# Patient Record
Sex: FEMALE | Race: WHITE | NOT HISPANIC OR LATINO | Employment: OTHER | URBAN - METROPOLITAN AREA
[De-identification: names, ages, dates, MRNs, and addresses within clinical notes are randomized per-mention and may not be internally consistent; named-entity substitution may affect disease eponyms.]

---

## 2017-01-09 ENCOUNTER — OFFICE VISIT (OUTPATIENT)
Dept: PEDIATRICS | Facility: CLINIC | Age: 57
End: 2017-01-09
Payer: COMMERCIAL

## 2017-01-09 VITALS
HEIGHT: 61 IN | SYSTOLIC BLOOD PRESSURE: 131 MMHG | HEART RATE: 84 BPM | BODY MASS INDEX: 28.9 KG/M2 | OXYGEN SATURATION: 97 % | DIASTOLIC BLOOD PRESSURE: 84 MMHG | TEMPERATURE: 96.5 F | WEIGHT: 153.1 LBS

## 2017-01-09 DIAGNOSIS — R06.2 WHEEZING: ICD-10-CM

## 2017-01-09 DIAGNOSIS — F33.2 SEVERE SEASONAL AFFECTIVE DISORDER (H): ICD-10-CM

## 2017-01-09 DIAGNOSIS — N95.1 MENOPAUSAL SYNDROME (HOT FLASHES): ICD-10-CM

## 2017-01-09 DIAGNOSIS — G47.00 INSOMNIA, UNSPECIFIED TYPE: ICD-10-CM

## 2017-01-09 DIAGNOSIS — J30.9 ATOPIC RHINITIS: ICD-10-CM

## 2017-01-09 DIAGNOSIS — H10.45 OTHER CHRONIC ALLERGIC CONJUNCTIVITIS: ICD-10-CM

## 2017-01-09 DIAGNOSIS — B07.0 VERRUCA PLANTARIS: ICD-10-CM

## 2017-01-09 DIAGNOSIS — R52 PAIN: ICD-10-CM

## 2017-01-09 DIAGNOSIS — F32.5 MAJOR DEPRESSION IN COMPLETE REMISSION (H): Primary | ICD-10-CM

## 2017-01-09 LAB — TSH SERPL DL<=0.005 MIU/L-ACNC: 1.18 MU/L (ref 0.4–4)

## 2017-01-09 PROCEDURE — 82306 VITAMIN D 25 HYDROXY: CPT | Mod: 90 | Performed by: NURSE PRACTITIONER

## 2017-01-09 PROCEDURE — 36415 COLL VENOUS BLD VENIPUNCTURE: CPT | Performed by: NURSE PRACTITIONER

## 2017-01-09 PROCEDURE — 84443 ASSAY THYROID STIM HORMONE: CPT | Performed by: NURSE PRACTITIONER

## 2017-01-09 PROCEDURE — 99214 OFFICE O/P EST MOD 30 MIN: CPT | Performed by: NURSE PRACTITIONER

## 2017-01-09 PROCEDURE — 99000 SPECIMEN HANDLING OFFICE-LAB: CPT | Performed by: NURSE PRACTITIONER

## 2017-01-09 RX ORDER — FEXOFENADINE HYDROCHLORIDE 180 MG/1
180 TABLET, FILM COATED ORAL DAILY
Qty: 90 TABLET | Refills: 3 | Status: SHIPPED | OUTPATIENT
Start: 2017-01-09

## 2017-01-09 RX ORDER — ALBUTEROL SULFATE 90 UG/1
2 AEROSOL, METERED RESPIRATORY (INHALATION) EVERY 6 HOURS PRN
Qty: 1 INHALER | Refills: 5 | Status: SHIPPED | OUTPATIENT
Start: 2017-01-09 | End: 2018-07-23

## 2017-01-09 RX ORDER — FLUTICASONE PROPIONATE 50 MCG
1-2 SPRAY, SUSPENSION (ML) NASAL DAILY
Qty: 16 G | Refills: 5 | Status: SHIPPED | OUTPATIENT
Start: 2017-01-09 | End: 2021-08-26

## 2017-01-09 RX ORDER — VENLAFAXINE HYDROCHLORIDE 75 MG/1
225 CAPSULE, EXTENDED RELEASE ORAL DAILY
Qty: 270 CAPSULE | Refills: 3 | Status: SHIPPED | OUTPATIENT
Start: 2017-01-09 | End: 2018-01-19

## 2017-01-09 RX ORDER — TRAZODONE HYDROCHLORIDE 50 MG/1
50 TABLET, FILM COATED ORAL AT BEDTIME
Qty: 90 TABLET | Refills: 3 | Status: SHIPPED | OUTPATIENT
Start: 2017-01-09 | End: 2018-03-05

## 2017-01-09 RX ORDER — OLOPATADINE HYDROCHLORIDE 2 MG/ML
1 SOLUTION/ DROPS OPHTHALMIC DAILY
Qty: 1 BOTTLE | Refills: 6 | Status: SHIPPED | OUTPATIENT
Start: 2017-01-09 | End: 2021-09-17

## 2017-01-09 ASSESSMENT — ANXIETY QUESTIONNAIRES
3. WORRYING TOO MUCH ABOUT DIFFERENT THINGS: MORE THAN HALF THE DAYS
2. NOT BEING ABLE TO STOP OR CONTROL WORRYING: MORE THAN HALF THE DAYS
6. BECOMING EASILY ANNOYED OR IRRITABLE: SEVERAL DAYS
1. FEELING NERVOUS, ANXIOUS, OR ON EDGE: MORE THAN HALF THE DAYS
GAD7 TOTAL SCORE: 9
5. BEING SO RESTLESS THAT IT IS HARD TO SIT STILL: SEVERAL DAYS
7. FEELING AFRAID AS IF SOMETHING AWFUL MIGHT HAPPEN: NOT AT ALL

## 2017-01-09 ASSESSMENT — PATIENT HEALTH QUESTIONNAIRE - PHQ9: 5. POOR APPETITE OR OVEREATING: SEVERAL DAYS

## 2017-01-09 NOTE — NURSING NOTE
"Chief Complaint   Patient presents with     RECHECK     recheck medication for hot flashes     Allergies     need a new script for allegra and inhaler     Depression     would like to discuss restarting prozac       Initial /84 mmHg  Pulse 84  Temp(Src) 96.5  F (35.8  C) (Temporal)  Ht 5' 0.75\" (1.543 m)  Wt 153 lb 1.6 oz (69.446 kg)  BMI 29.17 kg/m2  SpO2 97%  LMP 06/12/2014  Breastfeeding? No Estimated body mass index is 29.17 kg/(m^2) as calculated from the following:    Height as of this encounter: 5' 0.75\" (1.543 m).    Weight as of this encounter: 153 lb 1.6 oz (69.446 kg)..  BP completed using cuff size: KEV Nolan    "

## 2017-01-09 NOTE — PROGRESS NOTES
SUBJECTIVE:                                                    Marcelina Estevez is a 56 year old female who presents to clinic today for the following health issues:    1. Recheck medication for hot flashes.    2. Need a new script for allegra and inhaler    Depression and Anxiety Follow-Up    Status since last visit: Worsened     Other associated symptoms:hard to concentrate, feels more irritable     Complicating factors:     Significant life event: Yes-  Long term care for parents     Current substance abuse: None    PHQ-9 SCORE 6/7/2016 9/28/2016 1/9/2017   Total Score - - -   Total Score MyChart - 12 (Moderate depression) -   Total Score 2 12 8     MARCELINA-7 SCORE 9/24/2015 6/7/2016 1/9/2017   Total Score - - -   Total Score 3 5 9        PHQ-9  English      PHQ-9   Any Language     GAD7   Gotten worse in the last year and was added on Remeron and did not like it how it make her feel  Is caring for her mother with Alzheimer   Now her dad 90 yo and lives alone and was recently diagnosed with pneumonia and is trying to get him into assisted living   Has been in contact with          Amount of exercise or physical activity: walking,yoga    Problems taking medications regularly: No    Medication side effects: none    Diet: regular (no restrictions)        Problem list and histories reviewed & adjusted, as indicated.  Additional history: as documented    Patient Active Problem List   Diagnosis     Hypertension goal BP (blood pressure) < 140/90     Hypertriglyceridemia     Night sweats     Atopic rhinitis     Major depression in complete remission (H)     Keratitis sicca, bilateral     Hyperlipidemia LDL goal <100     CKD (chronic kidney disease) stage 1, GFR 90 ml/min or greater     Menopausal syndrome (hot flashes)     Allergic rhinitis due to pollen     Status post total hysterectomy     Anemia     ACP (advance care planning)     Past Surgical History   Procedure Laterality Date     Release carpal tunnel  bilateral  2009     Orthopedic surgery       left knee tear meniscus     Appendectomy       Ent surgery       tonsillectomy and adnoid removal     Biopsy       cervical node     Dilation and curettage, hysteroscopy diagnostic, combined  7/1/2014     Procedure: COMBINED DILATION AND CURETTAGE, HYSTEROSCOPY DIAGNOSTIC;  Surgeon: Mana Cabrera DO;  Location: [facility] OR     Hysterectomy total abdominal         Social History   Substance Use Topics     Smoking status: Former Smoker     Quit date: 01/01/2001     Smokeless tobacco: Never Used     Alcohol Use: Yes      Comment: social     Family History   Problem Relation Age of Onset     OSTEOPOROSIS Mother      Eye Disorder Mother      cataract, mac degen     OSTEOPOROSIS Father      Eye Disorder Father      glaucoma     Hypertension Maternal Grandmother      Unknown/Adopted Paternal Grandfather      DIABETES Other      Eye Disorder Paternal Grandmother      glaucoma     Hypertension Daughter      DIABETES Maternal Grandfather          Current Outpatient Prescriptions   Medication Sig Dispense Refill     cloNIDine (CATAPRES) 0.2 MG tablet Take 1 tablet (0.2 mg) by mouth daily 90 tablet 1     phenazopyridine (PYRIDIUM) 100 MG tablet Take 1 tablet (100 mg) by mouth 3 times daily as needed for urinary tract discomfort 12 tablet 0     acetaminophen-codeine (TYLENOL #3) 300-30 MG per tablet Take 1 tablet by mouth every 4 hours as needed for moderate pain 15 tablet 0     ALPRAZolam (XANAX) 0.25 MG tablet Take 1 tablet (0.25 mg) by mouth daily as needed for anxiety 30 tablet 1     simvastatin (ZOCOR) 40 MG tablet Take 1 tablet (40 mg) by mouth At Bedtime 90 tablet 2     losartan-hydrochlorothiazide (HYZAAR) 100-25 MG per tablet Take 1 tablet by mouth daily 90 tablet 2     venlafaxine (EFFEXOR-XR) 75 MG 24 hr capsule Take 3 capsules (225 mg) by mouth daily 270 capsule 1     fexofenadine (ALLEGRA ALLERGY) 180 MG tablet Take 1 tablet (180 mg) by mouth daily 30 tablet 0      "fluticasone (FLONASE) 50 MCG/ACT nasal spray Spray 1-2 sprays into both nostrils daily 16 g 5     albuterol (PROAIR HFA, PROVENTIL HFA, VENTOLIN HFA) 108 (90 BASE) MCG/ACT inhaler Inhale 2 puffs into the lungs every 6 hours as needed for shortness of breath / dyspnea 1 Inhaler 5     olopatadine HCl (PATADAY) 0.2 % SOLN Place 1 drop into both eyes daily 1 Bottle 6     traZODone (DESYREL) 50 MG tablet Take 0.5-1 tablets (25-50 mg) by mouth nightly as needed for sleep 90 tablet 3     tretinoin (RETIN-A) 0.05 % cream Spread a pea size amount into affected area on the face topically at bedtime.  Use sunscreen SPF>30. 45 g 2     aspirin (ASPIR-LOW) 81 MG EC tablet Take 81 mg by mouth daily.       Calcium 600+D 600-200 MG-UNIT TABS Take  by mouth.       Allergies   Allergen Reactions     Seasonal Allergies      Sulfa Drugs      Vomiting       Vicodin [Hydrocodone-Acetaminophen] Nausea     Wellbutrin [Bupropion] Nausea       ROS:  CONSTITUTIONAL:NEGATIVE for fever, chills, change in weight  ENT/MOUTH: NEGATIVE for ear, mouth and throat problems  RESP:NEGATIVE for significant cough or SOB  CV: NEGATIVE for chest pain, palpitations or peripheral edema  GI: NEGATIVE for nausea, poor appetite and vomiting  MUSCULOSKELETAL: POSITIVE  for joint pain feet  and NEGATIVE for joint swelling  and joint warmth   NEURO: NEGATIVE for weakness, dizziness or paresthesias  PSYCHIATRIC: POSITIVE foranxiety, depressed mood, Hx anxiety, Hx depression and stress and NEGATIVE forthoughts of hurting someone else and thoughts of self harm  Uses the trazodone but only periodically   OBJECTIVE:                                                    /84 mmHg  Pulse 84  Temp(Src) 96.5  F (35.8  C) (Temporal)  Ht 5' 0.75\" (1.543 m)  Wt 153 lb 1.6 oz (69.446 kg)  BMI 29.17 kg/m2  SpO2 97%  LMP 06/12/2014  Breastfeeding? No  Body mass index is 29.17 kg/(m^2).  GENERAL APPEARANCE: alert, active and no distress  HENT: oral mucous membranes " moist  NECK: no adenopathy and no asymmetry, masses, or scars  RESP: lungs clear to auscultation - no rales, rhonchi or wheezes  CV: regular rates and rhythm, no murmur, click or rub and no irregular beats  MS: extremities normal- no gross deformities noted  NEURO: Normal strength and tone, mentation intact and speech normal  PSYCH: mentation appears normal and affect normal/bright  MENTAL STATUS EXAM:  Appearance/Behavior: No apparent distress and Casually groomed  Speech: Normal  Mood/Affect: normal affect  Insight: Adequate    Diagnostic Test Results:  Results for orders placed or performed in visit on 01/09/17   Vitamin D Deficiency   Result Value Ref Range    Vitamin D Deficiency screening 24 20 - 75 ug/L   TSH with free T4 reflex   Result Value Ref Range    TSH 1.18 0.40 - 4.00 mU/L        ASSESSMENT/PLAN:                                                      Marcelina was seen today for recheck, allergies and depression.    Diagnoses and all orders for this visit:    Major depression in complete remission (H)  -     Vitamin D Deficiency  -     TSH with free T4 reflex    Severe seasonal affective disorder (H)  -     order for DME; Equipment being ordered: light lamp for seasonal affective disorder    Wheezing  -     albuterol (PROAIR HFA/PROVENTIL HFA/VENTOLIN HFA) 108 (90 BASE) MCG/ACT Inhaler; Inhale 2 puffs into the lungs every 6 hours as needed for shortness of breath / dyspnea    Other chronic allergic conjunctivitis  -     olopatadine HCl (PATADAY) 0.2 % SOLN; Place 1 drop into both eyes daily    Verruca plantaris  -     acetaminophen-codeine (TYLENOL #3) 300-30 MG per tablet; Take 1 tablet by mouth every 4 hours as needed for moderate pain  Continue current medications.    Pain  -     acetaminophen-codeine (TYLENOL #3) 300-30 MG per tablet; Take 1 tablet by mouth every 4 hours as needed for moderate pain  Continue current medications.    Menopausal syndrome (hot flashes)  -     venlafaxine (EFFEXOR-XR) 75  MG 24 hr capsule; Take 3 capsules (225 mg) by mouth daily    Atopic rhinitis  -     ALLEGRA ALLERGY 180 MG tablet; Take 1 tablet (180 mg) by mouth daily  -     fluticasone (FLONASE) 50 MCG/ACT spray; Spray 1-2 sprays into both nostrils daily    Insomnia, unspecified type  -     traZODone (DESYREL) 50 MG tablet; Take 1 tablet (50 mg) by mouth At Bedtime    PLAN:   Symptomatic therapy suggested:   Take trazodone every night  Will try a seasonal affective disorder lamp.    Patient needs to follow up in if no improvement,or sooner if worsening of symptoms or other symptoms develop.  Will follow up and/or notify patient of  results via My Chart to determine further need for followup  Follow up in 1 month with Dr. Painting    See Patient Instructions    EDUARD Milner [facility]

## 2017-01-09 NOTE — PATIENT INSTRUCTIONS
It was a pleasure seeing you today at the [facility]. Thank you for allowing us to care for you today. We truly hope we provided you with the excellent service you deserve. Please let us know if there is anything else we can do for you so we can be sure you are leaving completley satisfied with your care experience.       General information about your clinic   Clinic Hours Lab Hours (Appointments are required)   Mon-Thurs: 7:30 AM - 7 PM Mon-Thurs: 7:30 AM - 7 PM   Fri: 7:30 AM - 5 PM Fri: 7:30 AM - 5 PM        After Hours Nurse Advise & Appts:  [facility]: [phone]  [facility]: to make appointments anytime: [phone] On Call Physician: call [phone] and answering service will page the on call physician.        For urgent appointments, please call [phone] and ask for the triage nurse or your care team clinic nurse.  How to contact my care team:  [facility]: [email or web address]   Phone: [phone]   Fax: [phone]       [facility]:   Phone: [phone]  Hours: 8:00 AM - 6:00 PM  Medication Refills:  Call your pharmacy and they will forward the refill to us. Please allow 3 business days for your refills to be completed.       Normal or non-critical lab and imaging results will be communicated to you by MyChart, letter or phone within 7 days.  If you do not hear from us within 10 days, please call the clinic. If you have a critical or abnormal lab result, we will notify you by phone as soon as possible.       We now have PWIC (Pediatric Walk in Care)  Monday-Friday from 7:30-4. Simply walk in and be seen for your urgent needs like cough, fever, rash, diarrhea or vomiting, pink eye, UTI. No appointments needed. Ask one of the team for more information      -Your Care Team:    Dr. Annette Painting - Internal Medicine/Pediatrics   Dr. Abdias Gold - Family Medicine  Dr. Bianca Serrano - Pediatrics  Keerthi Burr CNP - Family Practice Nurse  Practitioner    PLAN:   1.   Symptomatic therapy suggested:   Take trazodone every night  Will try a seasonal affective disorder lamp.  2.  Orders Placed This Encounter   Medications     albuterol (PROAIR HFA/PROVENTIL HFA/VENTOLIN HFA) 108 (90 BASE) MCG/ACT Inhaler     Sig: Inhale 2 puffs into the lungs every 6 hours as needed for shortness of breath / dyspnea     Dispense:  1 Inhaler     Refill:  5     ALLEGRA ALLERGY 180 MG tablet     Sig: Take 1 tablet (180 mg) by mouth daily     Dispense:  90 tablet     Refill:  3     fluticasone (FLONASE) 50 MCG/ACT spray     Sig: Spray 1-2 sprays into both nostrils daily     Dispense:  16 g     Refill:  5     olopatadine HCl (PATADAY) 0.2 % SOLN     Sig: Place 1 drop into both eyes daily     Dispense:  1 Bottle     Refill:  6     acetaminophen-codeine (TYLENOL #3) 300-30 MG per tablet     Sig: Take 1 tablet by mouth every 4 hours as needed for moderate pain     Dispense:  15 tablet     Refill:  0     venlafaxine (EFFEXOR-XR) 75 MG 24 hr capsule     Sig: Take 3 capsules (225 mg) by mouth daily     Dispense:  270 capsule     Refill:  3     traZODone (DESYREL) 50 MG tablet     Sig: Take 1 tablet (50 mg) by mouth At Bedtime     Dispense:  90 tablet     Refill:  3     order for DME     Sig: Equipment being ordered: light lamp for seasonal affective disorder     Dispense:  1 Device     Refill:  0     Orders Placed This Encounter   Procedures     Vitamin D Deficiency     TSH with free T4 reflex       3. Patient needs to follow up in if no improvement,or sooner if worsening of symptoms or other symptoms develop.  Will follow up and/or notify patient of  results via My Chart to determine further need for followup  Follow up in 1 month with Dr. Painting

## 2017-01-09 NOTE — MR AVS SNAPSHOT
After Visit Summary   1/9/2017    Marcelina Estevez    MRN: [id number]           Patient Information     Date Of Birth          1960        Visit Information        Provider Department      1/9/2017 2:20 PM Keerthi Burr APRN [facility]        Today's Diagnoses     Wheezing    -  1     Major depression in complete remission (H)         Other chronic allergic conjunctivitis         Verruca plantaris         Pain         Menopausal syndrome (hot flashes)         Atopic rhinitis         Insomnia, unspecified type         Severe seasonal affective disorder (H)           Care Instructions    It was a pleasure seeing you today at the [facility]. Thank you for allowing us to care for you today. We truly hope we provided you with the excellent service you deserve. Please let us know if there is anything else we can do for you so we can be sure you are leaving completley satisfied with your care experience.       General information about your clinic   Clinic Hours Lab Hours (Appointments are required)   Mon-Thurs: 7:30 AM - 7 PM Mon-Thurs: 7:30 AM - 7 PM   Fri: 7:30 AM - 5 PM Fri: 7:30 AM - 5 PM        After Hours Nurse Advise & Appts:  [facility]: [phone]  [facility] On Call: to make appointments anytime: [phone] On Call Physician: call [phone] and answering service will page the on call physician.        For urgent appointments, please call [phone] and ask for the triage nurse or your care team clinic nurse.  How to contact my care team:  MyChart: [email or web address]   Phone: [phone]   Fax: [phone]       [facility]:   Phone: [phone]  Hours: 8:00 AM - 6:00 PM  Medication Refills:  Call your pharmacy and they will forward the refill to us. Please allow 3 business days for your refills to be completed.       Normal or non-critical lab and imaging results will be communicated to you by  Anyt, letter or phone within 7 days.  If you do not hear from us within 10 days, please call the clinic. If you have a critical or abnormal lab result, we will notify you by phone as soon as possible.       We now have PWIC (Pediatric Walk in Care)  Monday-Friday from 7:30-4. Simply walk in and be seen for your urgent needs like cough, fever, rash, diarrhea or vomiting, pink eye, UTI. No appointments needed. Ask one of the team for more information      -Your Care Team:    Dr. Annette Painting - Internal Medicine/Pediatrics   Dr. Abdias Gold - Family Medicine  Dr. Bianca Serrano - Pediatrics  Keerthi Burr CNP - Family Practice Nurse Practitioner    PLAN:   1.   Symptomatic therapy suggested:   Take trazodone every night  Will try a seasonal affective disorder lamp.  2.  Orders Placed This Encounter   Medications     albuterol (PROAIR HFA/PROVENTIL HFA/VENTOLIN HFA) 108 (90 BASE) MCG/ACT Inhaler     Sig: Inhale 2 puffs into the lungs every 6 hours as needed for shortness of breath / dyspnea     Dispense:  1 Inhaler     Refill:  5     ALLEGRA ALLERGY 180 MG tablet     Sig: Take 1 tablet (180 mg) by mouth daily     Dispense:  90 tablet     Refill:  3     fluticasone (FLONASE) 50 MCG/ACT spray     Sig: Spray 1-2 sprays into both nostrils daily     Dispense:  16 g     Refill:  5     olopatadine HCl (PATADAY) 0.2 % SOLN     Sig: Place 1 drop into both eyes daily     Dispense:  1 Bottle     Refill:  6     acetaminophen-codeine (TYLENOL #3) 300-30 MG per tablet     Sig: Take 1 tablet by mouth every 4 hours as needed for moderate pain     Dispense:  15 tablet     Refill:  0     venlafaxine (EFFEXOR-XR) 75 MG 24 hr capsule     Sig: Take 3 capsules (225 mg) by mouth daily     Dispense:  270 capsule     Refill:  3     traZODone (DESYREL) 50 MG tablet     Sig: Take 1 tablet (50 mg) by mouth At Bedtime     Dispense:  90 tablet     Refill:  3     order for DME     Sig: Equipment being ordered: light lamp for seasonal affective  disorder     Dispense:  1 Device     Refill:  0     Orders Placed This Encounter   Procedures     Vitamin D Deficiency     TSH with free T4 reflex       3. Patient needs to follow up in if no improvement,or sooner if worsening of symptoms or other symptoms develop.  Will follow up and/or notify patient of  results via My Chart to determine further need for followup  Follow up in 1 month with Dr. Painting                         Follow-ups after your visit        Who to contact     If you have questions or need follow up information about today's clinic visit or your schedule please contact [facility] directly at [phone].  Normal or non-critical lab and imaging results will be communicated to you by [employer], letter or phone within 4 business days after the clinic has received the results. If you do not hear from us within 7 days, please contact the clinic through [employer] or phone. If you have a critical or abnormal lab result, we will notify you by phone as soon as possible.  Submit refill requests through [employer] or call your pharmacy and they will forward the refill request to us. Please allow 3 business days for your refill to be completed.          Additional Information About Your Visit        [employer] Information     [employer] gives you secure access to your electronic health record. If you see a primary care provider, you can also send messages to your care team and make appointments. If you have questions, please call your primary care clinic.  If you do not have a primary care provider, please call [phone] and they will assist you.      [employer] is an electronic gateway that provides easy, online access to your medical records. With [employer], you can request a clinic appointment, read your test results, renew a prescription or communicate with your care team.     To access your existing account, please contact your [facility] or call [phone] for  "assistance.        Care EveryWhere ID     This is your Care EveryWhere ID. This could be used by other organizations to access your [facility] medical records  [id number]        Your Vitals Were     Pulse Temperature Height    84 96.5  F (35.8  C) (Temporal) 5' 0.75\" (1.543 m)    BMI (Body Mass Index) Pulse Oximetry Last Period    29.17 kg/m2 97% 06/12/2014    Breastfeeding?          No         Blood Pressure from Last 3 Encounters:   01/09/17 131/84   11/14/16 126/80   10/03/16 124/86    Weight from Last 3 Encounters:   01/09/17 153 lb 1.6 oz (69.446 kg)   11/14/16 149 lb 9.6 oz (67.858 kg)   10/03/16 146 lb (66.225 kg)              We Performed the Following     TSH with free T4 reflex     Vitamin D Deficiency          Today's Medication Changes          These changes are accurate as of: 1/9/17  3:13 PM.  If you have any questions, ask your nurse or doctor.               Start taking these medicines.        Dose/Directions    ALLEGRA ALLERGY 180 MG tablet   Used for:  Atopic rhinitis   Generic drug:  fexofenadine   Replaces:  fexofenadine 180 MG tablet   Started by:  Keerthi Burr APRN CNP        Dose:  180 mg   Take 1 tablet (180 mg) by mouth daily   Quantity:  90 tablet   Refills:  3       order for DME   Used for:  Severe seasonal affective disorder (H)   Started by:  Keerthi Burr APRN CNP        Equipment being ordered: light lamp for seasonal affective disorder   Quantity:  1 Device   Refills:  0         These medicines have changed or have updated prescriptions.        Dose/Directions    traZODone 50 MG tablet   Commonly known as:  DESYREL   This may have changed:    - how much to take  - when to take this  - reasons to take this   Used for:  Insomnia, unspecified type   Changed by:  Keerthi Burr APRN CNP        Dose:  50 mg   Take 1 tablet (50 mg) by mouth At Bedtime   Quantity:  90 tablet   Refills:  3         Stop taking these medicines if you haven't already. Please contact " your care team if you have questions.     fexofenadine 180 MG tablet   Commonly known as:  ALLEGRA ALLERGY   Replaced by:  ALLEGRA ALLERGY 180 MG tablet   Stopped by:  Keerthi Burr APRN CNP                Where to get your medicines      These medications were sent to [facility] - [place], [place] - [address], [address]  [address], [address], [facility] [address]     Phone:  [phone]    - albuterol 108 (90 BASE) MCG/ACT Inhaler  - ALLEGRA ALLERGY 180 MG tablet  - fluticasone 50 MCG/ACT spray  - olopatadine HCl 0.2 % Soln  - traZODone 50 MG tablet  - venlafaxine 75 MG 24 hr capsule      Some of these will need a paper prescription and others can be bought over the counter.  Ask your nurse if you have questions.     Bring a paper prescription for each of these medications    - acetaminophen-codeine 300-30 MG per tablet  - order for DME             Primary Care Provider Office Phone # Fax #    Annette Painting [phone]       [facility] [address]  [facility] [address]        Thank you!     Thank you for choosing [facility]  for your care. Our goal is always to provide you with excellent care. Hearing back from our patients is one way we can continue to improve our services. Please take a few minutes to complete the written survey that you may receive in the mail after your visit with us. Thank you!             Your Updated Medication List - Protect others around you: Learn how to safely use, store and throw away your medicines at www.disposemymeds.org.          This list is accurate as of: 1/9/17  3:13 PM.  Always use your most recent med list.                   Brand Name Dispense Instructions for use    acetaminophen-codeine 300-30 MG per tablet    TYLENOL #3    15 tablet    Take 1 tablet by mouth every 4 hours as needed for moderate pain       albuterol 108 (90 BASE) MCG/ACT Inhaler    PROAIR HFA/PROVENTIL HFA/VENTOLIN  HFA    1 Inhaler    Inhale 2 puffs into the lungs every 6 hours as needed for shortness of breath / dyspnea       ALLEGRA ALLERGY 180 MG tablet   Generic drug:  fexofenadine     90 tablet    Take 1 tablet (180 mg) by mouth daily       ALPRAZolam 0.25 MG tablet    XANAX    30 tablet    Take 1 tablet (0.25 mg) by mouth daily as needed for anxiety       ASPIR-LOW 81 MG EC tablet   Generic drug:  aspirin      Take 81 mg by mouth daily.       CALCIUM 600+D 600-200 MG-UNIT Tabs   Generic drug:  calcium carbonate-vitamin D      Take  by mouth.       cloNIDine 0.2 MG tablet    CATAPRES    90 tablet    Take 1 tablet (0.2 mg) by mouth daily       fluticasone 50 MCG/ACT spray    FLONASE    16 g    Spray 1-2 sprays into both nostrils daily       losartan-hydrochlorothiazide 100-25 MG per tablet    HYZAAR    90 tablet    Take 1 tablet by mouth daily       olopatadine HCl 0.2 % Soln    PATADAY    1 Bottle    Place 1 drop into both eyes daily       order for DME     1 Device    Equipment being ordered: light lamp for seasonal affective disorder       phenazopyridine 100 MG tablet    PYRIDIUM    12 tablet    Take 1 tablet (100 mg) by mouth 3 times daily as needed for urinary tract discomfort       simvastatin 40 MG tablet    ZOCOR    90 tablet    Take 1 tablet (40 mg) by mouth At Bedtime       traZODone 50 MG tablet    DESYREL    90 tablet    Take 1 tablet (50 mg) by mouth At Bedtime       tretinoin 0.05 % cream    RETIN-A    45 g    Spread a pea size amount into affected area on the face topically at bedtime.  Use sunscreen SPF>30.       venlafaxine 75 MG 24 hr capsule    EFFEXOR-XR    270 capsule    Take 3 capsules (225 mg) by mouth daily

## 2017-01-10 LAB — DEPRECATED CALCIDIOL+CALCIFEROL SERPL-MC: 24 UG/L (ref 20–75)

## 2017-01-10 ASSESSMENT — PATIENT HEALTH QUESTIONNAIRE - PHQ9: SUM OF ALL RESPONSES TO PHQ QUESTIONS 1-9: 8

## 2017-01-10 ASSESSMENT — ANXIETY QUESTIONNAIRES: GAD7 TOTAL SCORE: 9

## 2017-01-10 NOTE — PROGRESS NOTES
Quick Note:    Cyndie Estevez,    Attached are your test results.  -TSH (thyroid stimulating hormone) level is normal which indicates normal thyroid function.   Please contact us if you have any questions.    Keerthi Burr, NICK    ______

## 2017-01-10 NOTE — PROGRESS NOTES
Quick Note:    Cyndie Estevez,    Attached are your test results.  -Vitamin D level is mildly below normal, 1000 IU daily in diet or supplements is recommended.    Please contact us if you have any questions.    Keerthi Burr, NICK    ______

## 2017-02-15 DIAGNOSIS — F41.0 PANIC ATTACK: ICD-10-CM

## 2017-02-15 DIAGNOSIS — R52 PAIN: ICD-10-CM

## 2017-02-15 DIAGNOSIS — B07.0 VERRUCA PLANTARIS: ICD-10-CM

## 2017-02-15 RX ORDER — ALPRAZOLAM 0.25 MG
0.25 TABLET ORAL DAILY PRN
Qty: 30 TABLET | Refills: 1 | Status: SHIPPED | OUTPATIENT
Start: 2017-02-15 | End: 2017-07-25

## 2017-02-15 NOTE — TELEPHONE ENCOUNTER
Hello,  last fill date:01-  Last quantity:15    Thank You,  Meagan Bishop  Pharmacy Technician  [facility]  [phone]

## 2017-04-26 DIAGNOSIS — B07.0 VERRUCA PLANTARIS: ICD-10-CM

## 2017-04-26 DIAGNOSIS — R52 PAIN: ICD-10-CM

## 2017-04-26 DIAGNOSIS — I10 HYPERTENSION GOAL BP (BLOOD PRESSURE) < 140/90: ICD-10-CM

## 2017-04-26 NOTE — TELEPHONE ENCOUNTER
Hello,    Patient last filled hyzaar on 1/23/17 for #90 and Tylenol #3 on 2/15/17 for #15    Thank you,    Cheryl Aguilar-Alena  [facility]   [phone]

## 2017-04-27 RX ORDER — LOSARTAN POTASSIUM AND HYDROCHLOROTHIAZIDE 25; 100 MG/1; MG/1
1 TABLET ORAL DAILY
Qty: 90 TABLET | Refills: 0 | Status: SHIPPED | OUTPATIENT
Start: 2017-04-27 | End: 2017-08-07

## 2017-04-27 RX ORDER — LOSARTAN POTASSIUM AND HYDROCHLOROTHIAZIDE 25; 100 MG/1; MG/1
TABLET ORAL
Qty: 60 TABLET | Refills: 0 | Status: SHIPPED | OUTPATIENT
Start: 2017-04-27 | End: 2017-08-07

## 2017-04-27 NOTE — TELEPHONE ENCOUNTER
losartan-hydrochlorothiazide (HYZAAR) 100-25 MG per tablet  Last Written Prescription Date: 7/28/2016  Last Fill Quantity: 90, # refills: 2  Last Office Visit with [facility], P or [facility] prescribing provider: 1/9/2017       Potassium   Date Value Ref Range Status   06/29/2016 4.0 3.4 - 5.3 mmol/L Final     Creatinine   Date Value Ref Range Status   06/29/2016 0.85 0.52 - 1.04 mg/dL Final     BP Readings from Last 3 Encounters:   01/09/17 131/84   11/14/16 126/80   10/03/16 124/86     Refilled per [facility] protocol.    Aisha Mclaughlin RN

## 2017-06-22 DIAGNOSIS — B07.0 VERRUCA PLANTARIS: ICD-10-CM

## 2017-06-22 DIAGNOSIS — I10 HYPERTENSION GOAL BP (BLOOD PRESSURE) < 140/90: ICD-10-CM

## 2017-06-22 DIAGNOSIS — R52 PAIN: ICD-10-CM

## 2017-06-22 DIAGNOSIS — R61 NIGHT SWEATS: ICD-10-CM

## 2017-06-22 RX ORDER — CLONIDINE HYDROCHLORIDE 0.2 MG/1
TABLET ORAL
Qty: 90 TABLET | Refills: 1 | OUTPATIENT
Start: 2017-06-22

## 2017-06-22 RX ORDER — LOSARTAN POTASSIUM AND HYDROCHLOROTHIAZIDE 25; 100 MG/1; MG/1
TABLET ORAL
Qty: 60 TABLET | Refills: 0 | OUTPATIENT
Start: 2017-06-22

## 2017-06-22 RX ORDER — LOSARTAN POTASSIUM AND HYDROCHLOROTHIAZIDE 25; 100 MG/1; MG/1
1 TABLET ORAL DAILY
Qty: 90 TABLET | Refills: 0 | OUTPATIENT
Start: 2017-06-22

## 2017-06-22 RX ORDER — CLONIDINE HYDROCHLORIDE 0.2 MG/1
0.2 TABLET ORAL DAILY
Qty: 30 TABLET | Refills: 0 | Status: SHIPPED | OUTPATIENT
Start: 2017-06-22 | End: 2017-07-25

## 2017-06-22 NOTE — TELEPHONE ENCOUNTER
Routing refill request to provider for review/approval because:  Drug not on the [facility] refill protocol   acetominophen-codeine (TYLENOL #3) 300-30 MG per tablet     Last Written Prescription Date: 4/27/2017  Last Fill Quantity: 15,  # refills: 0   Last Office Visit with [facility], [facility] or [facility] prescribing provider: 1/9/2017                                               losartan-hydrochlorothiazide (HYZAAR) 100-25 MG per tablet\  Last Written Prescription Date: 4/27/2017  Last Fill Quantity: 90, # refills: 0  Last Office Visit with [facility], [facility] or [facility] prescribing provider: 1/9/2017  Potassium   Date Value Ref Range Status   06/29/2016 4.0 3.4 - 5.3 mmol/L Final     Creatinine   Date Value Ref Range Status   06/29/2016 0.85 0.52 - 1.04 mg/dL Final     BP Readings from Last 3 Encounters:   01/09/17 131/84   11/14/16 126/80   10/03/16 124/86   Patient requesting refill too soon. Patient should still have a 30 day supply of Hyzaar. Refill refused.       cloNIDine (CATAPRES) 0.2 MG tablet    Last Written Prescription Date: 12/12/2016  Last Fill Quantity: 90, # refills: 1  Last Office Visit with [facility], [facility] or [facility] prescribing provider: 1/9/2017  Potassium   Date Value Ref Range Status   06/29/2016 4.0 3.4 - 5.3 mmol/L Final     Creatinine   Date Value Ref Range Status   06/29/2016 0.85 0.52 - 1.04 mg/dL Final     BP Readings from Last 3 Encounters:   01/09/17 131/84   11/14/16 126/80   10/03/16 124/86     Refilled Catapres for one month as patient will be due shortly for laboratory monitoring.     Refilled per [facility] protocol.    Aisha Mclaughlin RN

## 2017-06-22 NOTE — TELEPHONE ENCOUNTER
Hello,  last fill date:04-  Last quantity:15  For t#3       last fill date:04-  Last quantity:90 for losartan and clonidine    Thank You,  Meagan Bishop  Pharmacy Technician  [facility]  [phone]

## 2017-06-23 NOTE — TELEPHONE ENCOUNTER
Patient notified RX tubed to onsite pharmacy.  Read her Dr. Painting's note in full.  Patient verbalized understanding. She is coming in on Monday for mammogram, no appointments available that day.  She states she will call back to schedule a physical with fasting labs next week.        RX tubed to onsite pharmacy.    Josey Le RN,   [facility]

## 2017-06-23 NOTE — TELEPHONE ENCOUNTER
Please let patient now that we have new policy regarding Tylenol with codeine refills. She will need an appointment to sign pain contract for ongoing refills. I can give her one last prescription.   She will need to see me before future refills. She is also due for annual physical and fasting labs. Please schedule an appointment.

## 2017-06-26 ENCOUNTER — RADIANT APPOINTMENT (OUTPATIENT)
Dept: MAMMOGRAPHY | Facility: CLINIC | Age: 57
End: 2017-06-26
Attending: INTERNAL MEDICINE
Payer: COMMERCIAL

## 2017-06-26 DIAGNOSIS — Z12.31 VISIT FOR SCREENING MAMMOGRAM: ICD-10-CM

## 2017-06-26 PROCEDURE — G0202 SCR MAMMO BI INCL CAD: HCPCS | Performed by: RADIOLOGY

## 2017-06-26 PROCEDURE — 77063 BREAST TOMOSYNTHESIS BI: CPT | Performed by: RADIOLOGY

## 2017-07-25 DIAGNOSIS — R61 NIGHT SWEATS: ICD-10-CM

## 2017-07-25 DIAGNOSIS — F41.0 PANIC ATTACK: ICD-10-CM

## 2017-07-25 NOTE — TELEPHONE ENCOUNTER
Hello,  last fill date:06-  Last quantity:30    Thank You,  Meagan Bishop  Pharmacy Technician  [facility]  [phone]

## 2017-07-26 DIAGNOSIS — R61 NIGHT SWEATS: ICD-10-CM

## 2017-07-26 RX ORDER — ALPRAZOLAM 0.25 MG
0.25 TABLET ORAL DAILY PRN
Qty: 30 TABLET | Refills: 1 | Status: SHIPPED | OUTPATIENT
Start: 2017-07-26 | End: 2017-11-27

## 2017-07-26 RX ORDER — CLONIDINE HYDROCHLORIDE 0.2 MG/1
TABLET ORAL
Qty: 30 TABLET | Refills: 4 | Status: SHIPPED | OUTPATIENT
Start: 2017-07-26 | End: 2017-07-26

## 2017-07-26 RX ORDER — CLONIDINE HYDROCHLORIDE 0.2 MG/1
0.2 TABLET ORAL DAILY
Qty: 30 TABLET | Refills: 4 | Status: SHIPPED | OUTPATIENT
Start: 2017-07-26 | End: 2017-08-07

## 2017-07-26 NOTE — TELEPHONE ENCOUNTER
cloNIDine (CATAPRES) 0.2 MG tablet - due to internet connection issue - first script did not go through. Refilled. Aisha Mclaughlin RN

## 2017-07-26 NOTE — TELEPHONE ENCOUNTER
Routing refill request to provider for review/approval because:  Drug not on the [facility] refill protocol   ALPRAZolam (XANAX) 0.25 MG tablet  Last Written Prescription Date: 2/15/2017  Last Fill Quantity: 30,  # refills: 1   Last Office Visit with [facility] or [facility] prescribing provider: 1/9/2017                                         Next 5 appointments (look out 90 days)     Aug 07, 2017  2:20 PM CDT   PHYSICAL with Annette Painting MD   [facility])    [address] [place] [id number]   [phone]                  cloNIDine (CATAPRES) 0.2 MG tablet   Last Written Prescription Date: 6/22/2017  Last Fill Quantity: 30, # refills: 0  Last Office Visit with [facility], [facility] or [facility] prescribing provider: 1/9/2017  Next 5 appointments (look out 90 days)     Aug 07, 2017  2:20 PM CDT   PHYSICAL with Annette Painting MD   [facility])    [id number] [place] [id number]   [phone]                   Potassium   Date Value Ref Range Status   06/29/2016 4.0 3.4 - 5.3 mmol/L Final     Creatinine   Date Value Ref Range Status   06/29/2016 0.85 0.52 - 1.04 mg/dL Final     BP Readings from Last 3 Encounters:   01/09/17 131/84   11/14/16 126/80   10/03/16 124/86     Refilled clonidine per [facility] protocol.    Aisha Mclaughlin RN

## 2017-07-26 NOTE — TELEPHONE ENCOUNTER
Please inform patient, refill/prescriptioni approved. Please fax prescription to designated pharmacy.

## 2017-07-27 ENCOUNTER — DOCUMENTATION ONLY (OUTPATIENT)
Dept: LAB | Facility: CLINIC | Age: 57
End: 2017-07-27

## 2017-07-27 DIAGNOSIS — E78.5 HYPERLIPIDEMIA LDL GOAL <100: ICD-10-CM

## 2017-07-27 DIAGNOSIS — I10 HYPERTENSION GOAL BP (BLOOD PRESSURE) < 140/90: Primary | ICD-10-CM

## 2017-07-27 NOTE — TELEPHONE ENCOUNTER
Voicemail message left for patient advising her the script she requested has been sent to [facility] pharmacy.    Marielle Alexander CMA

## 2017-08-02 ENCOUNTER — TELEPHONE (OUTPATIENT)
Dept: PEDIATRICS | Facility: CLINIC | Age: 57
End: 2017-08-02

## 2017-08-02 DIAGNOSIS — B07.0 VERRUCA PLANTARIS: ICD-10-CM

## 2017-08-02 DIAGNOSIS — I10 HYPERTENSION GOAL BP (BLOOD PRESSURE) < 140/90: ICD-10-CM

## 2017-08-02 DIAGNOSIS — E78.5 HYPERLIPIDEMIA LDL GOAL <100: ICD-10-CM

## 2017-08-02 DIAGNOSIS — N18.1 CKD (CHRONIC KIDNEY DISEASE) STAGE 1, GFR 90 ML/MIN OR GREATER: Primary | ICD-10-CM

## 2017-08-02 DIAGNOSIS — R52 PAIN: ICD-10-CM

## 2017-08-02 LAB
ANION GAP SERPL CALCULATED.3IONS-SCNC: 9 MMOL/L (ref 3–14)
BUN SERPL-MCNC: 30 MG/DL (ref 7–30)
CALCIUM SERPL-MCNC: 9.5 MG/DL (ref 8.5–10.1)
CHLORIDE SERPL-SCNC: 97 MMOL/L (ref 94–109)
CHOLEST SERPL-MCNC: 237 MG/DL
CO2 SERPL-SCNC: 27 MMOL/L (ref 20–32)
CREAT SERPL-MCNC: 0.99 MG/DL (ref 0.52–1.04)
CREAT UR-MCNC: 137 MG/DL
GFR SERPL CREATININE-BSD FRML MDRD: 58 ML/MIN/1.7M2
GLUCOSE SERPL-MCNC: 98 MG/DL (ref 70–99)
HDLC SERPL-MCNC: 97 MG/DL
LDLC SERPL CALC-MCNC: 114 MG/DL
MICROALBUMIN UR-MCNC: 166 MG/L
MICROALBUMIN/CREAT UR: 121.17 MG/G CR (ref 0–25)
NONHDLC SERPL-MCNC: 140 MG/DL
POTASSIUM SERPL-SCNC: 4.1 MMOL/L (ref 3.4–5.3)
SODIUM SERPL-SCNC: 133 MMOL/L (ref 133–144)
TRIGL SERPL-MCNC: 129 MG/DL

## 2017-08-02 PROCEDURE — 82043 UR ALBUMIN QUANTITATIVE: CPT | Performed by: INTERNAL MEDICINE

## 2017-08-02 PROCEDURE — 80061 LIPID PANEL: CPT | Performed by: INTERNAL MEDICINE

## 2017-08-02 PROCEDURE — 80048 BASIC METABOLIC PNL TOTAL CA: CPT | Performed by: INTERNAL MEDICINE

## 2017-08-02 PROCEDURE — 36415 COLL VENOUS BLD VENIPUNCTURE: CPT | Performed by: INTERNAL MEDICINE

## 2017-08-02 NOTE — TELEPHONE ENCOUNTER
Patient advised of information below per Ira Burr.  Patient stated understanding.  Patient said she takes about 2 ibuprofen weekly for headaches or pain of some sort.  She said she takes Tylenol #3 but mentioned that she only gets 15 tablets at a time and now Dr. Painting wants her to sign a pain contract which she has no idea what that is about.  Patient is wondering if you have any ideas of what she could take for pain that would work and not effect her Kidneys?  Patient would like you to put in the lab order for her so she doesn't have to worry about calling us in 3 months.    Marielle Alexander [facility]    Message routed to Ira Burr

## 2017-08-02 NOTE — TELEPHONE ENCOUNTER
Albumin Random Urine Quantitative   Status:  Final result   Visible to patient:  Yes ([facility]) Dx:  Hypertension goal BP (blood pressure)... Order: [id number]       Notes Recorded by Keerthi Burr APRN CNP on 8/2/2017 at 8:55 AM  Please call   Dr. Moulton out of the office and we are reviewing your results for you.  Please follow up if further concerns or questions   Please make sure taking her BP medications daily or taking a lot of NSAIDs OTC as her urine microalbuminuria went up considerably  ------    Notes Recorded by Keerthi Burr APRN CNP on 8/2/2017 at 8:54 AM  Please call  Marcelina Estevez,  Attached are your test results.  -Microalbumin (urine protein) level is elevated. This is suggestive of early damage to your kidneys from high blood pressure.  ADVISE: avoiding anti-inflamatory agents such as ibuprofen (Advil, Motrin) or naproxen (Aleve) as much as possible, keeping your blood pressure in a normal range, and rechecking your microalbumin level in 3 months (microalbumin; DX: Microalbuminuria)   Please contact us if you have any questions.    Keerthi Burr CNP             Ref Range & Units 8:10 AM   1yr ago   2yr ago        Creatinine Urine mg/dL 137 104 155      Albumin Urine mg/L mg/L 166 31 105      Albumin Urine mg/g Cr 0 - 25 mg/g Cr 121.17 (H) 29.90 (H) 67.74 (H)     Resulting Agency  [facility]          Specimen Collected: 08/02/17  8:10 AM Last Resulted: 08/02/17  8:44 AM                                Lipid panel reflex to direct LDL   Status:  Final result   [facility]  [facility] Dx:  Hyperlipidemia LDL goal <100 Order: [id number]       Notes Recorded by Keerthi Burr APRN CNP on 8/2/2017 at 8:55 AM  Please call   Dr. Moulton out of the office and we are reviewing your results for you.  Please follow up if further concerns or questions   Please make sure taking her BP medications daily or  taking a lot of NSAIDs OTC as her urine microalbuminuria went up considerably  ------    Notes Recorded by Keerthi Burr APRN CNP on 8/2/2017 at 8:54 AM  Please call  Marcelina Estevez,  Attached are your test results.  -Microalbumin (urine protein) level is elevated. This is suggestive of early damage to your kidneys from high blood pressure.  ADVISE: avoiding anti-inflamatory agents such as ibuprofen (Advil, Motrin) or naproxen (Aleve) as much as possible, keeping your blood pressure in a normal range, and rechecking your microalbumin level in 3 months (microalbumin; DX: Microalbuminuria)   Please contact us if you have any questions.    Keerthi Burr CNP    ------    Notes Recorded by Keerthi Burr APRN CNP on 8/2/2017 at 8:39 AM  Cyndie Estevez,    Is out of the office and we are reviewing your results for you.  Please follow up if further concerns or questions     Attached are your test results.  -Kidney function (GFR) is decreased.  ADVISE: lets recheck this not fasting in the next week or 2  and be sure drinking plenty of water  -Sodium is normal.  -Potassium is normal.  -Glucose (diabetic screening test) is normal.  -Cholesterol levels are at your goal levels.  ADVISE: Continuing your medication, a regular exercise program with at least 30 minutes of aerobic exercise 3-4 days/week ( 45 minutes 4-6 days/week if weight loss needed), and a low saturated fat,/low carbohydrate diet are helpful to maintain this.  Rechecking your fasting cholesterol panel in 12 months is recommended (Lipid w/ LDL reflex).   Please contact us if you have any questions.    Keerthi Burr CNP             Ref Range & Units 8:05 AM   1yr ago   2yr ago        Cholesterol <200 mg/dL 237 (H) 227 (H) (H)CM     Comments: Desirable:       <200 mg/dl      Triglycerides <150 mg/dL 129 106CM 83R, CM     Comments: Fasting specimen      HDL Cholesterol >49 mg/dL 97 99 102R      LDL Cholesterol Calculated <100  mg/dL 114 (H) 107 (H)CM 95R, CM     Comments: Above desirable:  100-129 mg/dl    Borderline High:  130-159 mg/dL    High:             160-189 mg/dL    Very high:       >189 mg/dl         Non HDL Cholesterol <130 mg/dL 140 (H) 128      Comments: Above Desirable:  130-159 mg/dl    Borderline high:  160-189 mg/dl    High:             190-219 mg/dl    Very high:       >219 mg/dl        [facility]ing Agency  [facility]          Specimen Collected: 08/02/17  8:05 AM Last Resulted: 08/02/17  8:30 AM                CM=Additional comments  R=Reference range differs from displayed range                     Basic metabolic panel   Status:  Final result   Visible to patient:  Yes (MyChart) Dx:  Hypertension goal BP (blood pressure)... Order: [id number]       Notes Recorded by Keerthi Burr APRN CNP on 8/2/2017 at 8:55 AM  Please call   Dr. Moulton out of the office and we are reviewing your results for you.  Please follow up if further concerns or questions   Please make sure taking her BP medications daily or taking a lot of NSAIDs OTC as her urine microalbuminuria went up considerably  ------    Notes Recorded by Keerthi Burr APRN CNP on 8/2/2017 at 8:54 AM  Please call  Marcelina Estevez,  Attached are your test results.  -Microalbumin (urine protein) level is elevated. This is suggestive of early damage to your kidneys from high blood pressure.  ADVISE: avoiding anti-inflamatory agents such as ibuprofen (Advil, Motrin) or naproxen (Aleve) as much as possible, keeping your blood pressure in a normal range, and rechecking your microalbumin level in 3 months (microalbumin; DX: Microalbuminuria)   Please contact us if you have any questions.    Keerthi Burr CNP    ------    Notes Recorded by Keerthi Burr APRN CNP on 8/2/2017 at 8:39 AM  Dr.Ho Reena  Is out of the office and we are reviewing your results for you.  Please follow up if further concerns or questions     Attached are your test  results.  -Kidney function (GFR) is decreased.  ADVISE: lets recheck this not fasting in the next week or 2  and be sure drinking plenty of water  -Sodium is normal.  -Potassium is normal.  -Glucose (diabetic screening test) is normal.  -Cholesterol levels are at your goal levels.  ADVISE: Continuing your medication, a regular exercise program with at least 30 minutes of aerobic exercise 3-4 days/week ( 45 minutes 4-6 days/week if weight loss needed), and a low saturated fat,/low carbohydrate diet are helpful to maintain this.  Rechecking your fasting cholesterol panel in 12 months is recommended (Lipid w/ LDL reflex).   Please contact us if you have any questions.    Keerthi Burr, CNP             Ref Range & Units 8:05 AM   1yr ago   2yr ago        Sodium 133 - 144 mmol/L 133 138 138      Potassium 3.4 - 5.3 mmol/L 4.1 4.0 4.3      Chloride 94 - 109 mmol/L 97 104 105      Carbon Dioxide 20 - 32 mmol/L 27 26 26      Anion Gap 3 - 14 mmol/L 9 8 7      Glucose 70 - 99 mg/dL 98 89 95     Comments: Fasting specimen      Urea Nitrogen 7 - 30 mg/dL 30 16 12      Creatinine 0.52 - 1.04 mg/dL 0.99 0.85 0.65      GFR Estimate >60 mL/min/1.7m2 58 (L) 69CM >90  Non  ...     Comments: Non  GFR Calc      GFR Estimate If Black >60 mL/min/1.7m2 70 84CM >90   GFR ...     Comments:  GFR Calc      Calcium 8.5 - 10.1 mg/dL 9.5 9.2 9.1     Resulting Agency  [facility]            Aisha Mclaughlin RN

## 2017-08-02 NOTE — PROGRESS NOTES
Please call  Marcelina Estevez,  Attached are your test results.  -Microalbumin (urine protein) level is elevated. This is suggestive of early damage to your kidneys from high blood pressure.  ADVISE: avoiding anti-inflamatory agents such as ibuprofen (Advil, Motrin) or naproxen (Aleve) as much as possible, keeping your blood pressure in a normal range, and rechecking your microalbumin level in 3 months (microalbumin; DX: Microalbuminuria)   Please contact us if you have any questions.    Keerthi Burr, CNP

## 2017-08-02 NOTE — PROGRESS NOTES
Dr.Ho Reena  Is out of the office and we are reviewing your results for you.  Please follow up if further concerns or questions     Attached are your test results.  -Kidney function (GFR) is decreased.  ADVISE: lets recheck this not fasting in the next week or 2  and be sure drinking plenty of water  -Sodium is normal.  -Potassium is normal.  -Glucose (diabetic screening test) is normal.  -Cholesterol levels are at your goal levels.  ADVISE: Continuing your medication, a regular exercise program with at least 30 minutes of aerobic exercise 3-4 days/week ( 45 minutes 4-6 days/week if weight loss needed), and a low saturated fat,/low carbohydrate diet are helpful to maintain this.  Rechecking your fasting cholesterol panel in 12 months is recommended (Lipid w/ LDL reflex).   Please contact us if you have any questions.    Keerthi Burr, CNP

## 2017-08-03 NOTE — TELEPHONE ENCOUNTER
[facility] Call Center    Phone Message    Name of Caller: QUAN CHENG    Phone Number: Home number on file [phone] (home)    Best time to return call: TOMORROW    May a detailed message be left on voicemail: no    Relation to patient: Self    Reason for Call: Other: Pt returning phone call to Kadie     Action Taken: Message routed to:  Primary Care p [phone]

## 2017-08-03 NOTE — TELEPHONE ENCOUNTER
She could try regular or arthritis tylenol for her pain as that does not bother the kidneys   Increasing her narcotics are not a very good idea as per recommendations nationally are to keep them to a minimum   May need to meet with Dr. Painting if she feels like she is having chronic headaches or podiatry if her feet are giving her a lot of difficulties

## 2017-08-04 NOTE — TELEPHONE ENCOUNTER
Patient advised of information below per Ira Burr.  Patient stated understanding.  Patient states she has an appt with Dr. Painting on Monday at 02:20 pm and she will discuss her pain meds/pain with at that time.    Marielle Alexander CMA

## 2017-08-07 ENCOUNTER — OFFICE VISIT (OUTPATIENT)
Dept: PEDIATRICS | Facility: CLINIC | Age: 57
End: 2017-08-07
Payer: COMMERCIAL

## 2017-08-07 VITALS
HEIGHT: 61 IN | OXYGEN SATURATION: 99 % | WEIGHT: 151 LBS | DIASTOLIC BLOOD PRESSURE: 80 MMHG | BODY MASS INDEX: 28.51 KG/M2 | TEMPERATURE: 98.6 F | HEART RATE: 83 BPM | SYSTOLIC BLOOD PRESSURE: 138 MMHG

## 2017-08-07 DIAGNOSIS — R61 NIGHT SWEATS: ICD-10-CM

## 2017-08-07 DIAGNOSIS — E78.5 HYPERLIPIDEMIA LDL GOAL <100: ICD-10-CM

## 2017-08-07 DIAGNOSIS — F32.5 MAJOR DEPRESSION IN COMPLETE REMISSION (H): ICD-10-CM

## 2017-08-07 DIAGNOSIS — I10 HYPERTENSION GOAL BP (BLOOD PRESSURE) < 140/90: ICD-10-CM

## 2017-08-07 DIAGNOSIS — B07.0 VERRUCA PLANTARIS: ICD-10-CM

## 2017-08-07 DIAGNOSIS — N18.2 CKD (CHRONIC KIDNEY DISEASE) STAGE 2, GFR 60-89 ML/MIN: ICD-10-CM

## 2017-08-07 DIAGNOSIS — Z23 NEED FOR SHINGLES VACCINE: ICD-10-CM

## 2017-08-07 DIAGNOSIS — G89.4 CHRONIC PAIN SYNDROME: ICD-10-CM

## 2017-08-07 DIAGNOSIS — G44.219 EPISODIC TENSION-TYPE HEADACHE, NOT INTRACTABLE: ICD-10-CM

## 2017-08-07 DIAGNOSIS — Z00.00 ROUTINE GENERAL MEDICAL EXAMINATION AT A HEALTH CARE FACILITY: Primary | ICD-10-CM

## 2017-08-07 DIAGNOSIS — Z23 NEED FOR PNEUMOCOCCAL VACCINE: ICD-10-CM

## 2017-08-07 PROCEDURE — 99213 OFFICE O/P EST LOW 20 MIN: CPT | Mod: 25 | Performed by: INTERNAL MEDICINE

## 2017-08-07 PROCEDURE — 99396 PREV VISIT EST AGE 40-64: CPT | Mod: 25 | Performed by: INTERNAL MEDICINE

## 2017-08-07 PROCEDURE — 90732 PPSV23 VACC 2 YRS+ SUBQ/IM: CPT | Performed by: INTERNAL MEDICINE

## 2017-08-07 PROCEDURE — 90471 IMMUNIZATION ADMIN: CPT | Performed by: INTERNAL MEDICINE

## 2017-08-07 RX ORDER — SIMVASTATIN 40 MG
40 TABLET ORAL AT BEDTIME
Qty: 90 TABLET | Refills: 3 | Status: SHIPPED | OUTPATIENT
Start: 2017-08-07 | End: 2018-06-11

## 2017-08-07 RX ORDER — LOSARTAN POTASSIUM AND HYDROCHLOROTHIAZIDE 25; 100 MG/1; MG/1
1 TABLET ORAL DAILY
Qty: 90 TABLET | Refills: 3 | Status: SHIPPED | OUTPATIENT
Start: 2017-08-07 | End: 2017-12-15

## 2017-08-07 RX ORDER — CLONIDINE HYDROCHLORIDE 0.2 MG/1
0.2 TABLET ORAL DAILY
Qty: 90 TABLET | Refills: 3 | Status: SHIPPED | OUTPATIENT
Start: 2017-08-07 | End: 2018-08-08

## 2017-08-07 ASSESSMENT — ANXIETY QUESTIONNAIRES
1. FEELING NERVOUS, ANXIOUS, OR ON EDGE: SEVERAL DAYS
5. BEING SO RESTLESS THAT IT IS HARD TO SIT STILL: NOT AT ALL
GAD7 TOTAL SCORE: 6
3. WORRYING TOO MUCH ABOUT DIFFERENT THINGS: SEVERAL DAYS
6. BECOMING EASILY ANNOYED OR IRRITABLE: SEVERAL DAYS
2. NOT BEING ABLE TO STOP OR CONTROL WORRYING: SEVERAL DAYS
7. FEELING AFRAID AS IF SOMETHING AWFUL MIGHT HAPPEN: SEVERAL DAYS

## 2017-08-07 ASSESSMENT — PATIENT HEALTH QUESTIONNAIRE - PHQ9
SUM OF ALL RESPONSES TO PHQ QUESTIONS 1-9: 3
5. POOR APPETITE OR OVEREATING: SEVERAL DAYS

## 2017-08-07 NOTE — PROGRESS NOTES
SUBJECTIVE:   CC: Marcelina Estevez is an 57 year old woman who presents for preventive health visit.     Healthy Habits:    Do you get at least three servings of calcium containing foods daily (dairy, green leafy vegetables, etc.)? yes    Amount of exercise or daily activities, outside of work: 4 day(s) per week    Problems taking medications regularly No    Medication side effects: No    Have you had an eye exam in the past two years? yes    Do you see a dentist twice per year? yes    Do you have sleep apnea, excessive snoring or daytime drowsiness?no      PROBLEMS TO ADD ON...  Revisit chronic pain management:   Right toe with wart surgically removed by podiatry, sometimes this will wake up at night.  Headache: stress related, not migraine. On Tylenol#3 15 tablets per months.   Patient had signed pain contract in 2011 but she didn't remember this. We reviewed the need for controlled medication agreement.     Recent labs with protein in the urine. She recalled having protein in the urine since she was a kid.   She was using more NSAIDS for headache recently. Will have her try Extra strength tylenol.       Today's PHQ-2 Score:   PHQ-2 ( 1999 [employer]) 8/7/2017 1/9/2017   Q1: Little interest or pleasure in doing things 1 3   Q2: Feeling down, depressed or hopeless 1 3   PHQ-2 Score 2 6       Abuse: Current or Past(Physical, Sexual or Emotional)- No  Do you feel safe in your environment - Yes    Social History   Substance Use Topics     Smoking status: Former Smoker     Quit date: 1/1/2001     Smokeless tobacco: Never Used     Alcohol use Yes      Comment: social     The patient does not drink >3 drinks per day nor >7 drinks per week.    Reviewed orders with patient.  Reviewed health maintenance and updated orders accordingly - Yes  Labs reviewed in [facility]    Patient over age 50, mutual decision to screen reflected in health maintenance.      Pertinent mammograms are reviewed under the imaging tab.  History of abnormal  "Pap smear: NO - age 30-65 PAP every 5 years with negative HPV co-testing recommended    Reviewed and updated as needed this visit by clinical staffTobacco  Allergies  Meds  Med Hx  Surg Hx  Fam Hx  Soc Hx        Reviewed and updated as needed this visit by Provider              ROS:  C: NEGATIVE for fever, chills, change in weight  I: NEGATIVE for worrisome rashes, moles or lesions  E: NEGATIVE for vision changes or irritation  ENT: NEGATIVE for ear, mouth and throat problems  R: NEGATIVE for significant cough or SOB  B: NEGATIVE for masses, tenderness or discharge  CV: NEGATIVE for chest pain, palpitations or peripheral edema  GI: NEGATIVE for nausea, abdominal pain, heartburn, or change in bowel habits  : NEGATIVE for unusual urinary or vaginal symptoms. No vaginal bleeding.  M: NEGATIVE for significant arthralgias or myalgia  N: NEGATIVE for weakness, dizziness or paresthesias  P: NEGATIVE for changes in mood or affect     OBJECTIVE:   /80 (Cuff Size: Adult Regular)  Pulse 83  Temp 98.6  F (37  C) (Temporal)  Ht 5' 0.75\" (1.543 m)  Wt 151 lb (68.5 kg)  LMP 06/12/2014  SpO2 99%  BMI 28.77 kg/m2  EXAM:  GENERAL: healthy, alert and no distress  EYES: Eyes grossly normal to inspection, PERRL and conjunctivae and sclerae normal  HENT: ear canals and TM's normal, nose and mouth without ulcers or lesions  NECK: no adenopathy, no asymmetry, masses, or scars and thyroid normal to palpation  RESP: lungs clear to auscultation - no rales, rhonchi or wheezes  BREAST: normal without masses, tenderness or nipple discharge and no palpable axillary masses or adenopathy  CV: regular rate and rhythm, normal S1 S2, no S3 or S4, no murmur, click or rub, no peripheral edema and peripheral pulses strong  ABDOMEN: soft, nontender, no hepatosplenomegaly, no masses and bowel sounds normal  MS: no gross musculoskeletal defects noted, no edema  SKIN: no suspicious lesions or rashes  NEURO: Normal strength and tone, " mentation intact and speech normal  PSYCH: mentation appears normal, affect normal/bright    Component      Latest Ref Rng & Units 8/2/2017   Sodium      133 - 144 mmol/L 133   Potassium      3.4 - 5.3 mmol/L 4.1   Chloride      94 - 109 mmol/L 97   Carbon Dioxide      20 - 32 mmol/L 27   Anion Gap      3 - 14 mmol/L 9   Glucose      70 - 99 mg/dL 98   Urea Nitrogen      7 - 30 mg/dL 30   Creatinine      0.52 - 1.04 mg/dL 0.99   GFR Estimate      >60 mL/min/1.7m2 58 (L)   GFR Estimate If Black      >60 mL/min/1.7m2 70   Calcium      8.5 - 10.1 mg/dL 9.5   Cholesterol      <200 mg/dL 237 (H)   Triglycerides      <150 mg/dL 129   HDL Cholesterol      >49 mg/dL 97   LDL Cholesterol Calculated      <100 mg/dL 114 (H)   Non HDL Cholesterol      <130 mg/dL 140 (H)   Creatinine Urine      mg/dL 137   Albumin Urine mg/L      mg/L 166   Albumin Urine mg/g Cr      0 - 25 mg/g Cr 121.17 (H)       ASSESSMENT/PLAN:       ICD-10-CM    1. Routine general medical examination at a health care facility Z00.00    2. Hypertension goal BP (blood pressure) < 140/90 I10 losartan-hydrochlorothiazide (HYZAAR) 100-25 MG per tablet   3. Verruca plantaris B07.0 acetaminophen-codeine (TYLENOL #3) 300-30 MG per tablet   4. Episodic tension-type headache, not intractable G44.219 acetaminophen-codeine (TYLENOL #3) 300-30 MG per tablet     Pain Drug Scr UR W Rptd Meds   5. Pain R52    6. Hyperlipidemia LDL goal <100 E78.5 simvastatin (ZOCOR) 40 MG tablet     Lipid panel reflex to direct LDL   7. CKD (chronic kidney disease) stage 2, GFR 60-89 ml/min N18.2 aspirin (ASPIR-LOW) 81 MG EC tablet     losartan-hydrochlorothiazide (HYZAAR) 100-25 MG per tablet     Hemoglobin     Pneumococcal vaccine 23 valent PPSV23  (Pneumovax) [44133]     ADMIN: Vaccine, Initial (37994)   8. Night sweats R61 cloNIDine (CATAPRES) 0.2 MG tablet   9. Major depression in complete remission (H) F32.5 DEPRESSION ACTION PLAN (DAP)   10. Need for pneumococcal vaccine Z23  "Pneumococcal vaccine 23 valent PPSV23  (Pneumovax) [68483]     ADMIN: Vaccine, Initial (31349)   11. Need for shingles vaccine Z23 zoster vaccine live, PF, (ZOSTAVAX) injection   12. Chronic pain syndrome G89.4        -- spent some time discussing need for monitoring controlled medications, narcotic is not the best for chronic pain. Pt will try with extra tylenol as much as possible. May continue with T#3 15 tabs per months. Pt told me she will have dental procedure done soon and her dentist normally give her Percocet for dental pain because T#3 isn't enough. This is permissible with controlled medication agreement provided she notifies us when she gets narcotics elsewhere for a new health condition.   -- CKD 3: avoid NSAIS, baby ASA, goal LDL < 100  -- stable chronic health issues. Medications refilled.   -- LDL not at goal: pt wants to try eating healthier, lose some weight. Continue simvastatin at 40 mg for now.   -- follow up in 3 months to recheck urine microalbumin and fasting lipid.     COUNSELING:   Reviewed preventive health counseling, as reflected in patient instructions         reports that she quit smoking about 16 years ago. She has never used smokeless tobacco.    Estimated body mass index is 28.77 kg/(m^2) as calculated from the following:    Height as of this encounter: 5' 0.75\" (1.543 m).    Weight as of this encounter: 151 lb (68.5 kg).   Weight management plan: pt plans to monitor her eating, walk more.     Counseling Resources:  ATP IV Guidelines  Pooled Cohorts Equation Calculator  Breast Cancer Risk Calculator  FRAX Risk Assessment  ICSI Preventive Guidelines  Dietary Guidelines for Americans, 2010  USDA's MyPlate  ASA Prophylaxis  Lung CA Screening    Annette Painting MD-PhD  [facility]  "

## 2017-08-07 NOTE — PATIENT INSTRUCTIONS
Make appointment(s) for:   -- fasting lab appointment in 3 months.         Medication(s) prescribed today:    Orders Placed This Encounter   Medications     acetaminophen-codeine (TYLENOL #3) 300-30 MG per tablet     Sig: Take 1 tablet by mouth every 4 hours as needed for moderate pain     Dispense:  15 tablet     Refill:  0     aspirin (ASPIR-LOW) 81 MG EC tablet     Sig: Take 1 tablet (81 mg) by mouth daily     Dispense:  30 tablet     simvastatin (ZOCOR) 40 MG tablet     Sig: Take 1 tablet (40 mg) by mouth At Bedtime     Dispense:  90 tablet     Refill:  3     losartan-hydrochlorothiazide (HYZAAR) 100-25 MG per tablet     Sig: Take 1 tablet by mouth daily     Dispense:  90 tablet     Refill:  3     cloNIDine (CATAPRES) 0.2 MG tablet     Sig: Take 1 tablet (0.2 mg) by mouth daily     Dispense:  90 tablet     Refill:  3     zoster vaccine live, PF, (ZOSTAVAX) injection     Sig: Inject 0.65 mLs Subcutaneous once for 1 dose     Dispense:  0.65 mL     Refill:  0           Preventive Health Recommendations  Female Ages 50 - 64    Yearly exam: See your health care provider every year in order to  o Review health changes.   o Discuss preventive care.    o Review your medicines if your doctor has prescribed any.      Get a Pap test every three years (unless you have an abnormal result and your provider advises testing more often).    If you get Pap tests with HPV test, you only need to test every 5 years, unless you have an abnormal result.     You do not need a Pap test if your uterus was removed (hysterectomy) and you have not had cancer.    You should be tested each year for STDs (sexually transmitted diseases) if you're at risk.     Have a mammogram every 1 to 2 years.    Have a colonoscopy at age 50, or have a yearly FIT test (stool test). These exams screen for colon cancer.      Have a cholesterol test every 5 years, or more often if advised.    Have a diabetes test (fasting glucose) every three years. If you are  at risk for diabetes, you should have this test more often.     If you are at risk for osteoporosis (brittle bone disease), think about having a bone density scan (DEXA).    Shots: Get a flu shot each year. Get a tetanus shot every 10 years.    Nutrition:     Eat at least 5 servings of fruits and vegetables each day.    Eat whole-grain bread, whole-wheat pasta and brown rice instead of white grains and rice.    Talk to your provider about Calcium and Vitamin D.     Lifestyle    Exercise at least 150 minutes a week (30 minutes a day, 5 days a week). This will help you control your weight and prevent disease.    Limit alcohol to one drink per day.    No smoking.     Wear sunscreen to prevent skin cancer.     See your dentist every six months for an exam and cleaning.    See your eye doctor every 1 to 2 years.

## 2017-08-07 NOTE — LETTER
My Depression Action Plan  Name: Marcelina Estevez   Date of Birth 1960  Date: 8/7/2017    My doctor: Annette Painting   My clinic: [address] [address] [phone]          GREEN    ZONE   Good Control    What it looks like:     Things are going generally well. You have normal up s and down s. You may even feel depressed from time to time, but bad moods usually last less than a day.   What you need to do:  1. Continue to care for yourself (see self care plan)  2. Check your depression survival kit and update it as needed  3. Follow your physician s recommendations including any medication.  4. Do not stop taking medication unless you consult with your physician first.           YELLOW         ZONE Getting Worse    What it looks like:     Depression is starting to interfere with your life.     It may be hard to get out of bed; you may be starting to isolate yourself from others.    Symptoms of depression are starting to last most all day and this has happened for several days.     You may have suicidal thoughts but they are not constant.   What you need to do:     1. Call your care team, your response to treatment will improve if you keep your care team informed of your progress. Yellow periods are signs an adjustment may need to be made.     2. Continue your self-care, even if you have to fake it!    3. Talk to someone in your support network    4. Open up your depression survival kit           RED    ZONE Medical Alert - Get Help    What it looks like:     Depression is seriously interfering with your life.     You may experience these or other symptoms: You can t get out of bed most days, can t work or engage in other necessary activities, you have trouble taking care of basic hygiene, or basic responsibilities, thoughts of suicide or death that will not go away, self-injurious behavior.     What you need to do:  1. Call your care team and request a same-day  appointment. If they are not available (weekends or after hours) call your local crisis line, emergency room or 911.      Electronically signed by: Annette Painting MD, August 7, 2017    Depression Self Care Plan / Survival Kit    Self-Care for Depression  Here s the deal. Your body and mind are really not as separate as most people think.  What you do and think affects how you feel and how you feel influences what you do and think. This means if you do things that people who feel good do, it will help you feel better.  Sometimes this is all it takes.  There is also a place for medication and therapy depending on how severe your depression is, so be sure to consult with your medical provider and/ or Behavioral Health Consultant if your symptoms are worsening or not improving.     In order to better manage my stress, I will:    Exercise  Get some form of exercise, every day. This will help reduce pain and release endorphins, the  feel good  chemicals in your brain. This is almost as good as taking antidepressants!  This is not the same as joining a gym and then never going! (they count on that by the way ) It can be as simple as just going for a walk or doing some gardening, anything that will get you moving.      Hygiene   Maintain good hygiene (Get out of bed in the morning, Make your bed, Brush your teeth, Take a shower, and Get dressed like you were going to work, even if you are unemployed).  If your clothes don't fit try to get ones that do.    Diet  I will strive to eat foods that are good for me, drink plenty of water, and avoid excessive sugar, caffeine, alcohol, and other mood-altering substances.  Some foods that are helpful in depression are: complex carbohydrates, B vitamins, flaxseed, fish or fish oil, fresh fruits and vegetables.    Psychotherapy  I agree to participate in Individual Therapy (if recommended).    Medication  If prescribed medications, I agree to take them.  Missing doses can result in serious  side effects.  I understand that drinking alcohol, or other illicit drug use, may cause potential side effects.  I will not stop my medication abruptly without first discussing it with my provider.    Staying Connected With Others  I will stay in touch with my friends, family members, and my primary care provider/team.    Use your imagination  Be creative.  We all have a creative side; it doesn t matter if it s oil painting, sand castles, or mud pies! This will also kick up the endorphins.    Witness Beauty  (AKA stop and smell the roses) Take a look outside, even in mid-winter. Notice colors, textures. Watch the squirrels and birds.     Service to others  Be of service to others.  There is always someone else in need.  By helping others we can  get out of ourselves  and remember the really important things.  This also provides opportunities for practicing all the other parts of the program.    Humor  Laugh and be silly!  Adjust your TV habits for less news and crime-drama and more comedy.    Control your stress  Try breathing deep, massage therapy, biofeedback, and meditation. Find time to relax each day.     My support system    Clinic Contact:  Phone number:    Contact 1:  Phone number:    Contact 2:  Phone number:    Anglican/:  Phone number:    Therapist:  Phone number:    Local crisis center:    Phone number:    Other community support:  Phone number:

## 2017-08-07 NOTE — NURSING NOTE
"Chief Complaint   Patient presents with     Physical       Initial /80 (Cuff Size: Adult Regular)  Pulse 83  Temp 98.6  F (37  C) (Temporal)  Ht 5' 0.75\" (1.543 m)  Wt 151 lb (68.5 kg)  LMP 06/12/2014  SpO2 99%  BMI 28.77 kg/m2 Estimated body mass index is 28.77 kg/(m^2) as calculated from the following:    Height as of this encounter: 5' 0.75\" (1.543 m).    Weight as of this encounter: 151 lb (68.5 kg).  Medication Reconciliation: complete    "

## 2017-08-07 NOTE — LETTER
Controlled Substance Agreement  I understand that my care provider has prescribed controlled substances (narcotics, tranquilizers, and/or stimulants) to help manage my condition(s).  I am taking this medicine to help me function or work.  I know that this is strong medicine.  It could have serious side effects and even cause a dependency on the drug.  If I stop these medicines suddenly, I could have severe withdrawal symptoms.    The risks, benefits, and side effects of these medication(s) were explained to me.  I agree that:  1. I will take part in other treatments as advised by my provider.  This may be psychiatry or counseling, physical therapy, behavioral therapy, group treatment, or a referral to a pain clinic.  I will reduce or stop my medicine when my provider tells me to do so.   2. I will take my medicines as prescribed.  I will not change the dose or schedule unless my provider tells me to.  There will be no refills if I  run out early.   I may be contacted at any time without warning and asked to complete a drug test or pill count.   3. I will keep all my appointments at the clinic.  If I miss appointments or fail to follow instructions, my provider may stop my medicine.  4. I will not ask other providers to prescribe controlled substances.  And I will not accept controlled substances from other people.  If I need a prescribed controlled substance for another reason, I will notify my provider.    5. I will use one pharmacy to fill all of my controlled substance prescriptions.  If my prescription is mailed to my pharmacy, it may take 5 to 7 days for my medicine to be ready.  6. I understand that my provider, clinic care team, and pharmacy can track controlled substance prescriptions from other providers through a central database (prescription monitoring program).  7. I will bring in my list of medications (or my medicine bottles) each time I come to the clinic.  8. Refills of controlled substances will be  made only during office hours.  It is up to me to make sure that I do not run out of my medicines on weekends or holidays.     9. I am responsible for my prescriptions.  If the medicine is lost or stolen, it will not be replaced.   I also agree not to share these medicines with anyone.  10. I agree to not use ANY illegal or recreational drugs.  This includes marijuana, cocaine, bath salts or other drugs.  I agree not to use alcohol unless my provider says I may.  I agree to give urine samples whenever asked.  If I fail to give a urine sample, the provider may stop my medicine.     11. I will tell my nurse or provider right away if I become pregnant or have a new medical problem treated outside of [facility] ([facility]  12. I understand that this medicine can affect my thinking and judgment.  It may be unsafe for me to drive, use machinery and do dangerous tasks.  I will not do any of these things until I know how the medicine affects me.  If my dose changes, I will wait to see how it affects me.  I will contact my provider if I have concerns about medicine side effects.  I understand that if I do not follow any of the conditions above, my prescriptions or treatment may be stopped.    I agree that my provider, clinic care team, and pharmacy may work with any city, state or federal law enforcement agency that investigates the misuse, sale, or other diversion of my controlled medicine. I will allow my provider to discuss my care with or share a copy of this agreement with any other treating provider, pharmacy or emergency room where I receive care.  I agree to give up (waive) any right of privacy or confidentiality with respect to these authorizations.   I have read this agreement and have asked questions about anything I did not understand.   ___________________________________    ___________________________  Patient Signature                                                   Date  ___________________________________      ____________________________  Witness                                                                    Date  ___________________________________  Libin Ho, MD-PhD

## 2017-08-07 NOTE — MR AVS SNAPSHOT
After Visit Summary   8/7/2017    Marcelina Estevez    MRN: [id number]           Patient Information     Date Of Birth          1960        Visit Information        Provider Department      8/7/2017 2:20 PM Annette Painting MD [facility]        Today's Diagnoses     Routine general medical examination at a health care facility    -  1    Hypertension goal BP (blood pressure) < 140/90        Verruca plantaris        Episodic tension-type headache, not intractable        Pain        Hyperlipidemia LDL goal <100        CKD (chronic kidney disease) stage 2, GFR 60-89 ml/min        Night sweats        Major depression in complete remission (H)        Need for pneumococcal vaccine        Need for shingles vaccine          Care Instructions    Make appointment(s) for:   -- fasting lab appointment in 3 months.         Medication(s) prescribed today:    Orders Placed This Encounter   Medications     acetaminophen-codeine (TYLENOL #3) 300-30 MG per tablet     Sig: Take 1 tablet by mouth every 4 hours as needed for moderate pain     Dispense:  15 tablet     Refill:  0     aspirin (ASPIR-LOW) 81 MG EC tablet     Sig: Take 1 tablet (81 mg) by mouth daily     Dispense:  30 tablet     simvastatin (ZOCOR) 40 MG tablet     Sig: Take 1 tablet (40 mg) by mouth At Bedtime     Dispense:  90 tablet     Refill:  3     losartan-hydrochlorothiazide (HYZAAR) 100-25 MG per tablet     Sig: Take 1 tablet by mouth daily     Dispense:  90 tablet     Refill:  3     cloNIDine (CATAPRES) 0.2 MG tablet     Sig: Take 1 tablet (0.2 mg) by mouth daily     Dispense:  90 tablet     Refill:  3     zoster vaccine live, PF, (ZOSTAVAX) injection     Sig: Inject 0.65 mLs Subcutaneous once for 1 dose     Dispense:  0.65 mL     Refill:  0           Preventive Health Recommendations  Female Ages 50 - 64    Yearly exam: See your health care provider every year in order to  o Review health changes.   o Discuss preventive care.     o Review your medicines if your doctor has prescribed any.      Get a Pap test every three years (unless you have an abnormal result and your provider advises testing more often).    If you get Pap tests with HPV test, you only need to test every 5 years, unless you have an abnormal result.     You do not need a Pap test if your uterus was removed (hysterectomy) and you have not had cancer.    You should be tested each year for STDs (sexually transmitted diseases) if you're at risk.     Have a mammogram every 1 to 2 years.    Have a colonoscopy at age 50, or have a yearly FIT test (stool test). These exams screen for colon cancer.      Have a cholesterol test every 5 years, or more often if advised.    Have a diabetes test (fasting glucose) every three years. If you are at risk for diabetes, you should have this test more often.     If you are at risk for osteoporosis (brittle bone disease), think about having a bone density scan (DEXA).    Shots: Get a flu shot each year. Get a tetanus shot every 10 years.    Nutrition:     Eat at least 5 servings of fruits and vegetables each day.    Eat whole-grain bread, whole-wheat pasta and brown rice instead of white grains and rice.    Talk to your provider about Calcium and Vitamin D.     Lifestyle    Exercise at least 150 minutes a week (30 minutes a day, 5 days a week). This will help you control your weight and prevent disease.    Limit alcohol to one drink per day.    No smoking.     Wear sunscreen to prevent skin cancer.     See your dentist every six months for an exam and cleaning.    See your eye doctor every 1 to 2 years.            Follow-ups after your visit        Your next 10 appointments already scheduled     Oct 02, 2017 10:30 AM CDT   Return Visit with Job Mandujano MD, MG OPHTH NURSE ONLY   [facility]    [address] [address] [place] [address] [phone]              Future tests that  were ordered for you today     Open Future Orders        Priority Expected Expires Ordered    Pain Drug Scr UR W Rptd Meds Routine  8/7/2018 8/7/2017    Lipid panel reflex to direct LDL Routine  8/7/2018 8/7/2017    Hemoglobin Routine  8/7/2018 8/7/2017            Who to contact     If you have questions or need follow up information about today's clinic visit or your schedule please contact [facility] directly at [phone].  Normal or non-critical lab and imaging results will be communicated to you by [employer], letter or phone within 4 business days after the clinic has received the results. If you do not hear from us within 7 days, please contact the clinic through [employer] or phone. If you have a critical or abnormal lab result, we will notify you by phone as soon as possible.  Submit refill requests through [employer] or call your pharmacy and they will forward the refill request to us. Please allow 3 business days for your refill to be completed.          Additional Information About Your Visit        [employer] Information     [employer] gives you secure access to your electronic health record. If you see a primary care provider, you can also send messages to your care team and make appointments. If you have questions, please call your primary care clinic.  If you do not have a primary care provider, please call [phone] and they will assist you.      [employer] is an electronic gateway that provides easy, online access to your medical records. With [employer], you can request a clinic appointment, read your test results, renew a prescription or communicate with your care team.     To access your existing account, please contact your [facility] or call [phone] for assistance.        Care EveryWhere ID     This is your Care EveryWhere ID. This could be used by other organizations to access your [facility] medical records  [id number]        Your Vitals Were     Pulse  "Temperature Height Last Period Pulse Oximetry BMI (Body Mass Index)    83 98.6  F (37  C) (Temporal) 5' 0.75\" (1.543 m) 06/12/2014 99% 28.77 kg/m2       Blood Pressure from Last 3 Encounters:   08/07/17 138/80   01/09/17 131/84   11/14/16 126/80    Weight from Last 3 Encounters:   08/07/17 151 lb (68.5 kg)   01/09/17 153 lb 1.6 oz (69.4 kg)   11/14/16 149 lb 9.6 oz (67.9 kg)              We Performed the Following     ADMIN: Vaccine, Initial (56267)     DEPRESSION ACTION PLAN (DAP)     Pneumococcal vaccine 23 valent PPSV23  (Pneumovax) [35940]          Today's Medication Changes          These changes are accurate as of: 8/7/17  3:23 PM.  If you have any questions, ask your nurse or doctor.               Start taking these medicines.        Dose/Directions    zoster vaccine live (PF) injection   Commonly known as:  ZOSTAVAX   Used for:  Need for shingles vaccine   Started by:  Annette Painting MD        Dose:  1 each   Inject 0.65 mLs Subcutaneous once for 1 dose   Quantity:  0.65 mL   Refills:  0         These medicines have changed or have updated prescriptions.        Dose/Directions    simvastatin 40 MG tablet   Commonly known as:  ZOCOR   This may have changed:  See the new instructions.   Used for:  Hyperlipidemia LDL goal <100   Changed by:  Annette Painting MD        Dose:  40 mg   Take 1 tablet (40 mg) by mouth At Bedtime   Quantity:  90 tablet   Refills:  3            Where to get your medicines      These medications were sent to [facility] [place], [place] - [address], [address]  [address], [address], [facility] [address]     Phone:  [phone]     cloNIDine 0.2 MG tablet    losartan-hydrochlorothiazide 100-25 MG per tablet    simvastatin 40 MG tablet    zoster vaccine live (PF) injection         Some of these will need a paper prescription and others can be bought over the counter.  Ask your nurse if you have questions.     Bring a paper prescription for each of these " medications     acetaminophen-codeine 300-30 MG per tablet                Primary Care Provider Office Phone # Fax #    Annette Painting [phone]       [facility] [address]  [facility] [address]        Equal Access to Services     LOLAANA CRISTINA CRYSTAL : Hadii aad ku hadasho Soomaali, waaxda luqadaha, qaybta kaalmada adeegyada, waxay idiin hayaan adeeg khelida laDraganpojaelyn foster. So [facility] [phone].    ATENCIÓN: Si habla español, tiene a mcgovern disposición servicios gratuitos de asistencia lingüística. Llame al [phone].    We comply with applicable federal civil rights laws and [place] laws. We do not discriminate on the basis of race, color, national origin, age, disability sex, sexual orientation or gender identity.            Thank you!     Thank you for choosing [facility]  for your care. Our goal is always to provide you with excellent care. Hearing back from our patients is one way we can continue to improve our services. Please take a few minutes to complete the written survey that you may receive in the mail after your visit with us. Thank you!             Your Updated Medication List - Protect others around you: Learn how to safely use, store and throw away your medicines at www.disposemymeds.org.          This list is accurate as of: 8/7/17  3:23 PM.  Always use your most recent med list.                   Brand Name Dispense Instructions for use Diagnosis    acetaminophen-codeine 300-30 MG per tablet    TYLENOL #3    15 tablet    Take 1 tablet by mouth every 4 hours as needed for moderate pain    Verruca plantaris, Episodic tension-type headache, not intractable       albuterol 108 (90 BASE) MCG/ACT Inhaler    PROAIR HFA/PROVENTIL HFA/VENTOLIN HFA    1 Inhaler    Inhale 2 puffs into the lungs every 6 hours as needed for shortness of breath / dyspnea    Wheezing       ALLEGRA ALLERGY 180 MG tablet   Generic drug:  fexofenadine     90 tablet    Take 1 tablet (180 mg) by  mouth daily    Atopic rhinitis       ALPRAZolam 0.25 MG tablet    XANAX    30 tablet    Take 1 tablet (0.25 mg) by mouth daily as needed for anxiety    Panic attack       ASPIR-LOW 81 MG EC tablet   Generic drug:  aspirin     30 tablet    Take 1 tablet (81 mg) by mouth daily    CKD (chronic kidney disease) stage 2, GFR 60-89 ml/min       CALCIUM 600+D 600-200 MG-UNIT Tabs   Generic drug:  calcium carbonate-vitamin D      Take  by mouth.        cloNIDine 0.2 MG tablet    CATAPRES    90 tablet    Take 1 tablet (0.2 mg) by mouth daily    Night sweats       fluticasone 50 MCG/ACT spray    FLONASE    16 g    Spray 1-2 sprays into both nostrils daily    Atopic rhinitis       losartan-hydrochlorothiazide 100-25 MG per tablet    HYZAAR    90 tablet    Take 1 tablet by mouth daily    Hypertension goal BP (blood pressure) < 140/90, CKD (chronic kidney disease) stage 2, GFR 60-89 ml/min       olopatadine HCl 0.2 % Soln    PATADAY    1 Bottle    Place 1 drop into both eyes daily    Other chronic allergic conjunctivitis       order for DME     1 Device    Equipment being ordered: light lamp for seasonal affective disorder    Severe seasonal affective disorder (H)       simvastatin 40 MG tablet    ZOCOR    90 tablet    Take 1 tablet (40 mg) by mouth At Bedtime    Hyperlipidemia LDL goal <100       traZODone 50 MG tablet    DESYREL    90 tablet    Take 1 tablet (50 mg) by mouth At Bedtime    Insomnia, unspecified type       tretinoin 0.05 % cream    RETIN-A    45 g    Spread a pea size amount into affected area on the face topically at bedtime.  Use sunscreen SPF>30.    Milium       venlafaxine 75 MG 24 hr capsule    EFFEXOR-XR    270 capsule    Take 3 capsules (225 mg) by mouth daily    Menopausal syndrome (hot flashes)       zoster vaccine live (PF) injection    ZOSTAVAX    0.65 mL    Inject 0.65 mLs Subcutaneous once for 1 dose    Need for shingles vaccine

## 2017-08-08 ASSESSMENT — ANXIETY QUESTIONNAIRES: GAD7 TOTAL SCORE: 6

## 2017-09-08 ENCOUNTER — TELEPHONE (OUTPATIENT)
Dept: PEDIATRICS | Facility: CLINIC | Age: 57
End: 2017-09-08

## 2017-09-08 DIAGNOSIS — J32.9 OTHER SINUSITIS, UNSPECIFIED CHRONICITY: Primary | ICD-10-CM

## 2017-09-08 RX ORDER — AMOXICILLIN 500 MG/1
1000 CAPSULE ORAL 2 TIMES DAILY
Qty: 40 CAPSULE | Refills: 0 | Status: SHIPPED | OUTPATIENT
Start: 2017-09-08 | End: 2017-09-18

## 2017-09-08 NOTE — TELEPHONE ENCOUNTER
"Called patient, her symptoms started 3 days ago, she did not go to  work on Wednesday, coughing at night, nasal drainage, full sinuses.  Headache, out of balance, nasal drainage is light green.  Has had chills and hot, but not fever.        Discussed with patient that what is advised home remedies such as rest, fluids, steam, neti pot.     She is not interested in Urgent Care or an appointment with a provider at another clinic.  She states \"I just want an antibiotic\".     Please advise.   Marcelle Rosa RN, [facility]    "

## 2017-09-08 NOTE — TELEPHONE ENCOUNTER
Left message informing patient prescription sent to her pharmacy.  If not better should be seen.  If further questions to please call clinic and ask to speak with nurse.    Josey Le RN,   [facility], [facility]

## 2017-09-08 NOTE — TELEPHONE ENCOUNTER
PATIENT CALLING BACK TO FIND OUT IF SHE CAN GET AN ANTIBIOTIC TODAY, WAS TRIAGED.  DO NOT SEE THAT MEDICATION WAS ORDERED. PLEASE CALL PATIENT OR SHE MAY HAVE TO GO TO URGENT CARE. [phone] (home) NONE (work)

## 2017-09-08 NOTE — TELEPHONE ENCOUNTER
[facility] Call Center    Phone Message    Name of Caller: Marcelina    Phone Number: Home number on file [phone] (home)    Best time to return call: Any    May a detailed message be left on voicemail: yes    Relation to patient: Self    Reason for Call: Other: Patient is calling to see if she can get a Rx to treat a sinus infection that she believes she has. She uses the [facility]. Please advise.      Action Taken: Message routed to:  Primary Care p [phone]

## 2017-09-08 NOTE — TELEPHONE ENCOUNTER
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  Good bye.\"    MARISSAI for Dr. Painting.    Marcelle Rosa RN, [facility]    "

## 2017-09-28 DIAGNOSIS — G44.209 TENSION HEADACHE: ICD-10-CM

## 2017-09-28 NOTE — TELEPHONE ENCOUNTER
Tylenol #3      Last Written Prescription Date: 8-17-17  Last Fill Quantity: 15,  # refills: 0   Last Office Visit with G, P or [facility] prescribing provider: 8-7-17                                         Next 5 appointments (look out 90 days)     Oct 02, 2017 10:30 AM CDT   Return Visit with Job Mandujano MD, MG OPH NURSE ONLY   [facility] ([facility]    [address] [place] [id number] [phone]                  [facility]  Pharmacy Float Tech  On Behalf of [facility]

## 2017-10-02 ENCOUNTER — OFFICE VISIT (OUTPATIENT)
Dept: OPHTHALMOLOGY | Facility: CLINIC | Age: 57
End: 2017-10-02
Payer: COMMERCIAL

## 2017-10-02 DIAGNOSIS — Z83.511 FAMILY HISTORY OF GLAUCOMA: ICD-10-CM

## 2017-10-02 DIAGNOSIS — H52.4 PRESBYOPIA: ICD-10-CM

## 2017-10-02 DIAGNOSIS — H52.223 REGULAR ASTIGMATISM OF BOTH EYES: ICD-10-CM

## 2017-10-02 DIAGNOSIS — H52.13 MYOPIA OF BOTH EYES: Primary | ICD-10-CM

## 2017-10-02 PROCEDURE — 92133 CPTRZD OPH DX IMG PST SGM ON: CPT | Performed by: OPHTHALMOLOGY

## 2017-10-02 PROCEDURE — 92015 DETERMINE REFRACTIVE STATE: CPT | Performed by: OPHTHALMOLOGY

## 2017-10-02 PROCEDURE — 92014 COMPRE OPH EXAM EST PT 1/>: CPT | Performed by: OPHTHALMOLOGY

## 2017-10-02 PROCEDURE — 92083 EXTENDED VISUAL FIELD XM: CPT | Performed by: OPHTHALMOLOGY

## 2017-10-02 ASSESSMENT — REFRACTION_WEARINGRX
OS_SPHERE: -3.50
OD_SPHERE: -5.25
SPECS_TYPE: PAL
OS_AXIS: 004
OD_ADD: +2.50
OS_CYLINDER: +1.50
OD_AXIS: 179
OD_CYLINDER: +3.25
OS_ADD: +2.50

## 2017-10-02 ASSESSMENT — REFRACTION_MANIFEST
OD_AXIS: 002
OD_ADD: +2.50
OD_SPHERE: -5.25
OS_ADD: +2.50
OD_CYLINDER: +3.25
OS_SPHERE: -3.75
OS_AXIS: 005
OS_CYLINDER: +1.25

## 2017-10-02 ASSESSMENT — PACHYMETRY
OD_CT(UM): 630
OS_CT(UM): 648

## 2017-10-02 ASSESSMENT — EXTERNAL EXAM - LEFT EYE: OS_EXAM: NORMAL

## 2017-10-02 ASSESSMENT — VISUAL ACUITY
OD_CC: 20/25
OS_CC: 20/25
OS_CC+: -2
OD_CC: 20/20
OS_CC: 20/20
CORRECTION_TYPE: GLASSES
METHOD: SNELLEN - LINEAR

## 2017-10-02 ASSESSMENT — TONOMETRY
OD_IOP_MMHG: 23
OS_IOP_MMHG: 20
IOP_METHOD: TONOPEN

## 2017-10-02 ASSESSMENT — SLIT LAMP EXAM - LIDS
COMMENTS: NORMAL
COMMENTS: NORMAL

## 2017-10-02 ASSESSMENT — CONF VISUAL FIELD
OS_NORMAL: 1
OD_NORMAL: 1

## 2017-10-02 ASSESSMENT — CUP TO DISC RATIO
OS_RATIO: 0.1
OD_RATIO: 0.1

## 2017-10-02 ASSESSMENT — EXTERNAL EXAM - RIGHT EYE: OD_EXAM: NORMAL

## 2017-10-02 NOTE — MR AVS SNAPSHOT
After Visit Summary   10/2/2017    Marcelina Estevez    MRN: [id number]           Patient Information     Date Of Birth          1960        Visit Information        Provider Department      10/2/2017 10:30 AM Job Mandujano MD;  OPHTH NURSE ONLY [facility]        Today's Diagnoses     Myopia of both eyes    -  1    Regular astigmatism of both eyes        Presbyopia        Family history of glaucoma           Follow-ups after your visit        Follow-up notes from your care team     Return in about 1 year (around 10/2/2018) for Annual Eye Exam.      Who to contact     If you have questions or need follow up information about today's clinic visit or your schedule please contact [facility] directly at [phone].  Normal or non-critical lab and imaging results will be communicated to you by Saperionhart, letter or phone within 4 business days after the clinic has received the results. If you do not hear from us within 7 days, please contact the clinic through Private Companyt or phone. If you have a critical or abnormal lab result, we will notify you by phone as soon as possible.  Submit refill requests through Turbocoating or call your pharmacy and they will forward the refill request to us. Please allow 3 business days for your refill to be completed.          Additional Information About Your Visit        Saperionhart Information     Turbocoating gives you secure access to your electronic health record. If you see a primary care provider, you can also send messages to your care team and make appointments. If you have questions, please call your primary care clinic.  If you do not have a primary care provider, please call [phone] and they will assist you.      Turbocoating is an electronic gateway that provides easy, online access to your medical records. With Turbocoating, you can request a clinic appointment, read your test results, renew a prescription or communicate with your care  team.     To access your existing account, please contact your [facility] or call [phone] for assistance.        Care EveryWhere ID     This is your Care EveryWhere ID. This could be used by other organizations to access your [facility] medical records  [id number]        Your Vitals Were     Last Period                   06/12/2014            Blood Pressure from Last 3 Encounters:   08/07/17 138/80   01/09/17 131/84   11/14/16 126/80    Weight from Last 3 Encounters:   08/07/17 68.5 kg (151 lb)   01/09/17 69.4 kg (153 lb 1.6 oz)   11/14/16 67.9 kg (149 lb 9.6 oz)              We Performed the Following     EYE EXAM, NEW PATIENT,COMPREHESV     HVF 24-2 OU     OCT Optic Nerve RNFL Optovue OU (both eyes)     REFRACTION        Primary Care Provider Office Phone # Fax #    Annette Painting MD PhD [phone]       [address]  [facility] [address]        Equal Access to Services     YOEL ROJAS : Hadii aad ku hadasho Soomaali, waaxda luqadaha, qaybta kaalmada adeegyada, waxay idiin haypon danny kharayuri jones . So [facility] [phone].    ATENCIÓN: Si habla español, tiene a mcgovern disposición servicios gratuitos de asistencia lingüística. Llame al [phone].    We comply with applicable federal civil rights laws and [place] laws. We do not discriminate on the basis of race, color, national origin, age, disability, sex, sexual orientation, or gender identity.            Thank you!     Thank you for choosing [facility]  for your care. Our goal is always to provide you with excellent care. Hearing back from our patients is one way we can continue to improve our services. Please take a few minutes to complete the written survey that you may receive in the mail after your visit with us. Thank you!             Your Updated Medication List - Protect others around you: Learn how to safely use, store and throw away your medicines at www.disposemymeds.org.           This list is accurate as of: 10/2/17 11:59 PM.  Always use your most recent med list.                   Brand Name Dispense Instructions for use Diagnosis    acetaminophen-codeine 300-30 MG per tablet    TYLENOL #3    15 tablet    Take 1 tablet by mouth every 4 hours as needed for moderate pain    Tension headache       albuterol 108 (90 BASE) MCG/ACT Inhaler    PROAIR HFA/PROVENTIL HFA/VENTOLIN HFA    1 Inhaler    Inhale 2 puffs into the lungs every 6 hours as needed for shortness of breath / dyspnea    Wheezing       ALLEGRA ALLERGY 180 MG tablet   Generic drug:  fexofenadine     90 tablet    Take 1 tablet (180 mg) by mouth daily    Atopic rhinitis       ALPRAZolam 0.25 MG tablet    XANAX    30 tablet    Take 1 tablet (0.25 mg) by mouth daily as needed for anxiety    Panic attack       ASPIR-LOW 81 MG EC tablet   Generic drug:  aspirin     30 tablet    Take 1 tablet (81 mg) by mouth daily    CKD (chronic kidney disease) stage 2, GFR 60-89 ml/min       CALCIUM 600+D 600-200 MG-UNIT Tabs   Generic drug:  calcium carbonate-vitamin D      Take  by mouth.        cloNIDine 0.2 MG tablet    CATAPRES    90 tablet    Take 1 tablet (0.2 mg) by mouth daily    Night sweats       fluticasone 50 MCG/ACT spray    FLONASE    16 g    Spray 1-2 sprays into both nostrils daily    Atopic rhinitis       losartan-hydrochlorothiazide 100-25 MG per tablet    HYZAAR    90 tablet    Take 1 tablet by mouth daily    Hypertension goal BP (blood pressure) < 140/90, CKD (chronic kidney disease) stage 2, GFR 60-89 ml/min       olopatadine HCl 0.2 % Soln    PATADAY    1 Bottle    Place 1 drop into both eyes daily    Other chronic allergic conjunctivitis       order for DME     1 Device    Equipment being ordered: light lamp for seasonal affective disorder    Severe seasonal affective disorder (H)       simvastatin 40 MG tablet    ZOCOR    90 tablet    Take 1 tablet (40 mg) by mouth At Bedtime    Hyperlipidemia LDL goal <100       traZODone 50  MG tablet    DESYREL    90 tablet    Take 1 tablet (50 mg) by mouth At Bedtime    Insomnia, unspecified type       tretinoin 0.05 % cream    RETIN-A    45 g    Spread a pea size amount into affected area on the face topically at bedtime.  Use sunscreen SPF>30.    Milium       venlafaxine 75 MG 24 hr capsule    EFFEXOR-XR    270 capsule    Take 3 capsules (225 mg) by mouth daily    Menopausal syndrome (hot flashes)
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
## 2017-10-02 NOTE — PROGRESS NOTES
Assessment & Plan   Marcelina Estevze is a 57 year old female who presents with:   Review of systems for the eyes was negative other than the pertinent positives and negatives noted in the HPI.  History is obtained from the patient.    Myopia of both eyes  - Rx per MR for new glasses (optional)  - REFRACTION    Regular astigmatism of both eyes    Presbyopia    Family history of glaucoma  - OCT Optic Nerve RNFL Optovue OU (both eyes)  - HVF 24-2 OU    Both normal OU. Observe.      Return in 1 year for annual exam    Documentation for today's encounter was performed by Edwina Ramos COA. OSC. Acting as a scribe in my presence. I have reviewed and verified that it is an accurate recording of today's encounter.    Attending Physician Attestation:  Complete documentation of historical and exam elements from today's encounter can be found in the full encounter summary report (not reduplicated in this progress note).  I personally obtained the chief complaint(s) and history of present illness.  I confirmed and edited as necessary the review of systems, past medical/surgical history, family history, social history, and examination findings as documented by others; and I examined the patient myself.  I personally reviewed the relevant tests, images, and reports as documented above.  I formulated and edited as necessary the assessment and plan and discussed the findings and management plan with the patient and family. - Job Mandujano MD

## 2017-11-27 DIAGNOSIS — G44.209 TENSION HEADACHE: ICD-10-CM

## 2017-11-27 DIAGNOSIS — F41.0 PANIC ATTACK: ICD-10-CM

## 2017-11-27 RX ORDER — ALPRAZOLAM 0.25 MG
0.25 TABLET ORAL DAILY PRN
Qty: 30 TABLET | Refills: 2 | Status: SHIPPED | OUTPATIENT
Start: 2017-11-27 | End: 2018-05-31

## 2017-11-27 NOTE — TELEPHONE ENCOUNTER
Patient advised of information below per Dr. Painting.  Patient stated understanding.  Tylenol #3 script and Xanax script sent via tube to [facility] pharmacy.    Marielle Alexander CMA

## 2017-11-27 NOTE — TELEPHONE ENCOUNTER
Hello,  last fill date:09-  Last quantity:30 days    Thank You,  Meagan Bishop  Pharmacy Technician  [facility]  [phone]

## 2017-12-15 ENCOUNTER — OFFICE VISIT (OUTPATIENT)
Dept: PEDIATRICS | Facility: CLINIC | Age: 57
End: 2017-12-15
Payer: COMMERCIAL

## 2017-12-15 ENCOUNTER — RADIANT APPOINTMENT (OUTPATIENT)
Dept: GENERAL RADIOLOGY | Facility: CLINIC | Age: 57
End: 2017-12-15
Attending: NURSE PRACTITIONER
Payer: COMMERCIAL

## 2017-12-15 VITALS
WEIGHT: 156.1 LBS | HEART RATE: 87 BPM | SYSTOLIC BLOOD PRESSURE: 145 MMHG | TEMPERATURE: 97.6 F | OXYGEN SATURATION: 98 % | DIASTOLIC BLOOD PRESSURE: 80 MMHG | BODY MASS INDEX: 29.74 KG/M2

## 2017-12-15 DIAGNOSIS — M25.50 MULTIPLE JOINT PAIN: ICD-10-CM

## 2017-12-15 DIAGNOSIS — R76.8 ELEVATED ANTINUCLEAR ANTIBODY (ANA) LEVEL: ICD-10-CM

## 2017-12-15 DIAGNOSIS — I10 HYPERTENSION GOAL BP (BLOOD PRESSURE) < 140/90: ICD-10-CM

## 2017-12-15 DIAGNOSIS — M25.50 MULTIPLE JOINT PAIN: Primary | ICD-10-CM

## 2017-12-15 DIAGNOSIS — N18.2 CKD (CHRONIC KIDNEY DISEASE) STAGE 2, GFR 60-89 ML/MIN: ICD-10-CM

## 2017-12-15 DIAGNOSIS — F32.5 MAJOR DEPRESSION IN COMPLETE REMISSION (H): ICD-10-CM

## 2017-12-15 LAB
ANION GAP SERPL CALCULATED.3IONS-SCNC: 9 MMOL/L (ref 3–14)
BUN SERPL-MCNC: 18 MG/DL (ref 7–30)
CALCIUM SERPL-MCNC: 9.4 MG/DL (ref 8.5–10.1)
CHLORIDE SERPL-SCNC: 100 MMOL/L (ref 94–109)
CO2 SERPL-SCNC: 24 MMOL/L (ref 20–32)
CREAT SERPL-MCNC: 0.97 MG/DL (ref 0.52–1.04)
CRP SERPL-MCNC: 6.8 MG/L (ref 0–8)
ERYTHROCYTE [SEDIMENTATION RATE] IN BLOOD BY WESTERGREN METHOD: 23 MM/H (ref 0–30)
GFR SERPL CREATININE-BSD FRML MDRD: 59 ML/MIN/1.7M2
GLUCOSE SERPL-MCNC: 88 MG/DL (ref 70–99)
POTASSIUM SERPL-SCNC: 4.2 MMOL/L (ref 3.4–5.3)
SODIUM SERPL-SCNC: 133 MMOL/L (ref 133–144)

## 2017-12-15 PROCEDURE — 86140 C-REACTIVE PROTEIN: CPT | Performed by: NURSE PRACTITIONER

## 2017-12-15 PROCEDURE — 73130 X-RAY EXAM OF HAND: CPT | Mod: LT | Performed by: RADIOLOGY

## 2017-12-15 PROCEDURE — 86200 CCP ANTIBODY: CPT | Performed by: NURSE PRACTITIONER

## 2017-12-15 PROCEDURE — 99214 OFFICE O/P EST MOD 30 MIN: CPT | Performed by: NURSE PRACTITIONER

## 2017-12-15 PROCEDURE — 86038 ANTINUCLEAR ANTIBODIES: CPT | Performed by: NURSE PRACTITIONER

## 2017-12-15 PROCEDURE — 36415 COLL VENOUS BLD VENIPUNCTURE: CPT | Performed by: NURSE PRACTITIONER

## 2017-12-15 PROCEDURE — 86431 RHEUMATOID FACTOR QUANT: CPT | Performed by: NURSE PRACTITIONER

## 2017-12-15 PROCEDURE — 86039 ANTINUCLEAR ANTIBODIES (ANA): CPT | Performed by: NURSE PRACTITIONER

## 2017-12-15 PROCEDURE — 85652 RBC SED RATE AUTOMATED: CPT | Performed by: NURSE PRACTITIONER

## 2017-12-15 PROCEDURE — 80048 BASIC METABOLIC PNL TOTAL CA: CPT | Performed by: NURSE PRACTITIONER

## 2017-12-15 RX ORDER — LOSARTAN POTASSIUM AND HYDROCHLOROTHIAZIDE 25; 100 MG/1; MG/1
1 TABLET ORAL DAILY
Qty: 90 TABLET | Refills: 3 | Status: SHIPPED | OUTPATIENT
Start: 2017-12-15 | End: 2018-09-10

## 2017-12-15 RX ORDER — BUPROPION HYDROCHLORIDE 150 MG/1
150 TABLET ORAL EVERY MORNING
Qty: 30 TABLET | Refills: 1 | Status: SHIPPED | OUTPATIENT
Start: 2017-12-15 | End: 2018-02-06

## 2017-12-15 ASSESSMENT — ANXIETY QUESTIONNAIRES
GAD7 TOTAL SCORE: 10
6. BECOMING EASILY ANNOYED OR IRRITABLE: MORE THAN HALF THE DAYS
3. WORRYING TOO MUCH ABOUT DIFFERENT THINGS: MORE THAN HALF THE DAYS
1. FEELING NERVOUS, ANXIOUS, OR ON EDGE: SEVERAL DAYS
2. NOT BEING ABLE TO STOP OR CONTROL WORRYING: MORE THAN HALF THE DAYS
5. BEING SO RESTLESS THAT IT IS HARD TO SIT STILL: SEVERAL DAYS
7. FEELING AFRAID AS IF SOMETHING AWFUL MIGHT HAPPEN: NOT AT ALL
IF YOU CHECKED OFF ANY PROBLEMS ON THIS QUESTIONNAIRE, HOW DIFFICULT HAVE THESE PROBLEMS MADE IT FOR YOU TO DO YOUR WORK, TAKE CARE OF THINGS AT HOME, OR GET ALONG WITH OTHER PEOPLE: SOMEWHAT DIFFICULT

## 2017-12-15 ASSESSMENT — PATIENT HEALTH QUESTIONNAIRE - PHQ9
5. POOR APPETITE OR OVEREATING: MORE THAN HALF THE DAYS
SUM OF ALL RESPONSES TO PHQ QUESTIONS 1-9: 8

## 2017-12-15 NOTE — PROGRESS NOTES
Cyndie Estevez,    Attached are your test results.  -Kidney function is stable (Cr, GFR), Sodium is normal, Potassium is normal, Calcium is normal, Glucose is normal (diabetes screening test).   Inflammatory markers so far are normal    Please contact us if you have any questions.    Keerthi Burr, CNP

## 2017-12-15 NOTE — NURSING NOTE
"Chief Complaint   Patient presents with     Depression     Anxiety       Initial /80 (BP Location: Right arm, Patient Position: Sitting, Cuff Size: Adult Regular)  Pulse 87  Temp 97.6  F (36.4  C) (Temporal)  Wt 156 lb 1.6 oz (70.8 kg)  LMP 06/12/2014  SpO2 98%  Breastfeeding? No  BMI 29.74 kg/m2 Estimated body mass index is 29.74 kg/(m^2) as calculated from the following:    Height as of 8/7/17: 5' 0.75\" (1.543 m).    Weight as of this encounter: 156 lb 1.6 oz (70.8 kg).  Medication Reconciliation: complete      KEV Espinoza      "

## 2017-12-15 NOTE — PATIENT INSTRUCTIONS
PLAN:   1.   Symptomatic therapy suggested: will add on wellbutrin once a day.  2.  Orders Placed This Encounter   Medications     VITAMIN D, CHOLECALCIFEROL, PO     Sig: Take 2,000 Units by mouth daily     losartan-hydrochlorothiazide (HYZAAR) 100-25 MG per tablet     Sig: Take 1 tablet by mouth daily     Dispense:  90 tablet     Refill:  3     buPROPion (WELLBUTRIN XL) 150 MG 24 hr tablet     Sig: Take 1 tablet (150 mg) by mouth every morning     Dispense:  30 tablet     Refill:  1     Orders Placed This Encounter   Procedures     XR Hand Bilateral G/E 3 Views     Erythrocyte sedimentation rate auto     CRP inflammation     Cyclic Citrullinated Peptide Antibody IgG     Rheumatoid factor     Anti Nuclear Farida IgG by IFA with Reflex     Basic metabolic panel       3. Patient needs to follow up in if no improvement,or sooner if worsening of symptoms or other symptoms develop.  FURTHER TESTING:       - hand xrays   Will follow up and/or notify patient of  results via My Chart to determine further need for followup  Follow up in 1 month.  Initiated consultation with LILLY Murrieta, Behavioral Health Clinician for mental health counseling.       It was a pleasure seeing you today at the [facility]. Thank you for allowing us to care for you today. We truly hope we provided you with the excellent service you deserve. Please let us know if there is anything else we can do for you so we can be sure you are leaving completley satisfied with your care experience.       General information about your clinic   Clinic Hours Lab Hours (Appointments are required)   Mon-Thurs: 7:30 AM - 7 PM Mon-Thurs: 7:30 AM - 7 PM   Fri: 7:30 AM - 5 PM Fri: 7:30 AM - 5 PM        After Hours Nurse Advise & Appts:  [facility]: [phone]  [facility] On Call: to make appointments anytime: [phone] On Call Physician: call [phone] and answering service will page the on call physician.         For urgent appointments, please call [phone] and ask for the triage nurse or your care team clinic nurse.  How to contact my care team:  [facility]: [email or web address]   Phone: [phone]   Fax: [phone]       [facility]:   Phone: [phone]  Hours: 8:00 AM - 6:00 PM  Medication Refills:  Call your pharmacy and they will forward the refill to us. Please allow 3 business days for your refills to be completed.       Normal or non-critical lab and imaging results will be communicated to you by MyChart, letter or phone within 7 days.  If you do not hear from us within 10 days, please call the clinic. If you have a critical or abnormal lab result, we will notify you by phone as soon as possible.       We now have PWIC (Pediatric Walk in Care)  Monday-Friday from 7:30-4. Simply walk in and be seen for your urgent needs like cough, fever, rash, diarrhea or vomiting, pink eye, UTI. No appointments needed. Ask one of the team for more information      -Your Care Team:    Dr. Annette Painting - Internal Medicine/Pediatrics   Dr. Abdias Gold - Family Medicine  Dr. Bianca Serrano - Pediatrics  Keerthi Burr CNP - Family Practice Nurse Practitioner  Dr. Talya Beltran - Pediatrics

## 2017-12-15 NOTE — PROGRESS NOTES
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  No.    Do you have concerns about your personal safety or the safety of others?   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 About a year ago had to take her keys and to get someone to get her pills.   Last year got a lamp which helps some       Amount of exercise or physical activity: 4-5 days/week for an average of 15-30 minutes    Problems taking medications regularly: No    Medication side effects: none    Diet: regular (no restrictions)    Hypertension Follow-up      Outpatient blood pressures are being checked at home.  Results are 180/76's.    Low Salt Diet: no added salt      Also, she has additional complaints of :  Has joint pain in multiple joints and morning stiffness   Has left elbow pain as well and intermittently         Problem list and histories reviewed & adjusted, as indicated.  Additional history: as documented    Patient Active Problem List   Diagnosis     Hypertension goal BP (blood pressure) < 140/90     Hypertriglyceridemia     Night sweats      Atopic rhinitis     Major depression in complete remission (H)     Keratitis sicca, bilateral     Hyperlipidemia LDL goal <100     CKD (chronic kidney disease) stage 1, GFR 90 ml/min or greater     Menopausal syndrome (hot flashes)     Allergic rhinitis due to pollen     Status post total hysterectomy     Anemia     ACP (advance care planning)     Tension headache     Past Surgical History:   Procedure Laterality Date     APPENDECTOMY       BIOPSY      cervical node     DILATION AND CURETTAGE, HYSTEROSCOPY DIAGNOSTIC, COMBINED  7/1/2014    Procedure: COMBINED DILATION AND CURETTAGE, HYSTEROSCOPY DIAGNOSTIC;  Surgeon: Mana Cabrera DO;  Location: [facility]     ENT SURGERY      tonsillectomy and adnoid removal     HYSTERECTOMY TOTAL ABDOMINAL       ORTHOPEDIC SURGERY      left knee tear meniscus     RELEASE CARPAL TUNNEL BILATERAL  2009       Social History   Substance Use Topics     Smoking status: Former Smoker     Quit date: 1/1/2001     Smokeless tobacco: Never Used     Alcohol use Yes      Comment: social     Family History   Problem Relation Age of Onset     OSTEOPOROSIS Mother      Eye Disorder Mother      cataract, mac degen     OSTEOPOROSIS Father      Eye Disorder Father      glaucoma     Hypertension Maternal Grandmother      Unknown/Adopted Paternal Grandfather      Eye Disorder Paternal Grandmother      glaucoma     Hypertension Daughter      DIABETES Maternal Grandfather      DIABETES Other          Current Outpatient Prescriptions   Medication Sig Dispense Refill     VITAMIN D, CHOLECALCIFEROL, PO Take 2,000 Units by mouth daily       acetaminophen-codeine (TYLENOL #3) 300-30 MG per tablet Take 1 tablet by mouth every 4 hours as needed for moderate pain 15 tablet 0     ALPRAZolam (XANAX) 0.25 MG tablet Take 1 tablet (0.25 mg) by mouth daily as needed for anxiety 30 tablet 2     simvastatin (ZOCOR) 40 MG tablet Take 1 tablet (40 mg) by mouth At Bedtime 90 tablet 3     losartan-hydrochlorothiazide  (HYZAAR) 100-25 MG per tablet Take 1 tablet by mouth daily 90 tablet 3     cloNIDine (CATAPRES) 0.2 MG tablet Take 1 tablet (0.2 mg) by mouth daily 90 tablet 3     albuterol (PROAIR HFA/PROVENTIL HFA/VENTOLIN HFA) 108 (90 BASE) MCG/ACT Inhaler Inhale 2 puffs into the lungs every 6 hours as needed for shortness of breath / dyspnea 1 Inhaler 5     ALLEGRA ALLERGY 180 MG tablet Take 1 tablet (180 mg) by mouth daily 90 tablet 3     fluticasone (FLONASE) 50 MCG/ACT spray Spray 1-2 sprays into both nostrils daily 16 g 5     olopatadine HCl (PATADAY) 0.2 % SOLN Place 1 drop into both eyes daily 1 Bottle 6     venlafaxine (EFFEXOR-XR) 75 MG 24 hr capsule Take 3 capsules (225 mg) by mouth daily 270 capsule 3     traZODone (DESYREL) 50 MG tablet Take 1 tablet (50 mg) by mouth At Bedtime 90 tablet 3     order for DME Equipment being ordered: light lamp for seasonal affective disorder 1 Device 0     tretinoin (RETIN-A) 0.05 % cream Spread a pea size amount into affected area on the face topically at bedtime.  Use sunscreen SPF>30. 45 g 2     Calcium 600+D 600-200 MG-UNIT TABS Take  by mouth.       Allergies   Allergen Reactions     Seasonal Allergies      Sulfa Drugs      Vomiting       Vicodin [Hydrocodone-Acetaminophen] Nausea     Wellbutrin [Bupropion] Nausea     BP Readings from Last 3 Encounters:   12/15/17 145/80   08/07/17 138/80   01/09/17 131/84    Wt Readings from Last 3 Encounters:   12/15/17 156 lb 1.6 oz (70.8 kg)   08/07/17 151 lb (68.5 kg)   01/09/17 153 lb 1.6 oz (69.4 kg)            Labs reviewed in EPIC        Reviewed and updated as needed this visit by clinical staffTobacco  Allergies  Meds  Med Hx  Surg Hx  Fam Hx  Soc Hx      Reviewed and updated as needed this visit by Provider         ROS:  CONSTITUTIONAL:NEGATIVE for fever, chills, change in weight  ENT/MOUTH: NEGATIVE for ear, mouth and throat problems  RESP:NEGATIVE for significant cough or SOB  CV: NEGATIVE for chest pain, palpitations or  peripheral edema and POSITIVE for HX HTN  GI: NEGATIVE for nausea, abdominal pain, heartburn, or change in bowel habits  MUSCULOSKELETAL: POSITIVE  for arthralgias  and joint pain  and NEGATIVE for joint swelling  and joint warmth   NEURO: NEGATIVE for weakness, dizziness or paresthesias  PSYCHIATRIC: POSITIVE foranxiety, depressed mood, Hx anxiety and Hx depression and NEGATIVE forthoughts of hurting someone else and thoughts of self harm    OBJECTIVE:     /80 (BP Location: Right arm, Patient Position: Sitting, Cuff Size: Adult Regular)  Pulse 87  Temp 97.6  F (36.4  C) (Temporal)  Wt 156 lb 1.6 oz (70.8 kg)  LMP 06/12/2014  SpO2 98%  Breastfeeding? No  BMI 29.74 kg/m2  Body mass index is 29.74 kg/(m^2).  GENERAL APPEARANCE: alert, active and no distress  NECK: no adenopathy  RESP: lungs clear to auscultation - no rales, rhonchi or wheezes  CV: regular rates and rhythm and no murmur, click or rub  MS: extremities normal- no gross deformities noted  NEURO: Normal strength and tone, mentation intact and speech normal  PSYCH: mentation appears normal, patient appearance-- and fatigued    Diagnostic Test Results:  Results for orders placed or performed in visit on 12/15/17   Erythrocyte sedimentation rate auto   Result Value Ref Range    Sed Rate 23 0 - 30 mm/h   CRP inflammation   Result Value Ref Range    CRP Inflammation 6.8 0.0 - 8.0 mg/L   Cyclic Citrullinated Peptide Antibody IgG   Result Value Ref Range    Cyclic Citrullinated Peptide Antibody, IgG 1 <7 U/mL   Rheumatoid factor   Result Value Ref Range    Rheumatoid Factor <20 <20 IU/mL   Anti Nuclear Farida IgG by IFA with Reflex   Result Value Ref Range    MINDY interpretation Positive (A) NEG^Negative    MINDY pattern 1 CENTROMERE     MINDY titer 1 >1:1280    Basic metabolic panel   Result Value Ref Range    Sodium 133 133 - 144 mmol/L    Potassium 4.2 3.4 - 5.3 mmol/L    Chloride 100 94 - 109 mmol/L    Carbon Dioxide 24 20 - 32 mmol/L    Anion Gap 9 3 -  14 mmol/L    Glucose 88 70 - 99 mg/dL    Urea Nitrogen 18 7 - 30 mg/dL    Creatinine 0.97 0.52 - 1.04 mg/dL    GFR Estimate 59 (L) >60 mL/min/1.7m2    GFR Estimate If Black 72 >60 mL/min/1.7m2    Calcium 9.4 8.5 - 10.1 mg/dL       ASSESSMENT/PLAN:     Marcelina was seen today for depression and anxiety.    Diagnoses and all orders for this visit:    Multiple joint pain  -     XR Hand Bilateral G/E 3 Views; Future  -     Erythrocyte sedimentation rate auto  -     CRP inflammation  -     Cyclic Citrullinated Peptide Antibody IgG  -     Rheumatoid factor  -     Cancel: Albumin Random Urine Quantitative with Creat Ratio  -     Anti Nuclear Farida IgG by IFA with Reflex  -     RHEUMATOLOGY REFERRAL    Hypertension goal BP (blood pressure) < 140/90  -     losartan-hydrochlorothiazide (HYZAAR) 100-25 MG per tablet; Take 1 tablet by mouth daily    CKD (chronic kidney disease) stage 2, GFR 60-89 ml/min  -     losartan-hydrochlorothiazide (HYZAAR) 100-25 MG per tablet; Take 1 tablet by mouth daily  -     Basic metabolic panel    Major depression in complete remission (H)  -     buPROPion (WELLBUTRIN XL) 150 MG 24 hr tablet; Take 1 tablet (150 mg) by mouth every morning  Initiate medication with add on Wellbutrin   Reviewed concept of depression as function of biochemical imbalance of neurotransmitters/rationale for treatment.  Risks and benefits of medication(s) reviewed with patient.  Questions answered.  Counseling advised  Followup appointment in 1 month(s)  Patient instructed to call for significant side effects medications or problems  Patient advised immediate presentation to hospital for suicidal thought, etc.    Elevated antinuclear antibody (MINDY) level  -     RHEUMATOLOGY REFERRAL    PLAN:   1.   Symptomatic therapy suggested: will add on wellbutrin once a day.  2.  Orders Placed This Encounter   Medications     VITAMIN D, CHOLECALCIFEROL, PO     Sig: Take 2,000 Units by mouth daily     losartan-hydrochlorothiazide  (HYZAAR) 100-25 MG per tablet     Sig: Take 1 tablet by mouth daily     Dispense:  90 tablet     Refill:  3     buPROPion (WELLBUTRIN XL) 150 MG 24 hr tablet     Sig: Take 1 tablet (150 mg) by mouth every morning     Dispense:  30 tablet     Refill:  1     Orders Placed This Encounter   Procedures     XR Hand Bilateral G/E 3 Views     Erythrocyte sedimentation rate auto     CRP inflammation     Cyclic Citrullinated Peptide Antibody IgG     Rheumatoid factor     Anti Nuclear Farida IgG by IFA with Reflex     Basic metabolic panel       3. Patient needs to follow up in if no improvement,or sooner if worsening of symptoms or other symptoms develop.  FURTHER TESTING:       - hand xrays   Will follow up and/or notify patient of  results via My Chart to determine further need for followup  Follow up in 1 month.  Initiated consultation with LILLY Murrieta, Behavioral Health Clinician for mental health counseling.         See Patient Instructions    EDUARD Milner CNP  [facility]

## 2017-12-15 NOTE — MR AVS SNAPSHOT
After Visit Summary   12/15/2017    Marcelina Estevez    MRN: [id number]           Patient Information     Date Of Birth          1960        Visit Information        Provider Department      12/15/2017 11:30 AM Keerthi Burr APRN [facility]        Today's Diagnoses     Multiple joint pain    -  1    Hypertension goal BP (blood pressure) < 140/90        CKD (chronic kidney disease) stage 2, GFR 60-89 ml/min        Major depression in complete remission (H)          Care Instructions    PLAN:   1.   Symptomatic therapy suggested: will add on wellbutrin once a day.  2.  Orders Placed This Encounter   Medications     VITAMIN D, CHOLECALCIFEROL, PO     Sig: Take 2,000 Units by mouth daily     losartan-hydrochlorothiazide (HYZAAR) 100-25 MG per tablet     Sig: Take 1 tablet by mouth daily     Dispense:  90 tablet     Refill:  3     buPROPion (WELLBUTRIN XL) 150 MG 24 hr tablet     Sig: Take 1 tablet (150 mg) by mouth every morning     Dispense:  30 tablet     Refill:  1     Orders Placed This Encounter   Procedures     XR Hand Bilateral G/E 3 Views     Erythrocyte sedimentation rate auto     CRP inflammation     Cyclic Citrullinated Peptide Antibody IgG     Rheumatoid factor     Anti Nuclear Farida IgG by IFA with Reflex     Basic metabolic panel       3. Patient needs to follow up in if no improvement,or sooner if worsening of symptoms or other symptoms develop.  FURTHER TESTING:       - hand xrays   Will follow up and/or notify patient of  results via My Chart to determine further need for followup  Follow up in 1 month.  Initiated consultation with LILLY Murrieta, Behavioral Health Clinician for mental health counseling.       It was a pleasure seeing you today at the [facility]. Thank you for allowing us to care for you today. We truly hope we provided you with the excellent service you deserve. Please let us know if there is anything  else we can do for you so we can be sure you are leaving completley satisfied with your care experience.       General information about your clinic   Clinic Hours Lab Hours (Appointments are required)   Mon-Thurs: 7:30 AM - 7 PM Mon-Thurs: 7:30 AM - 7 PM   Fri: 7:30 AM - 5 PM Fri: 7:30 AM - 5 PM        After Hours Nurse Advise & Appts:  [facility]: [phone]  [facility] On Call: to make appointments anytime: [phone] On Call Physician: call [phone] and answering service will page the on call physician.        For urgent appointments, please call [phone] and ask for the triage nurse or your care team clinic nurse.  How to contact my care team:  MyChart: [email or web address]   Phone: [phone]   Fax: [phone]       [facility]:   Phone: [phone]  Hours: 8:00 AM - 6:00 PM  Medication Refills:  Call your pharmacy and they will forward the refill to us. Please allow 3 business days for your refills to be completed.       Normal or non-critical lab and imaging results will be communicated to you by MyChart, letter or phone within 7 days.  If you do not hear from us within 10 days, please call the clinic. If you have a critical or abnormal lab result, we will notify you by phone as soon as possible.       We now have PWIC (Pediatric Walk in Care)  Monday-Friday from 7:30-4. Simply walk in and be seen for your urgent needs like cough, fever, rash, diarrhea or vomiting, pink eye, UTI. No appointments needed. Ask one of the team for more information      -Your Care Team:    Dr. Annette Painting - Internal Medicine/Pediatrics   Dr. Abdias Gold - Family Medicine  Dr. Bianca Serrano - Pediatrics  Keerthi Burr CNP - Family Practice Nurse Practitioner  Dr. Talya Beltran - Pediatrics                       Follow-ups after your visit        Your next 10 appointments already scheduled     Jan 08, 2018 11:30 AM CST   [facility]    [address] [address] [place] [id number] [phone]              Future tests that were ordered for you today     Open Future Orders        Priority Expected Expires Ordered    XR Hand Bilateral G/E 3 Views Routine 12/15/2017 12/15/2018 12/15/2017            Who to contact     If you have questions or need follow up information about today's clinic visit or your schedule please contact [facility] directly at [phone].  Normal or non-critical lab and imaging results will be communicated to you by [facility]hart, letter or phone within 4 business days after the clinic has received the results. If you do not hear from us within 7 days, please contact the clinic through [employer] or phone. If you have a critical or abnormal lab result, we will notify you by phone as soon as possible.  Submit refill requests through [employer] or call your pharmacy and they will forward the refill request to us. Please allow 3 business days for your refill to be completed.          Additional Information About Your Visit        [facility] Information     [employer] gives you secure access to your electronic health record. If you see a primary care provider, you can also send messages to your care team and make appointments. If you have questions, please call your primary care clinic.  If you do not have a primary care provider, please call [phone] and they will assist you.      [employer] is an electronic gateway that provides easy, online access to your medical records. With [employer], you can request a clinic appointment, read your test results, renew a prescription or communicate with your care team.     To access your existing account, please contact your [facility] or call [phone] for assistance.        Care EveryWhere ID     This is your Care EveryWhere ID. This could be used by other organizations to access your [facility]  records  [id number]        Your Vitals Were     Pulse Temperature Last Period Pulse Oximetry Breastfeeding? BMI (Body Mass Index)    87 97.6  F (36.4  C) (Temporal) 06/12/2014 98% No 29.74 kg/m2       Blood Pressure from Last 3 Encounters:   12/15/17 145/80   08/07/17 138/80   01/09/17 131/84    Weight from Last 3 Encounters:   12/15/17 156 lb 1.6 oz (70.8 kg)   08/07/17 151 lb (68.5 kg)   01/09/17 153 lb 1.6 oz (69.4 kg)              We Performed the Following     Anti Nuclear Farida IgG by IFA with Reflex     Basic metabolic panel     CRP inflammation     Cyclic Citrullinated Peptide Antibody IgG     Erythrocyte sedimentation rate auto     Rheumatoid factor          Today's Medication Changes          These changes are accurate as of: 12/15/17 12:15 PM.  If you have any questions, ask your nurse or doctor.               Start taking these medicines.        Dose/Directions    buPROPion 150 MG 24 hr tablet   Commonly known as:  WELLBUTRIN XL   Used for:  Major depression in complete remission (H)   Started by:  Keerthi Burr APRN CNP        Dose:  150 mg   Take 1 tablet (150 mg) by mouth every morning   Quantity:  30 tablet   Refills:  1            Where to get your medicines      These medications were sent to [facility] - [place], [place] - [address], [address]  [address], [address], [facility] [address]     Phone:  [phone]     buPROPion 150 MG 24 hr tablet    losartan-hydrochlorothiazide 100-25 MG per tablet                Primary Care Provider Office Phone # Fax #    Annette Painting MD PhD [phone]       [address]  [facility] [address]        Equal Access to Services     ANA CRISTINA ROJAS AH: Hadii skye proctor Sosammi, waaxda luqadaha, qaybta kaalmada adeegyada, faith foster. So [facility] [phone].    ATENCIÓN: Si habla español, tiene a mcgovern disposición servicios gratuitos de asistencia lingüística. Llame al  [phone].    We comply with applicable federal civil rights laws and [place] laws. We do not discriminate on the basis of race, color, national origin, age, disability, sex, sexual orientation, or gender identity.            Thank you!     Thank you for choosing [facility]  for your care. Our goal is always to provide you with excellent care. Hearing back from our patients is one way we can continue to improve our services. Please take a few minutes to complete the written survey that you may receive in the mail after your visit with us. Thank you!             Your Updated Medication List - Protect others around you: Learn how to safely use, store and throw away your medicines at www.disposemymeds.org.          This list is accurate as of: 12/15/17 12:15 PM.  Always use your most recent med list.                   Brand Name Dispense Instructions for use Diagnosis    acetaminophen-codeine 300-30 MG per tablet    TYLENOL #3    15 tablet    Take 1 tablet by mouth every 4 hours as needed for moderate pain    Tension headache       albuterol 108 (90 BASE) MCG/ACT Inhaler    PROAIR HFA/PROVENTIL HFA/VENTOLIN HFA    1 Inhaler    Inhale 2 puffs into the lungs every 6 hours as needed for shortness of breath / dyspnea    Wheezing       ALLEGRA ALLERGY 180 MG tablet   Generic drug:  fexofenadine     90 tablet    Take 1 tablet (180 mg) by mouth daily    Atopic rhinitis       ALPRAZolam 0.25 MG tablet    XANAX    30 tablet    Take 1 tablet (0.25 mg) by mouth daily as needed for anxiety    Panic attack       buPROPion 150 MG 24 hr tablet    WELLBUTRIN XL    30 tablet    Take 1 tablet (150 mg) by mouth every morning    Major depression in complete remission (H)       CALCIUM 600+D 600-200 MG-UNIT Tabs   Generic drug:  calcium carbonate-vitamin D      Take  by mouth.        cloNIDine 0.2 MG tablet    CATAPRES    90 tablet    Take 1 tablet (0.2 mg) by mouth daily    Night sweats       fluticasone 50 MCG/ACT  spray    FLONASE    16 g    Spray 1-2 sprays into both nostrils daily    Atopic rhinitis       losartan-hydrochlorothiazide 100-25 MG per tablet    HYZAAR    90 tablet    Take 1 tablet by mouth daily    Hypertension goal BP (blood pressure) < 140/90, CKD (chronic kidney disease) stage 2, GFR 60-89 ml/min       olopatadine HCl 0.2 % Soln    PATADAY    1 Bottle    Place 1 drop into both eyes daily    Other chronic allergic conjunctivitis       order for DME     1 Device    Equipment being ordered: light lamp for seasonal affective disorder    Severe seasonal affective disorder (H)       simvastatin 40 MG tablet    ZOCOR    90 tablet    Take 1 tablet (40 mg) by mouth At Bedtime    Hyperlipidemia LDL goal <100       traZODone 50 MG tablet    DESYREL    90 tablet    Take 1 tablet (50 mg) by mouth At Bedtime    Insomnia, unspecified type       tretinoin 0.05 % cream    RETIN-A    45 g    Spread a pea size amount into affected area on the face topically at bedtime.  Use sunscreen SPF>30.    Milium       venlafaxine 75 MG 24 hr capsule    EFFEXOR-XR    270 capsule    Take 3 capsules (225 mg) by mouth daily    Menopausal syndrome (hot flashes)       VITAMIN D (CHOLECALCIFEROL) PO      Take 2,000 Units by mouth daily

## 2017-12-16 ASSESSMENT — ANXIETY QUESTIONNAIRES: GAD7 TOTAL SCORE: 10

## 2017-12-18 LAB
ANA PAT SER IF-IMP: ABNORMAL
ANA SER QL IF: POSITIVE
ANA TITR SER IF: ABNORMAL {TITER}
CCP AB SER IA-ACNC: 1 U/ML
RHEUMATOID FACT SER NEPH-ACNC: <20 IU/ML (ref 0–20)

## 2017-12-18 NOTE — PROGRESS NOTES
Cyndie Estevez,    Attached are your test results.  Hand xrays look like mild arthritis changes    Please contact us if you have any questions.    Keerthi Burr, CNP

## 2017-12-19 ENCOUNTER — MYC MEDICAL ADVICE (OUTPATIENT)
Dept: PEDIATRICS | Facility: CLINIC | Age: 57
End: 2017-12-19

## 2017-12-19 DIAGNOSIS — G44.209 TENSION HEADACHE: ICD-10-CM

## 2017-12-19 DIAGNOSIS — M19.90 INFLAMMATORY ARTHRITIS: Primary | ICD-10-CM

## 2017-12-19 NOTE — TELEPHONE ENCOUNTER
Routing to provider to please review and advise.     Patient requesting a medication for pain.     Aisha Mclaughlin RN

## 2017-12-19 NOTE — PROGRESS NOTES
Cyndie Estevez,    Attached are your test results.  Rheumatoid markers are normal.   Lupus screen is positive   Lets refer you to rheumatology to evaluate this further     Please call [phone] to make appointment  if you do not hear from referrals in the next few days.      Please contact us if you have any questions.    Keerthi Burr, CNP

## 2017-12-19 NOTE — TELEPHONE ENCOUNTER
Routing new message to provider team to please advise on patient's message on MyChart regarding refill of Tylenol 3.    Marcelle Rosa RN, [facility]

## 2017-12-20 RX ORDER — NAPROXEN 500 MG/1
500 TABLET ORAL 2 TIMES DAILY PRN
Qty: 60 TABLET | Refills: 1 | Status: SHIPPED | OUTPATIENT
Start: 2017-12-20 | End: 2018-12-05

## 2017-12-20 NOTE — TELEPHONE ENCOUNTER
Please inform patient, refill/prescriptioni approved. Please drop off prescription at pharmacy.  (T3)

## 2018-01-08 ENCOUNTER — OFFICE VISIT (OUTPATIENT)
Dept: PSYCHOLOGY | Facility: CLINIC | Age: 58
End: 2018-01-08
Payer: COMMERCIAL

## 2018-01-08 DIAGNOSIS — F33.1 MODERATE EPISODE OF RECURRENT MAJOR DEPRESSIVE DISORDER (H): Primary | ICD-10-CM

## 2018-01-08 DIAGNOSIS — F41.1 GENERALIZED ANXIETY DISORDER: ICD-10-CM

## 2018-01-08 PROCEDURE — 90791 PSYCH DIAGNOSTIC EVALUATION: CPT | Performed by: SOCIAL WORKER

## 2018-01-08 NOTE — Clinical Note
Ira Logan Dr. referred Marcelina to me a few weeks ago during her office visit. I have a return visit scheduled with her, and we will see what progress can be made in the next 2-3 visits.  She seems interested and motivated, but I will refer to an [facility] therapist if it appears that she will need more than [facility] services.  Thanks, Mar

## 2018-01-08 NOTE — PROGRESS NOTES
"[facility]  Integrated Behavioral Health Services   Diagnostic Assessment      PATIENT'S NAME: Marcelina Estevez  MRN:   [id number]  :   1960  DATE OF SERVICE: 2018  SERVICE LOCATION: Face to Face in Clinic  Visit Activities: NEW and [facility] Only      Identifying Information:  Patient is a 57 year old year old, ,  female.  Patient attended the session alone.      Referral:  Patient was referred for an assessment by Ira Burr at [facility].   Reason for referral: clarify behavioral health diagnosis and determine behavioral health treatment options.     Patient's Statement of Presenting Concern:  Patient reports the following reason(s) for seeking an assessment at this time: Patient reported that her depression and anxiety have \"never been this bad before\". She stated that the symptoms occur everyday for large portions of her day. She stated that she wants to feel \"happy\", have a place to release her emotions, and learn new coping skills to help with management of symptoms.  Patient stated that her symptoms have resulted in the following functional impairments: management of the household and or completion of tasks, self-care and social interactions    History of Presenting Concern:  Patient reported that she has \"always\" had anxiety, and shared belief that she has had depression during her entire adult life. Patient reported seasonal component to symptoms, and shared that symptoms worsen in the winter months. Most recently, patient reported that symptoms returned 6 months ago due to stress associated with being the primary support for her mother who has dementia (moving her into assisted living and taking away car keys when mother was not in agreement of this plan).  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  Additionally, she reported that her " "lupus screen was positive in December, and is now worried about her health.     Symptoms of depression include frequently crying, feeling sad, and decrease in happiness. She stated that she has no interest and motivation to activities that she previously enjoyed. Per patient, she wants to \"sleep all the time\", and reported that even though she is sleeping more, she feels tired and has decreased energy. She stated that she has a hard time completing activities around the home, and reported that she is isolating and withdrawing more from family and friends.  Patient reported that her mind is always worrying about her health, her mother, her father, and her work.  She stated that she tries to shift her focus onto something else, but her mind will return to what she is worrying about.  Patient reported difficulties relaxing, headaches, muscle tension, and GI disturbances.  These symptoms strongly contrast patient's presentation prior to 6 months ago. She reported that when she does not have depression ,she is happy, positive, and the \"fun one\".     Patient has attempted to resolve these concerns in the past through: medication(s) from physician / PCP. Patient reports that other professional(s) are involved in providing support / services. Patient is currently prescribed medication that is managed by her PCP, Dr. Paintign.    Social History:  Patient reported she grew up in [place], [place]. They were the second born of 2 children.  Patient reported that her childhood was \"normal\".  Patient reported a history of 1 committed relationships or marriages. Patient has been  for 32 years. Patient reported having 2 children (27 and 29 years old). Patient identified some stable and meaningful social connections.     Patient lives with her .  Patient is currently employed part time.  Work history: She stated that she works as a [employer] for [employer]. 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    Patient " reported that she has not been involved with the legal system. Patient's highest education level was some college and associate degree / vocational certificate. Patient did not identify any learning problems. There are no ethnic, cultural or Episcopal factors that may be relevant for therapy.  Patient did not serve in the .     Mental Health History:  Patient reported the following biological family members or relatives with mental health issues: Mother experienced Anxiety and Depression, Sister experienced Anxiety and Depression. Patient has received the following mental health services in the past: medication(s) from physician / PCP. Patient is currently receiving the following services: medication(s) from physician / PCP.    Chemical Health History:  Patient reported the following biological family members or relatives with chemical health issues: Aunt reportedly used alcohol , Paternal Grandfather reportedly used alcohol . Patient has not received chemical dependency treatment in the past. Patient is not currently receiving any chemical dependency treatment. Patient reports no problems as a result of their drinking / drug use. Patient reported occasional/social marijuana use while at her cabin.     Cage-AID score is: 0.  Based on Cage-Aid score and clinical interview there  are not indications of drug or alcohol abuse.      Discussed the general effects of drugs and alcohol on health and well-being.      Significant Losses / Trauma / Abuse / Neglect Issues:  There are indications or report of significant loss, trauma, abuse or neglect issues related to: major medical problems concerns for lupus.    Issues of possible neglect are not present.      Medical History:   Patient Active Problem List   Diagnosis     Hypertension goal BP (blood pressure) < 140/90     Hypertriglyceridemia     Night sweats     Atopic rhinitis     Major depression in complete remission (H)     Keratitis sicca, bilateral      Hyperlipidemia LDL goal <100     CKD (chronic kidney disease) stage 1, GFR 90 ml/min or greater     Menopausal syndrome (hot flashes)     Allergic rhinitis due to pollen     Status post total hysterectomy     Anemia     ACP (advance care planning)     Tension headache       Medication Review:  Current Outpatient Prescriptions   Medication     acetaminophen-codeine (TYLENOL #3) 300-30 MG per tablet     naproxen (NAPROSYN) 500 MG tablet     VITAMIN D, CHOLECALCIFEROL, PO     losartan-hydrochlorothiazide (HYZAAR) 100-25 MG per tablet     buPROPion (WELLBUTRIN XL) 150 MG 24 hr tablet     ALPRAZolam (XANAX) 0.25 MG tablet     simvastatin (ZOCOR) 40 MG tablet     cloNIDine (CATAPRES) 0.2 MG tablet     albuterol (PROAIR HFA/PROVENTIL HFA/VENTOLIN HFA) 108 (90 BASE) MCG/ACT Inhaler     ALLEGRA ALLERGY 180 MG tablet     fluticasone (FLONASE) 50 MCG/ACT spray     olopatadine HCl (PATADAY) 0.2 % SOLN     venlafaxine (EFFEXOR-XR) 75 MG 24 hr capsule     traZODone (DESYREL) 50 MG tablet     order for DME     tretinoin (RETIN-A) 0.05 % cream     Calcium 600+D 600-200 MG-UNIT TABS     No current facility-administered medications for this visit.        Patient was provided recommendation to follow-up with physician.    Mental Status Assessment:  Appearance:   Appropriate   Eye Contact:   Good   Psychomotor Behavior: Normal   Attitude:   Cooperative   Orientation:   All  Speech   Rate / Production: Normal    Volume:  Normal   Mood:    Anxious  Sad   Affect:    Appropriate   Thought Content:  Clear   Thought Form:  Coherent  Goal Directed  Logical   Insight:    Good       Safety Assessment:    Patient denies a history of suicidal ideation, suicide attempts, self-injurious behavior, homicidal ideation, homicidal behavior and and other safety concerns  Patient denies current or recent suicidal ideation or behaviors.  Patient denies current or recent homicidal ideation or behaviors.  Patient denies current or recent self injurious  behavior or ideation.  Patient denies other safety concerns.  Patient reports there are no firearms in the house. She stated that there may be firearms at their cabin, but stated that she does not know where they are.  Protective Factors Sense of responsibility to family and Positive problem-solving skills   Risk Factors Current high stress, Intense emotionality and Intense guilt      Plan for Safety and Risk Management:  A safety and risk management plan has not been developed at this time, however patient was encouraged to call [place] [phone] should there be a change in any of these risk factors.      Review of Symptoms:  Depression: Sleep Interest Energy Concentration Appetite Ruminations Irritability  Dasha:  No symptoms  Psychosis: No symptoms  Anxiety: Worries Nervousness  Panic:  Couple times per week  Post Traumatic Stress Disorder: No symptoms  Obsessive Compulsive Disorder: No symptoms  Eating Disorder: No symptoms  Oppositional Defiant Disorder: No symptoms  ADD / ADHD: No symptoms  Conduct Disorder: No symptoms    Patient's Strengths and Limitations:  Patient identified the following strengths or resources that will help her succeed in counseling: commitment to health and well being, family support and positive work environment. Patient identified the following supports: family and friends. Things that may interfere with the patient's success in behavioral health services include:symptoms, schedule.    Diagnostic Criteria:  A. Excessive anxiety and worry about a number of events or activities (such as work or school performance).   B. The person finds it difficult to control the worry.  C. Select 3 or more symptoms (required for diagnosis). Only one item is required in children.   - Restlessness or feeling keyed up or on edge.    - Being easily fatigued.    - Difficulty concentrating or mind going blank.    - Irritability.    - Muscle tension.    - Sleep disturbance (difficulty falling or staying  asleep, or restless unsatisfying sleep).   D. The focus of the anxiety and worry is not confined to features of an Axis I disorder.  E. The anxiety, worry, or physical symptoms cause clinically significant distress or impairment in social, occupational, or other important areas of functioning.   F. The disturbance is not due to the direct physiological effects of a substance (e.g., a drug of abuse, a medication) or a general medical condition (e.g., hyperthyroidism) and does not occur exclusively during a Mood Disorder, a Psychotic Disorder, or a Pervasive Developmental Disorder.  CRITERIA (A-C) REPRESENT A MAJOR DEPRESSIVE EPISODE - SELECT THESE CRITERIA  A) Recurrent episode(s) - symptoms have been present during the same 2-week period and represent a change from previous functioning 5 or more symptoms (required for diagnosis)   - Depressed mood. Note: In children and adolescents, can be irritable mood.     - Diminished interest or pleasure in all, or almost all, activities.    - Increased sleep.    - Fatigue or loss of energy.    - Feelings of worthlessness or inappropriate and excessive guilt.    - Diminished ability to think or concentrate, or indecisiveness.   B) The symptoms cause clinically significant distress or impairment in social, occupational, or other important areas of functioning  C) The episode is not attributable to the physiological effects of a substance or to another medical condition  D) The occurence of major depressive episode is not better explained by other thought / psychotic disorders  E) There has never been a manic episode or hypomanic episode      Functional Status:  Patient's symptoms are causing reduced functional status in the following areas: Activities of Daily Living - daily chores  Social / Relational - increase in isolating and withdrawing      DSM5 Diagnoses: (Sustained by DSM5 Criteria Listed Above)  Diagnoses: 296.32 (F33.1) Major Depressive Disorder, Recurrent Episode,  Moderate _  300.02 (F41.1) Generalized Anxiety Disorder  Psychosocial & Contextual Factors: strained relationship with mother who has dementia, place of employment (for past 25 years) is closing, recent concern for lupus  WHODAS Score: TBD    Preliminary Treatment Plan:    The client reports no currently identified Shinto, ethnic or cultural issues relevant to therapy.    Initial Treatment will focus on: Depressed Mood - activity scheduling, increase motivation to complete activities  Anxiety - increase in activities that assist with relaxation.    Chemical dependency recommendations: No indications of CD issues    As a preliminary treatment goal, patient will experience a reduction in depressed mood, will develop more effective coping skills to manage depressive symptoms, will develop healthy cognitive patterns and beliefs, will increase ability to function adaptively and will continue to take medications as prescribed / participate in supportive activities and services  and will develop more effective coping skills to manage anxiety symptoms, will develop healthy cognitive patterns and beliefs and will increase ability to function adaptively.    The focus of initial interventions will be to teach CBT skills, teach DBT skills, teach mindfulness skills and teach relaxation strategies.    The patient is receiving treatment / structured support from the following professional(s) / service and treatment. Collaboration will be initiated with: primary care physician.    Referral to another professional/service is not indicated at this time..    A Release of Information is not needed at this time.    Report to child or adult protection services was NA.    Mar Padilla [facility], Behavioral Health Clinician

## 2018-01-19 DIAGNOSIS — N95.1 MENOPAUSAL SYNDROME (HOT FLASHES): ICD-10-CM

## 2018-01-19 DIAGNOSIS — G44.209 TENSION HEADACHE: ICD-10-CM

## 2018-01-19 RX ORDER — VENLAFAXINE HYDROCHLORIDE 75 MG/1
CAPSULE, EXTENDED RELEASE ORAL
Qty: 270 CAPSULE | Refills: 1 | Status: SHIPPED | OUTPATIENT
Start: 2018-01-19 | End: 2018-07-16

## 2018-01-19 NOTE — TELEPHONE ENCOUNTER
Hello,  last fill date:12-  Last quantity:30    Thank You,  Meagan Bishop  Pharmacy Technician  [facility]  [phone]

## 2018-01-22 ENCOUNTER — PRE VISIT (OUTPATIENT)
Dept: RHEUMATOLOGY | Facility: CLINIC | Age: 58
End: 2018-01-22

## 2018-01-22 NOTE — TELEPHONE ENCOUNTER
PREVISIT INFORMATION                                                    Marcelina Estevez scheduled for future visit at [facility].    Patient is scheduled to see Dr Lopez on mon 1/29/18  Reason for visit: Consult joint pain/ elevated labs  Referring provider DR Painting  Has patient seen previous specialist?no  Medical Records:  Available in chart.  Patient was previously seen at a [facility] or [facility] facility.    REVIEW                                                      New patient packet mailed to patient: N/A  Medication reconciliation complete: Yes      Current Outpatient Prescriptions   Medication Sig Dispense Refill     venlafaxine (EFFEXOR-XR) 75 MG 24 hr capsule TAKE THREE CAPSULES BY MOUTH EVERY  capsule 1     acetaminophen-codeine (TYLENOL #3) 300-30 MG per tablet Take 1 tablet by mouth every 4 hours as needed for moderate pain 30 tablet 0     naproxen (NAPROSYN) 500 MG tablet Take 1 tablet (500 mg) by mouth 2 times daily as needed for moderate pain 60 tablet 1     VITAMIN D, CHOLECALCIFEROL, PO Take 2,000 Units by mouth daily       losartan-hydrochlorothiazide (HYZAAR) 100-25 MG per tablet Take 1 tablet by mouth daily 90 tablet 3     buPROPion (WELLBUTRIN XL) 150 MG 24 hr tablet Take 1 tablet (150 mg) by mouth every morning 30 tablet 1     ALPRAZolam (XANAX) 0.25 MG tablet Take 1 tablet (0.25 mg) by mouth daily as needed for anxiety 30 tablet 2     simvastatin (ZOCOR) 40 MG tablet Take 1 tablet (40 mg) by mouth At Bedtime 90 tablet 3     cloNIDine (CATAPRES) 0.2 MG tablet Take 1 tablet (0.2 mg) by mouth daily 90 tablet 3     albuterol (PROAIR HFA/PROVENTIL HFA/VENTOLIN HFA) 108 (90 BASE) MCG/ACT Inhaler Inhale 2 puffs into the lungs every 6 hours as needed for shortness of breath / dyspnea 1 Inhaler 5     ALLEGRA ALLERGY 180 MG tablet Take 1 tablet (180 mg) by mouth daily 90 tablet 3     fluticasone (FLONASE) 50 MCG/ACT spray Spray 1-2 sprays  into both nostrils daily 16 g 5     olopatadine HCl (PATADAY) 0.2 % SOLN Place 1 drop into both eyes daily 1 Bottle 6     traZODone (DESYREL) 50 MG tablet Take 1 tablet (50 mg) by mouth At Bedtime 90 tablet 3     order for DME Equipment being ordered: light lamp for seasonal affective disorder 1 Device 0     tretinoin (RETIN-A) 0.05 % cream Spread a pea size amount into affected area on the face topically at bedtime.  Use sunscreen SPF>30. 45 g 2     Calcium 600+D 600-200 MG-UNIT TABS Take  by mouth.         Allergies: Seasonal allergies; Sulfa drugs; Vicodin [hydrocodone-acetaminophen]; and Wellbutrin [bupropion]      Patient review:  Who is currently managing your care (update PCP if needed)? Dr Painting    Are you currently taking any medications to manage your rheumatology condition? No   If not, have you previously taken any rheumatology medications like DMARD or biologic (type, dates, length of tx, effective or not)? No      Are you taking any medications over-the-counter? No     Update home medications and allergies info: Yes     Have you had any recent rheumatology labs? Yes   Last lab results in chart:    Hemoglobin   Date Value Ref Range Status   12/15/2014 11.4 (L) 11.7 - 15.7 g/dL Final   08/01/2014 12.4 11.7 - 15.7 g/dL Final   12/30/2011 13.0 11.7 - 15.7 g/dL Final     Urea Nitrogen   Date Value Ref Range Status   12/15/2017 18 7 - 30 mg/dL Final   08/02/2017 30 7 - 30 mg/dL Final   06/29/2016 16 7 - 30 mg/dL Final     Creatinine   Date Value Ref Range Status   09/24/2014 0.9 0.52 - 1.04 mg/dL Final     Sed Rate   Date Value Ref Range Status   12/15/2017 23 0 - 30 mm/h Final   12/30/2011 10 0 - 30 mm/h Final     CRP Inflammation   Date Value Ref Range Status   12/15/2017 6.8 0.0 - 8.0 mg/L Final     AST   Date Value Ref Range Status   12/30/2011 31 0 - 45 U/L Final     Albumin   Date Value Ref Range Status   12/30/2011 4.7 3.9 - 5.1 g/dL Final     Alkaline Phosphatase   Date Value Ref Range Status    12/30/2011 53 40 - 150 U/L Final     ALT   Date Value Ref Range Status   06/29/2016 37 0 - 50 U/L Final   06/24/2015 58 (H) 0 - 50 U/L Final   08/01/2014 25 0 - 50 U/L Final     Rheumatoid Factor   Date Value Ref Range Status   12/15/2017 <20 <20 IU/mL Final     Recent Labs   Lab Test  12/15/17   1232  08/02/17   0805  01/09/17   1519  06/29/16   1042  06/24/15   0751  12/15/14   1233  08/01/14   1051   12/30/11   1247   WBC   --    --    --    --    --   5.8  6.8   --   9.1   HGB   --    --    --    --    --   11.4*  12.4   --   13.0   HCT   --    --    --    --    --   33.7*  35.6   --   37.4   MCV   --    --    --    --    --   90  94   --   95   PLT   --    --    --    --    --   255  214   --   214   BUN  18  30   --   16  12   --   18   < >  16   TSH   --    --   1.18   --    --    --    --    --    --    AST   --    --    --    --    --    --    --    --   31   ALT   --    --    --   37  58*   --   25   < >  28   ALKPHOS   --    --    --    --    --    --    --    --   53    < > = values in this interval not displayed.       Color Urine (no units)   Date Value   11/14/2016 Yellow     Appearance Urine (no units)   Date Value   11/14/2016 Cloudy     Glucose Urine (mg/dL)   Date Value   11/14/2016 Negative     Bilirubin Urine (no units)   Date Value   11/14/2016 Negative     Ketones Urine (mg/dL)   Date Value   11/14/2016 Negative     Specific Gravity Urine (no units)   Date Value   11/14/2016 1.017     pH Urine (pH)   Date Value   11/14/2016 6.0     Protein Albumin Urine (mg/dL)   Date Value   11/14/2016 100 (A)     Nitrite Urine (no units)   Date Value   11/14/2016 Negative     Leukocyte Esterase Urine (no units)   Date Value   11/14/2016 Large (A)       Have you had any recent x-rays related to your visit? Yes Avail in EPIC    Are your immunizations up-to-date? Yes    Do you have copy of your immunizations? N/A   Immunization History   Administered Date(s) Administered     Influenza (IIV3) PF 09/05/2011,  09/10/2012     Influenza Vaccine IM 3yrs+ 4 Valent IIV4 09/15/2017     Mantoux Tuberculin Skin Test 09/27/2016     Pneumococcal 23 valent 08/07/2017           PLAN/FOLLOW-UP NEEDED                                                      Previsit review complete.  Patient will see provider at future scheduled appointment.     Patient Reminders Given:  Please, make sure you bring an updated list of your medications.   Please, present 15 minutes early.  If you need to cancel or reschedule,please call [phone].

## 2018-01-23 NOTE — TELEPHONE ENCOUNTER
Called pharm to clarify if they received the script, it was sent to pharm and not documented. Sarahi ANGULO

## 2018-01-29 ENCOUNTER — OFFICE VISIT (OUTPATIENT)
Dept: RHEUMATOLOGY | Facility: CLINIC | Age: 58
End: 2018-01-29
Attending: NURSE PRACTITIONER
Payer: COMMERCIAL

## 2018-01-29 VITALS
WEIGHT: 155.9 LBS | BODY MASS INDEX: 29.43 KG/M2 | SYSTOLIC BLOOD PRESSURE: 138 MMHG | DIASTOLIC BLOOD PRESSURE: 84 MMHG | OXYGEN SATURATION: 100 % | HEIGHT: 61 IN | RESPIRATION RATE: 18 BRPM | HEART RATE: 75 BPM | TEMPERATURE: 98.2 F

## 2018-01-29 DIAGNOSIS — M34.9 LIMITED SCLERODERMA (H): ICD-10-CM

## 2018-01-29 DIAGNOSIS — I73.00 RAYNAUD'S DISEASE WITHOUT GANGRENE: Primary | ICD-10-CM

## 2018-01-29 LAB — CK SERPL-CCNC: 66 U/L (ref 30–225)

## 2018-01-29 PROCEDURE — 86480 TB TEST CELL IMMUN MEASURE: CPT | Performed by: STUDENT IN AN ORGANIZED HEALTH CARE EDUCATION/TRAINING PROGRAM

## 2018-01-29 PROCEDURE — 99000 SPECIMEN HANDLING OFFICE-LAB: CPT | Performed by: STUDENT IN AN ORGANIZED HEALTH CARE EDUCATION/TRAINING PROGRAM

## 2018-01-29 PROCEDURE — 36415 COLL VENOUS BLD VENIPUNCTURE: CPT | Performed by: STUDENT IN AN ORGANIZED HEALTH CARE EDUCATION/TRAINING PROGRAM

## 2018-01-29 PROCEDURE — 86704 HEP B CORE ANTIBODY TOTAL: CPT | Performed by: STUDENT IN AN ORGANIZED HEALTH CARE EDUCATION/TRAINING PROGRAM

## 2018-01-29 PROCEDURE — 87340 HEPATITIS B SURFACE AG IA: CPT | Performed by: STUDENT IN AN ORGANIZED HEALTH CARE EDUCATION/TRAINING PROGRAM

## 2018-01-29 PROCEDURE — 86235 NUCLEAR ANTIGEN ANTIBODY: CPT | Performed by: STUDENT IN AN ORGANIZED HEALTH CARE EDUCATION/TRAINING PROGRAM

## 2018-01-29 PROCEDURE — 99214 OFFICE O/P EST MOD 30 MIN: CPT | Performed by: STUDENT IN AN ORGANIZED HEALTH CARE EDUCATION/TRAINING PROGRAM

## 2018-01-29 PROCEDURE — 00000167 ZZHCL STATISTIC INR NC: Performed by: STUDENT IN AN ORGANIZED HEALTH CARE EDUCATION/TRAINING PROGRAM

## 2018-01-29 PROCEDURE — 82550 ASSAY OF CK (CPK): CPT | Performed by: STUDENT IN AN ORGANIZED HEALTH CARE EDUCATION/TRAINING PROGRAM

## 2018-01-29 PROCEDURE — 85613 RUSSELL VIPER VENOM DILUTED: CPT | Performed by: STUDENT IN AN ORGANIZED HEALTH CARE EDUCATION/TRAINING PROGRAM

## 2018-01-29 PROCEDURE — 83516 IMMUNOASSAY NONANTIBODY: CPT | Mod: 90 | Performed by: STUDENT IN AN ORGANIZED HEALTH CARE EDUCATION/TRAINING PROGRAM

## 2018-01-29 PROCEDURE — 86147 CARDIOLIPIN ANTIBODY EA IG: CPT | Performed by: STUDENT IN AN ORGANIZED HEALTH CARE EDUCATION/TRAINING PROGRAM

## 2018-01-29 PROCEDURE — 86146 BETA-2 GLYCOPROTEIN ANTIBODY: CPT | Performed by: STUDENT IN AN ORGANIZED HEALTH CARE EDUCATION/TRAINING PROGRAM

## 2018-01-29 PROCEDURE — 00000401 ZZHCL STATISTIC THROMBIN TIME NC: Performed by: STUDENT IN AN ORGANIZED HEALTH CARE EDUCATION/TRAINING PROGRAM

## 2018-01-29 PROCEDURE — 85730 THROMBOPLASTIN TIME PARTIAL: CPT | Performed by: STUDENT IN AN ORGANIZED HEALTH CARE EDUCATION/TRAINING PROGRAM

## 2018-01-29 RX ORDER — HYDROXYCHLOROQUINE SULFATE 200 MG/1
200 TABLET, FILM COATED ORAL 2 TIMES DAILY
Qty: 60 TABLET | Refills: 3 | Status: SHIPPED | OUTPATIENT
Start: 2018-01-29 | End: 2018-12-05

## 2018-01-29 ASSESSMENT — PAIN SCALES - GENERAL: PAINLEVEL: SEVERE PAIN (6)

## 2018-01-29 NOTE — PROGRESS NOTES
Rheumatology Clinic Visit     Marcelina Estevez MRN# [id number]   YOB: 1960 Age: 57 year old     Date of Visit: January 29, 2018  Primary care provider: Annette Painting          Assessment and Plan:   Assessment     -- Worsening raynaud's  -- Puffiness of hands-possible sclerodactyly  -- Arthritis particularly in hands  -- Positive MINDY >-1:1280 centromere pattern  -- Mildly elevated ALT level-35 with normal AST    Ms Estevez is 58 yo female she has history of raynaud's for many years and noticed pain in her hands along with swelling.  She followed with Dr. Egan at [facility] and had autoimmune workup which revealed a high titer positive MINDY of 1: 1280 with centromere pattern.  The rest of her SUSIE panel, double-stranded DNA antibodies were negative.  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  No particular DMARD therapy was started.      Today she complains of worsening raynaud's along with puffiness, swelling and stiffness in her hands.  She does not have significant joint involvement other than the hands.  She does complain of shortness of breath which is progressive.  No baseline pulmonary function tests or echo done recently.  I will get 2D echocardiogram today to evaluate for pulmonary hypertension and baseline pulmonary function tests and 6 minute walk test to evaluate for possible ILD.  Patients with CREST syndrome have high risk of having pulmonary hypertension.     Also for the persistent pain in her hands and other joints including shoulders and knees I recommended her to try hydroxychloroquine 200 mg twice daily.  I told her that it is a slow acting medication and will try  3-6 months to see improvement.  In case Plaquenil does not help with her joint pains we can consider adding methotrexate for inflammatory arthritis.    She has a borderline elevated ALT level with normal AST.  Will get CK to make sure its not coming from the muscles.  In case there is worsening of her  LFTs will consider getting abdominal ultrasound and evaluation for autoimmune hepatitis or PBC.     For raynaud's I recommended her to avoid cold outdoors and keep herself warm.  But in case it gets worse then calcium channel blocker like amlodipine can be tried.  Avoid beta blockers for blood pressure management if possible.     Plan    -- Will do blood tests today and Echocardiogram and PFT     --  Will start her on plaquenil 200 mg BID for undifferentiated inflammatory arthritis. It is slow acting medication and will giver it a trial for next 3 - 4 months.   -- If she will be continued on Plaquenil long term will do eye exam in 3 - 4 months as a baseline and then every year.      -- RTC in 3 months           Active Problem List:     Patient Active Problem List    Diagnosis Date Noted     Raynaud's disease without gangrene 01/29/2018     Priority: Medium     Generalized anxiety disorder 01/08/2018     Priority: Medium     Tension headache 09/28/2017     Priority: Medium     Patient is followed by Annette Painting MD PhD for ongoing prescription of pain medication.  All refills should only be approved by this provider, or covering partner.    Medication(s): Tylenol with codeine (T#3).   Maximum quantity per month: 15  Clinic visit frequency required: Q 6  months     Controlled substance agreement:  Encounter-Level CSA - 08/07/2017:          Controlled Substance Agreement - Scan on 8/16/2017 10:21 AM : CONTROLLED SUBSTANCE AGREEMENT (below)              Pain Clinic evaluation in the past: No    DIRE Total Score(s):  No flowsheet data found.    Last [facility] website verification:  none   [email or web address]       ACP (advance care planning) 08/19/2015     Priority: Medium     Advance Care Planning 8/19/2015: ACP Review and Resources Provided:  Reviewed chart for advance care plan.  Marcelina NEVAREZ Daynerobel has no plan or code status on file however states presence of ACP document. Copy requested.  Confirmed/documented legally  designated decision maker(s). Added by Mary Padilla RN Advance Care Planning Liaison with [employer]           Anemia 01/09/2015     Priority: Medium     Status post total hysterectomy 11/18/2014     Priority: Medium     Allergic rhinitis due to pollen 08/02/2014     Priority: Medium     Menopausal syndrome (hot flashes) 12/19/2013     Priority: Medium     Hyperlipidemia LDL goal <100 06/17/2013     Priority: Medium     CKD (chronic kidney disease) stage 1, GFR 90 ml/min or greater 06/17/2013     Priority: Medium     Moderate episode of recurrent major depressive disorder (H) 09/10/2012     Priority: Medium     Celexa not helping, fluoxetine caused rash.        Hypertension goal BP (blood pressure) < 140/90 01/18/2011     Priority: Medium     Keratitis sicca, bilateral 06/10/2013     Priority: Low     Hypertriglyceridemia 01/18/2011     Priority: Low     Controlled with simvastatin.        Night sweats 01/18/2011     Priority: Low     Worse with menstruation. On Clonidine.        Atopic rhinitis 01/18/2011     Priority: Low     (Problem list name updated by automated process. Provider to review and confirm.)              History of Present Illness:   Marcelina Estevez is a 57 year old female with PMH of raynaud's, osteoarthritis, hand pain, depression seen in the clinic in consultation at request of Keerthi Burr APRN CNP for evaluation of joint pains.     C/o Pain in her hands and trigger finger in left hand. Stiffness in the morning. Can not move them or make a fist. complains of mild pain in her knees and shoulders, has hx of knee surgery for meniscal tear. She saw Dr. Egan at  [facility] for abnormal MINDY and was diagnosed with possible CREST syndrome.  He did autoimmune workup which revealed positive MINDY 1: 320 with centromere pattern, negative SUSIE panel, double-stranded DNA, rheumatoid serologies. No specific DMARD therapy was started. On her last visit 1/11/2018 she was  noted to have left elbow swelling with some white discharge.  To rule out possibility of calcinosis she had x-ray of the left elbow which was normal.  She was given empiric antibiotics for possible bursitis and it improved the swelling and pain.    She also reports having raynaud's for the past 20 years.  Denies any gangrene or infarcts over her fingertips. Denies malar rash, photosensitivity, recurrent mouth/genital ulcers, sicca symptoms, pleuritic chest pains, recurrent sinusitis/rhinitis, swallowing difficulty, hearing or visual changes recently. No h/o arterial/venous thrombosis in the past. Denies any tick bite, recent GI/ infection.            Review of Systems:   Review Of Systems  Constitutional: denies fever, chills, night sweats and weight loss.  Skin: No skin rash.  Eyes: No dryness or irritation in eyes. No episode of eye inflammation or redness.   Ears/Nose/Throat: no recurrent sinus infections.  Respiratory: No shortness of breath, dyspnea on exertion, cough, or hemoptysis  Cardiovascular: no chest pain or palpitations.  Gastrointestinal: no nausea, vomiting, abdominal pain.  Normal bowel movements.  Genitourinary: no dysuria, frequency  or hematuria.  Musculoskeletal: as in HPI  Neurologic: no numbness, tingling.  Psychiatric: no mood disorders.  Hematologic/Lymphatic/Immunologic: no history of easy bruising, petechia or purpura.  No abnormal bleeding.   Endocrine: no h/o thyroid disease or Diabetes.                  Past Medical History:     Past Medical History:   Diagnosis Date     Hyperlipidemia      Hypertension      Positive MINDY (antinuclear antibody)      Raynaud disease      Past Surgical History:   Procedure Laterality Date     APPENDECTOMY       BIOPSY      cervical node     DILATION AND CURETTAGE, HYSTEROSCOPY DIAGNOSTIC, COMBINED  7/1/2014    Procedure: COMBINED DILATION AND CURETTAGE, HYSTEROSCOPY DIAGNOSTIC;  Surgeon: Mana Cabrera DO;  Location: [facility] OR     ENT SURGERY       tonsillectomy and adnoid removal     HYSTERECTOMY TOTAL ABDOMINAL       ORTHOPEDIC SURGERY      left knee tear meniscus     RELEASE CARPAL TUNNEL BILATERAL  2009            Social History:     Social History     Occupational History     Not on file.     Social History Main Topics     Smoking status: Former Smoker     Quit date: 1/1/2001     Smokeless tobacco: Never Used     Alcohol use Yes      Comment: social     Drug use: No     Sexual activity: Yes     Partners: Male     Birth control/ protection: Surgical      Comment: vasectomy            Family History:     Family History   Problem Relation Age of Onset     OSTEOPOROSIS Mother      Eye Disorder Mother      cataract, mac degen     OSTEOPOROSIS Father      Eye Disorder Father      glaucoma     Hypertension Maternal Grandmother      Unknown/Adopted Paternal Grandfather      Eye Disorder Paternal Grandmother      glaucoma     Hypertension Daughter      DIABETES Maternal Grandfather      DIABETES Other             Allergies:     Allergies   Allergen Reactions     Seasonal Allergies      Sulfa Drugs      Vomiting       Vicodin [Hydrocodone-Acetaminophen] Nausea            Medications:     Current Outpatient Prescriptions   Medication Sig Dispense Refill     hydroxychloroquine (PLAQUENIL) 200 MG tablet Take 1 tablet (200 mg) by mouth 2 times daily Get annual eye exams for hydroxychloroquine (plaquenil) monitoring and fax to [phone]. 60 tablet 3     venlafaxine (EFFEXOR-XR) 75 MG 24 hr capsule TAKE THREE CAPSULES BY MOUTH EVERY  capsule 1     acetaminophen-codeine (TYLENOL #3) 300-30 MG per tablet Take 1 tablet by mouth every 4 hours as needed for moderate pain 30 tablet 0     VITAMIN D, CHOLECALCIFEROL, PO Take 2,000 Units by mouth daily       losartan-hydrochlorothiazide (HYZAAR) 100-25 MG per tablet Take 1 tablet by mouth daily 90 tablet 3     ALPRAZolam (XANAX) 0.25 MG tablet Take 1 tablet (0.25 mg) by mouth daily as needed for anxiety 30 tablet 2      "simvastatin (ZOCOR) 40 MG tablet Take 1 tablet (40 mg) by mouth At Bedtime 90 tablet 3     cloNIDine (CATAPRES) 0.2 MG tablet Take 1 tablet (0.2 mg) by mouth daily 90 tablet 3     albuterol (PROAIR HFA/PROVENTIL HFA/VENTOLIN HFA) 108 (90 BASE) MCG/ACT Inhaler Inhale 2 puffs into the lungs every 6 hours as needed for shortness of breath / dyspnea 1 Inhaler 5     ALLEGRA ALLERGY 180 MG tablet Take 1 tablet (180 mg) by mouth daily 90 tablet 3     fluticasone (FLONASE) 50 MCG/ACT spray Spray 1-2 sprays into both nostrils daily 16 g 5     traZODone (DESYREL) 50 MG tablet Take 1 tablet (50 mg) by mouth At Bedtime 90 tablet 3     order for DME Equipment being ordered: light lamp for seasonal affective disorder 1 Device 0     naproxen (NAPROSYN) 500 MG tablet Take 1 tablet (500 mg) by mouth 2 times daily as needed for moderate pain (Patient not taking: Reported on 1/29/2018) 60 tablet 1     buPROPion (WELLBUTRIN XL) 150 MG 24 hr tablet Take 1 tablet (150 mg) by mouth every morning 30 tablet 1     olopatadine HCl (PATADAY) 0.2 % SOLN Place 1 drop into both eyes daily (Patient not taking: Reported on 1/29/2018) 1 Bottle 6     tretinoin (RETIN-A) 0.05 % cream Spread a pea size amount into affected area on the face topically at bedtime.  Use sunscreen SPF>30. (Patient not taking: Reported on 1/29/2018) 45 g 2     Calcium 600+D 600-200 MG-UNIT TABS Take  by mouth.              Physical Exam:   Blood pressure 138/84, pulse 75, temperature 98.2  F (36.8  C), resp. rate 18, height 1.543 m (5' 0.75\"), weight 70.7 kg (155 lb 14.4 oz), last menstrual period 06/12/2014, SpO2 100 %, not currently breastfeeding.  Wt Readings from Last 4 Encounters:   01/29/18 70.7 kg (155 lb 14.4 oz)   12/15/17 70.8 kg (156 lb 1.6 oz)   08/07/17 68.5 kg (151 lb)   01/09/17 69.4 kg (153 lb 1.6 oz)       Constitutional: well-developed, appearing stated age; cooperative  Eyes: nl EOM, PERRLA, vision, conjunctiva, sclera  ENT: nl external ears, nose, " hearing, lips, teeth, gums, throat  No mucous membrane lesions, normal saliva pool  Neck: no mass or thyroid enlargement  Resp: lungs clear to auscultation, nl to palpation  CV: RRR, no murmurs, rubs or gallops, no edema  GI: no ABD mass or tenderness, no HSM  : not tested  Lymph: no cervical, supraclavicular, inguinal or epitrochlear nodes    MS: All TMJ, neck, shoulder, elbow, wrist, MCP/PIP/DIP, spine, hip, knee, ankle, and foot MTP/IP joints were examined.    -- Puffiness of hands, sclerodactyly, tightness of the skin distal to the MCP joints, no active raynaud's.  Difficulty making a fist due to stiffness.  No significant synovitis and tenderness over MCP, PIP joints   -- tenderness present over Anterior aspect of bilateral shoulders  -- No tenderness present over bilateral ankles, MTP joints  --Left elbow swelling and erythema, no tenderness or discharge.  -- No dactylitis,  tenosynovitis, enthesopathy.    Skin: no nail pitting, alopecia, rash, nodules or lesions  Neuro: nl cranial nerves, strength, sensation, DTRs.   Psych: nl judgement, orientation, memory, affect.         Data:     Results for orders placed or performed in visit on 01/29/18   CK total   Result Value Ref Range    CK Total 66 30 - 225 U/L       Recent Labs   Lab Test  12/15/17   1232  08/02/17   0805  06/29/16   1042   12/15/14   1233  09/24/14   0953  08/01/14   1051   12/30/11   1247   WBC   --    --    --    --   5.8   --   6.8   --   9.1   RBC   --    --    --    --   3.73*   --   3.80   --   3.95   HGB   --    --    --    --   11.4*   --   12.4   --   13.0   HCT   --    --    --    --   33.7*   --   35.6   --   37.4   MCV   --    --    --    --   90   --   94   --   95   RDW   --    --    --    --   15.0   --   13.4   --   13.5   PLT   --    --    --    --   255   --   214   --   214   ALBUMIN   --    --    --    --    --    --    --    --   4.7   CRP  6.8   --    --    --    --    --    --    --    --    BUN  18  30  16   < >   --     --   18   < >  16   CREAT   --    --    --    --    --   0.9   --    --    --     < > = values in this interval not displayed.      Recent Labs   Lab Test  01/09/17   1519   TSH  1.18     Hemoglobin   Date Value Ref Range Status   12/15/2014 11.4 (L) 11.7 - 15.7 g/dL Final   08/01/2014 12.4 11.7 - 15.7 g/dL Final   12/30/2011 13.0 11.7 - 15.7 g/dL Final     Urea Nitrogen   Date Value Ref Range Status   12/15/2017 18 7 - 30 mg/dL Final   08/02/2017 30 7 - 30 mg/dL Final   06/29/2016 16 7 - 30 mg/dL Final     Creatinine   Date Value Ref Range Status   09/24/2014 0.9 0.52 - 1.04 mg/dL Final     Sed Rate   Date Value Ref Range Status   12/15/2017 23 0 - 30 mm/h Final   12/30/2011 10 0 - 30 mm/h Final     CRP Inflammation   Date Value Ref Range Status   12/15/2017 6.8 0.0 - 8.0 mg/L Final     AST   Date Value Ref Range Status   12/30/2011 31 0 - 45 U/L Final     Albumin   Date Value Ref Range Status   12/30/2011 4.7 3.9 - 5.1 g/dL Final     Alkaline Phosphatase   Date Value Ref Range Status   12/30/2011 53 40 - 150 U/L Final     ALT   Date Value Ref Range Status   06/29/2016 37 0 - 50 U/L Final   06/24/2015 58 (H) 0 - 50 U/L Final   08/01/2014 25 0 - 50 U/L Final     Rheumatoid Factor   Date Value Ref Range Status   12/15/2017 <20 <20 IU/mL Final     Recent Labs   Lab Test  12/15/17   1232  08/02/17   0805  01/09/17   1519  06/29/16   1042  06/24/15   0751  12/15/14   1233  08/01/14   1051   12/30/11   1247   WBC   --    --    --    --    --   5.8  6.8   --   9.1   HGB   --    --    --    --    --   11.4*  12.4   --   13.0   HCT   --    --    --    --    --   33.7*  35.6   --   37.4   MCV   --    --    --    --    --   90  94   --   95   PLT   --    --    --    --    --   255  214   --   214   BUN  18  30   --   16  12   --   18   < >  16   TSH   --    --   1.18   --    --    --    --    --    --    AST   --    --    --    --    --    --    --    --   31   ALT   --    --    --   37  58*   --   25   < >  28   ALKPHOS   --     --    --    --    --    --    --    --   53    < > = values in this interval not displayed.       Outside studies reviewed:   Component Name  12/28/2017 1/22/2013 1/17/2013         4     NEGATIVE     Negative Negative     Negative Negative     Negative Negative     Negative Negative     <1:20       Anti CCP   Anti DNA Farida   Anti RNP Farida   Anti SM Farida   Anti SSA Farida   Anti SSB Farida   Anti-Neutrophil Cytoplasmic Farida IGG                            Component Name  3/9/2017     Nonreactive   Hep C Farida       Reviewed Rheumatology lab flowsheet    Hilda Lopez MD  [facility]  Department of Rheumatology & Autoimmune Disorders  [facility]: [phone]   Pager - [phone]

## 2018-01-29 NOTE — PATIENT INSTRUCTIONS
-- Will do blood tests today and Echocardiogram and PFT     -- Start Plaquenil 200 mg BID     -- RTC in 3 months   Hydroxychloroquine tablets  Brand Names: Plaquenil, Quineprox  What is this medicine?  HYDROXYCHLOROQUINE (marion drox ee KLOR oh kwin) is used to treat rheumatoid arthritis and systemic lupus erythematosus. It is also used to treat malaria.  How should I use this medicine?  Take this medicine by mouth with a glass of water. Follow the directions on the prescription label. Avoid taking antacids within 4 hours of taking this medicine. It is best to separate these medicines by at least 4 hours. Do not cut, crush or chew this medicine. You can take it with or without food. If it upsets your stomach, take it with food. Take your medicine at regular intervals. Do not take your medicine more often than directed. Take all of your medicine as directed even if you think you are better. Do not skip doses or stop your medicine early.  Talk to your pediatrician regarding the use of this medicine in children. While this drug may be prescribed for selected conditions, precautions do apply.  What side effects may I notice from receiving this medicine?  Side effects that you should report to your doctor or health care professional as soon as possible:    allergic reactions like skin rash, itching or hives, swelling of the face, lips, or tongue    changes in vision    decreased hearing or ringing of the ears    redness, blistering, peeling or loosening of the skin, including inside the mouth    seizures    sensitivity to light    signs and symptoms of a dangerous change in heartbeat or heart rhythm like chest pain; dizziness; fast or irregular heartbeat; palpitations; feeling faint or lightheaded, falls; breathing problems    signs and symptoms of liver injury like dark yellow or brown urine; general ill feeling or flu-like symptoms; light-colored stools; loss of appetite; nausea; right upper belly pain; unusually weak or  tired; yellowing of the eyes or skin    signs and symptoms of low blood sugar such as feeling anxious; confusion; dizziness; increased hunger; unusually weak or tired; sweating; shakiness; cold; irritable; headache; blurred vision; fast heartbeat; loss of consciousness    uncontrollable head, mouth, neck, arm, or leg movements  Side effects that usually do not require medical attention (report to your doctor or health care professional if they continue or are bothersome):    anxious    diarrhea    dizziness    hair loss    headache    irritable    nausea, vomiting    loss of appetite    stomach pain  What may interact with this medicine?  Do not take this medicine with any of the following medications:    cisapride    dofetilide    dronedarone    live virus vaccines    penicillamine    pimozide    thioridazine    ziprasidone  This medicine may also interact with the following medications:    ampicillin    antacids    cimetidine    cyclosporine    digoxin    medicines for diabetes, like insulin, glipizide, glyburide    medicines for seizures like carbamazepine, phenobarbital, phenytoin    mefloquine    methotrexate    other medicines that prolong the QT interval (cause an abnormal heart rhythm)    praziquantel  What if I miss a dose?  If you miss a dose, take it as soon as you can. If it is almost time for your next dose, take only that dose. Do not take double or extra doses.  Where should I keep my medicine?  Keep out of the reach of children. In children, this medicine can cause overdose with small doses.  Store at room temperature between 15 and 30 degrees C (59 and 86 degrees F). Protect from moisture and light. Throw away any unused medicine after the expiration date.  What should I tell my health care provider before I take this medicine?  They need to know if you have any of these conditions:    diabetes    eye disease, vision problems    G6PD deficiency    history of blood diseases    history of irregular  heart beat    if you often drink alcohol    kidney disease    liver disease    porphyria    psoriasis    seizures    an unusual or allergic reaction to chloroquine, hydroxychloroquine, other medicines, foods, dyes, or preservatives    pregnant or trying to get pregnant    breast-feeding  What should I watch for while using this medicine?  Tell your doctor or healthcare professional if your symptoms do not start to get better or if they get worse.  Avoid taking antacids within 4 hours of taking this medicine. It is best to separate these medicines by at least 4 hours.  Tell your doctor or health care professional right away if you have any change in your eyesight.  Your vision and blood may be tested before and during use of this medicine.  This medicine can make you more sensitive to the sun. Keep out of the sun. If you cannot avoid being in the sun, wear protective clothing and use sunscreen. Do not use sun lamps or tanning beds/booths.  NOTE:This sheet is a summary. It may not cover all possible information. If you have questions about this medicine, talk to your doctor, pharmacist, or health care provider. Copyright  2017 [employer]

## 2018-01-29 NOTE — MR AVS SNAPSHOT
After Visit Summary   1/29/2018    Marcelina Estevez    MRN: [id number]           Patient Information     Date Of Birth          1960        Visit Information        Provider Department      1/29/2018 10:00 AM Hilda Lopez MD [facility]        Today's Diagnoses     Systemic sclerosis (H)    -  1    Raynaud's disease without gangrene          Care Instructions    -- Will do blood tests today and Echocardiogram and PFT     -- Start Plaquenil 200 mg BID     -- RTC in 3 months   Hydroxychloroquine tablets  Brand Names: Plaquenil, Quineprox  What is this medicine?  HYDROXYCHLOROQUINE (marion drox ee KLOR oh kwin) is used to treat rheumatoid arthritis and systemic lupus erythematosus. It is also used to treat malaria.  How should I use this medicine?  Take this medicine by mouth with a glass of water. Follow the directions on the prescription label. Avoid taking antacids within 4 hours of taking this medicine. It is best to separate these medicines by at least 4 hours. Do not cut, crush or chew this medicine. You can take it with or without food. If it upsets your stomach, take it with food. Take your medicine at regular intervals. Do not take your medicine more often than directed. Take all of your medicine as directed even if you think you are better. Do not skip doses or stop your medicine early.  Talk to your pediatrician regarding the use of this medicine in children. While this drug may be prescribed for selected conditions, precautions do apply.  What side effects may I notice from receiving this medicine?  Side effects that you should report to your doctor or health care professional as soon as possible:    allergic reactions like skin rash, itching or hives, swelling of the face, lips, or tongue    changes in vision    decreased hearing or ringing of the ears    redness, blistering, peeling or loosening of the skin, including inside the mouth    seizures    sensitivity to  light    signs and symptoms of a dangerous change in heartbeat or heart rhythm like chest pain; dizziness; fast or irregular heartbeat; palpitations; feeling faint or lightheaded, falls; breathing problems    signs and symptoms of liver injury like dark yellow or brown urine; general ill feeling or flu-like symptoms; light-colored stools; loss of appetite; nausea; right upper belly pain; unusually weak or tired; yellowing of the eyes or skin    signs and symptoms of low blood sugar such as feeling anxious; confusion; dizziness; increased hunger; unusually weak or tired; sweating; shakiness; cold; irritable; headache; blurred vision; fast heartbeat; loss of consciousness    uncontrollable head, mouth, neck, arm, or leg movements  Side effects that usually do not require medical attention (report to your doctor or health care professional if they continue or are bothersome):    anxious    diarrhea    dizziness    hair loss    headache    irritable    nausea, vomiting    loss of appetite    stomach pain  What may interact with this medicine?  Do not take this medicine with any of the following medications:    cisapride    dofetilide    dronedarone    live virus vaccines    penicillamine    pimozide    thioridazine    ziprasidone  This medicine may also interact with the following medications:    ampicillin    antacids    cimetidine    cyclosporine    digoxin    medicines for diabetes, like insulin, glipizide, glyburide    medicines for seizures like carbamazepine, phenobarbital, phenytoin    mefloquine    methotrexate    other medicines that prolong the QT interval (cause an abnormal heart rhythm)    praziquantel  What if I miss a dose?  If you miss a dose, take it as soon as you can. If it is almost time for your next dose, take only that dose. Do not take double or extra doses.  Where should I keep my medicine?  Keep out of the reach of children. In children, this medicine can cause overdose with small doses.  Store  at room temperature between 15 and 30 degrees C (59 and 86 degrees F). Protect from moisture and light. Throw away any unused medicine after the expiration date.  What should I tell my health care provider before I take this medicine?  They need to know if you have any of these conditions:    diabetes    eye disease, vision problems    G6PD deficiency    history of blood diseases    history of irregular heart beat    if you often drink alcohol    kidney disease    liver disease    porphyria    psoriasis    seizures    an unusual or allergic reaction to chloroquine, hydroxychloroquine, other medicines, foods, dyes, or preservatives    pregnant or trying to get pregnant    breast-feeding  What should I watch for while using this medicine?  Tell your doctor or healthcare professional if your symptoms do not start to get better or if they get worse.  Avoid taking antacids within 4 hours of taking this medicine. It is best to separate these medicines by at least 4 hours.  Tell your doctor or health care professional right away if you have any change in your eyesight.  Your vision and blood may be tested before and during use of this medicine.  This medicine can make you more sensitive to the sun. Keep out of the sun. If you cannot avoid being in the sun, wear protective clothing and use sunscreen. Do not use sun lamps or tanning beds/booths.  NOTE:This sheet is a summary. It may not cover all possible information. If you have questions about this medicine, talk to your doctor, pharmacist, or health care provider. Copyright  2017 [employer]                Follow-ups after your visit        Follow-up notes from your care team     Return in about 3 months (around 4/29/2018).      Your next 10 appointments already scheduled     Feb 12, 2018  8:30 AM CST   Office Visit with PFT LAB   [facility]    [id number] [place] [id number] [phone]           Bring a  current list of meds and any records pertaining to this visit. For Physicals, please bring immunization records and any forms needing to be filled out. Please arrive 10 minutes early to complete paperwork.            Feb 12, 2018 10:30 AM CST   Ech Complete with MG ALAK ECHO TECH   [facility]    [address] [place] [id number] [phone]           1. Please bring or wear a comfortable two-piece outfit. 2. You may eat, drink and take your normal medicines. 3. For any questions that cannot be answered, please contact the ordering physician            Apr 16, 2018 10:00 AM CDT   Return Visit with Hilda Lopez MD   [facility] ([facility])    [id number] [place] [id number] [phone]              Future tests that were ordered for you today     Open Future Orders        Priority Expected Expires Ordered    Echocardiogram Routine  1/29/2019 1/29/2018    General PFT Lab (Please always keep checked) Routine  1/29/2019 1/29/2018    6 minute walk test Routine  1/29/2019 1/29/2018            Who to contact     If you have questions or need follow up information about today's clinic visit or your schedule please contact [facility] directly at [phone].  Normal or non-critical lab and imaging results will be communicated to you by MyChart, letter or phone within 4 business days after the clinic has received the results. If you do not hear from us within 7 days, please contact the clinic through [employer] ([employer]hart or phone. If you have a critical or abnormal lab result, we will notify you by phone as soon as possible.  Submit refill requests through [employer] or call your pharmacy and they will forward the refill request to us. Please allow 3 business days for your refill to be completed.          Additional Information About Your Visit        [employer] Information     [employer] gives you  "secure access to your electronic health record. If you see a primary care provider, you can also send messages to your care team and make appointments. If you have questions, please call your primary care clinic.  If you do not have a primary care provider, please call [phone] and they will assist you.      [employer] is an electronic gateway that provides easy, online access to your medical records. With [employer], you can request a clinic appointment, read your test results, renew a prescription or communicate with your care team.     To access your existing account, please contact your [facility] or call [phone] for assistance.        Care EveryWhere ID     This is your Care EveryWhere ID. This could be used by other organizations to access your [place] medical records  [id number]        Your Vitals Were     Pulse Temperature Respirations Height Last Period Pulse Oximetry    75 98.2  F (36.8  C) 18 1.543 m (5' 0.75\") 06/12/2014 100%    BMI (Body Mass Index)                   29.7 kg/m2            Blood Pressure from Last 3 Encounters:   01/29/18 138/84   12/15/17 145/80   08/07/17 138/80    Weight from Last 3 Encounters:   01/29/18 70.7 kg (155 lb 14.4 oz)   12/15/17 70.8 kg (156 lb 1.6 oz)   08/07/17 68.5 kg (151 lb)              We Performed the Following     Beta 2 Glycoprotein 1 Antibody IgG     Beta 2 Glycoprotein 1 Antibody IgM     Cardiolipin Farida IgG and IgM     Centromere Antibody IgG     CK total     Hepatitis B core antibody     Hepatitis B surface antigen     Lupus Anticoagulant Panel     M Tuberculosis by Quantiferon     RNA Polymerase III Antibody IgG          Today's Medication Changes          These changes are accurate as of 1/29/18 11:21 AM.  If you have any questions, ask your nurse or doctor.               Start taking these medicines.        Dose/Directions    hydroxychloroquine 200 MG tablet   Commonly known as:  PLAQUENIL   Used for:  Raynaud's disease " without gangrene, Systemic sclerosis (H)   Started by:  Hilda Lpoez MD        Dose:  200 mg   Take 1 tablet (200 mg) by mouth 2 times daily Get annual eye exams for hydroxychloroquine (plaquenil) monitoring and fax to [phone].   Quantity:  60 tablet   Refills:  3            Where to get your medicines      These medications were sent to [facility] [place], [place] - [address], [address]  [address], [address], [facility] [address]     Phone:  [phone]     hydroxychloroquine 200 MG tablet                Primary Care Provider Office Phone # Fax #    Annette Painting MD PhD [phone]       [address]  [facility] [address]        Equal Access to Services     [facility]: Hadii skye alejandro hadasho Sosammi, waaxda luqadaha, qaybta kaalmada adeana laurayaruddy, faith jones . [facility] [phone].    ATENCIÓN: Si habla español, tiene a mcgovern disposición servicios gratuitos de asistencia lingüística. [facility] [phone].    We comply with applicable federal civil rights laws and [place] laws. We do not discriminate on the basis of race, color, national origin, age, disability, sex, sexual orientation, or gender identity.            Thank you!     Thank you for choosing [facility]  for your care. Our goal is always to provide you with excellent care. Hearing back from our patients is one way we can continue to improve our services. Please take a few minutes to complete the written survey that you may receive in the mail after your visit with us. Thank you!             Your Updated Medication List - Protect others around you: Learn how to safely use, store and throw away your medicines at www.disposemymeds.org.          This list is accurate as of 1/29/18 11:21 AM.  Always use your most recent med list.                   Brand Name Dispense Instructions for use Diagnosis    acetaminophen-codeine 300-30 MG per tablet     TYLENOL #3    30 tablet    Take 1 tablet by mouth every 4 hours as needed for moderate pain    Tension headache       albuterol 108 (90 BASE) MCG/ACT Inhaler    PROAIR HFA/PROVENTIL HFA/VENTOLIN HFA    1 Inhaler    Inhale 2 puffs into the lungs every 6 hours as needed for shortness of breath / dyspnea    Wheezing       ALLEGRA ALLERGY 180 MG tablet   Generic drug:  fexofenadine     90 tablet    Take 1 tablet (180 mg) by mouth daily    Atopic rhinitis       ALPRAZolam 0.25 MG tablet    XANAX    30 tablet    Take 1 tablet (0.25 mg) by mouth daily as needed for anxiety    Panic attack       buPROPion 150 MG 24 hr tablet    WELLBUTRIN XL    30 tablet    Take 1 tablet (150 mg) by mouth every morning    Major depression in complete remission (H)       CALCIUM 600+D 600-200 MG-UNIT Tabs   Generic drug:  calcium carbonate-vitamin D      Take  by mouth.        cloNIDine 0.2 MG tablet    CATAPRES    90 tablet    Take 1 tablet (0.2 mg) by mouth daily    Night sweats       fluticasone 50 MCG/ACT spray    FLONASE    16 g    Spray 1-2 sprays into both nostrils daily    Atopic rhinitis       hydroxychloroquine 200 MG tablet    PLAQUENIL    60 tablet    Take 1 tablet (200 mg) by mouth 2 times daily Get annual eye exams for hydroxychloroquine (plaquenil) monitoring and fax to [phone].    Raynaud's disease without gangrene, Systemic sclerosis (H)       losartan-hydrochlorothiazide 100-25 MG per tablet    HYZAAR    90 tablet    Take 1 tablet by mouth daily    Hypertension goal BP (blood pressure) < 140/90, CKD (chronic kidney disease) stage 2, GFR 60-89 ml/min       naproxen 500 MG tablet    NAPROSYN    60 tablet    Take 1 tablet (500 mg) by mouth 2 times daily as needed for moderate pain    Inflammatory arthritis       olopatadine HCl 0.2 % Soln    PATADAY    1 Bottle    Place 1 drop into both eyes daily    Other chronic allergic conjunctivitis       order for DME     1 Device    Equipment being ordered: light lamp for seasonal  affective disorder    Severe seasonal affective disorder (H)       simvastatin 40 MG tablet    ZOCOR    90 tablet    Take 1 tablet (40 mg) by mouth At Bedtime    Hyperlipidemia LDL goal <100       traZODone 50 MG tablet    DESYREL    90 tablet    Take 1 tablet (50 mg) by mouth At Bedtime    Insomnia, unspecified type       tretinoin 0.05 % cream    RETIN-A    45 g    Spread a pea size amount into affected area on the face topically at bedtime.  Use sunscreen SPF>30.    Milium       venlafaxine 75 MG 24 hr capsule    EFFEXOR-XR    270 capsule    TAKE THREE CAPSULES BY MOUTH EVERY DAY    Menopausal syndrome (hot flashes)       VITAMIN D (CHOLECALCIFEROL) PO      Take 2,000 Units by mouth daily

## 2018-01-29 NOTE — NURSING NOTE
"Marcelina Estevez's goals for this visit include:   She requests these members of her care team be copied on today's visit information:     PCP: Annette Painting    Referring Provider:  Keerthi Burr, EDUARD CNP  [address] [place] [address]    Chief Complaint   Patient presents with     Consult     multiple joint pains       Initial /84 (Patient Position: Sitting, Cuff Size: Adult Large)  Pulse 75  Temp 98.2  F (36.8  C)  Resp 18  Ht 1.543 m (5' 0.75\")  Wt 70.7 kg (155 lb 14.4 oz)  LMP 06/12/2014  SpO2 100%  BMI 29.7 kg/m2 Estimated body mass index is 29.7 kg/(m^2) as calculated from the following:    Height as of this encounter: 1.543 m (5' 0.75\").    Weight as of this encounter: 70.7 kg (155 lb 14.4 oz).  Medication Reconciliation: complete    Do you need any medication refills at today's visit? No  Chief Complaint   Patient presents with     Consult     multiple joint pains         "

## 2018-01-29 NOTE — LETTER
1/29/2018      RE: Marcelina Estevez  [address] [place] [id number]     Dear Colleague,    Thank you for referring your patient, Marcelina Estevez, to the [facility]. Please see a copy of my visit note below.    Rheumatology Clinic Visit     Marcelina Estevez MRN# [id number]   YOB: 1960 Age: 57 year old     Date of Visit: January 29, 2018  Primary care provider: Annette Painting          Assessment and Plan:   Assessment     -- Worsening raynaud's  -- Puffiness of hands-possible sclerodactyly  -- Arthritis particularly in hands  -- Positive MINDY >-1:1280 centromere pattern  -- Mildly elevated ALT level-35 with normal AST    Ms Herb is 56 yo female she has history of raynaud's for many years and noticed pain in her hands along with swelling.  [place] [facility] [place] work[place] which revealed a high titer positive MINDY of 1: 1280 with centromere pattern.  The rest of her SUSIE panel, double-stranded DNA antibodies were negative.  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  No particular DMARD therapy was started.      Today she complains of worsening raynaud's along with puffiness, swelling and stiffness in her hands.  She does not have significant joint involvement other than the hands.  She does complain of shortness of breath which is progressive.  No baseline pulmonary function tests or echo done recently.  I will get 2D echocardiogram today to evaluate for pulmonary hypertension and baseline pulmonary function tests and 6 minute walk test to evaluate for possible ILD.  Patients with CREST syndrome have high risk of having pulmonary hypertension.     Also for the persistent pain in her hands and other joints including shoulders and knees I recommended her to try hydroxychloroquine 200 mg twice daily.  I told her that it is a slow acting medication and will try  3-6 months to see improvement.  In case Plaquenil does not help  with her joint pains we can consider adding methotrexate for inflammatory arthritis.    She has a borderline elevated ALT level with normal AST.  Will get CK to make sure its not coming from the muscles.  In case there is worsening of her LFTs will consider getting abdominal ultrasound and evaluation for autoimmune hepatitis or PBC.     For raynaud's I recommended her to avoid cold outdoors and keep herself warm.  But in case it gets worse then calcium channel blocker like amlodipine can be tried.  Avoid beta blockers for blood pressure management if possible.     Plan    -- Will do blood tests today and Echocardiogram and PFT     --  Will start her on plaquenil 200 mg BID for undifferentiated inflammatory arthritis. It is slow acting medication and will giver it a trial for next 3 - 4 months.   -- If she will be continued on Plaquenil long term will do eye exam in 3 - 4 months as a baseline and then every year.      -- RTC in 3 months           Active Problem List:     Patient Active Problem List    Diagnosis Date Noted     Raynaud's disease without gangrene 01/29/2018     Priority: Medium     Generalized anxiety disorder 01/08/2018     Priority: Medium     Tension headache 09/28/2017     Priority: Medium     Patient is followed by Annette Painting MD PhD for ongoing prescription of pain medication.  All refills should only be approved by this provider, or covering partner.    Medication(s): Tylenol with codeine (T#3).   Maximum quantity per month: 15  Clinic visit frequency required: Q 6  months     Controlled substance agreement:  Encounter-Level CSA - 08/07/2017:          Controlled Substance Agreement - Scan on 8/16/2017 10:21 AM : CONTROLLED SUBSTANCE AGREEMENT (below)              Pain Clinic evaluation in the past: No    DIRE Total Score(s):  No flowsheet data found.    Last [facility] website verification:  none   [email or web address]       ACP (advance care planning) 08/19/2015     Priority: Medium     Advance  Care Planning 8/19/2015: ACP Review and Resources Provided:  Reviewed chart for advance care plan.  Marcelina Estevez has no plan or code status on file however states presence of ACP document. Copy requested.  Confirmed/documented legally designated decision maker(s). Added by Mary Padilla RN Advance Care Planning Liaison with [employer]           Anemia 01/09/2015     Priority: Medium     Status post total hysterectomy 11/18/2014     Priority: Medium     Allergic rhinitis due to pollen 08/02/2014     Priority: Medium     Menopausal syndrome (hot flashes) 12/19/2013     Priority: Medium     Hyperlipidemia LDL goal <100 06/17/2013     Priority: Medium     CKD (chronic kidney disease) stage 1, GFR 90 ml/min or greater 06/17/2013     Priority: Medium     Moderate episode of recurrent major depressive disorder (H) 09/10/2012     Priority: Medium     Celexa not helping, fluoxetine caused rash.        Hypertension goal BP (blood pressure) < 140/90 01/18/2011     Priority: Medium     Keratitis sicca, bilateral 06/10/2013     Priority: Low     Hypertriglyceridemia 01/18/2011     Priority: Low     Controlled with simvastatin.        Night sweats 01/18/2011     Priority: Low     Worse with menstruation. On Clonidine.        Atopic rhinitis 01/18/2011     Priority: Low     (Problem list name updated by automated process. Provider to review and confirm.)              History of Present Illness:   Marcelina Estevez is a 57 year old female with PMH of raynaud's, osteoarthritis, hand pain, depression seen in the clinic in consultation at request of Keerthi Burr APRN CNP for evaluation of joint pains.     C/o Pain in her hands and trigger finger in left hand. Stiffness in the morning. Can not move them or make a fist. complains of mild pain in her knees and shoulders, has hx of knee surgery for meniscal tear. She saw Dr. Egan at  [facility] for abnormal MINDY and was diagnosed with possible CREST  syndrome.  He did autoimmune workup which revealed positive MINDY 1: 320 with centromere pattern, negative SUSIE panel, double-stranded DNA, rheumatoid serologies. No specific DMARD therapy was started. On her last visit 1/11/2018 she was noted to have left elbow swelling with some white discharge.  To rule out possibility of calcinosis she had x-ray of the left elbow which was normal.  She was given empiric antibiotics for possible bursitis and it improved the swelling and pain.    She also reports having raynaud's for the past 20 years.  Denies any gangrene or infarcts over her fingertips. Denies malar rash, photosensitivity, recurrent mouth/genital ulcers, sicca symptoms, pleuritic chest pains, recurrent sinusitis/rhinitis, swallowing difficulty, hearing or visual changes recently. No h/o arterial/venous thrombosis in the past. Denies any tick bite, recent GI/ infection.            Review of Systems:   Review Of Systems  Constitutional: denies fever, chills, night sweats and weight loss.  Skin: No skin rash.  Eyes: No dryness or irritation in eyes. No episode of eye inflammation or redness.   Ears/Nose/Throat: no recurrent sinus infections.  Respiratory: No shortness of breath, dyspnea on exertion, cough, or hemoptysis  Cardiovascular: no chest pain or palpitations.  Gastrointestinal: no nausea, vomiting, abdominal pain.  Normal bowel movements.  Genitourinary: no dysuria, frequency  or hematuria.  Musculoskeletal: as in HPI  Neurologic: no numbness, tingling.  Psychiatric: no mood disorders.  Hematologic/Lymphatic/Immunologic: no history of easy bruising, petechia or purpura.  No abnormal bleeding.   Endocrine: no h/o thyroid disease or Diabetes.                  Past Medical History:     Past Medical History:   Diagnosis Date     Hyperlipidemia      Hypertension      Positive MINDY (antinuclear antibody)      Raynaud disease      Past Surgical History:   Procedure Laterality Date     APPENDECTOMY       BIOPSY       cervical node     DILATION AND CURETTAGE, HYSTEROSCOPY DIAGNOSTIC, COMBINED  7/1/2014    Procedure: COMBINED DILATION AND CURETTAGE, HYSTEROSCOPY DIAGNOSTIC;  Surgeon: Mana Cabrera DO;  Location: [facility] OR     ENT SURGERY      tonsillectomy and adnoid removal     HYSTERECTOMY TOTAL ABDOMINAL       ORTHOPEDIC SURGERY      left knee tear meniscus     RELEASE CARPAL TUNNEL BILATERAL  2009            Social History:     Social History     Occupational History     Not on file.     Social History Main Topics     Smoking status: Former Smoker     Quit date: 1/1/2001     Smokeless tobacco: Never Used     Alcohol use Yes      Comment: social     Drug use: No     Sexual activity: Yes     Partners: Male     Birth control/ protection: Surgical      Comment: vasectomy            Family History:     Family History   Problem Relation Age of Onset     OSTEOPOROSIS Mother      Eye Disorder Mother      cataract, mac degen     OSTEOPOROSIS Father      Eye Disorder Father      glaucoma     Hypertension Maternal Grandmother      Unknown/Adopted Paternal Grandfather      Eye Disorder Paternal Grandmother      glaucoma     Hypertension Daughter      DIABETES Maternal Grandfather      DIABETES Other             Allergies:     Allergies   Allergen Reactions     Seasonal Allergies      Sulfa Drugs      Vomiting       Vicodin [Hydrocodone-Acetaminophen] Nausea            Medications:     Current Outpatient Prescriptions   Medication Sig Dispense Refill     hydroxychloroquine (PLAQUENIL) 200 MG tablet Take 1 tablet (200 mg) by mouth 2 times daily Get annual eye exams for hydroxychloroquine (plaquenil) monitoring and fax to [phone]. 60 tablet 3     venlafaxine (EFFEXOR-XR) 75 MG 24 hr capsule TAKE THREE CAPSULES BY MOUTH EVERY  capsule 1     acetaminophen-codeine (TYLENOL #3) 300-30 MG per tablet Take 1 tablet by mouth every 4 hours as needed for moderate pain 30 tablet 0     VITAMIN D, CHOLECALCIFEROL, PO Take 2,000 Units by  "mouth daily       losartan-hydrochlorothiazide (HYZAAR) 100-25 MG per tablet Take 1 tablet by mouth daily 90 tablet 3     ALPRAZolam (XANAX) 0.25 MG tablet Take 1 tablet (0.25 mg) by mouth daily as needed for anxiety 30 tablet 2     simvastatin (ZOCOR) 40 MG tablet Take 1 tablet (40 mg) by mouth At Bedtime 90 tablet 3     cloNIDine (CATAPRES) 0.2 MG tablet Take 1 tablet (0.2 mg) by mouth daily 90 tablet 3     albuterol (PROAIR HFA/PROVENTIL HFA/VENTOLIN HFA) 108 (90 BASE) MCG/ACT Inhaler Inhale 2 puffs into the lungs every 6 hours as needed for shortness of breath / dyspnea 1 Inhaler 5     ALLEGRA ALLERGY 180 MG tablet Take 1 tablet (180 mg) by mouth daily 90 tablet 3     fluticasone (FLONASE) 50 MCG/ACT spray Spray 1-2 sprays into both nostrils daily 16 g 5     traZODone (DESYREL) 50 MG tablet Take 1 tablet (50 mg) by mouth At Bedtime 90 tablet 3     order for DME Equipment being ordered: light lamp for seasonal affective disorder 1 Device 0     naproxen (NAPROSYN) 500 MG tablet Take 1 tablet (500 mg) by mouth 2 times daily as needed for moderate pain (Patient not taking: Reported on 1/29/2018) 60 tablet 1     buPROPion (WELLBUTRIN XL) 150 MG 24 hr tablet Take 1 tablet (150 mg) by mouth every morning 30 tablet 1     olopatadine HCl (PATADAY) 0.2 % SOLN Place 1 drop into both eyes daily (Patient not taking: Reported on 1/29/2018) 1 Bottle 6     tretinoin (RETIN-A) 0.05 % cream Spread a pea size amount into affected area on the face topically at bedtime.  Use sunscreen SPF>30. (Patient not taking: Reported on 1/29/2018) 45 g 2     Calcium 600+D 600-200 MG-UNIT TABS Take  by mouth.              Physical Exam:   Blood pressure 138/84, pulse 75, temperature 98.2  F (36.8  C), resp. rate 18, height 1.543 m (5' 0.75\"), weight 70.7 kg (155 lb 14.4 oz), last menstrual period 06/12/2014, SpO2 100 %, not currently breastfeeding.  Wt Readings from Last 4 Encounters:   01/29/18 70.7 kg (155 lb 14.4 oz)   12/15/17 70.8 kg (156 " lb 1.6 oz)   08/07/17 68.5 kg (151 lb)   01/09/17 69.4 kg (153 lb 1.6 oz)       Constitutional: well-developed, appearing stated age; cooperative  Eyes: nl EOM, PERRLA, vision, conjunctiva, sclera  ENT: nl external ears, nose, hearing, lips, teeth, gums, throat  No mucous membrane lesions, normal saliva pool  Neck: no mass or thyroid enlargement  Resp: lungs clear to auscultation, nl to palpation  CV: RRR, no murmurs, rubs or gallops, no edema  GI: no ABD mass or tenderness, no HSM  : not tested  Lymph: no cervical, supraclavicular, inguinal or epitrochlear nodes    MS: All TMJ, neck, shoulder, elbow, wrist, MCP/PIP/DIP, spine, hip, knee, ankle, and foot MTP/IP joints were examined.    -- Puffiness of hands, sclerodactyly, tightness of the skin distal to the MCP joints, no active raynaud's.  Difficulty making a fist due to stiffness.  No significant synovitis and tenderness over MCP, PIP joints   -- tenderness present over Anterior aspect of bilateral shoulders  -- No tenderness present over bilateral ankles, MTP joints  --Left elbow swelling and erythema, no tenderness or discharge.  -- No dactylitis,  tenosynovitis, enthesopathy.    Skin: no nail pitting, alopecia, rash, nodules or lesions  Neuro: nl cranial nerves, strength, sensation, DTRs.   Psych: nl judgement, orientation, memory, affect.         Data:     Results for orders placed or performed in visit on 01/29/18   CK total   Result Value Ref Range    CK Total 66 30 - 225 U/L       Recent Labs   Lab Test  12/15/17   1232  08/02/17   0805  06/29/16   1042   12/15/14   1233  09/24/14   0953  08/01/14   1051   12/30/11   1247   WBC   --    --    --    --   5.8   --   6.8   --   9.1   RBC   --    --    --    --   3.73*   --   3.80   --   3.95   HGB   --    --    --    --   11.4*   --   12.4   --   13.0   HCT   --    --    --    --   33.7*   --   35.6   --   37.4   MCV   --    --    --    --   90   --   94   --   95   RDW   --    --    --    --   15.0   --    13.4   --   13.5   PLT   --    --    --    --   255   --   214   --   214   ALBUMIN   --    --    --    --    --    --    --    --   4.7   CRP  6.8   --    --    --    --    --    --    --    --    BUN  18  30  16   < >   --    --   18   < >  16   CREAT   --    --    --    --    --   0.9   --    --    --     < > = values in this interval not displayed.      Recent Labs   Lab Test  01/09/17   1519   TSH  1.18     Hemoglobin   Date Value Ref Range Status   12/15/2014 11.4 (L) 11.7 - 15.7 g/dL Final   08/01/2014 12.4 11.7 - 15.7 g/dL Final   12/30/2011 13.0 11.7 - 15.7 g/dL Final     Urea Nitrogen   Date Value Ref Range Status   12/15/2017 18 7 - 30 mg/dL Final   08/02/2017 30 7 - 30 mg/dL Final   06/29/2016 16 7 - 30 mg/dL Final     Creatinine   Date Value Ref Range Status   09/24/2014 0.9 0.52 - 1.04 mg/dL Final     Sed Rate   Date Value Ref Range Status   12/15/2017 23 0 - 30 mm/h Final   12/30/2011 10 0 - 30 mm/h Final     CRP Inflammation   Date Value Ref Range Status   12/15/2017 6.8 0.0 - 8.0 mg/L Final     AST   Date Value Ref Range Status   12/30/2011 31 0 - 45 U/L Final     Albumin   Date Value Ref Range Status   12/30/2011 4.7 3.9 - 5.1 g/dL Final     Alkaline Phosphatase   Date Value Ref Range Status   12/30/2011 53 40 - 150 U/L Final     ALT   Date Value Ref Range Status   06/29/2016 37 0 - 50 U/L Final   06/24/2015 58 (H) 0 - 50 U/L Final   08/01/2014 25 0 - 50 U/L Final     Rheumatoid Factor   Date Value Ref Range Status   12/15/2017 <20 <20 IU/mL Final     Recent Labs   Lab Test  12/15/17   1232  08/02/17   0805  01/09/17   1519  06/29/16   1042  06/24/15   0751  12/15/14   1233  08/01/14   1051   12/30/11   1247   WBC   --    --    --    --    --   5.8  6.8   --   9.1   HGB   --    --    --    --    --   11.4*  12.4   --   13.0   HCT   --    --    --    --    --   33.7*  35.6   --   37.4   MCV   --    --    --    --    --   90  94   --   95   PLT   --    --    --    --    --   255  214   --   214   BUN   18  30   --   16  12   --   18   < >  16   TSH   --    --   1.18   --    --    --    --    --    --    AST   --    --    --    --    --    --    --    --   31   ALT   --    --    --   37  58*   --   25   < >  28   ALKPHOS   --    --    --    --    --    --    --    --   53    < > = values in this interval not displayed.       Outside studies reviewed:   Component Name  12/28/2017 1/22/2013 1/17/2013         4     NEGATIVE     Negative Negative     Negative Negative     Negative Negative     Negative Negative     <1:20       Anti CCP   Anti DNA Farida   Anti RNP Farida   Anti SM Farida   Anti SSA Farida   Anti SSB Farida   Anti-Neutrophil Cytoplasmic Farida IGG                            Component Name  3/9/2017     Nonreactive   Hep C Farida       Reviewed Rheumatology lab flowsheet    Hilda Lopez MD  [facility]  Department of Rheumatology & Autoimmune Disorders  [facility]: [phone]   Pager - [phone]          Again, thank you for allowing me to participate in the care of your patient.      Sincerely,    Hilda Lopez MD

## 2018-01-30 LAB
B2 GLYCOPROT1 IGG SERPL IA-ACNC: <0.6 U/ML
B2 GLYCOPROT1 IGM SERPL IA-ACNC: 1.8 U/ML
CARDIOLIPIN ANTIBODY IGG: <1.6 GPL-U/ML (ref 0–19.9)
CARDIOLIPIN ANTIBODY IGM: 0.3 MPL-U/ML (ref 0–19.9)
CENTROMERE IGG SER-ACNC: >8 AI (ref 0–0.9)
HBV CORE AB SERPL QL IA: NONREACTIVE
HBV SURFACE AG SERPL QL IA: NONREACTIVE

## 2018-01-31 LAB
M TB TUBERC IFN-G BLD QL: NEGATIVE
M TB TUBERC IFN-G/MITOGEN IGNF BLD: 0.01 IU/ML

## 2018-02-01 LAB
LA PPP-IMP: NEGATIVE
RNAP III IGG SERPL IA-ACNC: 4 UNITS (ref 0–19)

## 2018-02-06 DIAGNOSIS — F32.5 MAJOR DEPRESSION IN COMPLETE REMISSION (H): ICD-10-CM

## 2018-02-06 NOTE — LETTER
February 8, 2018      Marcelina Estevez  [address] [place] [id number]              Dear Marcelina,      We recently received a call from your pharmacy requesting a refill of your medication. We have provided a 90 day refill of your medication, buPROPion (WELLBUTRIN XL) 150 MG 24 hr tablet, as requested.      A review of your chart indicates that your last office visit was on 12/15/2017.  At this appointment, Keerthi Burr CNP advised for a return in 1 month.     We have authorized one time 90 day refill of your medication to allow time for you to schedule.     If you have a history of diabetes or high cholesterol, please come in fasting for the appointment. Fasting entails nothing to eat or drink 8 hours prior to your appointment; with the exception on water. You may take your medication the day of the appointment.    Please call the clinic to schedule your appointment.    Thank you for taking an active role in your healthcare.      Sincerely,    [facility]

## 2018-02-06 NOTE — TELEPHONE ENCOUNTER
Hello,  last fill date:01-  Last quantity:30   ++ pt would like a 90 days supply if ok++++    Thank You,  Meagan Bishop  Pharmacy Technician  [facility]  [phone]

## 2018-02-08 ENCOUNTER — MYC MEDICAL ADVICE (OUTPATIENT)
Dept: PHARMACY | Facility: CLINIC | Age: 58
End: 2018-02-08

## 2018-02-08 RX ORDER — BUPROPION HYDROCHLORIDE 150 MG/1
TABLET ORAL
Qty: 90 TABLET | Refills: 0 | Status: SHIPPED | OUTPATIENT
Start: 2018-02-08 | End: 2018-05-08

## 2018-02-08 ASSESSMENT — PATIENT HEALTH QUESTIONNAIRE - PHQ9
10. IF YOU CHECKED OFF ANY PROBLEMS, HOW DIFFICULT HAVE THESE PROBLEMS MADE IT FOR YOU TO DO YOUR WORK, TAKE CARE OF THINGS AT HOME, OR GET ALONG WITH OTHER PEOPLE: SOMEWHAT DIFFICULT
SUM OF ALL RESPONSES TO PHQ QUESTIONS 1-9: 7
SUM OF ALL RESPONSES TO PHQ QUESTIONS 1-9: 7

## 2018-02-08 NOTE — TELEPHONE ENCOUNTER
PHQ-9 score:    PHQ-9 SCORE 2/8/2018   Total Score -   Total Score MyChart 7 (Mild depression)   Total Score 7     Updated. Aisha Mclaughlin RN     (4) rarely moist

## 2018-02-08 NOTE — TELEPHONE ENCOUNTER
buPROPion (WELLBUTRIN XL) 150 MG 24 hr tablet  Last Written Prescription Date:  12/15/2017  Last Fill Quantity: 30,  # refills: 1   Last Office Visit with [facility] or [facility] primary care provider:  12/15/2017   Future Office Visit:    Next 5 appointments (look out 90 days)     Feb 12, 2018  8:30 AM CST   [facility] with [facility]   [facility]    [id number] [place] [phone]   [phone]            Apr 16, 2018 10:00 AM CDT   Return Visit with Hilda Lopez MD   [facility])    [id number] [place] [id number]   [phone]                 PHQ-9 score:    PHQ-9 SCORE 12/15/2017   Total Score -   Total Score MyChart -   Total Score 8     SSRIs Protocol Failed2/6 12:10 PM   PHQ-9 score less than 5 in past 6 months    Medication is Bupropion    Patient is age 18 or older    No active pregnancy on record    No positive pregnancy test in last 12 months    Recent (6 mo) or future visit with authorizing provider's specialty     Per OV from 12/15/2017 patient to return in 1 month for the above medication.     90 day given as requested. Letter sent to patient.     PHQ9 questionnaire sent via [employer] to patient.     Aisha Mclaughlin RN

## 2018-02-09 ASSESSMENT — PATIENT HEALTH QUESTIONNAIRE - PHQ9: SUM OF ALL RESPONSES TO PHQ QUESTIONS 1-9: 7

## 2018-02-12 ENCOUNTER — RADIANT APPOINTMENT (OUTPATIENT)
Dept: CARDIOLOGY | Facility: CLINIC | Age: 58
End: 2018-02-12
Attending: STUDENT IN AN ORGANIZED HEALTH CARE EDUCATION/TRAINING PROGRAM
Payer: COMMERCIAL

## 2018-02-12 ENCOUNTER — OFFICE VISIT (OUTPATIENT)
Dept: NURSING | Facility: CLINIC | Age: 58
End: 2018-02-12
Payer: COMMERCIAL

## 2018-02-12 VITALS
HEIGHT: 61 IN | RESPIRATION RATE: 16 BRPM | OXYGEN SATURATION: 99 % | BODY MASS INDEX: 29.68 KG/M2 | HEART RATE: 71 BPM | WEIGHT: 157.2 LBS

## 2018-02-12 DIAGNOSIS — I73.00 RAYNAUD'S DISEASE WITHOUT GANGRENE: ICD-10-CM

## 2018-02-12 DIAGNOSIS — R05.9 COUGH: Primary | ICD-10-CM

## 2018-02-12 LAB
6 MIN WALK (FT): 1450 FT
6 MIN WALK (M): 442 M

## 2018-02-12 PROCEDURE — 94729 DIFFUSING CAPACITY: CPT | Performed by: INTERNAL MEDICINE

## 2018-02-12 PROCEDURE — 94618 PULMONARY STRESS TESTING: CPT | Performed by: INTERNAL MEDICINE

## 2018-02-12 PROCEDURE — 99207 ZZC DROP WITH A PROCEDURE: CPT | Performed by: INTERNAL MEDICINE

## 2018-02-12 PROCEDURE — 94726 PLETHYSMOGRAPHY LUNG VOLUMES: CPT | Performed by: INTERNAL MEDICINE

## 2018-02-12 PROCEDURE — 94060 EVALUATION OF WHEEZING: CPT | Mod: 59 | Performed by: INTERNAL MEDICINE

## 2018-02-12 PROCEDURE — 93306 TTE W/DOPPLER COMPLETE: CPT

## 2018-02-12 RX ADMIN — Medication 4 ML: at 10:15

## 2018-02-12 NOTE — MR AVS SNAPSHOT
After Visit Summary   2/12/2018    Marcelina Estevez    MRN: [id number]           Patient Information     Date Of Birth          1960        Visit Information        Provider Department      2/12/2018 8:30 AM PFT LAB [facility]        Today's Diagnoses     Cough    -  1    Raynaud's disease without gangrene           Follow-ups after your visit        Your next 10 appointments already scheduled     Feb 12, 2018 10:30 AM CST   Ech Complete with MGECHR1, MG ECHO TECH   [facility])    [address] [place] [address] [phone]           1. Please bring or wear a comfortable two-piece outfit. 2. You may eat, drink and take your normal medicines. 3. For any questions that cannot be answered, please contact the ordering physician            Apr 16, 2018 10:00 AM CDT   Return Visit with Hilda Lopez MD   [facility])    [address] [place] [address] [phone]              Who to contact     If you have questions or need follow up information about today's clinic visit or your schedule please contact [facility] directly at [phone].  Normal or non-critical lab and imaging results will be communicated to you by Apricot Treeshart, letter or phone within 4 business days after the clinic has received the results. If you do not hear from us within 7 days, please contact the clinic through Apricot Treeshart or phone. If you have a critical or abnormal lab result, we will notify you by phone as soon as possible.  Submit refill requests through RockBee or call your pharmacy and they will forward the refill request to us. Please allow 3 business days for your refill to be completed.          Additional Information About Your Visit        Apricot TreesharPlaylore Information     RockBee gives you secure access to your electronic health record. If you see a primary care  provider, you can also send messages to your care team and make appointments. If you have questions, please call your primary care clinic.  If you do not have a primary care provider, please call [phone] and they will assist you.      EvolveMol is an electronic gateway that provides easy, online access to your medical records. With EvolveMol, you can request a clinic appointment, read your test results, renew a prescription or communicate with your care team.     To access your existing account, please contact your [facility] or call [phone] for assistance.        Care EveryWhere ID     This is your Care EveryWhere ID. This could be used by other organizations to access your [facility] medical records  [id number]        Your Vitals Were     Last Period                   06/12/2014            Blood Pressure from Last 3 Encounters:   01/29/18 138/84   12/15/17 145/80   08/07/17 138/80    Weight from Last 3 Encounters:   01/29/18 70.7 kg (155 lb 14.4 oz)   12/15/17 70.8 kg (156 lb 1.6 oz)   08/07/17 68.5 kg (151 lb)              We Performed the Following     6 minute walk test     General PFT Lab (Please always keep checked)     HC DIFFUSING CAPACITY     PULMONARY STRESS TEST        Primary Care Provider Office Phone # Fax #    Annette Painting MD PhD [phone]       [address]  [facility] [address]        Equal Access to Services     YOEL ROJAS : Hadii aad ku hadasho Sosammi, waaxda luqadaha, qaybta kaalmada dannyyaruddy, faith jones . So [facility] [phone].    ATENCIÓN: Si habla español, tiene a mcgovern disposición servicios gratuitos de asistencia lingüística. Llame al [phone].    We comply with applicable federal civil rights laws and [place] laws. We do not discriminate on the basis of race, color, national origin, age, disability, sex, sexual orientation, or gender identity.            Thank you!     Thank you for choosing [facility]  for your care. Our goal is always to provide you with excellent care. Hearing back from our patients is one way we can continue to improve our services. Please take a few minutes to complete the written survey that you may receive in the mail after your visit with us. Thank you!             Your Updated Medication List - Protect others around you: Learn how to safely use, store and throw away your medicines at www.disposemymeds.org.          This list is accurate as of 2/12/18  9:55 AM.  Always use your most recent med list.                   Brand Name Dispense Instructions for use Diagnosis    acetaminophen-codeine 300-30 MG per tablet    TYLENOL #3    30 tablet    Take 1 tablet by mouth every 4 hours as needed for moderate pain    Tension headache       albuterol 108 (90 BASE) MCG/ACT Inhaler    PROAIR HFA/PROVENTIL HFA/VENTOLIN HFA    1 Inhaler    Inhale 2 puffs into the lungs every 6 hours as needed for shortness of breath / dyspnea    Wheezing       ALLEGRA ALLERGY 180 MG tablet   Generic drug:  fexofenadine     90 tablet    Take 1 tablet (180 mg) by mouth daily    Atopic rhinitis       ALPRAZolam 0.25 MG tablet    XANAX    30 tablet    Take 1 tablet (0.25 mg) by mouth daily as needed for anxiety    Panic attack       buPROPion 150 MG 24 hr tablet    WELLBUTRIN XL    90 tablet    TAKE ONE TABLET BY MOUTH EVERY MORNING    Major depression in complete remission (H)       CALCIUM 600+D 600-200 MG-UNIT Tabs   Generic drug:  calcium carbonate-vitamin D      Take  by mouth.        cloNIDine 0.2 MG tablet    CATAPRES    90 tablet    Take 1 tablet (0.2 mg) by mouth daily    Night sweats       fluticasone 50 MCG/ACT spray    FLONASE    16 g    Spray 1-2 sprays into both nostrils daily    Atopic rhinitis       hydroxychloroquine 200 MG tablet    PLAQUENIL    60 tablet    Take 1 tablet (200 mg) by mouth 2 times daily Get annual eye exams for hydroxychloroquine (plaquenil) monitoring and fax to  [phone].    Raynaud's disease without gangrene       losartan-hydrochlorothiazide 100-25 MG per tablet    HYZAAR    90 tablet    Take 1 tablet by mouth daily    Hypertension goal BP (blood pressure) < 140/90, CKD (chronic kidney disease) stage 2, GFR 60-89 ml/min       naproxen 500 MG tablet    NAPROSYN    60 tablet    Take 1 tablet (500 mg) by mouth 2 times daily as needed for moderate pain    Inflammatory arthritis       olopatadine HCl 0.2 % Soln    PATADAY    1 Bottle    Place 1 drop into both eyes daily    Other chronic allergic conjunctivitis       order for DME     1 Device    Equipment being ordered: light lamp for seasonal affective disorder    Severe seasonal affective disorder (H)       simvastatin 40 MG tablet    ZOCOR    90 tablet    Take 1 tablet (40 mg) by mouth At Bedtime    Hyperlipidemia LDL goal <100       traZODone 50 MG tablet    DESYREL    90 tablet    Take 1 tablet (50 mg) by mouth At Bedtime    Insomnia, unspecified type       tretinoin 0.05 % cream    RETIN-A    45 g    Spread a pea size amount into affected area on the face topically at bedtime.  Use sunscreen SPF>30.    Milium       venlafaxine 75 MG 24 hr capsule    EFFEXOR-XR    270 capsule    TAKE THREE CAPSULES BY MOUTH EVERY DAY    Menopausal syndrome (hot flashes)       VITAMIN D (CHOLECALCIFEROL) PO      Take 2,000 Units by mouth daily

## 2018-02-12 NOTE — LETTER
2018      Marcelina Estevez  [address] [place] [address]        Dear ,    We are writing to inform you of your test results.      Normal Echocardiogram          Resulted Orders   ECHO COMPLETE WITH OPTISON    Narrative    [id number]  [facility]  [id number]  [id number]^JUMA^ADRIANO^           [facility]  Echocardiography Laboratory  [address] [place] [address]        Name: MARCELINA ESTEVEZ  MRN: [id number]  : 1960  Study Date: 2018 09:37 AM  Age: 57 yrs  Gender: Female  Patient Location: [facility]  Reason For Study: Raynaud's disease without gangrene  Ordering Physician: ADRIANO DENNISON  Referring Physician: ADRIANO DENNISON  Performed By: Roxi Medina RDCS     BSA: 1.7 m2  Height: 61 in  Weight: 157 lb  BP: 146/79 mmHg  _____________________________________________________________________________  __        Procedure  Echocardiogram with two-dimensional, color and spectral Doppler performed.  Contrast Optison. Optison (NDC #3567-7884-44) given intravenously. Patient was  given 4 ml mixture of 3 ml Optison and 6 ml saline. 5 ml wasted. IV start  location LAC .  _____________________________________________________________________________  __        Interpretation Summary     Global and regional left ventricular function is normal with an EF of 60-65%.  Right ventricular function, chamber size, wall motion, and thickness are  normal.  Both atria appear normal.  The inferior vena cava is normal.  No pericardial effusion is present.  Previous study not available for comparison.  _____________________________________________________________________________  __        Left Ventricle  Global and regional left ventricular function is normal with an EF of 60-65%.  Left ventricular size is normal. Relative wall thickness is increased  consistent with concentric remodeling. Left ventricular diastolic function is  indeterminate. No regional wall  motion abnormalities are seen.     Right Ventricle  Right ventricular function, chamber size, wall motion, and thickness are  normal.     Atria  Both atria appear normal. The atrial septum is intact as assessed by color  Doppler .     Mitral Valve  The mitral valve is normal. Mild mitral annular calcification is present.        Aortic Valve  Aortic valve is normal in structure and function.     Tricuspid Valve  The tricuspid valve is normal. Pulmonary artery systolic pressure cannot be  assessed.     Pulmonic Valve  The pulmonic valve cannot be assessed.     Vessels  The aorta root is normal. The pulmonary artery cannot be assessed. The  inferior vena cava is normal.     Pericardium  No pericardial effusion is present.        Compared to Previous Study  Previous study not available for comparison.  _____________________________________________________________________________  __  MMode/2D Measurements & Calculations     IVSd: 1.1 cm  LVIDd: 4.7 cm  LVIDs: 2.7 cm  LVPWd: 0.76 cm  FS: 42.9 %  EDV(Teich): 99.8 ml  ESV(Teich): 25.9 ml  LV mass(C)d: 142.8 grams  LV mass(C)dI: 83.8 grams/m2  Ao root diam: 3.2 cm  asc Aorta Diam: 3.1 cm  LVOT diam: 2.1 cm  LVOT area: 3.5 cm2  LA Volume (BP): 47.0 ml  LA Volume Index (BP): 27.6 ml/m2  RWT: 0.33           Doppler Measurements & Calculations  MV E max david: 100.2 cm/sec  MV A max david: 123.9 cm/sec  MV E/A: 0.81  MV dec time: 0.22 sec  Ao V2 max: 162.9 cm/sec  Ao max P.0 mmHg  E/E' av.0  Lateral E/e': 13.3  Medial E/e': 14.7           _____________________________________________________________________________  __           Report approved by: Vidya Spencer 2018 11:01 AM          If you have any questions or concerns, please call the clinic at the number listed above.       Sincerely,    Dr Hilda Lopez

## 2018-02-12 NOTE — PROGRESS NOTES
PFT Note:    6 MWT:  Completed 6 minute walk on RA. Patient walked independently at moderate pace. Forehead sensor was used due to Raynaud's disease. Tolerated well.    Completed FVC, SVC, DLCO and Pleth with repeat of FVC after 4 puffs albuterol and a 15 minute wait.

## 2018-02-13 LAB
DLCOUNC-%PRED-PRE: 73 %
DLCOUNC-PRE: 14.83 ML/MIN/MMHG
DLCOUNC-PRED: 20.23 ML/MIN/MMHG
ERV-%PRED-PRE: 50 %
ERV-PRE: 0.26 L
ERV-PRED: 0.52 L
EXPTIME-PRE: 6.63 SEC
FEF2575-%PRED-POST: 84 %
FEF2575-%PRED-PRE: 68 %
FEF2575-POST: 1.83 L/SEC
FEF2575-PRE: 1.47 L/SEC
FEF2575-PRED: 2.16 L/SEC
FEFMAX-%PRED-PRE: 90 %
FEFMAX-PRE: 5.24 L/SEC
FEFMAX-PRED: 5.81 L/SEC
FEV1-%PRED-PRE: 86 %
FEV1-PRE: 1.92 L
FEV1FEV6-PRE: 75 %
FEV1FEV6-PRED: 81 %
FEV1FVC-PRE: 73 %
FEV1FVC-PRED: 80 %
FEV1SVC-PRE: 67 %
FEV1SVC-PRED: 79 %
FIFMAX-PRE: 3.12 L/SEC
FRCPLETH-%PRED-PRE: 111 %
FRCPLETH-PRE: 2.75 L
FRCPLETH-PRED: 2.47 L
FVC-%PRED-PRE: 93 %
FVC-PRE: 2.61 L
FVC-PRED: 2.79 L
IC-%PRED-PRE: 112 %
IC-PRE: 2.58 L
IC-PRED: 2.29 L
RVPLETH-%PRED-PRE: 147 %
RVPLETH-PRE: 2.48 L
RVPLETH-PRED: 1.68 L
TLCPLETH-%PRED-PRE: 124 %
TLCPLETH-PRE: 5.33 L
TLCPLETH-PRED: 4.27 L
VA-%PRED-PRE: 91 %
VA-PRE: 4.02 L
VC-%PRED-PRE: 101 %
VC-PRE: 2.85 L
VC-PRED: 2.81 L

## 2018-03-05 DIAGNOSIS — G47.00 INSOMNIA, UNSPECIFIED TYPE: ICD-10-CM

## 2018-03-05 RX ORDER — TRAZODONE HYDROCHLORIDE 50 MG/1
TABLET, FILM COATED ORAL
Qty: 90 TABLET | Refills: 2 | Status: SHIPPED | OUTPATIENT
Start: 2018-03-05 | End: 2018-09-10

## 2018-03-05 NOTE — TELEPHONE ENCOUNTER
Hello,  last fill date:11-  Last quantity:90    Thank You,  Meagan Bishop  Pharmacy Technician  [facility]  [phone]

## 2018-03-05 NOTE — TELEPHONE ENCOUNTER
traZODone (DESYREL) 50 MG tablet 90 tablet 3 1/9/2017  No   Sig: Take 1 tablet (50 mg) by mouth At Bedtime     Last OV with Dr. Painting: 8/7/2017 and 12/15/2017 with Keerthi Burr CNP    Future OV: none    Serotonin Modulators Passed3/5 10:14 AM   Recent (12 mo) or future (30 days) visit within the authorizing provider's specialty    Patient is age 18 or older    No active pregnancy on record    No positive pregnancy test in past 12 months     Refilled per P protocol.    Aisha Mclaughlin RN

## 2018-03-28 DIAGNOSIS — G44.209 TENSION HEADACHE: ICD-10-CM

## 2018-03-28 NOTE — TELEPHONE ENCOUNTER
Rx tubed to onsite pharmacy. No need to contact patient, refill request came from pharmacy.  Guille Foy, CMA

## 2018-04-16 ENCOUNTER — OFFICE VISIT (OUTPATIENT)
Dept: RHEUMATOLOGY | Facility: CLINIC | Age: 58
End: 2018-04-16
Payer: COMMERCIAL

## 2018-04-16 VITALS
HEART RATE: 62 BPM | OXYGEN SATURATION: 98 % | DIASTOLIC BLOOD PRESSURE: 79 MMHG | TEMPERATURE: 98 F | BODY MASS INDEX: 29.64 KG/M2 | SYSTOLIC BLOOD PRESSURE: 120 MMHG | WEIGHT: 157 LBS | HEIGHT: 61 IN

## 2018-04-16 DIAGNOSIS — M34.1 CREST (CALCINOSIS, RAYNAUD'S PHENOMENON, ESOPHAGEAL DYSFUNCTION, SCLERODACTYLY, TELANGIECTASIA) (H): ICD-10-CM

## 2018-04-16 DIAGNOSIS — M65.30 TRIGGER FINGER, ACQUIRED: Primary | ICD-10-CM

## 2018-04-16 PROCEDURE — 99214 OFFICE O/P EST MOD 30 MIN: CPT | Performed by: STUDENT IN AN ORGANIZED HEALTH CARE EDUCATION/TRAINING PROGRAM

## 2018-04-16 ASSESSMENT — PAIN SCALES - GENERAL: PAINLEVEL: SEVERE PAIN (6)

## 2018-04-16 NOTE — MR AVS SNAPSHOT
After Visit Summary   4/16/2018    Marcelina Estevez    MRN: [id number]           Patient Information     Date Of Birth          1960        Visit Information        Provider Department      4/16/2018 10:00 AM Hilda Lopez MD [facility]        Today's Diagnoses     Trigger finger, acquired    -  1      Care Instructions    -- She will try Plaquenil 200 mg BID     -- Sports medicine referral for trigger finger     -- RTC in 2 months           Follow-ups after your visit        Additional Services     SPORTS MEDICINE REFERRAL       Your provider has referred you to:  [facility]: [facility] [phone]   [email or web address]    Left 4th trigger finger    Please be aware that coverage of these services is subject to the terms and limitations of your health insurance plan.  Call member services at your health plan with any benefit or coverage questions.      Please bring the following to your appointment:    >>   Any x-rays, CTs or MRIs which have been performed.  Contact the facility where they were done to arrange for  prior to your scheduled appointment.    >>   List of current medications   >>   This referral request   >>   Any documents/labs given to you for this referral                  Follow-up notes from your care team     Return in about 2 months (around 6/16/2018).      Your next 10 appointments already scheduled     Jun 11, 2018 10:00 AM CDT   LAB with LAB FIRST FLOOR Replaced by [facility] ([facility])    [address] [place] [address] [phone]           Please do not eat 10-12 hours before your appointment if you are coming in fasting for labs on lipids, cholesterol, or glucose (sugar). This does not apply to pregnant women. Water, hot tea and black coffee (with nothing added) are okay. Do not drink other fluids, diet soda or chew gum.            Agustín  18, 2018 11:30 AM CDT   [facility] Visit with Hilda Lopez MD   [facility]    [address] [place] [address] [phone]              Who to contact     If you have questions or need follow up information about today's clinic visit or your schedule please contact [facility] directly at [phone].  Normal or non-critical lab and imaging results will be communicated to you by LapSpacehart, letter or phone within 4 business days after the clinic has received the results. If you do not hear from us within 7 days, please contact the clinic through 3D Biomatrixt or phone. If you have a critical or abnormal lab result, we will notify you by phone as soon as possible.  Submit refill requests through Hullabalu or call your pharmacy and they will forward the refill request to us. Please allow 3 business days for your refill to be completed.          Additional Information About Your Visit        Hullabalu Information     Hullabalu gives you secure access to your electronic health record. If you see a primary care provider, you can also send messages to your care team and make appointments. If you have questions, please call your primary care clinic.  If you do not have a primary care provider, please call [phone] and they will assist you.      Hullabalu is an electronic gateway that provides easy, online access to your medical records. With Hullabalu, you can request a clinic appointment, read your test results, renew a prescription or communicate with your care team.     To access your existing account, please contact your [facility] or call [phone] for assistance.        Care EveryWhere ID     This is your Care EveryWhere ID. This could be used by other organizations to access your [facility] medical records  [id number]        Your Vitals Were     Pulse Temperature Height Last Period Pulse Oximetry BMI (Body Mass  "Index)    62 98  F (36.7  C) (Oral) 1.543 m (5' 0.75\") 06/12/2014 98% 29.91 kg/m2       Blood Pressure from Last 3 Encounters:   04/16/18 120/79   01/29/18 138/84   12/15/17 145/80    Weight from Last 3 Encounters:   04/16/18 71.2 kg (157 lb)   02/12/18 71.3 kg (157 lb 3.2 oz)   01/29/18 70.7 kg (155 lb 14.4 oz)              We Performed the Following     SPORTS MEDICINE REFERRAL        Primary Care Provider Office Phone # Fax #    Annette Painting MD PhD [phone] [address] [facility] [address]        Equal Access to Services     ANA CRISTINA ROJAS : Miracle proctor Sosammi, waaxda luqadaha, qaybta kaalmada adeegyada, faith jones . So [facility] [phone].    ATENCIÓN: Si habla español, tiene a mcgovern disposición servicios gratuitos de asistencia lingüística. Ll[facility] [phone].    We comply with applicable federal civil rights laws and [place] laws. We do not discriminate on the basis of race, color, national origin, age, disability, sex, sexual orientation, or gender identity.            Thank you!     Thank you for choosing [facility]  for your care. Our goal is always to provide you with excellent care. Hearing back from our patients is one way we can continue to improve our services. Please take a few minutes to complete the written survey that you may receive in the mail after your visit with us. Thank you!             Your Updated Medication List - Protect others around you: Learn how to safely use, store and throw away your medicines at www.disposemymeds.org.          This list is accurate as of 4/16/18 10:46 AM.  Always use your most recent med list.                   Brand Name Dispense Instructions for use Diagnosis    acetaminophen-codeine 300-30 MG per tablet    TYLENOL #3    30 tablet    Take 1 tablet by mouth every 4 hours as needed for moderate pain    Tension headache       albuterol 108 (90 Base) MCG/ACT Inhaler    PROAIR " HFA/PROVENTIL HFA/VENTOLIN HFA    1 Inhaler    Inhale 2 puffs into the lungs every 6 hours as needed for shortness of breath / dyspnea    Wheezing       ALLEGRA ALLERGY 180 MG tablet   Generic drug:  fexofenadine     90 tablet    Take 1 tablet (180 mg) by mouth daily    Atopic rhinitis       ALPRAZolam 0.25 MG tablet    XANAX    30 tablet    Take 1 tablet (0.25 mg) by mouth daily as needed for anxiety    Panic attack       buPROPion 150 MG 24 hr tablet    WELLBUTRIN XL    90 tablet    TAKE ONE TABLET BY MOUTH EVERY MORNING    Major depression in complete remission (H)       CALCIUM 600+D 600-200 MG-UNIT Tabs   Generic drug:  calcium carbonate-vitamin D      Take  by mouth.        cloNIDine 0.2 MG tablet    CATAPRES    90 tablet    Take 1 tablet (0.2 mg) by mouth daily    Night sweats       fluticasone 50 MCG/ACT spray    FLONASE    16 g    Spray 1-2 sprays into both nostrils daily    Atopic rhinitis       hydroxychloroquine 200 MG tablet    PLAQUENIL    60 tablet    Take 1 tablet (200 mg) by mouth 2 times daily Get annual eye exams for hydroxychloroquine (plaquenil) monitoring and fax to [phone].    Raynaud's disease without gangrene       losartan-hydrochlorothiazide 100-25 MG per tablet    HYZAAR    90 tablet    Take 1 tablet by mouth daily    Hypertension goal BP (blood pressure) < 140/90, CKD (chronic kidney disease) stage 2, GFR 60-89 ml/min       naproxen 500 MG tablet    NAPROSYN    60 tablet    Take 1 tablet (500 mg) by mouth 2 times daily as needed for moderate pain    Inflammatory arthritis       olopatadine HCl 0.2 % Soln    PATADAY    1 Bottle    Place 1 drop into both eyes daily    Other chronic allergic conjunctivitis       order for DME     1 Device    Equipment being ordered: light lamp for seasonal affective disorder    Severe seasonal affective disorder (H)       simvastatin 40 MG tablet    ZOCOR    90 tablet    Take 1 tablet (40 mg) by mouth At Bedtime    Hyperlipidemia LDL goal <100        traZODone 50 MG tablet    DESYREL    90 tablet    TAKE 1 TABLET (50MG) BY MOUTH EVERY NIGHT AT BEDTIME    Insomnia, unspecified type       tretinoin 0.05 % cream    RETIN-A    45 g    Spread a pea size amount into affected area on the face topically at bedtime.  Use sunscreen SPF>30.    Milium       venlafaxine 75 MG 24 hr capsule    EFFEXOR-XR    270 capsule    TAKE THREE CAPSULES BY MOUTH EVERY DAY    Menopausal syndrome (hot flashes)       VITAMIN D (CHOLECALCIFEROL) PO      Take 2,000 Units by mouth daily

## 2018-04-16 NOTE — PATIENT INSTRUCTIONS
-- She will try Plaquenil 200 mg BID     -- Sports medicine referral for trigger finger     -- RTC in 2 months

## 2018-04-16 NOTE — PROGRESS NOTES
Rheumatology Clinic Visit     Marcelina Estevez MRN# [id number]   YOB: 1960 Age: 57 year old     Date of Visit:April 16, 2018  Primary care provider: Annette Painting          Assessment and Plan:   Assessment     -- Raynaud's  -- Puffiness of hands-possible sclerodactyly  -- Arthritis particularly in hands  -- Positive MINDY >-1:1280 centromere pattern      Ms Estevez is 58 yo female seen in clinic for evaluation of bilateral hand pain.     Raynaud's: She has history of raynaud's for many years. In the last few years she has noted worsening.  She denies any skin breakdown or gangrene.  At present she is able to control her raynaud's with conservative approach like keeping her hands are warm.  I discussed with her regarding trying calcium channel blocker like amlodipine but she would like to wait.       Hand arthritis: She has noticed pain in her hands along with swelling.  She followed with Dr. Egan at [facility] and had autoimmune workup which revealed a high titer positive MINDY of 1: 1280 with centromere pattern.  The rest of her SUSIE panel, double-stranded DNA antibodies were negative.  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 Repeat autoimmune workup in the last office visit revealed high titer anticentromere antibody.     Pain in her hands may be related to limited cutaneous systemic sclerosis/CREST I recommended her to try Plaquenil 200 mg twice daily in the last office visit.  She did not start it due to side effects of retinopathy.  I discussed with her again that the risk of retinopathy is there but low, moreover annual eye exams are done to help to de eye diseasetect signs of eye disease.  She is willing to try Plaquenil.    Limited cutaneous systemic sclerosis/CREST : She has features of raynaud's, esophageal dysmotility and telangiectasias.  He also got 2D echo and PFTs for pulmonary hypertension/ILD.  She has normal echo and PFTs.  For her esophageal dysmotility I  recommended her to take small frequent meals.  She does not think dysphagia is a major issue at this time.    --Trigger finger : She has left fourth trigger finger which is really bothering her to do daily activities and using her hands.  She wants to get evaluated for possible injection in the tendon sheath.  I will give her sports medicine referral for further evaluation and injection.        Plan    --  Will start her on plaquenil 200 mg BID for undifferentiated inflammatory arthritis. It is slow acting medication and will giver it a trial for next 3 - 4 months.   -- If she will be continued on Plaquenil long term will do eye exam in 3 - 4 months as a baseline and then every year.      -- Sports medicine referral given for left fourth trigger finger.     -- RTC in 3 months           Active Problem List:     Patient Active Problem List    Diagnosis Date Noted     CREST (calcinosis, Raynaud's phenomenon, esophageal dysfunction, sclerodactyly, telangiectasia) (H)      Priority: Medium     Limited systemic sclerosis (H)      Priority: Medium     Raynaud's disease without gangrene 01/29/2018     Priority: Medium     Generalized anxiety disorder 01/08/2018     Priority: Medium     Tension headache 09/28/2017     Priority: Medium     Patient is followed by Annette Painting MD PhD for ongoing prescription of pain medication.  All refills should only be approved by this provider, or covering partner.    Medication(s): Tylenol with codeine (T#3).   Maximum quantity per month: 15  Clinic visit frequency required: Q 6  months     Controlled substance agreement:  Encounter-Level CSA - 08/07/2017:          Controlled Substance Agreement - Scan on 8/16/2017 10:21 AM : CONTROLLED SUBSTANCE AGREEMENT (below)              Pain Clinic evaluation in the past: No    DIRE Total Score(s):  No flowsheet data found.    Last [facility] website verification:  none   [email or web address]       ACP (advance care planning) 08/19/2015      Priority: Medium     Advance Care Planning 8/19/2015: ACP Review and Resources Provided:  Reviewed chart for advance care plan.  Marcelina Estevez has no plan or code status on file however states presence of ACP document. Copy requested.  Confirmed/documented legally designated decision maker(s). Added by Mary Padilla RN Advance Care Planning Liaison with [employer]           Anemia 01/09/2015     Priority: Medium     Status post total hysterectomy 11/18/2014     Priority: Medium     Allergic rhinitis due to pollen 08/02/2014     Priority: Medium     Menopausal syndrome (hot flashes) 12/19/2013     Priority: Medium     Hyperlipidemia LDL goal <100 06/17/2013     Priority: Medium     CKD (chronic kidney disease) stage 1, GFR 90 ml/min or greater 06/17/2013     Priority: Medium     Moderate episode of recurrent major depressive disorder (H) 09/10/2012     Priority: Medium     Celexa not helping, fluoxetine caused rash.        Hypertension goal BP (blood pressure) < 140/90 01/18/2011     Priority: Medium     Keratitis sicca, bilateral 06/10/2013     Priority: Low     Hypertriglyceridemia 01/18/2011     Priority: Low     Controlled with simvastatin.        Night sweats 01/18/2011     Priority: Low     Worse with menstruation. On Clonidine.        Atopic rhinitis 01/18/2011     Priority: Low     (Problem list name updated by automated process. Provider to review and confirm.)              History of Present Illness:   Marcelina Estevez is a 57 year old female with PMH of raynaud's, osteoarthritis, hand pain, depression seen in the clinic in consultation at request of Keerthi Burr APRN CNP for evaluation of joint pains.     April 16, 2018 : She has pain in her hands. She has left fourth trigger finger. Right second knuckle hurts. Ankles hurt.  She has raynaud's but it is stable at present.  She denies any new gangrene, skin breakdown over her digits.  Her abdomen workup done on the last office visit revealed  high titer anticentromere antibody confirming the diagnosis of limited cutaneous systemic sclerosis.  She was given Plaquenil prescription last office visit but she did not start it due to side effects.       HPI from initial visit: C/o Pain in her hands and trigger finger in left hand. Stiffness in the morning. Can not move them or make a fist. complains of mild pain in her knees and shoulders, has hx of knee surgery for meniscal tear. She saw Dr. Egan at  [facility] for abnormal MINDY and was diagnosed with possible CREST syndrome.  He did autoimmune workup which revealed positive MINDY 1: 320 with centromere pattern, negative SUSIE panel, double-stranded DNA, rheumatoid serologies. No specific DMARD therapy was started. On her last visit 1/11/2018 she was noted to have left elbow swelling with some white discharge.  To rule out possibility of calcinosis she had x-ray of the left elbow which was normal.  She was given empiric antibiotics for possible bursitis and it improved the swelling and pain.    She also reports having raynaud's for the past 20 years.  Denies any gangrene or infarcts over her fingertips. Denies malar rash, photosensitivity, recurrent mouth/genital ulcers, sicca symptoms, pleuritic chest pains, recurrent sinusitis/rhinitis, swallowing difficulty, hearing or visual changes recently. No h/o arterial/venous thrombosis in the past. Denies any tick bite, recent GI/ infection.            Review of Systems:   Review Of Systems  Constitutional: denies fever, chills, night sweats and weight loss.  Skin: No skin rash.  Eyes: No dryness or irritation in eyes. No episode of eye inflammation or redness.   Ears/Nose/Throat: no recurrent sinus infections.  Respiratory: No shortness of breath, dyspnea on exertion, cough, or hemoptysis  Cardiovascular: no chest pain or palpitations.  Gastrointestinal: no nausea, vomiting, abdominal pain.  Normal bowel movements.  Genitourinary: no dysuria,  frequency  or hematuria.  Musculoskeletal: as in HPI  Neurologic: no numbness, tingling.  Psychiatric: no mood disorders.  Hematologic/Lymphatic/Immunologic: no history of easy bruising, petechia or purpura.  No abnormal bleeding.   Endocrine: no h/o thyroid disease or Diabetes.                  Past Medical History:     Past Medical History:   Diagnosis Date     CREST (calcinosis, Raynaud's phenomenon, esophageal dysfunction, sclerodactyly, telangiectasia) (H)      Hyperlipidemia      Hypertension      Limited systemic sclerosis (H)      Positive MINDY (antinuclear antibody)      Raynaud disease      Past Surgical History:   Procedure Laterality Date     APPENDECTOMY       BIOPSY      cervical node     DILATION AND CURETTAGE, HYSTEROSCOPY DIAGNOSTIC, COMBINED  7/1/2014    Procedure: COMBINED DILATION AND CURETTAGE, HYSTEROSCOPY DIAGNOSTIC;  Surgeon: Mana Cabrera DO;  Location: [facility] OR     ENT SURGERY      tonsillectomy and adnoid removal     HYSTERECTOMY TOTAL ABDOMINAL       ORTHOPEDIC SURGERY      left knee tear meniscus     RELEASE CARPAL TUNNEL BILATERAL  2009            Social History:     Social History     Occupational History     Not on file.     Social History Main Topics     Smoking status: Former Smoker     Quit date: 1/1/2001     Smokeless tobacco: Never Used     Alcohol use Yes      Comment: social     Drug use: No     Sexual activity: Yes     Partners: Male     Birth control/ protection: Surgical      Comment: vasectomy            Family History:     Family History   Problem Relation Age of Onset     OSTEOPOROSIS Mother      Eye Disorder Mother      cataract, mac degen     OSTEOPOROSIS Father      Eye Disorder Father      glaucoma     Hypertension Maternal Grandmother      Unknown/Adopted Paternal Grandfather      Eye Disorder Paternal Grandmother      glaucoma     Hypertension Daughter      DIABETES Maternal Grandfather      DIABETES Other             Allergies:     Allergies   Allergen  Reactions     Seasonal Allergies      Sulfa Drugs      Vomiting       Vicodin [Hydrocodone-Acetaminophen] Nausea            Medications:     Current Outpatient Prescriptions   Medication Sig Dispense Refill     acetaminophen-codeine (TYLENOL #3) 300-30 MG per tablet Take 1 tablet by mouth every 4 hours as needed for moderate pain 30 tablet 0     traZODone (DESYREL) 50 MG tablet TAKE 1 TABLET (50MG) BY MOUTH EVERY NIGHT AT BEDTIME 90 tablet 2     buPROPion (WELLBUTRIN XL) 150 MG 24 hr tablet TAKE ONE TABLET BY MOUTH EVERY MORNING 90 tablet 0     venlafaxine (EFFEXOR-XR) 75 MG 24 hr capsule TAKE THREE CAPSULES BY MOUTH EVERY  capsule 1     VITAMIN D, CHOLECALCIFEROL, PO Take 2,000 Units by mouth daily       losartan-hydrochlorothiazide (HYZAAR) 100-25 MG per tablet Take 1 tablet by mouth daily 90 tablet 3     ALPRAZolam (XANAX) 0.25 MG tablet Take 1 tablet (0.25 mg) by mouth daily as needed for anxiety 30 tablet 2     simvastatin (ZOCOR) 40 MG tablet Take 1 tablet (40 mg) by mouth At Bedtime 90 tablet 3     cloNIDine (CATAPRES) 0.2 MG tablet Take 1 tablet (0.2 mg) by mouth daily 90 tablet 3     albuterol (PROAIR HFA/PROVENTIL HFA/VENTOLIN HFA) 108 (90 BASE) MCG/ACT Inhaler Inhale 2 puffs into the lungs every 6 hours as needed for shortness of breath / dyspnea 1 Inhaler 5     ALLEGRA ALLERGY 180 MG tablet Take 1 tablet (180 mg) by mouth daily 90 tablet 3     fluticasone (FLONASE) 50 MCG/ACT spray Spray 1-2 sprays into both nostrils daily 16 g 5     order for DME Equipment being ordered: light lamp for seasonal affective disorder 1 Device 0     tretinoin (RETIN-A) 0.05 % cream Spread a pea size amount into affected area on the face topically at bedtime.  Use sunscreen SPF>30. 45 g 2     hydroxychloroquine (PLAQUENIL) 200 MG tablet Take 1 tablet (200 mg) by mouth 2 times daily Get annual eye exams for hydroxychloroquine (plaquenil) monitoring and fax to [phone]. (Patient not taking: Reported on 4/16/2018) 60  "tablet 3     naproxen (NAPROSYN) 500 MG tablet Take 1 tablet (500 mg) by mouth 2 times daily as needed for moderate pain (Patient not taking: Reported on 1/29/2018) 60 tablet 1     olopatadine HCl (PATADAY) 0.2 % SOLN Place 1 drop into both eyes daily (Patient not taking: Reported on 1/29/2018) 1 Bottle 6     Calcium 600+D 600-200 MG-UNIT TABS Take  by mouth.              Physical Exam:   Blood pressure 120/79, pulse 62, temperature 98  F (36.7  C), temperature source Oral, height 1.543 m (5' 0.75\"), weight 71.2 kg (157 lb), last menstrual period 06/12/2014, SpO2 98 %, not currently breastfeeding.  Wt Readings from Last 4 Encounters:   04/16/18 71.2 kg (157 lb)   02/12/18 71.3 kg (157 lb 3.2 oz)   01/29/18 70.7 kg (155 lb 14.4 oz)   12/15/17 70.8 kg (156 lb 1.6 oz)       Constitutional: well-developed, appearing stated age; cooperative  Eyes: nl EOM, PERRLA, vision, conjunctiva, sclera  ENT: nl external ears, nose, hearing, lips, teeth, gums, throat  No mucous membrane lesions, normal saliva pool  Neck: no mass or thyroid enlargement  Resp: lungs clear to auscultation, nl to palpation  CV: RRR, no murmurs, rubs or gallops, no edema  GI: no ABD mass or tenderness, no HSM  : not tested  Lymph: no cervical, supraclavicular, inguinal or epitrochlear nodes    MS: All TMJ, neck, shoulder, elbow, wrist, MCP/PIP/DIP, spine, hip, knee, ankle, and foot MTP/IP joints were examined.    -- Puffiness of hands, some tightness of the skin distal to the MCP joints, no active raynaud's.  Difficulty making a fist due to stiffness. Tenderness noted over MCP, PIP joints   -- Tenderness present over Anterior aspect of bilateral shoulders  -- No tenderness present over bilateral ankles, MTP joints  -- Left elbow swelling and erythema improved, no tenderness or discharge.  -- She has telangiectasia over forehead, lower lip.   -- No dactylitis,  tenosynovitis, enthesopathy.    Skin: no nail pitting, alopecia, rash, nodules or " lesions  Neuro: nl cranial nerves, strength, sensation, DTRs.   Psych: nl judgement, orientation, memory, affect.         Data:     Results for orders placed or performed in visit on 18   ECHO COMPLETE WITH OPTISON    Narrative    [id number]  [facility]  [id number]  [id number]^JUMA^ADRIANO^           [facility]  Echocardiography Laboratory  [address] [place] [address]        Name: QUAN CHENG  MRN: [id number]  : 1960  Study Date: 2018 09:37 AM  Age: 57 yrs  Gender: Female  Patient [facility]: [facility]  Reason For Study: Raynaud's disease without gangrene  Ordering Physician: ADRIANO DENNISON  Referring Physician: ADRIANO DENNISON  Performed By: Roix Medina RDCS     BSA: 1.7 m2  Height: 61 in  Weight: 157 lb  BP: 146/79 mmHg  _____________________________________________________________________________  __        Procedure  Echocardiogram with two-dimensional, color and spectral Doppler performed.  Contrast Optison. Optison (NDC #9797-2131-92) given intravenously. Patient was  given 4 ml mixture of 3 ml Optison and 6 ml saline. 5 ml wasted. IV start  location LAC .  _____________________________________________________________________________  __        Interpretation Summary     Global and regional left ventricular function is normal with an EF of 60-65%.  Right ventricular function, chamber size, wall motion, and thickness are  normal.  Both atria appear normal.  The inferior vena cava is normal.  No pericardial effusion is present.  Previous study not available for comparison.  _____________________________________________________________________________  __        Left Ventricle  Global and regional left ventricular function is normal with an EF of 60-65%.  Left ventricular size is normal. Relative wall thickness is increased  consistent with concentric remodeling. Left ventricular diastolic function is  indeterminate. No regional wall motion abnormalities  are seen.     Right Ventricle  Right ventricular function, chamber size, wall motion, and thickness are  normal.     Atria  Both atria appear normal. The atrial septum is intact as assessed by color  Doppler .     Mitral Valve  The mitral valve is normal. Mild mitral annular calcification is present.        Aortic Valve  Aortic valve is normal in structure and function.     Tricuspid Valve  The tricuspid valve is normal. Pulmonary artery systolic pressure cannot be  assessed.     Pulmonic Valve  The pulmonic valve cannot be assessed.     Vessels  The aorta root is normal. The pulmonary artery cannot be assessed. The  inferior vena cava is normal.     Pericardium  No pericardial effusion is present.        Compared to Previous Study  Previous study not available for comparison.  _____________________________________________________________________________  __  MMode/2D Measurements & Calculations     IVSd: 1.1 cm  LVIDd: 4.7 cm  LVIDs: 2.7 cm  LVPWd: 0.76 cm  FS: 42.9 %  EDV(Teich): 99.8 ml  ESV(Teich): 25.9 ml  LV mass(C)d: 142.8 grams  LV mass(C)dI: 83.8 grams/m2  Ao root diam: 3.2 cm  asc Aorta Diam: 3.1 cm  LVOT diam: 2.1 cm  LVOT area: 3.5 cm2  LA Volume (BP): 47.0 ml  LA Volume Index (BP): 27.6 ml/m2  RWT: 0.33           Doppler Measurements & Calculations  MV E max david: 100.2 cm/sec  MV A max david: 123.9 cm/sec  MV E/A: 0.81  MV dec time: 0.22 sec  Ao V2 max: 162.9 cm/sec  Ao max P.0 mmHg  E/E' av.0  Lateral E/e': 13.3  Medial E/e': 14.7           _____________________________________________________________________________  __           Report approved by: Vidya Spencer 2018 11:01 AM          Recent Labs   Lab Test  12/15/17   1232  17   0805  16   1042   12/15/14   1233  14   0953  14   1051   11   1247   WBC   --    --    --    --   5.8   --   6.8   --   9.1   RBC   --    --    --    --   3.73*   --   3.80   --   3.95   HGB   --    --    --    --   11.4*   --    12.4   --   13.0   HCT   --    --    --    --   33.7*   --   35.6   --   37.4   MCV   --    --    --    --   90   --   94   --   95   RDW   --    --    --    --   15.0   --   13.4   --   13.5   PLT   --    --    --    --   255   --   214   --   214   ALBUMIN   --    --    --    --    --    --    --    --   4.7   CRP  6.8   --    --    --    --    --    --    --    --    BUN  18  30  16   < >   --    --   18   < >  16   CREAT   --    --    --    --    --   0.9   --    --    --     < > = values in this interval not displayed.      Recent Labs   Lab Test  01/09/17   1519   TSH  1.18     Hemoglobin   Date Value Ref Range Status   12/15/2014 11.4 (L) 11.7 - 15.7 g/dL Final   08/01/2014 12.4 11.7 - 15.7 g/dL Final   12/30/2011 13.0 11.7 - 15.7 g/dL Final     Urea Nitrogen   Date Value Ref Range Status   12/15/2017 18 7 - 30 mg/dL Final   08/02/2017 30 7 - 30 mg/dL Final   06/29/2016 16 7 - 30 mg/dL Final     Creatinine   Date Value Ref Range Status   09/24/2014 0.9 0.52 - 1.04 mg/dL Final     Sed Rate   Date Value Ref Range Status   12/15/2017 23 0 - 30 mm/h Final   12/30/2011 10 0 - 30 mm/h Final     CRP Inflammation   Date Value Ref Range Status   12/15/2017 6.8 0.0 - 8.0 mg/L Final     AST   Date Value Ref Range Status   12/30/2011 31 0 - 45 U/L Final     Albumin   Date Value Ref Range Status   12/30/2011 4.7 3.9 - 5.1 g/dL Final     Alkaline Phosphatase   Date Value Ref Range Status   12/30/2011 53 40 - 150 U/L Final     ALT   Date Value Ref Range Status   06/29/2016 37 0 - 50 U/L Final   06/24/2015 58 (H) 0 - 50 U/L Final   08/01/2014 25 0 - 50 U/L Final     Rheumatoid Factor   Date Value Ref Range Status   12/15/2017 <20 <20 IU/mL Final     Recent Labs   Lab Test  12/15/17   1232  08/02/17   0805  01/09/17   1519  06/29/16   1042  06/24/15   0751  12/15/14   1233  08/01/14   1051   12/30/11   1247   WBC   --    --    --    --    --   5.8  6.8   --   9.1   HGB   --    --    --    --    --   11.4*  12.4   --   13.0    HCT   --    --    --    --    --   33.7*  35.6   --   37.4   MCV   --    --    --    --    --   90  94   --   95   PLT   --    --    --    --    --   255  214   --   214   BUN  18  30   --   16  12   --   18   < >  16   TSH   --    --   1.18   --    --    --    --    --    --    AST   --    --    --    --    --    --    --    --   31   ALT   --    --    --   37  58*   --   25   < >  28   ALKPHOS   --    --    --    --    --    --    --    --   53    < > = values in this interval not displayed.       Outside studies reviewed:   Component Name  12/28/2017 1/22/2013 1/17/2013         4     NEGATIVE     Negative Negative     Negative Negative     Negative Negative     Negative Negative     <1:20       Anti CCP   Anti DNA Farida   Anti RNP Farida   Anti SM Farida   Anti SSA Farida   Anti SSB Farida   Anti-Neutrophil Cytoplasmic Farida IGG                            Component Name  3/9/2017     Nonreactive   Hep C Farida       Reviewed Rheumatology lab flowsheet    Hilda Lopez MD  [facility]  Department of Rheumatology & Autoimmune Disorders  [facility]: [phone]   Pager - [phone]

## 2018-04-16 NOTE — NURSING NOTE
"Marcelina Estevez's goals for this visit include:   She requests these members of her care team be copied on today's visit information:     PCP: Annette Painting    Referring Provider:  No referring provider defined for this encounter.    Chief Complaint   Patient presents with     RECHECK     3 month follow up for  Raynaud's disease without gangrene        Initial /79  Pulse 62  Temp 98  F (36.7  C) (Oral)  Ht 1.543 m (5' 0.75\")  Wt 71.2 kg (157 lb)  LMP 06/12/2014  SpO2 98%  BMI 29.91 kg/m2 Estimated body mass index is 29.91 kg/(m^2) as calculated from the following:    Height as of this encounter: 1.543 m (5' 0.75\").    Weight as of this encounter: 71.2 kg (157 lb).  Medication Reconciliation: complete  "

## 2018-04-17 DIAGNOSIS — M34.1 CREST (CALCINOSIS, RAYNAUD'S PHENOMENON, ESOPHAGEAL DYSFUNCTION, SCLERODACTYLY, TELANGIECTASIA) (H): ICD-10-CM

## 2018-04-17 DIAGNOSIS — I73.00 RAYNAUD'S DISEASE WITHOUT GANGRENE: Primary | ICD-10-CM

## 2018-04-30 ENCOUNTER — OFFICE VISIT (OUTPATIENT)
Dept: ORTHOPEDICS | Facility: CLINIC | Age: 58
End: 2018-04-30
Attending: STUDENT IN AN ORGANIZED HEALTH CARE EDUCATION/TRAINING PROGRAM
Payer: COMMERCIAL

## 2018-04-30 VITALS
OXYGEN SATURATION: 98 % | DIASTOLIC BLOOD PRESSURE: 93 MMHG | SYSTOLIC BLOOD PRESSURE: 142 MMHG | HEIGHT: 61 IN | WEIGHT: 159 LBS | HEART RATE: 74 BPM | BODY MASS INDEX: 30.02 KG/M2

## 2018-04-30 DIAGNOSIS — M65.342 TRIGGER FINGER, LEFT RING FINGER: Primary | ICD-10-CM

## 2018-04-30 PROCEDURE — 99207 ZZC NO CHARGE LOS: CPT | Performed by: FAMILY MEDICINE

## 2018-04-30 PROCEDURE — 20550 NJX 1 TENDON SHEATH/LIGAMENT: CPT | Mod: F3 | Performed by: FAMILY MEDICINE

## 2018-04-30 ASSESSMENT — PAIN SCALES - GENERAL: PAINLEVEL: MODERATE PAIN (4)

## 2018-04-30 NOTE — MR AVS SNAPSHOT
After Visit Summary   2018    Marcelina Estevez    MRN: [id number]           Patient Information     Date Of Birth          1960        Visit Information        Provider Department      2018 1:20 PM Diogo Burr,  [facility]        Today's Diagnoses     Trigger finger, left ring finger    -  1      Care Instructions    Thanks for coming today.  Ortho/Sports Medicine Clinic  [address] [place] [id number]    To schedule future appointments in Ortho Clinic, you may call [phone].    To schedule ordered imaging by your provider:   Call Central Imaging [phone]    To schedule an injection ordered by your provider:  Call Central Imaging Injection scheduling line: [phone]  MyChart available online at:  [email or web address]    Please call if any further questions or concerns ([phone]).  Clinic hours 8 am to 5 pm.    Return to clinic (call) if symptoms worsen or fail to improve.            Follow-ups after your visit        Your next 10 appointments already scheduled     2018 10:00 AM CDT   LAB with LAB FIRST FLOOR [facility] ([facility])    [address] [place] [id number] [phone]           Please do not eat 10-12 hours before your appointment if you are coming in fasting for labs on lipids, cholesterol, or glucose (sugar). This does not apply to pregnant women. Water, hot tea and black coffee (with nothing added) are okay. Do not drink other fluids, diet soda or chew gum.            2018 11:30 AM CDT   Return Visit with Hilda Lopez MD   [facility]    [address] [place] [address] [phone]              Future tests that were ordered for you today     Open Future Orders        Priority Expected Expires Ordered    US Guided Needle Placement Routine  2019        "     Who to contact     If you have questions or need follow up information about today's clinic visit or your schedule please contact [facility] directly at [phone].  Normal or non-critical lab and imaging results will be communicated to you by Kloodhart, letter or phone within 4 business days after the clinic has received the results. If you do not hear from us within 7 days, please contact the clinic through Kloodhart or phone. If you have a critical or abnormal lab result, we will notify you by phone as soon as possible.  Submit refill requests through [employer] Imagine or call your pharmacy and they will forward the refill request to us. Please allow 3 business days for your refill to be completed.          Additional Information About Your Visit        [employer] Imagine Information     [employer] Imagine gives you secure access to your electronic health record. If you see a primary care provider, you can also send messages to your care team and make appointments. If you have questions, please call your primary care clinic.  If you do not have a primary care provider, please call [phone] and they will assist you.      [employer] Imagine is an electronic gateway that provides easy, online access to your medical records. With [employer] Imagine, you can request a clinic appointment, read your test results, renew a prescription or communicate with your care team.     To access your existing account, please contact your [facility] or call [phone] for assistance.        Care EveryWhere ID     This is your Care EveryWhere ID. This could be used by other organizations to access your [facility] medical records  [id number]        Your Vitals Were     Pulse Height Last Period Pulse Oximetry BMI (Body Mass Index)       74 1.56 m (5' 1.42\") 06/12/2014 98% 29.64 kg/m2        Blood Pressure from Last 3 Encounters:   04/30/18 (!) 142/93   04/16/18 120/79   01/29/18 138/84    Weight from Last 3 Encounters:   04/30/18 " 72.1 kg (159 lb)   04/16/18 71.2 kg (157 lb)   02/12/18 71.3 kg (157 lb 3.2 oz)              We Performed the Following     INJECTION SINGLE TENDON SHEATH/LIGAMENT     KENALOG PER 10 MG        Primary Care Provider Office Phone # Fax #    Annette Painting MD PhD [phone] [address]  [facility] [address]        Equal Access to Services     ANA CRISTINA ROJAS : Hadii aad ku hadasho Soomaali, waaxda luqadaha, qaybta kaalmada adeegyada, waxay idiin hayaan adeeg kharash la'gamaliel . So [facility] [phone].    ATENCIÓN: Si annamariela espshannan, tiene a mcgovern disposición servicios gratuitos de asistencia lingüística. Llame al [phone].    We comply with applicable federal civil rights laws and [place] laws. We do not discriminate on the basis of race, color, national origin, age, disability, sex, sexual orientation, or gender identity.            Thank you!     Thank you for choosing [facility]  for your care. Our goal is always to provide you with excellent care. Hearing back from our patients is one way we can continue to improve our services. Please take a few minutes to complete the written survey that you may receive in the mail after your visit with us. Thank you!             Your Updated Medication List - Protect others around you: Learn how to safely use, store and throw away your medicines at www.disposemymeds.org.          This list is accurate as of 4/30/18  2:12 PM.  Always use your most recent med list.                   Brand Name Dispense Instructions for use Diagnosis    acetaminophen-codeine 300-30 MG per tablet    TYLENOL #3    30 tablet    Take 1 tablet by mouth every 4 hours as needed for moderate pain    Tension headache       albuterol 108 (90 Base) MCG/ACT Inhaler    PROAIR HFA/PROVENTIL HFA/VENTOLIN HFA    1 Inhaler    Inhale 2 puffs into the lungs every 6 hours as needed for shortness of breath / dyspnea    Wheezing       ALLEGRA ALLERGY 180 MG tablet   Generic drug:   fexofenadine     90 tablet    Take 1 tablet (180 mg) by mouth daily    Atopic rhinitis       ALPRAZolam 0.25 MG tablet    XANAX    30 tablet    Take 1 tablet (0.25 mg) by mouth daily as needed for anxiety    Panic attack       buPROPion 150 MG 24 hr tablet    WELLBUTRIN XL    90 tablet    TAKE ONE TABLET BY MOUTH EVERY MORNING    Major depression in complete remission (H)       CALCIUM 600+D 600-200 MG-UNIT Tabs   Generic drug:  calcium carbonate-vitamin D      Take  by mouth.        cloNIDine 0.2 MG tablet    CATAPRES    90 tablet    Take 1 tablet (0.2 mg) by mouth daily    Night sweats       fluticasone 50 MCG/ACT spray    FLONASE    16 g    Spray 1-2 sprays into both nostrils daily    Atopic rhinitis       hydroxychloroquine 200 MG tablet    PLAQUENIL    60 tablet    Take 1 tablet (200 mg) by mouth 2 times daily Get annual eye exams for hydroxychloroquine (plaquenil) monitoring and fax to [phone].    Raynaud's disease without gangrene       losartan-hydrochlorothiazide 100-25 MG per tablet    HYZAAR    90 tablet    Take 1 tablet by mouth daily    Hypertension goal BP (blood pressure) < 140/90, CKD (chronic kidney disease) stage 2, GFR 60-89 ml/min       naproxen 500 MG tablet    NAPROSYN    60 tablet    Take 1 tablet (500 mg) by mouth 2 times daily as needed for moderate pain    Inflammatory arthritis       olopatadine HCl 0.2 % Soln    PATADAY    1 Bottle    Place 1 drop into both eyes daily    Other chronic allergic conjunctivitis       order for DME     1 Device    Equipment being ordered: light lamp for seasonal affective disorder    Severe seasonal affective disorder (H)       simvastatin 40 MG tablet    ZOCOR    90 tablet    Take 1 tablet (40 mg) by mouth At Bedtime    Hyperlipidemia LDL goal <100       traZODone 50 MG tablet    DESYREL    90 tablet    TAKE 1 TABLET (50MG) BY MOUTH EVERY NIGHT AT BEDTIME    Insomnia, unspecified type       tretinoin 0.05 % cream    RETIN-A    45 g    Spread a pea size  amount into affected area on the face topically at bedtime.  Use sunscreen SPF>30.    Milium       venlafaxine 75 MG 24 hr capsule    EFFEXOR-XR    270 capsule    TAKE THREE CAPSULES BY MOUTH EVERY DAY    Menopausal syndrome (hot flashes)       VITAMIN D (CHOLECALCIFEROL) PO      Take 2,000 Units by mouth daily

## 2018-04-30 NOTE — LETTER
4/30/2018         RE: Marcelina Estevez  [address] [place] [id number]        Dear Colleague,    Thank you for referring your patient, Marcelina Estevez, to the [facility]. Please see a copy of my visit note below.    CHIEF COMPLAINT:  Consult (left ring trigger finger. pt states it locks and is painful. she tried splint. pt states it takes 20 mins before she can move hands in the morning. )       HISTORY OF PRESENT ILLNESS  Ms. Estevez is a pleasant 58 year old year old female who presents to clinic today with trigger finger.  Marcelina is seen at the request of Dr. Lopez.    Marcelina has a history of Raynaud's disease that has been worsening recently.  Her hands have been bothering her for years, she recently noticed a triggering of her left fourth finger.  This is fairly reliably reproducible.  She is here for a possible injection.    Additional history: as documented    MEDICAL HISTORY  Patient Active Problem List   Diagnosis     Hypertension goal BP (blood pressure) < 140/90     Hypertriglyceridemia     Night sweats     Atopic rhinitis     Moderate episode of recurrent major depressive disorder (H)     Keratitis sicca, bilateral     Hyperlipidemia LDL goal <100     CKD (chronic kidney disease) stage 1, GFR 90 ml/min or greater     Menopausal syndrome (hot flashes)     Allergic rhinitis due to pollen     Status post total hysterectomy     Anemia     ACP (advance care planning)     Tension headache     Generalized anxiety disorder     Raynaud's disease without gangrene     CREST (calcinosis, Raynaud's phenomenon, esophageal dysfunction, sclerodactyly, telangiectasia) (H)     Limited systemic sclerosis (H)       Current Outpatient Prescriptions   Medication Sig Dispense Refill     acetaminophen-codeine (TYLENOL #3) 300-30 MG per tablet Take 1 tablet by mouth every 4 hours as needed for moderate pain 30 tablet 0     albuterol (PROAIR HFA/PROVENTIL HFA/VENTOLIN HFA) 108 (90 BASE) MCG/ACT  Inhaler Inhale 2 puffs into the lungs every 6 hours as needed for shortness of breath / dyspnea 1 Inhaler 5     ALLEGRA ALLERGY 180 MG tablet Take 1 tablet (180 mg) by mouth daily 90 tablet 3     ALPRAZolam (XANAX) 0.25 MG tablet Take 1 tablet (0.25 mg) by mouth daily as needed for anxiety 30 tablet 2     buPROPion (WELLBUTRIN XL) 150 MG 24 hr tablet TAKE ONE TABLET BY MOUTH EVERY MORNING 90 tablet 0     Calcium 600+D 600-200 MG-UNIT TABS Take  by mouth.       cloNIDine (CATAPRES) 0.2 MG tablet Take 1 tablet (0.2 mg) by mouth daily 90 tablet 3     fluticasone (FLONASE) 50 MCG/ACT spray Spray 1-2 sprays into both nostrils daily 16 g 5     hydroxychloroquine (PLAQUENIL) 200 MG tablet Take 1 tablet (200 mg) by mouth 2 times daily Get annual eye exams for hydroxychloroquine (plaquenil) monitoring and fax to [phone]. 60 tablet 3     losartan-hydrochlorothiazide (HYZAAR) 100-25 MG per tablet Take 1 tablet by mouth daily 90 tablet 3     naproxen (NAPROSYN) 500 MG tablet Take 1 tablet (500 mg) by mouth 2 times daily as needed for moderate pain 60 tablet 1     olopatadine HCl (PATADAY) 0.2 % SOLN Place 1 drop into both eyes daily 1 Bottle 6     order for DME Equipment being ordered: light lamp for seasonal affective disorder 1 Device 0     simvastatin (ZOCOR) 40 MG tablet Take 1 tablet (40 mg) by mouth At Bedtime 90 tablet 3     traZODone (DESYREL) 50 MG tablet TAKE 1 TABLET (50MG) BY MOUTH EVERY NIGHT AT BEDTIME 90 tablet 2     tretinoin (RETIN-A) 0.05 % cream Spread a pea size amount into affected area on the face topically at bedtime.  Use sunscreen SPF>30. 45 g 2     venlafaxine (EFFEXOR-XR) 75 MG 24 hr capsule TAKE THREE CAPSULES BY MOUTH EVERY  capsule 1     VITAMIN D, CHOLECALCIFEROL, PO Take 2,000 Units by mouth daily         Allergies   Allergen Reactions     Seasonal Allergies      Sulfa Drugs      Vomiting       Vicodin [Hydrocodone-Acetaminophen] Nausea       Family History   Problem Relation Age of  "Onset     OSTEOPOROSIS Mother      Eye Disorder Mother      cataract, mac degen     OSTEOPOROSIS Father      Eye Disorder Father      glaucoma     Hypertension Maternal Grandmother      Unknown/Adopted Paternal Grandfather      Eye Disorder Paternal Grandmother      glaucoma     Hypertension Daughter      DIABETES Maternal Grandfather      DIABETES Other        Additional medical/Social/Surgical histories reviewed in [facility] and updated as appropriate.     REVIEW OF SYSTEMS (4/30/2018)  CONSTITUTIONAL: Denies fever and weight loss  EYES: Denies acute vision changes  ENT: Denies hearing changes or difficulty swallowing  CARDIAC: Denies chest pain or edema  RESPIRATORY: Denies dyspnea, cough or wheeze  GASTROINTESTINAL: Denies abdominal pain, nausea, vomiting  MUSCULOSKELETAL: See HPI  SKIN: Denies any recent rash or lesion  NEUROLOGICAL: Denies numbness or focal weakness  PSYCHIATRIC: No history of psychiatric symptoms or problems  ENDOCRINE: Denies current diagnosis of diabetes   HEMATOLOGY: Denies episodes of easy bleeding      PHYSICAL EXAM  Vitals:    04/30/18 1329   BP: (!) 142/93   Pulse: 74   SpO2: 98%   Weight: 72.1 kg (159 lb)   Height: 1.56 m (5' 1.42\")     General  - normal appearance, in no obvious distress  CV  - normal radial pulse  Pulm  - normal respiratory pattern, non-labored  Musculoskeletal - left 4th finger  - inspection: no atrophy, normal joint alignment, no swelling  - palpation: no bony or soft tissue tenderness, no tenderness at the anatomical snuffbox  - ROM:  MCP 90 deg flexion   0 deg extension    deg flexion   0 deg extension   DIP 80 deg flexion   0 deg extension   palpable snap at the A1 pulley with active flexion, reproducible  - strength: 5/5  strength, 5/5 wrist abduction, 5/5 flexion, extension, pronation, supination, adduction  - special tests:  (-) varus  (-) valgus  Neuro  - no numbness, no motor deficit, grossly normal coordination, normal muscle tone  Skin  - no " "ecchymosis, erythema, warmth, or induration, no obvious rash  Psych  - interactive, appropriate, normal mood and affect               ASSESSMENT & PLAN  Ms. Estevez is a 58 year old year old female who presents to clinic today with trigger finger of the left 4th digit.    We discussed pathogenesis and management of trigger finger.  I did perform an injection today (see procedure note).    I did discuss with Ira that this may take a couple of weeks to fully take effect.  If her symptoms are persistent in 2 weeks we may be able to repeat this injection.    Thank you for allowing me to participate in Marcelina's care.    Trigger finger Injection - Ultrasound Guided  The patient was informed of the risks and the benefits of the procedure and a written consent was signed.  The patient s left 4th finger was prepped with chlorhexidine in sterile fashion.   20 mg of triamcinolone suspension was drawn up into a 3 mL syringe with 1.0 mL of 1% lidocaine and 1.0 mL of 0.5% marcaine.  Injection was performed using sterile technique.  Under ultrasound guidance a 5/8\" 25-gauge needle was used to enter the tendon sheath at the A1 pulley.  Needle placement was visualized and documented with ultrasound.  Ultrasound was necessary to ensure that steroid did not enter the tendon itself which could potentially cause tendon rupture.  Injection performed long axis to the probe.  Injection solution visualized within the joint space.  Images were permanently stored for the patient's record.  There were no complications. The patient tolerated the procedure well. There was negligible bleeding.   The patient was instructed to ice the hand upon leaving clinic and refrain from overuse over the next 3 days.   The patient was instructed to call or go to the emergency room with any unusual pain, swelling, redness, or if otherwise concerned.  A follow up appointment will be scheduled to evaluate response to the injection, and to assess range of motion " and pain.  Kenalog: NDC 2883-9678-06        Diogo Burr DO, [facility]  Primary Care Sports Medicine           Again, thank you for allowing me to participate in the care of your patient.        Sincerely,        Diogo Burr DO

## 2018-04-30 NOTE — PATIENT INSTRUCTIONS
Thanks for coming today.  Ortho/Sports Medicine Clinic  [address] [place] [address]    To schedule future appointments in Ortho Clinic, you may call [phone].    To schedule ordered imaging by your provider:   Call Central Imaging [phone]    To schedule an injection ordered by your provider:  Call Central Imaging Injection scheduling line: [phone]  [email or web address] available online at:  [email or web address]    Please call if any further questions or concerns ([phone]).  Clinic hours 8 am to 5 pm.    Return to clinic (call) if symptoms worsen or fail to improve.

## 2018-04-30 NOTE — PROGRESS NOTES
CHIEF COMPLAINT:  Consult (left ring trigger finger. pt states it locks and is painful. she tried splint. pt states it takes 20 mins before she can move hands in the morning. )       HISTORY OF PRESENT ILLNESS  Ms. Estevez is a pleasant 58 year old year old female who presents to clinic today with trigger finger.  Marcelina is seen at the request of Dr. Lopez.    Marcelina has a history of Raynaud's disease that has been worsening recently.  Her hands have been bothering her for years, she recently noticed a triggering of her left fourth finger.  This is fairly reliably reproducible.  She is here for a possible injection.    Additional history: as documented    MEDICAL HISTORY  Patient Active Problem List   Diagnosis     Hypertension goal BP (blood pressure) < 140/90     Hypertriglyceridemia     Night sweats     Atopic rhinitis     Moderate episode of recurrent major depressive disorder (H)     Keratitis sicca, bilateral     Hyperlipidemia LDL goal <100     CKD (chronic kidney disease) stage 1, GFR 90 ml/min or greater     Menopausal syndrome (hot flashes)     Allergic rhinitis due to pollen     Status post total hysterectomy     Anemia     ACP (advance care planning)     Tension headache     Generalized anxiety disorder     Raynaud's disease without gangrene     CREST (calcinosis, Raynaud's phenomenon, esophageal dysfunction, sclerodactyly, telangiectasia) (H)     Limited systemic sclerosis (H)       Current Outpatient Prescriptions   Medication Sig Dispense Refill     acetaminophen-codeine (TYLENOL #3) 300-30 MG per tablet Take 1 tablet by mouth every 4 hours as needed for moderate pain 30 tablet 0     albuterol (PROAIR HFA/PROVENTIL HFA/VENTOLIN HFA) 108 (90 BASE) MCG/ACT Inhaler Inhale 2 puffs into the lungs every 6 hours as needed for shortness of breath / dyspnea 1 Inhaler 5     ALLEGRA ALLERGY 180 MG tablet Take 1 tablet (180 mg) by mouth daily 90 tablet 3     ALPRAZolam (XANAX) 0.25 MG tablet Take 1 tablet (0.25  mg) by mouth daily as needed for anxiety 30 tablet 2     buPROPion (WELLBUTRIN XL) 150 MG 24 hr tablet TAKE ONE TABLET BY MOUTH EVERY MORNING 90 tablet 0     Calcium 600+D 600-200 MG-UNIT TABS Take  by mouth.       cloNIDine (CATAPRES) 0.2 MG tablet Take 1 tablet (0.2 mg) by mouth daily 90 tablet 3     fluticasone (FLONASE) 50 MCG/ACT spray Spray 1-2 sprays into both nostrils daily 16 g 5     hydroxychloroquine (PLAQUENIL) 200 MG tablet Take 1 tablet (200 mg) by mouth 2 times daily Get annual eye exams for hydroxychloroquine (plaquenil) monitoring and fax to [phone]. 60 tablet 3     losartan-hydrochlorothiazide (HYZAAR) 100-25 MG per tablet Take 1 tablet by mouth daily 90 tablet 3     naproxen (NAPROSYN) 500 MG tablet Take 1 tablet (500 mg) by mouth 2 times daily as needed for moderate pain 60 tablet 1     olopatadine HCl (PATADAY) 0.2 % SOLN Place 1 drop into both eyes daily 1 Bottle 6     order for DME Equipment being ordered: light lamp for seasonal affective disorder 1 Device 0     simvastatin (ZOCOR) 40 MG tablet Take 1 tablet (40 mg) by mouth At Bedtime 90 tablet 3     traZODone (DESYREL) 50 MG tablet TAKE 1 TABLET (50MG) BY MOUTH EVERY NIGHT AT BEDTIME 90 tablet 2     tretinoin (RETIN-A) 0.05 % cream Spread a pea size amount into affected area on the face topically at bedtime.  Use sunscreen SPF>30. 45 g 2     venlafaxine (EFFEXOR-XR) 75 MG 24 hr capsule TAKE THREE CAPSULES BY MOUTH EVERY  capsule 1     VITAMIN D, CHOLECALCIFEROL, PO Take 2,000 Units by mouth daily         Allergies   Allergen Reactions     Seasonal Allergies      Sulfa Drugs      Vomiting       Vicodin [Hydrocodone-Acetaminophen] Nausea       Family History   Problem Relation Age of Onset     OSTEOPOROSIS Mother      Eye Disorder Mother      cataract, mac degen     OSTEOPOROSIS Father      Eye Disorder Father      glaucoma     Hypertension Maternal Grandmother      Unknown/Adopted Paternal Grandfather      Eye Disorder Paternal  "Grandmother      glaucoma     Hypertension Daughter      DIABETES Maternal Grandfather      DIABETES Other        Additional medical/Social/Surgical histories reviewed in [facility] and updated as appropriate.     REVIEW OF SYSTEMS (4/30/2018)  CONSTITUTIONAL: Denies fever and weight loss  EYES: Denies acute vision changes  ENT: Denies hearing changes or difficulty swallowing  CARDIAC: Denies chest pain or edema  RESPIRATORY: Denies dyspnea, cough or wheeze  GASTROINTESTINAL: Denies abdominal pain, nausea, vomiting  MUSCULOSKELETAL: See HPI  SKIN: Denies any recent rash or lesion  NEUROLOGICAL: Denies numbness or focal weakness  PSYCHIATRIC: No history of psychiatric symptoms or problems  ENDOCRINE: Denies current diagnosis of diabetes   HEMATOLOGY: Denies episodes of easy bleeding      PHYSICAL EXAM  Vitals:    04/30/18 1329   BP: (!) 142/93   Pulse: 74   SpO2: 98%   Weight: 72.1 kg (159 lb)   Height: 1.56 m (5' 1.42\")     General  - normal appearance, in no obvious distress  CV  - normal radial pulse  Pulm  - normal respiratory pattern, non-labored  Musculoskeletal - left 4th finger  - inspection: no atrophy, normal joint alignment, no swelling  - palpation: no bony or soft tissue tenderness, no tenderness at the anatomical snuffbox  - ROM:  MCP 90 deg flexion   0 deg extension    deg flexion   0 deg extension   DIP 80 deg flexion   0 deg extension   palpable snap at the A1 pulley with active flexion, reproducible  - strength: 5/5  strength, 5/5 wrist abduction, 5/5 flexion, extension, pronation, supination, adduction  - special tests:  (-) varus  (-) valgus  Neuro  - no numbness, no motor deficit, grossly normal coordination, normal muscle tone  Skin  - no ecchymosis, erythema, warmth, or induration, no obvious rash  Psych  - interactive, appropriate, normal mood and affect               ASSESSMENT & PLAN  Ms. Estevez is a 58 year old year old female who presents to clinic today with trigger finger of the left " "4th digit.    We discussed pathogenesis and management of trigger finger.  I did perform an injection today (see procedure note).    I did discuss with Ira that this may take a couple of weeks to fully take effect.  If her symptoms are persistent in 2 weeks we may be able to repeat this injection.    Thank you for allowing me to participate in Marcelina's care.    Trigger finger Injection - Ultrasound Guided  The patient was informed of the risks and the benefits of the procedure and a written consent was signed.  The patient s left 4th finger was prepped with chlorhexidine in sterile fashion.   20 mg of triamcinolone suspension was drawn up into a 3 mL syringe with 1.0 mL of 1% lidocaine and 1.0 mL of 0.5% marcaine.  Injection was performed using sterile technique.  Under ultrasound guidance a 5/8\" 25-gauge needle was used to enter the tendon sheath at the A1 pulley.  Needle placement was visualized and documented with ultrasound.  Ultrasound was necessary to ensure that steroid did not enter the tendon itself which could potentially cause tendon rupture.  Injection performed long axis to the probe.  Injection solution visualized within the joint space.  Images were permanently stored for the patient's record.  There were no complications. The patient tolerated the procedure well. There was negligible bleeding.   The patient was instructed to ice the hand upon leaving clinic and refrain from overuse over the next 3 days.   The patient was instructed to call or go to the emergency room with any unusual pain, swelling, redness, or if otherwise concerned.  A follow up appointment will be scheduled to evaluate response to the injection, and to assess range of motion and pain.  Kenalog: NDC [id number]        Diogo Burr DO, JTM  Primary Care Sports Medicine         "

## 2018-04-30 NOTE — NURSING NOTE
"Marcelina Estevez's goals for this visit include: evaluate left ring trigger finger  She requests these members of her care team be copied on today's visit information: yes    PCP: Annette Painting    Referring Provider:  Hilda Lopez MD  [address]  [place] [address]    Chief Complaint   Patient presents with     Consult     left ring trigger finger. pt states it locks and is painful. she tried splint. pt states it takes 20 mins before she can move hands in the morning.        Initial BP (!) 142/93  Pulse 74  Ht 1.56 m (5' 1.42\")  Wt 72.1 kg (159 lb)  LMP 06/12/2014  SpO2 98%  BMI 29.64 kg/m2 Estimated body mass index is 29.64 kg/(m^2) as calculated from the following:    Height as of this encounter: 1.56 m (5' 1.42\").    Weight as of this encounter: 72.1 kg (159 lb).  Medication Reconciliation: complete    "

## 2018-05-08 DIAGNOSIS — F32.5 MAJOR DEPRESSION IN COMPLETE REMISSION (H): ICD-10-CM

## 2018-05-08 NOTE — TELEPHONE ENCOUNTER
Hello,  last fill date:02-  Last quantity:90    Thank You,  Meagan Bishop  Pharmacy Technician  [facility]  [phone]

## 2018-05-09 RX ORDER — BUPROPION HYDROCHLORIDE 150 MG/1
TABLET ORAL
Qty: 30 TABLET | Refills: 0 | Status: SHIPPED | OUTPATIENT
Start: 2018-05-09 | End: 2018-05-22

## 2018-05-09 NOTE — TELEPHONE ENCOUNTER
buPROPion (WELLBUTRIN XL) 150 MG 24 hr tablet 90 tablet 0 2/8/2018  No   Sig: TAKE ONE TABLET BY MOUTH EVERY MORNING   Class: E-Prescribe   Notes to Pharmacy: Patient will need to be seen for further refills. Letter sent to patient.     Last OV with Dr. Painting: 8/7/2017 and 12/15/2017 with Keerthi Burr CNPFollowcheli appointment in 1 month(s)    Next 5 appointments (look out 90 days)     Jun 18, 2018 11:30 AM CDT   [facility] Visit with Hilda Lopez MD   [facility]    [id number] [place] [id number]   [phone]                PHQ-9 SCORE 8/7/2017 12/15/2017 2/8/2018   Total Score - - -   Total Score MyChart - - 7 (Mild depression)   Total Score 3 8 7         SSRIs Protocol Failed5/8 12:17 PM   PHQ-9 score less than 5 in past 6 months    Medication is Bupropion    Patient is age 18 or older    No active pregnancy on record    No positive pregnancy test in last 12 months    Recent (6 mo) or future (30 days) visit within the authorizing provider's specialt     Patient due for depression follow up per Dominick Payan refill. Patient notified by [employer] message.     Aisha Mclaughlin RN

## 2018-05-11 DIAGNOSIS — G44.209 TENSION HEADACHE: ICD-10-CM

## 2018-05-11 NOTE — TELEPHONE ENCOUNTER
Please make sure to send to the pool in PCPs absence  Prescription done, please fax prescription and inform patient  Message forwarded to PCP for FYI.

## 2018-05-11 NOTE — TELEPHONE ENCOUNTER
Medication: Tylen #3    Last Fill: 03/28/18       Qty: 30   Last Rx Date: 03/28/18     Last MD Visit: 04/30/18

## 2018-05-11 NOTE — TELEPHONE ENCOUNTER
Patient is waiting at the pharmacy wanting to know if we can refill her acetaminophen-codeine (TYLENOL #3) 300-30 MG per tablet?      Message routed to Dr. Alla Alexander CMA

## 2018-05-22 ENCOUNTER — OFFICE VISIT (OUTPATIENT)
Dept: PEDIATRICS | Facility: CLINIC | Age: 58
End: 2018-05-22
Payer: COMMERCIAL

## 2018-05-22 VITALS
DIASTOLIC BLOOD PRESSURE: 76 MMHG | TEMPERATURE: 98.8 F | SYSTOLIC BLOOD PRESSURE: 124 MMHG | OXYGEN SATURATION: 98 % | WEIGHT: 156.2 LBS | HEART RATE: 71 BPM | BODY MASS INDEX: 29.11 KG/M2

## 2018-05-22 DIAGNOSIS — R25.2 MUSCLE CRAMPING: ICD-10-CM

## 2018-05-22 DIAGNOSIS — N18.1 CKD (CHRONIC KIDNEY DISEASE) STAGE 1, GFR 90 ML/MIN OR GREATER: ICD-10-CM

## 2018-05-22 DIAGNOSIS — M34.1 CREST (CALCINOSIS, RAYNAUD'S PHENOMENON, ESOPHAGEAL DYSFUNCTION, SCLERODACTYLY, TELANGIECTASIA) (H): ICD-10-CM

## 2018-05-22 DIAGNOSIS — F32.5 MAJOR DEPRESSION IN COMPLETE REMISSION (H): Primary | ICD-10-CM

## 2018-05-22 DIAGNOSIS — F41.0 PANIC ATTACK: ICD-10-CM

## 2018-05-22 DIAGNOSIS — M79.641 PAIN OF RIGHT HAND: ICD-10-CM

## 2018-05-22 LAB
ANION GAP SERPL CALCULATED.3IONS-SCNC: 7 MMOL/L (ref 3–14)
BUN SERPL-MCNC: 26 MG/DL (ref 7–30)
CALCIUM SERPL-MCNC: 9.5 MG/DL (ref 8.5–10.1)
CHLORIDE SERPL-SCNC: 103 MMOL/L (ref 94–109)
CK SERPL-CCNC: 114 U/L (ref 30–225)
CO2 SERPL-SCNC: 26 MMOL/L (ref 20–32)
CREAT SERPL-MCNC: 1.16 MG/DL (ref 0.52–1.04)
CRP SERPL-MCNC: 3.5 MG/L (ref 0–8)
ERYTHROCYTE [SEDIMENTATION RATE] IN BLOOD BY WESTERGREN METHOD: 18 MM/H (ref 0–30)
GFR SERPL CREATININE-BSD FRML MDRD: 48 ML/MIN/1.7M2
GLUCOSE SERPL-MCNC: 89 MG/DL (ref 70–99)
MAGNESIUM SERPL-MCNC: 1.9 MG/DL (ref 1.6–2.3)
POTASSIUM SERPL-SCNC: 4.3 MMOL/L (ref 3.4–5.3)
SODIUM SERPL-SCNC: 136 MMOL/L (ref 133–144)

## 2018-05-22 PROCEDURE — 86140 C-REACTIVE PROTEIN: CPT | Performed by: NURSE PRACTITIONER

## 2018-05-22 PROCEDURE — 82306 VITAMIN D 25 HYDROXY: CPT | Performed by: NURSE PRACTITIONER

## 2018-05-22 PROCEDURE — 99214 OFFICE O/P EST MOD 30 MIN: CPT | Performed by: NURSE PRACTITIONER

## 2018-05-22 PROCEDURE — 82550 ASSAY OF CK (CPK): CPT | Performed by: NURSE PRACTITIONER

## 2018-05-22 PROCEDURE — 83735 ASSAY OF MAGNESIUM: CPT | Performed by: NURSE PRACTITIONER

## 2018-05-22 PROCEDURE — 80048 BASIC METABOLIC PNL TOTAL CA: CPT | Performed by: NURSE PRACTITIONER

## 2018-05-22 PROCEDURE — 85652 RBC SED RATE AUTOMATED: CPT | Performed by: NURSE PRACTITIONER

## 2018-05-22 PROCEDURE — 36415 COLL VENOUS BLD VENIPUNCTURE: CPT | Performed by: NURSE PRACTITIONER

## 2018-05-22 RX ORDER — BUPROPION HYDROCHLORIDE 150 MG/1
150 TABLET ORAL EVERY MORNING
Qty: 180 TABLET | Refills: 3 | Status: SHIPPED | OUTPATIENT
Start: 2018-05-22 | End: 2018-12-05

## 2018-05-22 RX ORDER — HYDROXYZINE HYDROCHLORIDE 25 MG/1
25-50 TABLET, FILM COATED ORAL EVERY 6 HOURS PRN
Qty: 60 TABLET | Refills: 1 | Status: SHIPPED | OUTPATIENT
Start: 2018-05-22 | End: 2018-12-12

## 2018-05-22 ASSESSMENT — PATIENT HEALTH QUESTIONNAIRE - PHQ9: 5. POOR APPETITE OR OVEREATING: SEVERAL DAYS

## 2018-05-22 ASSESSMENT — ANXIETY QUESTIONNAIRES
3. WORRYING TOO MUCH ABOUT DIFFERENT THINGS: SEVERAL DAYS
2. NOT BEING ABLE TO STOP OR CONTROL WORRYING: SEVERAL DAYS
GAD7 TOTAL SCORE: 6
6. BECOMING EASILY ANNOYED OR IRRITABLE: SEVERAL DAYS
7. FEELING AFRAID AS IF SOMETHING AWFUL MIGHT HAPPEN: NOT AT ALL
5. BEING SO RESTLESS THAT IT IS HARD TO SIT STILL: SEVERAL DAYS
1. FEELING NERVOUS, ANXIOUS, OR ON EDGE: SEVERAL DAYS
IF YOU CHECKED OFF ANY PROBLEMS ON THIS QUESTIONNAIRE, HOW DIFFICULT HAVE THESE PROBLEMS MADE IT FOR YOU TO DO YOUR WORK, TAKE CARE OF THINGS AT HOME, OR GET ALONG WITH OTHER PEOPLE: SOMEWHAT DIFFICULT

## 2018-05-22 ASSESSMENT — PAIN SCALES - GENERAL: PAINLEVEL: EXTREME PAIN (8)

## 2018-05-22 NOTE — PATIENT INSTRUCTIONS
PLAN:   1.   Symptomatic therapy suggested: will try hydroxyzine for panic attacks.  2.  Orders Placed This Encounter   Medications     buPROPion (WELLBUTRIN XL) 150 MG 24 hr tablet     Sig: Take 1 tablet (150 mg) by mouth every morning     Dispense:  180 tablet     Refill:  3     hydrOXYzine (ATARAX) 25 MG tablet     Sig: Take 1-2 tablets (25-50 mg) by mouth every 6 hours as needed for anxiety     Dispense:  60 tablet     Refill:  1     Orders Placed This Encounter   Procedures     Basic metabolic panel     Magnesium     Vitamin D Deficiency     CRP inflammation     CK total     Erythrocyte sedimentation rate auto     ORTHOPEDICS ADULT REFERRAL       3. Patient needs to follow up in if no improvement,or sooner if worsening of symptoms or other symptoms develop.  CONSULTATION/REFERRAL to Orthopedics  FOLLOW UP WITH SPECIALIST :Rheumatology  Will follow up and/or notify patient of  results via My Chart to determine further need for followup    It was a pleasure seeing you today at the [facility]. Thank you for allowing us to care for you today. We truly hope we provided you with the excellent service you deserve. Please let us know if there is anything else we can do for you so we can be sure you are leaving completley satisfied with your care experience.       General information about your clinic   Clinic Hours Lab Hours (Appointments are required)   Mon-Thurs: 7:30 AM - 7 PM Mon-Thurs: 7:30 AM - 7 PM   Fri: 7:30 AM - 5 PM Fri: 7:30 AM - 5 PM        After Hours Nurse Advise & Appts:  [facility] Nurse [facility]visors: [phone]  [facility] On Call: to make appointments anytime: [phone] On Call Physician: call [phone] and answering service will page the on call physician.        For urgent appointments, please call [phone] and ask for the triage nurse or your care team clinic nurse.  How to contact my care team:  MyChart: [email or web address]   Phone: [phone]   Fax:  [phone]       [facility]:   Phone: [phone]  Hours: 8:00 AM - 6:00 PM  Medication Refills:  Call your pharmacy and they will forward the refill to us. Please allow 3 business days for your refills to be completed.       Normal or non-critical lab and imaging results will be communicated to you by MyChart, letter or phone within 7 days.  If you do not hear from us within 10 days, please call the clinic. If you have a critical or abnormal lab result, we will notify you by phone as soon as possible.       We now have PWIC (Pediatric Walk in Care)  Monday-Friday from 7:30-4. Simply walk in and be seen for your urgent needs like cough, fever, rash, diarrhea or vomiting, pink eye, UTI. No appointments needed. Ask one of the team for more information      -Your Care Team:    Dr. Annette Painting - Internal Medicine/Pediatrics   Dr. Abdias Gold - Family Medicine  Dr. Bianca Serrano - Pediatrics  Dr. Talya Beltran - Pediatrics  Keerthi Burr CNP - Family Practice Nurse Practitioner

## 2018-05-22 NOTE — MR AVS SNAPSHOT
After Visit Summary   5/22/2018    Marcelina Estevez    MRN: [id number]           Patient Information     Date Of Birth          1960        Visit Information        Provider Department      5/22/2018 10:30 AM Keerthi Burr APRN CNP [facility]        Today's Diagnoses     Major depression in complete remission (H)    -  1    CREST (calcinosis, Raynaud's phenomenon, esophageal dysfunction, sclerodactyly, telangiectasia) (H)        CKD (chronic kidney disease) stage 1, GFR 90 ml/min or greater        Muscle cramping        Panic attack        Pain of right hand          Care Instructions    PLAN:   1.   Symptomatic therapy suggested: will try hydroxyzine for panic attacks.  2.  Orders Placed This Encounter   Medications     buPROPion (WELLBUTRIN XL) 150 MG 24 hr tablet     Sig: Take 1 tablet (150 mg) by mouth every morning     Dispense:  180 tablet     Refill:  3     hydrOXYzine (ATARAX) 25 MG tablet     Sig: Take 1-2 tablets (25-50 mg) by mouth every 6 hours as needed for anxiety     Dispense:  60 tablet     Refill:  1     Orders Placed This Encounter   Procedures     Basic metabolic panel     Magnesium     Vitamin D Deficiency     CRP inflammation     CK total     Erythrocyte sedimentation rate auto     ORTHOPEDICS ADULT REFERRAL       3. Patient needs to follow up in if no improvement,or sooner if worsening of symptoms or other symptoms develop.  CONSULTATION/REFERRAL to Orthopedics  FOLLOW UP WITH SPECIALIST :Rheumatology  Will follow up and/or notify patient of  results via My Chart to determine further need for followup    It was a pleasure seeing you today at the [facility]. Thank you for allowing us to care for you today. We truly hope we provided you with the excellent service you deserve. Please let us know if there is anything else we can do for you so we can be sure you are leaving completley satisfied with your care experience.        General information about your clinic   Clinic Hours Lab Hours (Appointments are required)   Mon-Thurs: 7:30 AM - 7 PM Mon-Thurs: 7:30 AM - 7 PM   Fri: 7:30 AM - 5 PM Fri: 7:30 AM - 5 PM        After Hours Nurse Advise & Appts:  [facility]: [phone]  [facility] On Call: to make appointments anytime: [phone] On Call Physician: call [phone] and answering service will page the on call physician.        For urgent appointments, please call [phone] and ask for the triage nurse or your care team clinic nurse.  How to contact my care team:  MyChart: [email or web address]   Phone: [phone]   Fax: [phone]       [facility]:   Phone: [phone]  Hours: 8:00 AM - 6:00 PM  Medication Refills:  Call your pharmacy and they will forward the refill to us. Please allow 3 business days for your refills to be completed.       Normal or non-critical lab and imaging results will be communicated to you by MyChart, letter or phone within 7 days.  If you do not hear from us within 10 days, please call the clinic. If you have a critical or abnormal lab result, we will notify you by phone as soon as possible.       We now have PWIC (Pediatric Walk in Care)  Monday-Friday from 7:30-4. Simply walk in and be seen for your urgent needs like cough, fever, rash, diarrhea or vomiting, pink eye, UTI. No appointments needed. Ask one of the team for more information      -Your Care Team:    Dr. Annette Painting - Internal Medicine/Pediatrics   Dr. Abdias Gold - Family Medicine  Dr. Bianca Serrano - Pediatrics  Dr. Talya Beltran - Pediatrics  Keerthi Burr CNP - Family Practice Nurse Practitioner                         Follow-ups after your visit        Additional Services     ORTHOPEDICS ADULT REFERRAL       Your provider has referred you to: [facility]: [facility] [phone]    [email or web address]    Please be aware that coverage of these services is subject to the terms and limitations of your health insurance plan.  Call member services at your health plan with any benefit or coverage questions.      Please bring the following to your appointment:    >>   Any x-rays, CTs or MRIs which have been performed.  Contact the facility where they were done to arrange for  prior to your scheduled appointment.    >>   List of current medications   >>   This referral request   >>   Any documents/labs given to you for this referral                  Your next 10 appointments already scheduled     Jun 11, 2018 10:00 AM CDT   LAB with LAB FIRST FLOOR [facility] ([facility])    [address] [place] [id number] [phone]           Please do not eat 10-12 hours before your appointment if you are coming in fasting for labs on lipids, cholesterol, or glucose (sugar). This does not apply to pregnant women. Water, hot tea and black coffee (with nothing added) are okay. Do not drink other fluids, diet soda or chew gum.            Jun 18, 2018 11:30 AM CDT   Return Visit with Hilda Lopez MD   [facility] ([facility])    [address] [place] [id number] [phone]              Who to contact     If you have questions or need follow up information about today's clinic visit or your schedule please contact [facility] directly at [phone].  Normal or non-critical lab and imaging results will be communicated to you by MyChart, letter or phone within 4 business days after the clinic has received the results. If you do not hear from us within 7 days, please contact the clinic through MyChart or phone. If you have a critical or abnormal lab result, we will notify you by phone as  soon as possible.  Submit refill requests through [employer] or call your pharmacy and they will forward the refill request to us. Please allow 3 business days for your refill to be completed.          Additional Information About Your Visit        [employer] Information     [employer] gives you secure access to your electronic health record. If you see a primary care provider, you can also send messages to your care team and make appointments. If you have questions, please call your primary care clinic.  If you do not have a primary care provider, please call [phone] and they will assist you.      [employer] is an electronic gateway that provides easy, online access to your medical records. With [employer], you can request a clinic appointment, read your test results, renew a prescription or communicate with your care team.     To access your existing account, please contact your [facility] or call [phone] for assistance.        Care EveryWhere ID     This is your Care EveryWhere ID. This could be used by other organizations to access your [place] medical records  [id number]        Your Vitals Were     Pulse Temperature Last Period Pulse Oximetry BMI (Body Mass Index)       71 98.8  F (37.1  C) (Temporal) 06/12/2014 98% 29.11 kg/m2        Blood Pressure from Last 3 Encounters:   05/22/18 124/76   04/30/18 (!) 142/93   04/16/18 120/79    Weight from Last 3 Encounters:   05/22/18 156 lb 3.2 oz (70.9 kg)   04/30/18 159 lb (72.1 kg)   04/16/18 157 lb (71.2 kg)              We Performed the Following     Basic metabolic panel     CK total     CRP inflammation     Erythrocyte sedimentation rate auto     Magnesium     ORTHOPEDICS ADULT REFERRAL     Vitamin D Deficiency          Today's Medication Changes          These changes are accurate as of 5/22/18 11:13 AM.  If you have any questions, ask your nurse or doctor.               Start taking these medicines.        Dose/Directions     hydrOXYzine 25 MG tablet   Commonly known as:  ATARAX   Used for:  Panic attack   Started by:  Keerthi Burr APRN CNP        Dose:  25-50 mg   Take 1-2 tablets (25-50 mg) by mouth every 6 hours as needed for anxiety   Quantity:  60 tablet   Refills:  1         These medicines have changed or have updated prescriptions.        Dose/Directions    buPROPion 150 MG 24 hr tablet   Commonly known as:  WELLBUTRIN XL   This may have changed:  See the new instructions.   Used for:  Major depression in complete remission (H)   Changed by:  Keerthi Burr APRN CNP        Dose:  150 mg   Take 1 tablet (150 mg) by mouth every morning   Quantity:  180 tablet   Refills:  3            Where to get your medicines      These medications were sent to [facility] - [place], [place] - [address], [address]  [address], [address], [facility] [address]     Phone:  [phone]     buPROPion 150 MG 24 hr tablet    hydrOXYzine 25 MG tablet                Primary Care Provider Office Phone # Fax #    Annette Painting MD PhD [phone]       [address]  [facility] [address]        Equal Access to Services     [facility] : Hadii skye spencero Sosammi, waaxda luqadaha, qaybta kaalmada adeegyada, faith foster. So [facility] [phone].    ATENCIÓN: Si habla español, tiene a mcgovern disposición servicios gratuitos de asistencia lingüística. [facility] [phone].    We comply with applicable federal civil rights laws and [place] laws. We do not discriminate on the basis of race, color, national origin, age, disability, sex, sexual orientation, or gender identity.            Thank you!     Thank you for [facility]  [facility] your care. Our goal is always to provide you with excellent care. Hearing back from our patients is one way we can continue to improve our services. Please take a few minutes to complete the written survey that you  may receive in the mail after your visit with us. Thank you!             Your Updated Medication List - Protect others around you: Learn how to safely use, store and throw away your medicines at www.disposemymeds.org.          This list is accurate as of 5/22/18 11:13 AM.  Always use your most recent med list.                   Brand Name Dispense Instructions for use Diagnosis    acetaminophen-codeine 300-30 MG per tablet    TYLENOL #3    30 tablet    Take 1 tablet by mouth every 4 hours as needed for moderate pain    Tension headache       albuterol 108 (90 Base) MCG/ACT Inhaler    PROAIR HFA/PROVENTIL HFA/VENTOLIN HFA    1 Inhaler    Inhale 2 puffs into the lungs every 6 hours as needed for shortness of breath / dyspnea    Wheezing       ALLEGRA ALLERGY 180 MG tablet   Generic drug:  fexofenadine     90 tablet    Take 1 tablet (180 mg) by mouth daily    Atopic rhinitis       ALPRAZolam 0.25 MG tablet    XANAX    30 tablet    Take 1 tablet (0.25 mg) by mouth daily as needed for anxiety    Panic attack       buPROPion 150 MG 24 hr tablet    WELLBUTRIN XL    180 tablet    Take 1 tablet (150 mg) by mouth every morning    Major depression in complete remission (H)       CALCIUM 600+D 600-200 MG-UNIT Tabs   Generic drug:  calcium carbonate-vitamin D      Take  by mouth.        cloNIDine 0.2 MG tablet    CATAPRES    90 tablet    Take 1 tablet (0.2 mg) by mouth daily    Night sweats       fluticasone 50 MCG/ACT spray    FLONASE    16 g    Spray 1-2 sprays into both nostrils daily    Atopic rhinitis       hydroxychloroquine 200 MG tablet    PLAQUENIL    60 tablet    Take 1 tablet (200 mg) by mouth 2 times daily Get annual eye exams for hydroxychloroquine (plaquenil) monitoring and fax to [phone].    Raynaud's disease without gangrene       hydrOXYzine 25 MG tablet    ATARAX    60 tablet    Take 1-2 tablets (25-50 mg) by mouth every 6 hours as needed for anxiety    Panic attack       losartan-hydrochlorothiazide  100-25 MG per tablet    HYZAAR    90 tablet    Take 1 tablet by mouth daily    Hypertension goal BP (blood pressure) < 140/90, CKD (chronic kidney disease) stage 2, GFR 60-89 ml/min       naproxen 500 MG tablet    NAPROSYN    60 tablet    Take 1 tablet (500 mg) by mouth 2 times daily as needed for moderate pain    Inflammatory arthritis       olopatadine HCl 0.2 % Soln    PATADAY    1 Bottle    Place 1 drop into both eyes daily    Other chronic allergic conjunctivitis       order for DME     1 Device    Equipment being ordered: light lamp for seasonal affective disorder    Severe seasonal affective disorder (H)       simvastatin 40 MG tablet    ZOCOR    90 tablet    Take 1 tablet (40 mg) by mouth At Bedtime    Hyperlipidemia LDL goal <100       traZODone 50 MG tablet    DESYREL    90 tablet    TAKE 1 TABLET (50MG) BY MOUTH EVERY NIGHT AT BEDTIME    Insomnia, unspecified type       tretinoin 0.05 % cream    RETIN-A    45 g    Spread a pea size amount into affected area on the face topically at bedtime.  Use sunscreen SPF>30.    Milium       venlafaxine 75 MG 24 hr capsule    EFFEXOR-XR    270 capsule    TAKE THREE CAPSULES BY MOUTH EVERY DAY    Menopausal syndrome (hot flashes)       VITAMIN D (CHOLECALCIFEROL) PO      Take 2,000 Units by mouth daily

## 2018-05-22 NOTE — PROGRESS NOTES
SUBJECTIVE:   Marcelina Estevez is a 58 year old female who presents to clinic today for the following health issues:  Concerns: Pt states that she is here to follow up on her current medications, as well some joint pain around her joints around the entire body. Pt states taking medication prescribed PLAQUENIL for the pain but the provider suggested to stay away from sun when taking it and is wondering is this medication can be change to something that will not interfere with the sun. Pt would like to discuss about which prescription could be refill or needs to be refill for the future.     Medication Followup of  Pt states taking all the medications, except PLAQUENIL due to interfering with the sun.    Taking Medication as prescribed: yes    Side Effects:  None    Medication Helping Symptoms:  yes   Has appointment in June with rheumatology related to the plaquenil   Hands as not a bad as was  The right hand index pain knuckle   Was thinking she may be a flare up     Depression Followup    Status since last visit: Stable     See PHQ-9 for current symptoms.  Other associated symptoms: anxiety     Complicating factors:   Significant life event:  Yes-  Was taking care of mother and father in law is dying  Now her sons wife is having a flair    Current substance abuse:  None  Anxiety or Panic symptoms:  Yes-  In past     PHQ-9 8/7/2017 12/15/2017 2/8/2018   Total Score 3 8 7   Q9: Suicide Ideation Not at all Not at all Not at all     In the past two weeks have you had thoughts of suicide or self-harm?  No.    Do you have concerns about your personal safety or the safety of others?   No  PHQ-9  English  PHQ-9   Any Language  Suicide Assessment Five-step Evaluation and Treatment (SAFE-T)    Problem list and histories reviewed & adjusted, as indicated.  Additional history: as documented    Patient Active Problem List   Diagnosis     Hypertension goal BP (blood pressure) < 140/90     Hypertriglyceridemia     Night sweats      Atopic rhinitis     Moderate episode of recurrent major depressive disorder (H)     Keratitis sicca, bilateral     Hyperlipidemia LDL goal <100     CKD (chronic kidney disease) stage 1, GFR 90 ml/min or greater     Menopausal syndrome (hot flashes)     Allergic rhinitis due to pollen     Status post total hysterectomy     Anemia     ACP (advance care planning)     Tension headache     Generalized anxiety disorder     Raynaud's disease without gangrene     CREST (calcinosis, Raynaud's phenomenon, esophageal dysfunction, sclerodactyly, telangiectasia) (H)     Limited systemic sclerosis (H)     Past Surgical History:   Procedure Laterality Date     APPENDECTOMY       BIOPSY      cervical node     DILATION AND CURETTAGE, HYSTEROSCOPY DIAGNOSTIC, COMBINED  7/1/2014    Procedure: COMBINED DILATION AND CURETTAGE, HYSTEROSCOPY DIAGNOSTIC;  Surgeon: Mana Cabrera DO;  Location: [facility]     ENT SURGERY      tonsillectomy and adnoid removal     HYSTERECTOMY TOTAL ABDOMINAL       ORTHOPEDIC SURGERY      left knee tear meniscus     RELEASE CARPAL TUNNEL BILATERAL  2009       Social History   Substance Use Topics     Smoking status: Former Smoker     Quit date: 1/1/2001     Smokeless tobacco: Never Used     Alcohol use Yes      Comment: social     Family History   Problem Relation Age of Onset     OSTEOPOROSIS Mother      Eye Disorder Mother      cataract, mac degen     OSTEOPOROSIS Father      Eye Disorder Father      glaucoma     Hypertension Maternal Grandmother      Unknown/Adopted Paternal Grandfather      Eye Disorder Paternal Grandmother      glaucoma     Hypertension Daughter      DIABETES Maternal Grandfather      DIABETES Other          Current Outpatient Prescriptions   Medication Sig Dispense Refill     acetaminophen-codeine (TYLENOL #3) 300-30 MG per tablet Take 1 tablet by mouth every 4 hours as needed for moderate pain 30 tablet 0     ALLEGRA ALLERGY 180 MG tablet Take 1 tablet (180 mg) by mouth daily 90  tablet 3     buPROPion (WELLBUTRIN XL) 150 MG 24 hr tablet TAKE ONE TABLET BY MOUTH EVERY MORNING 30 tablet 0     cloNIDine (CATAPRES) 0.2 MG tablet Take 1 tablet (0.2 mg) by mouth daily 90 tablet 3     losartan-hydrochlorothiazide (HYZAAR) 100-25 MG per tablet Take 1 tablet by mouth daily 90 tablet 3     simvastatin (ZOCOR) 40 MG tablet Take 1 tablet (40 mg) by mouth At Bedtime 90 tablet 3     traZODone (DESYREL) 50 MG tablet TAKE 1 TABLET (50MG) BY MOUTH EVERY NIGHT AT BEDTIME 90 tablet 2     venlafaxine (EFFEXOR-XR) 75 MG 24 hr capsule TAKE THREE CAPSULES BY MOUTH EVERY  capsule 1     albuterol (PROAIR HFA/PROVENTIL HFA/VENTOLIN HFA) 108 (90 BASE) MCG/ACT Inhaler Inhale 2 puffs into the lungs every 6 hours as needed for shortness of breath / dyspnea (Patient not taking: Reported on 5/22/2018) 1 Inhaler 5     ALPRAZolam (XANAX) 0.25 MG tablet Take 1 tablet (0.25 mg) by mouth daily as needed for anxiety (Patient not taking: Reported on 5/22/2018) 30 tablet 2     Calcium 600+D 600-200 MG-UNIT TABS Take  by mouth.       fluticasone (FLONASE) 50 MCG/ACT spray Spray 1-2 sprays into both nostrils daily (Patient not taking: Reported on 5/22/2018) 16 g 5     hydroxychloroquine (PLAQUENIL) 200 MG tablet Take 1 tablet (200 mg) by mouth 2 times daily Get annual eye exams for hydroxychloroquine (plaquenil) monitoring and fax to [phone]. (Patient not taking: Reported on 5/22/2018) 60 tablet 3     naproxen (NAPROSYN) 500 MG tablet Take 1 tablet (500 mg) by mouth 2 times daily as needed for moderate pain (Patient not taking: Reported on 5/22/2018) 60 tablet 1     olopatadine HCl (PATADAY) 0.2 % SOLN Place 1 drop into both eyes daily (Patient not taking: Reported on 5/22/2018) 1 Bottle 6     order for DME Equipment being ordered: light lamp for seasonal affective disorder (Patient not taking: Reported on 5/22/2018) 1 Device 0     tretinoin (RETIN-A) 0.05 % cream Spread a pea size amount into affected area on the face  topically at bedtime.  Use sunscreen SPF>30. (Patient not taking: Reported on 5/22/2018) 45 g 2     VITAMIN D, CHOLECALCIFEROL, PO Take 2,000 Units by mouth daily       Allergies   Allergen Reactions     Seasonal Allergies      Sulfa Drugs      Vomiting       Vicodin [Hydrocodone-Acetaminophen] Nausea     BP Readings from Last 3 Encounters:   05/22/18 124/76   04/30/18 (!) 142/93   04/16/18 120/79    Wt Readings from Last 3 Encounters:   05/22/18 156 lb 3.2 oz (70.9 kg)   04/30/18 159 lb (72.1 kg)   04/16/18 157 lb (71.2 kg)                  Labs reviewed in EPIC    Reviewed and updated as needed this visit by clinical staff       Reviewed and updated as needed this visit by Provider         ROS:  CONSTITUTIONAL:NEGATIVE for fever, chills, change in weight  ENT/MOUTH: NEGATIVE for ear, mouth and throat problems  RESP:NEGATIVE for significant cough or SOB  CV: NEGATIVE for chest pain, palpitations or peripheral edema  MUSCULOSKELETAL: POSITIVE  for arthralgias  and joint pain  and NEGATIVE for joint swelling  and joint warmth   NEURO: NEGATIVE for weakness, dizziness or paresthesias    OBJECTIVE:     /76 (BP Location: Right arm, Patient Position: Sitting, Cuff Size: Adult Large)  Pulse 71  Temp 98.8  F (37.1  C) (Temporal)  Wt 156 lb 3.2 oz (70.9 kg)  LMP 06/12/2014  SpO2 98%  BMI 29.11 kg/m2  Body mass index is 29.11 kg/(m^2).  GENERAL APPEARANCE: alert, active and no distress  RESP: lungs clear to auscultation - no rales, rhonchi or wheezes  CV: regular rates and rhythm and no irregular beats  MS: extremities normal- no gross deformities noted, gait normal and normal muscle tone  no musculoskeletal defects are noted, gait is age appropriate without ataxia, exam of all extremities reveals no erythema, induration, or nodules.Knuckle on index finger right hand appears swollen   Examination of the fingers shows normal radial pulse, normal capillary refilling and circulation.  SKIN: no suspicious lesions or  rashes  NEURO: Normal strength and tone, mentation intact and speech normal  PSYCH: mentation appears normal and affect normal/bright  MENTAL STATUS EXAM:  Appearance/Behavior: No apparent distress, Casually groomed and Dressed appropriately for weather  Speech: Normal  Mood/Affect: normal affect  Insight: Adequate    Diagnostic Test Results:  Results for orders placed or performed in visit on 05/22/18   Basic metabolic panel   Result Value Ref Range    Sodium 136 133 - 144 mmol/L    Potassium 4.3 3.4 - 5.3 mmol/L    Chloride 103 94 - 109 mmol/L    Carbon Dioxide 26 20 - 32 mmol/L    Anion Gap 7 3 - 14 mmol/L    Glucose 89 70 - 99 mg/dL    Urea Nitrogen 26 7 - 30 mg/dL    Creatinine 1.16 (H) 0.52 - 1.04 mg/dL    GFR Estimate 48 (L) >60 mL/min/1.7m2    GFR Estimate If Black 58 (L) >60 mL/min/1.7m2    Calcium 9.5 8.5 - 10.1 mg/dL   Magnesium   Result Value Ref Range    Magnesium 1.9 1.6 - 2.3 mg/dL   Vitamin D Deficiency   Result Value Ref Range    Vitamin D Deficiency screening 37 20 - 75 ug/L   CRP inflammation   Result Value Ref Range    CRP Inflammation 3.5 0.0 - 8.0 mg/L   CK total   Result Value Ref Range    CK Total 114 30 - 225 U/L   Erythrocyte sedimentation rate auto   Result Value Ref Range    Sed Rate 18 0 - 30 mm/h     ASSESSMENT/PLAN:     Marcelina was seen today for recheck medication.    Diagnoses and all orders for this visit:    Major depression in complete remission (H)  -     buPROPion (WELLBUTRIN XL) 150 MG 24 hr tablet; Take 1 tablet (150 mg) by mouth every morning  Initiate medication with hydroxyzine   Reviewed concept of depression as function of biochemical imbalance of neurotransmitters/rationale for treatment.  Risks and benefits of medication(s) reviewed with patient.  Questions answered.  Counseling advised  Patient instructed to call for significant side effects medications or problems  Patient advised immediate presentation to hospital for suicidal thought, etc.    CREST (calcinosis,  Raynaud's phenomenon, esophageal dysfunction, sclerodactyly, telangiectasia) (H)  -     CRP inflammation  -     CK total  -     Erythrocyte sedimentation rate auto    CKD (chronic kidney disease) stage 1, GFR 90 ml/min or greater  -     Basic metabolic panel  -     **Basic metabolic panel FUTURE 14d; Future    Muscle cramping  -     Basic metabolic panel  -     Magnesium  -     Vitamin D Deficiency    Panic attack  -     hydrOXYzine (ATARAX) 25 MG tablet; Take 1-2 tablets (25-50 mg) by mouth every 6 hours as needed for anxiety    Pain of right hand  -     ORTHOPEDICS ADULT REFERRAL    PLAN:   1.   Symptomatic therapy suggested: will try hydroxyzine for panic attacks.  2.  Orders Placed This Encounter   Medications     buPROPion (WELLBUTRIN XL) 150 MG 24 hr tablet     Sig: Take 1 tablet (150 mg) by mouth every morning     Dispense:  180 tablet     Refill:  3     hydrOXYzine (ATARAX) 25 MG tablet     Sig: Take 1-2 tablets (25-50 mg) by mouth every 6 hours as needed for anxiety     Dispense:  60 tablet     Refill:  1     Orders Placed This Encounter   Procedures     Basic metabolic panel     Magnesium     Vitamin D Deficiency     CRP inflammation     CK total     Erythrocyte sedimentation rate auto     ORTHOPEDICS ADULT REFERRAL       3. Patient needs to follow up in if no improvement,or sooner if worsening of symptoms or other symptoms develop.  CONSULTATION/REFERRAL to Orthopedics  FOLLOW UP WITH SPECIALIST :Rheumatology  Will follow up and/or notify patient of  results via My Chart to determine further need for followup    See Patient Instructions    EDUARD Milner CNP  [facility]

## 2018-05-23 LAB — DEPRECATED CALCIDIOL+CALCIFEROL SERPL-MC: 37 UG/L (ref 20–75)

## 2018-05-23 ASSESSMENT — ANXIETY QUESTIONNAIRES: GAD7 TOTAL SCORE: 6

## 2018-05-23 ASSESSMENT — PATIENT HEALTH QUESTIONNAIRE - PHQ9: SUM OF ALL RESPONSES TO PHQ QUESTIONS 1-9: 4

## 2018-05-24 NOTE — PROGRESS NOTES
Cyndie Estevez,    Attached are your test results.  Labs are normal except   -Kidney function (GFR) is decreased.  ADVISE: need to increase fluids and recheck in a week   If still decreased may need to take water pill out of you BP med  -Sodium is normal.  -Potassium is normal.  -Glucose is normal.   Please contact us if you have any questions.    Keerthi Burr, CNP

## 2018-05-31 DIAGNOSIS — F41.0 PANIC ATTACK: ICD-10-CM

## 2018-05-31 RX ORDER — ALPRAZOLAM 0.25 MG
0.25 TABLET ORAL DAILY PRN
Qty: 30 TABLET | Refills: 2 | Status: SHIPPED | OUTPATIENT
Start: 2018-05-31 | End: 2018-12-13

## 2018-05-31 NOTE — TELEPHONE ENCOUNTER
Routing refill request to provider for review/approval because:  Drug not on the [facility] refill protocol     ALPRAZolam (XANAX) 0.25 MG tablet 30 tablet 2 11/27/2017     Sig - Route: Take 1 tablet (0.25 mg) by mouth daily as needed for anxiety - Oral      Last OV with Dr. Painting: 8/7/2017 and 5/22/2018 with Keerthi Burr CNP    Next 5 appointments (look out 90 days)     Jun 05, 2018 10:00 AM CDT   [facility]   [facility]    [address] [place] [address]   [phone]            Jun 18, 2018 11:30 AM CDT   Return Visit with Hilda Lopez MD   [facility])    [address] [place] [address]   [phone]                Aisha Mclaughlin RN

## 2018-05-31 NOTE — TELEPHONE ENCOUNTER
Hello,  last fill date:04-24-20181  Last quantity:30    Thank You,  Meagan Bishop  Pharmacy Technician  [facility]  [phone]

## 2018-06-05 ENCOUNTER — TELEPHONE (OUTPATIENT)
Dept: PEDIATRICS | Facility: CLINIC | Age: 58
End: 2018-06-05

## 2018-06-05 NOTE — TELEPHONE ENCOUNTER
Called patient, apologized she did not get the information.  She will increase fluids and recheck on Monday, 6/11/18.    She has an existing appt on that day.    Per her permission, deactivated her MyChart.      She also wanted to know if she could switch her PCP to rIa Burr NP.  Due to her medical complexity, Ira wishes to defer to Dr Painting.  Informed patient, and scheduled her for a physical on 8/8/18 with Dr. Painting.    Marcelle Rosa RN, [facility]

## 2018-06-05 NOTE — TELEPHONE ENCOUNTER
Result note below sent to patient via [employer] by provider. [employer] message has not been read.  Patient Result Comments   Entered by Keerthi Burr APRN CNP at 5/23/2018  8:21 PM   Cyndie Estevez,     Attached are your test results.   Labs are normal except   -Kidney function (GFR) is decreased.  ADVISE: need to increase fluids and recheck in a week   If still decreased may need to take water pill out of you BP med   -Sodium is normal.   -Potassium is normal.   -Glucose is normal.    Please contact us if you have any questions.     Keerthi Burr, NICK

## 2018-06-11 DIAGNOSIS — E78.5 HYPERLIPIDEMIA LDL GOAL <100: ICD-10-CM

## 2018-06-11 RX ORDER — SIMVASTATIN 40 MG
TABLET ORAL
Qty: 90 TABLET | Refills: 0 | Status: SHIPPED | OUTPATIENT
Start: 2018-06-11 | End: 2018-08-08

## 2018-06-11 NOTE — TELEPHONE ENCOUNTER
simvastatin (ZOCOR) 40 MG tablet 90 tablet 3 8/7/2017  --   Sig - Route: Take 1 tablet (40 mg) by mouth At Bedtime - Oral     Last OV with Dr. WILKS: 8/7/2017 (physical) and 5/22/2018 with Keerthi Burr CNP    Next 5 appointments (look out 90 days)     Jun 18, 2018 11:30 AM CDT   Return Visit with Hilda Lopez MD   [facility])    [id number] [place] [address]   [phone]            Aug 08, 2018  1:30 PM CDT   PHYSICAL with Annette Wilks MD PhD   [facility])    [address] [address] [place] [address]   [phone]                LDL Cholesterol Calculated   Date Value Ref Range Status   08/02/2017 114 (H) <100 mg/dL Final     Comment:     Above desirable:  100-129 mg/dl   Borderline High:  130-159 mg/dL   High:             160-189 mg/dL   Very high:       >189 mg/dl       Creatinine   Date Value Ref Range Status   05/22/2018 1.16 (H) 0.52 - 1.04 mg/dL Final     Statins Protocol Passed6/11 3:22 PM   LDL on file in past 12 months    No abnormal creatine kinase in past 12 months    Recent (12 mo) or future (30 days) visit within the authorizing provider's specialty    Patient is age 18 or older    No active pregnancy on record    No positive pregnancy test in past 12 months     Refilled per [facility] protocol.    Aisha Mclaughlin RN

## 2018-06-12 DIAGNOSIS — I73.00 RAYNAUD'S DISEASE WITHOUT GANGRENE: ICD-10-CM

## 2018-06-12 DIAGNOSIS — M34.1 CREST (CALCINOSIS, RAYNAUD'S PHENOMENON, ESOPHAGEAL DYSFUNCTION, SCLERODACTYLY, TELANGIECTASIA) (H): ICD-10-CM

## 2018-06-12 DIAGNOSIS — N18.1 CKD (CHRONIC KIDNEY DISEASE) STAGE 1, GFR 90 ML/MIN OR GREATER: ICD-10-CM

## 2018-06-12 LAB
ALBUMIN SERPL-MCNC: 3.7 G/DL (ref 3.4–5)
ALT SERPL W P-5'-P-CCNC: 35 U/L (ref 0–50)
ANION GAP SERPL CALCULATED.3IONS-SCNC: 7 MMOL/L (ref 3–14)
AST SERPL W P-5'-P-CCNC: 27 U/L (ref 0–45)
BASOPHILS # BLD AUTO: 0.1 10E9/L (ref 0–0.2)
BASOPHILS NFR BLD AUTO: 1.2 %
BUN SERPL-MCNC: 19 MG/DL (ref 7–30)
CALCIUM SERPL-MCNC: 9.1 MG/DL (ref 8.5–10.1)
CHLORIDE SERPL-SCNC: 105 MMOL/L (ref 94–109)
CO2 SERPL-SCNC: 27 MMOL/L (ref 20–32)
CREAT SERPL-MCNC: 0.91 MG/DL (ref 0.52–1.04)
CRP SERPL-MCNC: <2.9 MG/L (ref 0–8)
DIFFERENTIAL METHOD BLD: ABNORMAL
EOSINOPHIL # BLD AUTO: 0.1 10E9/L (ref 0–0.7)
EOSINOPHIL NFR BLD AUTO: 2.3 %
ERYTHROCYTE [DISTWIDTH] IN BLOOD BY AUTOMATED COUNT: 12.9 % (ref 10–15)
GFR SERPL CREATININE-BSD FRML MDRD: 64 ML/MIN/1.7M2
GLUCOSE SERPL-MCNC: 99 MG/DL (ref 70–99)
HCT VFR BLD AUTO: 32.8 % (ref 35–47)
HGB BLD-MCNC: 10.9 G/DL (ref 11.7–15.7)
IMM GRANULOCYTES # BLD: 0 10E9/L (ref 0–0.4)
IMM GRANULOCYTES NFR BLD: 0.4 %
LYMPHOCYTES # BLD AUTO: 0.9 10E9/L (ref 0.8–5.3)
LYMPHOCYTES NFR BLD AUTO: 17.7 %
MCH RBC QN AUTO: 32.2 PG (ref 26.5–33)
MCHC RBC AUTO-ENTMCNC: 33.2 G/DL (ref 31.5–36.5)
MCV RBC AUTO: 97 FL (ref 78–100)
MONOCYTES # BLD AUTO: 0.6 10E9/L (ref 0–1.3)
MONOCYTES NFR BLD AUTO: 10.6 %
NEUTROPHILS # BLD AUTO: 3.5 10E9/L (ref 1.6–8.3)
NEUTROPHILS NFR BLD AUTO: 67.8 %
PLATELET # BLD AUTO: 220 10E9/L (ref 150–450)
POTASSIUM SERPL-SCNC: 4.5 MMOL/L (ref 3.4–5.3)
RBC # BLD AUTO: 3.38 10E12/L (ref 3.8–5.2)
SODIUM SERPL-SCNC: 139 MMOL/L (ref 133–144)
WBC # BLD AUTO: 5.2 10E9/L (ref 4–11)

## 2018-06-12 PROCEDURE — 36415 COLL VENOUS BLD VENIPUNCTURE: CPT | Performed by: NURSE PRACTITIONER

## 2018-06-12 PROCEDURE — 80048 BASIC METABOLIC PNL TOTAL CA: CPT | Performed by: NURSE PRACTITIONER

## 2018-06-12 PROCEDURE — 82040 ASSAY OF SERUM ALBUMIN: CPT | Performed by: NURSE PRACTITIONER

## 2018-06-12 PROCEDURE — 84460 ALANINE AMINO (ALT) (SGPT): CPT | Performed by: NURSE PRACTITIONER

## 2018-06-12 PROCEDURE — 85025 COMPLETE CBC W/AUTO DIFF WBC: CPT | Performed by: NURSE PRACTITIONER

## 2018-06-12 PROCEDURE — 84450 TRANSFERASE (AST) (SGOT): CPT | Performed by: NURSE PRACTITIONER

## 2018-06-12 PROCEDURE — 86140 C-REACTIVE PROTEIN: CPT | Performed by: NURSE PRACTITIONER

## 2018-06-12 NOTE — LETTER
June 12, 2018      Marcelina Estevez                                                                [address] [place] [address]          Dear Marcelina,    The results of your recent lab testing were normal.     Please make a follow-up appointment if you have additional questions.    Sincerely,       Keerthi Burr NP      Results for orders placed or performed in visit on 06/12/18   Albumin level   Result Value Ref Range    Albumin 3.7 3.4 - 5.0 g/dL   ALT   Result Value Ref Range    ALT 35 0 - 50 U/L   AST   Result Value Ref Range    AST 27 0 - 45 U/L   CBC with platelets differential   Result Value Ref Range    WBC 5.2 4.0 - 11.0 10e9/L    RBC Count 3.38 (L) 3.8 - 5.2 10e12/L    Hemoglobin 10.9 (L) 11.7 - 15.7 g/dL    Hematocrit 32.8 (L) 35.0 - 47.0 %    MCV 97 78 - 100 fl    MCH 32.2 26.5 - 33.0 pg    MCHC 33.2 31.5 - 36.5 g/dL    RDW 12.9 10.0 - 15.0 %    Platelet Count 220 150 - 450 10e9/L    Diff Method Automated Method     % Neutrophils 67.8 %    % Lymphocytes 17.7 %    % Monocytes 10.6 %    % Eosinophils 2.3 %    % Basophils 1.2 %    % Immature Granulocytes 0.4 %    Absolute Neutrophil 3.5 1.6 - 8.3 10e9/L    Absolute Lymphocytes 0.9 0.8 - 5.3 10e9/L    Absolute Monocytes 0.6 0.0 - 1.3 10e9/L    Absolute Eosinophils 0.1 0.0 - 0.7 10e9/L    Absolute Basophils 0.1 0.0 - 0.2 10e9/L    Abs Immature Granulocytes 0.0 0 - 0.4 10e9/L   CRP inflammation   Result Value Ref Range    CRP Inflammation <2.9 0.0 - 8.0 mg/L   **Basic metabolic panel FUTURE 14d   Result Value Ref Range    Sodium 139 133 - 144 mmol/L    Potassium 4.5 3.4 - 5.3 mmol/L    Chloride 105 94 - 109 mmol/L    Carbon Dioxide 27 20 - 32 mmol/L    Anion Gap 7 3 - 14 mmol/L    Glucose 99 70 - 99 mg/dL    Urea Nitrogen 19 7 - 30 mg/dL    Creatinine 0.91 0.52 - 1.04 mg/dL    GFR Estimate 64 >60 mL/min/1.7m2    GFR Estimate If Black 77 >60 mL/min/1.7m2    Calcium 9.1 8.5 - 10.1 mg/dL

## 2018-06-12 NOTE — LETTER
June 14, 2018      Marcelina Estevez  [address] [place] [address]        Dear ,    We are writing to inform you of your test results.    Normal labs     Resulted Orders   Albumin level   Result Value Ref Range    Albumin 3.7 3.4 - 5.0 g/dL   ALT   Result Value Ref Range    ALT 35 0 - 50 U/L   AST   Result Value Ref Range    AST 27 0 - 45 U/L   CBC with platelets differential   Result Value Ref Range    WBC 5.2 4.0 - 11.0 10e9/L    RBC Count 3.38 (L) 3.8 - 5.2 10e12/L    Hemoglobin 10.9 (L) 11.7 - 15.7 g/dL    Hematocrit 32.8 (L) 35.0 - 47.0 %    MCV 97 78 - 100 fl    MCH 32.2 26.5 - 33.0 pg    MCHC 33.2 31.5 - 36.5 g/dL    RDW 12.9 10.0 - 15.0 %    Platelet Count 220 150 - 450 10e9/L    Diff Method Automated Method     % Neutrophils 67.8 %    % Lymphocytes 17.7 %    % Monocytes 10.6 %    % Eosinophils 2.3 %    % Basophils 1.2 %    % Immature Granulocytes 0.4 %    Absolute Neutrophil 3.5 1.6 - 8.3 10e9/L    Absolute Lymphocytes 0.9 0.8 - 5.3 10e9/L    Absolute Monocytes 0.6 0.0 - 1.3 10e9/L    Absolute Eosinophils 0.1 0.0 - 0.7 10e9/L    Absolute Basophils 0.1 0.0 - 0.2 10e9/L    Abs Immature Granulocytes 0.0 0 - 0.4 10e9/L   CRP inflammation   Result Value Ref Range    CRP Inflammation <2.9 0.0 - 8.0 mg/L   **Basic metabolic panel FUTURE 14d   Result Value Ref Range    Sodium 139 133 - 144 mmol/L    Potassium 4.5 3.4 - 5.3 mmol/L    Chloride 105 94 - 109 mmol/L    Carbon Dioxide 27 20 - 32 mmol/L    Anion Gap 7 3 - 14 mmol/L    Glucose 99 70 - 99 mg/dL      Comment:      Non Fasting    Urea Nitrogen 19 7 - 30 mg/dL    Creatinine 0.91 0.52 - 1.04 mg/dL    GFR Estimate 64 >60 mL/min/1.7m2      Comment:      Non  GFR Calc    GFR Estimate If Black 77 >60 mL/min/1.7m2      Comment:       GFR Calc    Calcium 9.1 8.5 - 10.1 mg/dL       If you have any questions or concerns, please call the clinic at the number listed above.       Sincerely,        Thanks,   Lyla Flores  SUSANNE.  [facility]   Rheumatology  Phone: [phone]  Fax: [phone]   (staff for Dr. Lopez)

## 2018-06-13 DIAGNOSIS — G44.209 TENSION HEADACHE: ICD-10-CM

## 2018-06-13 NOTE — TELEPHONE ENCOUNTER
Please inform patient, refill/prescriptioni approved. Please drop off prescription at pharmacy.

## 2018-06-13 NOTE — TELEPHONE ENCOUNTER
Hello,  last fill date:05-  Last quantity:30    Thank You,  Meagan Bishop  Pharmacy Technician  [facility]  [phone]

## 2018-06-13 NOTE — TELEPHONE ENCOUNTER
Routing refill request to provider for review/approval because:  Drug not on the [facility] refill protocol     acetaminophen-codeine (TYLENOL #3) 300-30 MG per tablet 30 tablet 0 5/11/2018  No      Sig - Route: Take 1 tablet by mouth every 4 hours as needed for moderate pain - Oral         Last office visit:  5/22/18      Next 5 appointments (look out 90 days)     Jun 18, 2018 11:30 AM CDT   Return Visit with Hilda Lopez MD   [facility])    [address] [place] [id number]   [phone]            Aug 08, 2018  1:30 PM CDT   PHYSICAL with Annette Painting MD PhD   [facility])    [address] [place] [address]   [phone]                Josey Le RN,   [facility]

## 2018-06-14 NOTE — TELEPHONE ENCOUNTER
acetaminophen-codeine (TYLENOL #3) 300-30 MG per tablet script sent to onsite [facility].  Ofelia Ley, CMA

## 2018-07-16 DIAGNOSIS — N95.1 MENOPAUSAL SYNDROME (HOT FLASHES): ICD-10-CM

## 2018-07-16 NOTE — TELEPHONE ENCOUNTER
Routing refill request to provider for review/approval because:  Drug not on the [facility] refill protocol for DX: menopausal hot flashes.    venlafaxine (EFFEXOR-XR) 75 MG 24 hr capsule 270 capsule 1 1/19/2018  No      Sig: TAKE THREE CAPSULES BY MOUTH EVERY DAY       Last office visit:  5/22/18      Next 5 appointments (look out 90 days)     Aug 08, 2018 11:30 AM CDT   Return Visit with Hilda Lopez MD   [facility])    [address] [place] [phone]   [phone]            Aug 08, 2018  1:30 PM CDT   PHYSICAL with Annette Painting MD PhD   [facility])    [address] [place] [address]   [phone]                      Josey Le RN,   . [facility]

## 2018-07-17 RX ORDER — VENLAFAXINE HYDROCHLORIDE 75 MG/1
CAPSULE, EXTENDED RELEASE ORAL
Qty: 270 CAPSULE | Refills: 1 | Status: SHIPPED | OUTPATIENT
Start: 2018-07-17 | End: 2019-01-07

## 2018-07-23 DIAGNOSIS — R06.2 WHEEZING: ICD-10-CM

## 2018-07-23 RX ORDER — ALBUTEROL SULFATE 90 UG/1
AEROSOL, METERED RESPIRATORY (INHALATION)
Qty: 8.5 G | Refills: 0 | Status: SHIPPED | OUTPATIENT
Start: 2018-07-23 | End: 2019-11-14

## 2018-07-23 NOTE — TELEPHONE ENCOUNTER
albuterol (PROAIR HFA/PROVENTIL HFA/VENTOLIN HFA) 108 (90 BASE) MCG/ACT Inhaler 1 Inhaler 5 1/9/2017  No   Sig - Route: Inhale 2 puffs into the lungs every 6 hours as needed for shortness of breath / dyspnea - Inhalation   Patient not taking: Reported on 5/22/2018          Last OV with Dr. Painting: 8/7/2017 and 5/22/2018 with Keerthi Burr CNP:    Next 5 appointments (look out 90 days)     Aug 08, 2018 11:30 AM CDT   Return Visit with Hilda Lopez MD   [facility])    [address] [place] [address]   [phone]            Aug 08, 2018  1:30 PM CDT   PHYSICAL with Annette Painting MD PhD   [facility])    [address] [place] [address]   [phone]                Asthma Maintenance Inhalers - Anticholinergics Passed7/23 2:49 PM   Patient is age 12 years or older    Recent (12 mo) or future (30 days) visit within the authorizing provider's specialty     Refilled per [facility] protocol.    Aisha Mclaughlin RN

## 2018-08-08 ENCOUNTER — OFFICE VISIT (OUTPATIENT)
Dept: RHEUMATOLOGY | Facility: CLINIC | Age: 58
End: 2018-08-08
Payer: COMMERCIAL

## 2018-08-08 ENCOUNTER — RADIANT APPOINTMENT (OUTPATIENT)
Dept: MAMMOGRAPHY | Facility: CLINIC | Age: 58
End: 2018-08-08
Attending: INTERNAL MEDICINE
Payer: COMMERCIAL

## 2018-08-08 ENCOUNTER — RADIANT APPOINTMENT (OUTPATIENT)
Dept: ULTRASOUND IMAGING | Facility: CLINIC | Age: 58
End: 2018-08-08
Attending: STUDENT IN AN ORGANIZED HEALTH CARE EDUCATION/TRAINING PROGRAM
Payer: COMMERCIAL

## 2018-08-08 ENCOUNTER — RADIANT APPOINTMENT (OUTPATIENT)
Dept: GENERAL RADIOLOGY | Facility: CLINIC | Age: 58
End: 2018-08-08
Attending: STUDENT IN AN ORGANIZED HEALTH CARE EDUCATION/TRAINING PROGRAM
Payer: COMMERCIAL

## 2018-08-08 ENCOUNTER — OFFICE VISIT (OUTPATIENT)
Dept: PEDIATRICS | Facility: CLINIC | Age: 58
End: 2018-08-08
Payer: COMMERCIAL

## 2018-08-08 VITALS
OXYGEN SATURATION: 96 % | SYSTOLIC BLOOD PRESSURE: 120 MMHG | WEIGHT: 150.9 LBS | DIASTOLIC BLOOD PRESSURE: 77 MMHG | HEART RATE: 76 BPM | BODY MASS INDEX: 27.77 KG/M2 | HEIGHT: 62 IN

## 2018-08-08 VITALS
TEMPERATURE: 97.9 F | SYSTOLIC BLOOD PRESSURE: 102 MMHG | OXYGEN SATURATION: 98 % | DIASTOLIC BLOOD PRESSURE: 70 MMHG | WEIGHT: 153 LBS | HEART RATE: 79 BPM | BODY MASS INDEX: 28.89 KG/M2 | HEIGHT: 61 IN

## 2018-08-08 DIAGNOSIS — R22.9 LOCALIZED SOFT TISSUE SWELLING: Primary | ICD-10-CM

## 2018-08-08 DIAGNOSIS — R06.02 SOB (SHORTNESS OF BREATH): ICD-10-CM

## 2018-08-08 DIAGNOSIS — G44.219 EPISODIC TENSION-TYPE HEADACHE, NOT INTRACTABLE: ICD-10-CM

## 2018-08-08 DIAGNOSIS — R22.9 LOCALIZED SOFT TISSUE SWELLING: ICD-10-CM

## 2018-08-08 DIAGNOSIS — R12 HEART BURN: ICD-10-CM

## 2018-08-08 DIAGNOSIS — Z11.4 SCREENING FOR HIV (HUMAN IMMUNODEFICIENCY VIRUS): ICD-10-CM

## 2018-08-08 DIAGNOSIS — Z00.00 ROUTINE GENERAL MEDICAL EXAMINATION AT A HEALTH CARE FACILITY: Primary | ICD-10-CM

## 2018-08-08 DIAGNOSIS — I10 HYPERTENSION GOAL BP (BLOOD PRESSURE) < 140/90: ICD-10-CM

## 2018-08-08 DIAGNOSIS — R61 NIGHT SWEATS: ICD-10-CM

## 2018-08-08 DIAGNOSIS — G44.209 TENSION HEADACHE: ICD-10-CM

## 2018-08-08 DIAGNOSIS — Z79.891 LONG TERM PRESCRIPTION OPIATE USE: ICD-10-CM

## 2018-08-08 DIAGNOSIS — Z12.31 VISIT FOR SCREENING MAMMOGRAM: ICD-10-CM

## 2018-08-08 DIAGNOSIS — E78.5 HYPERLIPIDEMIA LDL GOAL <100: ICD-10-CM

## 2018-08-08 DIAGNOSIS — R22.1 NECK FULLNESS: ICD-10-CM

## 2018-08-08 DIAGNOSIS — N18.1 CKD (CHRONIC KIDNEY DISEASE) STAGE 1, GFR 90 ML/MIN OR GREATER: ICD-10-CM

## 2018-08-08 LAB
CREAT UR-MCNC: 58 MG/DL
MICROALBUMIN UR-MCNC: 27 MG/L
MICROALBUMIN/CREAT UR: 46.66 MG/G CR (ref 0–25)

## 2018-08-08 PROCEDURE — 76536 US EXAM OF HEAD AND NECK: CPT | Performed by: RADIOLOGY

## 2018-08-08 PROCEDURE — 99396 PREV VISIT EST AGE 40-64: CPT | Performed by: INTERNAL MEDICINE

## 2018-08-08 PROCEDURE — 77063 BREAST TOMOSYNTHESIS BI: CPT | Performed by: STUDENT IN AN ORGANIZED HEALTH CARE EDUCATION/TRAINING PROGRAM

## 2018-08-08 PROCEDURE — 99214 OFFICE O/P EST MOD 30 MIN: CPT | Performed by: STUDENT IN AN ORGANIZED HEALTH CARE EDUCATION/TRAINING PROGRAM

## 2018-08-08 PROCEDURE — 77067 SCR MAMMO BI INCL CAD: CPT | Performed by: STUDENT IN AN ORGANIZED HEALTH CARE EDUCATION/TRAINING PROGRAM

## 2018-08-08 PROCEDURE — 99000 SPECIMEN HANDLING OFFICE-LAB: CPT | Performed by: INTERNAL MEDICINE

## 2018-08-08 PROCEDURE — 82043 UR ALBUMIN QUANTITATIVE: CPT | Performed by: INTERNAL MEDICINE

## 2018-08-08 PROCEDURE — 71046 X-RAY EXAM CHEST 2 VIEWS: CPT | Performed by: RADIOLOGY

## 2018-08-08 PROCEDURE — 80307 DRUG TEST PRSMV CHEM ANLYZR: CPT | Mod: 90 | Performed by: INTERNAL MEDICINE

## 2018-08-08 RX ORDER — OMEPRAZOLE 40 MG/1
40 CAPSULE, DELAYED RELEASE ORAL DAILY
Qty: 30 CAPSULE | Refills: 3 | Status: SHIPPED | OUTPATIENT
Start: 2018-08-08 | End: 2018-12-05

## 2018-08-08 RX ORDER — SIMVASTATIN 40 MG
TABLET ORAL
Qty: 90 TABLET | Refills: 3 | Status: SHIPPED | OUTPATIENT
Start: 2018-08-08 | End: 2019-11-14

## 2018-08-08 RX ORDER — CLONIDINE HYDROCHLORIDE 0.2 MG/1
0.2 TABLET ORAL DAILY
Qty: 90 TABLET | Refills: 3 | Status: SHIPPED | OUTPATIENT
Start: 2018-08-08 | End: 2018-12-05

## 2018-08-08 ASSESSMENT — PAIN SCALES - GENERAL: PAINLEVEL: MILD PAIN (3)

## 2018-08-08 NOTE — LETTER
August 10, 2018      Marcelina Estevez  [address] [place] [address]        Dear ,    We are writing to inform you of your test results.    Normal Ultrasound neck. No abnormal Lymph nodes, masses or dilated blood vessel seen.     Resulted Orders   US head neck soft tissue    Narrative    EXAMINATION: US HEAD NECK SOFT TISSUE, 8/8/2018 2:57 PM     COMPARISON: None.    HISTORY: Bilateral soft tissue masses over the clavicle, right greater  than left.    FINDINGS:  Lymph nodes are measured bilaterally with measurements as follows:    Right:  Level 2: 8 x 8 x 3 mm  Level 3: 6 x 5 x 3 mm  Level 4: None  Level 5: None  Level 6: None    Left:  Level 2: 5 x 4 x 2 mm  Level 3: 5 x 6 x 2 mm, 4 x 4 by 3 mm, 6 x 6 x 4 mm  Level 4: None  Level 5: None  Level 6: None    Targeted areas of the area of concern in the supraclavicular region as  indicated by the patient was performed. There is normal appearing  subcutaneous fat.      Impression    IMPRESSION:  1.  Normal lymph nodes in the neck levels.  2.  Normal subcutaneous fat in the area of palpable concern as  indicated by the patient.    JACQUI SAVAGE MD       If you have any questions or concerns, please call the clinic at the number listed above.       Thank you,   Joanne MYLES LPN  [facility]  Rheumatology  Phone: [phone]  Fax: [phone]  (Staff for Dr. Lopez)

## 2018-08-08 NOTE — LETTER
August 10, 2018      Marcelina Estevez  [address]  [facility] [place] [address]        Dear ,    We are writing to inform you of your test results.    Normal chest Xray     Resulted Orders   X-ray Chest 2 views    Narrative    Exam: XR CHEST 2 VW, 8/8/2018 12:49 PM    Indication: to look for ILD; SOB (shortness of breath)    Comparison: None    Findings:   Heart and 20 vasculature was within normal limits. Linear opacity  along the left chest wall likely atelectasis or scarring. No focal  airspace opacities identified. Pleural spaces are clear. Upper abdomen  unremarkable.      Impression    Impression: No acute pulmonary disease identified.    SHMUEL GILLIS MD       If you have any questions or concerns, please call the clinic at the number listed above.       Thank you,   Joanne MYLES LPN  [facility]  Rheumatology  Phone: [phone]  Fax: [phone]

## 2018-08-08 NOTE — NURSING NOTE
"Marcelina Estevez's goals for this visit include:   She requests these members of her care team be copied on today's visit information:     PCP: Annette Painting    Referring Provider:  No referring provider defined for this encounter.    /77  Pulse 76  Ht 1.568 m (5' 1.75\")  Wt 68.4 kg (150 lb 14.4 oz)  LMP 06/12/2014  SpO2 96%  BMI 27.82 kg/m2   "

## 2018-08-08 NOTE — MR AVS SNAPSHOT
After Visit Summary   8/8/2018    Marcelina Estevez    MRN: [id number]           Patient Information     Date Of Birth          1960        Visit Information        Provider Department      8/8/2018 1:30 PM Annette Painting MD PhD [facility]        Today's Diagnoses     Routine general medical examination at a health care facility    -  1    Screening for HIV (human immunodeficiency virus)        Hypertension goal BP (blood pressure) < 140/90        CKD (chronic kidney disease) stage 1, GFR 90 ml/min or greater        Hyperlipidemia LDL goal <100        Neck fullness        Tension headache        Night sweats        Long term prescription opiate use        Episodic tension-type headache, not intractable          Care Instructions    Make appointment(s) for:   -- get urine test today  -- fasting lab appointment.      Check with your insurance regarding coverage for Shingrix (RZV) - the new shingles vaccine.       Medication(s) prescribed today:    Orders Placed This Encounter   Medications     acetaminophen-codeine (TYLENOL #3) 300-30 MG per tablet     Sig: Take 1 tablet by mouth daily as needed for moderate pain     Dispense:  30 tablet     Refill:  0     cloNIDine (CATAPRES) 0.2 MG tablet     Sig: Take 1 tablet (0.2 mg) by mouth daily     Dispense:  90 tablet     Refill:  3     simvastatin (ZOCOR) 40 MG tablet     Sig: TAKE 1 TABLET (40MG) BY MOUTH EVERY NIGHT AT BEDTIME     Dispense:  90 tablet     Refill:  3           Preventive Health Recommendations  Female Ages 50 - 64    Yearly exam: See your health care provider every year in order to  o Review health changes.   o Discuss preventive care.    o Review your medicines if your doctor has prescribed any.      Get a Pap test every three years (unless you have an abnormal result and your provider advises testing more often).    If you get Pap tests with HPV test, you only need to test every 5 years, unless you have an abnormal result.      You do not need a Pap test if your uterus was removed (hysterectomy) and you have not had cancer.    You should be tested each year for STDs (sexually transmitted diseases) if you're at risk.     Have a mammogram every 1 to 2 years.    Have a colonoscopy at age 50, or have a yearly FIT test (stool test). These exams screen for colon cancer.      Have a cholesterol test every 5 years, or more often if advised.    Have a diabetes test (fasting glucose) every three years. If you are at risk for diabetes, you should have this test more often.     If you are at risk for osteoporosis (brittle bone disease), think about having a bone density scan (DEXA).    Shots: Get a flu shot each year. Get a tetanus shot every 10 years.    Nutrition:     Eat at least 5 servings of fruits and vegetables each day.    Eat whole-grain bread, whole-wheat pasta and brown rice instead of white grains and rice.    Get adequate Calcium and Vitamin D.     Lifestyle    Exercise at least 150 minutes a week (30 minutes a day, 5 days a week). This will help you control your weight and prevent disease.    Limit alcohol to one drink per day.    No smoking.     Wear sunscreen to prevent skin cancer.     See your dentist every six months for an exam and cleaning.    See your eye doctor every 1 to 2 years.            Follow-ups after your visit        Your next 10 appointments already scheduled     Aug 08, 2018  2:50 PM CDT   XR CHEST 2 VIEWS with MGXR2   [email or web address] ([facility] [[email or web address]    [address] [place] [id number] [phone]           Please bring a list of your current medicines to your exam. (Include vitamins, minerals and over-thecounter medicines.) Leave your valuables at home.  Tell your doctor if there is a chance you may be pregnant.  You do not need to do anything special for this exam.            Aug 08, 2018  3:05 PM CDT   US HEAD NECK SOFT TISSUE with MGUS1, MG 10sec   [facility]  [facility] ([facility])    [address] [place] [id number] [phone]           Please bring a list of your medicines (including vitamins, minerals and over-the-counter drugs). Also, tell your doctor about any allergies you may have. Wear comfortable clothes and leave your valuables at home.  You do not need to do anything special to prepare for your exam.  Please call the Imaging Department at your exam site with any questions.            Feb 06, 2019 11:30 AM CST   Return Visit with Hilda Lopez MD   [facility] ([facility])    [address] [place] [id number] [phone]              Future tests that were ordered for you today     Open Future Orders        Priority Expected Expires Ordered    Drug  Screen Comprehensive , Urine with Reported Meds ([employer]) (Pain Care Package) Routine  8/8/2019 8/8/2018    CBC with platelets and differential Routine  8/8/2019 8/8/2018    Lipid panel reflex to direct LDL Fasting Routine 8/8/2018 8/8/2019 8/8/2018    HIV Antigen Antibody Combo Routine  8/8/2019 8/8/2018    US head neck soft tissue Routine  3/6/2019 8/8/2018            Who to contact     If you have questions or need follow up information about today's clinic visit or your schedule please contact [facility] directly at [phone].  Normal or non-critical lab and imaging results will be communicated to you by MyChart, letter or phone within 4 business days after the clinic has received the results. If you do not hear from us within 7 days, please contact the clinic through MyChart or phone. If you have a critical or abnormal lab result, we will notify you by phone as soon as possible.  Submit refill requests through [employer] or call your pharmacy and they will forward the refill request to us. Please allow 3 business days for your refill to be completed.          Additional Information About Your Visit    "     MyChart Information     [employer] is an electronic gateway that provides easy, online access to your medical records. With [employer], you can request a clinic appointment, read your test results, renew a prescription or communicate with your care team.     To sign up for [employer] visit the website at [email or web address]   You will be asked to enter the access code listed below, as well as some personal information. Please follow the directions to create your username and password.     Your access code is: [email or web address]  Expires: 2018 12:22 PM     Your access code will  in 90 days. If you need help or a new code, please contact your [facility] or call [phone] for assistance.        Care EveryWhere ID     This is your Care EveryWhere ID. This could be used by other organizations to access your [facility] medical records  [id number]        Your Vitals Were     Pulse Temperature Height Last Period Pulse Oximetry BMI (Body Mass Index)    79 97.9  F (36.6  C) (Temporal) 5' 0.5\" (1.537 m) 2014 98% 29.39 kg/m2       Blood Pressure from Last 3 Encounters:   18 102/70   18 120/77   18 124/76    Weight from Last 3 Encounters:   18 153 lb (69.4 kg)   18 150 lb 14.4 oz (68.4 kg)   18 156 lb 3.2 oz (70.9 kg)              We Performed the Following     Pain Drug Scr UR W Rptd Meds          Today's Medication Changes          These changes are accurate as of 18  2:07 PM.  If you have any questions, ask your nurse or doctor.               Start taking these medicines.        Dose/Directions    acetaminophen-codeine 300-30 MG per tablet   Commonly known as:  TYLENOL #3   Used for:  Tension headache   Started by:  Annette Painting MD PhD        Dose:  1 tablet   Take 1 tablet by mouth daily as needed for moderate pain   Quantity:  30 tablet   Refills:  0       omeprazole 40 MG capsule   Commonly known as:  priLOSEC   Used for:  Heart burn "   Started by:  Hilda Lopez MD        Dose:  40 mg   Take 1 capsule (40 mg) by mouth daily   Quantity:  30 capsule   Refills:  3            Where to get your medicines      These medications were sent to [facility] - [place], [place] - [address], [address]  [address], [facility] [address]     Phone:  [phone]     cloNIDine 0.2 MG tablet    omeprazole 40 MG capsule    simvastatin 40 MG tablet         Some of these will need a paper prescription and others can be bought over the counter.  Ask your nurse if you have questions.     Bring a paper prescription for each of these medications     acetaminophen-codeine 300-30 MG per tablet               Information about OPIOIDS     PRESCRIPTION OPIOIDS: WHAT YOU NEED TO KNOW   We gave you an opioid (narcotic) pain medicine. It is important to manage your pain, but opioids are not always the best choice. You should first try all the other options your care team gave you. Take this medicine for as short a time (and as few doses) as possible.    Some activities can increase your pain, such as bandage changes or therapy sessions. It may help to take your pain medicine 30 to 60 minutes before these activities. Reduce your stress by getting enough sleep, working on hobbies you enjoy and practicing relaxation or meditation. Talk to your care team about ways to manage your pain beyond prescription opioids.    These medicines have risks:    DO NOT drive when on new or higher doses of pain medicine. These medicines can affect your alertness and reaction times, and you could be arrested for driving under the influence (DUI). If you need to use opioids long-term, talk to your care team about driving.    DO NOT operate heavy machinery    DO NOT do any other dangerous activities while taking these medicines.    DO NOT drink any alcohol while taking these medicines.     If the opioid prescribed includes acetaminophen, DO NOT take  with any other medicines that contain acetaminophen. Read all labels carefully. Look for the word  acetaminophen  or  Tylenol.  Ask your pharmacist if you have questions or are unsure.    You can get addicted to pain medicines, especially if you have a history of addiction (chemical, alcohol or substance dependence). Talk to your care team about ways to reduce this risk.    All opioids tend to cause constipation. Drink plenty of water and eat foods that have a lot of fiber, such as fruits, vegetables, prune juice, apple juice and high-fiber cereal. Take a laxative (Miralax, milk of magnesia, Colace, Senna) if you don t move your bowels at least every other day. Other side effects include upset stomach, sleepiness, dizziness, throwing up, tolerance (needing more of the medicine to have the same effect), physical dependence and slowed breathing.    Store your pills in a secure place, locked if possible. We will not replace any lost or stolen medicine. If you don t finish your medicine, please throw away (dispose) as directed by your pharmacist. The [place] Pollution Control Agency has more information about safe disposal: [email or web address]-medications         Primary Care Provider Office Phone # Fax #    Annette Painting MD PhD [phone]       [address] [facility] [address]        Equal Access to Services     YOEL ROJAS : Hadii skye alejandro hadasho Soomaali, waaxda luqadaha, qaybta kaalmada adeegyada, faith jones . So [facility] [phone].    ATENCIÓN: Si habla español, tiene a mcgovern disposición servicios gratuitos de asistencia lingüística. Llame al [phone].    We comply with applicable federal civil rights laws and [place] laws. We do not discriminate on the basis of race, color, national origin, age, disability, sex, sexual orientation, or gender identity.            Thank you!     Thank you for choosing [facility]  for your care. Our goal is always to provide you with excellent care. Hearing back from our patients is one way we can continue to improve our services. Please take a few minutes to complete the written survey that you may receive in the mail after your visit with us. Thank you!             Your Updated Medication List - Protect others around you: Learn how to safely use, store and throw away your medicines at www.disposemymeds.org.          This list is accurate as of 8/8/18  2:07 PM.  Always use your most recent med list.                   Brand Name Dispense Instructions for use Diagnosis    acetaminophen-codeine 300-30 MG per tablet    TYLENOL #3    30 tablet    Take 1 tablet by mouth daily as needed for moderate pain    Tension headache       ALLEGRA ALLERGY 180 MG tablet   Generic drug:  fexofenadine     90 tablet    Take 1 tablet (180 mg) by mouth daily    Atopic rhinitis       ALPRAZolam 0.25 MG tablet    XANAX    30 tablet    Take 1 tablet (0.25 mg) by mouth daily as needed for anxiety    Panic attack       buPROPion 150 MG 24 hr tablet    WELLBUTRIN XL    180 tablet    Take 1 tablet (150 mg) by mouth every morning    Major depression in complete remission (H)       CALCIUM 600+D 600-200 MG-UNIT Tabs   Generic drug:  calcium carbonate-vitamin D      Take  by mouth.        cloNIDine 0.2 MG tablet    CATAPRES    90 tablet    Take 1 tablet (0.2 mg) by mouth daily    Night sweats       fluticasone 50 MCG/ACT spray    FLONASE    16 g    Spray 1-2 sprays into both nostrils daily    Atopic rhinitis       hydroxychloroquine 200 MG tablet    PLAQUENIL    60 tablet    Take 1 tablet (200 mg) by mouth 2 times daily Get annual eye exams for hydroxychloroquine (plaquenil) monitoring and fax to [phone].    Raynaud's disease without gangrene       hydrOXYzine 25 MG tablet    ATARAX    60 tablet    Take 1-2 tablets (25-50 mg) by mouth every 6 hours as needed for anxiety    Panic attack        losartan-hydrochlorothiazide 100-25 MG per tablet    HYZAAR    90 tablet    Take 1 tablet by mouth daily    Hypertension goal BP (blood pressure) < 140/90, CKD (chronic kidney disease) stage 2, GFR 60-89 ml/min       naproxen 500 MG tablet    NAPROSYN    60 tablet    Take 1 tablet (500 mg) by mouth 2 times daily as needed for moderate pain    Inflammatory arthritis       olopatadine HCl 0.2 % Soln    PATADAY    1 Bottle    Place 1 drop into both eyes daily    Other chronic allergic conjunctivitis       omeprazole 40 MG capsule    priLOSEC    30 capsule    Take 1 capsule (40 mg) by mouth daily    Heart burn       order for DME     1 Device    Equipment being ordered: light lamp for seasonal affective disorder    Severe seasonal affective disorder (H)       PROAIR  (90 Base) MCG/ACT Inhaler   Generic drug:  albuterol     8.5 g    INHALE 2 PUFS BY MOUTH EVERY 6 HOURS AS NEEDED FOR SHORTNESS OF BREATH / DYSPNEA    Wheezing       simvastatin 40 MG tablet    ZOCOR    90 tablet    TAKE 1 TABLET (40MG) BY MOUTH EVERY NIGHT AT BEDTIME    Hyperlipidemia LDL goal <100       traZODone 50 MG tablet    DESYREL    90 tablet    TAKE 1 TABLET (50MG) BY MOUTH EVERY NIGHT AT BEDTIME    Insomnia, unspecified type       tretinoin 0.05 % cream    RETIN-A    45 g    Spread a pea size amount into affected area on the face topically at bedtime.  Use sunscreen SPF>30.    Milium       venlafaxine 75 MG 24 hr capsule    EFFEXOR-XR    270 capsule    TAKE THREE CAPSULES BY MOUTH EVERY DAY    Menopausal syndrome (hot flashes)       VITAMIN D (CHOLECALCIFEROL) PO      Take 2,000 Units by mouth daily

## 2018-08-08 NOTE — MR AVS SNAPSHOT
"              After Visit Summary   8/8/2018    Marcelina Estevez    MRN: [id number]           Patient Information     Date Of Birth          1960        Visit Information        Provider Department      8/8/2018 11:30 AM Hilda Lopez MD [facility]        Today's Diagnoses     Localized soft tissue swelling    -  1    SOB (shortness of breath)        Heart burn           Follow-ups after your visit        Your next 10 appointments already scheduled     Aug 08, 2018 12:30 PM CDT   MA SCREENING DIGITAL BILATERAL with MGMA1, MG MA TECH   [facility] ([facility])    [id number] [place] [address] [phone]           Do not use any powder, lotion or deodorant under your arms or on your breast. If you do, we will ask you to remove it before your exam.  Wear comfortable, two-piece clothing.  If you have any allergies, tell your care team.  Bring any previous mammograms from other facilities or have them mailed to the breast center. Three-dimensional (3D) mammograms are available at [place] locations in [facility], [facility], [facility], and [place]. [facility] locations include [place] and [facility] in [place]. Benefits of 3D mammograms include: - Improved rate of cancer detection - Decreases your chance of having to go back for more tests, which means fewer: - \"False-positive\" results (This means that there is an abnormal area but it isn't cancer.) - Invasive testing procedures, such as a biopsy or surgery - Can provide clearer images of the breast if you have dense breast tissue. 3D mammography is an optional exam that anyone can have with a 2D mammogram. It doesn't replace or take the place of a 2D mammogram. 2D mammograms remain an effective screening test for all women.  Not all insurance companies cover the cost of a 3D mammogram. Check with your insurance.            Aug 08, " 2018  1:30 PM CDT   PHYSICAL with Annette Painting MD PhD   [facility] ([facility])    [address] [place] [id number] [phone]            Aug 08, 2018  2:50 PM CDT   XR CHEST 2 VIEWS with MGXR2   [facility] ([facility])    [address] [place] [address] [phone]           Please bring a list of your current medicines to your exam. (Include vitamins, minerals and over-thecounter medicines.) Leave your valuables at home.  Tell your doctor if there is a chance you may be pregnant.  You do not need to do anything special for this exam.            Aug 08, 2018  3:05 PM CDT   US HEAD NECK SOFT TISSUE with MGUS1, MG US TECH   [facility] ([facility])    [address] [place] [id number] [phone]           Please bring a list of your medicines (including vitamins, minerals and over-the-counter drugs). Also, tell your doctor about any allergies you may have. Wear comfortable clothes and leave your valuables at home.  You do not need to do anything special to prepare for your exam.  Please call the Imaging Department at your exam site with any questions.            Feb 06, 2019 11:30 AM CST   Return Visit with Hilda Lopez MD   [facility] ([facility])    [address] [place] [id number] [phone]              Future tests that were ordered for you today     Open Future Orders        Priority Expected Expires Ordered    X-ray Chest 2 views Routine 8/8/2018 3/6/2019 8/8/2018    US head neck soft tissue Routine  3/6/2019 8/8/2018            Who to contact     If you have questions or need follow up information about today's clinic visit or your schedule please contact [facility] directly at [phone].  Normal or non-critical lab and imaging results will be communicated to you by Bettye  "letter or phone within 4 business days after the clinic has received the results. If you do not hear from us within 7 days, please contact the clinic through tabulate or phone. If you have a critical or abnormal lab result, we will notify you by phone as soon as possible.  Submit refill requests through tabulate or call your pharmacy and they will forward the refill request to us. Please allow 3 business days for your refill to be completed.          Additional Information About Your Visit        tabulate Information     tabulate is an electronic gateway that provides easy, online access to your medical records. With tabulate, you can request a clinic appointment, read your test results, renew a prescription or communicate with your care team.     To sign up for tabulate visit the website at [email or web address]   You will be asked to enter the access code listed below, as well as some personal information. Please follow the directions to create your username and password.     Your access code is: [email or web address]  Expires: 2018 12:22 PM     Your access code will  in 90 days. If you need help or a new code, please contact your [facility] or call [phone] for assistance.        Care EveryWhere ID     This is your Care EveryWhere ID. This could be used by other organizations to access your [facility] medical records  [id number]        Your Vitals Were     Pulse Height Last Period Pulse Oximetry BMI (Body Mass Index)       76 1.568 m (5' 1.75\") 2014 96% 27.82 kg/m2        Blood Pressure from Last 3 Encounters:   18 120/77   18 124/76   18 (!) 142/93    Weight from Last 3 Encounters:   18 68.4 kg (150 lb 14.4 oz)   18 70.9 kg (156 lb 3.2 oz)   18 72.1 kg (159 lb)                 Today's Medication Changes          These changes are accurate as of 18 12:22 PM.  If you have any questions, ask your nurse or doctor.               Start " taking these medicines.        Dose/Directions    omeprazole 40 MG capsule   Commonly known as:  priLOSEC   Used for:  Heart burn   Started by:  Hilda Lopez MD        Dose:  40 mg   Take 1 capsule (40 mg) by mouth daily   Quantity:  30 capsule   Refills:  3            Where to get your medicines      These medications were sent to [facility] - [place], [place] - [address], [address]  [address], [facility] [address]     Phone:  [phone]     omeprazole 40 MG capsule                Primary Care Provider Office Phone # Fax #    Annette Painting MD PhD [phone]       [address]  [facility] [address]        Equal Access to Services     [facility] : Hadii aad ku hadasho Soomaali, waaxda luqadaha, qaybta kaalmada adeegyada, faith jones . So [facility] [phone].    ATENCIÓN: Si habla español, tiene a mcgovern disposición servicios gratuitos de asistencia lingüística. [facility] [phone].    We comply with applicable federal civil rights laws and [place] laws. We do not discriminate on the basis of race, color, national origin, age, disability, sex, sexual orientation, or gender identity.            Thank you!     Thank you for choosing [facility]  for your care. Our goal is always to provide you with excellent care. Hearing back from our patients is one way we can continue to improve our services. Please take a few minutes to complete the written survey that you may receive in the mail after your visit with us. Thank you!             Your Updated Medication List - Protect others around you: Learn how to safely use, store and throw away your medicines at www.disposemymeds.org.          This list is accurate as of 8/8/18 12:22 PM.  Always use your most recent med list.                   Brand Name Dispense Instructions for use Diagnosis    acetaminophen-codeine 300-30 MG per tablet    TYLENOL #3    30 tablet     Take 1 tablet by mouth every 4 hours as needed for moderate pain    Tension headache       ALLEGRA ALLERGY 180 MG tablet   Generic drug:  fexofenadine     90 tablet    Take 1 tablet (180 mg) by mouth daily    Atopic rhinitis       ALPRAZolam 0.25 MG tablet    XANAX    30 tablet    Take 1 tablet (0.25 mg) by mouth daily as needed for anxiety    Panic attack       buPROPion 150 MG 24 hr tablet    WELLBUTRIN XL    180 tablet    Take 1 tablet (150 mg) by mouth every morning    Major depression in complete remission (H)       CALCIUM 600+D 600-200 MG-UNIT Tabs   Generic drug:  calcium carbonate-vitamin D      Take  by mouth.        cloNIDine 0.2 MG tablet    CATAPRES    90 tablet    Take 1 tablet (0.2 mg) by mouth daily    Night sweats       fluticasone 50 MCG/ACT spray    FLONASE    16 g    Spray 1-2 sprays into both nostrils daily    Atopic rhinitis       hydroxychloroquine 200 MG tablet    PLAQUENIL    60 tablet    Take 1 tablet (200 mg) by mouth 2 times daily Get annual eye exams for hydroxychloroquine (plaquenil) monitoring and fax to [phone].    Raynaud's disease without gangrene       hydrOXYzine 25 MG tablet    ATARAX    60 tablet    Take 1-2 tablets (25-50 mg) by mouth every 6 hours as needed for anxiety    Panic attack       losartan-hydrochlorothiazide 100-25 MG per tablet    HYZAAR    90 tablet    Take 1 tablet by mouth daily    Hypertension goal BP (blood pressure) < 140/90, CKD (chronic kidney disease) stage 2, GFR 60-89 ml/min       naproxen 500 MG tablet    NAPROSYN    60 tablet    Take 1 tablet (500 mg) by mouth 2 times daily as needed for moderate pain    Inflammatory arthritis       olopatadine HCl 0.2 % Soln    PATADAY    1 Bottle    Place 1 drop into both eyes daily    Other chronic allergic conjunctivitis       omeprazole 40 MG capsule    priLOSEC    30 capsule    Take 1 capsule (40 mg) by mouth daily    Heart burn       order for DME     1 Device    Equipment being ordered: light lamp for  seasonal affective disorder    Severe seasonal affective disorder (H)       PROAIR  (90 Base) MCG/ACT Inhaler   Generic drug:  albuterol     8.5 g    INHALE 2 PUFS BY MOUTH EVERY 6 HOURS AS NEEDED FOR SHORTNESS OF BREATH / DYSPNEA    Wheezing       simvastatin 40 MG tablet    ZOCOR    90 tablet    TAKE 1 TABLET (40MG) BY MOUTH EVERY NIGHT AT BEDTIME    Hyperlipidemia LDL goal <100       traZODone 50 MG tablet    DESYREL    90 tablet    TAKE 1 TABLET (50MG) BY MOUTH EVERY NIGHT AT BEDTIME    Insomnia, unspecified type       tretinoin 0.05 % cream    RETIN-A    45 g    Spread a pea size amount into affected area on the face topically at bedtime.  Use sunscreen SPF>30.    Milium       venlafaxine 75 MG 24 hr capsule    EFFEXOR-XR    270 capsule    TAKE THREE CAPSULES BY MOUTH EVERY DAY    Menopausal syndrome (hot flashes)       VITAMIN D (CHOLECALCIFEROL) PO      Take 2,000 Units by mouth daily

## 2018-08-08 NOTE — LETTER
August 27, 2018      Marcelina Estevez  [address] [place] [address]              Dear Marcelina,        Enclosed is a copy of your test results.   --Small amount of protein is present in the urine.  This has improved compared to a year ago.  Urine drug screen is appropriate.     --No change in your current medications     If you have additional question, please call or make a follow-up appointment.     Annette Painting MD-PhD                                         Resulted Orders   Pain Drug Scr UR W Rptd Meds   Result Value Ref Range    Pain Drug SCR UR W RPTD Meds FINAL       Comment:      (Note)  ====================================================================  [facility] COMP DRUG ANALYSIS,UR  ====================================================================  Test                             Result       Flag       Units        Drug Present and Declared for Prescription Verification   Alprazolam                     68           EXPECTED   ng/mg creat   Alpha-hydroxyalprazolam        192          EXPECTED   ng/mg creat    Source of alprazolam is a scheduled prescription medication.    Alpha-hydroxyalprazolam is an expected metabolite of alprazolam.   Trazodone                      PRESENT      EXPECTED                 1,3 chlorophenyl piperazine    PRESENT      EXPECTED                  1,3-chlorophenyl piperazine is an expected metabolite of    trazodone.   Venlafaxine                    PRESENT      EXPECTED                 Desmethylvenlafaxine           PRESENT      EXPECTED                  Desmethylvenlafaxine is an expected metabolite of venlafaxine.  Drug P  resent not Declared for Prescription Verification   Carboxy-THC                    202          UNEXPECTED ng/mg creat    Carboxy-THC is a metabolite of tetrahydrocannabinol  (THC).    Source of THC is most commonly illicit, but THC is also present    in a scheduled prescription medication.  Drug Absent but Declared for Prescription Verification    Codeine                        Not Detected UNEXPECTED ng/mg creat   Bupropion                      Not Detected UNEXPECTED               Acetaminophen                  Not Detected UNEXPECTED                Acetaminophen, as indicated in the declared medication list, is    not always detected even when used as directed.   Naproxen                       Not Detected UNEXPECTED               Hydroxyzine                    Not Detected UNEXPECTED               Clonidine                      Not Detected UNEXPECTED              ====================================================================  Test                      Result    Flag   Units        Ref Range        Creatinine              59               mg/dL      >=20            ====================================================================  Declared Medications:  The flagging and interpretation on this report are based on the  following declared medications.  Unexpected results may arise from  inaccuracies in the declared medications.  **Note: The testing scope of this panel includes these medications:  Alprazolam  Bupropion (Wellbutrin)  Clonidine  Codeine (Tylenol 3)  Hydroxyzine (Atarax)  Naproxen  Trazodone  Venlafaxine (Effexor)  **Note: The testing scope of this panel does not include small to  moderate amounts of these reported medications:  Acetaminophen (Tylenol 3)  **Note: The testing scope of this panel does not include following  reported medications:  Calcium (Calcium/Vitamin D)  Fexofenadine (Allegra)  Fluticasone (Flonase)  Hydrochlorothiazide (Hyzaar)  Hydroxychloroquine (Plaquenil)  Losartan (Hyzaar)  Olopatadine (Pataday)  Simvastatin  Tretinoin    Tretinoin (Retin-A)  Vitamin D  Vitamin D (Calcium/Vitamin D)  ====================================================================  For clinical consultation, please call [phone].  ====================================================================  Analysis performed by [facility]  [employer] [place] [address]     Albumin Random Urine Quantitative with Creat Ratio   Result Value Ref Range    Creatinine Urine 58 mg/dL    Albumin Urine mg/L 27 mg/L    Albumin Urine mg/g Cr 46.66 (H) 0 - 25 mg/g Cr

## 2018-08-08 NOTE — PROGRESS NOTES
Rheumatology Clinic Visit     Marcelina sEtevez MRN# [id number]   YOB: 1960 Age: 57 year old     Date of Visit: August 8, 2018  Primary care provider: Annette Painting          Assessment and Plan:   Assessment     -- Raynaud's  -- Puffiness of hands  -- Arthritis particularly in hands  -- Cough and heart burn  -- Neck Soft tissue swelling   -- Positive MINDY >-1:1280 centromere pattern      Ms Estevez is 58 yo female seen in clinic for evaluation of bilateral hand pain.     Raynaud's: She has history of raynaud's for many years. In the last few years she has noted worsening.  She denies any skin breakdown or gangrene.  At present she is able to control her raynaud's with conservative approach like keeping her hands are warm.  I discussed with her regarding trying calcium channel blocker like amlodipine but she would like to wait.       Hand arthritis: She has pain in her hands along with swelling.  She followed with Dr. Egan at [facility] [place] and had autoimmune workup which revealed a high titer positive MINDY of 1: 1280 with centromere pattern.  The rest of her SUSIE panel, double-stranded DNA antibodies were negative.  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 Repeat autoimmune workup in the last office visit revealed high titer anticentromere antibody.     Pain in her hands may be related to limited cutaneous systemic sclerosis/CREST I recommended her to try Plaquenil 200 mg twice daily on the last office visit but she has not done it. She did not start it due to side effects of retinopathy.  I discussed with her again that the risk of retinopathy is there but low, moreover annual eye exams are done to help to detect signs of eye disease.  She will think about it.     Limited cutaneous systemic sclerosis/CREST : She has features of raynaud's, esophageal dysmotility and telangiectasias. She also got 2D echo and PFTs for pulmonary hypertension/ILD.  She has normal echo and PFTs.  For her  esophageal dysmotility I recommended her to take small frequent meals.  Will prescribe her omeprazole 40 mg to be taken daily before meals.     Soft tissue swelling -there is no soft tissue swelling on neck above the clavicles - Will get ultrasound of soft tissue neck and look for evidence of any mass, aneurysm etc.  Mass is not pulsatile and the likelihood of aneurysm is low.     --Trigger finger : She had left fourth trigger finger and received steroid injection through sports medicine which helped. It has resolved now.         Plan    --Ultrasound soft tissue neck to evaluate for the neck swelling.    --Chest x-ray will be done since she has been having increasing cough to look for evidence of interstitial lung disease.    --She complains of pain in her hands.  I recommended her to try Plaquenil 200 mg daily first to see if she notice improvement in her joint pains.  She will think about it.    -- Return to clinic in 6 months          Active Problem List:     Patient Active Problem List    Diagnosis Date Noted     CREST (calcinosis, Raynaud's phenomenon, esophageal dysfunction, sclerodactyly, telangiectasia) (H)      Priority: Medium     Limited systemic sclerosis (H)      Priority: Medium     Raynaud's disease without gangrene 01/29/2018     Priority: Medium     Generalized anxiety disorder 01/08/2018     Priority: Medium     Tension headache 09/28/2017     Priority: Medium     Patient is followed by Annette Painting MD PhD for ongoing prescription of pain medication.  All refills should only be approved by this provider, or covering partner.    Medication(s): Tylenol with codeine (T#3).   Maximum quantity per month: 15  Clinic visit frequency required: Q 6  months     Controlled substance agreement:  Encounter-Level CSA - 08/07/2017:          Controlled Substance Agreement - Scan on 8/16/2017 10:21 AM : CONTROLLED SUBSTANCE AGREEMENT (below)              Pain Clinic evaluation in the past: No    DIRE Total  Score(s):  No flowsheet data found.    Last [facility] website verification:  none   [email or web address]       ACP (advance care planning) 08/19/2015     Priority: Medium     Advance Care Planning 8/19/2015: ACP Review and Resources Provided:  Reviewed chart for advance care plan.  Marcelina Estevez has no plan or code status on file however states presence of ACP document. Copy requested.  Confirmed/documented legally designated decision maker(s). Added by Mary Padilla RN Advance Care Planning Liaison with [employer]           Anemia 01/09/2015     Priority: Medium     Status post total hysterectomy 11/18/2014     Priority: Medium     Allergic rhinitis due to pollen 08/02/2014     Priority: Medium     Menopausal syndrome (hot flashes) 12/19/2013     Priority: Medium     Hyperlipidemia LDL goal <100 06/17/2013     Priority: Medium     CKD (chronic kidney disease) stage 1, GFR 90 ml/min or greater 06/17/2013     Priority: Medium     Moderate episode of recurrent major depressive disorder (H) 09/10/2012     Priority: Medium     Celexa not helping, fluoxetine caused rash.        Hypertension goal BP (blood pressure) < 140/90 01/18/2011     Priority: Medium     Keratitis sicca, bilateral 06/10/2013     Priority: Low     Hypertriglyceridemia 01/18/2011     Priority: Low     Controlled with simvastatin.        Night sweats 01/18/2011     Priority: Low     Worse with menstruation. On Clonidine.        Atopic rhinitis 01/18/2011     Priority: Low     (Problem list name updated by automated process. Provider to review and confirm.)              History of Present Illness:   Marcelina Estevez is a 57 year old female with PMH of raynaud's, osteoarthritis, hand pain, depression seen in the clinic in consultation at request of Keerthi Burr APRN CNP for evaluation of joint pains.     August 8, 2018 -  She did not start Plaquenil due to possible retinal toxicity side effect.  She has had chronic pain in her hands  and puffiness in her fingers.  Does not have skin tightening or difficulty making a fist.  She uses Tylenol for polyarthralgia which helps.  Raynaud's is better in summer.  Lately she has been experiencing more heartburn and had cough with gagging.  Denies any shortness of breath, chest pain or palpitations.  She received steroid injection for trigger finger through sports medicine and it has completely resolved the pain in her finger and locking.    April 16, 2018 : She has pain in her hands. She has left fourth trigger finger. Right second knuckle hurts. Ankles hurt.  She has raynaud's but it is stable at present.  She denies any new gangrene, skin breakdown over her digits.  Her abdomen workup done on the last office visit revealed high titer anticentromere antibody confirming the diagnosis of limited cutaneous systemic sclerosis.  She was given Plaquenil prescription last office visit but she did not start it due to side effects.       HPI from initial visit: C/o Pain in her hands and trigger finger in left hand. Stiffness in the morning. Can not move them or make a fist. complains of mild pain in her knees and shoulders, has hx of knee surgery for meniscal tear. She saw Dr. Egan at   for abnormal MINDY and was diagnosed with possible CREST syndrome.  He did autoimmune workup which revealed positive MINDY 1: 320 with centromere pattern, negative SUSIE panel, double-stranded DNA, rheumatoid serologies. No specific DMARD therapy was started. On her last visit 1/11/2018 she was noted to have left elbow swelling with some white discharge.  To rule out possibility of calcinosis she had x-ray of the left elbow which was normal.  She was given empiric antibiotics for possible bursitis and it improved the swelling and pain.    She also reports having raynaud's for the past 20 years.  Denies any gangrene or infarcts over her fingertips. Denies malar rash, photosensitivity, recurrent  mouth/genital ulcers, sicca symptoms, pleuritic chest pains, recurrent sinusitis/rhinitis, swallowing difficulty, hearing or visual changes recently. No h/o arterial/venous thrombosis in the past. Denies any tick bite, recent GI/ infection.            Review of Systems:   Review Of Systems  Constitutional: denies fever, chills, night sweats and weight loss.  Skin: No skin rash.  Eyes: No dryness or irritation in eyes. No episode of eye inflammation or redness.   Ears/Nose/Throat: no recurrent sinus infections.  Respiratory: No shortness of breath, dyspnea on exertion, cough, or hemoptysis  Cardiovascular: no chest pain or palpitations.  Gastrointestinal: no nausea, vomiting, abdominal pain.  Normal bowel movements.  Genitourinary: no dysuria, frequency  or hematuria.  Musculoskeletal: as in HPI  Neurologic: no numbness, tingling.  Psychiatric: no mood disorders.  Hematologic/Lymphatic/Immunologic: no history of easy bruising, petechia or purpura.  No abnormal bleeding.   Endocrine: no h/o thyroid disease or Diabetes.                  Past Medical History:     Past Medical History:   Diagnosis Date     CREST (calcinosis, Raynaud's phenomenon, esophageal dysfunction, sclerodactyly, telangiectasia) (H)      Hyperlipidemia      Hypertension      Limited systemic sclerosis (H)      Positive MINDY (antinuclear antibody)      Raynaud disease      Past Surgical History:   Procedure Laterality Date     APPENDECTOMY       BIOPSY      cervical node     DILATION AND CURETTAGE, HYSTEROSCOPY DIAGNOSTIC, COMBINED  7/1/2014    Procedure: COMBINED DILATION AND CURETTAGE, HYSTEROSCOPY DIAGNOSTIC;  Surgeon: Mana Cabrera DO;  Location: [facility] OR     ENT SURGERY      tonsillectomy and adnoid removal     HYSTERECTOMY TOTAL ABDOMINAL       ORTHOPEDIC SURGERY      left knee tear meniscus     RELEASE CARPAL TUNNEL BILATERAL  2009            Social History:     Social History     Occupational History     Not on file.     Social History  Main Topics     Smoking status: Former Smoker     Quit date: 1/1/2001     Smokeless tobacco: Never Used     Alcohol use Yes      Comment: social     Drug use: No     Sexual activity: Yes     Partners: Male     Birth control/ protection: Surgical      Comment: vasectomy            Family History:     Family History   Problem Relation Age of Onset     Osteoperosis Mother      Eye Disorder Mother      cataract, mac degen     Osteoperosis Father      Eye Disorder Father      glaucoma     Hypertension Maternal Grandmother      Unknown/Adopted Paternal Grandfather      Eye Disorder Paternal Grandmother      glaucoma     Hypertension Daughter      Diabetes Maternal Grandfather      Diabetes Other             Allergies:     Allergies   Allergen Reactions     Seasonal Allergies      Sulfa Drugs      Vomiting       Vicodin [Hydrocodone-Acetaminophen] Nausea            Medications:     Current Outpatient Prescriptions   Medication Sig Dispense Refill     acetaminophen-codeine (TYLENOL #3) 300-30 MG per tablet Take 1 tablet by mouth every 4 hours as needed for moderate pain 30 tablet 0     ALLEGRA ALLERGY 180 MG tablet Take 1 tablet (180 mg) by mouth daily 90 tablet 3     ALPRAZolam (XANAX) 0.25 MG tablet Take 1 tablet (0.25 mg) by mouth daily as needed for anxiety 30 tablet 2     buPROPion (WELLBUTRIN XL) 150 MG 24 hr tablet Take 1 tablet (150 mg) by mouth every morning 180 tablet 3     cloNIDine (CATAPRES) 0.2 MG tablet Take 1 tablet (0.2 mg) by mouth daily 90 tablet 3     fluticasone (FLONASE) 50 MCG/ACT spray Spray 1-2 sprays into both nostrils daily 16 g 5     hydrOXYzine (ATARAX) 25 MG tablet Take 1-2 tablets (25-50 mg) by mouth every 6 hours as needed for anxiety 60 tablet 1     losartan-hydrochlorothiazide (HYZAAR) 100-25 MG per tablet Take 1 tablet by mouth daily 90 tablet 3     olopatadine HCl (PATADAY) 0.2 % SOLN Place 1 drop into both eyes daily 1 Bottle 6     order for DME Equipment being ordered: light lamp for  "seasonal affective disorder 1 Device 0     PROAIR  (90 Base) MCG/ACT inhaler INHALE 2 PUFS BY MOUTH EVERY 6 HOURS AS NEEDED FOR SHORTNESS OF BREATH / DYSPNEA 8.5 g 0     simvastatin (ZOCOR) 40 MG tablet TAKE 1 TABLET (40MG) BY MOUTH EVERY NIGHT AT BEDTIME 90 tablet 0     traZODone (DESYREL) 50 MG tablet TAKE 1 TABLET (50MG) BY MOUTH EVERY NIGHT AT BEDTIME 90 tablet 2     tretinoin (RETIN-A) 0.05 % cream Spread a pea size amount into affected area on the face topically at bedtime.  Use sunscreen SPF>30. 45 g 2     venlafaxine (EFFEXOR-XR) 75 MG 24 hr capsule TAKE THREE CAPSULES BY MOUTH EVERY  capsule 1     Calcium 600+D 600-200 MG-UNIT TABS Take  by mouth.       hydroxychloroquine (PLAQUENIL) 200 MG tablet Take 1 tablet (200 mg) by mouth 2 times daily Get annual eye exams for hydroxychloroquine (plaquenil) monitoring and fax to [phone]. (Patient not taking: Reported on 5/22/2018) 60 tablet 3     naproxen (NAPROSYN) 500 MG tablet Take 1 tablet (500 mg) by mouth 2 times daily as needed for moderate pain (Patient not taking: Reported on 5/22/2018) 60 tablet 1     VITAMIN D, CHOLECALCIFEROL, PO Take 2,000 Units by mouth daily              Physical Exam:   Blood pressure 120/77, pulse 76, height 1.568 m (5' 1.75\"), weight 68.4 kg (150 lb 14.4 oz), last menstrual period 06/12/2014, SpO2 96 %, not currently breastfeeding.  Wt Readings from Last 4 Encounters:   08/08/18 68.4 kg (150 lb 14.4 oz)   05/22/18 70.9 kg (156 lb 3.2 oz)   04/30/18 72.1 kg (159 lb)   04/16/18 71.2 kg (157 lb)       Constitutional: well-developed, appearing stated age; cooperative  Eyes: nl EOM, PERRLA, vision, conjunctiva, sclera  ENT: nl external ears, nose, hearing, lips, teeth, gums, throat  No mucous membrane lesions, normal saliva pool  Neck: no mass or thyroid enlargement  Resp: lungs clear to auscultation, nl to palpation  CV: RRR, no murmurs, rubs or gallops, no edema  GI: no ABD mass or tenderness, no HSM  : not " tested  Lymph: no cervical, supraclavicular, inguinal or epitrochlear nodes    MS: All TMJ, neck, shoulder, elbow, wrist, MCP/PIP/DIP, spine, hip, knee, ankle, and foot MTP/IP joints were examined.    -- Puffiness of hands, some tightness of the skin distal to the MCP joints, no active raynaud's.  Difficulty making a fist due to stiffness. Tenderness noted over MCP, PIP joints   -- Tenderness present over Anterior aspect of bilateral shoulders  -- No tenderness present over bilateral ankles, MTP joints  -- Left elbow swelling and erythema improved, no tenderness or discharge.  -- She has telangiectasia over forehead, lower lip.   -- No dactylitis,  tenosynovitis, enthesopathy.    Skin: no nail pitting, alopecia, rash, nodules or lesions  Neuro: nl cranial nerves, strength, sensation, DTRs.   Psych: nl judgement, orientation, memory, affect.         Data:     Results for orders placed or performed in visit on 06/12/18   Albumin level   Result Value Ref Range    Albumin 3.7 3.4 - 5.0 g/dL   ALT   Result Value Ref Range    ALT 35 0 - 50 U/L   AST   Result Value Ref Range    AST 27 0 - 45 U/L   CBC with platelets differential   Result Value Ref Range    WBC 5.2 4.0 - 11.0 10e9/L    RBC Count 3.38 (L) 3.8 - 5.2 10e12/L    Hemoglobin 10.9 (L) 11.7 - 15.7 g/dL    Hematocrit 32.8 (L) 35.0 - 47.0 %    MCV 97 78 - 100 fl    MCH 32.2 26.5 - 33.0 pg    MCHC 33.2 31.5 - 36.5 g/dL    RDW 12.9 10.0 - 15.0 %    Platelet Count 220 150 - 450 10e9/L    Diff Method Automated Method     % Neutrophils 67.8 %    % Lymphocytes 17.7 %    % Monocytes 10.6 %    % Eosinophils 2.3 %    % Basophils 1.2 %    % Immature Granulocytes 0.4 %    Absolute Neutrophil 3.5 1.6 - 8.3 10e9/L    Absolute Lymphocytes 0.9 0.8 - 5.3 10e9/L    Absolute Monocytes 0.6 0.0 - 1.3 10e9/L    Absolute Eosinophils 0.1 0.0 - 0.7 10e9/L    Absolute Basophils 0.1 0.0 - 0.2 10e9/L    Abs Immature Granulocytes 0.0 0 - 0.4 10e9/L   CRP inflammation   Result Value Ref Range     CRP Inflammation <2.9 0.0 - 8.0 mg/L   **Basic metabolic panel FUTURE 14d   Result Value Ref Range    Sodium 139 133 - 144 mmol/L    Potassium 4.5 3.4 - 5.3 mmol/L    Chloride 105 94 - 109 mmol/L    Carbon Dioxide 27 20 - 32 mmol/L    Anion Gap 7 3 - 14 mmol/L    Glucose 99 70 - 99 mg/dL    Urea Nitrogen 19 7 - 30 mg/dL    Creatinine 0.91 0.52 - 1.04 mg/dL    GFR Estimate 64 >60 mL/min/1.7m2    GFR Estimate If Black 77 >60 mL/min/1.7m2    Calcium 9.1 8.5 - 10.1 mg/dL       Recent Labs   Lab Test  12/15/17   1232  08/02/17   0805  06/29/16   1042   12/15/14   1233  09/24/14   0953  08/01/14   1051   12/30/11   1247   WBC   --    --    --    --   5.8   --   6.8   --   9.1   RBC   --    --    --    --   3.73*   --   3.80   --   3.95   HGB   --    --    --    --   11.4*   --   12.4   --   13.0   HCT   --    --    --    --   33.7*   --   35.6   --   37.4   MCV   --    --    --    --   90   --   94   --   95   RDW   --    --    --    --   15.0   --   13.4   --   13.5   PLT   --    --    --    --   255   --   214   --   214   ALBUMIN   --    --    --    --    --    --    --    --   4.7   CRP  6.8   --    --    --    --    --    --    --    --    BUN  18  30  16   < >   --    --   18   < >  16   CREAT   --    --    --    --    --   0.9   --    --    --     < > = values in this interval not displayed.      Recent Labs   Lab Test  01/09/17   1519   TSH  1.18     Hemoglobin   Date Value Ref Range Status   06/12/2018 10.9 (L) 11.7 - 15.7 g/dL Final   12/15/2014 11.4 (L) 11.7 - 15.7 g/dL Final   08/01/2014 12.4 11.7 - 15.7 g/dL Final     Urea Nitrogen   Date Value Ref Range Status   06/12/2018 19 7 - 30 mg/dL Final   05/22/2018 26 7 - 30 mg/dL Final   12/15/2017 18 7 - 30 mg/dL Final     Creatinine   Date Value Ref Range Status   09/24/2014 0.9 0.52 - 1.04 mg/dL Final     Sed Rate   Date Value Ref Range Status   05/22/2018 18 0 - 30 mm/h Final   12/15/2017 23 0 - 30 mm/h Final   12/30/2011 10 0 - 30 mm/h Final     CRP  Inflammation   Date Value Ref Range Status   06/12/2018 <2.9 0.0 - 8.0 mg/L Final   05/22/2018 3.5 0.0 - 8.0 mg/L Final   12/15/2017 6.8 0.0 - 8.0 mg/L Final     AST   Date Value Ref Range Status   06/12/2018 27 0 - 45 U/L Final   12/30/2011 31 0 - 45 U/L Final     Albumin   Date Value Ref Range Status   06/12/2018 3.7 3.4 - 5.0 g/dL Final   12/30/2011 4.7 3.9 - 5.1 g/dL Final     Alkaline Phosphatase   Date Value Ref Range Status   12/30/2011 53 40 - 150 U/L Final     ALT   Date Value Ref Range Status   06/12/2018 35 0 - 50 U/L Final   06/29/2016 37 0 - 50 U/L Final   06/24/2015 58 (H) 0 - 50 U/L Final     Rheumatoid Factor   Date Value Ref Range Status   12/15/2017 <20 <20 IU/mL Final     Recent Labs   Lab Test  06/12/18   1145  05/22/18   1118  12/15/17   1232   01/09/17   1519  06/29/16   1042  06/24/15   0751  12/15/14   1233  08/01/14   1051   12/30/11   1247   WBC  5.2   --    --    --    --    --    --   5.8  6.8   --   9.1   HGB  10.9*   --    --    --    --    --    --   11.4*  12.4   --   13.0   HCT  32.8*   --    --    --    --    --    --   33.7*  35.6   --   37.4   MCV  97   --    --    --    --    --    --   90  94   --   95   PLT  220   --    --    --    --    --    --   255  214   --   214   BUN  19  26  18   < >   --   16  12   --   18   < >  16   TSH   --    --    --    --   1.18   --    --    --    --    --    --    AST  27   --    --    --    --    --    --    --    --    --   31   ALT  35   --    --    --    --   37  58*   --   25   < >  28   ALKPHOS   --    --    --    --    --    --    --    --    --    --   53    < > = values in this interval not displayed.       Outside studies reviewed:   Component Name  12/28/2017 1/22/2013 1/17/2013         4     NEGATIVE     Negative Negative     Negative Negative     Negative Negative     Negative Negative     <1:20       Anti CCP   Anti DNA Farida   Anti RNP Farida   Anti SM Farida   Anti SSA Farida   Anti SSB Farida   Anti-Neutrophil Cytoplasmic Farida IGG                             Component Name  3/9/2017     Nonreactive   Hep C Farida       Reviewed Rheumatology lab flowsheet    Hilda Lopez MD  [facility]  Department of Rheumatology & Autoimmune Disorders  [facility]: [phone]   Pager - [phone]

## 2018-08-08 NOTE — PATIENT INSTRUCTIONS
Make appointment(s) for:   -- get urine test today  -- fasting lab appointment.      Check with your insurance regarding coverage for Shingrix (RZV) - the new shingles vaccine.       Medication(s) prescribed today:    Orders Placed This Encounter   Medications     acetaminophen-codeine (TYLENOL #3) 300-30 MG per tablet     Sig: Take 1 tablet by mouth daily as needed for moderate pain     Dispense:  30 tablet     Refill:  0     cloNIDine (CATAPRES) 0.2 MG tablet     Sig: Take 1 tablet (0.2 mg) by mouth daily     Dispense:  90 tablet     Refill:  3     simvastatin (ZOCOR) 40 MG tablet     Sig: TAKE 1 TABLET (40MG) BY MOUTH EVERY NIGHT AT BEDTIME     Dispense:  90 tablet     Refill:  3           Preventive Health Recommendations  Female Ages 50 - 64    Yearly exam: See your health care provider every year in order to  o Review health changes.   o Discuss preventive care.    o Review your medicines if your doctor has prescribed any.      Get a Pap test every three years (unless you have an abnormal result and your provider advises testing more often).    If you get Pap tests with HPV test, you only need to test every 5 years, unless you have an abnormal result.     You do not need a Pap test if your uterus was removed (hysterectomy) and you have not had cancer.    You should be tested each year for STDs (sexually transmitted diseases) if you're at risk.     Have a mammogram every 1 to 2 years.    Have a colonoscopy at age 50, or have a yearly FIT test (stool test). These exams screen for colon cancer.      Have a cholesterol test every 5 years, or more often if advised.    Have a diabetes test (fasting glucose) every three years. If you are at risk for diabetes, you should have this test more often.     If you are at risk for osteoporosis (brittle bone disease), think about having a bone density scan (DEXA).    Shots: Get a flu shot each year. Get a tetanus shot every 10 years.    Nutrition:     Eat at least 5 servings  of fruits and vegetables each day.    Eat whole-grain bread, whole-wheat pasta and brown rice instead of white grains and rice.    Get adequate Calcium and Vitamin D.     Lifestyle    Exercise at least 150 minutes a week (30 minutes a day, 5 days a week). This will help you control your weight and prevent disease.    Limit alcohol to one drink per day.    No smoking.     Wear sunscreen to prevent skin cancer.     See your dentist every six months for an exam and cleaning.    See your eye doctor every 1 to 2 years.

## 2018-08-08 NOTE — PROGRESS NOTES
SUBJECTIVE:   CC: Marcelina Estevez is an 58 year old woman who presents for preventive health visit.     Healthy Habits:    Do you get at least three servings of calcium containing foods daily (dairy, green leafy vegetables, etc.)? yes    Amount of exercise or daily activities, outside of work: 2 day(s) per week    Problems taking medications regularly No    Medication side effects: No    Have you had an eye exam in the past two years? yes    Do you see a dentist twice per year? yes    Do you have sleep apnea, excessive snoring or daytime drowsiness?no      PROBLEMS TO ADD ON...  Lesion back of left shoulder, changing.   Saw rheumatology. Noted neck fullness. Has neck US ordered.     Today's PHQ-2 Score:   PHQ-2 ( 1999 [employer]) 12/15/2017 8/7/2017   Q1: Little interest or pleasure in doing things 3 1   Q2: Feeling down, depressed or hopeless 3 1   PHQ-2 Score 6 2       Abuse: Current or Past(Physical, Sexual or Emotional)- No  Do you feel safe in your environment - Yes    Social History   Substance Use Topics     Smoking status: Former Smoker     Quit date: 1/1/2001     Smokeless tobacco: Never Used     Alcohol use Yes      Comment: social     If you drink alcohol do you typically have >3 drinks per day or >7 drinks per week? No                     Reviewed orders with patient.  Reviewed health maintenance and updated orders accordingly - Yes  Labs reviewed in [facility]    Patient over age 50, mutual decision to screen reflected in health maintenance.  Patient under age 50, mutual decision reflected in health maintenance.      Pertinent mammograms are reviewed under the imaging tab.  History of abnormal Pap smear: Status post benign hysterectomy. Health Maintenance and Surgical History updated.  PAP / HPV 5/2/2014 5/23/2011   PAP OTHER-NIL, See Result NIL     Reviewed and updated as needed this visit by clinical staff  Tobacco  Allergies  Meds  Med Hx  Surg Hx  Fam Hx  Soc Hx        Reviewed and updated as needed  "this visit by Provider            ROS:  CONSTITUTIONAL: NEGATIVE for fever, chills, change in weight  INTEGUMENTARY/SKIN: NEGATIVE for worrisome rashes, moles or lesions  EYES: NEGATIVE for vision changes or irritation  ENT: NEGATIVE for ear, mouth and throat problems  RESP: NEGATIVE for significant cough or SOB  BREAST: NEGATIVE for masses, tenderness or discharge  CV: NEGATIVE for chest pain, palpitations or peripheral edema  GI: NEGATIVE for nausea, abdominal pain, heartburn, or change in bowel habits  : NEGATIVE for unusual urinary or vaginal symptoms. No vaginal bleeding.  MUSCULOSKELETAL: NEGATIVE for significant arthralgias or myalgia  NEURO: NEGATIVE for weakness, dizziness or paresthesias  PSYCHIATRIC: NEGATIVE for changes in mood or affect     OBJECTIVE:   /70  Pulse 79  Temp 97.9  F (36.6  C) (Temporal)  Ht 5' 0.5\" (1.537 m)  Wt 153 lb (69.4 kg)  LMP 06/12/2014  SpO2 98%  BMI 29.39 kg/m2  EXAM:  GENERAL: healthy, alert and no distress  EYES: Eyes grossly normal to inspection, PERRL and conjunctivae and sclerae normal  HENT: ear canals and TM's normal, nose and mouth without ulcers or lesions  NECK: no adenopathy, no asymmetry, masses, or scars and thyroid normal to palpation; puffiness at the base of the neck bilaterally, no discrete mass felt.   RESP: lungs clear to auscultation - no rales, rhonchi or wheezes  BREAST: normal without masses, tenderness or nipple discharge and no palpable axillary masses or adenopathy  CV: regular rate and rhythm, normal S1 S2, no S3 or S4, no murmur, click or rub, no peripheral edema and peripheral pulses strong  ABDOMEN: soft, nontender, no hepatosplenomegaly, no masses and bowel sounds normal  MS: no gross musculoskeletal defects noted, no edema  SKIN: no suspicious lesions or rashes  NEURO: Normal strength and tone, mentation intact and speech normal  PSYCH: mentation appears normal, affect normal/bright    Diagnostic Test Results:  Results for orders " "placed or performed in visit on 08/08/18 (from the past 24 hour(s))   Albumin Random Urine Quantitative with Creat Ratio   Result Value Ref Range    Creatinine Urine 58 mg/dL    Albumin Urine mg/L 27 mg/L    Albumin Urine mg/g Cr 46.66 (H) 0 - 25 mg/g Cr       ASSESSMENT/PLAN:       ICD-10-CM    1. Routine general medical examination at a health care facility Z00.00    2. Screening for HIV (human immunodeficiency virus) Z11.4 HIV Antigen Antibody Combo   3. Hypertension goal BP (blood pressure) < 140/90 I10    4. CKD (chronic kidney disease) stage 1, GFR 90 ml/min or greater N18.1 Albumin Random Urine Quantitative with Creat Ratio     Albumin Random Urine Quantitative with Creat Ratio   5. Hyperlipidemia LDL goal <100 E78.5 Lipid panel reflex to direct LDL Fasting     simvastatin (ZOCOR) 40 MG tablet   6. Neck fullness R22.1 CBC with platelets and differential   7. Tension headache G44.209 acetaminophen-codeine (TYLENOL #3) 300-30 MG per tablet     Drug  Screen Comprehensive , Urine with Reported Meds (MedTox) (Pain Care Package)     DISCONTINUED: acetaminophen-codeine (TYLENOL #3) 300-30 MG per tablet   8. Night sweats R61 cloNIDine (CATAPRES) 0.2 MG tablet   9. Long term prescription opiate use Z79.891 Drug  Screen Comprehensive , Urine with Reported Meds (MedTox) (Pain Care Package)   10. Episodic tension-type headache, not intractable G44.219 Pain Drug Scr UR W Rptd Meds     -- stable chronic health issues. Medications refilled.    -- monitoring labs.     COUNSELING:   Reviewed preventive health counseling, as reflected in patient instructions    BP Readings from Last 1 Encounters:   08/08/18 102/70     Estimated body mass index is 29.39 kg/(m^2) as calculated from the following:    Height as of this encounter: 5' 0.5\" (1.537 m).    Weight as of this encounter: 153 lb (69.4 kg).           reports that she quit smoking about 17 years ago. She has never used smokeless tobacco.      Counseling Resources:  ATP IV " Guidelines  Pooled Cohorts Equation Calculator  Breast Cancer Risk Calculator  FRAX Risk Assessment  ICSI Preventive Guidelines  Dietary Guidelines for Americans, 2010  [employer]'s MyPlate  ASA Prophylaxis  Lung CA Screening    Annette Painting MD PhD  [facility]

## 2018-08-12 LAB — PAIN DRUG SCR UR W RPTD MEDS: NORMAL

## 2018-08-21 ENCOUNTER — TELEPHONE (OUTPATIENT)
Dept: GASTROENTEROLOGY | Facility: CLINIC | Age: 58
End: 2018-08-21

## 2018-08-21 NOTE — TELEPHONE ENCOUNTER
Hello,    Omeprazole requires a PA due to the inusurance max of 90 in 365 days.  Please contact [employer] at [phone], id # [id number].    Please let us know when approved.      Thank You,  Meagan Bishop  Pharmacy Technician  [facility]  [phone]

## 2018-08-21 NOTE — TELEPHONE ENCOUNTER
MEDICATION APPEAL APPROVED    Medication: OMEPRAZOLE 40 MG CAP-APPROVED   Authorization Effective Date: 8/21/2018  Authorization Expiration Date: 8/21/2021  Approved Dose/Quantity:   Reference #: [id number]   Insurance Company:    Expected CoPay:       CoPay Card Available:      Foundation Assistance Needed:    Which Pharmacy is filling the prescription (Not needed for infusion/clinic administered): [facility] [place], [place] - [address], [address]    THE REQUEST FOR THE PA GOT APPROVED WHILE I WAS ON THE PHONE WITH THE INSURANCE RE.  SHE WILL BE FAXING THE APPROVAL TO THE CENTRAL PA TEAM'S FAX NUMBER.  I CALLED THE FILLING PHARMACY AND IT WAS A REFILL TOO SOON SINCE SHE JUST GOT IT ON 8/8/18; BUT SHE SHOULD BE OK AFTER THAT SINCE THE PA DID GET APPROVED.

## 2018-08-21 NOTE — TELEPHONE ENCOUNTER
PA Initiation    Medication: OMEPRAZOLE 40 MG CAP-APPROVED   Insurance Company: [employer] - Phone [phone] Fax [phone]  Pharmacy Filling the Rx: [facility] [place], [place] - [address], [address]  Filling Pharmacy Phone: [phone]  Filling Pharmacy Fax: [phone]  Start Date: 8/21/2018    INITIATED OVER THE PHONE WITH [employer].  THE REP DID RUN A TEST CLAIM AND IT DID SHOW A PA WAS NEEDED. I ANSWERED THE QUESTIONS OVER THE PHONE.

## 2018-08-27 ENCOUNTER — TELEPHONE (OUTPATIENT)
Dept: PEDIATRICS | Facility: CLINIC | Age: 58
End: 2018-08-27

## 2018-08-27 DIAGNOSIS — G44.209 TENSION HEADACHE: ICD-10-CM

## 2018-08-27 NOTE — PROGRESS NOTES
Send letter with the following comment:         Dear Marcelina,     Enclosed is a copy of your test results.   --Small amount of protein is present in the urine.  This has improved compared to a year ago.  Urine drug screen is appropriate.    --No change in your current medications    If you have additional question, please call or make a follow-up appointment.    Annette Painting MD-PhD

## 2018-08-27 NOTE — TELEPHONE ENCOUNTER
Hello,  last fill date:08-  Last quantity:30    Thank You,  Meagan Bishop  Pharmacy Technician  [facility]  [phone]

## 2018-08-27 NOTE — TELEPHONE ENCOUNTER
Patient is not due until 9/7/18. Please call patient to schedule a clinic appointment before then. Her urine drug screen is abnormal. We'll hold off refill until her appointment. Ok to use same day.

## 2018-08-29 NOTE — TELEPHONE ENCOUNTER
No future appointments scheduled with Dr. Annette Painting.     Patient Contact    Attempt # 1    Was call answered?  No.  Left message on home number to return call to clinic to schedule a follow OV with PCP regarding future refills of medication.  Guille Foy, CMA

## 2018-08-30 NOTE — TELEPHONE ENCOUNTER
Called and spoke to patient. Informed of message. Patient scheduled for 9/10/18 with Dr. Painting.    Fabi Rodgers ROQUE

## 2018-09-04 DIAGNOSIS — I10 HYPERTENSION GOAL BP (BLOOD PRESSURE) < 140/90: ICD-10-CM

## 2018-09-04 DIAGNOSIS — N18.2 CKD (CHRONIC KIDNEY DISEASE) STAGE 2, GFR 60-89 ML/MIN: ICD-10-CM

## 2018-09-07 RX ORDER — LOSARTAN POTASSIUM AND HYDROCHLOROTHIAZIDE 25; 100 MG/1; MG/1
TABLET ORAL
Qty: 90 TABLET | Refills: 3 | OUTPATIENT
Start: 2018-09-07

## 2018-09-07 NOTE — TELEPHONE ENCOUNTER
losartan-hydrochlorothiazide (HYZAAR) 100-25 MG per tablet 90 tablet 3 12/15/2017  No   Sig - Route: Take 1 tablet by mouth daily - Oral       Angiotensin-II Receptors Passed9/4 1:55 PM   Blood pressure under 140/90 in past 12 months    Recent (12 mo) or future (30 days) visit within the authorizing provider's specialty    Patient is age 18 or older    No active pregnancy on record    Normal serum creatinine on file in past 12 months    Normal serum potassium on file in past 12 months    No positive pregnancy test in past 12 months     Pharmacy   [facility] - [place], [place] - [address], [address]     Refill request too soon. Same requesting pharmacy. Aisha Mclaughlin RN

## 2018-09-10 ENCOUNTER — OFFICE VISIT (OUTPATIENT)
Dept: PEDIATRICS | Facility: CLINIC | Age: 58
End: 2018-09-10
Payer: COMMERCIAL

## 2018-09-10 VITALS
BODY MASS INDEX: 29.54 KG/M2 | DIASTOLIC BLOOD PRESSURE: 86 MMHG | WEIGHT: 153.8 LBS | OXYGEN SATURATION: 97 % | SYSTOLIC BLOOD PRESSURE: 135 MMHG | HEART RATE: 92 BPM | TEMPERATURE: 98.8 F

## 2018-09-10 DIAGNOSIS — G44.209 TENSION HEADACHE: Primary | ICD-10-CM

## 2018-09-10 DIAGNOSIS — N18.2 CKD (CHRONIC KIDNEY DISEASE) STAGE 2, GFR 60-89 ML/MIN: ICD-10-CM

## 2018-09-10 DIAGNOSIS — G47.00 INSOMNIA, UNSPECIFIED TYPE: ICD-10-CM

## 2018-09-10 DIAGNOSIS — F33.1 MODERATE EPISODE OF RECURRENT MAJOR DEPRESSIVE DISORDER (H): ICD-10-CM

## 2018-09-10 DIAGNOSIS — E78.5 HYPERLIPIDEMIA LDL GOAL <100: ICD-10-CM

## 2018-09-10 DIAGNOSIS — Z11.4 SCREENING FOR HIV (HUMAN IMMUNODEFICIENCY VIRUS): ICD-10-CM

## 2018-09-10 DIAGNOSIS — F12.90 MARIJUANA USE, EPISODIC: ICD-10-CM

## 2018-09-10 DIAGNOSIS — I73.00 RAYNAUD'S DISEASE WITHOUT GANGRENE: ICD-10-CM

## 2018-09-10 DIAGNOSIS — I10 HYPERTENSION GOAL BP (BLOOD PRESSURE) < 140/90: ICD-10-CM

## 2018-09-10 DIAGNOSIS — Z23 NEED FOR PROPHYLACTIC VACCINATION AND INOCULATION AGAINST INFLUENZA: ICD-10-CM

## 2018-09-10 PROCEDURE — 99214 OFFICE O/P EST MOD 30 MIN: CPT | Mod: 25 | Performed by: INTERNAL MEDICINE

## 2018-09-10 PROCEDURE — 90673 RIV3 VACCINE NO PRESERV IM: CPT | Performed by: INTERNAL MEDICINE

## 2018-09-10 PROCEDURE — 90471 IMMUNIZATION ADMIN: CPT | Performed by: INTERNAL MEDICINE

## 2018-09-10 RX ORDER — METHOCARBAMOL 500 MG/1
1000 TABLET, FILM COATED ORAL DAILY PRN
Qty: 30 TABLET | Refills: 1 | Status: SHIPPED | OUTPATIENT
Start: 2018-09-10 | End: 2019-11-14

## 2018-09-10 RX ORDER — TRAZODONE HYDROCHLORIDE 50 MG/1
TABLET, FILM COATED ORAL
Qty: 90 TABLET | Refills: 2 | Status: SHIPPED | OUTPATIENT
Start: 2018-09-10 | End: 2019-11-14

## 2018-09-10 RX ORDER — LOSARTAN POTASSIUM AND HYDROCHLOROTHIAZIDE 25; 100 MG/1; MG/1
1 TABLET ORAL DAILY
Qty: 90 TABLET | Refills: 3 | Status: SHIPPED | OUTPATIENT
Start: 2018-09-10 | End: 2018-12-05

## 2018-09-10 RX ORDER — HYDROXYCHLOROQUINE SULFATE 200 MG/1
200 TABLET, FILM COATED ORAL 2 TIMES DAILY
Qty: 60 TABLET | Refills: 3 | Status: CANCELLED | OUTPATIENT
Start: 2018-09-10

## 2018-09-10 NOTE — LETTER
My Depression Action Plan  Name: Marcelina Estevez   Date of Birth 1960  Date: 9/10/2018    My doctor: Annette Painting   My clinic: [address] [address] [phone]          GREEN    ZONE   Good Control    What it looks like:     Things are going generally well. You have normal up s and down s. You may even feel depressed from time to time, but bad moods usually last less than a day.   What you need to do:  1. Continue to care for yourself (see self care plan)  2. Check your depression survival kit and update it as needed  3. Follow your physician s recommendations including any medication.  4. Do not stop taking medication unless you consult with your physician first.           YELLOW         ZONE Getting Worse    What it looks like:     Depression is starting to interfere with your life.     It may be hard to get out of bed; you may be starting to isolate yourself from others.    Symptoms of depression are starting to last most all day and this has happened for several days.     You may have suicidal thoughts but they are not constant.   What you need to do:     1. Call your care team, your response to treatment will improve if you keep your care team informed of your progress. Yellow periods are signs an adjustment may need to be made.     2. Continue your self-care, even if you have to fake it!    3. Talk to someone in your support network    4. Open up your depression survival kit           RED    ZONE Medical Alert - Get Help    What it looks like:     Depression is seriously interfering with your life.     You may experience these or other symptoms: You can t get out of bed most days, can t work or engage in other necessary activities, you have trouble taking care of basic hygiene, or basic responsibilities, thoughts of suicide or death that will not go away, self-injurious behavior.     What you need to do:  1. Call your care team and request a same-day  appointment. If they are not available (weekends or after hours) call your local crisis line, emergency room or 911.      Electronically signed by: Annette Painting, September 10, 2018    Depression Self Care Plan / Survival Kit    Self-Care for Depression  Here s the deal. Your body and mind are really not as separate as most people think.  What you do and think affects how you feel and how you feel influences what you do and think. This means if you do things that people who feel good do, it will help you feel better.  Sometimes this is all it takes.  There is also a place for medication and therapy depending on how severe your depression is, so be sure to consult with your medical provider and/ or Behavioral Health Consultant if your symptoms are worsening or not improving.     In order to better manage my stress, I will:    Exercise  Get some form of exercise, every day. This will help reduce pain and release endorphins, the  feel good  chemicals in your brain. This is almost as good as taking antidepressants!  This is not the same as joining a gym and then never going! (they count on that by the way ) It can be as simple as just going for a walk or doing some gardening, anything that will get you moving.      Hygiene   Maintain good hygiene (Get out of bed in the morning, Make your bed, Brush your teeth, Take a shower, and Get dressed like you were going to work, even if you are unemployed).  If your clothes don't fit try to get ones that do.    Diet  I will strive to eat foods that are good for me, drink plenty of water, and avoid excessive sugar, caffeine, alcohol, and other mood-altering substances.  Some foods that are helpful in depression are: complex carbohydrates, B vitamins, flaxseed, fish or fish oil, fresh fruits and vegetables.    Psychotherapy  I agree to participate in Individual Therapy (if recommended).    Medication  If prescribed medications, I agree to take them.  Missing doses can result in serious  side effects.  I understand that drinking alcohol, or other illicit drug use, may cause potential side effects.  I will not stop my medication abruptly without first discussing it with my provider.    Staying Connected With Others  I will stay in touch with my friends, family members, and my primary care provider/team.    Use your imagination  Be creative.  We all have a creative side; it doesn t matter if it s oil painting, sand castles, or mud pies! This will also kick up the endorphins.    Witness Beauty  (AKA stop and smell the roses) Take a look outside, even in mid-winter. Notice colors, textures. Watch the squirrels and birds.     Service to others  Be of service to others.  There is always someone else in need.  By helping others we can  get out of ourselves  and remember the really important things.  This also provides opportunities for practicing all the other parts of the program.    Humor  Laugh and be silly!  Adjust your TV habits for less news and crime-drama and more comedy.    Control your stress  Try breathing deep, massage therapy, biofeedback, and meditation. Find time to relax each day.     My support system    Clinic Contact:  Phone number:    Contact 1:  Phone number:    Contact 2:  Phone number:    Orthodox/:  Phone number:    Therapist:  Phone number:    Local crisis center:    Phone number:    Other community support:  Phone number:

## 2018-09-10 NOTE — TELEPHONE ENCOUNTER
Hello,  last fill date:07-  Last quantity:90    Thank You,  Meagan Bishop  Pharmacy Technician  [facility]  [phone]

## 2018-09-10 NOTE — TELEPHONE ENCOUNTER
losartan-hydrochlorothiazide (HYZAAR) 100-25 MG per tablet 90 tablet 3 12/15/2017  No   Sig - Route: Take 1 tablet by mouth daily - Oral     Last OV with Dr. Painting: 9/10/18 Return next August for her annual--physical    No future apts.     BP Readings from Last 3 Encounters:   09/10/18 135/86   08/08/18 102/70   08/08/18 120/77     Creatinine   Date Value Ref Range Status   06/12/2018 0.91 0.52 - 1.04 mg/dL Final     Potassium   Date Value Ref Range Status   06/12/2018 4.5 3.4 - 5.3 mmol/L Final     Angiotensin-II Receptors Passed9/10 9:07 AM   Blood pressure under 140/90 in past 12 months    Recent (12 mo) or future (30 days) visit within the authorizing provider's specialty    Patient is age 18 or older    No active pregnancy on record    Normal serum creatinine on file in past 12 months    Normal serum potassium on file in past 12 months    No positive pregnancy test in past 12 months     Refilled per [facility] protocol.    Aisha Mclaughlin RN

## 2018-09-10 NOTE — NURSING NOTE
Injectable Influenza Immunization Documentation    1.  Is the person to be vaccinated sick today?  No    2. Does the person to be vaccinated have an allergy to eggs or to a component of the vaccine?  No    3. Has the person to be vaccinated today ever had a serious reaction to influenza vaccine in the past?  No    4. Has the person to be vaccinated ever had Guillain-Lansing syndrome?  No     Form completed by Sarahi ANGULO

## 2018-09-10 NOTE — PATIENT INSTRUCTIONS
Make appointment(s) for:   -- fasting lab appointment.         For severe headaches:   Methocarbamol 1000 mg daily as needed  Tylenol 1000 mg three times a day as needed.         Medication(s) prescribed today:    Orders Placed This Encounter   Medications     traZODone (DESYREL) 50 MG tablet     Sig: TAKE 1 TABLET (50MG) BY MOUTH EVERY NIGHT AT BEDTIME     Dispense:  90 tablet     Refill:  2

## 2018-09-10 NOTE — MR AVS SNAPSHOT
After Visit Summary   9/10/2018    Marcelina Estevez    MRN: [id number]           Patient Information     Date Of Birth          1960        Visit Information        Provider Department      9/10/2018 11:50 AM Annette Painting MD PhD [facility]        Today's Diagnoses     Tension headache    -  1    Raynaud's disease without gangrene        Insomnia, unspecified type        Need for prophylactic vaccination and inoculation against influenza        Screening for HIV (human immunodeficiency virus)        Hyperlipidemia LDL goal <100        Moderate episode of recurrent major depressive disorder (H)          Care Instructions    Make appointment(s) for:   -- fasting lab appointment.         For severe headaches:   Methocarbamol 1000 mg daily as needed  Tylenol 1000 mg three times a day as needed.         Medication(s) prescribed today:    Orders Placed This Encounter   Medications     traZODone (DESYREL) 50 MG tablet     Sig: TAKE 1 TABLET (50MG) BY MOUTH EVERY NIGHT AT BEDTIME     Dispense:  90 tablet     Refill:  2                   Follow-ups after your visit        Your next 10 appointments already scheduled     Feb 06, 2019 11:30 AM CST   Return Visit with Hilda Lopez MD   [facility] ([facility])    [address] [place] [id number] [phone]              Who to contact     If you have questions or need follow up information about today's clinic visit or your schedule please contact [facility] directly at [phone].  Normal or non-critical lab and imaging results will be communicated to you by MyChart, letter or phone within 4 business days after the clinic has received the results. If you do not hear from us within 7 days, please contact the clinic through MyChart or phone. If you have a critical or abnormal lab result, we will notify you by phone as soon as possible.  Submit refill requests through  Car[employer] or call your pharmacy and they will forward the refill request to us. Please allow 3 business days for your refill to be completed.          Additional Information About Your Visit        Carbonite Information     Car[employer]ite is an electronic gateway that provides easy, online access to your medical records. With Carbonite, you can request a clinic appointment, read your test results, renew a prescription or communicate with your care team.     To sign up for [employer]ite visit the website at [email or web address]   You will be asked to enter the access code listed below, as well as some personal information. Please follow the directions to create your username and password.     Your access code is: [email or web address]  Expires: 2018 12:22 PM     Your access code will  in 90 days. If you need help or a new code, please contact your [facility] or call [phone] for assistance.        Care EveryWhere ID     This is your Care EveryWhere ID. This could be used by other organizations to access your [facility] medical records  [id number]        Your Vitals Were     Pulse Temperature Last Period Pulse Oximetry BMI (Body Mass Index)       92 98.8  F (37.1  C) (Oral) 2014 97% 29.54 kg/m2        Blood Pressure from Last 3 Encounters:   09/10/18 135/86   18 102/70   18 120/77    Weight from Last 3 Encounters:   09/10/18 153 lb 12.8 oz (69.8 kg)   18 153 lb (69.4 kg)   18 150 lb 14.4 oz (68.4 kg)              We Performed the Following     DEPRESSION ACTION PLAN (DAP)     FLU VACCINE, SPLIT VIRUS, IM (QUADRIVALENT) [69493]- >3 YRS     Vaccine Administration, Initial [63791]          Today's Medication Changes          These changes are accurate as of 9/10/18 12:32 PM.  If you have any questions, ask your nurse or doctor.               Start taking these medicines.        Dose/Directions    methocarbamol 500 MG tablet   Commonly known as:  ROBAXIN   Used for:   Tension headache   Started by:  Annette Painting MD PhD        Dose:  1000 mg   Take 2 tablets (1,000 mg) by mouth daily as needed for muscle spasms   Quantity:  30 tablet   Refills:  1            Where to get your medicines      These medications were sent to [facility] - [place], [place] - [address], [address]  [address], [address], [facility] [address]     Phone:  [phone]     methocarbamol 500 MG tablet    traZODone 50 MG tablet                Primary Care Provider Office Phone # Fax #    Annette Painting MD PhD [phone]       [address]  [facility] [address]        Equal Access to Services     [facility]: Hadii skye spencero Sosammi, waaxda luqadaha, qaybta kaalmada adeegyada, faith jones . So [facility] [phone].    ATENCIÓN: Si habla español, tiene a mcgovern disposición servicios gratuitos de asistencia lingüística. [facility] [phone].    We comply with applicable federal civil rights laws and [place] laws. We do not discriminate on the basis of race, color, national origin, age, disability, sex, sexual orientation, or gender identity.            Thank you!     Thank you for choosing [facility]  for your care. Our goal is always to provide you with excellent care. Hearing back from our patients is one way we can continue to improve our services. Please take a few minutes to complete the written survey that you may receive in the mail after your visit with us. Thank you!             Your Updated Medication List - Protect others around you: Learn how to safely use, store and throw away your medicines at www.disposemymeds.org.          This list is accurate as of 9/10/18 12:32 PM.  Always use your most recent med list.                   Brand Name Dispense Instructions for use Diagnosis    acetaminophen-codeine 300-30 MG per tablet    TYLENOL #3    30 tablet    Take 1 tablet by mouth daily as needed for moderate  pain    Tension headache       ALLEGRA ALLERGY 180 MG tablet   Generic drug:  fexofenadine     90 tablet    Take 1 tablet (180 mg) by mouth daily    Atopic rhinitis       ALPRAZolam 0.25 MG tablet    XANAX    30 tablet    Take 1 tablet (0.25 mg) by mouth daily as needed for anxiety    Panic attack       buPROPion 150 MG 24 hr tablet    WELLBUTRIN XL    180 tablet    Take 1 tablet (150 mg) by mouth every morning    Major depression in complete remission (H)       CALCIUM 600+D 600-200 MG-UNIT Tabs   Generic drug:  calcium carbonate-vitamin D      Take  by mouth.        cloNIDine 0.2 MG tablet    CATAPRES    90 tablet    Take 1 tablet (0.2 mg) by mouth daily    Night sweats       fluticasone 50 MCG/ACT spray    FLONASE    16 g    Spray 1-2 sprays into both nostrils daily    Atopic rhinitis       hydroxychloroquine 200 MG tablet    PLAQUENIL    60 tablet    Take 1 tablet (200 mg) by mouth 2 times daily Get annual eye exams for hydroxychloroquine (plaquenil) monitoring and fax to [phone].    Raynaud's disease without gangrene       hydrOXYzine 25 MG tablet    ATARAX    60 tablet    Take 1-2 tablets (25-50 mg) by mouth every 6 hours as needed for anxiety    Panic attack       losartan-hydrochlorothiazide 100-25 MG per tablet    HYZAAR    90 tablet    Take 1 tablet by mouth daily    Hypertension goal BP (blood pressure) < 140/90, CKD (chronic kidney disease) stage 2, GFR 60-89 ml/min       methocarbamol 500 MG tablet    ROBAXIN    30 tablet    Take 2 tablets (1,000 mg) by mouth daily as needed for muscle spasms    Tension headache       naproxen 500 MG tablet    NAPROSYN    60 tablet    Take 1 tablet (500 mg) by mouth 2 times daily as needed for moderate pain    Inflammatory arthritis       olopatadine HCl 0.2 % Soln    PATADAY    1 Bottle    Place 1 drop into both eyes daily    Other chronic allergic conjunctivitis       omeprazole 40 MG capsule    priLOSEC    30 capsule    Take 1 capsule (40 mg) by mouth daily     Heart burn       order for DME     1 Device    Equipment being ordered: light lamp for seasonal affective disorder    Severe seasonal affective disorder (H)       PROAIR  (90 Base) MCG/ACT inhaler   Generic drug:  albuterol     8.5 g    INHALE 2 PUFS BY MOUTH EVERY 6 HOURS AS NEEDED FOR SHORTNESS OF BREATH / DYSPNEA    Wheezing       simvastatin 40 MG tablet    ZOCOR    90 tablet    TAKE 1 TABLET (40MG) BY MOUTH EVERY NIGHT AT BEDTIME    Hyperlipidemia LDL goal <100       traZODone 50 MG tablet    DESYREL    90 tablet    TAKE 1 TABLET (50MG) BY MOUTH EVERY NIGHT AT BEDTIME    Insomnia, unspecified type       tretinoin 0.05 % cream    RETIN-A    45 g    Spread a pea size amount into affected area on the face topically at bedtime.  Use sunscreen SPF>30.    Milium       venlafaxine 75 MG 24 hr capsule    EFFEXOR-XR    270 capsule    TAKE THREE CAPSULES BY MOUTH EVERY DAY    Menopausal syndrome (hot flashes)       VITAMIN D (CHOLECALCIFEROL) PO      Take 2,000 Units by mouth daily

## 2018-09-10 NOTE — PROGRESS NOTES
SUBJECTIVE:   Marcelina Estevez is a 58 year old female who presents to clinic today for the following health issues:    Results - Discuss results of recent drug screen    Refill Request - Patient requesting refills of these medications  Signed Prescriptions:                        Disp   Refills    traZODone (DESYREL) 50 MG tablet           90 tab*2        Sig: TAKE 1 TABLET (50MG) BY MOUTH EVERY NIGHT AT BEDTIME  Authorizing Provider: TADEO WILKS    methocarbamol (ROBAXIN) 500 MG tablet      30 tab*1        Sig: Take 2 tablets (1,000 mg) by mouth daily as needed           for muscle spasms  Authorizing Provider: TADEO WILKS    Patient has a history of chronic tension headac for some time.  He.  She has been using Tylenol with Codeine 30 tablets per month.  She has signed a controlled medication agreement.  However her recent urine drug screen last month is positive for marijuana.  patient admits to recreational use today.  I discussed with the patient that we are not able to continue to prescribe Tylenol with codeine for her with recreational marijuana use.  This is clearly stated in controlled medication agreement.    She has also been evaluated by rheumatology for a positive MINDY of greater than 1: 1 2 A0 with centromere pattern.  She has not been diagnosed definitively with a specific rheumatologic condition other than Raynaud's, but Plaquenil was prescribed to help with hand arthritis.  Patient reported that she is unable to locate the prescription from her rheumatologist and will like me to write a prescription for her.  In reviewing of her chart, she just met with her rheumatologist last month.  The prescription was sent to our pharmacy in January.    ROS:  Constitutional, HEENT, cardiovascular, pulmonary, gi and gu systems are negative, except as otherwise noted.         Current Outpatient Prescriptions on File Prior to Visit:  ALLEGRA ALLERGY 180 MG tablet Take 1 tablet (180 mg) by mouth daily   ALPRAZolam  (XANAX) 0.25 MG tablet Take 1 tablet (0.25 mg) by mouth daily as needed for anxiety   buPROPion (WELLBUTRIN XL) 150 MG 24 hr tablet Take 1 tablet (150 mg) by mouth every morning   Calcium 600+D 600-200 MG-UNIT TABS Take  by mouth.   cloNIDine (CATAPRES) 0.2 MG tablet Take 1 tablet (0.2 mg) by mouth daily   fluticasone (FLONASE) 50 MCG/ACT spray Spray 1-2 sprays into both nostrils daily   hydroxychloroquine (PLAQUENIL) 200 MG tablet Take 1 tablet (200 mg) by mouth 2 times daily Get annual eye exams for hydroxychloroquine (plaquenil) monitoring and fax to [phone].   hydrOXYzine (ATARAX) 25 MG tablet Take 1-2 tablets (25-50 mg) by mouth every 6 hours as needed for anxiety   losartan-hydrochlorothiazide (HYZAAR) 100-25 MG per tablet Take 1 tablet by mouth daily   naproxen (NAPROSYN) 500 MG tablet Take 1 tablet (500 mg) by mouth 2 times daily as needed for moderate pain   olopatadine HCl (PATADAY) 0.2 % SOLN Place 1 drop into both eyes daily   omeprazole (PRILOSEC) 40 MG capsule Take 1 capsule (40 mg) by mouth daily   order for DME Equipment being ordered: light lamp for seasonal affective disorder   PROAIR  (90 Base) MCG/ACT inhaler INHALE 2 PUFS BY MOUTH EVERY 6 HOURS AS NEEDED FOR SHORTNESS OF BREATH / DYSPNEA   simvastatin (ZOCOR) 40 MG tablet TAKE 1 TABLET (40MG) BY MOUTH EVERY NIGHT AT BEDTIME   tretinoin (RETIN-A) 0.05 % cream Spread a pea size amount into affected area on the face topically at bedtime.  Use sunscreen SPF>30.   venlafaxine (EFFEXOR-XR) 75 MG 24 hr capsule TAKE THREE CAPSULES BY MOUTH EVERY DAY   VITAMIN D, CHOLECALCIFEROL, PO Take 2,000 Units by mouth daily   [DISCONTINUED] traZODone (DESYREL) 50 MG tablet TAKE 1 TABLET (50MG) BY MOUTH EVERY NIGHT AT BEDTIME     Current Facility-Administered Medications on File Prior to Visit:  sodium chloride (PF) 0.9% PF flush 10 mL          Patient Active Problem List   Diagnosis     Hypertension goal BP (blood pressure) < 140/90      Hypertriglyceridemia     Night sweats     Atopic rhinitis     Moderate episode of recurrent major depressive disorder (H)     Keratitis sicca, bilateral     Hyperlipidemia LDL goal <100     CKD (chronic kidney disease) stage 1, GFR 90 ml/min or greater     Menopausal syndrome (hot flashes)     Allergic rhinitis due to pollen     Status post total hysterectomy     Anemia     ACP (advance care planning)     Tension headache     Generalized anxiety disorder     Raynaud's disease without gangrene     CREST (calcinosis, Raynaud's phenomenon, esophageal dysfunction, sclerodactyly, telangiectasia) (H)     Limited systemic sclerosis (H)     Marijuana use, episodic     Past Surgical History:   Procedure Laterality Date     APPENDECTOMY       BIOPSY      cervical node     DILATION AND CURETTAGE, HYSTEROSCOPY DIAGNOSTIC, COMBINED  7/1/2014    Procedure: COMBINED DILATION AND CURETTAGE, HYSTEROSCOPY DIAGNOSTIC;  Surgeon: Mana Cabrera DO;  Location: [facility] OR     ENT SURGERY      tonsillectomy and adnoid removal     HYSTERECTOMY TOTAL ABDOMINAL       ORTHOPEDIC SURGERY      left knee tear meniscus     RELEASE CARPAL TUNNEL BILATERAL  2009       Social History   Substance Use Topics     Smoking status: Former Smoker     Quit date: 1/1/2001     Smokeless tobacco: Never Used     Alcohol use Yes      Comment: social     Family History   Problem Relation Age of Onset     Osteoporosis Mother      Eye Disorder Mother      cataract, mac degen     Osteoporosis Father      Eye Disorder Father      glaucoma     Hypertension Maternal Grandmother      Unknown/Adopted Paternal Grandfather      Eye Disorder Paternal Grandmother      glaucoma     Hypertension Daughter      Diabetes Maternal Grandfather      Diabetes Other              Problem list, Medication list, Allergies, and Medical/Social/Surgical histories reviewed in [facility] and updated as appropriate.    OBJECTIVE:                                                    /86 (BP  Location: Right arm, Patient Position: Sitting, Cuff Size: Adult Regular)  Pulse 92  Temp 98.8  F (37.1  C) (Oral)  Wt 153 lb 12.8 oz (69.8 kg)  LMP 06/12/2014  SpO2 97%  BMI 29.54 kg/m2    GENERAL: healthy, alert and no distress            ASSESSMENT/PLAN:                                                      58 year old female with the following diagnoses and treatment plan:      ICD-10-CM    1. Tension headache G44.209 methocarbamol (ROBAXIN) 500 MG tablet   2. Raynaud's disease without gangrene I73.00    3. Insomnia, unspecified type G47.00 traZODone (DESYREL) 50 MG tablet   4. Need for prophylactic vaccination and inoculation against influenza Z23 Vaccine Administration, Initial [73573]     HC INFLUENZA VAC (RIV3) RECOMBINANT HA IM     CANCELED: FLU VACCINE, SPLIT VIRUS, IM (QUADRIVALENT) [38896]- >3 YRS   5. Screening for HIV (human immunodeficiency virus) Z11.4    6. Hyperlipidemia LDL goal <100 E78.5    7. Moderate episode of recurrent major depressive disorder (H) F33.1 DEPRESSION ACTION PLAN (DAP)   8. Marijuana use, episodic F12.90        --I discussed with the patient the rationale of not using Tylenol with codeine in the setting of marijuana use I recommended that she use extra strength Tylenol thousand milligrams 3 times a day as needed.,  And methocarbamol thousand milligrams daily as needed when she has a severe headache.  It happens infrequently.  1-2 times a week.  --Refill trazodone.  Updated depression action plan.  --Advised the patient to go to our pharmacy to get Plaquenil.  I did not write a new prescription for this.  Reminded patient that taking this prescription she needs annual eye exam to follow for potential side effects.  She is agreeable to do so.  --   She is due for a couple of routine monitoring labs including lipid and HIV screen.  She plans to schedule that in the near future.  Return next August for her annual--physical    Will call or return to clinic if worsening or  symptoms not improving as discussed.  See Patient Instructions.      Annette Painting MD-PhD  [facility]    (Note: Chart documentation was done in part with Dragon Voice Recognition software. Although reviewed after completion, some word and grammatical errors may remain.)    Injectable Influenza Immunization Documentation    1.  Is the person to be vaccinated sick today?   No    2. Does the person to be vaccinated have an allergy to a component   of the vaccine?   No  Egg Allergy Algorithm Link    3. Has the person to be vaccinated ever had a serious reaction   to influenza vaccine in the past?   No    4. Has the person to be vaccinated ever had Guillain-Barré syndrome?   No    Form completed by Guille Foy [facility]

## 2018-10-15 ENCOUNTER — NURSE TRIAGE (OUTPATIENT)
Dept: NURSING | Facility: CLINIC | Age: 58
End: 2018-10-15

## 2018-10-15 NOTE — TELEPHONE ENCOUNTER
Flu shot received approx 5 weeks ago.  Marcelina reports pain at injection site since receiving.  Pain not better, does have pain that radiates down to fingers with some numbness also present.  Triaged to be seen within 72 hours.      Reason for Disposition    [1] Pain, tenderness, or swelling at the injection site AND [2] persists > 3 days    Protocols used: IMMUNIZATION REACTIONS-ADULT-AH

## 2018-11-20 ENCOUNTER — TRANSFERRED RECORDS (OUTPATIENT)
Dept: HEALTH INFORMATION MANAGEMENT | Facility: CLINIC | Age: 58
End: 2018-11-20

## 2018-11-20 ENCOUNTER — OFFICE VISIT (OUTPATIENT)
Dept: OPHTHALMOLOGY | Facility: CLINIC | Age: 58
End: 2018-11-20
Payer: COMMERCIAL

## 2018-11-20 DIAGNOSIS — H52.13 MYOPIA OF BOTH EYES WITH ASTIGMATISM AND PRESBYOPIA: ICD-10-CM

## 2018-11-20 DIAGNOSIS — H52.203 MYOPIA OF BOTH EYES WITH ASTIGMATISM AND PRESBYOPIA: ICD-10-CM

## 2018-11-20 DIAGNOSIS — Z79.899 HIGH RISK MEDICATION USE: Primary | ICD-10-CM

## 2018-11-20 DIAGNOSIS — H52.4 MYOPIA OF BOTH EYES WITH ASTIGMATISM AND PRESBYOPIA: ICD-10-CM

## 2018-11-20 PROCEDURE — 92134 CPTRZ OPH DX IMG PST SGM RTA: CPT | Performed by: OPHTHALMOLOGY

## 2018-11-20 PROCEDURE — 92083 EXTENDED VISUAL FIELD XM: CPT | Performed by: OPHTHALMOLOGY

## 2018-11-20 PROCEDURE — 92014 COMPRE OPH EXAM EST PT 1/>: CPT | Performed by: OPHTHALMOLOGY

## 2018-11-20 ASSESSMENT — VISUAL ACUITY
CORRECTION_TYPE: GLASSES
OD_CC: 20/20
OD_CC+: -1
METHOD: SNELLEN - LINEAR
OS_CC: 20/25
OS_CC: 20/25
OS_CC+: -1
OD_CC: 20/30+

## 2018-11-20 ASSESSMENT — SLIT LAMP EXAM - LIDS
COMMENTS: NORMAL
COMMENTS: NORMAL

## 2018-11-20 ASSESSMENT — REFRACTION_WEARINGRX
OS_CYLINDER: +1.50
OS_ADD: +2.50
OD_AXIS: 179
SPECS_TYPE: PAL
OD_ADD: +2.50
OS_SPHERE: -3.50
OS_AXIS: 004
OD_CYLINDER: +3.25
OD_SPHERE: -5.25

## 2018-11-20 ASSESSMENT — EXTERNAL EXAM - RIGHT EYE: OD_EXAM: NORMAL

## 2018-11-20 ASSESSMENT — REFRACTION_MANIFEST
OS_CYLINDER: +1.25
OD_SPHERE: -4.75
OS_SPHERE: -3.75
OD_AXIS: 005
OD_ADD: +2.50
OD_CYLINDER: +3.25
OS_ADD: +2.50
OS_AXIS: 005

## 2018-11-20 ASSESSMENT — TONOMETRY
IOP_METHOD: TONOPEN
OD_IOP_MMHG: 23
OS_IOP_MMHG: 22

## 2018-11-20 ASSESSMENT — CONF VISUAL FIELD: COMMENTS: HVF DONE TODAY

## 2018-11-20 ASSESSMENT — ENCOUNTER SYMPTOMS: JOINT SWELLING: 1

## 2018-11-20 ASSESSMENT — EXTERNAL EXAM - LEFT EYE: OS_EXAM: NORMAL

## 2018-11-20 ASSESSMENT — CUP TO DISC RATIO
OS_RATIO: 0.1
OD_RATIO: 0.1

## 2018-11-20 NOTE — NURSING NOTE
Patient presents with:  Plaquenil: Started taking P;aquenil 200mg/BID in september and told to have a eye exam.   Annual Eye Exam: No VA changes.  Ou get tired when reading a lot.      Referring Provider:  Job Mandujano MD  [facility]  [address] [place] [address]    HPI    Last Eye Exam:  10/2/17   Informant(s):  EM[facility]   Affected eye(s):  Both   Symptoms:     Blurred vision   Decreased vision   Difficulty with driving      Duration:  1 month   Frequency:  Intermittent       Do you have eye pain now?:  No      Comments:  Pt denies any gtts.  Pt also is a glaucoma suspect.              Brionna Dickens, COT

## 2018-11-20 NOTE — PROGRESS NOTES
Assessment & Plan   Marcelina Estevez is a 58 year old female who presents with:   Review of systems for the eyes was negative other than the pertinent positives and negatives noted in the HPI.  History is obtained from the patient.    Chief Complaint   Patient presents with     Plaquenil     Started taking P;aquenil 200mg/BID in september and told to have a eye exam.      Annual Eye Exam     No VA changes.  Ou get tired when reading a lot.     High risk medication use  - Long term/ current use of Plaquenil Therapy. No evidence of Chloroquine maculopathy today in either eye. No bullseye, no loss of photoreceptor junction on OCT, excellent color vision. Cleared to continue Plaquenil. Risk factors for toxicity include: does, use > 5 years, age > 65, renal and/or hepatic failure.  - SD-OCT Macula Optovue OU (both eyes)  - HVF 10-2 OU    (she is not sure that the plaquenil is working,)    Myopia of both eyes with astigmatism and presbyopia  - Rx per MR for glasses (optional)      Return in about 1 year (around 11/20/2019) for Glaucoma check, Annual Eye Exam, Visual field and OCT.    Documentation for today's encounter was performed by Edwina Ramos COA. OSC. Acting as a scribe in my presence. I have reviewed and verified that it is an accurate recording of today's encounter.    Attending Physician Attestation:  Complete documentation of historical and exam elements from today's encounter can be found in the full encounter summary report (not reduplicated in this progress note).  I personally obtained the chief complaint(s) and history of present illness.  I confirmed and edited as necessary the review of systems, past medical/surgical history, family history, social history, and examination findings as documented by others; and I examined the patient myself.  I personally reviewed the relevant tests, images, and reports as documented above.  I formulated and edited as necessary the assessment and plan and discussed the  findings and management plan with the patient and family. - Job Mandujano MD  s

## 2018-11-20 NOTE — LETTER
11/20/2018       RE: Marcelina Estevez  [address]  [place] [address]     Dear Colleague,    Thank you for referring your patient, Marcelina Estevez, to the [facility]. Please see a copy of my visit note below.    Assessment & Plan   Marcelina Estevez is a 58 year old female who presents with:   Review of systems for the eyes was negative other than the pertinent positives and negatives noted in the HPI.  History is obtained from the patient.    Chief Complaint   Patient presents with     Plaquenil     Started taking P;aquenil 200mg/BID in september and told to have a eye exam.      Annual Eye Exam     No VA changes.  Ou get tired when reading a lot.     High risk medication use  - Long term/ current use of Plaquenil Therapy. No evidence of Chloroquine maculopathy today in either eye. No bullseye, no loss of photoreceptor junction on OCT, excellent color vision. Cleared to continue Plaquenil. Risk factors for toxicity include: does, use > 5 years, age > 65, renal and/or hepatic failure.  - SD-OCT Macula Optovue OU (both eyes)  - HVF 10-2 OU    (she is not sure that the plaquenil is working,)    Myopia of both eyes with astigmatism and presbyopia  - Rx per MR for glasses (optional)      Return in about 1 year (around 11/20/2019) for Glaucoma check, Annual Eye Exam, Visual field and OCT.    Documentation for today's encounter was performed by Edwina Ramos COA. OSC. Acting as a scribe in my presence. I have reviewed and verified that it is an accurate recording of today's encounter.    Attending Physician Attestation:  Complete documentation of historical and exam elements from today's encounter can be found in the full encounter summary report (not reduplicated in this progress note).  I personally obtained the chief complaint(s) and history of present illness.  I confirmed and edited as necessary the review of systems, past medical/surgical history,  family history, social history, and examination findings as documented by others; and I examined the patient myself.  I personally reviewed the relevant tests, images, and reports as documented above.  I formulated and edited as necessary the assessment and plan and discussed the findings and management plan with the patient and family. - Job Mandujano MD  s    Again, thank you for allowing me to participate in the care of your patient.      Sincerely,    Job Mandujano MD

## 2018-11-20 NOTE — MR AVS SNAPSHOT
After Visit Summary   11/20/2018    Marcelina Estevez    MRN: [id number]           Patient Information     Date Of Birth          1960        Visit Information        Provider Department      11/20/2018 10:00 AM 2, Mg Ophth Nurse Only; Job Mandujano MD [facility]        Today's Diagnoses     High risk medication use    -  1    Myopia of both eyes with astigmatism and presbyopia          Care Instructions    In order for us to perform a thorough eye examination, your eyes were dilated.  The affects of the dilating drops last for 4- 6 hours.  You will be more sensitive to light and vision will be blurry up close.  Mydriatic sunglasses were given if needed.                Follow-ups after your visit        Follow-up notes from your care team     Return in about 1 year (around 11/20/2019) for Glaucoma check, Annual Eye Exam, Visual field and OCT.      Your next 10 appointments already scheduled     Dec 12, 2018  9:00 AM CST   [facility] Visit with Hilda Lopez MD   [facility]    [address] [place] [address] [phone]            Feb 06, 2019 11:30 AM CST   Return Visit with Hilda Lopez MD   [facility] ([facility]    [address] [place] [address] [phone]              Who to contact     If you have questions or need follow up information about today's clinic visit or your schedule please contact [facility] directly at [phone].  Normal or non-critical lab and imaging results will be communicated to you by MyChart, letter or phone within 4 business days after the clinic has received the results. If you do not hear from us within 7 days, please contact the clinic through [employer] or phone. If you have a critical or abnormal lab result, we will notify you by phone as soon as possible.  Submit refill requests through [email or web address]  or call your pharmacy and they will forward the refill request to us. Please allow 3 business days for your refill to be completed.          Additional Information About Your Visit        [email or web address] Information     [employer] is an electronic gateway that provides easy, online access to your medical records. With [employer], you can request a clinic appointment, read your test results, renew a prescription or communicate with your care team.     To sign up for [employer] visit the website at [email or web address]   You will be asked to enter the access code listed below, as well as some personal information. Please follow the directions to create your username and password.     Your access code is: [email or web address]  Expires: 2019 11:03 AM     Your access code will  in 90 days. If you need help or a new code, please contact your [facility] or call [phone] for assistance.        Care EveryWhere ID     This is your Care EveryWhere ID. This could be used by other organizations to access your [facility] medical records  [id number]        Your Vitals Were     Last Period                   2014            Blood Pressure from Last 3 Encounters:   09/10/18 135/86   18 102/70   18 120/77    Weight from Last 3 Encounters:   09/10/18 69.8 kg (153 lb 12.8 oz)   18 69.4 kg (153 lb)   18 68.4 kg (150 lb 14.4 oz)              We Performed the Following     HVF 10-2 OU     SD-OCT Macula Optovue OU (both eyes)        Primary Care Provider Office Phone # Fax #    Annette Painting MD PhD [phone]       [address]  [facility] [address]        Equal Access to Services     ANA CRISTINA ROJAS : Hadii skye Madrigal, waaxda luqadaha, qaybta kaalmada marc, faith foster. So [facility] [phone].    ATENCIÓN: Si habla español, tiene a mcgovern disposición servicios gratuitos de asistencia lingüística. Llame al [phone].    We  comply with applicable federal civil rights laws and [place] laws. We do not discriminate on the basis of race, color, national origin, age, disability, sex, sexual orientation, or gender identity.            Thank you!     Thank you for choosing [facility]  for your care. Our goal is always to provide you with excellent care. Hearing back from our patients is one way we can continue to improve our services. Please take a few minutes to complete the written survey that you may receive in the mail after your visit with us. Thank you!             Your Updated Medication List - Protect others around you: Learn how to safely use, store and throw away your medicines at www.disposemymeds.org.          This list is accurate as of 11/20/18 11:03 AM.  Always use your most recent med list.                   Brand Name Dispense Instructions for use Diagnosis    ALLEGRA ALLERGY 180 MG tablet   Generic drug:  fexofenadine     90 tablet    Take 1 tablet (180 mg) by mouth daily    Atopic rhinitis       ALPRAZolam 0.25 MG tablet    XANAX    30 tablet    Take 1 tablet (0.25 mg) by mouth daily as needed for anxiety    Panic attack       buPROPion 150 MG 24 hr tablet    WELLBUTRIN XL    180 tablet    Take 1 tablet (150 mg) by mouth every morning    Major depression in complete remission (H)       CALCIUM 600+D 600-200 MG-UNIT Tabs   Generic drug:  calcium carbonate-vitamin D      Take  by mouth.        cloNIDine 0.2 MG tablet    CATAPRES    90 tablet    Take 1 tablet (0.2 mg) by mouth daily    Night sweats       fluticasone 50 MCG/ACT spray    FLONASE    16 g    Spray 1-2 sprays into both nostrils daily    Atopic rhinitis       hydroxychloroquine 200 MG tablet    PLAQUENIL    60 tablet    Take 1 tablet (200 mg) by mouth 2 times daily Get annual eye exams for hydroxychloroquine (plaquenil) monitoring and fax to [phone].    Raynaud's disease without gangrene       hydrOXYzine 25 MG tablet    ATARAX    60 tablet     Take 1-2 tablets (25-50 mg) by mouth every 6 hours as needed for anxiety    Panic attack       losartan-hydrochlorothiazide 100-25 MG per tablet    HYZAAR    90 tablet    Take 1 tablet by mouth daily    Hypertension goal BP (blood pressure) < 140/90, CKD (chronic kidney disease) stage 2, GFR 60-89 ml/min       methocarbamol 500 MG tablet    ROBAXIN    30 tablet    Take 2 tablets (1,000 mg) by mouth daily as needed for muscle spasms    Tension headache       naproxen 500 MG tablet    NAPROSYN    60 tablet    Take 1 tablet (500 mg) by mouth 2 times daily as needed for moderate pain    Inflammatory arthritis       olopatadine HCl 0.2 % Soln    PATADAY    1 Bottle    Place 1 drop into both eyes daily    Other chronic allergic conjunctivitis       omeprazole 40 MG capsule    priLOSEC    30 capsule    Take 1 capsule (40 mg) by mouth daily    Heart burn       order for DME     1 Device    Equipment being ordered: light lamp for seasonal affective disorder    Severe seasonal affective disorder (H)       PROAIR  (90 Base) MCG/ACT inhaler   Generic drug:  albuterol     8.5 g    INHALE 2 PUFS BY MOUTH EVERY 6 HOURS AS NEEDED FOR SHORTNESS OF BREATH / DYSPNEA    Wheezing       simvastatin 40 MG tablet    ZOCOR    90 tablet    TAKE 1 TABLET (40MG) BY MOUTH EVERY NIGHT AT BEDTIME    Hyperlipidemia LDL goal <100       traZODone 50 MG tablet    DESYREL    90 tablet    TAKE 1 TABLET (50MG) BY MOUTH EVERY NIGHT AT BEDTIME    Insomnia, unspecified type       tretinoin 0.05 % cream    RETIN-A    45 g    Spread a pea size amount into affected area on the face topically at bedtime.  Use sunscreen SPF>30.    Milium       venlafaxine 75 MG 24 hr capsule    EFFEXOR-XR    270 capsule    TAKE THREE CAPSULES BY MOUTH EVERY DAY    Menopausal syndrome (hot flashes)       VITAMIN D (CHOLECALCIFEROL) PO      Take 2,000 Units by mouth daily

## 2018-11-21 ENCOUNTER — TRANSFERRED RECORDS (OUTPATIENT)
Dept: HEALTH INFORMATION MANAGEMENT | Facility: CLINIC | Age: 58
End: 2018-11-21

## 2018-11-26 ENCOUNTER — PATIENT OUTREACH (OUTPATIENT)
Dept: CARE COORDINATION | Facility: CLINIC | Age: 58
End: 2018-11-26

## 2018-11-26 NOTE — PROGRESS NOTES
[facility]: Post-Discharge Note  SITUATION                                                      Admission Date: 11/20/18  Discharge:    Date: 11/22/18   Diagnosis:  NSTEMI   Service: [facility]   Discharge Plan: to home, follow up with Cardiology and PCP     BACKGROUND                                                      Patient presented to the ER with chest discomfort, tightness in her neck, and generally not feeling well. She has a recent dx of Scleroderma.      ASSESSMENT      Patient reports symptoms are: improved, per patient she is better. Denies CP, SOB, or not feeling well. She has been having some shakiness but overall feels much improved. She was able to get her medications and has scheduled her follow up appointments. Patient is checking her blood pressures at home, today's value was 136/81.  Does patient have all of their medications? Yes  Does patient know what their medications are for? Yes  Does patient have follow-up appointment(s) scheduled?  Yes  Does patient have any other questions or concerns?  No      PLAN                                                      Outpatient Plan:  Patient will continue to monitor her sx and follow up as indicated below. Encouraged the patient to call us with any questions or concerns. Patient agreed.    Next 5 appointments (look out 90 days)     Dec 05, 2018  2:50 PM CST   Return Visit with Annette Painting MD PhD   [facility])    [address] [facility] [address]   [phone]            Dec 05, 2018  4:00 PM CST   Return Visit with Hilda Lopez MD   [facility])    [address] [facility] [address]   [phone]            Feb 06, 2019 11:30 AM CST   Return Visit with Hilda Lopez MD   [facility])    [address] [facility] [address]   [phone]                 Olimpia Wright RN

## 2018-12-05 ENCOUNTER — OFFICE VISIT (OUTPATIENT)
Dept: RHEUMATOLOGY | Facility: CLINIC | Age: 58
End: 2018-12-05
Payer: COMMERCIAL

## 2018-12-05 ENCOUNTER — OFFICE VISIT (OUTPATIENT)
Dept: PEDIATRICS | Facility: CLINIC | Age: 58
End: 2018-12-05
Payer: COMMERCIAL

## 2018-12-05 ENCOUNTER — TELEPHONE (OUTPATIENT)
Dept: RHEUMATOLOGY | Facility: CLINIC | Age: 58
End: 2018-12-05

## 2018-12-05 VITALS
DIASTOLIC BLOOD PRESSURE: 76 MMHG | SYSTOLIC BLOOD PRESSURE: 128 MMHG | RESPIRATION RATE: 18 BRPM | BODY MASS INDEX: 29.52 KG/M2 | OXYGEN SATURATION: 99 % | WEIGHT: 153.7 LBS | HEART RATE: 82 BPM | TEMPERATURE: 98.1 F

## 2018-12-05 VITALS
BODY MASS INDEX: 28.96 KG/M2 | WEIGHT: 153.4 LBS | SYSTOLIC BLOOD PRESSURE: 157 MMHG | HEART RATE: 75 BPM | OXYGEN SATURATION: 98 % | HEIGHT: 61 IN | DIASTOLIC BLOOD PRESSURE: 95 MMHG

## 2018-12-05 DIAGNOSIS — R22.31 LOCALIZED SWELLING, MASS AND LUMP, UPPER LIMB, RIGHT: ICD-10-CM

## 2018-12-05 DIAGNOSIS — I73.00 RAYNAUD'S DISEASE WITHOUT GANGRENE: ICD-10-CM

## 2018-12-05 DIAGNOSIS — Z09 HOSPITAL DISCHARGE FOLLOW-UP: Primary | ICD-10-CM

## 2018-12-05 DIAGNOSIS — M19.049 HAND ARTHRITIS: Primary | ICD-10-CM

## 2018-12-05 DIAGNOSIS — F33.41 RECURRENT MAJOR DEPRESSION IN PARTIAL REMISSION (H): ICD-10-CM

## 2018-12-05 DIAGNOSIS — R12 HEART BURN: ICD-10-CM

## 2018-12-05 DIAGNOSIS — I21.4 NSTEMI (NON-ST ELEVATED MYOCARDIAL INFARCTION) (H): ICD-10-CM

## 2018-12-05 PROCEDURE — 99214 OFFICE O/P EST MOD 30 MIN: CPT | Performed by: INTERNAL MEDICINE

## 2018-12-05 PROCEDURE — 99214 OFFICE O/P EST MOD 30 MIN: CPT | Mod: 25 | Performed by: STUDENT IN AN ORGANIZED HEALTH CARE EDUCATION/TRAINING PROGRAM

## 2018-12-05 RX ORDER — BUPROPION HYDROCHLORIDE 300 MG/1
300 TABLET ORAL EVERY MORNING
Qty: 30 TABLET | Refills: 0 | Status: SHIPPED | OUTPATIENT
Start: 2018-12-05 | End: 2019-11-14

## 2018-12-05 RX ORDER — OMEPRAZOLE 40 MG/1
40 CAPSULE, DELAYED RELEASE ORAL DAILY
Qty: 90 CAPSULE | Refills: 3 | Status: SHIPPED | OUTPATIENT
Start: 2018-12-05 | End: 2019-03-28

## 2018-12-05 RX ORDER — ASPIRIN 81 MG/1
81 TABLET, CHEWABLE ORAL
COMMUNITY
Start: 2018-11-23 | End: 2018-12-23

## 2018-12-05 RX ORDER — HYDROXYCHLOROQUINE SULFATE 200 MG/1
TABLET, FILM COATED ORAL
Qty: 120 TABLET | Refills: 1 | Status: SHIPPED | OUTPATIENT
Start: 2018-12-05 | End: 2022-06-16

## 2018-12-05 RX ORDER — NIFEDIPINE 30 MG/1
30 TABLET, EXTENDED RELEASE ORAL
COMMUNITY
Start: 2018-11-22 | End: 2018-12-22

## 2018-12-05 RX ORDER — ISOSORBIDE MONONITRATE 30 MG/1
30 TABLET, EXTENDED RELEASE ORAL
COMMUNITY
Start: 2018-11-22 | End: 2018-12-22

## 2018-12-05 ASSESSMENT — PAIN SCALES - GENERAL
PAINLEVEL: MODERATE PAIN (5)
PAINLEVEL: SEVERE PAIN (6)

## 2018-12-05 ASSESSMENT — PATIENT HEALTH QUESTIONNAIRE - PHQ9
5. POOR APPETITE OR OVEREATING: NOT AT ALL
SUM OF ALL RESPONSES TO PHQ QUESTIONS 1-9: 5

## 2018-12-05 ASSESSMENT — ANXIETY QUESTIONNAIRES
3. WORRYING TOO MUCH ABOUT DIFFERENT THINGS: SEVERAL DAYS
2. NOT BEING ABLE TO STOP OR CONTROL WORRYING: SEVERAL DAYS
IF YOU CHECKED OFF ANY PROBLEMS ON THIS QUESTIONNAIRE, HOW DIFFICULT HAVE THESE PROBLEMS MADE IT FOR YOU TO DO YOUR WORK, TAKE CARE OF THINGS AT HOME, OR GET ALONG WITH OTHER PEOPLE: NOT DIFFICULT AT ALL
7. FEELING AFRAID AS IF SOMETHING AWFUL MIGHT HAPPEN: SEVERAL DAYS
6. BECOMING EASILY ANNOYED OR IRRITABLE: SEVERAL DAYS
GAD7 TOTAL SCORE: 5
1. FEELING NERVOUS, ANXIOUS, OR ON EDGE: SEVERAL DAYS
5. BEING SO RESTLESS THAT IT IS HARD TO SIT STILL: NOT AT ALL

## 2018-12-05 NOTE — PROGRESS NOTES
Rheumatology Clinic Visit     Marcelina Estevez MRN# [id number]   YOB: 1960 Age: 57 year old     Date of Visit: December 5th, 2018  Primary care provider: Annette Painting          Assessment and Plan:   Assessment     -- Raynaud's  -- Puffiness of hands  -- Arthritis particularly in hands  -- Cough and heart burn  -- Neck Soft tissue swelling   -- Positive MINDY >-1:1280 centromere pattern      Ms Estevez is 58 yo female seen in clinic for evaluation of bilateral hand pain.     Raynaud's: She has history of raynaud's for many years. In the last few years she has noted worsening.  She denies any skin breakdown or gangrene.  At present she is able to control her raynaud's with conservative approach like keeping her hands are warm.  I discussed with her regarding trying calcium channel blocker like amlodipine but she would like to wait.       Hand arthritis: She has pain in her hands along with swelling.  [place] [facility] [place] work[place] which revealed a high titer positive MINDY of 1: 1280 with centromere pattern.  The rest of her SUSIE panel, double-stranded DNA antibodies were negative.  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 Repeat autoimmune workup done on 1/29/18 revealed high titer anticentromere antibody.     Pain in her hands may be related to limited cutaneous systemic sclerosis/CREST. She has started Plaquenil and has been on it for 3 months. Denies any side effects with it. She is taking Plaquenil 1 tab daily, will increase to 300 mg total daily dose, she can take 400 mg on M/W/F and 200 mg daily on rest of the days.     Limited cutaneous systemic sclerosis/CREST : She has features of raynaud's, esophageal dysmotility and telangiectasias. She also got 2D echo and PFTs for pulmonary hypertension/ILD which are normal. For her esophageal dysmotility I recommended her to take small frequent meals. She takes omeprazole 40 mg daily before meal.      Plan    -- Continue Plaquenil. Will increase the dose.     -- RTC in 4 months           Active Problem List:     Patient Active Problem List    Diagnosis Date Noted     NSTEMI (non-ST elevated myocardial infarction) (H) 12/05/2018     Priority: Medium     Marijuana use, episodic 09/10/2018     Priority: Medium     CREST (calcinosis, Raynaud's phenomenon, esophageal dysfunction, sclerodactyly, telangiectasia) (H)      Priority: Medium     Limited systemic sclerosis (H)      Priority: Medium     Raynaud's disease without gangrene 01/29/2018     Priority: Medium     Generalized anxiety disorder 01/08/2018     Priority: Medium     Tension headache 09/28/2017     Priority: Medium     Patient is followed by Annette Painting MD PhD for ongoing prescription of pain medication.  All refills should only be approved by this provider, or covering partner.    Medication(s): Tylenol with codeine (T#3).   Maximum quantity per month: 15  Clinic visit frequency required: Q 6  months     Controlled substance agreement:  Encounter-Level CSA - 08/07/2017:          Controlled Substance Agreement - Scan on 8/16/2017 10:21 AM : CONTROLLED SUBSTANCE AGREEMENT (below)              Pain Clinic evaluation in the past: No    DIRE Total Score(s):  No flowsheet data found.    Last [facility] website verification:  none   [email or web address]       ACP (advance care planning) 08/19/2015     Priority: Medium     Advance Care Planning 8/19/2015: ACP Review and Resources Provided:  Reviewed chart for advance care plan.  Marcelina Estevez has no plan or code status on file however states presence of ACP document. Copy requested.  Confirmed/documented legally designated decision maker(s). Added by Mary Padilla RN Advance Care Planning Liaison with Mariann Davis 01/09/2015     Priority: Medium     Status post total hysterectomy 11/18/2014     Priority: Medium     Allergic rhinitis due to pollen 08/02/2014     Priority: Medium      Menopausal syndrome (hot flashes) 12/19/2013     Priority: Medium     Hyperlipidemia LDL goal <100 06/17/2013     Priority: Medium     CKD (chronic kidney disease) stage 1, GFR 90 ml/min or greater 06/17/2013     Priority: Medium     Moderate episode of recurrent major depressive disorder (H) 09/10/2012     Priority: Medium     Celexa not helping, fluoxetine caused rash.        Hypertension goal BP (blood pressure) < 140/90 01/18/2011     Priority: Medium     Keratitis sicca, bilateral 06/10/2013     Priority: Low     Hypertriglyceridemia 01/18/2011     Priority: Low     Controlled with simvastatin.        Night sweats 01/18/2011     Priority: Low     Worse with menstruation. On Clonidine.        Atopic rhinitis 01/18/2011     Priority: Low     (Problem list name updated by automated process. Provider to review and confirm.)              History of Present Illness:   Marcelina Estevez is a 57 year old female with PMH of raynaud's, osteoarthritis, hand pain, depression seen in the clinic in consultation at request of Keerthi Burr APRN CNP for evaluation of joint pains.     December 5, 2018 - She started on Plaquenil. She has joint pains in her hands and shoulders. Massage helped with the pain. She had NSTEMI on 11/20/18 and was started on Imdur and Procardia XL.     August 8, 2018 -  She did not start Plaquenil due to possible retinal toxicity side effect.  She has had chronic pain in her hands and puffiness in her fingers.  Does not have skin tightening or difficulty making a fist.  She uses Tylenol for polyarthralgia which helps.  Raynaud's is better in summer.  Lately she has been experiencing more heartburn and had cough with gagging.  Denies any shortness of breath, chest pain or palpitations.  She received steroid injection for trigger finger through sports medicine and it has completely resolved the pain in her finger and locking.    April 16, 2018 : She has pain in her hands. She has left fourth  trigger finger. Right second knuckle hurts. Ankles hurt.  She has raynaud's but it is stable at present.  She denies any new gangrene, skin breakdown over her digits.  Her abdomen workup done on the last office visit revealed high titer anticentromere antibody confirming the diagnosis of limited cutaneous systemic sclerosis.  She was given Plaquenil prescription last office visit but she did not start it due to side effects.       HPI from initial visit: C/o Pain in her hands and trigger finger in left hand. Stiffness in the morning. Can not move them or make a fist. complains of mild pain in her knees and shoulders, has hx of knee surgery for meniscal tear. She saw Dr. Egan at  [facility] for abnormal MINDY and was diagnosed with possible CREST syndrome.  He did autoimmune workup which revealed positive MINDY 1: 320 with centromere pattern, negative SUSIE panel, double-stranded DNA, rheumatoid serologies. No specific DMARD therapy was started. On her last visit 1/11/2018 she was noted to have left elbow swelling with some white discharge.  To rule out possibility of calcinosis she had x-ray of the left elbow which was normal.  She was given empiric antibiotics for possible bursitis and it improved the swelling and pain.    She also reports having raynaud's for the past 20 years.  Denies any gangrene or infarcts over her fingertips. Denies malar rash, photosensitivity, recurrent mouth/genital ulcers, sicca symptoms, pleuritic chest pains, recurrent sinusitis/rhinitis, swallowing difficulty, hearing or visual changes recently. No h/o arterial/venous thrombosis in the past. Denies any tick bite, recent GI/ infection.            Review of Systems:   Review Of Systems  Constitutional: denies fever, chills, night sweats and weight loss.  Skin: No skin rash.  Eyes: No dryness or irritation in eyes. No episode of eye inflammation or redness.   Ears/Nose/Throat: no recurrent sinus infections.  Respiratory:  No shortness of breath, dyspnea on exertion, cough, or hemoptysis  Cardiovascular: no chest pain or palpitations.  Gastrointestinal: no nausea, vomiting, abdominal pain.  Normal bowel movements.  Genitourinary: no dysuria, frequency  or hematuria.  Musculoskeletal: as in HPI  Neurologic: no numbness, tingling.  Psychiatric: no mood disorders.  Hematologic/Lymphatic/Immunologic: no history of easy bruising, petechia or purpura.  No abnormal bleeding.   Endocrine: no h/o thyroid disease or Diabetes.                  Past Medical History:     Past Medical History:   Diagnosis Date     CREST (calcinosis, Raynaud's phenomenon, esophageal dysfunction, sclerodactyly, telangiectasia) (H)      Hyperlipidemia      Hypertension      Limited systemic sclerosis (H)      Positive MINDY (antinuclear antibody)      Raynaud disease      Past Surgical History:   Procedure Laterality Date     APPENDECTOMY       BIOPSY      cervical node     DILATION AND CURETTAGE, HYSTEROSCOPY DIAGNOSTIC, COMBINED  7/1/2014    Procedure: COMBINED DILATION AND CURETTAGE, HYSTEROSCOPY DIAGNOSTIC;  Surgeon: Mana Cabrera DO;  Location: [facility] OR     ENT SURGERY      tonsillectomy and adnoid removal     HYSTERECTOMY TOTAL ABDOMINAL       ORTHOPEDIC SURGERY      left knee tear meniscus     RELEASE CARPAL TUNNEL BILATERAL  2009            Social History:     Social History     Occupational History     Not on file.     Social History Main Topics     Smoking status: Former Smoker     Quit date: 1/1/2001     Smokeless tobacco: Never Used     Alcohol use Yes      Comment: social     Drug use: No     Sexual activity: Yes     Partners: Male     Birth control/ protection: Surgical      Comment: vasectomy            Family History:     Family History   Problem Relation Age of Onset     Osteoporosis Mother      Eye Disorder Mother      cataract, mac degen     Osteoporosis Father      Eye Disorder Father      glaucoma     Hypertension Maternal Grandmother       Unknown/Adopted Paternal Grandfather      Eye Disorder Paternal Grandmother      glaucoma     Hypertension Daughter      Diabetes Maternal Grandfather      Diabetes Other             Allergies:     Allergies   Allergen Reactions     Seasonal Allergies      Sulfa Drugs      Vomiting       Vicodin [Hydrocodone-Acetaminophen] Nausea            Medications:     Current Outpatient Prescriptions   Medication Sig Dispense Refill     ALLEGRA ALLERGY 180 MG tablet Take 1 tablet (180 mg) by mouth daily 90 tablet 3     ALPRAZolam (XANAX) 0.25 MG tablet Take 1 tablet (0.25 mg) by mouth daily as needed for anxiety 30 tablet 2     aspirin (ASA) 81 MG chewable tablet Take 81 mg by mouth       buPROPion (WELLBUTRIN XL) 300 MG 24 hr tablet Take 1 tablet (300 mg) by mouth every morning 30 tablet 0     Calcium 600+D 600-200 MG-UNIT TABS Take  by mouth.       fluticasone (FLONASE) 50 MCG/ACT spray Spray 1-2 sprays into both nostrils daily 16 g 5     hydroxychloroquine (PLAQUENIL) 200 MG tablet Take 1 tablet (200 mg) by mouth 2 times daily Get annual eye exams for hydroxychloroquine (plaquenil) monitoring and fax to [phone]. 60 tablet 3     hydrOXYzine (ATARAX) 25 MG tablet Take 1-2 tablets (25-50 mg) by mouth every 6 hours as needed for anxiety 60 tablet 1     isosorbide mononitrate (IMDUR) 30 MG 24 hr tablet Take 30 mg by mouth       methocarbamol (ROBAXIN) 500 MG tablet Take 2 tablets (1,000 mg) by mouth daily as needed for muscle spasms 30 tablet 1     NIFEdipine ER OSMOTIC (PROCARDIA XL) 30 MG 24 hr tablet Take 30 mg by mouth       olopatadine HCl (PATADAY) 0.2 % SOLN Place 1 drop into both eyes daily 1 Bottle 6     omeprazole (PRILOSEC) 40 MG DR capsule Take 1 capsule (40 mg) by mouth daily 90 capsule 3     order for DME Equipment being ordered: light lamp for seasonal affective disorder 1 Device 0     PROAIR  (90 Base) MCG/ACT inhaler INHALE 2 PUFS BY MOUTH EVERY 6 HOURS AS NEEDED FOR SHORTNESS OF BREATH / DYSPNEA 8.5  "g 0     simvastatin (ZOCOR) 40 MG tablet TAKE 1 TABLET (40MG) BY MOUTH EVERY NIGHT AT BEDTIME 90 tablet 3     traZODone (DESYREL) 50 MG tablet TAKE 1 TABLET (50MG) BY MOUTH EVERY NIGHT AT BEDTIME 90 tablet 2     venlafaxine (EFFEXOR-XR) 75 MG 24 hr capsule TAKE THREE CAPSULES BY MOUTH EVERY  capsule 1     VITAMIN D, CHOLECALCIFEROL, PO Take 2,000 Units by mouth daily       [DISCONTINUED] buPROPion (WELLBUTRIN XL) 150 MG 24 hr tablet Take 1 tablet (150 mg) by mouth every morning 180 tablet 3            Physical Exam:   Blood pressure (!) 157/95, pulse 75, height 1.537 m (5' 0.5\"), weight 69.6 kg (153 lb 6.4 oz), last menstrual period 06/12/2014, SpO2 98 %, not currently breastfeeding.  Wt Readings from Last 4 Encounters:   12/05/18 69.6 kg (153 lb 6.4 oz)   12/05/18 69.7 kg (153 lb 11.2 oz)   09/10/18 69.8 kg (153 lb 12.8 oz)   08/08/18 69.4 kg (153 lb)       Constitutional: well-developed, appearing stated age; cooperative  Eyes: nl EOM, PERRLA, vision, conjunctiva, sclera  ENT: nl external ears, nose, hearing, lips, teeth, gums, throat  No mucous membrane lesions, normal saliva pool  Neck: no mass or thyroid enlargement  Resp: lungs clear to auscultation, nl to palpation  CV: RRR, no murmurs, rubs or gallops, no edema  GI: no ABD mass or tenderness, no HSM  : not tested  Lymph: no cervical, supraclavicular, inguinal or epitrochlear nodes    MS: All TMJ, neck, shoulder, elbow, wrist, MCP/PIP/DIP, spine, hip, knee, ankle, and foot MTP/IP joints were examined.    -- Puffiness of hands, some tightness of the skin distal to the MCP joints, no active raynaud's.  Difficulty making a fist due to stiffness. Tenderness noted over MCP, PIP joints   -- Tenderness present over Anterior aspect of bilateral shoulders  -- No tenderness present over bilateral ankles, MTP joints  -- Left elbow swelling and erythema improved, no tenderness or discharge.  -- She has telangiectasia over forehead, lower lip.   -- No dactylitis, "  tenosynovitis, enthesopathy.    Skin: no nail pitting, alopecia, rash, nodules or lesions  Neuro: nl cranial nerves, strength, sensation, DTRs.   Psych: nl judgement, orientation, memory, affect.         Data:     Results for orders placed or performed in visit on 11/20/18   SD-OCT Macula Optovue OU (both eyes)    Narrative    Performed by: RAGHU Arguelles   . Patient cooperation: Reliable   . Start time: 10:44 AM   . Stop Time: 10:58 AM   .     Right Eye   Reliability of the test: Good   . Findings normal/abnormal: Normal OCT   . Interpretation: Normal   . Plan: Monitor   . Interval: Initial   .     Left Eye   Reliability of the test: Good   . Findings normal/abnormal: Normal OCT   . Interpretation: Normal   . Plan: Monitor   . Interval: Initial   .    HVF 10-2 OU    Narrative    Performed by: RAGHU Arguelles   . Patient cooperation: Reliable   . Start time: 10:36 AM   . Stop Time: 10:48 AM   .   Good fixation. Reliability of the test: Good   . Interpretation: Normal   . Plan: Monitor   .        Recent Labs   Lab Test  12/15/17   1232  08/02/17   0805  06/29/16   1042   12/15/14   1233  09/24/14   0953  08/01/14   1051   12/30/11   1247   WBC   --    --    --    --   5.8   --   6.8   --   9.1   RBC   --    --    --    --   3.73*   --   3.80   --   3.95   HGB   --    --    --    --   11.4*   --   12.4   --   13.0   HCT   --    --    --    --   33.7*   --   35.6   --   37.4   MCV   --    --    --    --   90   --   94   --   95   RDW   --    --    --    --   15.0   --   13.4   --   13.5   PLT   --    --    --    --   255   --   214   --   214   ALBUMIN   --    --    --    --    --    --    --    --   4.7   CRP  6.8   --    --    --    --    --    --    --    --    BUN  18  30  16   < >   --    --   18   < >  16   CREAT   --    --    --    --    --   0.9   --    --    --     < > = values in this interval not displayed.      Recent Labs   Lab Test  01/09/17   1519   TSH  1.18     Hemoglobin   Date Value Ref Range  Status   06/12/2018 10.9 (L) 11.7 - 15.7 g/dL Final   12/15/2014 11.4 (L) 11.7 - 15.7 g/dL Final   08/01/2014 12.4 11.7 - 15.7 g/dL Final     Urea Nitrogen   Date Value Ref Range Status   06/12/2018 19 7 - 30 mg/dL Final   05/22/2018 26 7 - 30 mg/dL Final   12/15/2017 18 7 - 30 mg/dL Final     Creatinine   Date Value Ref Range Status   09/24/2014 0.9 0.52 - 1.04 mg/dL Final     Sed Rate   Date Value Ref Range Status   05/22/2018 18 0 - 30 mm/h Final   12/15/2017 23 0 - 30 mm/h Final   12/30/2011 10 0 - 30 mm/h Final     CRP Inflammation   Date Value Ref Range Status   06/12/2018 <2.9 0.0 - 8.0 mg/L Final   05/22/2018 3.5 0.0 - 8.0 mg/L Final   12/15/2017 6.8 0.0 - 8.0 mg/L Final     AST   Date Value Ref Range Status   06/12/2018 27 0 - 45 U/L Final   12/30/2011 31 0 - 45 U/L Final     Albumin   Date Value Ref Range Status   06/12/2018 3.7 3.4 - 5.0 g/dL Final   12/30/2011 4.7 3.9 - 5.1 g/dL Final     Alkaline Phosphatase   Date Value Ref Range Status   12/30/2011 53 40 - 150 U/L Final     ALT   Date Value Ref Range Status   06/12/2018 35 0 - 50 U/L Final   06/29/2016 37 0 - 50 U/L Final   06/24/2015 58 (H) 0 - 50 U/L Final     Rheumatoid Factor   Date Value Ref Range Status   12/15/2017 <20 <20 IU/mL Final     Recent Labs   Lab Test  06/12/18   1145  05/22/18   1118  12/15/17   1232   01/09/17   1519  06/29/16   1042  06/24/15   0751  12/15/14   1233  08/01/14   1051   12/30/11   1247   WBC  5.2   --    --    --    --    --    --   5.8  6.8   --   9.1   HGB  10.9*   --    --    --    --    --    --   11.4*  12.4   --   13.0   HCT  32.8*   --    --    --    --    --    --   33.7*  35.6   --   37.4   MCV  97   --    --    --    --    --    --   90  94   --   95   PLT  220   --    --    --    --    --    --   255  214   --   214   BUN  19  26  18   < >   --   16  12   --   18   < >  16   TSH   --    --    --    --   1.18   --    --    --    --    --    --    AST  27   --    --    --    --    --    --    --    --    --    31   ALT  35   --    --    --    --   37  58*   --   25   < >  28   ALKPHOS   --    --    --    --    --    --    --    --    --    --   53    < > = values in this interval not displayed.       Outside studies reviewed:   Component Name  12/28/2017 1/22/2013 1/17/2013         4     NEGATIVE     Negative Negative     Negative Negative     Negative Negative     Negative Negative     <1:20       Anti CCP   Anti DNA Farida   Anti RNP Farida   Anti SM Farida   Anti SSA Farida   Anti SSB Farida   Anti-Neutrophil Cytoplasmic Farida IGG                            Component Name  3/9/2017     Nonreactive   Hep C Farida       Reviewed Rheumatology lab flowsheet    Hilda Lopez MD  [facility]  Department of Rheumatology & Autoimmune Disorders  [facility]: [phone]   Pager - [phone]

## 2018-12-05 NOTE — MR AVS SNAPSHOT
After Visit Summary   12/5/2018    Marcelina Estevez    MRN: [id number]           Patient Information     Date Of Birth          1960        Visit Information        Provider Department      12/5/2018 2:50 PM Annette Painting MD PhD [facility]        Today's Diagnoses     Hospital discharge follow-up    -  1    Recurrent major depression in partial remission (H)        Localized swelling, mass and lump, upper limb, right        NSTEMI (non-ST elevated myocardial infarction) (H)        Heart burn          Care Instructions    Make appointment(s) for:   -- clinic follow up in 2 weeks.   -- ultrasound of the right arm.         Medication(s) prescribed today:  Medication(s) prescribed today:    Orders Placed This Encounter   Medications     buPROPion (WELLBUTRIN XL) 300 MG 24 hr tablet     Sig: Take 1 tablet (300 mg) by mouth every morning     Dispense:  30 tablet     Refill:  0     omeprazole (PRILOSEC) 40 MG DR capsule     Sig: Take 1 capsule (40 mg) by mouth daily     Dispense:  90 capsule     Refill:  3               Follow-ups after your visit        Your next 10 appointments already scheduled     Dec 05, 2018  4:00 PM CST   [facility] with Hilda Lopez MD   [facility]    [address] [place] [id number] [phone]            Dec 19, 2018 11:30 AM CST   US EXTREMITY NON VASCULAR RIGHT with USPreston MG  TECH   [facility]    [address] [place] [id number] [phone]           How do I prepare for my exam? (Food and drink instructions) No Food and Drink Restrictions.  How do I prepare for my exam? (Other instructions) You do not need to do anything special to prepare for your exam.  What should I wear: Wear comfortable clothes.  How long does the exam take: Most ultrasounds take 30 to 60 minutes.  What should I bring: Bring a list of your medicines,  including vitamins, minerals and over-the-counter drugs. It is safest to leave personal items at home.  Do I need a :  No  is needed.  What do I need to tell my doctor: Tell your doctor about any allergies you may have.  What should I do after the exam: No restrictions, you may resume normal activities.  What is this test: An ultrasound uses sound waves to make pictures of the body. Sound waves do not cause pain. The only discomfort may be the pressure of the wand against your skin or full bladder.  Who should I call with questions: If you have any questions, please call the Imaging Department where you will have your exam. Directions, parking instructions, and other information are available on our website, [email or web address].            Dec 19, 2018 12:10 PM CST   Return Visit with Annette Painting MD PhD   [facility]    [address] [place] [address] [id number] [phone]            Feb 06, 2019 11:30 AM CST   Return Visit with Hilda Lopez MD   [facility]    [address] [place] [id number] [phone]              Future tests that were ordered for you today     Open Future Orders        Priority Expected Expires Ordered    US Extremity Non Vascular Right Routine  12/5/2019 12/5/2018            Who to contact     If you have questions or need follow up information about today's clinic visit or your schedule please contact [facility] directly at [phone].  Normal or non-critical lab and imaging results will be communicated to you by MyChart, letter or phone within 4 business days after the clinic has received the results. If you do not hear from us within 7 days, please contact the clinic through MyChart or phone. If you have a critical or abnormal lab result, we will notify you by phone as soon as possible.  Submit refill requests through [employer] or call  your pharmacy and they will forward the refill request to us. Please allow 3 business days for your refill to be completed.          Additional Information About Your Visit        [employer] Information     [employer] is an electronic gateway that provides easy, online access to your medical records. With [employer], you can request a clinic appointment, read your test results, renew a prescription or communicate with your care team.     To sign up for [employer] visit the website at [email or web address]   You will be asked to enter the access code listed below, as well as some personal information. Please follow the directions to create your username and password.     Your access code is: [email or web address]  Expires: 2019 11:03 AM     Your access code will  in 90 days. If you need help or a new code, please contact your [facility] or call [phone] for assistance.        Care EveryWhere ID     This is your Care EveryWhere ID. This could be used by other organizations to access your [place] medical records  [id number]        Your Vitals Were     Pulse Temperature Respirations Last Period Pulse Oximetry BMI (Body Mass Index)    82 98.1  F (36.7  C) (Oral) 18 2014 99% 29.52 kg/m2       Blood Pressure from Last 3 Encounters:   18 128/76   09/10/18 135/86   18 102/70    Weight from Last 3 Encounters:   18 153 lb 11.2 oz (69.7 kg)   09/10/18 153 lb 12.8 oz (69.8 kg)   18 153 lb (69.4 kg)                 Today's Medication Changes          These changes are accurate as of 18  3:42 PM.  If you have any questions, ask your nurse or doctor.               These medicines have changed or have updated prescriptions.        Dose/Directions    buPROPion 300 MG 24 hr tablet   Commonly known as:  WELLBUTRIN XL   This may have changed:    - medication strength  - how much to take   Used for:  Recurrent major depression in partial remission (H)   Changed by:  Mert  MD Annette PhD        Dose:  300 mg   Take 1 tablet (300 mg) by mouth every morning   Quantity:  30 tablet   Refills:  0            Where to get your medicines      These medications were sent to [facility] - [place], [place] - [address], [address]  [address], [address], [facility] [address]     Phone:  [phone]     buPROPion 300 MG 24 hr tablet    omeprazole 40 MG DR capsule                Primary Care Provider Office Phone # Fax #    Annette Painting MD PhD [phone]       [address]  [facility] [address]        Equal Access to Services     [facility] [place]: Hadii skye alejandro hadasho Soomaali, waaxda luqadaha, qaybta kaalmada adeegyada, faith jones . So [facility] [phone].    ATENCIÓN: Si habla español, tiene a mcgovern disposición servicios gratuitos de asistencia lingüística. [facility] [phone].    We comply with applicable federal civil rights laws and [place] laws. We do not discriminate on the basis of race, color, national origin, age, disability, sex, sexual orientation, or gender identity.            Thank you!     Thank you for choosing [facility]  for your care. Our goal is always to provide you with excellent care. Hearing back from our patients is one way we can continue to improve our services. Please take a few minutes to complete the written survey that you may receive in the mail after your visit with us. Thank you!             Your Updated Medication List - Protect others around you: Learn how to safely use, store and throw away your medicines at www.disposemymeds.org.          This list is accurate as of 12/5/18  3:42 PM.  Always use your most recent med list.                   Brand Name Dispense Instructions for use Diagnosis    ALLEGRA ALLERGY 180 MG tablet   Generic drug:  fexofenadine     90 tablet    Take 1 tablet (180 mg) by mouth daily    Atopic rhinitis       ALPRAZolam 0.25 MG tablet    XANAX     30 tablet    Take 1 tablet (0.25 mg) by mouth daily as needed for anxiety    Panic attack       aspirin 81 MG chewable tablet    ASA     Take 81 mg by mouth        buPROPion 300 MG 24 hr tablet    WELLBUTRIN XL    30 tablet    Take 1 tablet (300 mg) by mouth every morning    Recurrent major depression in partial remission (H)       CALCIUM 600+D 600-200 MG-UNIT Tabs   Generic drug:  calcium carbonate-vitamin D      Take  by mouth.        fluticasone 50 MCG/ACT nasal spray    FLONASE    16 g    Spray 1-2 sprays into both nostrils daily    Atopic rhinitis       hydroxychloroquine 200 MG tablet    PLAQUENIL    60 tablet    Take 1 tablet (200 mg) by mouth 2 times daily Get annual eye exams for hydroxychloroquine (plaquenil) monitoring and fax to [phone].    Raynaud's disease without gangrene       hydrOXYzine 25 MG tablet    ATARAX    60 tablet    Take 1-2 tablets (25-50 mg) by mouth every 6 hours as needed for anxiety    Panic attack       isosorbide mononitrate 30 MG 24 hr tablet    IMDUR     Take 30 mg by mouth        methocarbamol 500 MG tablet    ROBAXIN    30 tablet    Take 2 tablets (1,000 mg) by mouth daily as needed for muscle spasms    Tension headache       NIFEdipine ER OSMOTIC 30 MG 24 hr tablet    PROCARDIA XL     Take 30 mg by mouth        olopatadine 0.2 % ophthalmic solution    PATADAY    1 Bottle    Place 1 drop into both eyes daily    Other chronic allergic conjunctivitis       omeprazole 40 MG DR capsule    priLOSEC    90 capsule    Take 1 capsule (40 mg) by mouth daily    Heart burn       order for DME     1 Device    Equipment being ordered: light lamp for seasonal affective disorder    Severe seasonal affective disorder (H)       PROAIR  (90 Base) MCG/ACT inhaler   Generic drug:  albuterol     8.5 g    INHALE 2 PUFS BY MOUTH EVERY 6 HOURS AS NEEDED FOR SHORTNESS OF BREATH / DYSPNEA    Wheezing       simvastatin 40 MG tablet    ZOCOR    90 tablet    TAKE 1 TABLET (40MG) BY MOUTH EVERY  NIGHT AT BEDTIME    Hyperlipidemia LDL goal <100       traZODone 50 MG tablet    DESYREL    90 tablet    TAKE 1 TABLET (50MG) BY MOUTH EVERY NIGHT AT BEDTIME    Insomnia, unspecified type       venlafaxine 75 MG 24 hr capsule    EFFEXOR-XR    270 capsule    TAKE THREE CAPSULES BY MOUTH EVERY DAY    Menopausal syndrome (hot flashes)       VITAMIN D (CHOLECALCIFEROL) PO      Take 2,000 Units by mouth daily

## 2018-12-05 NOTE — PATIENT INSTRUCTIONS
Make appointment(s) for:   -- clinic follow up in 2 weeks.   -- ultrasound of the right arm.         Medication(s) prescribed today:  Medication(s) prescribed today:    Orders Placed This Encounter   Medications     buPROPion (WELLBUTRIN XL) 300 MG 24 hr tablet     Sig: Take 1 tablet (300 mg) by mouth every morning     Dispense:  30 tablet     Refill:  0     omeprazole (PRILOSEC) 40 MG DR capsule     Sig: Take 1 capsule (40 mg) by mouth daily     Dispense:  90 capsule     Refill:  3

## 2018-12-05 NOTE — PROGRESS NOTES
SUBJECTIVE:   Marcelina Estevez is a 58 year old female who presents to clinic today for the following health issues:      Hospital Follow-up Visit:    Hospital/Nursing Home/IP Rehab Facility: [facility]  Date of Admission: 11/20/2018  Date of Discharge: 11/22/2018  Reason(s) for Admission: NSTEMI (non-ST elevated myocardial infarction) (HCC) (Primary Dx)      1. Non-ST-segment elevation myocardial infarction concerns. This was felt to be a small vessel ischemic process, without evidence of large or coronary artery disease involvement, no need for stenting at the time of coronary angiogram.  Cardiology feels as if this was a bit more of a small vessel ischemic process. With the help of cardiology, the losartan/hydrochlorothiazide and Catapres were to be discontinued. To help with potential small vessel ischemic changes, we initiated low-dose Imdur and low-dose nifedipine--both sustained release. Baby aspirin daily.         Problems taking medications regularly:  None       Medication changes since discharge: None       Problems adhering to non-medication therapy:  None    Summary of hospitalization:  CareEverywhere information obtained and reviewed  Diagnostic Tests/Treatments reviewed.  Follow up needed: none  Other Healthcare Providers Involved in Patient s Care:         Specialist appointment - cardiac rehab ongoing, cardiology follow up this Friday  ==========  Post-discharge update:  Since discharge patient has not had any chest pain or shortness of breath.  However she is getting more depressed.  She is on Effexor at the max dose.  Wellbutrin 150 mg daily.  She has alprazolam on as-needed basis for anxiety.    She is also noticed increasing heartburn.  She notes this is different from her chest pain that led to the hospitalization.    She noticed a lump on her right forearm.  It is not painful.  It does not interfere with her activity levels.    Current Outpatient Medications   Medication Sig      ALLEGRA ALLERGY 180 MG tablet Take 1 tablet (180 mg) by mouth daily     ALPRAZolam (XANAX) 0.25 MG tablet Take 1 tablet (0.25 mg) by mouth daily as needed for anxiety     aspirin (ASA) 81 MG chewable tablet Take 81 mg by mouth     buPROPion (WELLBUTRIN XL) 300 MG 24 hr tablet Take 1 tablet (300 mg) by mouth every morning     Calcium 600+D 600-200 MG-UNIT TABS Take  by mouth.     fluticasone (FLONASE) 50 MCG/ACT spray Spray 1-2 sprays into both nostrils daily     hydrOXYzine (ATARAX) 25 MG tablet Take 1-2 tablets (25-50 mg) by mouth every 6 hours as needed for anxiety     isosorbide mononitrate (IMDUR) 30 MG 24 hr tablet Take 30 mg by mouth     methocarbamol (ROBAXIN) 500 MG tablet Take 2 tablets (1,000 mg) by mouth daily as needed for muscle spasms     NIFEdipine ER OSMOTIC (PROCARDIA XL) 30 MG 24 hr tablet Take 30 mg by mouth     olopatadine HCl (PATADAY) 0.2 % SOLN Place 1 drop into both eyes daily     omeprazole (PRILOSEC) 40 MG DR capsule Take 1 capsule (40 mg) by mouth daily     order for DME Equipment being ordered: light lamp for seasonal affective disorder     PROAIR  (90 Base) MCG/ACT inhaler INHALE 2 PUFS BY MOUTH EVERY 6 HOURS AS NEEDED FOR SHORTNESS OF BREATH / DYSPNEA     simvastatin (ZOCOR) 40 MG tablet TAKE 1 TABLET (40MG) BY MOUTH EVERY NIGHT AT BEDTIME     traZODone (DESYREL) 50 MG tablet TAKE 1 TABLET (50MG) BY MOUTH EVERY NIGHT AT BEDTIME     venlafaxine (EFFEXOR-XR) 75 MG 24 hr capsule TAKE THREE CAPSULES BY MOUTH EVERY DAY     VITAMIN D, CHOLECALCIFEROL, PO Take 2,000 Units by mouth daily     diclofenac (VOLTAREN) 1 % topical gel Apply topically 4 times daily     hydroxychloroquine (PLAQUENIL) 200 MG tablet Take 400 mg on M/W/F and 200 mg on rest of the days.     No current facility-administered medications for this visit.      Facility-Administered Medications Ordered in Other Visits   Medication     sodium chloride (PF) 0.9% PF flush 10 mL        Patient Active Problem List   Diagnosis      Hypertension goal BP (blood pressure) < 140/90     Hypertriglyceridemia     Night sweats     Atopic rhinitis     Moderate episode of recurrent major depressive disorder (H)     Keratitis sicca, bilateral     Hyperlipidemia LDL goal <100     CKD (chronic kidney disease) stage 1, GFR 90 ml/min or greater     Menopausal syndrome (hot flashes)     Allergic rhinitis due to pollen     Status post total hysterectomy     Anemia     ACP (advance care planning)     Tension headache     Generalized anxiety disorder     Raynaud's disease without gangrene     CREST (calcinosis, Raynaud's phenomenon, esophageal dysfunction, sclerodactyly, telangiectasia) (H)     Limited systemic sclerosis (H)     Marijuana use, episodic     NSTEMI (non-ST elevated myocardial infarction) (H)     Past Surgical History:   Procedure Laterality Date     APPENDECTOMY       BIOPSY      cervical node     DILATION AND CURETTAGE, HYSTEROSCOPY DIAGNOSTIC, COMBINED  2014    Procedure: COMBINED DILATION AND CURETTAGE, HYSTEROSCOPY DIAGNOSTIC;  Surgeon: Mana Cabrera DO;  Location: [facility] OR     ENT SURGERY      tonsillectomy and adnoid removal     HYSTERECTOMY TOTAL ABDOMINAL       ORTHOPEDIC SURGERY      left knee tear meniscus     RELEASE CARPAL TUNNEL BILATERAL         Social History     Tobacco Use     Smoking status: Former Smoker     Last attempt to quit: 2001     Years since quittin.9     Smokeless tobacco: Never Used   Substance Use Topics     Alcohol use: Yes     Comment: social     Family History   Problem Relation Age of Onset     Osteoporosis Mother      Eye Disorder Mother         cataract, mac degen     Osteoporosis Father      Eye Disorder Father         glaucoma     Hypertension Maternal Grandmother      Unknown/Adopted Paternal Grandfather      Eye Disorder Paternal Grandmother         glaucoma     Hypertension Daughter      Diabetes Maternal Grandfather      Diabetes Other             Problem list, Medication list,  Allergies, and Medical/Social/Surgical histories reviewed in [facility] and updated as appropriate.    OBJECTIVE:                                                    /76   Pulse 82   Temp 98.1  F (36.7  C) (Oral)   Resp 18   Wt 69.7 kg (153 lb 11.2 oz)   LMP 06/12/2014   SpO2 99%   BMI 29.52 kg/m      GEN: healthy, alert and no distress  Right forearm: There is a palpable lump in the mid forearm, seems to be fixed to the underlying tissue.  Does not appear to be bony.       Diagnostic test results:  No results found for this or any previous visit (from the past 24 hour(s)).       ASSESSMENT/PLAN:                                                      58 year old female with the following diagnoses and treatment plan:      ICD-10-CM    1. Hospital discharge follow-up Z09    2. NSTEMI (non-ST elevated myocardial infarction) (H) I21.4    3. Recurrent major depression in partial remission (H) F33.41 buPROPion (WELLBUTRIN XL) 300 MG 24 hr tablet   4. Localized swelling, mass and lump, upper limb, right R22.31 US Extremity Non Vascular Right   5. Heart burn R12 omeprazole (PRILOSEC) 40 MG DR capsule       --Recent non-ST elevation MI, doing well, follows by cardiology in [place].   --Recurrent depression: Getting worse.  Increase Wellbutrin to 300 mg daily.  --Localized swelling on the right forearm: Likely a lipoma or a ganglion cyst.  Ultrasound to confirm.  --Acid reflux: I gave her prescription of Prilosec.    -- Clinic follow-up on depression in 2 weeks.      Post Discharge Medication Reconciliation: discharge medications reconciled and changed, per note/orders (see AVS).  Plan of care communicated with patient    Will call or return to clinic if worsening or symptoms not improving as discussed.  See Patient Instructions.      Annette Painting MD-PhD  [facility]    (Note: Chart documentation was done in part with Dragon Voice Recognition software. Although reviewed after completion, some word and  grammatical errors may remain.)

## 2018-12-05 NOTE — MR AVS SNAPSHOT
After Visit Summary   12/5/2018    Marcelina Estevez    MRN: [id number]           Patient Information     Date Of Birth          1960        Visit Information        Provider Department      12/5/2018 4:00 PM Hilda Lopez MD [facility]        Today's Diagnoses     Hand arthritis    -  1    Raynaud's disease without gangrene          Care Instructions    -- Continue Plaquenil. Will increase the dose.     -- RTC in 4 months           Follow-ups after your visit        Your next 10 appointments already scheduled     Dec 19, 2018 11:30 AM CST   US EXTREMITY NON VASCULAR RIGHT with MGUS1, MG US TECH   [facility]    [address] [place] [id number] [phone]           How do I prepare for my exam? (Food and drink instructions) No Food and Drink Restrictions.  How do I prepare for my exam? (Other instructions) You do not need to do anything special to prepare for your exam.  What should I wear: Wear comfortable clothes.  How long does the exam take: Most ultrasounds take 30 to 60 minutes.  What should I bring: Bring a list of your medicines, including vitamins, minerals and over-the-counter drugs. It is safest to leave personal items at home.  Do I need a :  No  is needed.  What do I need to tell my doctor: Tell your doctor about any allergies you may have.  What should I do after the exam: No restrictions, you may resume normal activities.  What is this test: An ultrasound uses sound waves to make pictures of the body. Sound waves do not cause pain. The only discomfort may be the pressure of the wand against your skin or full bladder.  Who should I call with questions: If you have any questions, please call the Imaging Department where you will have your exam. Directions, parking instructions, and other information are available on our website, [email or web address].            Dec 19, 2018 12:10 PM CST    Return Visit with Annette Painting MD PhD   [facility]    [address] [address] [place] [address] [id number] [phone]            2019 11:30 AM CST   Return Visit with Hilda Lopez MD   [facility]    [address] [place] [address] [phone]              Future tests that were ordered for you today     Open Future Orders        Priority Expected Expires Ordered    US Extremity Non Vascular Right Routine  2019            Who to contact     If you have questions or need follow up information about today's clinic visit or your schedule please contact [facility] directly at [phone].  Normal or non-critical lab and imaging results will be communicated to you by MyChart, letter or phone within 4 business days after the clinic has received the results. If you do not hear from us within 7 days, please contact the clinic through MyChart or phone. If you have a critical or abnormal lab result, we will notify you by phone as soon as possible.  Submit refill requests through [employer] or call your pharmacy and they will forward the refill request to us. Please allow 3 business days for your refill to be completed.          Additional Information About Your Visit        [employer] Information     [employer] is an electronic gateway that provides easy, online access to your medical records. With [employer], you can request a clinic appointment, read your test results, renew a prescription or communicate with your care team.     To sign up for [employer] visit the website at [email or web address]   You will be asked to enter the access code listed below, as well as some personal information. Please follow the directions to create your username and password.     Your access code is: [email or web address]  Expires: 2019 11:03 AM     Your access code will  in 90 days. If you  "need help or a new code, please contact your [facility] or call [phone] for assistance.        Care EveryWhere ID     This is your Care EveryWhere ID. This could be used by other organizations to access your [facility] medical records  [id number]        Your Vitals Were     Pulse Height Last Period Pulse Oximetry BMI (Body Mass Index)       75 1.537 m (5' 0.5\") 06/12/2014 98% 29.47 kg/m2        Blood Pressure from Last 3 Encounters:   12/05/18 (!) 157/95   12/05/18 128/76   09/10/18 135/86    Weight from Last 3 Encounters:   12/05/18 69.6 kg (153 lb 6.4 oz)   12/05/18 69.7 kg (153 lb 11.2 oz)   09/10/18 69.8 kg (153 lb 12.8 oz)              Today, you had the following     No orders found for display         Today's Medication Changes          These changes are accurate as of 12/5/18  5:04 PM.  If you have any questions, ask your nurse or doctor.               Start taking these medicines.        Dose/Directions    diclofenac 1 % topical gel   Commonly known as:  VOLTAREN   Used for:  Hand arthritis   Started by:  Hilda Lopez MD        Apply topically 4 times daily   Quantity:  1 Tube   Refills:  1         These medicines have changed or have updated prescriptions.        Dose/Directions    buPROPion 300 MG 24 hr tablet   Commonly known as:  WELLBUTRIN XL   This may have changed:    - medication strength  - how much to take   Used for:  Recurrent major depression in partial remission (H)   Changed by:  Annette Painting MD PhD        Dose:  300 mg   Take 1 tablet (300 mg) by mouth every morning   Quantity:  30 tablet   Refills:  0       hydroxychloroquine 200 MG tablet   Commonly known as:  PLAQUENIL   This may have changed:    - how much to take  - how to take this  - when to take this  - additional instructions   Used for:  Raynaud's disease without gangrene   Changed by:  Hilda Lopez MD        Take 400 mg on M/W/F and 200 mg on rest of the days.   Quantity:  120 tablet "   Refills:  1            Where to get your medicines      These medications were sent to [facility] - [place], [place] - [address], [address]  [address], [address], [facility] [address]     Phone:  [phone]     buPROPion 300 MG 24 hr tablet    diclofenac 1 % topical gel    hydroxychloroquine 200 MG tablet    omeprazole 40 MG DR capsule                Primary Care Provider Office Phone # Fax #    Annette Painting MD PhD [phone]       [address]  [facility] [address]        Equal Access to Services     [facility]: Hadii aad ku hadasho Soomaali, waaxda luqadaha, qaybta kaalmada adeegyada, faith jones . So [place] [phone].    ATENCIÓN: Si habla español, tiene a mcgovern disposición servicios gratuitos de asistencia lingüística. [facility] [phone].    We comply with applicable federal civil rights laws and [place] laws. We do not discriminate on the basis of race, color, national origin, age, disability, sex, sexual orientation, or gender identity.            Thank you!     Thank you for choosing [facility]  for your care. Our goal is always to provide you with excellent care. Hearing back from our patients is one way we can continue to improve our services. Please take a few minutes to complete the written survey that you may receive in the mail after your visit with us. Thank you!             Your Updated Medication List - Protect others around you: Learn how to safely use, store and throw away your medicines at www.disposemymeds.org.          This list is accurate as of 12/5/18  5:04 PM.  Always use your most recent med list.                   Brand Name Dispense Instructions for use Diagnosis    ALLEGRA ALLERGY 180 MG tablet   Generic drug:  fexofenadine     90 tablet    Take 1 tablet (180 mg) by mouth daily    Atopic rhinitis       ALPRAZolam 0.25 MG tablet    XANAX    30 tablet    Take 1 tablet (0.25 mg) by  mouth daily as needed for anxiety    Panic attack       aspirin 81 MG chewable tablet    ASA     Take 81 mg by mouth        buPROPion 300 MG 24 hr tablet    WELLBUTRIN XL    30 tablet    Take 1 tablet (300 mg) by mouth every morning    Recurrent major depression in partial remission (H)       CALCIUM 600+D 600-200 MG-UNIT Tabs   Generic drug:  calcium carbonate-vitamin D      Take  by mouth.        diclofenac 1 % topical gel    VOLTAREN    1 Tube    Apply topically 4 times daily    Hand arthritis       fluticasone 50 MCG/ACT nasal spray    FLONASE    16 g    Spray 1-2 sprays into both nostrils daily    Atopic rhinitis       hydroxychloroquine 200 MG tablet    PLAQUENIL    120 tablet    Take 400 mg on M/W/F and 200 mg on rest of the days.    Raynaud's disease without gangrene       hydrOXYzine 25 MG tablet    ATARAX    60 tablet    Take 1-2 tablets (25-50 mg) by mouth every 6 hours as needed for anxiety    Panic attack       isosorbide mononitrate 30 MG 24 hr tablet    IMDUR     Take 30 mg by mouth        methocarbamol 500 MG tablet    ROBAXIN    30 tablet    Take 2 tablets (1,000 mg) by mouth daily as needed for muscle spasms    Tension headache       NIFEdipine ER OSMOTIC 30 MG 24 hr tablet    PROCARDIA XL     Take 30 mg by mouth        olopatadine 0.2 % ophthalmic solution    PATADAY    1 Bottle    Place 1 drop into both eyes daily    Other chronic allergic conjunctivitis       omeprazole 40 MG DR capsule    priLOSEC    90 capsule    Take 1 capsule (40 mg) by mouth daily    Heart burn       order for DME     1 Device    Equipment being ordered: light lamp for seasonal affective disorder    Severe seasonal affective disorder (H)       PROAIR  (90 Base) MCG/ACT inhaler   Generic drug:  albuterol     8.5 g    INHALE 2 PUFS BY MOUTH EVERY 6 HOURS AS NEEDED FOR SHORTNESS OF BREATH / DYSPNEA    Wheezing       simvastatin 40 MG tablet    ZOCOR    90 tablet    TAKE 1 TABLET (40MG) BY MOUTH EVERY NIGHT AT BEDTIME     Hyperlipidemia LDL goal <100       traZODone 50 MG tablet    DESYREL    90 tablet    TAKE 1 TABLET (50MG) BY MOUTH EVERY NIGHT AT BEDTIME    Insomnia, unspecified type       venlafaxine 75 MG 24 hr capsule    EFFEXOR-XR    270 capsule    TAKE THREE CAPSULES BY MOUTH EVERY DAY    Menopausal syndrome (hot flashes)       VITAMIN D (CHOLECALCIFEROL) PO      Take 2,000 Units by mouth daily

## 2018-12-06 NOTE — TELEPHONE ENCOUNTER
Hello,    Voltaren needs a prior auth-  [id number]    Jaciel Ma Pharm. D.  [facility] Pharmacy Manager  [phone]  12/5/2018

## 2018-12-07 ASSESSMENT — ANXIETY QUESTIONNAIRES: GAD7 TOTAL SCORE: 5

## 2018-12-12 DIAGNOSIS — F41.0 PANIC ATTACK: ICD-10-CM

## 2018-12-13 RX ORDER — ALPRAZOLAM 0.25 MG
0.25 TABLET ORAL DAILY PRN
Qty: 30 TABLET | Refills: 2 | Status: SHIPPED | OUTPATIENT
Start: 2018-12-13 | End: 2019-01-09

## 2018-12-13 RX ORDER — HYDROXYZINE HYDROCHLORIDE 25 MG/1
TABLET, FILM COATED ORAL
Qty: 60 TABLET | Refills: 1 | Status: SHIPPED | OUTPATIENT
Start: 2018-12-13 | End: 2021-04-16

## 2018-12-13 NOTE — TELEPHONE ENCOUNTER
Disp Refills Start End BAYRON   hydrOXYzine (ATARAX) 25 MG tablet 60 tablet 1 5/22/2018  --   Sig - Route: Take 1-2 tablets (25-50 mg) by mouth every 6 hours as needed for anxiety - Oral     Last OV with Dr. Painting: 12/5/18    Next 5 appointments (look out 90 days)    Dec 19, 2018 12:10 PM CST  Return Visit with Annette Painting MD PhD  [facility]) [address] [place] [address] [phone]   Feb 06, 2019 11:30 AM CST  Return Visit with Hilda Lopez MD  [facility]) [address] [place] [address] [phone]        Refilled per [facility] protocol.    Aisha Mclaughlin RN      
None

## 2018-12-13 NOTE — TELEPHONE ENCOUNTER
Routing refill request to provider for review/approval because:  Drug not on the [facility] refill protocol      Disp Refills Start End BAYRON   ALPRAZolam (XANAX) 0.25 MG tablet 30 tablet 2 5/31/2018  No   Sig - Route: Take 1 tablet (0.25 mg) by mouth daily as needed for anxiety - Oral     Last OV with Dr. Painting: 12/5/18    Next 5 appointments (look out 90 days)    Dec 19, 2018 12:10 PM CST  Return Visit with Annette Painting MD PhD  [facility]) [address] [place] [phone]  [phone]   Feb 06, 2019 11:30 AM CST  Return Visit with Hilda Lopez MD  [facility]) [address] [place] [phone]  [phone]        Aisha Mclaughlin RN

## 2018-12-14 DIAGNOSIS — F41.0 PANIC ATTACK: ICD-10-CM

## 2018-12-14 RX ORDER — ALPRAZOLAM 0.25 MG
0.25 TABLET ORAL DAILY PRN
Qty: 30 TABLET | Refills: 2 | Status: CANCELLED | OUTPATIENT
Start: 2018-12-14

## 2018-12-14 NOTE — TELEPHONE ENCOUNTER
Called onsite pharmacy to request script back - they report they have did not receive the script. Will huddle with SUSANNE Juarez. Aisha Mclaughlin RN

## 2018-12-19 ENCOUNTER — ANCILLARY PROCEDURE (OUTPATIENT)
Dept: ULTRASOUND IMAGING | Facility: CLINIC | Age: 58
End: 2018-12-19
Attending: INTERNAL MEDICINE
Payer: COMMERCIAL

## 2018-12-19 ENCOUNTER — OFFICE VISIT (OUTPATIENT)
Dept: PEDIATRICS | Facility: CLINIC | Age: 58
End: 2018-12-19
Payer: COMMERCIAL

## 2018-12-19 VITALS
WEIGHT: 149 LBS | DIASTOLIC BLOOD PRESSURE: 94 MMHG | SYSTOLIC BLOOD PRESSURE: 144 MMHG | TEMPERATURE: 98.2 F | BODY MASS INDEX: 28.62 KG/M2 | OXYGEN SATURATION: 98 % | HEART RATE: 74 BPM

## 2018-12-19 DIAGNOSIS — I10 HYPERTENSION GOAL BP (BLOOD PRESSURE) < 140/90: ICD-10-CM

## 2018-12-19 DIAGNOSIS — F33.41 RECURRENT MAJOR DEPRESSION IN PARTIAL REMISSION (H): Primary | ICD-10-CM

## 2018-12-19 DIAGNOSIS — F41.9 ANXIETY: ICD-10-CM

## 2018-12-19 DIAGNOSIS — R22.31 LOCALIZED SWELLING, MASS AND LUMP, UPPER LIMB, RIGHT: ICD-10-CM

## 2018-12-19 DIAGNOSIS — D64.9 ANEMIA, UNSPECIFIED TYPE: ICD-10-CM

## 2018-12-19 LAB
BASOPHILS # BLD AUTO: 0.1 10E9/L (ref 0–0.2)
BASOPHILS NFR BLD AUTO: 0.8 %
DIFFERENTIAL METHOD BLD: ABNORMAL
EOSINOPHIL # BLD AUTO: 0 10E9/L (ref 0–0.7)
EOSINOPHIL NFR BLD AUTO: 0.2 %
ERYTHROCYTE [DISTWIDTH] IN BLOOD BY AUTOMATED COUNT: 12.5 % (ref 10–15)
FERRITIN SERPL-MCNC: 97 NG/ML (ref 8–252)
HCT VFR BLD AUTO: 32.8 % (ref 35–47)
HGB BLD-MCNC: 10.8 G/DL (ref 11.7–15.7)
IMM GRANULOCYTES # BLD: 0 10E9/L (ref 0–0.4)
IMM GRANULOCYTES NFR BLD: 0.3 %
IRON SATN MFR SERPL: 21 % (ref 15–46)
IRON SERPL-MCNC: 70 UG/DL (ref 35–180)
LYMPHOCYTES # BLD AUTO: 1.4 10E9/L (ref 0.8–5.3)
LYMPHOCYTES NFR BLD AUTO: 20.8 %
MCH RBC QN AUTO: 31.2 PG (ref 26.5–33)
MCHC RBC AUTO-ENTMCNC: 32.9 G/DL (ref 31.5–36.5)
MCV RBC AUTO: 95 FL (ref 78–100)
MONOCYTES # BLD AUTO: 0.8 10E9/L (ref 0–1.3)
MONOCYTES NFR BLD AUTO: 11.3 %
NEUTROPHILS # BLD AUTO: 4.4 10E9/L (ref 1.6–8.3)
NEUTROPHILS NFR BLD AUTO: 66.6 %
PLATELET # BLD AUTO: 195 10E9/L (ref 150–450)
RBC # BLD AUTO: 3.46 10E12/L (ref 3.8–5.2)
RETICS # AUTO: 65.7 10E9/L (ref 25–95)
RETICS/RBC NFR AUTO: 1.9 % (ref 0.5–2)
TIBC SERPL-MCNC: 333 UG/DL (ref 240–430)
TSH SERPL DL<=0.005 MIU/L-ACNC: 2.04 MU/L (ref 0.4–4)
VIT B12 SERPL-MCNC: 692 PG/ML (ref 193–986)
WBC # BLD AUTO: 6.7 10E9/L (ref 4–11)

## 2018-12-19 PROCEDURE — 83550 IRON BINDING TEST: CPT | Performed by: INTERNAL MEDICINE

## 2018-12-19 PROCEDURE — 84443 ASSAY THYROID STIM HORMONE: CPT | Performed by: INTERNAL MEDICINE

## 2018-12-19 PROCEDURE — 36415 COLL VENOUS BLD VENIPUNCTURE: CPT | Performed by: INTERNAL MEDICINE

## 2018-12-19 PROCEDURE — 82607 VITAMIN B-12: CPT | Performed by: INTERNAL MEDICINE

## 2018-12-19 PROCEDURE — 76882 US LMTD JT/FCL EVL NVASC XTR: CPT | Mod: RT | Performed by: STUDENT IN AN ORGANIZED HEALTH CARE EDUCATION/TRAINING PROGRAM

## 2018-12-19 PROCEDURE — 99214 OFFICE O/P EST MOD 30 MIN: CPT | Performed by: INTERNAL MEDICINE

## 2018-12-19 PROCEDURE — 85045 AUTOMATED RETICULOCYTE COUNT: CPT | Performed by: INTERNAL MEDICINE

## 2018-12-19 PROCEDURE — 83540 ASSAY OF IRON: CPT | Performed by: INTERNAL MEDICINE

## 2018-12-19 PROCEDURE — 99000 SPECIMEN HANDLING OFFICE-LAB: CPT | Performed by: INTERNAL MEDICINE

## 2018-12-19 PROCEDURE — 82747 ASSAY OF FOLIC ACID RBC: CPT | Mod: 90 | Performed by: INTERNAL MEDICINE

## 2018-12-19 PROCEDURE — 82728 ASSAY OF FERRITIN: CPT | Performed by: INTERNAL MEDICINE

## 2018-12-19 PROCEDURE — 85025 COMPLETE CBC W/AUTO DIFF WBC: CPT | Performed by: INTERNAL MEDICINE

## 2018-12-19 RX ORDER — BUPROPION HYDROCHLORIDE 300 MG/1
300 TABLET ORAL EVERY MORNING
Qty: 30 TABLET | Refills: 0 | Status: CANCELLED | OUTPATIENT
Start: 2018-12-19

## 2018-12-19 NOTE — PATIENT INSTRUCTIONS
Make appointment(s) for:   -- get labs done today.   -- Mychart follow up in 4 weeks.   -- Clinic follow up 2 months.

## 2018-12-19 NOTE — RESULT ENCOUNTER NOTE
Please mail result letter with the following comment:    Dear Marcelina,   Here is a copy of your test results that we reviewed at your recent clinic visit. Please continue the plan of care during the visit and follow up as planned.     Please call or make an appointment if you have further questions.     Annette Painting MD-PhD

## 2018-12-19 NOTE — PROGRESS NOTES
SUBJECTIVE:   Marcelina Estevez is a 58 year old female who presents to clinic today for the following health issues:      Depression and Anxiety Follow-Up,   Also Bilat ankle swelling following heart attack 1 month ago, Anemia. Would like treatment for anemia.       Status since last visit: No change    Other associated symptoms:None    Complicating factors:     Significant life event: No     Current substance abuse: None    PHQ 2/8/2018 5/22/2018 12/5/2018   PHQ-9 Total Score 7 4 5   Q9: Suicide Ideation Not at all Not at all Not at all     MARCELINA-7 SCORE 12/15/2017 5/22/2018 12/5/2018   Total Score - - -   Total Score 10 6 5     In the past two weeks have you had thoughts of suicide or self-harm?  No.    Do you have concerns about your personal safety or the safety of others?   No  PHQ-9  English  PHQ-9   Any Language  MARCELINA-7  Suicide Assessment Five-step Evaluation and Treatment (SAFE-T)    Amount of exercise or physical activity: 2-3 days/week for an average of 45-60 minutes    Problems taking medications regularly: No    Medication side effects: none    Diet: low salt      Pt was recently told that she had anemia. She was very concerned about the cause. She denied blood in the stool or black tarry stool. Denies stomach pain.     She was hospitalized in November in [facility] for shortness of breath and neck tightness. Had extensive work up, ruled out PE and aortic dissection. Had NSTEMI, but Angiogram was normal. This was attributed to vasospastic angina. During this hospitalization, however she was noted to have anemia.     Since recent NSTEMI, depression has gotten a little worse.    she has history of CREST and scleroderma.     ROS:  Constitutional, HEENT, cardiovascular, pulmonary, gi and gu systems are negative, except as otherwise noted.         Current Outpatient Medications on File Prior to Visit:  ALLEGRA ALLERGY 180 MG tablet Take 1 tablet (180 mg) by mouth daily   buPROPion (WELLBUTRIN XL) 300 MG 24 hr  tablet Take 1 tablet (300 mg) by mouth every morning   hydroxychloroquine (PLAQUENIL) 200 MG tablet Take 400 mg on M/W/F and 200 mg on rest of the days.   hydrOXYzine (ATARAX) 25 MG tablet TAKE ONE TO TWO TABLETS BY MOUTH EVERY 6 HOURS AS NEEDED FOR ANXIETY   isosorbide mononitrate (IMDUR) 30 MG 24 hr tablet Take 30 mg by mouth   NIFEdipine ER OSMOTIC (PROCARDIA XL) 30 MG 24 hr tablet Take 30 mg by mouth   omeprazole (PRILOSEC) 40 MG DR capsule Take 1 capsule (40 mg) by mouth daily   simvastatin (ZOCOR) 40 MG tablet TAKE 1 TABLET (40MG) BY MOUTH EVERY NIGHT AT BEDTIME   venlafaxine (EFFEXOR-XR) 75 MG 24 hr capsule TAKE THREE CAPSULES BY MOUTH EVERY DAY   VITAMIN D, CHOLECALCIFEROL, PO Take 2,000 Units by mouth daily   ALPRAZolam (XANAX) 0.25 MG tablet Take 1 tablet (0.25 mg) by mouth daily as needed for anxiety   Calcium 600+D 600-200 MG-UNIT TABS Take  by mouth.   diclofenac (VOLTAREN) 1 % topical gel Apply topically 4 times daily (Patient not taking: Reported on 12/19/2018)   fluticasone (FLONASE) 50 MCG/ACT spray Spray 1-2 sprays into both nostrils daily   methocarbamol (ROBAXIN) 500 MG tablet Take 2 tablets (1,000 mg) by mouth daily as needed for muscle spasms   olopatadine HCl (PATADAY) 0.2 % SOLN Place 1 drop into both eyes daily   order for DME Equipment being ordered: light lamp for seasonal affective disorder   PROAIR  (90 Base) MCG/ACT inhaler INHALE 2 PUFS BY MOUTH EVERY 6 HOURS AS NEEDED FOR SHORTNESS OF BREATH / DYSPNEA   traZODone (DESYREL) 50 MG tablet TAKE 1 TABLET (50MG) BY MOUTH EVERY NIGHT AT BEDTIME     Current Facility-Administered Medications on File Prior to Visit:  sodium chloride (PF) 0.9% PF flush 10 mL          Patient Active Problem List   Diagnosis     Hypertension goal BP (blood pressure) < 140/90     Hypertriglyceridemia     Night sweats     Atopic rhinitis     Moderate episode of recurrent major depressive disorder (H)     Keratitis sicca, bilateral     Hyperlipidemia LDL goal  <100     CKD (chronic kidney disease) stage 1, GFR 90 ml/min or greater     Menopausal syndrome (hot flashes)     Allergic rhinitis due to pollen     Status post total hysterectomy     Anemia     ACP (advance care planning)     Tension headache     Generalized anxiety disorder     Raynaud's disease without gangrene     CREST (calcinosis, Raynaud's phenomenon, esophageal dysfunction, sclerodactyly, telangiectasia) (H)     Limited systemic sclerosis (H)     Marijuana use, episodic     NSTEMI (non-ST elevated myocardial infarction) (H)     Past Surgical History:   Procedure Laterality Date     APPENDECTOMY       BIOPSY      cervical node     DILATION AND CURETTAGE, HYSTEROSCOPY DIAGNOSTIC, COMBINED  2014    Procedure: COMBINED DILATION AND CURETTAGE, HYSTEROSCOPY DIAGNOSTIC;  Surgeon: Mana Cabrera DO;  Location: [facility] OR     ENT SURGERY      tonsillectomy and adnoid removal     HYSTERECTOMY TOTAL ABDOMINAL       ORTHOPEDIC SURGERY      left knee tear meniscus     RELEASE CARPAL TUNNEL BILATERAL         Social History     Tobacco Use     Smoking status: Former Smoker     Last attempt to quit: 2001     Years since quittin.0     Smokeless tobacco: Never Used   Substance Use Topics     Alcohol use: Yes     Comment: social     Family History   Problem Relation Age of Onset     Osteoporosis Mother      Eye Disorder Mother         cataract, mac degen     Osteoporosis Father      Eye Disorder Father         glaucoma     Hypertension Maternal Grandmother      Unknown/Adopted Paternal Grandfather      Eye Disorder Paternal Grandmother         glaucoma     Hypertension Daughter      Diabetes Maternal Grandfather      Diabetes Other              Problem list, Medication list, Allergies, and Medical/Social/Surgical histories reviewed in [facility] and updated as appropriate.    OBJECTIVE:                                                    BP (!) 144/94   Pulse 74   Temp 98.2  F (36.8  C) (Temporal)   Wt 67.6 kg  (149 lb)   LMP 06/12/2014   SpO2 98%   BMI 28.62 kg/m      GENERAL: 58 year old female, alert and no distress  Psych: anxious    PHQ-9 SCORE 2/8/2018 5/22/2018 12/5/2018   PHQ-9 Total Score - - -   PHQ-9 Total Score MyChart 7 (Mild depression) - -   PHQ-9 Total Score 7 4 5     MARCELINA-7 SCORE 12/15/2017 5/22/2018 12/5/2018   Total Score - - -   Total Score 10 6 5            ASSESSMENT/PLAN:                                                      58 year old female with the following diagnoses and treatment plan:      ICD-10-CM    1. Recurrent major depression in partial remission (H) F33.41 buPROPion (WELLBUTRIN XL) 150 MG 24 hr tablet     buPROPion (WELLBUTRIN XL) 300 MG 24 hr tablet   2. Anemia, unspecified type D64.9 Vitamin B12     Folate RBC     TSH with free T4 reflex     CBC with platelets differential     Reticulocyte count     Iron and iron binding capacity     Ferritin   3. Anxiety F41.9    4. Hypertension goal BP (blood pressure) < 140/90 I10        -- anemia: discussed that we would start with work up first to find cause and then decide treatment for this. Labs ordered.   -- depression/anxiety: increase wellbutrin.   -- HTN: Pt was more anxious than usual. Her BP was higher than usual. We opted to make no changes with her depression and cardiac meds. She has routine follow up and rehab through cardiology. Can be rechecked there.   -- Mychart update on depression/anxiety in 4 weeks  -- clinic follow up in 2 months    Will call or return to clinic if worsening or symptoms not improving as discussed.  See Patient Instructions.      Annette Painting MD-PhD  [facility]    (Note: Chart documentation was done in part with Dragon Voice Recognition software. Although reviewed after completion, some word and grammatical errors may remain.)

## 2018-12-21 ENCOUNTER — TELEPHONE (OUTPATIENT)
Dept: PEDIATRICS | Facility: CLINIC | Age: 58
End: 2018-12-21

## 2018-12-21 LAB
FOLATE RBC-MCNC: 1138 NG/ML
HCT VFR BLD CALC: 32.8 %

## 2018-12-21 RX ORDER — BUPROPION HYDROCHLORIDE 300 MG/1
300 TABLET ORAL EVERY MORNING
Qty: 30 TABLET | Refills: 1 | Status: SHIPPED | OUTPATIENT
Start: 2018-12-21 | End: 2019-01-07

## 2018-12-21 RX ORDER — BUPROPION HYDROCHLORIDE 150 MG/1
150 TABLET ORAL EVERY MORNING
Qty: 30 TABLET | Refills: 1 | Status: SHIPPED | OUTPATIENT
Start: 2018-12-21 | End: 2019-01-07

## 2018-12-21 NOTE — TELEPHONE ENCOUNTER
Notes recorded by Annette Painting MD PhD on 12/21/2018 at 6:44 AM CST  Please call patient: let her know that her hemoglobin is stable at 10.8. Allnutritional markers were normal. The anemia she has is from chronic disease. No specific treatment for the anemia is needed. This can be monitored. The goal is to manage her chronic diseases. Her heart disease and CREST syndrome. Mychart follow up on how she is doing with depression/anxiety on the higher dose of Wellbutrin. Prescriptions have been sent.     Annette Painting MD PhD     Ref Range & Units 2d ago   Vitamin B12 193 - 986 pg/mL 692      Iron and iron binding capacity   Order: [id number]   Status:  Final result   Visible to patient:  No (Inaccessible in MyChart) Dx:  Anemia, unspecified type    Ref Range & Units 2d ago   Iron 35 - 180 ug/dL 70    Iron Binding Cap 240 - 430 ug/dL 333    Iron Saturation Index 15 - 46 % 21             Ref Range & Units 2d ago   Ferritin 8 - 252 ng/mL 97    Resulting Agency  [facility]         Ref Range & Units 2d ago 1yr ago   TSH 0.40 - 4.00 mU/L 2.04  1.18      Ref Range & Units 2d ago 6mo ago    WBC 4.0 - 11.0 10e9/L 6.7  5.2    RBC Count 3.8 - 5.2 10e12/L 3.46 Abnormally low   3.38 Abnormally low     Hemoglobin 11.7 - 15.7 g/dL 10.8 Abnormally low   10.9 Abnormally low     Hematocrit 35.0 - 47.0 % 32.8 Abnormally low   32.8 Abnormally low     MCV 78 - 100 fl 95  97    MCH 26.5 - 33.0 pg 31.2  32.2    MCHC 31.5 - 36.5 g/dL 32.9  33.2    RDW 10.0 - 15.0 % 12.5  12.9    Platelet Count 150 - 450 10e9/L 195  220    Diff Method  Automated Method  Automated Method    % Neutrophils % 66.6  67.8    % Lymphocytes % 20.8  17.7    % Monocytes % 11.3  10.6    % Eosinophils % 0.2  2.3    % Basophils % 0.8  1.2    % Immature Granulocytes % 0.3  0.4    Absolute Neutrophil 1.6 - 8.3 10e9/L 4.4  3.5    Absolute Lymphocytes 0.8 - 5.3 10e9/L 1.4  0.9    Absolute Monocytes 0.0 - 1.3 10e9/L 0.8  0.6    Absolute Eosinophils 0.0 - 0.7 10e9/L 0.0  0.1    Absolute  Basophils 0.0 - 0.2 10e9/L 0.1  0.1    Abs Immature Granulocytes 0 - 0.4 10e9/L 0.0  0.0    Resulting Agency  [facility]      Ref Range & Units 2d ago   % Retic 0.5 - 2.0 % 1.9    Absolute Retic 25 - 95 10e9/L 65.7    Resulting Agency  [facility]         Marcelle Rosa RN, [facility]

## 2018-12-21 NOTE — RESULT ENCOUNTER NOTE
Please call patient: let her know that her hemoglobin is stable at 10.8. Allnutritional markers were normal. The anemia she has is from chronic disease. No specific treatment for the anemia is needed. This can be monitored. The goal is to manage her chronic diseases. Her heart disease and CREST syndrome. Mychart follow up on how she is doing with depression/anxiety on the higher dose of Wellbutrin. Prescriptions have been sent.     Annette Painting MD PhD

## 2018-12-21 NOTE — TELEPHONE ENCOUNTER
Called patient and gave message per Dr. Painting.  Patient verbalized understanding and agreement to plan. Marcelle Rosa RN, [facility]

## 2019-01-08 ENCOUNTER — TELEPHONE (OUTPATIENT)
Dept: PEDIATRICS | Facility: CLINIC | Age: 59
End: 2019-01-08

## 2019-01-08 NOTE — TELEPHONE ENCOUNTER
**Please Do Not Close This Encounter**               ** Until This Has Been Addressed**          PRIOR AUTHORIZATION REQUIED PER INSURANCE       PATIENT:india dorado YOB: 1960          MEDICATION:Omeprazole 40mg                    SIG:take one capsule by mouth once daily       NDC:53312-494-46      INSURANCE:[employer]        INSURANCE PHONE #:[phone]      ID #:[id number]      INSURANCE REJECT:qty limit excd, max qty 90 in 365 days      PHARMACY:[facility]      PHARMACY NPI:[id number]      PHARMACY PHONE #:[phone]                                                          Please let us know when Prior Auth approved/denied, prescriber refusal to complete prior auth or if you would like to change medication/discontinue                                                            Thank you

## 2019-01-09 ENCOUNTER — TELEPHONE (OUTPATIENT)
Dept: PEDIATRICS | Facility: CLINIC | Age: 59
End: 2019-01-09

## 2019-01-09 DIAGNOSIS — F41.0 PANIC ATTACK: ICD-10-CM

## 2019-01-09 RX ORDER — ALPRAZOLAM 0.25 MG
0.25 TABLET ORAL DAILY PRN
Qty: 90 TABLET | Refills: 1 | Status: SHIPPED | OUTPATIENT
Start: 2019-01-11 | End: 2019-03-05

## 2019-01-09 NOTE — TELEPHONE ENCOUNTER
PA Initiation    Medication: Pilosec  Insurance Company: [employer] - Phone [phone] Fax [phone]  Pharmacy Filling the Rx:    Filling Pharmacy Phone:    Filling Pharmacy Fax:    Start Date: 1/9/2019    [facility] Authorization Team   Phone: [phone]  Fax: [phone]

## 2019-01-09 NOTE — TELEPHONE ENCOUNTER
[facility] Call Center    Phone Message    May a detailed message be left on voicemail: yes    Reason for Call: Medication Question or concern regarding medication   Prescription Clarification  Name of Medication: Cathryn calling from [employer] requesting a verbal ok allowing Rx ALPRAZolam (XANAX) 0.25 MG tablet [913] (Order [id number]) to be filled soon and for a 3 month supply  To be filled. Please advise.  Prescribing Provider: Dr. Painting     Action Taken: Message routed to:  Primary Care p [id number]

## 2019-01-10 ENCOUNTER — TRANSFERRED RECORDS (OUTPATIENT)
Dept: HEALTH INFORMATION MANAGEMENT | Facility: CLINIC | Age: 59
End: 2019-01-10

## 2019-01-10 DIAGNOSIS — M19.049 HAND ARTHRITIS: ICD-10-CM

## 2019-01-10 NOTE — TELEPHONE ENCOUNTER
Prior Authorization Approval    Authorization Effective Date: 1/10/2019  Authorization Expiration Date: 1/9/2022  Medication: diclofenac (VOLTAREN) 1 % topical gel - P/A APPROVED  Approved Dose/Quantity: 100  Reference #: [id number] KEY [id number]   Insurance Company: [employer] - Phone [phone] Fax [phone]  Expected CoPay:       CoPay Card Available:      Foundation Assistance Needed:    Which Pharmacy is filling the prescription (Not needed for infusion/clinic administered): [facility] [place], [place] - [address], [address]  Pharmacy Notified: Yes - sent email to Dilip technician  Patient Notified:

## 2019-01-10 NOTE — TELEPHONE ENCOUNTER
Central Prior Authorization Team   Phone: [phone]    PA Initiation    Medication: diclofenac (VOLTAREN) 1 % topical gel  Insurance Company: [employer] - Phone [phone] Fax [phone]  Pharmacy Filling the Rx: [facility] [place], [place] - [address]  Filling Pharmacy Phone: [phone]  Filling Pharmacy Fax:    Start Date: 1/10/2019

## 2019-01-10 NOTE — TELEPHONE ENCOUNTER
Patient is also needing a 3 month supply of buPROPion (WELLBUTRIN XL) 300 MG 24 hr tablet and Wellbutrin 150mg as well as Effexor.  Patient requesting refill be expedited, she is leaving on Saturday.  Please advise.  Thank you.

## 2019-01-10 NOTE — TELEPHONE ENCOUNTER
Patient calling back, noted she would like script to be sent to our pharmacy onsite. Script tubed to pharmacy onsite.    KEV Espinoza

## 2019-01-10 NOTE — TELEPHONE ENCOUNTER
See refill encounter 1/7/19 regarding request for additional medications. Waiting on provider response on these medications.    Unclear what pharmacy to send Rx for ALPRAZolam (XANAX) 0.25 MG tablet.     Patient Contact    Attempt # 1    Was call answered?  No.  Left message on home number to return call to clinic with preferred pharmacy to send prescription.    Rx for ALPRAZolam (XANAX) 0.25 MG tablet held at TC desk for patient return call.    Call Center: If/when patient calls back please   1) verify pharmacy for ALPRAZolam (XANAX)   2) advise patient Rx refills pending provider review for the other 3 medications (below).    Guille MONAE, CMA

## 2019-01-21 ENCOUNTER — TELEPHONE (OUTPATIENT)
Dept: PEDIATRICS | Facility: CLINIC | Age: 59
End: 2019-01-21

## 2019-01-21 NOTE — TELEPHONE ENCOUNTER
Left message for patient to return clinic call regarding scheduling. Patient needs a Return  appointment for recheck with  on or around 2/19/19. Number to clinic and Mychart option given, please assist in scheduling once patient returns clinic call    Call Center OKAY TO SCHEDULE.    Thanks,   Malia Johnson  Primary Care   [facility]

## 2019-01-21 NOTE — LETTER
February 11, 2019      Marcelina Estevez  [address] [place] [address]        Dear Marcelina Estevez,    In order to ensure that we are providing the best quality care, we would like to remind you that you are due for a Return appointment with  around 2/19/19 per your last visit's instructions.      We have been unable to reach you by phone. Please call your clinic or use mo9 ([employer]) to make an appointment with your provider before you run out of medication. This will prevent a delay in your next refill. Please let us know if you have any questions and we would be happy to help.     Thank you for trusting us with your care.    Sincerely,     [facility] Primary Care Team  [phone]

## 2019-02-11 NOTE — TELEPHONE ENCOUNTER
3rd    Chart letter created and sent.    Malia Johnson  Primary Care   [facility]

## 2019-03-05 ENCOUNTER — TELEPHONE (OUTPATIENT)
Dept: PEDIATRICS | Facility: CLINIC | Age: 59
End: 2019-03-05

## 2019-03-05 DIAGNOSIS — F41.0 PANIC ATTACK: ICD-10-CM

## 2019-03-05 RX ORDER — ALPRAZOLAM 0.25 MG
0.25 TABLET ORAL DAILY PRN
Qty: 90 TABLET | Refills: 1 | Status: SHIPPED | OUTPATIENT
Start: 2019-03-05 | End: 2019-09-12

## 2019-03-05 NOTE — TELEPHONE ENCOUNTER
Health Call Center    Phone Message    May a detailed message be left on voicemail: yes    Reason for Call: Medication Refill Request    Has the patient contacted the pharmacy for the refill? Yes   Name of medication being requested: ALPRAZolam (XANAX) 0.25 MG tablet [913] (Order [id number])    Provider who prescribed the medication: Dr. Painting  Pharmacy: [facility] order pharmacy  Date medication is needed: Laurent MILAN calling from  Specialty requesting a new Rx be sent in for this medication. States they are not able transfer controlled substances from another pharmacy. Please advise.          Action Taken: Message routed to:  Primary Care p [id number]

## 2019-03-05 NOTE — TELEPHONE ENCOUNTER
Pending Prescriptions:                       Disp   Refills    ALPRAZolam (XANAX) 0.25 MG tablet         90 tab*1            Sig: Take 1 tablet (0.25 mg) by mouth daily as needed           for anxiety      Last Written Prescription Date:  1/11/19  Last Fill Quantity: 90,  # refills: 1   Last office visit: 12/19/2018 with prescribing provider:  Dr. Annette Painting   Future Office Visit:   Next 5 appointments (look out 90 days)    Apr 03, 2019  9:30 AM CDT  Return Visit with Hilda Lopez MD  [facility]) [address] [place] [id number]  [phone]   Apr 03, 2019 10:50 AM CDT  Return Visit with Annette Painting MD PhD  [facility]) [address] [place] [id number]  [phone]           Routing refill request to provider for review/approval because:  Drug not on the [facility], P or [facility] refill protocol or controlled substance      Date medication is needed: Laurent MILAN calling from [facility] requesting a new Rx be sent in for this medication. States they are not able transfer controlled substances from another pharmacy.

## 2019-03-28 DIAGNOSIS — R12 HEART BURN: ICD-10-CM

## 2019-03-28 RX ORDER — OMEPRAZOLE 40 MG/1
40 CAPSULE, DELAYED RELEASE ORAL DAILY
Qty: 90 CAPSULE | Refills: 2 | Status: SHIPPED | OUTPATIENT
Start: 2019-03-28 | End: 2022-04-21 | Stop reason: ALTCHOICE

## 2019-03-28 NOTE — TELEPHONE ENCOUNTER
omeprazole (PRILOSEC) 40 MG DR capsule  Last Written Prescription Date:  12/5/18  Last Fill Quantity: 90,  # refills: 3   Last office visit: 12/19/2018 with prescribing provider:  Dr. Painting   Future Office Visit:  No future appointments    Looks like las refill was sent to our pharmacy onsite. Please advise    KEV Espinoza

## 2019-03-28 NOTE — TELEPHONE ENCOUNTER
Please refer to RN refill guidelines. Refilled per protocol.    Olimpia Wright RN   [facility], [facility]

## 2019-04-10 ENCOUNTER — TRANSFERRED RECORDS (OUTPATIENT)
Dept: HEALTH INFORMATION MANAGEMENT | Facility: CLINIC | Age: 59
End: 2019-04-10

## 2019-04-17 ENCOUNTER — TRANSFERRED RECORDS (OUTPATIENT)
Dept: HEALTH INFORMATION MANAGEMENT | Facility: CLINIC | Age: 59
End: 2019-04-17

## 2019-06-11 ENCOUNTER — TRANSFERRED RECORDS (OUTPATIENT)
Dept: HEALTH INFORMATION MANAGEMENT | Facility: CLINIC | Age: 59
End: 2019-06-11

## 2019-09-12 ENCOUNTER — TELEPHONE (OUTPATIENT)
Dept: PEDIATRICS | Facility: CLINIC | Age: 59
End: 2019-09-12

## 2019-09-12 DIAGNOSIS — F41.0 PANIC ATTACK: ICD-10-CM

## 2019-09-12 RX ORDER — ALPRAZOLAM 0.25 MG
0.25 TABLET ORAL DAILY PRN
Qty: 30 TABLET | Refills: 0 | Status: SHIPPED | OUTPATIENT
Start: 2019-09-12 | End: 2019-11-14 | Stop reason: ALTCHOICE

## 2019-09-12 NOTE — TELEPHONE ENCOUNTER
ALPRAZolam (XANAX) 0.25 MG tablet     Last Written Prescription Date:  3/5/19  Last Fill Quantity: 90,   # refills: 1  Last Office Visit: 12/19/18  Future Office visit:       Routing refill request to provider for review/approval because:  Drug not on the [facility], [facility] or [facility] refill protocol or controlled substance    KEV Espinoza

## 2019-09-12 NOTE — TELEPHONE ENCOUNTER
Patient is overdue to be seen.  I approved for 30 days.  Please help patient schedule an appointment in clinic.

## 2019-09-13 NOTE — TELEPHONE ENCOUNTER
Pt notified needs appt, pt requesting to do appt on same day as eye appt. Pt will call back due to length of wait time for schedulers. Pt requesting rx be faxed to [facility] in [facility]. June Zurita LPN

## 2019-09-25 ENCOUNTER — TELEPHONE (OUTPATIENT)
Dept: OPHTHALMOLOGY | Facility: CLINIC | Age: 59
End: 2019-09-25

## 2019-09-25 NOTE — TELEPHONE ENCOUNTER
[facility] Call Center    Phone Message    May a detailed message be left on voicemail: yes    Reason for Call: Patient is requesting her prescription from last years exam be sent to her Rheumatologist due to Plaquinel use.  Please advise.       Dr. Bhavin Gibbons- [facility]  Phone: [phone]  Fax: [phone]  Attn: Sarai    Action Taken: Message routed to:  Adult Clinics: Eye p [id number]

## 2019-09-26 NOTE — TELEPHONE ENCOUNTER
Faxed eye exam notes from 11/20/2018 exam with Dr. Mandujano to [facility] in [place].    Melanie Jeans, OA

## 2019-11-12 ENCOUNTER — DOCUMENTATION ONLY (OUTPATIENT)
Dept: LAB | Facility: CLINIC | Age: 59
End: 2019-11-12

## 2019-11-12 DIAGNOSIS — E78.5 HYPERLIPIDEMIA LDL GOAL <100: Primary | ICD-10-CM

## 2019-11-12 DIAGNOSIS — I10 HYPERTENSION GOAL BP (BLOOD PRESSURE) < 140/90: ICD-10-CM

## 2019-11-14 ENCOUNTER — OFFICE VISIT (OUTPATIENT)
Dept: PEDIATRICS | Facility: CLINIC | Age: 59
End: 2019-11-14
Payer: COMMERCIAL

## 2019-11-14 VITALS
SYSTOLIC BLOOD PRESSURE: 132 MMHG | OXYGEN SATURATION: 99 % | BODY MASS INDEX: 26.62 KG/M2 | TEMPERATURE: 97.9 F | HEIGHT: 61 IN | WEIGHT: 141 LBS | HEART RATE: 89 BPM | DIASTOLIC BLOOD PRESSURE: 78 MMHG

## 2019-11-14 DIAGNOSIS — Z00.00 ROUTINE GENERAL MEDICAL EXAMINATION AT A HEALTH CARE FACILITY: Primary | ICD-10-CM

## 2019-11-14 DIAGNOSIS — N95.1 MENOPAUSAL SYNDROME (HOT FLASHES): ICD-10-CM

## 2019-11-14 DIAGNOSIS — I10 HYPERTENSION GOAL BP (BLOOD PRESSURE) < 140/90: ICD-10-CM

## 2019-11-14 DIAGNOSIS — Z12.31 ENCOUNTER FOR SCREENING MAMMOGRAM FOR BREAST CANCER: ICD-10-CM

## 2019-11-14 DIAGNOSIS — E78.5 HYPERLIPIDEMIA LDL GOAL <100: ICD-10-CM

## 2019-11-14 DIAGNOSIS — R06.2 WHEEZING: ICD-10-CM

## 2019-11-14 DIAGNOSIS — F41.0 PANIC ATTACK: ICD-10-CM

## 2019-11-14 DIAGNOSIS — G47.00 INSOMNIA, UNSPECIFIED TYPE: ICD-10-CM

## 2019-11-14 DIAGNOSIS — F33.9 EPISODE OF RECURRENT MAJOR DEPRESSIVE DISORDER, UNSPECIFIED DEPRESSION EPISODE SEVERITY (H): ICD-10-CM

## 2019-11-14 DIAGNOSIS — F41.1 GAD (GENERALIZED ANXIETY DISORDER): ICD-10-CM

## 2019-11-14 LAB
ALT SERPL W P-5'-P-CCNC: 39 U/L (ref 0–50)
ANION GAP SERPL CALCULATED.3IONS-SCNC: 7 MMOL/L (ref 3–14)
BUN SERPL-MCNC: 16 MG/DL (ref 7–30)
CALCIUM SERPL-MCNC: 9.4 MG/DL (ref 8.5–10.1)
CHLORIDE SERPL-SCNC: 99 MMOL/L (ref 94–109)
CHOLEST SERPL-MCNC: 228 MG/DL
CO2 SERPL-SCNC: 29 MMOL/L (ref 20–32)
CREAT SERPL-MCNC: 0.9 MG/DL (ref 0.52–1.04)
CREAT UR-MCNC: 57 MG/DL
GFR SERPL CREATININE-BSD FRML MDRD: 70 ML/MIN/{1.73_M2}
GLUCOSE SERPL-MCNC: 99 MG/DL (ref 70–99)
HDLC SERPL-MCNC: 116 MG/DL
LDLC SERPL CALC-MCNC: 91 MG/DL
MICROALBUMIN UR-MCNC: 108 MG/L
MICROALBUMIN/CREAT UR: 190.14 MG/G CR (ref 0–25)
NONHDLC SERPL-MCNC: 112 MG/DL
POTASSIUM SERPL-SCNC: 3.6 MMOL/L (ref 3.4–5.3)
SODIUM SERPL-SCNC: 135 MMOL/L (ref 133–144)
TRIGL SERPL-MCNC: 106 MG/DL

## 2019-11-14 PROCEDURE — 36415 COLL VENOUS BLD VENIPUNCTURE: CPT | Performed by: FAMILY MEDICINE

## 2019-11-14 PROCEDURE — 82043 UR ALBUMIN QUANTITATIVE: CPT | Performed by: FAMILY MEDICINE

## 2019-11-14 PROCEDURE — 99214 OFFICE O/P EST MOD 30 MIN: CPT | Mod: 25 | Performed by: INTERNAL MEDICINE

## 2019-11-14 PROCEDURE — 80048 BASIC METABOLIC PNL TOTAL CA: CPT | Performed by: FAMILY MEDICINE

## 2019-11-14 PROCEDURE — 80061 LIPID PANEL: CPT | Performed by: FAMILY MEDICINE

## 2019-11-14 PROCEDURE — 99396 PREV VISIT EST AGE 40-64: CPT | Performed by: INTERNAL MEDICINE

## 2019-11-14 PROCEDURE — 84460 ALANINE AMINO (ALT) (SGPT): CPT | Performed by: FAMILY MEDICINE

## 2019-11-14 RX ORDER — SIMVASTATIN 40 MG
TABLET ORAL
Qty: 90 TABLET | Refills: 3 | Status: SHIPPED | OUTPATIENT
Start: 2019-11-14 | End: 2020-10-06

## 2019-11-14 RX ORDER — TRAZODONE HYDROCHLORIDE 50 MG/1
TABLET, FILM COATED ORAL
Qty: 90 TABLET | Refills: 2 | Status: SHIPPED | OUTPATIENT
Start: 2019-11-14 | End: 2020-10-06

## 2019-11-14 RX ORDER — VENLAFAXINE HYDROCHLORIDE 150 MG/1
300 CAPSULE, EXTENDED RELEASE ORAL DAILY
Qty: 180 CAPSULE | Refills: 1 | Status: SHIPPED | OUTPATIENT
Start: 2019-11-14 | End: 2020-03-11

## 2019-11-14 RX ORDER — VILAZODONE HYDROCHLORIDE 10 MG/1
10 TABLET ORAL DAILY
COMMUNITY
End: 2019-11-14

## 2019-11-14 RX ORDER — ALBUTEROL SULFATE 90 UG/1
AEROSOL, METERED RESPIRATORY (INHALATION)
Qty: 8.5 G | Refills: 3 | Status: SHIPPED | OUTPATIENT
Start: 2019-11-14 | End: 2021-08-04

## 2019-11-14 RX ORDER — HYDROCHLOROTHIAZIDE 25 MG/1
25 TABLET ORAL DAILY
COMMUNITY
Start: 2019-11-14 | End: 2019-11-14 | Stop reason: ALTCHOICE

## 2019-11-14 RX ORDER — LOSARTAN POTASSIUM 50 MG/1
50 TABLET ORAL DAILY
Qty: 90 TABLET | Refills: 3 | Status: SHIPPED | OUTPATIENT
Start: 2019-11-14 | End: 2020-11-18

## 2019-11-14 RX ORDER — LORAZEPAM 0.5 MG/1
0.5 TABLET ORAL DAILY PRN
Qty: 30 TABLET | Refills: 1 | Status: SHIPPED | OUTPATIENT
Start: 2019-11-14 | End: 2020-03-26

## 2019-11-14 ASSESSMENT — MIFFLIN-ST. JEOR: SCORE: 1144.01

## 2019-11-14 NOTE — PROGRESS NOTES
SUBJECTIVE:   CC: Marcelina Estevez is an 59 year old woman who presents for preventive health visit.     Healthy Habits:    Do you get at least three servings of calcium containing foods daily (dairy, green leafy vegetables, etc.)? yes    Amount of exercise or daily activities, outside of work: 2     day(s) per week    Problems taking medications regularly No    Medication side effects: No    Have you had an eye exam in the past two years? yes    Do you see a dentist twice per year? yes    Do you have sleep apnea, excessive snoring or daytime drowsiness?no      HPI:   [place] Has seen psychiatrist in [place]. Had Genesight testing. Pt brought in report. She is now on Effexor 225 mg. She feels this is not controlling her symptom well. She doesn't want to go back to psychiatry because her first consult was telemedicine. There is another provider with a long wait.     She has been using alprazolam for anxiety/panic attack. It makes her very tired. Per Gene sight testing, Lorazepam would work better, Alprazolam has moderate gene-drug interaction.     Per her report, Prestiq, Fetizma, and Vortifexine and effexor are among the best options.       Today's PHQ-2 Score:   PHQ-2 (  [employer]) 2019   Q1: Little interest or pleasure in doing things 1 1   Q2: Feeling down, depressed or hopeless 1 1   PHQ-2 Score 2 2       Abuse: Current or Past(Physical, Sexual or Emotional)- No  Do you feel safe in your environment? Yes        Social History     Tobacco Use     Smoking status: Former Smoker     Last attempt to quit: 2001     Years since quittin.8     Smokeless tobacco: Never Used   Substance Use Topics     Alcohol use: Yes     Comment: social     If you drink alcohol do you typically have >3 drinks per day or >7 drinks per week? No                     Reviewed orders with patient.  Reviewed health maintenance and updated orders accordingly - Yes  Labs reviewed in EPIC    Mammogram  "Screening: Patient over age 50, mutual decision to screen reflected in health maintenance.    Pertinent mammograms are reviewed under the imaging tab.  History of abnormal Pap smear: NO - age 30-65 PAP every 5 years with negative HPV co-testing recommended  PAP / HPV 5/2/2014 5/23/2011   PAP OTHER-NIL, See Result NIL     Reviewed and updated as needed this visit by clinical staff  Tobacco  Allergies  Meds  Med Hx  Surg Hx  Fam Hx  Soc Hx        Reviewed and updated as needed this visit by Provider            ROS:  Constitutional, HEENT, cardiovascular, pulmonary, gi and gu systems are negative, except as otherwise noted.     OBJECTIVE:   /78   Pulse 89   Temp 97.9  F (36.6  C) (Temporal)   Ht 1.537 m (5' 0.5\")   Wt 64 kg (141 lb)   LMP 06/12/2014   SpO2 99%   BMI 27.08 kg/m    EXAM:  GENERAL: healthy, alert and no distress  EYES: Eyes grossly normal to inspection, PERRL and conjunctivae and sclerae normal  HENT: ear canals and TM's normal, nose and mouth without ulcers or lesions  NECK: no adenopathy, no asymmetry, masses, or scars and thyroid normal to palpation  RESP: lungs clear to auscultation - no rales, rhonchi or wheezes  BREAST: normal without masses, tenderness or nipple discharge and no palpable axillary masses or adenopathy  CV: regular rate and rhythm, normal S1 S2, no S3 or S4, no murmur, click or rub, no peripheral edema and peripheral pulses strong  ABDOMEN: soft, nontender, no hepatosplenomegaly, no masses and bowel sounds normal  MS: no gross musculoskeletal defects noted, no edema  SKIN: no suspicious lesions or rashes  NEURO: Normal strength and tone, mentation intact and speech normal  PSYCH: mentation appears normal, affect normal/bright    Diagnostic Test Results:  Labs reviewed in Epic  Orders Only on 11/14/2019   Component Date Value Ref Range Status     Creatinine Urine 11/14/2019 57  mg/dL Final     Albumin Urine mg/L 11/14/2019 108  mg/L Final     Albumin Urine mg/g Cr " 11/14/2019 190.14* 0 - 25 mg/g Cr Final     Sodium 11/14/2019 135  133 - 144 mmol/L Final     Potassium 11/14/2019 3.6  3.4 - 5.3 mmol/L Final     Chloride 11/14/2019 99  94 - 109 mmol/L Final     Carbon Dioxide 11/14/2019 29  20 - 32 mmol/L Final     Anion Gap 11/14/2019 7  3 - 14 mmol/L Final     Glucose 11/14/2019 99  70 - 99 mg/dL Final     Urea Nitrogen 11/14/2019 16  7 - 30 mg/dL Final     Creatinine 11/14/2019 0.90  0.52 - 1.04 mg/dL Final     GFR Estimate 11/14/2019 70  >60 mL/min/[1.73_m2] Final    Comment: Non  GFR Calc  Starting 12/18/2018, serum creatinine based estimated GFR (eGFR) will be   calculated using the Chronic Kidney Disease Epidemiology Collaboration   (CKD-EPI) equation.       GFR Estimate If Black 11/14/2019 81  >60 mL/min/[1.73_m2] Final    Comment:  GFR Calc  Starting 12/18/2018, serum creatinine based estimated GFR (eGFR) will be   calculated using the Chronic Kidney Disease Epidemiology Collaboration   (CKD-EPI) equation.       Calcium 11/14/2019 9.4  8.5 - 10.1 mg/dL Final     ALT 11/14/2019 39  0 - 50 U/L Final     Cholesterol 11/14/2019 228* <200 mg/dL Final    Desirable:       <200 mg/dl     Triglycerides 11/14/2019 106  <150 mg/dL Final     HDL Cholesterol 11/14/2019 116  >49 mg/dL Final     LDL Cholesterol Calculated 11/14/2019 91  <100 mg/dL Final    Desirable:       <100 mg/dl     Non HDL Cholesterol 11/14/2019 112  <130 mg/dL Final          ASSESSMENT/PLAN:       ICD-10-CM    1. Routine general medical examination at a health care facility Z00.00    2. Menopausal syndrome (hot flashes) N95.1 venlafaxine (EFFEXOR-XR) 150 MG 24 hr capsule   3. Panic attack F41.0 LORazepam (ATIVAN) 0.5 MG tablet   4. Hypertension goal BP (blood pressure) < 140/90 I10 venlafaxine (EFFEXOR-XR) 150 MG 24 hr capsule     losartan (COZAAR) 50 MG tablet   5. Hyperlipidemia LDL goal <100 E78.5 simvastatin (ZOCOR) 40 MG tablet   6. Wheezing R06.2 albuterol (PROAIR HFA) 108  "(90 Base) MCG/ACT inhaler   7. Insomnia, unspecified type G47.00 traZODone (DESYREL) 50 MG tablet   8. Encounter for screening mammogram for breast cancer Z12.31 MA Screening Digital Bilateral   9. MARCELINA (generalized anxiety disorder) F41.1 venlafaxine (EFFEXOR-XR) 150 MG 24 hr capsule   10. Episode of recurrent major depressive disorder, unspecified depression episode severity (H) F33.9 venlafaxine (EFFEXOR-XR) 150 MG 24 hr capsule     Depression/anxiety/panic attack: I agreed to take over the meds. Will increase Effexor to 300 mg, rather than switching or adding new medications. Changed Alprazolam to Lorazepam for panic attack.     HTN: controlled but she was taking hydrochlorothiazide 25 mg 2 tablets a day. She didn't like frequent urination. Will reduce this to 25 mg 1 tablet a day. Increase Losartan to 50 mg. If BP stable, ok to return in one year. She is now living quite a distance from here.     Follow up if BP unstable or depression/anxiety not improving with these measures.       COUNSELING:   Reviewed preventive health counseling, as reflected in patient instructions    Estimated body mass index is 27.08 kg/m  as calculated from the following:    Height as of this encounter: 1.537 m (5' 0.5\").    Weight as of this encounter: 64 kg (141 lb).         reports that she quit smoking about 18 years ago. She has never used smokeless tobacco.      Counseling Resources:  ATP IV Guidelines  Pooled Cohorts Equation Calculator  Breast Cancer Risk Calculator  FRAX Risk Assessment  ICSI Preventive Guidelines  Dietary Guidelines for Americans, 2010  USDA's MyPlate  ASA Prophylaxis  Lung CA Screening    Annette Painting MD PhD  [facility]  "

## 2019-11-14 NOTE — PATIENT INSTRUCTIONS
Make appointment(s) for:   -- mammogram.  -- return in one year for annual physical with fasting labs.         Check with your insurance regarding coverage for Shingrix (RZV) - the new shingles vaccine.       Medication changes:  Reduce hydrochlorothiazide to 1 tablet a day.   Add Losartan 50 mg 1 tablet daily.   Increase Effexor to 300 mg.  Change Alprazolam to Lorazepam for panic attack.       Medication(s) prescribed today:    Orders Placed This Encounter   Medications     DISCONTD: vilazodone (VIIBRYD) 10 MG TABS tablet     Sig: Take 10 mg by mouth daily     venlafaxine (EFFEXOR-XR) 150 MG 24 hr capsule     Sig: Take 2 capsules (300 mg) by mouth daily     Dispense:  180 capsule     Refill:  1     Dose increased. Please cancel previous prescription.     LORazepam (ATIVAN) 0.5 MG tablet     Sig: Take 1 tablet (0.5 mg) by mouth daily as needed (panic attack)     Dispense:  30 tablet     Refill:  1     DISCONTD: hydrochlorothiazide (HYDRODIURIL) 25 MG tablet     Sig: Take 1 tablet (25 mg) by mouth daily     losartan (COZAAR) 50 MG tablet     Sig: Take 1 tablet (50 mg) by mouth daily     Dispense:  90 tablet     Refill:  3           Preventive Health Recommendations  Female Ages 50 - 64    Yearly exam: See your health care provider every year in order to  o Review health changes.   o Discuss preventive care.    o Review your medicines if your doctor has prescribed any.      Get a Pap test every three years (unless you have an abnormal result and your provider advises testing more often).    If you get Pap tests with HPV test, you only need to test every 5 years, unless you have an abnormal result.     You do not need a Pap test if your uterus was removed (hysterectomy) and you have not had cancer.    You should be tested each year for STDs (sexually transmitted diseases) if you're at risk.     Have a mammogram every 1 to 2 years.    Have a colonoscopy at age 50, or have a yearly FIT test (stool test). These exams  screen for colon cancer.      Have a cholesterol test every 5 years, or more often if advised.    Have a diabetes test (fasting glucose) every three years. If you are at risk for diabetes, you should have this test more often.     If you are at risk for osteoporosis (brittle bone disease), think about having a bone density scan (DEXA).    Shots: Get a flu shot each year. Get a tetanus shot every 10 years.    Nutrition:     Eat at least 5 servings of fruits and vegetables each day.    Eat whole-grain bread, whole-wheat pasta and brown rice instead of white grains and rice.    Get adequate Calcium and Vitamin D.     Lifestyle    Exercise at least 150 minutes a week (30 minutes a day, 5 days a week). This will help you control your weight and prevent disease.    Limit alcohol to one drink per day.    No smoking.     Wear sunscreen to prevent skin cancer.     See your dentist every six months for an exam and cleaning.    See your eye doctor every 1 to 2 years.

## 2019-11-18 ENCOUNTER — TELEPHONE (OUTPATIENT)
Dept: PEDIATRICS | Facility: CLINIC | Age: 59
End: 2019-11-18

## 2019-11-18 NOTE — TELEPHONE ENCOUNTER
[facility] Call Center    Phone Message    May a detailed message be left on voicemail: yes    Reason for Call: Other: Pt is hoping to receive a shingles vaccine while she's in clinic either on 11/26/2019 or 11/27/2019. Pt has been placed on a waitlist. Writer unsure if clinic has it in stock. Please call her to discuss.     Action Taken: Message routed to:  Primary Care p [phone]

## 2019-11-18 NOTE — TELEPHONE ENCOUNTER
Lm for pt, notified following wait list for shingrix. Notified waiting on protocol information for sched nurse only visits for vaccine. Notified if unable to get it here can always check at the pharmacy. June Zurita LPN

## 2019-11-20 ENCOUNTER — TELEPHONE (OUTPATIENT)
Dept: PEDIATRICS | Facility: CLINIC | Age: 59
End: 2019-11-20

## 2019-11-20 NOTE — TELEPHONE ENCOUNTER
[facility] Call Center    Phone Message    May a detailed message be left on voicemail: yes    Reason for Call: Medication Question or concern regarding medication   Prescription Clarification  Patient called and states that she has been taking venlafaxine (EFFEXOR-XR) 150 MG 24 hr capsule, 300 mg in the morning and 300 mg in the evening, for a total of 600 mg per day, and she does not feel right. Patient would like call back to discuss.           Action Taken: Message routed to:  Primary Care p [phone]

## 2019-11-20 NOTE — TELEPHONE ENCOUNTER
Patient started the increased Effexor dose on 11/14 and thought they were 300mg capsules and she was taking the wrong dose, but she wasn't. She feels okay and doesn't want to report anything to PCP.    Sameera Chen RN

## 2019-11-26 ENCOUNTER — ANCILLARY PROCEDURE (OUTPATIENT)
Dept: MAMMOGRAPHY | Facility: CLINIC | Age: 59
End: 2019-11-26
Attending: INTERNAL MEDICINE
Payer: COMMERCIAL

## 2019-11-26 ENCOUNTER — OFFICE VISIT (OUTPATIENT)
Dept: OPHTHALMOLOGY | Facility: CLINIC | Age: 59
End: 2019-11-26
Payer: COMMERCIAL

## 2019-11-26 DIAGNOSIS — H52.13 MYOPIA OF BOTH EYES WITH ASTIGMATISM AND PRESBYOPIA: ICD-10-CM

## 2019-11-26 DIAGNOSIS — Z12.31 ENCOUNTER FOR SCREENING MAMMOGRAM FOR BREAST CANCER: ICD-10-CM

## 2019-11-26 DIAGNOSIS — Z79.899 HIGH RISK MEDICATION USE: Primary | ICD-10-CM

## 2019-11-26 DIAGNOSIS — H52.203 MYOPIA OF BOTH EYES WITH ASTIGMATISM AND PRESBYOPIA: ICD-10-CM

## 2019-11-26 DIAGNOSIS — H52.4 MYOPIA OF BOTH EYES WITH ASTIGMATISM AND PRESBYOPIA: ICD-10-CM

## 2019-11-26 PROCEDURE — 77063 BREAST TOMOSYNTHESIS BI: CPT

## 2019-11-26 PROCEDURE — 92134 CPTRZ OPH DX IMG PST SGM RTA: CPT | Performed by: OPHTHALMOLOGY

## 2019-11-26 PROCEDURE — 92083 EXTENDED VISUAL FIELD XM: CPT | Performed by: OPHTHALMOLOGY

## 2019-11-26 PROCEDURE — 92014 COMPRE OPH EXAM EST PT 1/>: CPT | Performed by: OPHTHALMOLOGY

## 2019-11-26 PROCEDURE — 77067 SCR MAMMO BI INCL CAD: CPT

## 2019-11-26 ASSESSMENT — REFRACTION_WEARINGRX
OD_AXIS: 006
OD_CYLINDER: +3.75
OD_ADD: +2.50
OS_AXIS: 002
SPECS_TYPE: PAL
OS_SPHERE: -3.75
OS_ADD: +2.50
OD_SPHERE: -4.75
OS_CYLINDER: +1.25

## 2019-11-26 ASSESSMENT — VISUAL ACUITY
OS_CC: 20/20
CORRECTION_TYPE: GLASSES
OD_CC+: -1
OS_CC+: -1
OD_CC: 20/30
METHOD: SNELLEN - LINEAR

## 2019-11-26 ASSESSMENT — CONF VISUAL FIELD
OS_NORMAL: 1
OD_NORMAL: 1

## 2019-11-26 ASSESSMENT — CUP TO DISC RATIO
OD_RATIO: 0.1
OS_RATIO: 0.1

## 2019-11-26 ASSESSMENT — TONOMETRY
OS_IOP_MMHG: 20
IOP_METHOD: ICARE
OD_IOP_MMHG: 18

## 2019-11-26 ASSESSMENT — SLIT LAMP EXAM - LIDS
COMMENTS: NORMAL
COMMENTS: NORMAL

## 2019-11-26 ASSESSMENT — EXTERNAL EXAM - RIGHT EYE: OD_EXAM: NORMAL

## 2019-11-26 ASSESSMENT — EXTERNAL EXAM - LEFT EYE: OS_EXAM: NORMAL

## 2019-11-26 NOTE — PROGRESS NOTES
Assessment & Plan   Marcelina Estevez is a 59 year old female who presents with:   Review of systems for the eyes was negative other than the pertinent positives and negatives noted in the HPI.    High risk medication use  - without retinopathy  - HVF 10-2 OU  - SD-OCT Macula Optovue OU (both eyes)    Myopia of both eyes with astigmatism and presbyopia    Return in 12 month for annual (Plaquenil testing)      Attending Physician Attestation:  Complete documentation of historical and exam elements from today's encounter can be found in the full encounter summary report (not reduplicated in this progress note).  I personally obtained the chief complaint(s) and history of present illness.  I confirmed and edited as necessary the review of systems, past medical/surgical history, family history, social history, and examination findings as documented by others; and I examined the patient myself.  I personally reviewed the relevant tests, images, and reports as documented above.  I formulated and edited as necessary the assessment and plan and discussed the findings and management plan with the patient and family. - Job Mandujano MD

## 2020-03-03 ENCOUNTER — TELEPHONE (OUTPATIENT)
Dept: PEDIATRICS | Facility: CLINIC | Age: 60
End: 2020-03-03

## 2020-03-03 NOTE — TELEPHONE ENCOUNTER
Message routed to PA team. Called pt let her know it has been sent to them and when we hear back we will let her know. Shea PEARSON CMA

## 2020-03-03 NOTE — TELEPHONE ENCOUNTER
[facility] Call Center    Phone Message    May a detailed message be left on voicemail: yes     Reason for Call: Patient was advised by her pharmacy that a PA would need to be initiated for her insurance to approved taking venlafaxine (EFFEXOR-XR) 150 MG 24 hr capsule twice daily. The patient would like a call to confirm the PA was initiated.  Please advise.      Action Taken: Message routed to:  Primary Care p [id number]    Travel Screening: Not Applicable

## 2020-03-10 ENCOUNTER — TELEPHONE (OUTPATIENT)
Dept: PEDIATRICS | Facility: CLINIC | Age: 60
End: 2020-03-10

## 2020-03-10 NOTE — TELEPHONE ENCOUNTER
Patient gave call center [employer] - [phone].  [facility] [phone].

## 2020-03-10 NOTE — TELEPHONE ENCOUNTER
Message routed to provider to review. PA routed to team 03/03/2020.  Have not processed yet. Please advise. Shea PEARSON CMA

## 2020-03-10 NOTE — TELEPHONE ENCOUNTER
Central Prior Authorization Team   Phone: [phone]    PA Initiation    Medication: venlafaxine (EFFEXOR-XR) 150 MG 24 hr capsule  Insurance Company: [employer] - Phone [phone] Fax [phone]  Pharmacy Filling the Rx: [facility] - [place], [place] - [address]  Filling Pharmacy Phone: [phone]  Filling Pharmacy Fax:    Start Date: 3/10/2020

## 2020-03-10 NOTE — TELEPHONE ENCOUNTER
[facility] Call Center    Phone Message    3.10.20    Patient called regarding PA/refill of medication.  venlafaxine (EFFEXOR-XR) 150 MG 24 hr capsule  180 capsule      Lync'd Dr [facility] department and stated they will resend the PA due to no response from 3.3.20.    Pt is out of town and very upset that she is out of medication and needs the refill now. Information was taken from the patient and will call her back.    Writer called PA and rec'd information that the first encounter came to them as a closed encounter. She reopened it and found the PA outstanding. PA will expedite the request.  Gave PA [employer]   Phone: [phone]   and also [facility] in [place], [place]   Phone: [phone]   Called patient back and gave her the PA number (ok per PA) [phone]. Pt was instructed to call [facility] around 3:30pm today and hopefully it would have already been called in.  If not patient can call PA and check on status.

## 2020-03-10 NOTE — TELEPHONE ENCOUNTER
Pt calling to see what's going on with PA was routed to PA team last week. Pt would like answer for she will be out of medicine today. Shea PEARSON CMA

## 2020-03-11 DIAGNOSIS — F33.9 EPISODE OF RECURRENT MAJOR DEPRESSIVE DISORDER, UNSPECIFIED DEPRESSION EPISODE SEVERITY (H): ICD-10-CM

## 2020-03-11 DIAGNOSIS — N95.1 MENOPAUSAL SYNDROME (HOT FLASHES): ICD-10-CM

## 2020-03-11 DIAGNOSIS — F41.1 GAD (GENERALIZED ANXIETY DISORDER): ICD-10-CM

## 2020-03-11 DIAGNOSIS — I10 HYPERTENSION GOAL BP (BLOOD PRESSURE) < 140/90: ICD-10-CM

## 2020-03-11 RX ORDER — VENLAFAXINE HYDROCHLORIDE 150 MG/1
300 CAPSULE, EXTENDED RELEASE ORAL DAILY
Qty: 180 CAPSULE | Refills: 0 | Status: SHIPPED | OUTPATIENT
Start: 2020-03-11 | End: 2020-10-06

## 2020-03-11 NOTE — TELEPHONE ENCOUNTER
Prior Authorization Approval        Authorization Effective Date: 2/9/2020  Authorization Expiration Date: 3/10/2023  Medication: venlafaxine (EFFEXOR-XR) 150 MG 24 hr capsule   Approved Dose/Quantity:   Reference #: [id number]   Insurance Company: [employer] - Phone [phone] Fax [phone]  Expected CoPay:       CoPay Card Available:      Foundation Assistance Needed:    Which Pharmacy is filling the prescription (Not needed for infusion/clinic administered): [facility] - [facility], [place] - [address]  Pharmacy Notified: Yes - via voiceamail  Patient Notified:

## 2020-03-11 NOTE — TELEPHONE ENCOUNTER
VENLAFAXINE HCI ER 150MG ER CAPSULES  Last Written Prescription Date:  11/14/2019  Last Fill Quantity: 180 TABLETS,  # refills: 1  Last office visit: 11/14/2019 with prescribing provider:    Last refill per Pharmacy   Future Office Visit:      Rebel Nicole CMA

## 2020-03-25 DIAGNOSIS — F41.0 PANIC ATTACK: ICD-10-CM

## 2020-03-25 NOTE — TELEPHONE ENCOUNTER
LORAZEPAM 0.5MG TABLETS    Last Written Prescription Date:  11/14/2019  Last Fill Quantity: 30 TABLETS,  # refills: 1   Last office visit: 11/14/2019 with prescribing provider:    Last refill per Pharmacy   Future Office Visit:      Rebel Nicole CMA

## 2020-03-26 RX ORDER — LORAZEPAM 0.5 MG/1
0.5 TABLET ORAL DAILY PRN
Qty: 30 TABLET | Refills: 1 | Status: SHIPPED | OUTPATIENT
Start: 2020-03-26 | End: 2020-05-12

## 2020-05-12 DIAGNOSIS — F41.0 PANIC ATTACK: ICD-10-CM

## 2020-05-12 NOTE — TELEPHONE ENCOUNTER
LORazepam (ATIVAN) 0.5 MG tablet  Last Written Prescription Date:  3/26/20  Last Fill Quantity: 30,   # refills: 1  Last Office Visit : 11/14/19  Future Office visit:  none    Routing refill request to provider for review/approval because: controlled

## 2020-05-13 RX ORDER — LORAZEPAM 0.5 MG/1
0.5 TABLET ORAL DAILY PRN
Qty: 30 TABLET | Refills: 1 | Status: SHIPPED | OUTPATIENT
Start: 2020-05-13 | End: 2020-10-06

## 2020-06-25 ENCOUNTER — TRANSFERRED RECORDS (OUTPATIENT)
Dept: HEALTH INFORMATION MANAGEMENT | Facility: CLINIC | Age: 60
End: 2020-06-25

## 2020-06-25 LAB
HPV ABSTRACT: ABNORMAL
PAP-ABSTRACT: NORMAL

## 2020-07-06 NOTE — TELEPHONE ENCOUNTER
Please inform patient, refill/prescriptioni approved. Please drop off prescription at pharmacy.     stretcher

## 2020-07-15 ENCOUNTER — TRANSFERRED RECORDS (OUTPATIENT)
Dept: HEALTH INFORMATION MANAGEMENT | Facility: CLINIC | Age: 60
End: 2020-07-15

## 2020-07-15 LAB — COLOGUARD-ABSTRACT: POSITIVE

## 2020-08-19 ENCOUNTER — TRANSFERRED RECORDS (OUTPATIENT)
Dept: HEALTH INFORMATION MANAGEMENT | Facility: CLINIC | Age: 60
End: 2020-08-19

## 2020-09-30 ENCOUNTER — TELEPHONE (OUTPATIENT)
Dept: PEDIATRICS | Facility: CLINIC | Age: 60
End: 2020-09-30

## 2020-09-30 NOTE — TELEPHONE ENCOUNTER
[facility] Call Center    Phone Message    May a detailed message be left on voicemail: yes     Reason for Call: Symptoms or Concerns     [facility] system wanting patient to have colonoscopy, and also had abnormal sodium levels showing after bloodwork.    Shes wanting to have both a redraw for labs done with  location, as well as sched colonoscopy in .  Please reach out to advise best way to coordinate this since Dr. Painting is patients PCP. She doesn't want to work w [employer] [place] any more.     Action Taken: Message routed to:  Primary Care p [id number]    Travel Screening: Not Applicable

## 2020-09-30 NOTE — TELEPHONE ENCOUNTER
Patient contacted, was seen 7/1 and had PE, blood work, etc.  She received a letter today stating her sodium was low (from labs on 7/1).  She did a cologuard test and thought she got a negative letter, but then received a letter stating it was positive and should have colonoscopy.  She also received a letter stating that her PAP smear returned positive for HPV, she has had a total hysterectomy.       Patient is not entirely confident that these results are hers, or accurate, and is unhappy with the care she has been getting in the [facility].  Patient advised to schedule follow up with Dr. Painting and to obtain her recent test results from the other care system to review at visit.  Patient agrees, appointment scheduled with provider for this coming Friday, 10/2/2020/    Sari Gracia RN

## 2020-10-02 ENCOUNTER — OFFICE VISIT (OUTPATIENT)
Dept: PEDIATRICS | Facility: CLINIC | Age: 60
End: 2020-10-02
Payer: COMMERCIAL

## 2020-10-02 VITALS
SYSTOLIC BLOOD PRESSURE: 121 MMHG | BODY MASS INDEX: 28.23 KG/M2 | DIASTOLIC BLOOD PRESSURE: 77 MMHG | OXYGEN SATURATION: 98 % | HEART RATE: 81 BPM | HEIGHT: 60 IN | WEIGHT: 143.8 LBS

## 2020-10-02 DIAGNOSIS — R87.811 VAGINAL HIGH RISK HPV DNA TEST POSITIVE: ICD-10-CM

## 2020-10-02 DIAGNOSIS — F33.41 RECURRENT MAJOR DEPRESSIVE DISORDER, IN PARTIAL REMISSION (H): ICD-10-CM

## 2020-10-02 DIAGNOSIS — R19.5 POSITIVE COLORECTAL CANCER SCREENING USING COLOGUARD TEST: Primary | ICD-10-CM

## 2020-10-02 DIAGNOSIS — F41.1 GAD (GENERALIZED ANXIETY DISORDER): ICD-10-CM

## 2020-10-02 DIAGNOSIS — E87.1 HYPONATREMIA: ICD-10-CM

## 2020-10-02 LAB
ANION GAP SERPL CALCULATED.3IONS-SCNC: 6 MMOL/L (ref 3–14)
BUN SERPL-MCNC: 23 MG/DL (ref 7–30)
CALCIUM SERPL-MCNC: 8.9 MG/DL (ref 8.5–10.1)
CHLORIDE SERPL-SCNC: 93 MMOL/L (ref 94–109)
CO2 SERPL-SCNC: 28 MMOL/L (ref 20–32)
CREAT SERPL-MCNC: 1.08 MG/DL (ref 0.52–1.04)
GFR SERPL CREATININE-BSD FRML MDRD: 56 ML/MIN/{1.73_M2}
GLUCOSE SERPL-MCNC: 103 MG/DL (ref 70–99)
POTASSIUM SERPL-SCNC: 3.9 MMOL/L (ref 3.4–5.3)
SODIUM SERPL-SCNC: 127 MMOL/L (ref 133–144)

## 2020-10-02 PROCEDURE — 80048 BASIC METABOLIC PNL TOTAL CA: CPT | Performed by: INTERNAL MEDICINE

## 2020-10-02 PROCEDURE — 99214 OFFICE O/P EST MOD 30 MIN: CPT | Performed by: INTERNAL MEDICINE

## 2020-10-02 PROCEDURE — 36415 COLL VENOUS BLD VENIPUNCTURE: CPT | Performed by: INTERNAL MEDICINE

## 2020-10-02 PROCEDURE — 96127 BRIEF EMOTIONAL/BEHAV ASSMT: CPT | Performed by: INTERNAL MEDICINE

## 2020-10-02 RX ORDER — HYDROCHLOROTHIAZIDE 25 MG/1
25 TABLET ORAL 2 TIMES DAILY
COMMUNITY
Start: 2011-01-10 | End: 2020-10-06

## 2020-10-02 RX ORDER — NIFEDIPINE 30 MG/1
30 TABLET, EXTENDED RELEASE ORAL DAILY
COMMUNITY
Start: 2019-01-10 | End: 2021-07-20

## 2020-10-02 RX ORDER — ISOSORBIDE MONONITRATE 30 MG/1
30 TABLET, EXTENDED RELEASE ORAL DAILY
COMMUNITY
Start: 2019-01-10 | End: 2021-07-20

## 2020-10-02 ASSESSMENT — ANXIETY QUESTIONNAIRES
1. FEELING NERVOUS, ANXIOUS, OR ON EDGE: NEARLY EVERY DAY
7. FEELING AFRAID AS IF SOMETHING AWFUL MIGHT HAPPEN: MORE THAN HALF THE DAYS
6. BECOMING EASILY ANNOYED OR IRRITABLE: MORE THAN HALF THE DAYS
2. NOT BEING ABLE TO STOP OR CONTROL WORRYING: MORE THAN HALF THE DAYS
GAD7 TOTAL SCORE: 13
3. WORRYING TOO MUCH ABOUT DIFFERENT THINGS: NEARLY EVERY DAY
5. BEING SO RESTLESS THAT IT IS HARD TO SIT STILL: NOT AT ALL

## 2020-10-02 ASSESSMENT — PATIENT HEALTH QUESTIONNAIRE - PHQ9
5. POOR APPETITE OR OVEREATING: SEVERAL DAYS
SUM OF ALL RESPONSES TO PHQ QUESTIONS 1-9: 9

## 2020-10-02 ASSESSMENT — MIFFLIN-ST. JEOR: SCORE: 1150.64

## 2020-10-02 NOTE — PROGRESS NOTES
"  Subjective     Marcelina Estevez is a 60 year old female who presents for an office visit today for the following health issues:    HPI     Pt brought in lab results 7/1/2020 and 7/22- pt had positive cologuard test and would like an order for a colonoscopy today.     Patient moved to [place], [place] and has been seen doctor with [facility]. She has received notifications of abnormal labs and she wanted to follow up with us and get further work up here.     She had Cologard test that came back positive. Pt denied stomach pain, black tarry stool or bright red blood per rectum. No family history of colon cancer.     She brought in labs for review that was done in July. This is available on Care Everywhere. Sodium 131, Hemoglobin 11.6, MCV 95. Reviewed her BP meds, hydrochlorothiazide is 50 mg daily.     She sees outside rheumatologist for scleroderma.     Her lipid panel showed very high , LDL 80. She is on simvastatin 40 mg. She needs refill.     History of depression/anxeity. Doing doing veery well. She has not been able to see her mother due to restrictions from COVID-19. She is also anxious over the abnormal labs and current pandemic.     History of benign hysterectomy. At her physical, she had pelvic exam, pap/hpv from vaginal swab showed high risk DNA (not 16 or 18). She was told to have pap again in 3 years. She questioned if this is necessary.     PHQ-9 SCORE 5/22/2018 12/5/2018 10/2/2020   PHQ-9 Total Score - - -   PHQ-9 Total Score MyChart - - -   PHQ-9 Total Score 4 5 9     MARCELINA-7 SCORE 5/22/2018 12/5/2018 10/2/2020   Total Score - - -   Total Score 6 5 13         Review of Systems   Constitutional, HEENT, cardiovascular, pulmonary, gi and gu systems are negative, except as otherwise noted.       Objective          Vitals:  /77   Pulse 81   Ht 1.535 m (5' 0.43\")   Wt 65.2 kg (143 lb 12.8 oz)   LMP 06/12/2014   SpO2 98%   BMI 27.68 kg/m       healthy, alert and no distress  PSYCH: " Alert and oriented times 3; coherent speech, normal   rate and volume, able to articulate logical thoughts, able   to abstract reason, no tangential thoughts, no hallucinations   or delusions  Her affect is normal  RESP: No cough, no audible wheezing, able to talk in full sentences  Remainder of exam unable to be completed due to telephone visits              Assessment & Plan     Positive colorectal cancer screening using Cologuard test    - GASTROENTEROLOGY ADULT REF PROCEDURE ONLY    Vaginal high risk HPV DNA test positive  - I will confirm with our ob-gyn colleague that repeat in 3 years is appropriate.     MARCELINA (generalized anxiety disorder)  - Add Hydroxyzine prn.     Recurrent major depressive disorder, in partial remission (H)  - on max dose of Effexor already. Will refer to psychiatry.   - MENTAL HEALTH REFERRAL  - Adult; Psychiatry; Psychiatry; [facility]: Collaborative Care Psychiatry Service/Bridge to Long-Term Psychiatry as indicated ([phone]); Yes; Chronic Mental Health without improvement; Yes; We will contact you to schedule ...    Hyponatremia  Likely from hydrochlorothiazide. Check level today. I asked her to reduce this to 25 mg for now. Monitor BP.   - Basic metabolic panel  (Ca, Cl, CO2, Creat, Gluc, K, Na, BUN)    Further recommendation pending on the above test result.     A total of 25 minutes was spent face to face with this patient. More than 50% of the time was spent on education for the above problems and management plans.       Annette Painting MD PhD  [facility]

## 2020-10-02 NOTE — PATIENT INSTRUCTIONS
Make appointment(s) for:   -- get labs today.   -- colonoscopy.         Medication(s) prescribed today:    Orders Placed This Encounter   Medications     hydrochlorothiazide (HYDRODIURIL) 25 MG tablet     Sig: Take 25 mg by mouth 2 times daily     isosorbide mononitrate (IMDUR) 30 MG 24 hr tablet     Sig: Take 30 mg by mouth daily     NIFEdipine ER OSMOTIC (PROCARDIA XL) 30 MG 24 hr tablet     Sig: Take 30 mg by mouth daily

## 2020-10-03 ASSESSMENT — ANXIETY QUESTIONNAIRES: GAD7 TOTAL SCORE: 13

## 2020-10-06 ENCOUNTER — TELEPHONE (OUTPATIENT)
Dept: PEDIATRICS | Facility: CLINIC | Age: 60
End: 2020-10-06

## 2020-10-06 DIAGNOSIS — I10 HYPERTENSION GOAL BP (BLOOD PRESSURE) < 140/90: ICD-10-CM

## 2020-10-06 DIAGNOSIS — F41.0 PANIC ATTACK: ICD-10-CM

## 2020-10-06 DIAGNOSIS — E87.1 HYPONATREMIA: Primary | ICD-10-CM

## 2020-10-06 DIAGNOSIS — F41.1 GAD (GENERALIZED ANXIETY DISORDER): ICD-10-CM

## 2020-10-06 DIAGNOSIS — G47.00 INSOMNIA, UNSPECIFIED TYPE: ICD-10-CM

## 2020-10-06 DIAGNOSIS — N95.1 MENOPAUSAL SYNDROME (HOT FLASHES): ICD-10-CM

## 2020-10-06 DIAGNOSIS — F33.9 EPISODE OF RECURRENT MAJOR DEPRESSIVE DISORDER, UNSPECIFIED DEPRESSION EPISODE SEVERITY (H): ICD-10-CM

## 2020-10-06 DIAGNOSIS — E78.5 HYPERLIPIDEMIA LDL GOAL <100: ICD-10-CM

## 2020-10-06 RX ORDER — VENLAFAXINE HYDROCHLORIDE 150 MG/1
300 CAPSULE, EXTENDED RELEASE ORAL DAILY
Qty: 180 CAPSULE | Refills: 1 | Status: SHIPPED | OUTPATIENT
Start: 2020-10-06 | End: 2021-02-18

## 2020-10-06 RX ORDER — SIMVASTATIN 40 MG
TABLET ORAL
Qty: 90 TABLET | Refills: 3 | Status: SHIPPED | OUTPATIENT
Start: 2020-10-06 | End: 2021-10-13

## 2020-10-06 RX ORDER — LORAZEPAM 0.5 MG/1
0.5 TABLET ORAL DAILY PRN
Qty: 30 TABLET | Refills: 1 | Status: SHIPPED | OUTPATIENT
Start: 2020-10-06 | End: 2020-12-21

## 2020-10-06 RX ORDER — TRAZODONE HYDROCHLORIDE 50 MG/1
TABLET, FILM COATED ORAL
Qty: 90 TABLET | Refills: 1 | Status: SHIPPED | OUTPATIENT
Start: 2020-10-06 | End: 2020-10-06

## 2020-10-06 RX ORDER — HYDROCHLOROTHIAZIDE 25 MG/1
25 TABLET ORAL DAILY
COMMUNITY
Start: 2020-10-06 | End: 2021-08-03

## 2020-10-06 NOTE — TELEPHONE ENCOUNTER
Pt called Pharmacy Wanting us to Fill 90 d/s on all her meds can we get these refills for her please.

## 2020-10-06 NOTE — TELEPHONE ENCOUNTER
Spoke with patient on result. Sodium 127. Last visit, I had asked her to reduce hydrochlorothiazide to 1 tablet a day (from 1 tab bid on the day of the lab). Will plan to recheck BMP later this week.     BP at drug store good, less than 120/80.     Discussed drug interaction between Effexor and Trazodone. Pt said she didn't need trazodone. Ok to stop. She will use tylenol PM for sleep if she needs it.     Effexor dose on the higher side. She has no side effects. Has anxiety attacks. Lorazepam helps. Uses 0-1 a day. When she takes 2, she gets tired.     Had [facility] testing 2/27/2020. Effexor and Lorazepam are the preferred drugs to use.     PHQ-9 SCORE 5/22/2018 12/5/2018 10/2/2020   PHQ-9 Total Score - - -   PHQ-9 Total Score MyChart - - -   PHQ-9 Total Score 4 5 9     MARCELINA-7 SCORE 5/22/2018 12/5/2018 10/2/2020   Total Score - - -   Total Score 6 5 13

## 2020-10-06 NOTE — TELEPHONE ENCOUNTER
[facility] Call Center    Phone Message    May a detailed message be left on voicemail: yes     Reason for Call: Requesting Results   Name/type of test: Sodium Level    Pt is requesting results on her sodium level.  Thanks.    Action Taken: Message routed to:  Primary Care p [id number]    Travel Screening: Not Applicable

## 2020-10-07 NOTE — TELEPHONE ENCOUNTER
[facility] in [place] contacted, advised to discontinue Trazadone.  [facility] also contacted and Trazadone cancelled with them.      Sari Gracia RN

## 2020-10-08 DIAGNOSIS — Z11.59 ENCOUNTER FOR SCREENING FOR OTHER VIRAL DISEASES: Primary | ICD-10-CM

## 2020-10-19 RX ORDER — BISACODYL 5 MG/1
15 TABLET, DELAYED RELEASE ORAL SEE ADMIN INSTRUCTIONS
Qty: 3 TABLET | Refills: 0 | Status: SHIPPED | OUTPATIENT
Start: 2020-10-19 | End: 2021-09-17

## 2020-10-26 ENCOUNTER — SURGERY (OUTPATIENT)
Age: 60
End: 2020-10-26
Payer: COMMERCIAL

## 2020-10-26 ENCOUNTER — HOSPITAL ENCOUNTER (OUTPATIENT)
Facility: AMBULATORY SURGERY CENTER | Age: 60
Discharge: HOME | End: 2020-10-26
Attending: SURGERY | Admitting: SURGERY
Payer: COMMERCIAL

## 2020-10-26 VITALS
TEMPERATURE: 97.2 F | OXYGEN SATURATION: 98 % | DIASTOLIC BLOOD PRESSURE: 89 MMHG | HEART RATE: 74 BPM | RESPIRATION RATE: 18 BRPM | SYSTOLIC BLOOD PRESSURE: 130 MMHG

## 2020-10-26 DIAGNOSIS — Z12.11 COLON CANCER SCREENING: Primary | ICD-10-CM

## 2020-10-26 LAB — COLONOSCOPY: NORMAL

## 2020-10-26 PROCEDURE — G8918 PT W/O PREOP ORDER IV AB PRO: HCPCS

## 2020-10-26 PROCEDURE — 45378 DIAGNOSTIC COLONOSCOPY: CPT | Mod: 74

## 2020-10-26 PROCEDURE — G8907 PT DOC NO EVENTS ON DISCHARG: HCPCS

## 2020-10-26 PROCEDURE — 45378 DIAGNOSTIC COLONOSCOPY: CPT | Performed by: SURGERY

## 2020-10-26 RX ORDER — ONDANSETRON 2 MG/ML
4 INJECTION INTRAMUSCULAR; INTRAVENOUS
Status: DISCONTINUED | OUTPATIENT
Start: 2020-10-26 | End: 2020-10-27 | Stop reason: HOSPADM

## 2020-10-26 RX ORDER — LIDOCAINE 40 MG/G
CREAM TOPICAL
Status: DISCONTINUED | OUTPATIENT
Start: 2020-10-26 | End: 2020-10-27 | Stop reason: HOSPADM

## 2020-10-26 RX ORDER — FENTANYL CITRATE 50 UG/ML
INJECTION, SOLUTION INTRAMUSCULAR; INTRAVENOUS PRN
Status: DISCONTINUED | OUTPATIENT
Start: 2020-10-26 | End: 2020-10-26 | Stop reason: HOSPADM

## 2020-10-26 RX ORDER — DIPHENHYDRAMINE HYDROCHLORIDE 50 MG/ML
INJECTION INTRAMUSCULAR; INTRAVENOUS PRN
Status: DISCONTINUED | OUTPATIENT
Start: 2020-10-26 | End: 2020-10-26 | Stop reason: HOSPADM

## 2020-10-26 RX ADMIN — FENTANYL CITRATE 25 MCG: 50 INJECTION, SOLUTION INTRAMUSCULAR; INTRAVENOUS at 09:15

## 2020-10-26 RX ADMIN — FENTANYL CITRATE 25 MCG: 50 INJECTION, SOLUTION INTRAMUSCULAR; INTRAVENOUS at 09:12

## 2020-10-26 RX ADMIN — DIPHENHYDRAMINE HYDROCHLORIDE 50 MG: 50 INJECTION INTRAMUSCULAR; INTRAVENOUS at 09:19

## 2020-10-26 RX ADMIN — FENTANYL CITRATE 50 MCG: 50 INJECTION, SOLUTION INTRAMUSCULAR; INTRAVENOUS at 09:10

## 2020-10-26 RX ADMIN — FENTANYL CITRATE 50 MCG: 50 INJECTION, SOLUTION INTRAMUSCULAR; INTRAVENOUS at 09:40

## 2020-10-26 RX ADMIN — FENTANYL CITRATE 25 MCG: 50 INJECTION, SOLUTION INTRAMUSCULAR; INTRAVENOUS at 09:19

## 2020-10-28 ENCOUNTER — HOSPITAL ENCOUNTER (OUTPATIENT)
Facility: AMBULATORY SURGERY CENTER | Age: 60
End: 2020-10-28
Attending: INTERNAL MEDICINE
Payer: COMMERCIAL

## 2020-10-28 DIAGNOSIS — Z11.59 ENCOUNTER FOR SCREENING FOR OTHER VIRAL DISEASES: Primary | ICD-10-CM

## 2020-11-02 ENCOUNTER — TELEPHONE (OUTPATIENT)
Dept: PEDIATRICS | Facility: CLINIC | Age: 60
End: 2020-11-02

## 2020-11-02 NOTE — TELEPHONE ENCOUNTER
[facility] Call Center    Phone Message    May a detailed message be left on voicemail: yes     Reason for Call: Other: pt just had colonoscopy 10/26 and now they tell her she needs another one, pt is looking for your advise on this!  Please advise with her     Action Taken: Message routed to:  Primary Care p [id number]    Travel Screening: Not Applicable

## 2020-11-02 NOTE — TELEPHONE ENCOUNTER
Per review of colonoscopy report, examination was incomplete due to patient's intolerance of the procedure.  Forwarding to provider for review/recommendation.    Sari Gracia RN

## 2020-11-03 NOTE — TELEPHONE ENCOUNTER
According to the report, the exam was not completed because she was agitated during the exam and only a portion of the colon was seen. Therefore, she will need a repeat exam.

## 2020-11-04 NOTE — TELEPHONE ENCOUNTER
Detailed message left for patient with provider recommendation.   Requested return call if questions or concerns regarding this information.     Sari Gracia RN

## 2020-11-06 NOTE — TELEPHONE ENCOUNTER
Patient called returning call to the care team to discuss her colonoscopy that she will need again. Please advise. Thank you.

## 2020-11-06 NOTE — TELEPHONE ENCOUNTER
Called patient.  Discussed that she needs to repeat the test and gave message per .    She states the first colonoscopy she had, she let them know it was hurting.  She got more pain medication and was able to complete it.      This last time, she said it was so painful and did not get any more medication.  Not sure if she had all she could get?        She agrees to repeating the colonoscopy and discussing it with the team.    Marcelle Rosa RN, [facility]

## 2020-11-09 ENCOUNTER — OFFICE VISIT (OUTPATIENT)
Dept: OPHTHALMOLOGY | Facility: CLINIC | Age: 60
End: 2020-11-09
Payer: COMMERCIAL

## 2020-11-09 DIAGNOSIS — H52.203 MYOPIA OF BOTH EYES WITH ASTIGMATISM AND PRESBYOPIA: ICD-10-CM

## 2020-11-09 DIAGNOSIS — H52.13 MYOPIA OF BOTH EYES WITH ASTIGMATISM AND PRESBYOPIA: ICD-10-CM

## 2020-11-09 DIAGNOSIS — Z79.899 HIGH RISK MEDICATION USE: Primary | ICD-10-CM

## 2020-11-09 DIAGNOSIS — H52.4 MYOPIA OF BOTH EYES WITH ASTIGMATISM AND PRESBYOPIA: ICD-10-CM

## 2020-11-09 PROCEDURE — 92014 COMPRE OPH EXAM EST PT 1/>: CPT | Performed by: OPHTHALMOLOGY

## 2020-11-09 ASSESSMENT — REFRACTION_MANIFEST
OD_AXIS: 007
OS_SPHERE: -3.75
OD_ADD: +2.50
OS_ADD: +2.50
OS_AXIS: 025
OD_SPHERE: -5.00
OS_CYLINDER: +1.25
OD_CYLINDER: +3.75

## 2020-11-09 ASSESSMENT — TONOMETRY
OS_IOP_MMHG: 17
OD_IOP_MMHG: 18
IOP_METHOD: TONOPEN

## 2020-11-09 ASSESSMENT — VISUAL ACUITY
OS_CC: 20/20
OD_CC+: -3
CORRECTION_TYPE: GLASSES
METHOD: SNELLEN - LINEAR
OD_CC: 20/20

## 2020-11-09 ASSESSMENT — CONF VISUAL FIELD: COMMENTS: 10-2

## 2020-11-09 NOTE — NURSING NOTE
Chief Complaints and History of Present Illnesses   Patient presents with     Plaquenil      Chief Complaint(s) and History of Present Illness(es)     Plaquenil               Comments     Patient here for annual eye exam.  Eyes get tired a lot easier per patient.  Harder to see at night  Eye meds: AT's each eye   LIZETTE Crockett 11/9/2020 9:51 AM

## 2020-11-10 ASSESSMENT — SLIT LAMP EXAM - LIDS
COMMENTS: NORMAL
COMMENTS: NORMAL

## 2020-11-10 ASSESSMENT — EXTERNAL EXAM - RIGHT EYE: OD_EXAM: NORMAL

## 2020-11-10 ASSESSMENT — CUP TO DISC RATIO
OS_RATIO: 0.1
OD_RATIO: 0.1

## 2020-11-10 ASSESSMENT — EXTERNAL EXAM - LEFT EYE: OS_EXAM: NORMAL

## 2020-11-10 NOTE — PROGRESS NOTES
Assessment & Plan      Marcelina Estevez is a 60 year old female who presents with:   Review of systems for the eyes was negative other than the pertinent positives and negatives noted in the HPI.     High risk medication use  - without retinopathy  - HVF 10-2 OU  - SD-OCT Macula Optovue OU (both eyes)     Myopia of both eyes with astigmatism and presbyopia     Return in 12 month for annual (Plaquenil testing)    Attending Physician Attestation:  Complete documentation of historical and exam elements from today's encounter can be found in the full encounter summary report (not reduplicated in this progress note).  I personally obtained the chief complaint(s) and history of present illness.  I confirmed and edited as necessary the review of systems, past medical/surgical history, family history, social history, and examination findings as documented by others; and I examined the patient myself.  I personally reviewed the relevant tests, images, and reports as documented above.  I formulated and edited as necessary the assessment and plan and discussed the findings and management plan with the patient and family. - Job Mandujano MD

## 2020-11-17 DIAGNOSIS — I10 HYPERTENSION GOAL BP (BLOOD PRESSURE) < 140/90: ICD-10-CM

## 2020-11-18 RX ORDER — LOSARTAN POTASSIUM 50 MG/1
50 TABLET ORAL DAILY
Qty: 90 TABLET | Refills: 3 | Status: SHIPPED | OUTPATIENT
Start: 2020-11-18 | End: 2021-11-22

## 2020-11-18 NOTE — TELEPHONE ENCOUNTER
Last Clinic Visit: 10/2/2020 [facility]    *most recent Creatinine level done outside lab 10/13/2020 [facility]  Cr = 0.93  (0.57-1.11)

## 2020-12-01 ENCOUNTER — TELEPHONE (OUTPATIENT)
Dept: PEDIATRICS | Facility: CLINIC | Age: 60
End: 2020-12-01

## 2020-12-01 DIAGNOSIS — F41.0 PANIC ATTACK: ICD-10-CM

## 2020-12-01 NOTE — TELEPHONE ENCOUNTER
[facility] Call Center    Phone Message    May a detailed message be left on voicemail: yes     Reason for Call: Other: pt did not have colonoscopy due to pt getting COVID, pt is also wanting Dr. Painting to know pt is leaving for [place] 12/5/20 and will be needing a refill on all medications. pt currently has no refill for lorazepam and will need one while in [place], pt will need a refill in 2 weeks. pt is wanting all medication refilled in [place]. pt is unaware of which pharmacy she will use at this time. pt will call with pharmacy information when pt arrives. please advise     Action Taken: Message routed to:  Primary Care p [id number]

## 2020-12-02 NOTE — TELEPHONE ENCOUNTER
Will await refill review until patient provides pharmacy request in [place].    Marta Reyes RN  [facility]

## 2020-12-16 NOTE — TELEPHONE ENCOUNTER
The patient stated the pharmacy was supposed to contact the Clinic.  Advised that this writer was not seeing contact from the pharmacy requesting Lorazapam.      Preferred Pharmacy is [facility] [place].   Paula Chisholm- Phone- [phone]   This will be the patients preferred pharmacy from December 2020 to 04/2021.

## 2020-12-17 NOTE — TELEPHONE ENCOUNTER
Called patient, left message with phone number to call back.       Marcelle Rosa RN, [facility]

## 2020-12-17 NOTE — TELEPHONE ENCOUNTER
Is she moving to [place] or just going there for a visit?   She sees specialists too for some of her meds. I am not sure when she say all her meds, which ones she needs specifically. She had moved away to outside provider for over a year. Only came to see me once for abnormal test result. I have not filled her meds since 2018.  So ask pt specifically which ones besides the Lorazepam.

## 2020-12-17 NOTE — TELEPHONE ENCOUNTER
Medication lorazepam teed up and [facility] [place] [facility].    Routing to provider to please review when able.    Marcelle Rosa RN, [facility]

## 2020-12-18 NOTE — TELEPHONE ENCOUNTER
Called patient, left message we have been trying to reach patient to discuss medications needed, original message is from 12/1/2020,   gave RN phone number to call back.       Marcelle Rosa RN, [facility]

## 2020-12-18 NOTE — TELEPHONE ENCOUNTER
Patient called back.      She states she is in [place] for the winter, she is back in April.   Since she lives in [facility], she did see another provider, Dr. Saldana.   She was not pleased with her care there so she wants to be followed by Dr. Painting.    She is requesting medication:    Lorazepam and Venlafaxine     She takes Venlafaxine differently so she is running out.         She takes 150 mg  Two capsules in the morning and 1 capsule in the evening, so 3 per day    She also received a letter that the [facility] will be closing at 1/1/2021 but would like to use it for these refills at this time.  She will find a new pharmacy after 1/1/2021.    Routing to provider to please advise.     Marcelle Rosa RN, [facility]

## 2020-12-21 RX ORDER — LORAZEPAM 0.5 MG/1
0.5 TABLET ORAL DAILY PRN
Qty: 30 TABLET | Refills: 1 | Status: SHIPPED | OUTPATIENT
Start: 2020-12-21 | End: 2021-02-17

## 2020-12-21 NOTE — TELEPHONE ENCOUNTER
Patient called in tears, sobbing that she has been suffering all weekend without this medication.   She uses it for severe anxiety and panic attacks.    Routing to covering providers to please review.    Marcelle Rosa RN, [facility]

## 2020-12-21 NOTE — TELEPHONE ENCOUNTER
Called patient and gave message that Dr. Lambert refilled this for her.   Marcelle Rosa RN, [facility]

## 2021-01-06 ENCOUNTER — TELEPHONE (OUTPATIENT)
Dept: OPHTHALMOLOGY | Facility: CLINIC | Age: 61
End: 2021-01-06

## 2021-01-06 NOTE — TELEPHONE ENCOUNTER
Spoke with Daniel, let them know the rx we sent is her most recent rx from 11/2020. Sounds like there was a rx sent from [employer] as well with a date of June 2020 on it as the exam date. Discussed with Bibi that pt consistently has been in in November so that rx would not have been the most up to date from us. They will shred that other rx and use the one we sent.     Etta Cedillo, COA, 3:40 PM 01/06/2021

## 2021-01-06 NOTE — TELEPHONE ENCOUNTER
[facility] Call Center    Phone Message    May a detailed message be left on voicemail: yes     Reason for Call: Other: pt needs eye glass prescription faxed to [phone] please advise     Action Taken: Message routed to:  Adult Clinics: Eye p [id number]

## 2021-01-06 NOTE — TELEPHONE ENCOUNTER
[facility] Call Center    Phone Message    May a detailed message be left on voicemail: NA    Reason for Call: Bibi stated they received a glasses prescription that was not signed and they had received 2 prescriptions from the Clinic and the contain different demographic with the same patient name and different prescriptions.  Please advise.      Fax: [phone]    Action Taken: Message routed to:  Adult Clinics: Eye p [id number]    Travel Screening: Not Applicable

## 2021-02-16 DIAGNOSIS — F41.0 PANIC ATTACK: ICD-10-CM

## 2021-02-16 RX ORDER — LORAZEPAM 0.5 MG/1
0.5 TABLET ORAL DAILY PRN
Qty: 30 TABLET | Refills: 1 | OUTPATIENT
Start: 2021-02-16

## 2021-02-16 NOTE — TELEPHONE ENCOUNTER
Routing refill request to provider for review/approval because:  Drug not on the [facility] refill protocol     Mirtha KHANN, RN

## 2021-02-16 NOTE — LETTER
[address]  [address]  [phone]    February 18, 2021      Marcelina Estevez  [address] [address] [facility] [address]      Dear Marcelina,    We have refilled your Ativan.   We will need to see you for an office visit, before any additional refills can be given.  Please call [phone] to schedule this appointment.      Thank you,    [facility]

## 2021-02-17 RX ORDER — LORAZEPAM 0.5 MG/1
0.5 TABLET ORAL DAILY PRN
Qty: 30 TABLET | Refills: 1 | Status: SHIPPED | OUTPATIENT
Start: 2021-02-17 | End: 2021-02-18

## 2021-02-17 NOTE — TELEPHONE ENCOUNTER
Please let patient know to schedule 6 months follow-up appointment in April for controlled medication use (lorazepam).  I sent a refill for her for now.  Needs to be seen before the next refill.

## 2021-02-18 DIAGNOSIS — F33.9 EPISODE OF RECURRENT MAJOR DEPRESSIVE DISORDER, UNSPECIFIED DEPRESSION EPISODE SEVERITY (H): ICD-10-CM

## 2021-02-18 DIAGNOSIS — N95.1 MENOPAUSAL SYNDROME (HOT FLASHES): ICD-10-CM

## 2021-02-18 DIAGNOSIS — F41.0 PANIC ATTACK: ICD-10-CM

## 2021-02-18 DIAGNOSIS — D64.9 ANEMIA, UNSPECIFIED TYPE: ICD-10-CM

## 2021-02-18 DIAGNOSIS — I10 HYPERTENSION GOAL BP (BLOOD PRESSURE) < 140/90: ICD-10-CM

## 2021-02-18 DIAGNOSIS — N18.1 CKD (CHRONIC KIDNEY DISEASE) STAGE 1, GFR 90 ML/MIN OR GREATER: ICD-10-CM

## 2021-02-18 DIAGNOSIS — E78.5 HYPERLIPIDEMIA LDL GOAL <100: Primary | ICD-10-CM

## 2021-02-18 DIAGNOSIS — F41.1 GAD (GENERALIZED ANXIETY DISORDER): ICD-10-CM

## 2021-02-18 RX ORDER — LORAZEPAM 0.5 MG/1
0.5 TABLET ORAL DAILY PRN
Qty: 30 TABLET | Refills: 1 | Status: CANCELLED | OUTPATIENT
Start: 2021-02-18

## 2021-02-18 RX ORDER — LORAZEPAM 0.5 MG/1
0.5 TABLET ORAL DAILY PRN
Qty: 30 TABLET | Refills: 1 | Status: SHIPPED | OUTPATIENT
Start: 2021-02-18 | End: 2021-08-04

## 2021-02-18 RX ORDER — VENLAFAXINE HYDROCHLORIDE 150 MG/1
300 CAPSULE, EXTENDED RELEASE ORAL DAILY
Qty: 180 CAPSULE | Refills: 0 | Status: SHIPPED | OUTPATIENT
Start: 2021-02-18 | End: 2021-05-13

## 2021-02-18 NOTE — TELEPHONE ENCOUNTER
Spoke with patient.  The pill was refilled.  Effexor dose is supposed to be 300 mg daily.  This was what was prescribed last October at her visit.  However patient thought she was to take 2 in the morning and 1 at night.  She has been taking it this way since the last October visit.  She has not noted any side effects.  I discussed with patient that the maximum dose is 200 mg.  Stop taking the evening dose.  She has a physical scheduled for April.  We will have her do labs after the appointment.

## 2021-02-18 NOTE — TELEPHONE ENCOUNTER
Reason for call: Patient calling from [place], she states that the refill for the Lorazepam did not go through.  Also needs a new Rx Sent to [place] regarding the Venlafaxine with the new directions of 2 in the am and 1 in the pm.     Patient  can be reached at: [phone]    Call taken at 11:40 am on 2/18/2021    Bela Ackerman [facility]  2nd Floor  Primary Care

## 2021-03-03 ENCOUNTER — TELEPHONE (OUTPATIENT)
Dept: FAMILY MEDICINE | Facility: CLINIC | Age: 61
End: 2021-03-03

## 2021-03-03 NOTE — TELEPHONE ENCOUNTER
Reason for Call:  Immunization question    Detailed comments: Patient states she has an autoimmune disease and she is currently in [place] and would like your opinion if she should get a Covid shot or not? Please call.     Phone Number Patient can be reached at: Home number on file [phone] (home)    Best Time: this afternoon if possible    Can we leave a detailed message on this number? YES    Call taken on 3/3/2021 at 2:18 PM by Chanda Walker

## 2021-04-16 ENCOUNTER — OFFICE VISIT (OUTPATIENT)
Dept: FAMILY MEDICINE | Facility: CLINIC | Age: 61
End: 2021-04-16
Payer: COMMERCIAL

## 2021-04-16 VITALS
WEIGHT: 138 LBS | SYSTOLIC BLOOD PRESSURE: 124 MMHG | OXYGEN SATURATION: 98 % | RESPIRATION RATE: 16 BRPM | BODY MASS INDEX: 26.06 KG/M2 | HEIGHT: 61 IN | DIASTOLIC BLOOD PRESSURE: 80 MMHG | TEMPERATURE: 98.6 F | HEART RATE: 84 BPM

## 2021-04-16 DIAGNOSIS — F41.1 GAD (GENERALIZED ANXIETY DISORDER): ICD-10-CM

## 2021-04-16 DIAGNOSIS — Z11.4 ENCOUNTER FOR SCREENING FOR HIV: ICD-10-CM

## 2021-04-16 DIAGNOSIS — Z11.4 SCREENING FOR HIV (HUMAN IMMUNODEFICIENCY VIRUS): ICD-10-CM

## 2021-04-16 DIAGNOSIS — N95.2 ATROPHIC VAGINITIS: ICD-10-CM

## 2021-04-16 DIAGNOSIS — I10 HYPERTENSION GOAL BP (BLOOD PRESSURE) < 140/90: ICD-10-CM

## 2021-04-16 DIAGNOSIS — E78.5 HYPERLIPIDEMIA LDL GOAL <100: ICD-10-CM

## 2021-04-16 DIAGNOSIS — N18.30 STAGE 3 CHRONIC KIDNEY DISEASE, UNSPECIFIED WHETHER STAGE 3A OR 3B CKD (H): ICD-10-CM

## 2021-04-16 DIAGNOSIS — Z00.00 ROUTINE GENERAL MEDICAL EXAMINATION AT A HEALTH CARE FACILITY: Primary | ICD-10-CM

## 2021-04-16 DIAGNOSIS — F41.0 PANIC ATTACK: ICD-10-CM

## 2021-04-16 DIAGNOSIS — D63.8 ANEMIA IN OTHER CHRONIC DISEASES CLASSIFIED ELSEWHERE: ICD-10-CM

## 2021-04-16 DIAGNOSIS — N18.1 CKD (CHRONIC KIDNEY DISEASE) STAGE 1, GFR 90 ML/MIN OR GREATER: ICD-10-CM

## 2021-04-16 PROCEDURE — 99396 PREV VISIT EST AGE 40-64: CPT | Performed by: INTERNAL MEDICINE

## 2021-04-16 PROCEDURE — 99214 OFFICE O/P EST MOD 30 MIN: CPT | Mod: 25 | Performed by: INTERNAL MEDICINE

## 2021-04-16 PROCEDURE — 96127 BRIEF EMOTIONAL/BEHAV ASSMT: CPT | Performed by: INTERNAL MEDICINE

## 2021-04-16 RX ORDER — HYDROXYZINE HYDROCHLORIDE 25 MG/1
25 TABLET, FILM COATED ORAL 2 TIMES DAILY PRN
Qty: 30 TABLET | Refills: 0 | Status: SHIPPED | OUTPATIENT
Start: 2021-04-16 | End: 2021-05-14

## 2021-04-16 RX ORDER — ESTRADIOL 0.1 MG/G
2 CREAM VAGINAL
Qty: 42.5 G | Refills: 3 | Status: SHIPPED | OUTPATIENT
Start: 2021-04-19 | End: 2022-04-21

## 2021-04-16 ASSESSMENT — PATIENT HEALTH QUESTIONNAIRE - PHQ9
SUM OF ALL RESPONSES TO PHQ QUESTIONS 1-9: 6
5. POOR APPETITE OR OVEREATING: MORE THAN HALF THE DAYS

## 2021-04-16 ASSESSMENT — ANXIETY QUESTIONNAIRES
6. BECOMING EASILY ANNOYED OR IRRITABLE: SEVERAL DAYS
7. FEELING AFRAID AS IF SOMETHING AWFUL MIGHT HAPPEN: SEVERAL DAYS
1. FEELING NERVOUS, ANXIOUS, OR ON EDGE: SEVERAL DAYS
5. BEING SO RESTLESS THAT IT IS HARD TO SIT STILL: NOT AT ALL
GAD7 TOTAL SCORE: 9
IF YOU CHECKED OFF ANY PROBLEMS ON THIS QUESTIONNAIRE, HOW DIFFICULT HAVE THESE PROBLEMS MADE IT FOR YOU TO DO YOUR WORK, TAKE CARE OF THINGS AT HOME, OR GET ALONG WITH OTHER PEOPLE: SOMEWHAT DIFFICULT
2. NOT BEING ABLE TO STOP OR CONTROL WORRYING: MORE THAN HALF THE DAYS
3. WORRYING TOO MUCH ABOUT DIFFERENT THINGS: MORE THAN HALF THE DAYS

## 2021-04-16 ASSESSMENT — MIFFLIN-ST. JEOR: SCORE: 1120.4

## 2021-04-16 NOTE — PROGRESS NOTES
SUBJECTIVE:   CC: Marcelina Estevez is an 61 year old woman who presents for preventive health visit.       Patient has been advised of split billing requirements and indicates understanding: Yes  Healthy Habits:    Do you get at least three servings of calcium containing foods daily (dairy, green leafy vegetables, etc.)? yes    Amount of exercise or daily activities, outside of work: 3 day(s) per week    Problems taking medications regularly No    Medication side effects: No    Have you had an eye exam in the past two years? yes    Do you see a dentist twice per year? yes    Do you have sleep apnea, excessive snoring or daytime drowsiness?no    Marcelina has Hypertension goal BP (blood pressure) < 140/90; Moderate episode of recurrent major depressive disorder (H); CKD (chronic kidney disease) stage 1, GFR 90 ml/min or greater; Anemia in other chronic diseases classified elsewhere; MARCELINA (generalized anxiety disorder); Raynaud's disease without gangrene; CREST (calcinosis, Raynaud's phenomenon, esophageal dysfunction, sclerodactyly, telangiectasia) (H); and NSTEMI (non-ST elevated myocardial infarction) (H) on their pertinent problem list.    -ER in [place] for possible UTI, but did not have infection. Pt was told that she was just having dryness, wondering if something can be prescribed for this. Had this once before. Took Pyridium and Tylenol.    -thumb pain, ongoing issue for over 1 year, both thumbs but left is worse, hard to  and squeeze things    Anxiety: MARCELINA 12  Lorazepam 1 tab daily prn.   Mom passed away in December.       Today's PHQ-2 Score:   PHQ-2 ( 1999 Pf[employer]) 11/14/2019 12/5/2018   Q1: Little interest or pleasure in doing things 1 1   Q2: Feeling down, depressed or hopeless 1 1   PHQ-2 Score 2 2       Abuse: Current or Past(Physical, Sexual or Emotional)- No  Do you feel safe in your environment? Yes    Have you ever done Advance Care Planning? (For example, a Health Directive, POLST, or a discussion with  "a medical provider or your loved ones about your wishes): No, advance care planning information given to patient to review.  Patient plans to discuss their wishes with loved ones or provider.      Social History     Tobacco Use     Smoking status: Former Smoker     Quit date: 2001     Years since quittin.3     Smokeless tobacco: Never Used   Substance Use Topics     Alcohol use: Yes     Comment: social     If you drink alcohol do you typically have >3 drinks per day or >7 drinks per week? No                     Reviewed orders with patient.  Reviewed health maintenance and updated orders accordingly - Yes  Labs reviewed in EPIC    FSH-7: No flowsheet data found.  click delete button to remove this line now  Mammogram Screening: Recommended mammography every 1-2 years with patient discussion and risk factor consideration  Pertinent mammograms are reviewed under the imaging tab.    Pertinent mammograms are reviewed under the imaging tab.  History of abnormal Pap smear: Status post benign hysterectomy. Health Maintenance and Surgical History updated.  PAP / HPV 2014   PAP OTHER-NIL, See Result NIL     Reviewed and updated as needed this visit by clinical staff  Tobacco  Allergies  Meds   Med Hx  Surg Hx  Fam Hx  Soc Hx        Reviewed and updated as needed this visit by Provider                    ROS:  Constitutional, HEENT, cardiovascular, pulmonary, gi and gu systems are negative, except as otherwise noted.      OBJECTIVE:   /80   Pulse 84   Temp 98.6  F (37  C) (Tympanic)   Resp 16   Ht 1.537 m (5' 0.5\")   Wt 62.6 kg (138 lb)   LMP 2014   SpO2 98%   BMI 26.51 kg/m    EXAM:  GENERAL: healthy, alert and no distress  EYES: Eyes grossly normal to inspection, PERRL and conjunctivae and sclerae normal  HENT: ear canals and TM's normal, nose and mouth without ulcers or lesions  NECK: no adenopathy, no asymmetry, masses, or scars and thyroid normal to palpation  RESP: lungs " clear to auscultation - no rales, rhonchi or wheezes  BREAST: normal without masses, tenderness or nipple discharge and no palpable axillary masses or adenopathy  CV: regular rate and rhythm, normal S1 S2, no S3 or S4, no murmur, click or rub, no peripheral edema and peripheral pulses strong  ABDOMEN: soft, nontender, no hepatosplenomegaly, no masses and bowel sounds normal  MS: no gross musculoskeletal defects noted, no edema  SKIN: no suspicious lesions or rashes  NEURO: Normal strength and tone, mentation intact and speech normal  PSYCH: mentation appears normal, affect normal/bright    Diagnostic Test Results:  Labs reviewed in Epic  No results found for any visits on 04/16/21.    ASSESSMENT/PLAN:   Marcelina was seen today for physical.    Diagnoses and all orders for this visit:    Routine general medical examination at a health care facility    Screening for HIV (human immunodeficiency virus)    Hypertension goal BP (blood pressure) < 140/90  -     CBC with platelets; Future  -     Comprehensive metabolic panel; Future  -     Albumin Random Urine Quantitative with Creat Ratio; Future    Hyperlipidemia LDL goal <100  -     Comprehensive metabolic panel; Future  -     Lipid panel reflex to direct LDL Fasting; Future    CKD (chronic kidney disease) stage 1, GFR 90 ml/min or greater  -     CBC with platelets; Future  -     Albumin Random Urine Quantitative with Creat Ratio; Future    Encounter for screening for HIV  -     HIV Antigen Antibody Combo; Future    Atrophic vaginitis  -     estradiol (ESTRACE) 0.1 MG/GM vaginal cream; Place 2 g vaginally twice a week    Stage 3 chronic kidney disease, unspecified whether stage 3a or 3b CKD    Panic attack  -     hydrOXYzine (ATARAX) 25 MG tablet; Take 1 tablet (25 mg) by mouth 2 times daily as needed for anxiety    MARCELINA (generalized anxiety disorder)  -     FLUoxetine (PROZAC) 20 MG capsule; Take 1 capsule (20 mg) by mouth daily    Anemia in other chronic diseases  "classified elsewhere      Patient with stable chronic health issues monitoring labs ordered medications refilled.    Atrophic vaginitis: Vaginal Estrace prescribed.  Patient does not have any family history of breast cancer.  Next    Anxiety and panic attack: Previously patient was using alprazolam for anxiety.  She had been using it every single day.  Getting refills became a problem when she was in [place].  We discussed taking SSRI to help with the daily anxiety, finding alternative for panic attack.  Patient is agreeable to try this we will have her take fluoxetine 20 mg for anxiety, hydroxyzine for panic attack.  Virtual visit follow-up in 3 weeks    Patient has been advised of split billing requirements and indicates understanding: No  COUNSELING:   Reviewed preventive health counseling, as reflected in patient instructions    Estimated body mass index is 26.51 kg/m  as calculated from the following:    Height as of this encounter: 1.537 m (5' 0.5\").    Weight as of this encounter: 62.6 kg (138 lb).    Weight management plan: able to lose a few lbs.     She reports that she quit smoking about 20 years ago. She has never used smokeless tobacco.      Counseling Resources:  ATP IV Guidelines  Pooled Cohorts Equation Calculator  Breast Cancer Risk Calculator  BRCA-Related Cancer Risk Assessment: FHS-7 Tool  FRAX Risk Assessment  ICSI Preventive Guidelines  Dietary Guidelines for Americans, 2010  USDA's MyPlate  ASA Prophylaxis  Lung CA Screening    Annette Painting MD PhD  [facility]  "

## 2021-04-17 ASSESSMENT — ANXIETY QUESTIONNAIRES: GAD7 TOTAL SCORE: 9

## 2021-04-19 DIAGNOSIS — N18.1 CKD (CHRONIC KIDNEY DISEASE) STAGE 1, GFR 90 ML/MIN OR GREATER: ICD-10-CM

## 2021-04-19 DIAGNOSIS — Z11.4 ENCOUNTER FOR SCREENING FOR HIV: ICD-10-CM

## 2021-04-19 DIAGNOSIS — I10 HYPERTENSION GOAL BP (BLOOD PRESSURE) < 140/90: ICD-10-CM

## 2021-04-19 DIAGNOSIS — E87.1 HYPONATREMIA: Primary | ICD-10-CM

## 2021-04-19 DIAGNOSIS — E78.5 HYPERLIPIDEMIA LDL GOAL <100: ICD-10-CM

## 2021-04-19 LAB
ALBUMIN SERPL-MCNC: 4.5 G/DL (ref 3.4–5)
ALP SERPL-CCNC: 73 U/L (ref 40–150)
ALT SERPL W P-5'-P-CCNC: 50 U/L (ref 0–50)
ANION GAP SERPL CALCULATED.3IONS-SCNC: 8 MMOL/L (ref 3–14)
AST SERPL W P-5'-P-CCNC: 37 U/L (ref 0–45)
BILIRUB SERPL-MCNC: 0.5 MG/DL (ref 0.2–1.3)
BUN SERPL-MCNC: 24 MG/DL (ref 7–30)
CALCIUM SERPL-MCNC: 9.7 MG/DL (ref 8.5–10.1)
CHLORIDE SERPL-SCNC: 99 MMOL/L (ref 94–109)
CHOLEST SERPL-MCNC: 217 MG/DL
CO2 SERPL-SCNC: 25 MMOL/L (ref 20–32)
CREAT SERPL-MCNC: 0.94 MG/DL (ref 0.52–1.04)
CREAT UR-MCNC: 70 MG/DL
ERYTHROCYTE [DISTWIDTH] IN BLOOD BY AUTOMATED COUNT: 12.9 % (ref 10–15)
GFR SERPL CREATININE-BSD FRML MDRD: 66 ML/MIN/{1.73_M2}
GLUCOSE SERPL-MCNC: 98 MG/DL (ref 70–99)
HCT VFR BLD AUTO: 34.7 % (ref 35–47)
HDLC SERPL-MCNC: 131 MG/DL
HGB BLD-MCNC: 11.6 G/DL (ref 11.7–15.7)
HIV 1+2 AB+HIV1 P24 AG SERPL QL IA: NONREACTIVE
LDLC SERPL CALC-MCNC: 69 MG/DL
MCH RBC QN AUTO: 32 PG (ref 26.5–33)
MCHC RBC AUTO-ENTMCNC: 33.4 G/DL (ref 31.5–36.5)
MCV RBC AUTO: 96 FL (ref 78–100)
MICROALBUMIN UR-MCNC: 95 MG/L
MICROALBUMIN/CREAT UR: 134.52 MG/G CR (ref 0–25)
NONHDLC SERPL-MCNC: 86 MG/DL
PLATELET # BLD AUTO: 236 10E9/L (ref 150–450)
POTASSIUM SERPL-SCNC: 4 MMOL/L (ref 3.4–5.3)
PROT SERPL-MCNC: 7.8 G/DL (ref 6.8–8.8)
RBC # BLD AUTO: 3.62 10E12/L (ref 3.8–5.2)
SODIUM SERPL-SCNC: 132 MMOL/L (ref 133–144)
TRIGL SERPL-MCNC: 85 MG/DL
WBC # BLD AUTO: 6.5 10E9/L (ref 4–11)

## 2021-04-19 PROCEDURE — 80053 COMPREHEN METABOLIC PANEL: CPT | Performed by: INTERNAL MEDICINE

## 2021-04-19 PROCEDURE — 36415 COLL VENOUS BLD VENIPUNCTURE: CPT | Performed by: INTERNAL MEDICINE

## 2021-04-19 PROCEDURE — 82043 UR ALBUMIN QUANTITATIVE: CPT | Performed by: INTERNAL MEDICINE

## 2021-04-19 PROCEDURE — 87389 HIV-1 AG W/HIV-1&-2 AB AG IA: CPT | Performed by: INTERNAL MEDICINE

## 2021-04-19 PROCEDURE — 85027 COMPLETE CBC AUTOMATED: CPT | Performed by: INTERNAL MEDICINE

## 2021-04-19 PROCEDURE — 80061 LIPID PANEL: CPT | Performed by: INTERNAL MEDICINE

## 2021-04-19 NOTE — LETTER
April 22, 2021      Marcelina Oscarrobel  [address] [place] [address]        Dear ,    We are writing to inform you of your test results.    Your blood work were normal or stable. A few specific notes below:  Sodium is slowly low but improved from last year. This may be due to excessive fluid intake or medication side effect from the water pill. I recommend monitoring it ever 6 months to be sure this is stable.     Urine protein has improved from last year. This is from chronic kidney disease that you have had for sometime.     Hemoglobin borderline low, this is also stable from the last few years. You had a work up for for this back in 2018. There was no nutritional deficiency such as iron deficiency. This is likely anemia of chronic disease. It is stable. We'll continue to monitor once a year.     Resulted Orders   Albumin Random Urine Quantitative with Creat Ratio   Result Value Ref Range    Creatinine Urine 70 mg/dL    Albumin Urine mg/L 95 mg/L    Albumin Urine mg/g Cr 134.52 (H) 0 - 25 mg/g Cr   Lipid panel reflex to direct LDL Fasting   Result Value Ref Range    Cholesterol 217 (H) <200 mg/dL      Comment:      Desirable:       <200 mg/dl    Triglycerides 85 <150 mg/dL      Comment:      Fasting specimen    HDL Cholesterol 131 >49 mg/dL    LDL Cholesterol Calculated 69 <100 mg/dL      Comment:      Desirable:       <100 mg/dl    Non HDL Cholesterol 86 <130 mg/dL   Comprehensive metabolic panel   Result Value Ref Range    Sodium 132 (L) 133 - 144 mmol/L    Potassium 4.0 3.4 - 5.3 mmol/L    Chloride 99 94 - 109 mmol/L    Carbon Dioxide 25 20 - 32 mmol/L    Anion Gap 8 3 - 14 mmol/L    Glucose 98 70 - 99 mg/dL      Comment:      Fasting specimen    Urea Nitrogen 24 7 - 30 mg/dL    Creatinine 0.94 0.52 - 1.04 mg/dL    GFR Estimate 66 >60 mL/min/[1.73_m2]      Comment:      Non  GFR Calc  Starting 12/18/2018, serum creatinine based estimated GFR (eGFR) will be   calculated using the  Chronic Kidney Disease Epidemiology Collaboration   (CKD-EPI) equation.      GFR Estimate If Black 76 >60 mL/min/[1.73_m2]      Comment:       GFR Calc  Starting 12/18/2018, serum creatinine based estimated GFR (eGFR) will be   calculated using the Chronic Kidney Disease Epidemiology Collaboration   (CKD-EPI) equation.      Calcium 9.7 8.5 - 10.1 mg/dL    Bilirubin Total 0.5 0.2 - 1.3 mg/dL    Albumin 4.5 3.4 - 5.0 g/dL    Protein Total 7.8 6.8 - 8.8 g/dL    Alkaline Phosphatase 73 40 - 150 U/L    ALT 50 0 - 50 U/L    AST 37 0 - 45 U/L   CBC with platelets   Result Value Ref Range    WBC 6.5 4.0 - 11.0 10e9/L    RBC Count 3.62 (L) 3.8 - 5.2 10e12/L    Hemoglobin 11.6 (L) 11.7 - 15.7 g/dL    Hematocrit 34.7 (L) 35.0 - 47.0 %    MCV 96 78 - 100 fl    MCH 32.0 26.5 - 33.0 pg    MCHC 33.4 31.5 - 36.5 g/dL    RDW 12.9 10.0 - 15.0 %    Platelet Count 236 150 - 450 10e9/L   HIV Antigen Antibody Combo   Result Value Ref Range    HIV Antigen Antibody Combo Nonreactive NR^Nonreactive          Comment:      HIV-1 p24 Ag & HIV-1/HIV-2 Ab Not Detected       If you have any questions or concerns, please call the clinic at the number listed above.       Sincerely,      Annette Painting MD PhD

## 2021-04-20 PROBLEM — N95.2 ATROPHIC VAGINITIS: Status: ACTIVE | Noted: 2021-04-20

## 2021-04-20 PROBLEM — F41.0 PANIC ATTACK: Status: ACTIVE | Noted: 2021-04-20

## 2021-04-20 PROBLEM — I21.4 NSTEMI (NON-ST ELEVATED MYOCARDIAL INFARCTION) (H): Status: ACTIVE | Noted: 2018-12-05

## 2021-05-12 DIAGNOSIS — F41.1 GAD (GENERALIZED ANXIETY DISORDER): ICD-10-CM

## 2021-05-12 DIAGNOSIS — F41.0 PANIC ATTACK: ICD-10-CM

## 2021-05-12 NOTE — TELEPHONE ENCOUNTER
Patient has upcoming appointment, will postpone message to be certain this is addressed.   Belle Patino RN

## 2021-05-13 ENCOUNTER — VIRTUAL VISIT (OUTPATIENT)
Dept: FAMILY MEDICINE | Facility: CLINIC | Age: 61
End: 2021-05-13
Payer: COMMERCIAL

## 2021-05-13 DIAGNOSIS — M34.1 CREST (CALCINOSIS, RAYNAUD'S PHENOMENON, ESOPHAGEAL DYSFUNCTION, SCLERODACTYLY, TELANGIECTASIA) (H): ICD-10-CM

## 2021-05-13 DIAGNOSIS — F41.1 GAD (GENERALIZED ANXIETY DISORDER): Primary | ICD-10-CM

## 2021-05-13 DIAGNOSIS — F32.5 MAJOR DEPRESSION IN COMPLETE REMISSION (H): ICD-10-CM

## 2021-05-13 DIAGNOSIS — R05.9 COUGH: ICD-10-CM

## 2021-05-13 PROCEDURE — 99214 OFFICE O/P EST MOD 30 MIN: CPT | Mod: TEL | Performed by: INTERNAL MEDICINE

## 2021-05-13 PROCEDURE — 96127 BRIEF EMOTIONAL/BEHAV ASSMT: CPT | Mod: TEL | Performed by: INTERNAL MEDICINE

## 2021-05-13 RX ORDER — FLUOXETINE 40 MG/1
40 CAPSULE ORAL DAILY
Qty: 90 CAPSULE | Refills: 0 | Status: SHIPPED | OUTPATIENT
Start: 2021-05-13 | End: 2021-08-10

## 2021-05-13 ASSESSMENT — ANXIETY QUESTIONNAIRES
2. NOT BEING ABLE TO STOP OR CONTROL WORRYING: MORE THAN HALF THE DAYS
3. WORRYING TOO MUCH ABOUT DIFFERENT THINGS: MORE THAN HALF THE DAYS
GAD7 TOTAL SCORE: 9
7. FEELING AFRAID AS IF SOMETHING AWFUL MIGHT HAPPEN: NOT AT ALL
5. BEING SO RESTLESS THAT IT IS HARD TO SIT STILL: SEVERAL DAYS
IF YOU CHECKED OFF ANY PROBLEMS ON THIS QUESTIONNAIRE, HOW DIFFICULT HAVE THESE PROBLEMS MADE IT FOR YOU TO DO YOUR WORK, TAKE CARE OF THINGS AT HOME, OR GET ALONG WITH OTHER PEOPLE: NOT DIFFICULT AT ALL
6. BECOMING EASILY ANNOYED OR IRRITABLE: SEVERAL DAYS
1. FEELING NERVOUS, ANXIOUS, OR ON EDGE: SEVERAL DAYS

## 2021-05-13 ASSESSMENT — PATIENT HEALTH QUESTIONNAIRE - PHQ9
5. POOR APPETITE OR OVEREATING: MORE THAN HALF THE DAYS
SUM OF ALL RESPONSES TO PHQ QUESTIONS 1-9: 4

## 2021-05-13 NOTE — PROGRESS NOTES
Marcelina is a 61 year old who is being evaluated via a billable telephone visit.      What phone number would you like to be contacted at? [phone]   How would you like to obtain your AVS? MyChart    Assessment & Plan     Diagnoses and all orders for this visit:    MARCELINA (generalized anxiety disorder)  -     FLUoxetine (PROZAC) 40 MG capsule; Take 1 capsule (40 mg) by mouth daily  -     MO BEHAV ASSMT W/SCORE & DOCD/STAND INSTRUMENT    Major depression in complete remission (H)  -     MO BEHAV ASSMT W/SCORE & DOCD/STAND INSTRUMENT    Cough  Comments:  was suggestive of laryngeal acid reflux.  Patient is on omeprazole.  She will keep a diary and when the cough is worse.she will be getting chest CT as well.    CREST (calcinosis, Raynaud's phenomenon, esophageal dysfunction, sclerodactyly, telangiectasia) (H)      Depression is in remission.  Anxiety level has not changed, but patient feels subjectively improved with fluoxetine, able to help her handle new stressors with her father's health.  We will increase Effexor to 40 mg.  Taper Effexor to off.  She will start with Effexor 150 mg for 2 weeks, then go down to 75 mg for 2 weeks, then off.  Patient will MyChart me in 2 weeks and Effexor taper.  At that point, I may send her a prescription of 37.5 mg capsule to help her taper Effexor to off    Virtual visit follow-up in 4 to 6 weeks    Return in about 6 weeks (around 6/24/2021) for Virtual visit.    I spent a total of 34 minutes on the day of the visit.  doing chart review, history and exam, documentation and further activities as noted above    Annette Painting MD PhD  [facility]   Marcelina is a 61 year old who presents for the following health issues     HPI     Depression and Anxiety Follow-Up    How are you doing with your depression since your last visit? Improved. Fluoxetine really helps her anxiety.     How are you doing with your anxiety since your last visit?  No change    Are you  having other symptoms that might be associated with depression or anxiety? No    Have you had a significant life event? OTHER: father's health, 89, in the hospital for aspiration, waiting for placement for nursing home.     Do you have any concerns with your use of alcohol or other drugs? No     She has used hydroxyzine a few times for panic, has not needed to use lorazepam.  I gave her 10 tablets at the last visit, she has not used any.     She feels the fluoxetine is helping her with anxiety more so than the Effexor that she is taking.  I did not realize she was on high-dose Effexor at the last visit and did not advise her to taper at that time.    Patient reported the Effexor was originally helping her with menopause hot flashes.  At this point she has began having hot flashes, she does not think this is an issue for her anymore.    Social History     Tobacco Use     Smoking status: Former Smoker     Quit date: 2001     Years since quittin.3     Smokeless tobacco: Never Used   Substance Use Topics     Alcohol use: Yes     Comment: social     Drug use: No     PHQ 10/2/2020 2021 2021   PHQ-9 Total Score 9 6 4   Q9: Thoughts of better off dead/self-harm past 2 weeks Not at all Not at all Not at all     MARCELINA-7 SCORE 10/2/2020 2021 2021   Total Score - - -   Total Score 13 9 9         Had COVID-19 infection with respiratory symptoms end of November. Finished 2 doses of covid-19 vaccines in [place], last dose . Sometime after the second dose, she started having a dry cough. If she talks too much, the cough gets worse. She has episodes of cough 2-3 times a day that choke her.     Saw her rheumatologist for CREST.  There is concern for possible mild inflammatory arthritis.  Patient is scheduled to get a chest CT.     Review of Systems   Constitutional, HEENT, cardiovascular, pulmonary, gi and gu systems are negative, except as otherwise noted.      Objective           Vitals:  No vitals  were obtained today due to virtual visit.    Physical Exam   healthy, alert and no distress  PSYCH: Alert and oriented times 3; coherent speech, normal   rate and volume, able to articulate logical thoughts, able   to abstract reason, no tangential thoughts, no hallucinations   or delusions  Her affect is normal  RESP: No cough, no audible wheezing, able to talk in full sentences  Remainder of exam unable to be completed due to telephone visits    Phone call duration: 22 minutes

## 2021-05-14 RX ORDER — HYDROXYZINE HYDROCHLORIDE 25 MG/1
25 TABLET, FILM COATED ORAL 2 TIMES DAILY PRN
Qty: 30 TABLET | Refills: 0 | Status: SHIPPED | OUTPATIENT
Start: 2021-05-14 | End: 2021-09-17 | Stop reason: SINTOL

## 2021-05-14 ASSESSMENT — ANXIETY QUESTIONNAIRES: GAD7 TOTAL SCORE: 9

## 2021-05-14 NOTE — TELEPHONE ENCOUNTER
Refill request received within 30 days of last office visit with pcp.  Prescription is routed to the provider to please address refill.   Belle Patino RN

## 2021-07-20 DIAGNOSIS — I10 HYPERTENSION GOAL BP (BLOOD PRESSURE) < 140/90: Primary | ICD-10-CM

## 2021-07-20 NOTE — TELEPHONE ENCOUNTER
Routing refill request to provider for review/approval because:  Medication is reported/historical    Marcelle Manjarrez, ERINN, RN

## 2021-07-22 RX ORDER — NIFEDIPINE 30 MG/1
30 TABLET, EXTENDED RELEASE ORAL DAILY
Qty: 90 TABLET | Refills: 2 | Status: SHIPPED | OUTPATIENT
Start: 2021-07-22 | End: 2022-06-15

## 2021-07-22 RX ORDER — ISOSORBIDE MONONITRATE 30 MG/1
30 TABLET, EXTENDED RELEASE ORAL DAILY
Qty: 90 TABLET | Refills: 2 | Status: SHIPPED | OUTPATIENT
Start: 2021-07-22 | End: 2022-06-15

## 2021-08-03 ENCOUNTER — VIRTUAL VISIT (OUTPATIENT)
Dept: FAMILY MEDICINE | Facility: CLINIC | Age: 61
End: 2021-08-03
Payer: COMMERCIAL

## 2021-08-03 ENCOUNTER — TELEPHONE (OUTPATIENT)
Dept: BEHAVIORAL HEALTH | Facility: CLINIC | Age: 61
End: 2021-08-03

## 2021-08-03 DIAGNOSIS — F33.1 MODERATE EPISODE OF RECURRENT MAJOR DEPRESSIVE DISORDER (H): Primary | ICD-10-CM

## 2021-08-03 DIAGNOSIS — I10 HYPERTENSION GOAL BP (BLOOD PRESSURE) < 140/90: ICD-10-CM

## 2021-08-03 DIAGNOSIS — F41.1 GAD (GENERALIZED ANXIETY DISORDER): ICD-10-CM

## 2021-08-03 DIAGNOSIS — E87.1 HYPONATREMIA: ICD-10-CM

## 2021-08-03 PROCEDURE — 99214 OFFICE O/P EST MOD 30 MIN: CPT | Mod: TEL | Performed by: INTERNAL MEDICINE

## 2021-08-03 RX ORDER — HYDROCHLOROTHIAZIDE 25 MG/1
25 TABLET ORAL DAILY
Qty: 90 TABLET | Refills: 2 | Status: SHIPPED | OUTPATIENT
Start: 2021-08-03 | End: 2022-04-11

## 2021-08-03 RX ORDER — BUPROPION HYDROCHLORIDE 150 MG/1
150 TABLET ORAL EVERY MORNING
Qty: 90 TABLET | Refills: 0 | Status: SHIPPED | OUTPATIENT
Start: 2021-08-03 | End: 2021-10-28

## 2021-08-03 NOTE — TELEPHONE ENCOUNTER
Reached out to pt to offer [facility] appt per the request of Annette Painting  .Left voicemail with behavioral intakes number for scheduling.

## 2021-08-03 NOTE — PROGRESS NOTES
Marcelina is a 61 year old who is being evaluated via a billable telephone visit.      What phone number would you like to be contacted at? [phone]   [facility]    Assessment & Plan     Diagnoses and all orders for this visit:    Moderate episode of recurrent major depressive disorder (H)  -     buPROPion (WELLBUTRIN XL) 150 MG 24 hr tablet; Take 1 tablet (150 mg) by mouth every morning  -     MENTAL HEALTH REFERRAL  - Adult; Outpatient Treatment; Individual/Couples/Family/Group Therapy/Health Psychology[place] [facility] [place] [facility] [phone]; We will contact you to schedule the appointment or please call with any questions; Future    MARCELINA (generalized anxiety disorder)  -     MENTAL HEALTH REFERRAL  - Adult; Outpatient Treatment; Individual/Couples/Family/Group Therapy/Health Psychology[place] [facility] [place] [facility] [phone]; We will contact you to schedule the appointment or please call with any questions; Future    Hypertension goal BP (blood pressure) < 140/90  -     hydrochlorothiazide (HYDRODIURIL) 25 MG tablet; Take 1 tablet (25 mg) by mouth daily    Hyponatremia  -     hydrochlorothiazide (HYDRODIURIL) 25 MG tablet; Take 1 tablet (25 mg) by mouth daily       Will connect her with our behavior health clinician during transition for long term counseling.   Add wellbutrin for now.     History of hyponatremia, hydrochlorothiazide reduced from 50 mg to 25 mg. Pt was to get BMP rechecked. It has not been done yet. Order in place, she will schedule this.     Return in about 2 weeks (around 8/17/2021) for Virtual visit for depression and anxiety.      I spent a total of 19 minutes on the day of the visit.  doing chart review, history and exam, documentation and further activities as noted above       Annette Painting MD PhD  [facility]   Marcelina is a 61 year old who presents for the following health issues     Just moved dad to  assisted living, doing hospice. She feels helpless with her dad.   She has made many trips.    Ran out of Nifedipine 3 days ago. Her ankle swelled up since she ran out of Nifedipine. After she got back on the prescription, the ankle swelling resolved.     Marriage is elizabeth.     Went off Effexor, now on Prozac 40 mg due to Effexor not helping with her depression.  On the verge of a breakdown. Trying to hold it together for her dad. Can't get in for therapy anywhere.           Review of Systems   Constitutional, HEENT, cardiovascular, pulmonary, gi and gu systems are negative, except as otherwise noted.      Objective           Vitals:  No vitals were obtained today due to virtual visit.    Physical Exam   healthy and alert  PSYCH: Alert and oriented times 3; coherent speech, normal   rate and volume, able to articulate logical thoughts, able   to abstract reason, no tangential thoughts, no hallucinations   or delusions  Her affect is tearful, and crying during the visit.   RESP: No cough, no audible wheezing, able to talk in full sentences  Remainder of exam unable to be completed due to telephone visits        Phone call duration: 14  minutes

## 2021-08-04 DIAGNOSIS — F41.0 PANIC ATTACK: ICD-10-CM

## 2021-08-04 DIAGNOSIS — R06.2 WHEEZING: ICD-10-CM

## 2021-08-04 RX ORDER — LORAZEPAM 0.5 MG/1
0.5 TABLET ORAL DAILY PRN
Qty: 30 TABLET | Refills: 1 | Status: SHIPPED | OUTPATIENT
Start: 2021-08-04 | End: 2021-10-19

## 2021-08-04 RX ORDER — ALBUTEROL SULFATE 90 UG/1
AEROSOL, METERED RESPIRATORY (INHALATION)
Qty: 8.5 G | Refills: 3 | Status: SHIPPED | OUTPATIENT
Start: 2021-08-04 | End: 2022-06-16

## 2021-08-04 NOTE — TELEPHONE ENCOUNTER
Routing refill request to provider for review/approval because:  Drug not on the [facility] refill protocol     Eloise Long RN

## 2021-08-04 NOTE — TELEPHONE ENCOUNTER
.Reason for call:  Medication   If this is a refill request, has the caller requested the refill from the pharmacy already? No  Will the patient be using a [facility]? No  Name of the pharmacy and phone number for the current request: [facility]    Name of the medication requested: lorazepam, in [place]    Other request: fill script    Phone number to reach patient:  Home number on file [phone] (home)    Best Time:  anytime    Can we leave a detailed message on this number?  YES    Travel screening: Negative

## 2021-08-06 ENCOUNTER — VIRTUAL VISIT (OUTPATIENT)
Dept: BEHAVIORAL HEALTH | Facility: CLINIC | Age: 61
End: 2021-08-06
Payer: COMMERCIAL

## 2021-08-06 DIAGNOSIS — F43.21 GRIEF: ICD-10-CM

## 2021-08-06 DIAGNOSIS — F41.1 GENERALIZED ANXIETY DISORDER: ICD-10-CM

## 2021-08-06 DIAGNOSIS — F33.1 MODERATE EPISODE OF RECURRENT MAJOR DEPRESSIVE DISORDER (H): Primary | ICD-10-CM

## 2021-08-06 PROCEDURE — 90837 PSYTX W PT 60 MINUTES: CPT | Mod: GT | Performed by: SOCIAL WORKER

## 2021-08-06 ASSESSMENT — COLUMBIA-SUICIDE SEVERITY RATING SCALE - C-SSRS
5. HAVE YOU STARTED TO WORK OUT OR WORKED OUT THE DETAILS OF HOW TO KILL YOURSELF? DO YOU INTEND TO CARRY OUT THIS PLAN?: NO
2. HAVE YOU ACTUALLY HAD ANY THOUGHTS OF KILLING YOURSELF LIFETIME?: NO
TOTAL  NUMBER OF ABORTED OR SELF INTERRUPTED ATTEMPTS PAST LIFETIME: NO
6. HAVE YOU EVER DONE ANYTHING, STARTED TO DO ANYTHING, OR PREPARED TO DO ANYTHING TO END YOUR LIFE?: NO
5. HAVE YOU STARTED TO WORK OUT OR WORKED OUT THE DETAILS OF HOW TO KILL YOURSELF? DO YOU INTEND TO CARRY OUT THIS PLAN?: NO
ATTEMPT LIFETIME: NO
1. IN THE PAST MONTH, HAVE YOU WISHED YOU WERE DEAD OR WISHED YOU COULD GO TO SLEEP AND NOT WAKE UP?: NO
TOTAL  NUMBER OF ABORTED OR SELF INTERRUPTED ATTEMPTS PAST 3 MONTHS: NO
1. IN THE PAST MONTH, HAVE YOU WISHED YOU WERE DEAD OR WISHED YOU COULD GO TO SLEEP AND NOT WAKE UP?: YES
4. HAVE YOU HAD THESE THOUGHTS AND HAD SOME INTENTION OF ACTING ON THEM?: NO
TOTAL  NUMBER OF INTERRUPTED ATTEMPTS LIFETIME: NO
ATTEMPT PAST THREE MONTHS: NO
3. HAVE YOU BEEN THINKING ABOUT HOW YOU MIGHT KILL YOURSELF?: NO
6. HAVE YOU EVER DONE ANYTHING, STARTED TO DO ANYTHING, OR PREPARED TO DO ANYTHING TO END YOUR LIFE?: NO
REASONS FOR IDEATION PAST MONTH: MOSTLY TO END OR STOP THE PAIN (YOU COULDN'T GO ON LIVING WITH THE PAIN OR HOW YOU WERE FEELING)
2. HAVE YOU ACTUALLY HAD ANY THOUGHTS OF KILLING YOURSELF?: NO
TOTAL  NUMBER OF INTERRUPTED ATTEMPTS PAST 3 MONTHS: NO
4. HAVE YOU HAD THESE THOUGHTS AND HAD SOME INTENTION OF ACTING ON THEM?: NO

## 2021-08-06 NOTE — PROGRESS NOTES
[facility] Primary Care: : Integrated Behavioral Health  August 6, 2021      Behavioral Health Clinician Progress Note    Patient Name: Marcelina Estevez           Service Type:  Individual      Service Location:   Face to Face in Clinic     Session Start Time: 9:00am  Session End Time: 10:00 AM      Session Length: 53 - 60      Attendees: Client    Visit Activities (Refresh list every visit): [facility] Only    Diagnostic Assessment Date: Will be completed in the first four sessions  Treatment Plan Review Date: Provider and patient will continue to build rapport and discuss tx goals in their next few sessions.     Telemedicine Visit: The patient's condition can be safely assessed and treated via synchronous audio and visual telemedicine encounter.      Reason for Telemedicine Visit: Patient has requested telehealth visit and COVID-19    Originating Site (Patient Location): Patient's home    Distant Site (Provider Location): Provider Remote Setting- Home Office    Consent:  The patient/guardian has verbally consented to: the potential risks and benefits of telemedicine (video visit) versus in person care; bill my insurance or make self-payment for services provided; and responsibility for payment of non-covered services.     Mode of Communication:  Video Conference via [employer]    As the provider I attest to compliance with applicable laws and regulations related to telemedicine.        See Flowsheets for today's PHQ-9 and MARCELINA-7 results  Previous PHQ-9:   PHQ-9 SCORE 10/2/2020 4/16/2021 5/13/2021   PHQ-9 Total Score - - -   PHQ-9 Total Score MyChart - - -   PHQ-9 Total Score 9 6 4     Previous MARCELINA-7:   MARCELINA-7 SCORE 10/2/2020 4/16/2021 5/13/2021   Total Score - - -   Total Score 13 9 9       ALISON LEVEL:  ALISON Score (Last Two) 5/23/2011   ALISON Raw Score 42   Activation Score 66   ALISON Level 3       DATA  Extended Session (60+ minutes): No  Interactive Complexity: No  Crisis: No  [facility]  "Patient: No    Treatment Objective(s) Addressed in This Session:  Target Behavior(s): Depression and anxiety    Depressed Mood: Increase interest, engagement, and pleasure in doing things  Decrease frequency and intensity of feeling down, depressed, hopeless  Identify negative self-talk and behaviors: challenge core beliefs, myths, and actions  Improve concentration, focus, and mindfulness in daily activities   Anxiety: will experience a reduction in anxiety, will develop more effective coping skills to manage anxiety symptoms and will increase ability to function adaptively    Current Stressors / Issues:     Patient arrives in low spirits for her individual session and was tearful throughout.  Patient reports that she lost her mom in December, she was at [facility] and unfortunately was not able to see her due to Covid restrictions which was very challenging for her.  She reports that she was also recently diagnosed with a medical condition that impacts her hands and feet and making them constantly feels sore and she is very exhausted most days.  Patient also reports that she just had to put her father in hospice and he is about 3 and half hours away which is also difficult for her.  She also lost her dog last November which was very challenging for her and felt like she lost a good friend.  Patient reports that her depression symptoms are the most problematic for her and has caused her to want to withdrawal from socializing and taking care of herself.  She reports that she often has anxious thoughts of \" Am I good enough?\"  That she often ruminates about.  Patient states that the thought of suicide did cross her mind in the form of \"is all this suffering worth it?\"  She denies plan or intent or wanting to do that her family.  Patient states \"I would never kill myself, it was just a quick thought.\"  Denies needing a safety plan or crisis numbers.  Contracts for safety.  Provided psychoeducation on " depression and anxiety and discussed implementing regulation and grounding techniques into her routine.  Patient was open to exercising, journaling, rocking and exploring mental health apps provided.    Progress on Treatment Objective(s) / Homework:  New Objective established this session - PREPARATION (Decided to change - considering how); Intervened by negotiating a change plan and determining options / strategies for behavior change, identifying triggers, exploring social supports, and working towards setting a date to begin behavior change    Motivational Interviewing    MI Intervention: Expressed Empathy/Understanding, Supported Autonomy, Collaboration, Evocation, Permission to raise concern or advise, Open-ended questions, Reflections: simple and complex and Change talk (evoked)     Change Talk Expressed by the Patient: Desire to change Reasons to change Need to change Activation    Provider Response to Change Talk: E - Evoked more info from patient about behavior change, A - Affirmed patient's thoughts, decisions, or attempts at behavior change and S - Summarized patient's change talk statements    Also provided psychoeducation about behavioral health condition, symptoms, and treatment options    Care Plan review completed: Yes    Medication Review:  No changes to current psychiatric medication(s)    Medication Compliance:  Yes    Changes in Health Issues:   None reported    Chemical Use Review:   Substance Use: Chemical use reviewed, no active concerns identified      Tobacco Use: No change in amount of tobacco use since last session.  Patient declined discussion at this time    Assessment: Current Emotional / Mental Status (status of significant symptoms):  Risk status (Self / Other harm or suicidal ideation)  Patient denies a history of suicidal ideation, suicide attempts, self-injurious behavior, homicidal ideation, homicidal behavior and and other safety concerns  Patient denies current fears or concerns  for personal safety.  Patient reports the following current or recent suicidal ideation or behaviors: Reports that she did have thoughts about what it would be like if she was dead recently.  Patient denies current or recent homicidal ideation or behaviors.  Patient denies current or recent self injurious behavior or ideation.  Patient denies other safety concerns.  A safety and risk management plan has not been developed at this time, however patient was encouraged to call [place] [phone] should there be a change in any of these risk factors.    Appearance:   Appropriate   Eye Contact:   Good   Psychomotor Behavior: Normal   Attitude:   Cooperative  Friendly  Orientation:   All  Speech   Rate / Production: Emotional   Volume:  Normal   Mood:    Depressed  Sad   Affect:    Appropriate   Thought Content:  Clear  Rumination   Thought Form:  Coherent  Logical  Circumstantial  Insight:    Good     Diagnoses:  No diagnosis found.    Collateral Reports Completed:  Routed note to PCP    Plan: (Homework, other):  Patient was given information about behavioral services and encouraged to schedule a follow up appointment with the clinic [facility] in 1 week.  She was also given information about mental health symptoms and treatment options .  CD Recommendations: No indications of CD issues.  Rowan Ac, [facility]      ______________________________________________________________________    Integrated Primary Care Behavioral Health Treatment Plan    Patient's Name: Marcelina Estevez  YOB: 1960    Date: 8/6/2021    DSM-V Diagnoses: 296.33 (F33.2) Major Depressive Disorder, Recurrent Episode, Severe _ and With anxious distress  Psychosocial / Contextual Factors: Low social support, strained relationship with , recent medical diagnosis, mother passed away in December and father recently went into hospice  WHODAS: Will complete during diagnostic assessment    Referral / Collaboration:  The following referral(s)  was/were discussed but client declines follow up at this time. Patient is unsure if she wants to participate in outpatient counseling at this time but will continue with [facility], this conversation will continue.    Anticipated number of session or this episode of care: 10+    Provider and patient will continue to build rapport and discuss tx goals in their next few sessions.     Patient has reviewed and agreed to the above plan.      Rowan Ac, [facility]  August 6, 2021

## 2021-08-09 ENCOUNTER — TELEPHONE (OUTPATIENT)
Dept: FAMILY MEDICINE | Facility: CLINIC | Age: 61
End: 2021-08-09

## 2021-08-09 NOTE — TELEPHONE ENCOUNTER
Patient was seen for her throat lesion several times.  It was not bothering her before.  Now with a cough for three months, it is finally bothering her and would like it to be removed.  She saw ENT at [place] before, ENT is no longer there.    Patient is requesting a referral to see ENT.  She is okay going to [place] location.    OV scheduled with PCP to address cough next week.    Eloise Long RN

## 2021-08-09 NOTE — TELEPHONE ENCOUNTER
Patient calling and wants an ENT referral order for her lesion on the right side of her throat.  Bela [facility]erman [facility]  2nd Floor  Primary Care

## 2021-08-10 ENCOUNTER — VIRTUAL VISIT (OUTPATIENT)
Dept: BEHAVIORAL HEALTH | Facility: CLINIC | Age: 61
End: 2021-08-10
Payer: COMMERCIAL

## 2021-08-10 DIAGNOSIS — F33.1 MODERATE EPISODE OF RECURRENT MAJOR DEPRESSIVE DISORDER (H): Primary | ICD-10-CM

## 2021-08-10 DIAGNOSIS — F41.1 GAD (GENERALIZED ANXIETY DISORDER): ICD-10-CM

## 2021-08-10 DIAGNOSIS — F41.1 GENERALIZED ANXIETY DISORDER: ICD-10-CM

## 2021-08-10 PROCEDURE — 90834 PSYTX W PT 45 MINUTES: CPT | Mod: GT | Performed by: SOCIAL WORKER

## 2021-08-10 RX ORDER — FLUOXETINE 40 MG/1
40 CAPSULE ORAL DAILY
Qty: 90 CAPSULE | Refills: 1 | Status: SHIPPED | OUTPATIENT
Start: 2021-08-10 | End: 2022-02-01

## 2021-08-10 NOTE — Clinical Note
"FYI-pt told me she has been having flare ups with her pain but hasn't been reaching out because she doesn't wan to be a \"burden\" to you. I educated her on the importance of communicating her symptoms to you and her team so we know how to help and support her.  She will be seeing you next week, just wanted to give you a heads up. :) "

## 2021-08-10 NOTE — TELEPHONE ENCOUNTER
Patient calling requesting a refill of FLUoxetine (PROZAC) 40 MG capsule. She is down to 4 pills and will need this refilled please prior to her OV with Dr. Painting on 8/17/2021.  Davon Taylor

## 2021-08-10 NOTE — PROGRESS NOTES
[facility] Primary Care: : Integrated Behavioral Health  August 10, 2021      Behavioral Health Clinician Progress Note    Patient Name: Marcelina Estevez           Service Type:  Individual      Service Location:   Face to Face in Clinic     Session Start Time: 11:30am  Session End Time: 12:08PM      Session Length: 38 - 52      Attendees: Client    Visit Activities (Refresh list every visit): [facility] [place] Only    Diagnostic Assessment Date: Will be completed in the first four sessions  Treatment Plan Review Date: Provider and patient will continue to build rapport and discuss tx goals in their next few sessions.     Telemedicine Visit: The patient's condition can be safely assessed and treated via synchronous audio and visual telemedicine encounter.      Reason for Telemedicine Visit: Patient has requested telehealth visit and COVID-19    Originating Site (Patient Location): Patient's home    Distant Site (Provider Location): Provider Remote Setting- Home Office    Consent:  The patient/guardian has verbally consented to: the potential risks and benefits of telemedicine (video visit) versus in person care; bill my insurance or make self-payment for services provided; and responsibility for payment of non-covered services.     Mode of Communication:  Video Conference via [employer]    As the provider I attest to compliance with applicable laws and regulations related to telemedicine.        See Flowsheets for today's PHQ-9 and MARCELINA-7 results  Previous PHQ-9:   PHQ-9 SCORE 10/2/2020 4/16/2021 5/13/2021   PHQ-9 Total Score - - -   PHQ-9 Total Score MyChart - - -   PHQ-9 Total Score 9 6 4     Previous MARCELINA-7:   MARCELINA-7 SCORE 10/2/2020 4/16/2021 5/13/2021   Total Score - - -   Total Score 13 9 9       ALISON LEVEL:  ALISON Score (Last Two) 5/23/2011   ALISON Raw Score 42   Activation Score 66   ALISON Level 3       DATA  Extended Session (60+ minutes): No  Interactive Complexity: No  Crisis: No  [facility] Patient:  No    Treatment Objective(s) Addressed in This Session:  Target Behavior(s): Depression and anxiety    Depressed Mood: Increase interest, engagement, and pleasure in doing things  Decrease frequency and intensity of feeling down, depressed, hopeless  Identify negative self-talk and behaviors: challenge core beliefs, myths, and actions  Improve concentration, focus, and mindfulness in daily activities   Anxiety: will experience a reduction in anxiety, will develop more effective coping skills to manage anxiety symptoms and will increase ability to function adaptively    Current Stressors / Issues:     Patient arrived in good spirits for her individual session.  She reports that she is feeling much better than the previous session due to meeting with the [facility] and utilizing regulating suggestions throughout the last week.  Patient indicates that she did not realize how much she was not taking care of herself or checking in with her own needs due to everyone else's needs being high priority.  She reports that she also had a flareup last week with her pain which also impacted her emotional state.  She states that since her diagnosis is somewhat rare she often just deals with the pain or symptoms without reaching out to her provider.  Encouraged patient to document and communicate with provider about her symptoms to increase her pain management and to avoid her physical health declining.  Patient reports that she went to [place] with a friend over the weekend and felt this was good for her mental health to be in a positive environment and hopes to continue this.  Discussed outpatient counseling referral and was able to call [facility] during the appointment to schedule.  Patient is scheduled with [facility] on September 13.  Patient will continue with [facility] services until linked with outpatient.      Progress on Treatment Objective(s) / Homework:  Minimal progress - ACTION (Actively working towards change);  Intervened by reinforcing change plan / affirming steps taken    Motivational Interviewing    MI Intervention: Expressed Empathy/Understanding, Supported Autonomy, Collaboration, Evocation, Permission to raise concern or advise, Open-ended questions, Reflections: simple and complex and Change talk (evoked)     Change Talk Expressed by the Patient: Desire to change Reasons to change Need to change Activation    Provider Response to Change Talk: E - Evoked more info from patient about behavior change, A - Affirmed patient's thoughts, decisions, or attempts at behavior change and S - Summarized patient's change talk statements    Care Plan review completed: Yes    Medication Review:  No changes to current psychiatric medication(s)    Medication Compliance:  Yes    Changes in Health Issues:   None reported    Chemical Use Review:   Substance Use: Chemical use reviewed, no active concerns identified      Tobacco Use: No change in amount of tobacco use since last session.  Patient declined discussion at this time    Assessment: Current Emotional / Mental Status (status of significant symptoms):  Risk status (Self / Other harm or suicidal ideation)  Patient denies a history of suicidal ideation, suicide attempts, self-injurious behavior, homicidal ideation, homicidal behavior and and other safety concerns  Patient denies current fears or concerns for personal safety.  Patient reports the following current or recent suicidal ideation or behaviors: Reports that she did have thoughts about what it would be like if she was dead recently.  Patient denies current or recent homicidal ideation or behaviors.  Patient denies current or recent self injurious behavior or ideation.  Patient denies other safety concerns.  A safety and risk management plan has not been developed at this time, however patient was encouraged to call [facility] should there be a change in any of these risk factors.    Appearance:   Appropriate    Eye Contact:   Good   Psychomotor Behavior: Normal   Attitude:   Cooperative  Friendly  Orientation:   All  Speech   Rate / Production: Normal/ Responsive   Volume:  Normal   Mood:    Depressed   Affect:    Appropriate   Thought Content:  Clear   Thought Form:  Coherent  Logical  Circumstantial  Insight:    Good     Diagnoses:  1. Moderate episode of recurrent major depressive disorder (H)    2. Generalized anxiety disorder        Collateral Reports Completed:  Routed note to PCP    Plan: (Homework, other):  Patient was given information about behavioral services and encouraged to schedule a follow up appointment with the clinic [facility] in 2 weeks.  She was also given information about mental health symptoms and treatment options .  CD Recommendations: No indications of CD issues.  Rowan Ac, [facility]      ______________________________________________________________________    Integrated Primary Care Behavioral Health Treatment Plan    Patient's Name: Marcelina Estevez  YOB: 1960    Date: 8/10/2021    DSM-V Diagnoses: 296.33 (F33.2) Major Depressive Disorder, Recurrent Episode, Severe _ and With anxious distress  Psychosocial / Contextual Factors: Low social support, strained relationship with , recent medical diagnosis, mother passed away in December and father recently went into hospice  WHODAS: Will complete during diagnostic assessment    Referral / Collaboration:  The following referral(s) was/were discussed but client declines follow up at this time. Patient is unsure if she wants to participate in outpatient counseling at this time but will continue with [facility], this conversation will continue.    Anticipated number of session or this episode of care: 10+    Provider and patient will continue to build rapport and discuss tx goals in their next few sessions.     Patient has reviewed and agreed to the above plan.      Rowan Ac, [facility]  August 10, 2021

## 2021-08-11 ENCOUNTER — LAB (OUTPATIENT)
Dept: LAB | Facility: CLINIC | Age: 61
End: 2021-08-11
Payer: COMMERCIAL

## 2021-08-11 DIAGNOSIS — E87.1 HYPONATREMIA: ICD-10-CM

## 2021-08-11 DIAGNOSIS — N18.1 CKD (CHRONIC KIDNEY DISEASE) STAGE 1, GFR 90 ML/MIN OR GREATER: ICD-10-CM

## 2021-08-11 LAB
ANION GAP SERPL CALCULATED.3IONS-SCNC: 5 MMOL/L (ref 3–14)
BUN SERPL-MCNC: 22 MG/DL (ref 7–30)
CALCIUM SERPL-MCNC: 9.8 MG/DL (ref 8.5–10.1)
CHLORIDE BLD-SCNC: 100 MMOL/L (ref 94–109)
CO2 SERPL-SCNC: 27 MMOL/L (ref 20–32)
CREAT SERPL-MCNC: 0.93 MG/DL (ref 0.52–1.04)
GFR SERPL CREATININE-BSD FRML MDRD: 67 ML/MIN/1.73M2
GLUCOSE BLD-MCNC: 91 MG/DL (ref 70–99)
POTASSIUM BLD-SCNC: 4.2 MMOL/L (ref 3.4–5.3)
SODIUM SERPL-SCNC: 132 MMOL/L (ref 133–144)

## 2021-08-11 PROCEDURE — 36415 COLL VENOUS BLD VENIPUNCTURE: CPT

## 2021-08-11 PROCEDURE — 80048 BASIC METABOLIC PNL TOTAL CA: CPT

## 2021-08-17 ENCOUNTER — MEDICAL CORRESPONDENCE (OUTPATIENT)
Dept: HEALTH INFORMATION MANAGEMENT | Facility: CLINIC | Age: 61
End: 2021-08-17

## 2021-08-17 ENCOUNTER — OFFICE VISIT (OUTPATIENT)
Dept: FAMILY MEDICINE | Facility: CLINIC | Age: 61
End: 2021-08-17
Payer: COMMERCIAL

## 2021-08-17 ENCOUNTER — ANCILLARY PROCEDURE (OUTPATIENT)
Dept: GENERAL RADIOLOGY | Facility: CLINIC | Age: 61
End: 2021-08-17
Attending: INTERNAL MEDICINE
Payer: COMMERCIAL

## 2021-08-17 VITALS
SYSTOLIC BLOOD PRESSURE: 112 MMHG | HEART RATE: 68 BPM | OXYGEN SATURATION: 100 % | WEIGHT: 139.6 LBS | DIASTOLIC BLOOD PRESSURE: 72 MMHG | TEMPERATURE: 97.8 F | BODY MASS INDEX: 26.81 KG/M2

## 2021-08-17 DIAGNOSIS — R05.3 CHRONIC COUGH: ICD-10-CM

## 2021-08-17 DIAGNOSIS — F32.0 MILD MAJOR DEPRESSION (H): ICD-10-CM

## 2021-08-17 DIAGNOSIS — F41.1 GAD (GENERALIZED ANXIETY DISORDER): ICD-10-CM

## 2021-08-17 DIAGNOSIS — Z12.11 COLON CANCER SCREENING: ICD-10-CM

## 2021-08-17 DIAGNOSIS — L98.9 SKIN LESION: ICD-10-CM

## 2021-08-17 DIAGNOSIS — Z23 NEED FOR SHINGLES VACCINE: ICD-10-CM

## 2021-08-17 DIAGNOSIS — M34.9 SCLERODERMA (H): ICD-10-CM

## 2021-08-17 DIAGNOSIS — J38.3 LESION OF VOCAL CORD: Primary | ICD-10-CM

## 2021-08-17 DIAGNOSIS — E87.1 HYPONATREMIA: ICD-10-CM

## 2021-08-17 PROCEDURE — 99214 OFFICE O/P EST MOD 30 MIN: CPT | Performed by: INTERNAL MEDICINE

## 2021-08-17 PROCEDURE — 71046 X-RAY EXAM CHEST 2 VIEWS: CPT | Mod: FY | Performed by: RADIOLOGY

## 2021-08-17 PROCEDURE — 96127 BRIEF EMOTIONAL/BEHAV ASSMT: CPT | Performed by: INTERNAL MEDICINE

## 2021-08-17 ASSESSMENT — ANXIETY QUESTIONNAIRES
3. WORRYING TOO MUCH ABOUT DIFFERENT THINGS: NEARLY EVERY DAY
6. BECOMING EASILY ANNOYED OR IRRITABLE: SEVERAL DAYS
5. BEING SO RESTLESS THAT IT IS HARD TO SIT STILL: SEVERAL DAYS
GAD7 TOTAL SCORE: 10
1. FEELING NERVOUS, ANXIOUS, OR ON EDGE: SEVERAL DAYS
2. NOT BEING ABLE TO STOP OR CONTROL WORRYING: SEVERAL DAYS
8. IF YOU CHECKED OFF ANY PROBLEMS, HOW DIFFICULT HAVE THESE MADE IT FOR YOU TO DO YOUR WORK, TAKE CARE OF THINGS AT HOME, OR GET ALONG WITH OTHER PEOPLE?: VERY DIFFICULT
7. FEELING AFRAID AS IF SOMETHING AWFUL MIGHT HAPPEN: SEVERAL DAYS
4. TROUBLE RELAXING: MORE THAN HALF THE DAYS
7. FEELING AFRAID AS IF SOMETHING AWFUL MIGHT HAPPEN: SEVERAL DAYS

## 2021-08-17 ASSESSMENT — PATIENT HEALTH QUESTIONNAIRE - PHQ9
SUM OF ALL RESPONSES TO PHQ QUESTIONS 1-9: 7
10. IF YOU CHECKED OFF ANY PROBLEMS, HOW DIFFICULT HAVE THESE PROBLEMS MADE IT FOR YOU TO DO YOUR WORK, TAKE CARE OF THINGS AT HOME, OR GET ALONG WITH OTHER PEOPLE: SOMEWHAT DIFFICULT
SUM OF ALL RESPONSES TO PHQ QUESTIONS 1-9: 7

## 2021-08-17 NOTE — PROGRESS NOTES
Assessment & Plan     Marcelina was seen today for uri.    Diagnoses and all orders for this visit:    Lesion of vocal cord  Comments:  Refer to ENT, to see Dr. Sanon, or voice specialist.  Orders:  -     Otolaryngology Referral; Future    Chronic cough  Comments:  Possibly laryngeal reflux related.  Obtain chest x-ray to rule out pulmonary pathology.  Orders:  -     XR Chest 2 Views; Future    MARCELINA (generalized anxiety disorder)  Comments:  Increase fluoxetine to 60 mg by adding 20 mg.  Follow-up in 1 month  Orders:  -     FLUoxetine (PROZAC) 20 MG capsule; Take 1 capsule (20 mg) by mouth daily    Mild major depression (H)  Comments:  Increase fluoxetine to 60 mg by adding 20 mg.  Follow-up in 1 month.  Continue counseling with our behavioral health clinician  Orders:  -     IA BEHAV ASSMT W/SCORE & DOCD/STAND INSTRUMENT    Colon cancer screening  -     Adult Gastro Ref - Procedure Only; Future    Need for shingles vaccine  -     ZOSTER VACCINE RECOMBINANT ADJUVANTED IM NJX    Hyponatremia  Comments:  History of hyponatremia, due for recheck.    Skin lesion  Comments:  Has a spot on her arm that has changed.  Given her history of scleroderma, we will have her see dermatology for evaluation and possible biopsy.  Orders:  -     Adult Dermatology Referral; Future    Scleroderma (H)    Other orders  -     REVIEW OF HEALTH MAINTENANCE PROTOCOL ORDERS          Return in about 1 month (around 9/17/2021) for Virtual visit follow up for depression/anxiety.    Annette Painting MD PhD  [facility]   Marcleina is a 61 year old who presents for the following health issues     Depression/anxiety: Patient has been seeing our behavioral health clinician for counseling.  She found this to be beneficial.  Mother passed away last December.  Father is in hospice 3 and half hours away.  Patient has scleroderma/crest syndrome.  Currently taking fluoxetine 40 mg daily.    History of Present Illness       Mental  Health Follow-up:  Patient presents to follow-up on Depression & Anxiety.Patient's depression since last visit has been:  Medium  The patient is having other symptoms associated with depression.  Patient's anxiety since last visit has been:  Medium  The patient is having other symptoms associated with anxiety.  Any significant life events: relationship concerns, grief or loss and health concerns  Patient is feeling anxious or having panic attacks.  Patient has no concerns about alcohol or drug use.     Social History  Tobacco Use    Smoking status: Former Smoker      Quit date: 2001      Years since quittin.6    Smokeless tobacco: Never Used  Vaping Use    Vaping Use: Never used  Alcohol use: Yes    Comment: social  Drug use: No      Today's PHQ-9         PHQ-9 Total Score:     (P) 7   PHQ-9 Q9 Thoughts of better off dead/self-harm past 2 weeks :   (P) Not at all   Thoughts of suicide or self harm:      Self-harm Plan:        Self-harm Action:          Safety concerns for self or others:           She eats 2-3 servings of fruits and vegetables daily.She consumes 1 sweetened beverage(s) daily.She exercises with enough effort to increase her heart rate 30 to 60 minutes per day.  She exercises with enough effort to increase her heart rate 3 or less days per week. She is missing 1 dose(s) of medications per week.  She is not taking prescribed medications regularly due to remembering to take.       Acute Illness  Acute illness concerns: cough  Onset/Duration: 3 months, worse in the morning. Requesting ENT referral.  Patient had seen her vocal cord specialist Dr. Garza a few years back.  Patient was told she had mass lesion by the vocal cord, could consider removal.  Patient did not pursue this at the time.  She would like to see ENT again to recheck..  Symptoms:  Fever: no  Chills/Sweats: YES in the AM  Headache (location?): no  Sinus Pressure: no  Conjunctivitis:  no  Ear Pain: no  Rhinorrhea:  no  Congestion: no  Sore Throat: no  Cough: YES-non-productive  Wheeze: no  Decreased Appetite: no  Nausea: no  Vomiting: no  Diarrhea: YES  Dysuria/Freq.: no  Dysuria or Hematuria: no  Fatigue/Achiness: YES  Sick/Strep Exposure: no  Therapies tried and outcome: allegra, some improvement     PHQ-9 SCORE 4/16/2021 5/13/2021 8/17/2021   PHQ-9 Total Score - - -   PHQ-9 Total Score MyChart - - 7 (Mild depression)   PHQ-9 Total Score 6 4 7     MARCELINA-7 SCORE 4/16/2021 5/13/2021 8/17/2021   Total Score - - -   Total Score - - 10 (moderate anxiety)   Total Score 9 9 10   Answers for HPI/ROS submitted by the patient on 8/17/2021  If you checked off any problems, how difficult have these problems made it for you to do your work, take care of things at home, or get along with other people?: Somewhat difficult  PHQ9 TOTAL SCORE: 7  MARCELINA 7 TOTAL SCORE: 10      Review of Systems   Constitutional, HEENT, cardiovascular, pulmonary, gi and gu systems are negative, except as otherwise noted.      Objective    /72 (BP Location: Right arm, Cuff Size: Adult Regular)   Pulse 68   Temp 97.8  F (36.6  C) (Oral)   Wt 63.3 kg (139 lb 9.6 oz)   LMP 06/12/2014   SpO2 100%   BMI 26.81 kg/m    Body mass index is 26.81 kg/m .  Physical Exam   GENERAL: 61-year-old female, alert, anxious.  Raspy voice  EYES: Eyes grossly normal to inspection, PERRL and conjunctivae and sclerae normal  HENT: ear canals and TM's normal, nose and mouth without ulcers or lesions  RESP: lungs clear to auscultation - no rales, rhonchi or wheezes  CV: regular rate and rhythm, normal S1 S2, no S3 or S4, no murmur, click or rub, no peripheral edema and peripheral pulses strong  MS: no gross musculoskeletal defects noted, no edema  SKIN: no suspicious lesions or rashes  NEURO: Normal strength and tone, mentation intact and speech normal  PSYCH: mentation appears normal, affect normal/bright      Answers for HPI/ROS submitted by the patient on 8/17/2021  If you  checked off any problems, how difficult have these problems made it for you to do your work, take care of things at home, or get along with other people?: Somewhat difficult  PHQ9 TOTAL SCORE: 7  MARCELINA 7 TOTAL SCORE: 10

## 2021-08-17 NOTE — RESULT ENCOUNTER NOTE
Dear Marcelina,   Your recent test results showed the following:  -- chest xray is normal.     Please call or Mychart to our office if you have further questions.     Annette Painting MD-PhD

## 2021-08-17 NOTE — RESULT ENCOUNTER NOTE
Results discussed directly with patient while patient was present. Any further details documented in the note.   Annette Painting MD PhD

## 2021-08-18 ENCOUNTER — TRANSFERRED RECORDS (OUTPATIENT)
Dept: HEALTH INFORMATION MANAGEMENT | Facility: CLINIC | Age: 61
End: 2021-08-18

## 2021-08-18 ASSESSMENT — ANXIETY QUESTIONNAIRES: GAD7 TOTAL SCORE: 10

## 2021-08-18 ASSESSMENT — PATIENT HEALTH QUESTIONNAIRE - PHQ9: SUM OF ALL RESPONSES TO PHQ QUESTIONS 1-9: 7

## 2021-08-20 ENCOUNTER — TELEPHONE (OUTPATIENT)
Dept: GASTROENTEROLOGY | Facility: OUTPATIENT CENTER | Age: 61
End: 2021-08-20

## 2021-08-20 NOTE — TELEPHONE ENCOUNTER
Screening Questions  1. Are you active on mychart? no    2. What insurance is in the chart? [employer]    2.  Ordering/Referring Provider: Annette Painting MD    3. BMI 26.51    4. Are you on daily home oxygen? no    5. Do you have a history of difficult airway? no    6. Have you had a heart, lung, or liver transplant? no    7. Are you currently on dialysis? no    8. Have you had a stroke or Transient ischemic atttack (TIA) within 6 months? no    9. In the past 6 months, have you had any heart related issues including cardiomyopathy or heart attack?         If yes, did it require cardiac stenting or other implantable device?no    10. Do you have any implantable devices in your body (pacemaker, defib, LVAD)? no    11. Do you take nitroglycerin? If yes, how often? no    12. Are you currently taking any blood thinners?no    13. Are you a diabetic? no    14. (Females) Are you currently pregnant?   If yes, how many weeks?    15. Have you had a procedure in the past that was difficult to tolerate with conscious sedation? Any allergies to Fentanyl or Versed no    16. Are you taking any scheduled prescription narcotics more than once daily? no    17. Do you have any chemical dependencies such as alcohol, street drugs, or methadone? no    18. Do you have any history of post-traumatic stress syndrome or mental health issues? Depression/anxiety    19. Do you transfer independently? yes    20.  Do you have any issues with constipation? no    21. Preferred Pharmacy for Pre Prescription [facility] in [place], [place]    Scheduling Details    Which Colonoscopy Prep was Sent?: miralax  Procedure Scheduled: colonoscopy  Provider/Surgeon: carolina  Date of Procedure: 09/26/21  Location: [place]   Caller (Please ask for phone number if not scheduled by patient): india dorado      Sedation Type: mac  Conscious Sedation- Needs  for 6 hours after the procedure  MAC/General-Needs  for 24 hours after procedure    Pre-op  Required at [facility] and OR for MAC sedation:   (if yes advise patient they will need a pre-op prior to procedure)      Is patient on blood thinners? -no (If yes- inform patient to follow up with PCP or provider for follow up instructions)     Informed patient they will need an adult  yes  Cannot take any type of public or medical transportation alone    Informed Patient of COVID Test Requirement yes    Confirmed Nurse will call to complete assessment yes    Additional comments:

## 2021-08-24 NOTE — TELEPHONE ENCOUNTER
FUTURE VISIT INFORMATION      FUTURE VISIT INFORMATION:    Date: 11/30/21    Time: 1 PM with SLP    Location: [facility]  REFERRAL INFORMATION:    Referring provider:  Dr. Annette Painting    Referring providers clinic:  [facility]    Reason for visit/diagnosis: Lesion of Vocal Cord, Cough    RECORDS REQUESTED FROM:       Clinic name Comments Records Status Imaging Status   [facility] 8/17/21 - PCC OV with Dr. Painting [facility]    [facility] 8/8/18 - RHEUM OV with Dr. Lopez Epic    [facility] - Procedure 2/12/18 - PFT Epic    [facility] - Imaging 8/17/21 - XR Chest  8/8/18 - US Head Neck  8/8/18 - XR Chest [facility]   [facility] - Procedure 8/19/20 - PFT  6/11/19 - PFT 8/25 Sent to Scan    [facility] - Imaging 8/19/20 - CT Chest  11/26/18 - CTA Chest Care Everywhere 8/24 Req - In PACs           * 8/24/21 3:50 PM Faxed req to  for images to be pushed to [facility]. - Tatyana  * 8/25/21 2:38 PM Records received from  and sent to HIM to be scanned into the chart. - Tatyana

## 2021-08-26 DIAGNOSIS — J30.9 ATOPIC RHINITIS: ICD-10-CM

## 2021-08-26 RX ORDER — FLUTICASONE PROPIONATE 50 MCG
1-2 SPRAY, SUSPENSION (ML) NASAL DAILY
Qty: 16 G | Refills: 5 | Status: SHIPPED | OUTPATIENT
Start: 2021-08-26 | End: 2022-09-27

## 2021-09-01 DIAGNOSIS — Z11.59 ENCOUNTER FOR SCREENING FOR OTHER VIRAL DISEASES: ICD-10-CM

## 2021-09-16 ENCOUNTER — TRANSFERRED RECORDS (OUTPATIENT)
Dept: HEALTH INFORMATION MANAGEMENT | Facility: CLINIC | Age: 61
End: 2021-09-16
Payer: COMMERCIAL

## 2021-09-17 ENCOUNTER — OFFICE VISIT (OUTPATIENT)
Dept: FAMILY MEDICINE | Facility: CLINIC | Age: 61
End: 2021-09-17
Payer: COMMERCIAL

## 2021-09-17 VITALS
OXYGEN SATURATION: 99 % | RESPIRATION RATE: 16 BRPM | DIASTOLIC BLOOD PRESSURE: 70 MMHG | HEIGHT: 61 IN | TEMPERATURE: 99.2 F | SYSTOLIC BLOOD PRESSURE: 122 MMHG | WEIGHT: 138.56 LBS | BODY MASS INDEX: 26.16 KG/M2 | HEART RATE: 72 BPM

## 2021-09-17 DIAGNOSIS — D63.8 ANEMIA IN OTHER CHRONIC DISEASES CLASSIFIED ELSEWHERE: ICD-10-CM

## 2021-09-17 DIAGNOSIS — F41.1 GAD (GENERALIZED ANXIETY DISORDER): ICD-10-CM

## 2021-09-17 DIAGNOSIS — N18.1 CKD (CHRONIC KIDNEY DISEASE) STAGE 1, GFR 90 ML/MIN OR GREATER: ICD-10-CM

## 2021-09-17 DIAGNOSIS — I10 HYPERTENSION GOAL BP (BLOOD PRESSURE) < 140/90: ICD-10-CM

## 2021-09-17 DIAGNOSIS — F33.1 MODERATE EPISODE OF RECURRENT MAJOR DEPRESSIVE DISORDER (H): ICD-10-CM

## 2021-09-17 DIAGNOSIS — E87.1 HYPONATREMIA: ICD-10-CM

## 2021-09-17 DIAGNOSIS — M34.1 CREST (CALCINOSIS, RAYNAUD'S PHENOMENON, ESOPHAGEAL DYSFUNCTION, SCLERODACTYLY, TELANGIECTASIA) (H): ICD-10-CM

## 2021-09-17 DIAGNOSIS — Z12.11 COLON CANCER SCREENING: ICD-10-CM

## 2021-09-17 DIAGNOSIS — I21.4 NSTEMI (NON-ST ELEVATED MYOCARDIAL INFARCTION) (H): ICD-10-CM

## 2021-09-17 DIAGNOSIS — Z01.818 PREOP GENERAL PHYSICAL EXAM: Primary | ICD-10-CM

## 2021-09-17 DIAGNOSIS — Z23 ENCOUNTER FOR IMMUNIZATION: ICD-10-CM

## 2021-09-17 LAB
ANION GAP SERPL CALCULATED.3IONS-SCNC: 7 MMOL/L (ref 3–14)
BUN SERPL-MCNC: 28 MG/DL (ref 7–30)
CALCIUM SERPL-MCNC: 9.4 MG/DL (ref 8.5–10.1)
CHLORIDE BLD-SCNC: 98 MMOL/L (ref 94–109)
CO2 SERPL-SCNC: 25 MMOL/L (ref 20–32)
CREAT SERPL-MCNC: 0.94 MG/DL (ref 0.52–1.04)
GFR SERPL CREATININE-BSD FRML MDRD: 66 ML/MIN/1.73M2
GLUCOSE BLD-MCNC: 97 MG/DL (ref 70–99)
HGB BLD-MCNC: 11.3 G/DL (ref 11.7–15.7)
POTASSIUM BLD-SCNC: 4.1 MMOL/L (ref 3.4–5.3)
SODIUM SERPL-SCNC: 130 MMOL/L (ref 133–144)

## 2021-09-17 PROCEDURE — 90472 IMMUNIZATION ADMIN EACH ADD: CPT | Performed by: INTERNAL MEDICINE

## 2021-09-17 PROCEDURE — 80048 BASIC METABOLIC PNL TOTAL CA: CPT | Performed by: INTERNAL MEDICINE

## 2021-09-17 PROCEDURE — 90750 HZV VACC RECOMBINANT IM: CPT | Performed by: INTERNAL MEDICINE

## 2021-09-17 PROCEDURE — 36415 COLL VENOUS BLD VENIPUNCTURE: CPT | Performed by: INTERNAL MEDICINE

## 2021-09-17 PROCEDURE — 90471 IMMUNIZATION ADMIN: CPT | Performed by: INTERNAL MEDICINE

## 2021-09-17 PROCEDURE — 99213 OFFICE O/P EST LOW 20 MIN: CPT | Mod: 25 | Performed by: INTERNAL MEDICINE

## 2021-09-17 PROCEDURE — 90682 RIV4 VACC RECOMBINANT DNA IM: CPT | Performed by: INTERNAL MEDICINE

## 2021-09-17 PROCEDURE — 85018 HEMOGLOBIN: CPT | Performed by: INTERNAL MEDICINE

## 2021-09-17 ASSESSMENT — MIFFLIN-ST. JEOR: SCORE: 1130.9

## 2021-09-17 ASSESSMENT — PAIN SCALES - GENERAL: PAINLEVEL: NO PAIN (0)

## 2021-09-17 NOTE — H&P (VIEW-ONLY)
[address] [address]  Phone: [phone]  Primary Provider: Annette Wilks  Pre-op Performing Provider: ANNETTE WILKS      PREOPERATIVE EVALUATION:  Today's date: 9/17/2021    Marcelina Estevez is a 61 year old female who presents for a preoperative evaluation.    Surgical Information:  Surgery/Procedure: Colonoscopy  Surgery Location: [place]  Surgeon: Duke  Surgery Date: 9/29/2021  Time of Surgery: 10:40AM  Where patient plans to recover: At home with family  Fax number for surgical facility: Note does not need to be faxed, will be available electronically in Epic.    Type of Anesthesia Anticipated: to be determined    Assessment & Plan     The proposed surgical procedure is considered LOW risk.    Marcelina was seen today for pre-op exam.    Diagnoses and all orders for this visit:    Preop general physical exam  -     Hemoglobin; Future  -     Basic metabolic panel  (Ca, Cl, CO2, Creat, Gluc, K, Na, BUN); Future  -     Basic metabolic panel  (Ca, Cl, CO2, Creat, Gluc, K, Na, BUN)  -     Hemoglobin    Colon cancer screening    Encounter for immunization  -     Cancel: INFLUENZA VACCINE IM > 6 MONTHS VALENT IIV4 (AFLURIA/FLUZONE)  -     ZOSTER VACCINE RECOMBINANT ADJUVANTED IM NJX    Anemia in other chronic diseases classified elsewhere  Comments:  Hemoglobin is stable  Orders:  -     Hemoglobin; Future  -     Basic metabolic panel  (Ca, Cl, CO2, Creat, Gluc, K, Na, BUN); Future  -     Basic metabolic panel  (Ca, Cl, CO2, Creat, Gluc, K, Na, BUN)  -     Hemoglobin    CKD (chronic kidney disease) stage 1, GFR 90 ml/min or greater  -     Hemoglobin; Future  -     Basic metabolic panel  (Ca, Cl, CO2, Creat, Gluc, K, Na, BUN); Future  -     Basic metabolic panel  (Ca, Cl, CO2, Creat, Gluc, K, Na, BUN)  -     Hemoglobin    CREST (calcinosis, Raynaud's phenomenon, esophageal dysfunction, sclerodactyly, telangiectasia) (H)  Comments:  Stable.  Follows with  rheumatology    Hypertension goal BP (blood pressure) < 140/90  -     Basic metabolic panel  (Ca, Cl, CO2, Creat, Gluc, K, Na, BUN); Future  -     Basic metabolic panel  (Ca, Cl, CO2, Creat, Gluc, K, Na, BUN)    Moderate episode of recurrent major depressive disorder (H)    MARCELINA (generalized anxiety disorder)    NSTEMI (non-ST elevated myocardial infarction) (H)  Comments:  From small vessel ischemia, no obstructive coronary artery disease, on nifedipine.    Hyponatremia  Comments:  At baseline, chronic.  Stable.    Other orders  -     INFLUENZA QUAD, PF (RIV4) (FLUBLOK)         Risks and Recommendations:  The patient has the following additional risks and recommendations for perioperative complications:   - No identified additional risk factors other than previously addressed    Medication Instructions:  Patient is to take all scheduled medications on the day of surgery    RECOMMENDATION:  APPROVAL GIVEN to proceed with proposed procedure, without further diagnostic evaluation.        Subjective     HPI related to upcoming procedure: Patient is scheduled for colonoscopy under sedation      Preop Questions 9/17/2021   1. Have you ever had a heart attack or stroke? No   2. Have you ever had surgery on your heart or blood vessels, such as a stent placement, a coronary artery bypass, or surgery on an artery in your head, neck, heart, or legs? No   3. Do you have chest pain with activity? No   4. Do you have a history of  heart failure? No   5. Do you currently have a cold, bronchitis or symptoms of other infection? No   6. Do you have a cough, shortness of breath, or wheezing? No   7. Do you or anyone in your family have previous history of blood clots? No   8. Do you or does anyone in your family have a serious bleeding problem such as prolonged bleeding following surgeries or cuts? No   9. Have you ever had problems with anemia or been told to take iron pills? No   10. Have you had any abnormal blood loss such as  black, tarry or bloody stools, or abnormal vaginal bleeding? No     Health Care Directive:  Patient does not have a Health Care Directive or Living Will: Discussed advance care planning with patient; information given to patient to review.    Preoperative Review of :   reviewed - controlled substances reflected in medication list.    Marcelina has Hypertension goal BP (blood pressure) < 140/90; Moderate episode of recurrent major depressive disorder (H); CKD (chronic kidney disease) stage 1, GFR 90 ml/min or greater; Anemia in other chronic diseases classified elsewhere; MARCELINA (generalized anxiety disorder); Raynaud's disease without gangrene; CREST (calcinosis, Raynaud's phenomenon, esophageal dysfunction, sclerodactyly, telangiectasia) (H); and NSTEMI (non-ST elevated myocardial infarction) (H) on their pertinent problem list.     These chronic health issues are stable    Review of Systems   Hematological: Bruises/bleeds easily: thisivs.     ROS:  Constitutional, HEENT, cardiovascular, pulmonary, gi and gu systems are negative, except as otherwise noted.     Patient Active Problem List    Diagnosis Date Noted     NSTEMI (non-ST elevated myocardial infarction) (H) 12/05/2018     Priority: High     From small vessel ischemia.  No stents.  On nifedipine.       CREST (calcinosis, Raynaud's phenomenon, esophageal dysfunction, sclerodactyly, telangiectasia) (H)      Priority: High     Raynaud's disease without gangrene 01/29/2018     Priority: High     MARCELINA (generalized anxiety disorder) 01/08/2018     Priority: High     Anemia in other chronic diseases classified elsewhere 01/09/2015     Priority: High     B12, TSH, Iron study normal in 2018       CKD (chronic kidney disease) stage 1, GFR 90 ml/min or greater 06/17/2013     Priority: High     Moderate episode of recurrent major depressive disorder (H) 09/10/2012     Priority: High     Celexa not helping, fluoxetine caused rash.        Hypertension goal BP (blood  pressure) < 140/90 01/18/2011     Priority: High     Panic attack 04/20/2021     Priority: Medium     Atrophic vaginitis 04/20/2021     Priority: Medium     Marijuana use, episodic 09/10/2018     Priority: Medium     Limited systemic sclerosis (H)      Priority: Medium     Tension headache 09/28/2017     Priority: Medium     Patient is followed by Annette Painting MD PhD for ongoing prescription of pain medication.  All refills should only be approved by this provider, or covering partner.    Medication(s): Tylenol with codeine (T#3).   Maximum quantity per month: 15  Clinic visit frequency required: Q 6  months     Controlled substance agreement:  Encounter-Level CSA - 08/07/2017:          Controlled Substance Agreement - Scan on 8/16/2017 10:21 AM : CONTROLLED SUBSTANCE AGREEMENT (below)              Pain Clinic evaluation in the past: No    DIRE Total Score(s):  No flowsheet data found.    Last [facility] website verification:  none   [email or web address]       Status post total hysterectomy 11/18/2014     Priority: Medium     Allergic rhinitis due to pollen 08/02/2014     Priority: Medium     Menopausal syndrome (hot flashes) 12/19/2013     Priority: Medium     Hyperlipidemia LDL goal <100 06/17/2013     Priority: Medium     ACP (advance care planning) 08/19/2015     Priority: Low     Advance Care Planning 8/19/2015: ACP Review and Resources Provided:  Reviewed chart for advance care plan.  Marcelina ROQUE Estevez has no plan or code status on file however states presence of ACP document. Copy requested.  Confirmed/documented legally designated decision maker(s). Added by Mary Padilla RN Advance Care Planning Liaison with [employer]           Keratitis sicca, bilateral (H) 06/10/2013     Priority: Low     Hypertriglyceridemia 01/18/2011     Priority: Low     Controlled with simvastatin.        Night sweats 01/18/2011     Priority: Low     Worse with menstruation. On Clonidine.        Atopic rhinitis 01/18/2011      Priority: Low     (Problem list name updated by automated process. Provider to review and confirm.)        Past Medical History:   Diagnosis Date     CREST (calcinosis, Raynaud's phenomenon, esophageal dysfunction, sclerodactyly, telangiectasia) (H)      Hyperlipidemia      Hypertension      Limited systemic sclerosis (H)      Positive MINDY (antinuclear antibody)      Raynaud disease      Past Surgical History:   Procedure Laterality Date     APPENDECTOMY       BIOPSY      cervical node     COLONOSCOPY WITH CO2 INSUFFLATION N/A 10/26/2020    Procedure: COLONOSCOPY, WITH CO2 INSUFFLATION;  Surgeon: Phyllis Chaudhari MD;  Location: [facility] OR     DILATION AND CURETTAGE, HYSTEROSCOPY DIAGNOSTIC, COMBINED  7/1/2014    Procedure: COMBINED DILATION AND CURETTAGE, HYSTEROSCOPY DIAGNOSTIC;  Surgeon: Mana Cabrera DO;  Location: [facility] OR     ENT SURGERY      tonsillectomy and adnoid removal     HYSTERECTOMY TOTAL ABDOMINAL       ORTHOPEDIC SURGERY      left knee tear meniscus     RELEASE CARPAL TUNNEL BILATERAL  2009     Current Outpatient Medications   Medication Sig Dispense Refill     albuterol (PROAIR HFA) 108 (90 Base) MCG/ACT inhaler INHALE 2 PUFS BY MOUTH EVERY 6 HOURS AS NEEDED FOR SHORTNESS OF BREATH / DYSPNEA 8.5 g 3     ALLEGRA ALLERGY 180 MG tablet Take 1 tablet (180 mg) by mouth daily 90 tablet 3     buPROPion (WELLBUTRIN XL) 150 MG 24 hr tablet Take 1 tablet (150 mg) by mouth every morning 90 tablet 0     diclofenac (VOLTAREN) 1 % topical gel Apply 2 g topically 4 times daily to hands 1 Tube 1     estradiol (ESTRACE) 0.1 MG/GM vaginal cream Place 2 g vaginally twice a week 42.5 g 3     FLUoxetine (PROZAC) 20 MG capsule Take 1 capsule (20 mg) by mouth daily 90 capsule 0     FLUoxetine (PROZAC) 40 MG capsule Take 1 capsule (40 mg) by mouth daily 90 capsule 1     fluticasone (FLONASE) 50 MCG/ACT nasal spray Spray 1-2 sprays into both nostrils daily 16 g 5     hydrochlorothiazide (HYDRODIURIL) 25 MG tablet Take 1  tablet (25 mg) by mouth daily 90 tablet 2     hydroxychloroquine (PLAQUENIL) 200 MG tablet Take 400 mg on // and 200 mg on rest of the days. (Patient taking differently: 1.5 tabs daily) 120 tablet 1     isosorbide mononitrate (IMDUR) 30 MG 24 hr tablet Take 1 tablet (30 mg) by mouth daily 90 tablet 2     LORazepam (ATIVAN) 0.5 MG tablet Take 1 tablet (0.5 mg) by mouth daily as needed (panic attack) 30 tablet 1     losartan (COZAAR) 50 MG tablet Take 1 tablet (50 mg) by mouth daily 90 tablet 3     NIFEdipine ER OSMOTIC (PROCARDIA XL) 30 MG 24 hr tablet Take 1 tablet (30 mg) by mouth daily 90 tablet 2     omeprazole (PRILOSEC) 40 MG DR capsule Take 1 capsule (40 mg) by mouth daily 90 capsule 2     order for DME Equipment being ordered: light lamp for seasonal affective disorder 1 Device 0     simvastatin (ZOCOR) 40 MG tablet TAKE 1 TABLET (40MG) BY MOUTH EVERY NIGHT AT BEDTIME 90 tablet 3     VITAMIN D, CHOLECALCIFEROL, PO Take 2,000 Units by mouth daily         Allergies   Allergen Reactions     Seasonal Allergies      Sulfa Drugs      Vomiting       Vicodin [Hydrocodone-Acetaminophen] Nausea        Social History     Tobacco Use     Smoking status: Former Smoker     Quit date: 2001     Years since quittin.7     Smokeless tobacco: Never Used   Substance Use Topics     Alcohol use: Yes     Comment: social     Family History   Problem Relation Age of Onset     Osteoporosis Mother      Eye Disorder Mother         cataract, mac degen     Osteoporosis Father      Eye Disorder Father         glaucoma     Hypertension Maternal Grandmother      Unknown/Adopted Paternal Grandfather      Eye Disorder Paternal Grandmother         glaucoma     Hypertension Daughter      Diabetes Maternal Grandfather      Diabetes Other      Glaucoma No family hx of      Macular Degeneration No family hx of      History   Drug Use No         Objective     /70   Pulse 72   Temp 99.2  F (37.3  C) (Tympanic)   Resp 16   Ht  "1.549 m (5' 1\")   Wt 62.9 kg (138 lb 9 oz)   LMP 06/12/2014   SpO2 99%   BMI 26.18 kg/m      Physical Exam  GENERAL APPEARANCE: healthy, alert and no distress  HENT: ear canals and TM's normal and nose and mouth without ulcers or lesions  RESP: lungs clear to auscultation - no rales, rhonchi or wheezes  CV: regular rate and rhythm, normal S1 S2, no S3 or S4 and no murmur, click or rub   ABDOMEN: soft, nontender, no HSM or masses and bowel sounds normal  MS: extremities normal- no gross deformities noted  NEURO: Normal strength and tone, sensory exam grossly normal, mentation intact and speech normal    Recent Labs   Lab Test 08/11/21  1040 04/19/21  1015   HGB  --  11.6*   PLT  --  236   * 132*   POTASSIUM 4.2 4.0   CR 0.93 0.94        Diagnostics:  Results for orders placed or performed in visit on 09/17/21   Basic metabolic panel  (Ca, Cl, CO2, Creat, Gluc, K, Na, BUN)     Status: Abnormal   Result Value Ref Range    Sodium 130 (L) 133 - 144 mmol/L    Potassium 4.1 3.4 - 5.3 mmol/L    Chloride 98 94 - 109 mmol/L    Carbon Dioxide (CO2) 25 20 - 32 mmol/L    Anion Gap 7 3 - 14 mmol/L    Urea Nitrogen 28 7 - 30 mg/dL    Creatinine 0.94 0.52 - 1.04 mg/dL    Calcium 9.4 8.5 - 10.1 mg/dL    Glucose 97 70 - 99 mg/dL    GFR Estimate 66 >60 mL/min/1.73m2   Hemoglobin     Status: Abnormal   Result Value Ref Range    Hemoglobin 11.3 (L) 11.7 - 15.7 g/dL         No EKG required for low risk surgery (cataract, skin procedure, breast biopsy, etc).    ECHOCARDIOGRAM TRANSTHORACIC ADULT WITH OR WITHOUT CONTRAST      Other Result Text    Tony Brock MD - 06/08/2021 12:00 AM CDT   Formatting of this note is different from the original.   Page Two   06/08/2021   Marcelina Estevez   [id number]   ECHOCARDIOGRAM           PERFORMED BY:   [place], [place] SITE:   [place], [place] INTERPRETED BY:   [facility] [place], [place]       TRANSTHORACIC " ECHOCARDIOGRAM REPORT     REFERRING DIAGNOSIS:    Scleroderma.     PROCEDURE:  Transthoracic, two-dimensional, and M-mode echocardiography was performed from the parasternal, apical, and subcostal windows.  Simultaneous pulsed-wave, continuous wave and color flow Doppler was performed.       IMAGE QUALITY:    Good.    ECG RHYTHM:    Sinus.     RESULTS   LEFT ATRIUM:    Normal.     MITRAL VALVE:    Structurally normal with trace regurgitation.     LEFT VENTRICLE:    Normal in size. Systolic function is normal.  Left ventricular ejection fraction is 60%. No wall motion abnormalities. There is borderline left ventricular hypertrophy with grade I diastolic dysfunction.     AORTIC VALVE:    Normal with no regurgitation or stenosis.     AORTA:    Ascending aorta is borderline enlarged at the level of the sinuses at 3.8 cm.       RIGHT ATRIUM:    Normal.     TRICUSPID VALVE:    Structurally normal with trivial regurgitation. Unable to estimate right ventricular systolic pressure.     RIGHT VENTRICLE:    Size and function are normal.     PULMONIC VALVE:    Normal.     PERICARDIUM:    No effusion.     ADDITIONAL COMMENTS:    There is no color flow Doppler evidence of intracardiac shunt. No mass or thrombus is seen.       CONCLUSION:   1. Normal left ventricular size and systolic function without any wall motion abnormalities.  Ejection fraction is 60%.     2. Borderline left ventricular hypertrophy with grade I diastolic dysfunction.       3. Borderline enlarged aortic sinuses.     4. No clinically significant valve disease.     5. Unable to estimate right ventricular systolic pressure on the current study.       6. No study for comparison.       Note:    This study was performed by the staff at the [facility] [place] [facility] for [facility] in [place].  Only the interpretation was performed at the [facility].         Tony Brock MD   Cardiologist     D:  06/08/2021, 04:50 P T:  06/09/2021 01:55 P     Revised Cardiac Risk Index (RCRI):  The patient has the following serious cardiovascular risks for perioperative complications:   - No serious cardiac risks = 0 points     RCRI Interpretation: 0 points: Class I (very low risk - 0.4% complication rate)           Signed Electronically by: Annette Painting MD PhD  Copy of this evaluation report is provided to requesting physician.

## 2021-09-17 NOTE — PROGRESS NOTES
[address] [address]  Phone: [phone]  Primary Provider: nAnette Wilks  Pre-op Performing Provider: ANNETTE WILKS      PREOPERATIVE EVALUATION:  Today's date: 9/17/2021    Marcelina Estevez is a 61 year old female who presents for a preoperative evaluation.    Surgical Information:  Surgery/Procedure: Colonoscopy  Surgery Location: [place]  Surgeon: [facility]  Surgery Date: 9/29/2021  Time of Surgery: 10:40AM  Where patient plans to recover: At home with family  Fax number for surgical facility: Note does not need to be faxed, will be available electronically in Epic.    Type of Anesthesia Anticipated: to be determined    Assessment & Plan     The proposed surgical procedure is considered LOW risk.    Marcelina was seen today for pre-op exam.    Diagnoses and all orders for this visit:    Preop general physical exam  -     Hemoglobin; Future  -     Basic metabolic panel  (Ca, Cl, CO2, Creat, Gluc, K, Na, BUN); Future  -     Basic metabolic panel  (Ca, Cl, CO2, Creat, Gluc, K, Na, BUN)  -     Hemoglobin    Colon cancer screening    Encounter for immunization  -     Cancel: INFLUENZA VACCINE IM > 6 MONTHS VALENT IIV4 (AFLURIA/FLUZONE)  -     ZOSTER VACCINE RECOMBINANT ADJUVANTED IM NJX    Anemia in other chronic diseases classified elsewhere  Comments:  Hemoglobin is stable  Orders:  -     Hemoglobin; Future  -     Basic metabolic panel  (Ca, Cl, CO2, Creat, Gluc, K, Na, BUN); Future  -     Basic metabolic panel  (Ca, Cl, CO2, Creat, Gluc, K, Na, BUN)  -     Hemoglobin    CKD (chronic kidney disease) stage 1, GFR 90 ml/min or greater  -     Hemoglobin; Future  -     Basic metabolic panel  (Ca, Cl, CO2, Creat, Gluc, K, Na, BUN); Future  -     Basic metabolic panel  (Ca, Cl, CO2, Creat, Gluc, K, Na, BUN)  -     Hemoglobin    CREST (calcinosis, Raynaud's phenomenon, esophageal dysfunction, sclerodactyly, telangiectasia) (H)  Comments:  Stable.  Follows with  rheumatology    Hypertension goal BP (blood pressure) < 140/90  -     Basic metabolic panel  (Ca, Cl, CO2, Creat, Gluc, K, Na, BUN); Future  -     Basic metabolic panel  (Ca, Cl, CO2, Creat, Gluc, K, Na, BUN)    Moderate episode of recurrent major depressive disorder (H)    MARCELINA (generalized anxiety disorder)    NSTEMI (non-ST elevated myocardial infarction) (H)  Comments:  From small vessel ischemia, no obstructive coronary artery disease, on nifedipine.    Hyponatremia  Comments:  At baseline, chronic.  Stable.    Other orders  -     INFLUENZA QUAD, PF (RIV4) (FLUBLOK)         Risks and Recommendations:  The patient has the following additional risks and recommendations for perioperative complications:   - No identified additional risk factors other than previously addressed    Medication Instructions:  Patient is to take all scheduled medications on the day of surgery    RECOMMENDATION:  APPROVAL GIVEN to proceed with proposed procedure, without further diagnostic evaluation.        Subjective     HPI related to upcoming procedure: Patient is scheduled for colonoscopy under sedation      Preop Questions 9/17/2021   1. Have you ever had a heart attack or stroke? No   2. Have you ever had surgery on your heart or blood vessels, such as a stent placement, a coronary artery bypass, or surgery on an artery in your head, neck, heart, or legs? No   3. Do you have chest pain with activity? No   4. Do you have a history of  heart failure? No   5. Do you currently have a cold, bronchitis or symptoms of other infection? No   6. Do you have a cough, shortness of breath, or wheezing? No   7. Do you or anyone in your family have previous history of blood clots? No   8. Do you or does anyone in your family have a serious bleeding problem such as prolonged bleeding following surgeries or cuts? No   9. Have you ever had problems with anemia or been told to take iron pills? No   10. Have you had any abnormal blood loss such as  black, tarry or bloody stools, or abnormal vaginal bleeding? No     Health Care Directive:  Patient does not have a Health Care Directive or Living Will: Discussed advance care planning with patient; information given to patient to review.    Preoperative Review of :   reviewed - controlled substances reflected in medication list.    Marcelina has Hypertension goal BP (blood pressure) < 140/90; Moderate episode of recurrent major depressive disorder (H); CKD (chronic kidney disease) stage 1, GFR 90 ml/min or greater; Anemia in other chronic diseases classified elsewhere; MARCELINA (generalized anxiety disorder); Raynaud's disease without gangrene; CREST (calcinosis, Raynaud's phenomenon, esophageal dysfunction, sclerodactyly, telangiectasia) (H); and NSTEMI (non-ST elevated myocardial infarction) (H) on their pertinent problem list.     These chronic health issues are stable    Review of Systems   Hematological: Bruises/bleeds easily: thisivs.     ROS:  Constitutional, HEENT, cardiovascular, pulmonary, gi and gu systems are negative, except as otherwise noted.     Patient Active Problem List    Diagnosis Date Noted     NSTEMI (non-ST elevated myocardial infarction) (H) 12/05/2018     Priority: High     From small vessel ischemia.  No stents.  On nifedipine.       CREST (calcinosis, Raynaud's phenomenon, esophageal dysfunction, sclerodactyly, telangiectasia) (H)      Priority: High     Raynaud's disease without gangrene 01/29/2018     Priority: High     MARCELINA (generalized anxiety disorder) 01/08/2018     Priority: High     Anemia in other chronic diseases classified elsewhere 01/09/2015     Priority: High     B12, TSH, Iron study normal in 2018       CKD (chronic kidney disease) stage 1, GFR 90 ml/min or greater 06/17/2013     Priority: High     Moderate episode of recurrent major depressive disorder (H) 09/10/2012     Priority: High     Celexa not helping, fluoxetine caused rash.        Hypertension goal BP (blood  pressure) < 140/90 01/18/2011     Priority: High     Panic attack 04/20/2021     Priority: Medium     Atrophic vaginitis 04/20/2021     Priority: Medium     Marijuana use, episodic 09/10/2018     Priority: Medium     Limited systemic sclerosis (H)      Priority: Medium     Tension headache 09/28/2017     Priority: Medium     Patient is followed by Annette Painting MD PhD for ongoing prescription of pain medication.  All refills should only be approved by this provider, or covering partner.    Medication(s): Tylenol with codeine (T#3).   Maximum quantity per month: 15  Clinic visit frequency required: Q 6  months     Controlled substance agreement:  Encounter-Level CSA - 08/07/2017:          Controlled Substance Agreement - Scan on 8/16/2017 10:21 AM : CONTROLLED SUBSTANCE AGREEMENT (below)              Pain Clinic evaluation in the past: No    DIRE Total Score(s):  No flowsheet data found.    Last [facility] website verification:  none   [email or web address]       Status post total hysterectomy 11/18/2014     Priority: Medium     Allergic rhinitis due to pollen 08/02/2014     Priority: Medium     Menopausal syndrome (hot flashes) 12/19/2013     Priority: Medium     Hyperlipidemia LDL goal <100 06/17/2013     Priority: Medium     ACP (advance care planning) 08/19/2015     Priority: Low     Advance Care Planning 8/19/2015: ACP Review and Resources Provided:  Reviewed chart for advance care plan.  Marcelina ROQUE Estevez has no plan or code status on file however states presence of ACP document. Copy requested.  Confirmed/documented legally designated decision maker(s). Added by Mary Padilla RN Advance Care Planning Liaison with [employer]           Keratitis sicca, bilateral (H) 06/10/2013     Priority: Low     Hypertriglyceridemia 01/18/2011     Priority: Low     Controlled with simvastatin.        Night sweats 01/18/2011     Priority: Low     Worse with menstruation. On Clonidine.        Atopic rhinitis 01/18/2011      Priority: Low     (Problem list name updated by automated process. Provider to review and confirm.)        Past Medical History:   Diagnosis Date     CREST (calcinosis, Raynaud's phenomenon, esophageal dysfunction, sclerodactyly, telangiectasia) (H)      Hyperlipidemia      Hypertension      Limited systemic sclerosis (H)      Positive MINDY (antinuclear antibody)      Raynaud disease      Past Surgical History:   Procedure Laterality Date     APPENDECTOMY       BIOPSY      cervical node     COLONOSCOPY WITH CO2 INSUFFLATION N/A 10/26/2020    Procedure: COLONOSCOPY, WITH CO2 INSUFFLATION;  Surgeon: Phyllis Chaudhari MD;  Location: [facility] OR     DILATION AND CURETTAGE, HYSTEROSCOPY DIAGNOSTIC, COMBINED  7/1/2014    Procedure: COMBINED DILATION AND CURETTAGE, HYSTEROSCOPY DIAGNOSTIC;  Surgeon: Mana Cabrera DO;  Location: [facility] OR     ENT SURGERY      tonsillectomy and adnoid removal     HYSTERECTOMY TOTAL ABDOMINAL       ORTHOPEDIC SURGERY      left knee tear meniscus     RELEASE CARPAL TUNNEL BILATERAL  2009     Current Outpatient Medications   Medication Sig Dispense Refill     albuterol (PROAIR HFA) 108 (90 Base) MCG/ACT inhaler INHALE 2 PUFS BY MOUTH EVERY 6 HOURS AS NEEDED FOR SHORTNESS OF BREATH / DYSPNEA 8.5 g 3     ALLEGRA ALLERGY 180 MG tablet Take 1 tablet (180 mg) by mouth daily 90 tablet 3     buPROPion (WELLBUTRIN XL) 150 MG 24 hr tablet Take 1 tablet (150 mg) by mouth every morning 90 tablet 0     diclofenac (VOLTAREN) 1 % topical gel Apply 2 g topically 4 times daily to hands 1 Tube 1     estradiol (ESTRACE) 0.1 MG/GM vaginal cream Place 2 g vaginally twice a week 42.5 g 3     FLUoxetine (PROZAC) 20 MG capsule Take 1 capsule (20 mg) by mouth daily 90 capsule 0     FLUoxetine (PROZAC) 40 MG capsule Take 1 capsule (40 mg) by mouth daily 90 capsule 1     fluticasone (FLONASE) 50 MCG/ACT nasal spray Spray 1-2 sprays into both nostrils daily 16 g 5     hydrochlorothiazide (HYDRODIURIL) 25 MG tablet Take 1  tablet (25 mg) by mouth daily 90 tablet 2     hydroxychloroquine (PLAQUENIL) 200 MG tablet Take 400 mg on // and 200 mg on rest of the days. (Patient taking differently: 1.5 tabs daily) 120 tablet 1     isosorbide mononitrate (IMDUR) 30 MG 24 hr tablet Take 1 tablet (30 mg) by mouth daily 90 tablet 2     LORazepam (ATIVAN) 0.5 MG tablet Take 1 tablet (0.5 mg) by mouth daily as needed (panic attack) 30 tablet 1     losartan (COZAAR) 50 MG tablet Take 1 tablet (50 mg) by mouth daily 90 tablet 3     NIFEdipine ER OSMOTIC (PROCARDIA XL) 30 MG 24 hr tablet Take 1 tablet (30 mg) by mouth daily 90 tablet 2     omeprazole (PRILOSEC) 40 MG DR capsule Take 1 capsule (40 mg) by mouth daily 90 capsule 2     order for DME Equipment being ordered: light lamp for seasonal affective disorder 1 Device 0     simvastatin (ZOCOR) 40 MG tablet TAKE 1 TABLET (40MG) BY MOUTH EVERY NIGHT AT BEDTIME 90 tablet 3     VITAMIN D, CHOLECALCIFEROL, PO Take 2,000 Units by mouth daily         Allergies   Allergen Reactions     Seasonal Allergies      Sulfa Drugs      Vomiting       Vicodin [Hydrocodone-Acetaminophen] Nausea        Social History     Tobacco Use     Smoking status: Former Smoker     Quit date: 2001     Years since quittin.7     Smokeless tobacco: Never Used   Substance Use Topics     Alcohol use: Yes     Comment: social     Family History   Problem Relation Age of Onset     Osteoporosis Mother      Eye Disorder Mother         cataract, mac degen     Osteoporosis Father      Eye Disorder Father         glaucoma     Hypertension Maternal Grandmother      Unknown/Adopted Paternal Grandfather      Eye Disorder Paternal Grandmother         glaucoma     Hypertension Daughter      Diabetes Maternal Grandfather      Diabetes Other      Glaucoma No family hx of      Macular Degeneration No family hx of      History   Drug Use No         Objective     /70   Pulse 72   Temp 99.2  F (37.3  C) (Tympanic)   Resp 16   Ht  "1.549 m (5' 1\")   Wt 62.9 kg (138 lb 9 oz)   LMP 06/12/2014   SpO2 99%   BMI 26.18 kg/m      Physical Exam  GENERAL APPEARANCE: healthy, alert and no distress  HENT: ear canals and TM's normal and nose and mouth without ulcers or lesions  RESP: lungs clear to auscultation - no rales, rhonchi or wheezes  CV: regular rate and rhythm, normal S1 S2, no S3 or S4 and no murmur, click or rub   ABDOMEN: soft, nontender, no HSM or masses and bowel sounds normal  MS: extremities normal- no gross deformities noted  NEURO: Normal strength and tone, sensory exam grossly normal, mentation intact and speech normal    Recent Labs   Lab Test 08/11/21  1040 04/19/21  1015   HGB  --  11.6*   PLT  --  236   * 132*   POTASSIUM 4.2 4.0   CR 0.93 0.94        Diagnostics:  Results for orders placed or performed in visit on 09/17/21   Basic metabolic panel  (Ca, Cl, CO2, Creat, Gluc, K, Na, BUN)     Status: Abnormal   Result Value Ref Range    Sodium 130 (L) 133 - 144 mmol/L    Potassium 4.1 3.4 - 5.3 mmol/L    Chloride 98 94 - 109 mmol/L    Carbon Dioxide (CO2) 25 20 - 32 mmol/L    Anion Gap 7 3 - 14 mmol/L    Urea Nitrogen 28 7 - 30 mg/dL    Creatinine 0.94 0.52 - 1.04 mg/dL    Calcium 9.4 8.5 - 10.1 mg/dL    Glucose 97 70 - 99 mg/dL    GFR Estimate 66 >60 mL/min/1.73m2   Hemoglobin     Status: Abnormal   Result Value Ref Range    Hemoglobin 11.3 (L) 11.7 - 15.7 g/dL         No EKG required for low risk surgery (cataract, skin procedure, breast biopsy, etc).    ECHOCARDIOGRAM TRANSTHORACIC ADULT WITH OR WITHOUT CONTRAST      Other Result Text    Tony Brock MD - 06/08/2021 12:00 AM CDT   Formatting of this note is different from the original.   Page Two   06/08/2021   Marcelina Estevez   10-40-35-15   ECHOCARDIOGRAM           PERFORMED BY:   [place], [place] SITE:   [place], [place] INTERPRETED BY:   [facility] [place], [place]       TRANSTHORACIC " ECHOCARDIOGRAM REPORT     REFERRING DIAGNOSIS:    Scleroderma.     PROCEDURE:  Transthoracic, two-dimensional, and M-mode echocardiography was performed from the parasternal, apical, and subcostal windows.  Simultaneous pulsed-wave, continuous wave and color flow Doppler was performed.       IMAGE QUALITY:    Good.    ECG RHYTHM:    Sinus.     RESULTS   LEFT ATRIUM:    Normal.     MITRAL VALVE:    Structurally normal with trace regurgitation.     LEFT VENTRICLE:    Normal in size. Systolic function is normal.  Left ventricular ejection fraction is 60%. No wall motion abnormalities. There is borderline left ventricular hypertrophy with grade I diastolic dysfunction.     AORTIC VALVE:    Normal with no regurgitation or stenosis.     AORTA:    Ascending aorta is borderline enlarged at the level of the sinuses at 3.8 cm.       RIGHT ATRIUM:    Normal.     TRICUSPID VALVE:    Structurally normal with trivial regurgitation. Unable to estimate right ventricular systolic pressure.     RIGHT VENTRICLE:    Size and function are normal.     PULMONIC VALVE:    Normal.     PERICARDIUM:    No effusion.     ADDITIONAL COMMENTS:    There is no color flow Doppler evidence of intracardiac shunt. No mass or thrombus is seen.       CONCLUSION:   1. Normal left ventricular size and systolic function without any wall motion abnormalities.  Ejection fraction is 60%.     2. Borderline left ventricular hypertrophy with grade I diastolic dysfunction.       3. Borderline enlarged aortic sinuses.     4. No clinically significant valve disease.     5. Unable to estimate right ventricular systolic pressure on the current study.       6. No study for comparison.       Note:    This study was performed by the staff at the [facility] [place] [facility] for [facility] in [place].  Only the interpretation was performed at the [facility].         Tony Brock MD   Cardiologist     D:  06/08/2021, 04:50 P T:  06/09/2021 01:55 P     Revised Cardiac Risk Index (RCRI):  The patient has the following serious cardiovascular risks for perioperative complications:   - No serious cardiac risks = 0 points     RCRI Interpretation: 0 points: Class I (very low risk - 0.4% complication rate)           Signed Electronically by: Annette Painting MD PhD  Copy of this evaluation report is provided to requesting physician.

## 2021-09-17 NOTE — PATIENT INSTRUCTIONS
.ask the mammogram facility to send us a copy of your most recent mammogram report.    Daily calcium recommendation [id number] mg, vitamin D [id number] international unit(s).     Return if new or worsening symptoms.       Before your surgery:  10 days before: stop all over the counter supplements  1 week before: stop aspirin if you are taking aspirin.  2 days before: stop ibuprofen, naproxen or similar antiinflammatory medications. You may use Tylenol for pain or headache.     On the day of your surgery:  You may take scheduled with the smallest amount of water possible.     After surgery:   You may resume all your medications after the surgery unless your surgeon instructs you otherwise.     Preparing for Your Surgery  Getting started  A nurse will call you to review your health history and instructions. They will give you an arrival time based on your scheduled surgery time.  Please be ready to share the following:    Your doctor's clinic name and phone number    Your medical, surgical and anesthesia history    A list of allergies and sensitivities    A list of medicines, including herbal treatments and over-the-counter drugs    Whether the patient has a legal guardian (ask how to send us the papers in advance)  If you have a child who's having surgery, please ask for a copy of Preparing for Your Child's Surgery.    Preparing for surgery    Within 30 days of surgery: Have a pre-op exam (sometimes called an H&P, or History and Physical). This can be done at a clinic or pre-operative center.  ? If you're having a , you may not need this exam. Talk to your care team    At your pre-op exam, talk to your care team about all medicines you take. If you need to stop any medicines before surgery, ask when to start taking them again.  ? We do this for your safety. Many medicines can make you bleed too much during surgery. Some change how well surgery (anesthesia) drugs work.    Call your insurance company to let  them know you're having surgery. (If you don't have insurance, call [phone].)    Call your clinic if there's any change in your health. This includes signs of a cold or flu (sore throat, runny nose, cough, rash, fever). It also includes a scrape or scratch near the surgery site.    If you have questions on the day of surgery, call your hospital or surgery center.  Eating and drinking guidelines  For your safety: Unless your surgeon tells you otherwise, follow the guidelines below.    Eat and drink as usual until 8 hours before surgery. After that, no food or milk.    Drink clear liquids until 2 hours before surgery. These are liquids you can see through, like water, Gatorade and Propel Water. You may also have black coffee and tea (no cream or milk).    Nothing by mouth within 2 hours of surgery. This includes gum, candy and breath mints.    If you drink, stop drinking alcohol the night before surgery.    If your care team tells you to take medicine on the morning of surgery, it's okay to take it with a sip of water.  Preventing infection    Shower or bathe the night before and morning of your surgery. Follow the instructions your clinic gave you. (If no instructions, use regular soap.)    Don't shave or clip hair near your surgery site. We'll remove the hair if needed.    Don't smoke or vape the morning of surgery. You may chew nicotine gum up to 2 hours before surgery. A nicotine patch is okay.  ? Note: Some surgeries require you to completely quit smoking and nicotine. Check with your surgeon.    Your care team will make every effort to keep you safe from infection. We will:  ? Clean our hands often with soap and water (or an alcohol-based hand rub).  ? Clean the skin at your surgery site with a special soap that kills germs.  ? Give you a special gown to keep you warm. (Cold raises the risk of infection.)  ? Wear special hair covers, masks, gowns and gloves during surgery.  ? Give antibiotic medicine, if  prescribed. Not all surgeries need antibiotics.  What to bring on the day of surgery    Photo ID and insurance card    Copy of your health care directive, if you have one    Glasses and hearing aides (bring cases)  ? You can't wear contacts during surgery    Inhaler and eye drops, if you use them (tell us about these when you arrive)    CPAP machine or breathing device, if you use them    A few personal items, if spending the night    If you have . . .  ? A pacemaker or ICD (cardiac defibrillator): Bring the ID card.  ? An implanted stimulator: Bring the remote control.  ? A legal guardian: Bring a copy of the certified (court-stamped) guardianship papers.  Please remove any jewelry, including body piercings. Leave jewelry and other valuables at home.  If you're going home the day of surgery  Important: If you don't follow the rules below, we must cancel your surgery.     Arrange for someone to drive you home after surgery. You may not drive, take a taxi or take public transportation by yourself (unless you'll have local anesthesia only).    Arrange for a responsible adult to stay with you overnight. If you don't, we may keep you in the hospital overnight, and you may need to pay the costs yourself.  Questions?   If you have any questions for your care team, list them here: _________________________________________________________________________________________________________________________________________________________________________________________________________________________________________________________________________________________________________________________  For informational purposes only. Not to replace the advice of your health care provider. Copyright   2003, 2019 [employer]. All rights reserved. Clinically reviewed by Nolvia Patterson MD. [employer] [id number] - REV 4/20.

## 2021-09-18 NOTE — RESULT ENCOUNTER NOTE
Dear Marcelina,   Your recent test results showed the following:  -- hemoglobin is stable. Mild anemia from chronic disease.   -- electrolyte panel is normal except for sodium is slightly low as before. This is not at any danger level. Okay with surgery.   -- hydrochlorothiazide can cause low sodium. I recommend you reduce the dose of hydrochlorothiazide to 12.5 mg (half a tablet) daily.   -- if your blood pressure goes higher with this, please let me know. We can increase losartan or nifedipine.     Please call or Mychart to our office if you have further questions.     Annette Painting MD-PhD

## 2021-09-21 PROBLEM — N18.30 CHRONIC KIDNEY DISEASE, STAGE 3 (H): Status: RESOLVED | Noted: 2021-04-16 | Resolved: 2021-09-21

## 2021-09-25 ENCOUNTER — HEALTH MAINTENANCE LETTER (OUTPATIENT)
Age: 61
End: 2021-09-25

## 2021-09-26 ENCOUNTER — LAB (OUTPATIENT)
Dept: LAB | Facility: CLINIC | Age: 61
End: 2021-09-26
Payer: COMMERCIAL

## 2021-09-26 DIAGNOSIS — Z11.59 ENCOUNTER FOR SCREENING FOR OTHER VIRAL DISEASES: ICD-10-CM

## 2021-09-26 PROCEDURE — U0005 INFEC AGEN DETEC AMPLI PROBE: HCPCS

## 2021-09-26 PROCEDURE — U0003 INFECTIOUS AGENT DETECTION BY NUCLEIC ACID (DNA OR RNA); SEVERE ACUTE RESPIRATORY SYNDROME CORONAVIRUS 2 (SARS-COV-2) (CORONAVIRUS DISEASE [COVID-19]), AMPLIFIED PROBE TECHNIQUE, MAKING USE OF HIGH THROUGHPUT TECHNOLOGIES AS DESCRIBED BY CMS-2020-01-R: HCPCS

## 2021-09-27 LAB — SARS-COV-2 RNA RESP QL NAA+PROBE: NEGATIVE

## 2021-09-28 ENCOUNTER — ANESTHESIA EVENT (OUTPATIENT)
Dept: SURGERY | Facility: AMBULATORY SURGERY CENTER | Age: 61
End: 2021-09-28
Payer: COMMERCIAL

## 2021-09-29 ENCOUNTER — ANESTHESIA (OUTPATIENT)
Dept: SURGERY | Facility: AMBULATORY SURGERY CENTER | Age: 61
End: 2021-09-29
Payer: COMMERCIAL

## 2021-09-29 ENCOUNTER — HOSPITAL ENCOUNTER (OUTPATIENT)
Facility: AMBULATORY SURGERY CENTER | Age: 61
End: 2021-09-29
Attending: INTERNAL MEDICINE
Payer: COMMERCIAL

## 2021-09-29 VITALS
OXYGEN SATURATION: 98 % | HEART RATE: 61 BPM | DIASTOLIC BLOOD PRESSURE: 73 MMHG | SYSTOLIC BLOOD PRESSURE: 141 MMHG | RESPIRATION RATE: 16 BRPM | TEMPERATURE: 97.2 F

## 2021-09-29 VITALS — HEART RATE: 59 BPM

## 2021-09-29 DIAGNOSIS — D12.2 ADENOMATOUS POLYP OF ASCENDING COLON: Primary | ICD-10-CM

## 2021-09-29 LAB — COLONOSCOPY: NORMAL

## 2021-09-29 PROCEDURE — 45385 COLONOSCOPY W/LESION REMOVAL: CPT

## 2021-09-29 PROCEDURE — 45380 COLONOSCOPY AND BIOPSY: CPT | Mod: XS

## 2021-09-29 PROCEDURE — 45381 COLONOSCOPY SUBMUCOUS NJX: CPT

## 2021-09-29 PROCEDURE — G8918 PT W/O PREOP ORDER IV AB PRO: HCPCS

## 2021-09-29 PROCEDURE — G8907 PT DOC NO EVENTS ON DISCHARG: HCPCS

## 2021-09-29 RX ORDER — MEPERIDINE HYDROCHLORIDE 25 MG/ML
12.5 INJECTION INTRAMUSCULAR; INTRAVENOUS; SUBCUTANEOUS
Status: DISCONTINUED | OUTPATIENT
Start: 2021-09-29 | End: 2021-09-30 | Stop reason: HOSPADM

## 2021-09-29 RX ORDER — NALOXONE HYDROCHLORIDE 0.4 MG/ML
0.4 INJECTION, SOLUTION INTRAMUSCULAR; INTRAVENOUS; SUBCUTANEOUS
Status: DISCONTINUED | OUTPATIENT
Start: 2021-09-29 | End: 2021-09-30 | Stop reason: HOSPADM

## 2021-09-29 RX ORDER — ONDANSETRON 4 MG/1
4 TABLET, ORALLY DISINTEGRATING ORAL EVERY 30 MIN PRN
Status: DISCONTINUED | OUTPATIENT
Start: 2021-09-29 | End: 2021-09-30 | Stop reason: HOSPADM

## 2021-09-29 RX ORDER — FENTANYL CITRATE 50 UG/ML
25 INJECTION, SOLUTION INTRAMUSCULAR; INTRAVENOUS
Status: DISCONTINUED | OUTPATIENT
Start: 2021-09-29 | End: 2021-09-30 | Stop reason: HOSPADM

## 2021-09-29 RX ORDER — ONDANSETRON 2 MG/ML
4 INJECTION INTRAMUSCULAR; INTRAVENOUS
Status: DISCONTINUED | OUTPATIENT
Start: 2021-09-29 | End: 2021-09-30 | Stop reason: HOSPADM

## 2021-09-29 RX ORDER — PROPOFOL 10 MG/ML
INJECTION, EMULSION INTRAVENOUS CONTINUOUS PRN
Status: DISCONTINUED | OUTPATIENT
Start: 2021-09-29 | End: 2021-09-29

## 2021-09-29 RX ORDER — ONDANSETRON 2 MG/ML
4 INJECTION INTRAMUSCULAR; INTRAVENOUS EVERY 30 MIN PRN
Status: DISCONTINUED | OUTPATIENT
Start: 2021-09-29 | End: 2021-09-30 | Stop reason: HOSPADM

## 2021-09-29 RX ORDER — SODIUM CHLORIDE, SODIUM LACTATE, POTASSIUM CHLORIDE, CALCIUM CHLORIDE 600; 310; 30; 20 MG/100ML; MG/100ML; MG/100ML; MG/100ML
INJECTION, SOLUTION INTRAVENOUS CONTINUOUS
Status: DISCONTINUED | OUTPATIENT
Start: 2021-09-29 | End: 2021-09-30 | Stop reason: HOSPADM

## 2021-09-29 RX ORDER — ONDANSETRON 2 MG/ML
4 INJECTION INTRAMUSCULAR; INTRAVENOUS EVERY 6 HOURS PRN
Status: DISCONTINUED | OUTPATIENT
Start: 2021-09-29 | End: 2021-09-30 | Stop reason: HOSPADM

## 2021-09-29 RX ORDER — FLUMAZENIL 0.1 MG/ML
0.2 INJECTION, SOLUTION INTRAVENOUS
Status: ACTIVE | OUTPATIENT
Start: 2021-09-29 | End: 2021-09-29

## 2021-09-29 RX ORDER — LIDOCAINE 40 MG/G
CREAM TOPICAL
Status: DISCONTINUED | OUTPATIENT
Start: 2021-09-29 | End: 2021-09-30 | Stop reason: HOSPADM

## 2021-09-29 RX ORDER — NALOXONE HYDROCHLORIDE 0.4 MG/ML
0.2 INJECTION, SOLUTION INTRAMUSCULAR; INTRAVENOUS; SUBCUTANEOUS
Status: DISCONTINUED | OUTPATIENT
Start: 2021-09-29 | End: 2021-09-30 | Stop reason: HOSPADM

## 2021-09-29 RX ORDER — OXYCODONE HYDROCHLORIDE 5 MG/1
5 TABLET ORAL EVERY 4 HOURS PRN
Status: DISCONTINUED | OUTPATIENT
Start: 2021-09-29 | End: 2021-09-30 | Stop reason: HOSPADM

## 2021-09-29 RX ORDER — FENTANYL CITRATE 50 UG/ML
25 INJECTION, SOLUTION INTRAMUSCULAR; INTRAVENOUS EVERY 5 MIN PRN
Status: DISCONTINUED | OUTPATIENT
Start: 2021-09-29 | End: 2021-09-30 | Stop reason: HOSPADM

## 2021-09-29 RX ORDER — PROPOFOL 10 MG/ML
INJECTION, EMULSION INTRAVENOUS PRN
Status: DISCONTINUED | OUTPATIENT
Start: 2021-09-29 | End: 2021-09-29

## 2021-09-29 RX ORDER — PROCHLORPERAZINE MALEATE 10 MG
10 TABLET ORAL EVERY 6 HOURS PRN
Status: DISCONTINUED | OUTPATIENT
Start: 2021-09-29 | End: 2021-09-30 | Stop reason: HOSPADM

## 2021-09-29 RX ORDER — ONDANSETRON 4 MG/1
4 TABLET, ORALLY DISINTEGRATING ORAL EVERY 6 HOURS PRN
Status: DISCONTINUED | OUTPATIENT
Start: 2021-09-29 | End: 2021-09-30 | Stop reason: HOSPADM

## 2021-09-29 RX ORDER — LIDOCAINE HYDROCHLORIDE 20 MG/ML
INJECTION, SOLUTION INFILTRATION; PERINEURAL PRN
Status: DISCONTINUED | OUTPATIENT
Start: 2021-09-29 | End: 2021-09-29

## 2021-09-29 RX ADMIN — SODIUM CHLORIDE, SODIUM LACTATE, POTASSIUM CHLORIDE, CALCIUM CHLORIDE: 600; 310; 30; 20 INJECTION, SOLUTION INTRAVENOUS at 08:57

## 2021-09-29 RX ADMIN — PROPOFOL 150 MCG/KG/MIN: 10 INJECTION, EMULSION INTRAVENOUS at 09:01

## 2021-09-29 RX ADMIN — PROPOFOL 100 MG: 10 INJECTION, EMULSION INTRAVENOUS at 09:01

## 2021-09-29 RX ADMIN — PROPOFOL 50 MG: 10 INJECTION, EMULSION INTRAVENOUS at 09:04

## 2021-09-29 RX ADMIN — LIDOCAINE HYDROCHLORIDE 60 MG: 20 INJECTION, SOLUTION INFILTRATION; PERINEURAL at 09:00

## 2021-09-29 NOTE — ANESTHESIA POSTPROCEDURE EVALUATION
Patient: Marcelina Estevez    Procedure(s):  COLONOSCOPY, WITH CO2 INSUFFLATION  Colonoscopy, With Polypectomy And Biopsy  Tattooing, With Colonoscopy  Colonoscopy, Flexible, With Lesion Removal Using Snare    Diagnosis:Screening for colon cancer [Z12.11]  Diagnosis Additional Information: No value filed.    Anesthesia Type:  MAC    Note:  Disposition: Outpatient   Postop Pain Control: Uneventful            Sign Out: Well controlled pain   PONV: No   Neuro/Psych: Uneventful            Sign Out: Acceptable/Baseline neuro status   Airway/Respiratory: Uneventful            Sign Out: Acceptable/Baseline resp. status   CV/Hemodynamics: Uneventful            Sign Out: Acceptable CV status   Other NRE: NONE   DID A NON-ROUTINE EVENT OCCUR? No           Last vitals:  Vitals Value Taken Time   /73 09/29/21 1010   Temp     Pulse 61 09/29/21 0954   Resp 16 09/29/21 1010   SpO2 98 % 09/29/21 1010       Electronically Signed By: Will Joe MD  September 29, 2021  2:54 PM

## 2021-09-29 NOTE — ANESTHESIA PREPROCEDURE EVALUATION
Anesthesia Pre-Procedure Evaluation    Patient: Marcelina Estevez   MRN: [id number] : 1960        Preoperative Diagnosis: Screening for colon cancer [Z12.11]   Procedure : Procedure(s):  COLONOSCOPY, WITH CO2 INSUFFLATION     Past Medical History:   Diagnosis Date     CREST (calcinosis, Raynaud's phenomenon, esophageal dysfunction, sclerodactyly, telangiectasia) (H)      Hyperlipidemia      Hypertension      Limited systemic sclerosis (H)      Positive MINDY (antinuclear antibody)      Raynaud disease       Past Surgical History:   Procedure Laterality Date     APPENDECTOMY       BIOPSY      cervical node     COLONOSCOPY WITH CO2 INSUFFLATION N/A 10/26/2020    Procedure: COLONOSCOPY, WITH CO2 INSUFFLATION;  Surgeon: Phyllis Chaudhari MD;  Location: [facility] OR     DILATION AND CURETTAGE, HYSTEROSCOPY DIAGNOSTIC, COMBINED  2014    Procedure: COMBINED DILATION AND CURETTAGE, HYSTEROSCOPY DIAGNOSTIC;  Surgeon: Mana Cabrera DO;  Location: [facility] OR     ENT SURGERY      tonsillectomy and adnoid removal     HYSTERECTOMY TOTAL ABDOMINAL       ORTHOPEDIC SURGERY      left knee tear meniscus     RELEASE CARPAL TUNNEL BILATERAL  2009      Allergies   Allergen Reactions     Seasonal Allergies      Sulfa Drugs      Vomiting       Vicodin [Hydrocodone-Acetaminophen] Nausea      Social History     Tobacco Use     Smoking status: Former Smoker     Quit date: 2001     Years since quittin.7     Smokeless tobacco: Never Used   Substance Use Topics     Alcohol use: Yes     Comment: social      Wt Readings from Last 1 Encounters:   21 62.9 kg (138 lb 9 oz)        Anesthesia Evaluation            ROS/MED HX  ENT/Pulmonary:       Neurologic:       Cardiovascular:     (+) Dyslipidemia hypertension-----    METS/Exercise Tolerance:     Hematologic:       Musculoskeletal: Comment: Systemic Sclerosis      GI/Hepatic:       Renal/Genitourinary: Comment: Esophageal dysmotility    (+) renal disease, type: CRI,     Endo:        Psychiatric/Substance Use:       Infectious Disease:       Malignancy:       Other: Comment: CREST           Physical Exam    Airway  airway exam normal      Mallampati: II   TM distance: > 3 FB   Neck ROM: full   Mouth opening: > 3 cm    Respiratory Devices and Support         Dental  no notable dental history         Cardiovascular          Rhythm and rate: regular and normal     Pulmonary   pulmonary exam normal        breath sounds clear to auscultation           OUTSIDE LABS:  CBC:   Lab Results   Component Value Date    WBC 6.5 04/19/2021    WBC 6.7 12/19/2018    HGB 11.3 (L) 09/17/2021    HGB 11.6 (L) 04/19/2021    HCT 34.7 (L) 04/19/2021    HCT 32.8 (L) 12/19/2018     04/19/2021     12/19/2018     BMP:   Lab Results   Component Value Date     (L) 09/17/2021     (L) 08/11/2021    POTASSIUM 4.1 09/17/2021    POTASSIUM 4.2 08/11/2021    CHLORIDE 98 09/17/2021    CHLORIDE 100 08/11/2021    CO2 25 09/17/2021    CO2 27 08/11/2021    BUN 28 09/17/2021    BUN 22 08/11/2021    CR 0.94 09/17/2021    CR 0.93 08/11/2021    GLC 97 09/17/2021    GLC 91 08/11/2021     COAGS: No results found for: PTT, INR, FIBR  POC:   Lab Results   Component Value Date    HCG neg 07/01/2014     HEPATIC:   Lab Results   Component Value Date    ALBUMIN 4.5 04/19/2021    PROTTOTAL 7.8 04/19/2021    ALT 50 04/19/2021    AST 37 04/19/2021    ALKPHOS 73 04/19/2021    BILITOTAL 0.5 04/19/2021     OTHER:   Lab Results   Component Value Date    SERGIO 9.4 09/17/2021    MAG 1.9 05/22/2018    TSH 2.04 12/19/2018    CRP <2.9 06/12/2018    SED 18 05/22/2018       Anesthesia Plan    ASA Status:  3   NPO Status:  NPO Appropriate    Anesthesia Type: MAC.     - Reason for MAC: immobility needed, straight local not clinically adequate   Induction: Intravenous.   Maintenance: TIVA.        Consents    Anesthesia Plan(s) and associated risks, benefits, and realistic alternatives discussed. Questions answered and  patient/representative(s) expressed understanding.     - Discussed with:  Patient      - Extended Intubation/Ventilatory Support Discussed: No.      - Patient is DNR/DNI Status: No    Use of blood products discussed: No .     Postoperative Care    Pain management: IV analgesics, Oral pain medications, Multi-modal analgesia.   PONV prophylaxis: Ondansetron (or other 5HT-3), Background Propofol Infusion     Comments:                Will Joe MD

## 2021-09-29 NOTE — ANESTHESIA CARE TRANSFER NOTE
Patient: Marcelina Estevez    Procedure(s):  COLONOSCOPY, WITH CO2 INSUFFLATION  Colonoscopy, With Polypectomy And Biopsy  Tattooing, With Colonoscopy  Colonoscopy, Flexible, With Lesion Removal Using Snare    Diagnosis: Screening for colon cancer [Z12.11]  Diagnosis Additional Information: No value filed.    Anesthesia Type:   MAC     Note:    Oropharynx: oropharynx clear of all foreign objects  Level of Consciousness: awake  Oxygen Supplementation: room air    Independent Airway: airway patency satisfactory and stable  Dentition: dentition unchanged  Vital Signs Stable: post-procedure vital signs reviewed and stable  Report to RN Given: handoff report given  Patient transferred to: Phase II  Comments: To Phase II. Report to RN.  VSS Resp status stable.  Handoff Report: Identifed the Patient, Identified the Reponsible Provider, Reviewed the pertinent medical history, Discussed the surgical course, Reviewed Intra-OP anesthesia mangement and issues during anesthesia, Set expectations for post-procedure period and Allowed opportunity for questions and acknowledgement of understanding      Vitals:  Vitals Value Taken Time   BP     Temp     Pulse     Resp     SpO2         Electronically Signed By: EDUARD Martin CRNA  September 29, 2021  9:52 AM

## 2021-10-01 LAB
PATH REPORT.COMMENTS IMP SPEC: NORMAL
PATH REPORT.COMMENTS IMP SPEC: NORMAL
PATH REPORT.FINAL DX SPEC: NORMAL
PATH REPORT.GROSS SPEC: NORMAL
PATH REPORT.MICROSCOPIC SPEC OTHER STN: NORMAL
PATH REPORT.RELEVANT HX SPEC: NORMAL
PHOTO IMAGE: NORMAL

## 2021-10-01 PROCEDURE — 88305 TISSUE EXAM BY PATHOLOGIST: CPT | Performed by: PATHOLOGY

## 2021-10-06 ENCOUNTER — TELEPHONE (OUTPATIENT)
Dept: FAMILY MEDICINE | Facility: CLINIC | Age: 61
End: 2021-10-06

## 2021-10-06 NOTE — TELEPHONE ENCOUNTER
Pt is unable to access her mychart.  Pt calling for pathology report from her colonoscopy.    Pt read the result note from Dr. Wall and given number for his care coordinator for questions.      Pt would like the result note printed and mailed to her home address.  I know this is a specialist note, but am requesting the  print and mail the letter to pt.  Ana KHANN, RN

## 2021-10-11 ENCOUNTER — TELEPHONE (OUTPATIENT)
Dept: GASTROENTEROLOGY | Facility: CLINIC | Age: 61
End: 2021-10-11

## 2021-10-11 ENCOUNTER — TELEPHONE (OUTPATIENT)
Dept: FAMILY MEDICINE | Facility: CLINIC | Age: 61
End: 2021-10-11

## 2021-10-11 DIAGNOSIS — D36.9 TUBULAR ADENOMA: Primary | ICD-10-CM

## 2021-10-11 DIAGNOSIS — Z11.59 ENCOUNTER FOR SCREENING FOR OTHER VIRAL DISEASES: ICD-10-CM

## 2021-10-11 NOTE — TELEPHONE ENCOUNTER
Staff contacted PT with below message. PT stated she would call and schedule. PT wondered if she needed another pre-op. Staff advised PT to ask Surgeon and to call clinic back to schedule if needed.

## 2021-10-13 DIAGNOSIS — E78.5 HYPERLIPIDEMIA LDL GOAL <100: ICD-10-CM

## 2021-10-13 RX ORDER — SIMVASTATIN 40 MG
TABLET ORAL
Qty: 90 TABLET | Refills: 0 | Status: SHIPPED | OUTPATIENT
Start: 2021-10-13 | End: 2022-01-12

## 2021-10-19 ENCOUNTER — TRANSFERRED RECORDS (OUTPATIENT)
Dept: HEALTH INFORMATION MANAGEMENT | Facility: CLINIC | Age: 61
End: 2021-10-19
Payer: COMMERCIAL

## 2021-10-19 DIAGNOSIS — F41.0 PANIC ATTACK: ICD-10-CM

## 2021-10-19 RX ORDER — LORAZEPAM 0.5 MG/1
0.5 TABLET ORAL DAILY PRN
Qty: 30 TABLET | Refills: 1 | Status: SHIPPED | OUTPATIENT
Start: 2021-10-19 | End: 2022-04-21

## 2021-10-24 NOTE — PATIENT INSTRUCTIONS
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
## 2021-10-24 NOTE — H&P (VIEW-ONLY)
[address] [address]  Phone: [phone]  Primary Provider: Annette Painting  Pre-op Performing Provider: YASMANI ARTEAGA      PREOPERATIVE EVALUATION:  Today's date: 10/29/2021    Marcelina Estevez is a 61 year old female who presents for a preoperative evaluation.    Surgical Information:  Surgery/Procedure: COLONOSCOPY, WITH CO2 INSUFFLATION  Surgery Location: [place] [address]  Surgeon: Dr. Domingo Wall  Surgery Date: 11/10/2021  Time of Surgery: 11:05 AM  Where patient plans to recover: At home with family  Fax number for surgical facility: Note does not need to be faxed, will be available electronically in Epic.    Type of Anesthesia Anticipated: MAC    Assessment & Plan     The proposed surgical procedure is considered LOW risk.    Preop general physical exam    - CBC with platelets and differential  - Basic metabolic panel  (Ca, Cl, CO2, Creat, Gluc, K, Na, BUN)  - Basic metabolic panel  (Ca, Cl, CO2, Creat, Gluc, K, Na, BUN)  - CBC with platelets and differential    Adenomatous polyp of colon, unspecified part of colon  Reason for preop requires deep sedation    High priority for 2019-nCoV vaccine  Booster given today   - COVID-19,PF,MODERNA (18+ Yrs BOOSTER .25mL)    Hypertension goal BP (blood pressure) < 140/90  Blood pressure at goal with current regimen    NSTEMI (non-ST elevated myocardial infarction) (H)  No obstructive coronary artery disease- on nifedipine    Limited systemic sclerosis (H)  Followed by rheumatology     CKD (chronic kidney disease) stage 1, GFR 90 ml/min or greater  Stable     CREST (calcinosis, Raynaud's phenomenon, esophageal dysfunction, sclerodactyly, telangiectasia) (H)  Stable follows with rheumatology    Anemia in other chronic diseases classified elsewhere  Hemoglobin is stable    Moderate episode of recurrent major depressive disorder (H)  Stable - follows with Dr. Annette Painting     MARCELINA  (generalized anxiety disorder)  Follows with Dr. Annette Painting - as needed use lorazepam           Risks and Recommendations:  The patient has the following additional risks and recommendations for perioperative complications:   - No identified additional risk factors other than previously addressed    Medication Instructions:  Patient is to take all scheduled medications on the day of surgery    RECOMMENDATION:  APPROVAL GIVEN to proceed with proposed procedure, without further diagnostic evaluation.                      Subjective     HPI related to upcoming procedure: history of adenomatous polyp of ascending colon - last colonoscopy one month with couple large polyps and unable to resect both at time of procedure  Requires deep sedation due to pain without deep sedation      Preop Questions 10/29/2021   1. Have you ever had a heart attack or stroke? No   2. Have you ever had surgery on your heart or blood vessels, such as a stent placement, a coronary artery bypass, or surgery on an artery in your head, neck, heart, or legs? No   3. Do you have chest pain with activity? No   4. Do you have a history of  heart failure? No   5. Do you currently have a cold, bronchitis or symptoms of other infection? No   6. Do you have a cough, shortness of breath, or wheezing? No   7. Do you or anyone in your family have previous history of blood clots? No   8. Do you or does anyone in your family have a serious bleeding problem such as prolonged bleeding following surgeries or cuts? No   9. Have you ever had problems with anemia or been told to take iron pills? No   10. Have you had any abnormal blood loss such as black, tarry or bloody stools, or abnormal vaginal bleeding? No   11. Have you ever had a blood transfusion? No   12. Are you willing to have a blood transfusion if it is medically needed before, during, or after your surgery? Yes   13. Have you or any of your relatives ever had problems with anesthesia? No   14. Do you  have sleep apnea, excessive snoring or daytime drowsiness? No   15. Do you have any artifical heart valves or other implanted medical devices like a pacemaker, defibrillator, or continuous glucose monitor? No   16. Do you have artificial joints? No   17. Are you allergic to latex? No     Health Care Directive:  Patient does not have a Health Care Directive or Living Will: Discussed advance care planning with patient; however, patient declined at this time.    Preoperative Review of :   reviewed - lorazepam prescribed by her PCP       Status of Chronic Conditions:  DEPRESSION - Patient has a long history of Depression of moderate severity requiring medication for control with recent symptoms being stable..    HYPERTENSION - Patient has longstanding history of HTN , currently denies any symptoms referable to elevated blood pressure. Specifically denies chest pain, palpitations, dyspnea, orthopnea, PND or peripheral edema. Blood pressure readings have been in normal range. Current medication regimen is as listed below. Patient denies any side effects of medication.     History of chronic kidney disease Last Cr 1.01 .     History of NSTEMI - on nifedipine. No chest pain or shortness of breath     Review of Systems  CONSTITUTIONAL: NEGATIVE for fever, chills, change in weight  INTEGUMENTARY/SKIN: NEGATIVE for worrisome rashes, moles or lesions  EYES: NEGATIVE for vision changes or irritation  ENT/MOUTH: NEGATIVE for ear, mouth and throat problems  RESP: NEGATIVE for significant cough or SOB  CV: NEGATIVE for chest pain, palpitations or peripheral edema  GI: NEGATIVE for nausea, abdominal pain, heartburn, or change in bowel habits  : NEGATIVE for frequency, dysuria, or hematuria  MUSCULOSKELETAL: NEGATIVE for significant arthralgias or myalgia  NEURO: NEGATIVE for weakness, dizziness or paresthesias  ENDOCRINE: NEGATIVE for temperature intolerance, skin/hair changes  HEME: NEGATIVE for bleeding  problems  PSYCHIATRIC: NEGATIVE for changes in mood or affect    Patient Active Problem List    Diagnosis Date Noted     Panic attack 04/20/2021     Priority: Medium     Atrophic vaginitis 04/20/2021     Priority: Medium     NSTEMI (non-ST elevated myocardial infarction) (H) 12/05/2018     Priority: Medium     From small vessel ischemia.  No stents.  On nifedipine.       Marijuana use, episodic 09/10/2018     Priority: Medium     CREST (calcinosis, Raynaud's phenomenon, esophageal dysfunction, sclerodactyly, telangiectasia) (H)      Priority: Medium     Limited systemic sclerosis (H)      Priority: Medium     Raynaud's disease without gangrene 01/29/2018     Priority: Medium     MARCELINA (generalized anxiety disorder) 01/08/2018     Priority: Medium     Tension headache 09/28/2017     Priority: Medium     Patient is followed by Annette Painting MD PhD for ongoing prescription of pain medication.  All refills should only be approved by this provider, or covering partner.    Medication(s): Tylenol with codeine (T#3).   Maximum quantity per month: 15  Clinic visit frequency required: Q 6  months     Controlled substance agreement:  Encounter-Level CSA - 08/07/2017:          Controlled Substance Agreement - Scan on 8/16/2017 10:21 AM : CONTROLLED SUBSTANCE AGREEMENT (below)              Pain Clinic evaluation in the past: No    DIRE Total Score(s):  No flowsheet data found.    Last [facility] website verification:  none   [email or web address]       ACP (advance care planning) 08/19/2015     Priority: Medium     Advance Care Planning 8/19/2015: ACP Review and Resources Provided:  Reviewed chart for advance care plan.  Marcelina ROQUE Estevez has no plan or code status on file however states presence of ACP document. Copy requested.  Confirmed/documented legally designated decision maker(s). Added by Mary Padilla RN Advance Care Planning Liaison with [employer]           Anemia in other chronic diseases classified elsewhere  01/09/2015     Priority: Medium     B12, TSH, Iron study normal in 2018       Status post total hysterectomy 11/18/2014     Priority: Medium     Allergic rhinitis due to pollen 08/02/2014     Priority: Medium     Menopausal syndrome (hot flashes) 12/19/2013     Priority: Medium     Hyperlipidemia LDL goal <100 06/17/2013     Priority: Medium     CKD (chronic kidney disease) stage 1, GFR 90 ml/min or greater 06/17/2013     Priority: Medium     Moderate episode of recurrent major depressive disorder (H) 09/10/2012     Priority: Medium     Celexa not helping, fluoxetine caused rash.        Hypertension goal BP (blood pressure) < 140/90 01/18/2011     Priority: Medium     Keratitis sicca, bilateral (H) 06/10/2013     Priority: Low     Hypertriglyceridemia 01/18/2011     Priority: Low     Controlled with simvastatin.        Night sweats 01/18/2011     Priority: Low     Worse with menstruation. On Clonidine.        Atopic rhinitis 01/18/2011     Priority: Low     (Problem list name updated by automated process. Provider to review and confirm.)        Past Medical History:   Diagnosis Date     CREST (calcinosis, Raynaud's phenomenon, esophageal dysfunction, sclerodactyly, telangiectasia) (H)      Hyperlipidemia      Hypertension      Limited systemic sclerosis (H)      Positive MINDY (antinuclear antibody)      Raynaud disease      Past Surgical History:   Procedure Laterality Date     APPENDECTOMY       BIOPSY      cervical node     COLONOSCOPY WITH CO2 INSUFFLATION N/A 10/26/2020    Procedure: COLONOSCOPY, WITH CO2 INSUFFLATION;  Surgeon: Phyllis Chaudhari MD;  Location:  OR     DILATION AND CURETTAGE, HYSTEROSCOPY DIAGNOSTIC, COMBINED  7/1/2014    Procedure: COMBINED DILATION AND CURETTAGE, HYSTEROSCOPY DIAGNOSTIC;  Surgeon: Mana Cabrera DO;  Location:  OR     ENT SURGERY      tonsillectomy and adnoid removal     HYSTERECTOMY TOTAL ABDOMINAL       ORTHOPEDIC SURGERY      left knee tear meniscus     RELEASE  CARPAL TUNNEL BILATERAL  2009     Current Outpatient Medications   Medication Sig Dispense Refill     albuterol (PROAIR HFA) 108 (90 Base) MCG/ACT inhaler INHALE 2 PUFS BY MOUTH EVERY 6 HOURS AS NEEDED FOR SHORTNESS OF BREATH / DYSPNEA 8.5 g 3     ALLEGRA ALLERGY 180 MG tablet Take 1 tablet (180 mg) by mouth daily 90 tablet 3     buPROPion (WELLBUTRIN XL) 150 MG 24 hr tablet Take 1 tablet (150 mg) by mouth every morning 90 tablet 0     diclofenac (VOLTAREN) 1 % topical gel Apply 2 g topically 4 times daily to hands 1 Tube 1     estradiol (ESTRACE) 0.1 MG/GM vaginal cream Place 2 g vaginally twice a week 42.5 g 3     FLUoxetine (PROZAC) 20 MG capsule Take 1 capsule (20 mg) by mouth daily 90 capsule 0     FLUoxetine (PROZAC) 40 MG capsule Take 1 capsule (40 mg) by mouth daily 90 capsule 1     fluticasone (FLONASE) 50 MCG/ACT nasal spray Spray 1-2 sprays into both nostrils daily 16 g 5     hydrochlorothiazide (HYDRODIURIL) 25 MG tablet Take 1 tablet (25 mg) by mouth daily 90 tablet 2     hydroxychloroquine (PLAQUENIL) 200 MG tablet Take 400 mg on M/W/F and 200 mg on rest of the days. (Patient taking differently: 1.5 tabs daily) 120 tablet 1     isosorbide mononitrate (IMDUR) 30 MG 24 hr tablet Take 1 tablet (30 mg) by mouth daily 90 tablet 2     LORazepam (ATIVAN) 0.5 MG tablet Take 1 tablet (0.5 mg) by mouth daily as needed (panic attack) 30 tablet 1     losartan (COZAAR) 50 MG tablet Take 1 tablet (50 mg) by mouth daily 90 tablet 3     NIFEdipine ER OSMOTIC (PROCARDIA XL) 30 MG 24 hr tablet Take 1 tablet (30 mg) by mouth daily 90 tablet 2     omeprazole (PRILOSEC) 40 MG DR capsule Take 1 capsule (40 mg) by mouth daily 90 capsule 2     order for DME Equipment being ordered: light lamp for seasonal affective disorder 1 Device 0     simvastatin (ZOCOR) 40 MG tablet TAKE 1 TABLET (40MG) BY MOUTH EVERY NIGHT AT BEDTIME 90 tablet 0     VITAMIN D, CHOLECALCIFEROL, PO Take 2,000 Units by mouth daily         Allergies    Allergen Reactions     Seasonal Allergies      Sulfa Drugs      Vomiting       Vicodin [Hydrocodone-Acetaminophen] Nausea        Social History     Tobacco Use     Smoking status: Former Smoker     Quit date: 2001     Years since quittin.8     Smokeless tobacco: Never Used   Substance Use Topics     Alcohol use: Yes     Comment: social     Family History   Problem Relation Age of Onset     Osteoporosis Mother      Eye Disorder Mother         cataract, mac degen     Macular Degeneration Mother      Osteoporosis Father      Eye Disorder Father         glaucoma     Hypertension Maternal Grandmother      Unknown/Adopted Paternal Grandfather      Eye Disorder Paternal Grandmother         glaucoma     Hypertension Daughter      Diabetes Maternal Grandfather      Diabetes Other      Glaucoma No family hx of      History   Drug Use No       vessels areound heart 6 yrs ago       Objective     /64 (BP Location: Right arm, Patient Position: Sitting, Cuff Size: Adult Regular)   Pulse 69   Temp 99  F (37.2  C) (Oral)   Resp 18   Wt 63.5 kg (140 lb 1.6 oz)   LMP 2014   SpO2 97%   BMI 26.47 kg/m      Physical Exam    GENERAL APPEARANCE: healthy, alert and no distress     EYES: EOMI, PERRL     HENT: ear canals and TM's normal and nose and mouth without ulcers or lesions     NECK: no adenopathy, no asymmetry, masses, or scars and thyroid normal to palpation     RESP: lungs clear to auscultation - no rales, rhonchi or wheezes     CV: regular rates and rhythm, normal S1 S2, no S3 or S4 and no murmur, click or rub     ABDOMEN:  soft, nontender, no HSM or masses and bowel sounds normal     MS: extremities normal- no gross deformities noted, no evidence of inflammation in joints, FROM in all extremities.     SKIN: no suspicious lesions or rashes     NEURO: Normal strength and tone, sensory exam grossly normal, mentation intact and speech normal     PSYCH: mentation appears normal. and affect normal/bright      LYMPHATICS: No cervical adenopathy    Recent Labs   Lab Test 09/17/21  1151 08/11/21  1040 04/19/21  1015   HGB 11.3*  --  11.6*   PLT  --   --  236   * 132* 132*   POTASSIUM 4.1 4.2 4.0   CR 0.94 0.93 0.94        Diagnostics:  Recent Results (from the past 48 hour(s))   Basic metabolic panel  (Ca, Cl, CO2, Creat, Gluc, K, Na, BUN)    Collection Time: 10/29/21  2:19 PM   Result Value Ref Range    Sodium 134 133 - 144 mmol/L    Potassium 4.1 3.4 - 5.3 mmol/L    Chloride 102 94 - 109 mmol/L    Carbon Dioxide (CO2) 27 20 - 32 mmol/L    Anion Gap 5 3 - 14 mmol/L    Urea Nitrogen 43 (H) 7 - 30 mg/dL    Creatinine 1.01 0.52 - 1.04 mg/dL    Calcium 9.5 8.5 - 10.1 mg/dL    Glucose 101 (H) 70 - 99 mg/dL    GFR Estimate 60 (L) >60 mL/min/1.73m2   CBC with platelets and differential    Collection Time: 10/29/21  2:19 PM   Result Value Ref Range    WBC Count 7.2 4.0 - 11.0 10e3/uL    RBC Count 3.58 (L) 3.80 - 5.20 10e6/uL    Hemoglobin 11.3 (L) 11.7 - 15.7 g/dL    Hematocrit 33.5 (L) 35.0 - 47.0 %    MCV 94 78 - 100 fL    MCH 31.6 26.5 - 33.0 pg    MCHC 33.7 31.5 - 36.5 g/dL    RDW 13.4 10.0 - 15.0 %    Platelet Count 227 150 - 450 10e3/uL    % Neutrophils 71 %    % Lymphocytes 14 %    % Monocytes 11 %    % Eosinophils 3 %    % Basophils 0 %    Absolute Neutrophils 5.2 1.6 - 8.3 10e3/uL    Absolute Lymphocytes 1.0 0.8 - 5.3 10e3/uL    Absolute Monocytes 0.8 0.0 - 1.3 10e3/uL    Absolute Eosinophils 0.2 0.0 - 0.7 10e3/uL    Absolute Basophils 0.0 0.0 - 0.2 10e3/uL      No EKG required for low risk surgery (cataract, skin procedure, breast biopsy, etc).    Revised Cardiac Risk Index (RCRI):  The patient has the following serious cardiovascular risks for perioperative complications:   - No serious cardiac risks = 0 points     RCRI Interpretation: 0 points: Class I (very low risk - 0.4% complication rate)           Signed Electronically by: Kellee Guallpa PA-C  Copy of this evaluation report is provided to requesting  physician.

## 2021-10-27 DIAGNOSIS — F33.1 MODERATE EPISODE OF RECURRENT MAJOR DEPRESSIVE DISORDER (H): ICD-10-CM

## 2021-10-27 NOTE — TELEPHONE ENCOUNTER
Routing refill request to provider for review/approval because:  PHQ9 out of range.    Mila Couch RN  [facility]

## 2021-10-28 RX ORDER — BUPROPION HYDROCHLORIDE 150 MG/1
150 TABLET ORAL EVERY MORNING
Qty: 90 TABLET | Refills: 0 | Status: SHIPPED | OUTPATIENT
Start: 2021-10-28 | End: 2021-12-24

## 2021-10-29 ENCOUNTER — OFFICE VISIT (OUTPATIENT)
Dept: OPTOMETRY | Facility: CLINIC | Age: 61
End: 2021-10-29
Payer: COMMERCIAL

## 2021-10-29 ENCOUNTER — TRANSFERRED RECORDS (OUTPATIENT)
Dept: HEALTH INFORMATION MANAGEMENT | Facility: CLINIC | Age: 61
End: 2021-10-29

## 2021-10-29 ENCOUNTER — OFFICE VISIT (OUTPATIENT)
Dept: FAMILY MEDICINE | Facility: CLINIC | Age: 61
End: 2021-10-29
Payer: COMMERCIAL

## 2021-10-29 VITALS
SYSTOLIC BLOOD PRESSURE: 103 MMHG | OXYGEN SATURATION: 97 % | HEART RATE: 69 BPM | WEIGHT: 140.1 LBS | BODY MASS INDEX: 26.47 KG/M2 | DIASTOLIC BLOOD PRESSURE: 64 MMHG | TEMPERATURE: 99 F | RESPIRATION RATE: 18 BRPM

## 2021-10-29 DIAGNOSIS — D12.6 ADENOMATOUS POLYP OF COLON, UNSPECIFIED PART OF COLON: ICD-10-CM

## 2021-10-29 DIAGNOSIS — Z01.818 PREOP GENERAL PHYSICAL EXAM: Primary | ICD-10-CM

## 2021-10-29 DIAGNOSIS — D63.8 ANEMIA IN OTHER CHRONIC DISEASES CLASSIFIED ELSEWHERE: ICD-10-CM

## 2021-10-29 DIAGNOSIS — F33.1 MODERATE EPISODE OF RECURRENT MAJOR DEPRESSIVE DISORDER (H): ICD-10-CM

## 2021-10-29 DIAGNOSIS — N18.1 CKD (CHRONIC KIDNEY DISEASE) STAGE 1, GFR 90 ML/MIN OR GREATER: ICD-10-CM

## 2021-10-29 DIAGNOSIS — M34.1 CREST (CALCINOSIS, RAYNAUD'S PHENOMENON, ESOPHAGEAL DYSFUNCTION, SCLERODACTYLY, TELANGIECTASIA) (H): ICD-10-CM

## 2021-10-29 DIAGNOSIS — I10 HYPERTENSION GOAL BP (BLOOD PRESSURE) < 140/90: ICD-10-CM

## 2021-10-29 DIAGNOSIS — M34.9 LIMITED SYSTEMIC SCLEROSIS (H): ICD-10-CM

## 2021-10-29 DIAGNOSIS — H52.13 MYOPIA OF BOTH EYES: ICD-10-CM

## 2021-10-29 DIAGNOSIS — F41.1 GAD (GENERALIZED ANXIETY DISORDER): ICD-10-CM

## 2021-10-29 DIAGNOSIS — I21.4 NSTEMI (NON-ST ELEVATED MYOCARDIAL INFARCTION) (H): ICD-10-CM

## 2021-10-29 DIAGNOSIS — H25.13 SENILE NUCLEAR SCLEROSIS, BILATERAL: ICD-10-CM

## 2021-10-29 DIAGNOSIS — Z79.899 HIGH RISK MEDICATION USE: Primary | ICD-10-CM

## 2021-10-29 DIAGNOSIS — Z23 HIGH PRIORITY FOR 2019-NCOV VACCINE: ICD-10-CM

## 2021-10-29 LAB
ANION GAP SERPL CALCULATED.3IONS-SCNC: 5 MMOL/L (ref 3–14)
BASOPHILS # BLD AUTO: 0 10E3/UL (ref 0–0.2)
BASOPHILS NFR BLD AUTO: 0 %
BUN SERPL-MCNC: 43 MG/DL (ref 7–30)
CALCIUM SERPL-MCNC: 9.5 MG/DL (ref 8.5–10.1)
CHLORIDE BLD-SCNC: 102 MMOL/L (ref 94–109)
CO2 SERPL-SCNC: 27 MMOL/L (ref 20–32)
CREAT SERPL-MCNC: 1.01 MG/DL (ref 0.52–1.04)
EOSINOPHIL # BLD AUTO: 0.2 10E3/UL (ref 0–0.7)
EOSINOPHIL NFR BLD AUTO: 3 %
ERYTHROCYTE [DISTWIDTH] IN BLOOD BY AUTOMATED COUNT: 13.4 % (ref 10–15)
GFR SERPL CREATININE-BSD FRML MDRD: 60 ML/MIN/1.73M2
GLUCOSE BLD-MCNC: 101 MG/DL (ref 70–99)
HCT VFR BLD AUTO: 33.5 % (ref 35–47)
HGB BLD-MCNC: 11.3 G/DL (ref 11.7–15.7)
LYMPHOCYTES # BLD AUTO: 1 10E3/UL (ref 0.8–5.3)
LYMPHOCYTES NFR BLD AUTO: 14 %
MCH RBC QN AUTO: 31.6 PG (ref 26.5–33)
MCHC RBC AUTO-ENTMCNC: 33.7 G/DL (ref 31.5–36.5)
MCV RBC AUTO: 94 FL (ref 78–100)
MONOCYTES # BLD AUTO: 0.8 10E3/UL (ref 0–1.3)
MONOCYTES NFR BLD AUTO: 11 %
NEUTROPHILS # BLD AUTO: 5.2 10E3/UL (ref 1.6–8.3)
NEUTROPHILS NFR BLD AUTO: 71 %
PLATELET # BLD AUTO: 227 10E3/UL (ref 150–450)
POTASSIUM BLD-SCNC: 4.1 MMOL/L (ref 3.4–5.3)
RBC # BLD AUTO: 3.58 10E6/UL (ref 3.8–5.2)
SODIUM SERPL-SCNC: 134 MMOL/L (ref 133–144)
WBC # BLD AUTO: 7.2 10E3/UL (ref 4–11)

## 2021-10-29 PROCEDURE — 92083 EXTENDED VISUAL FIELD XM: CPT | Performed by: OPTOMETRIST

## 2021-10-29 PROCEDURE — 85025 COMPLETE CBC W/AUTO DIFF WBC: CPT | Performed by: PHYSICIAN ASSISTANT

## 2021-10-29 PROCEDURE — 92004 COMPRE OPH EXAM NEW PT 1/>: CPT | Performed by: OPTOMETRIST

## 2021-10-29 PROCEDURE — 92015 DETERMINE REFRACTIVE STATE: CPT | Performed by: OPTOMETRIST

## 2021-10-29 PROCEDURE — 91301 COVID-19,PF,MODERNA (18+ YRS BOOSTER .25ML): CPT | Performed by: PHYSICIAN ASSISTANT

## 2021-10-29 PROCEDURE — 36415 COLL VENOUS BLD VENIPUNCTURE: CPT | Performed by: PHYSICIAN ASSISTANT

## 2021-10-29 PROCEDURE — 0064A COVID-19,PF,MODERNA (18+ YRS BOOSTER .25ML): CPT | Performed by: PHYSICIAN ASSISTANT

## 2021-10-29 PROCEDURE — 99214 OFFICE O/P EST MOD 30 MIN: CPT | Mod: 25 | Performed by: PHYSICIAN ASSISTANT

## 2021-10-29 PROCEDURE — 80048 BASIC METABOLIC PNL TOTAL CA: CPT | Performed by: PHYSICIAN ASSISTANT

## 2021-10-29 PROCEDURE — 92134 CPTRZ OPH DX IMG PST SGM RTA: CPT | Performed by: OPTOMETRIST

## 2021-10-29 RX ORDER — BENZONATATE 100 MG/1
1 CAPSULE ORAL 3 TIMES DAILY PRN
COMMUNITY
Start: 2021-09-15 | End: 2021-10-29

## 2021-10-29 RX ORDER — CALCIUM CARBONATE 600 MG
1 TABLET ORAL DAILY
COMMUNITY
End: 2022-09-27

## 2021-10-29 ASSESSMENT — VISUAL ACUITY
OD_CC: 20/25
METHOD: SNELLEN - LINEAR
CORRECTION_TYPE: GLASSES
OS_CC+: -1
OS_CC: 20/30

## 2021-10-29 ASSESSMENT — EXTERNAL EXAM - LEFT EYE: OS_EXAM: NORMAL

## 2021-10-29 ASSESSMENT — REFRACTION_MANIFEST
OD_CYLINDER: +3.50
OS_SPHERE: -3.75
OD_ADD: +2.50
OS_CYLINDER: +1.50
OD_SPHERE: -5.00
OS_ADD: +2.50
OD_AXIS: 005
OS_AXIS: 010

## 2021-10-29 ASSESSMENT — TONOMETRY
OS_IOP_MMHG: 16
IOP_METHOD: ICARE
OD_IOP_MMHG: 18

## 2021-10-29 ASSESSMENT — REFRACTION_WEARINGRX
OS_AXIS: 180
SPECS_TYPE: PAL
OD_AXIS: 008
OD_ADD: +2.50
OD_CYLINDER: +3.75
OS_SPHERE: -3.75
OS_CYLINDER: +1.25
OD_SPHERE: -4.75
OS_ADD: +2.50

## 2021-10-29 ASSESSMENT — EXTERNAL EXAM - RIGHT EYE: OD_EXAM: NORMAL

## 2021-10-29 ASSESSMENT — CUP TO DISC RATIO
OD_RATIO: 0.1
OS_RATIO: 0.1

## 2021-10-29 ASSESSMENT — PAIN SCALES - GENERAL: PAINLEVEL: NO PAIN (0)

## 2021-10-29 ASSESSMENT — SLIT LAMP EXAM - LIDS
COMMENTS: NORMAL
COMMENTS: NORMAL

## 2021-10-29 NOTE — Clinical Note
CARMELITA- Dr. Turcios to review and cosign  Fyi for Dr. Annette Painting   I contacted patient and she did get moderna booster - MA (MEDICAL ASSISTANT) please document

## 2021-10-29 NOTE — LETTER
10/29/2021         RE: Marcelina Estevez  [address] [place] [address]        Dear Colleague,    Thank you for referring your patient, Marcelina Estevez, to the [facility]. Please see a copy of my visit note below.    Assessment/Plan  (Z79.899) High risk medication use  (primary encounter diagnosis)  Comment: No evidence of Plaquenil toxicity. Patient started Plaquenil 300mg daily in early 2018.   Plan: HVF 10-2 OU, SD-OCT Macula Optovue OU (both         eyes)        Discussed findings with patient as well as potential long term risks of continued medication use. Plan on monitoring annually with dilated exam for changes. Return to clinic sooner if vision changes are noted.     (H25.13) Senile nuclear sclerosis, bilateral  Comment: Not at surgical level  Plan: Monitor for now. No indication for surgery at this time.     (H52.13) Myopia of both eyes  Plan: REFRACTION [[id number]]        Discussed findings with patient. New spectacle prescription dispensed to patient. Patient is welcome to return to clinic with prolonged adaptation difficulties.       Complete documentation of historical and exam elements from today's encounter can  be found in the full encounter summary report (not reduplicated in this progress  note). I personally obtained the chief complaint(s) and history of present illness. I  confirmed and edited as necessary the review of systems, past medical/surgical  history, family history, social history, and examination findings as documented by  others; and I examined the patient myself. I personally reviewed the relevant tests,  images, and reports as documented above. I formulated and edited as necessary the  assessment and plan and discussed the findings and management plan with the  patient and family.    Leonardo Melgar OD       Again, thank you for allowing me to participate in the care of your patient.        Sincerely,        Leonardo Melgar OD

## 2021-10-29 NOTE — NURSING NOTE
Chief Complaints and History of Present Illnesses   Patient presents with     Annual Eye Exam       Chief Complaint(s) and History of Present Illness(es)     Annual Eye Exam     Laterality: both eyes              Comments     Patient here for annual Plaquenil eye exam. Taking 300 mg of Plaquenil daily. Rheumatologist is Dr. Bhavin Gibbons at [facility]. Notes that she sometimes has to take glasses off to read (has progressive lenses). No other problems/concerns. Sometimes will use ATs for dryness.                Etta Cedillo, COA

## 2021-10-29 NOTE — PROGRESS NOTES
Assessment/Plan  (Z79.899) High risk medication use  (primary encounter diagnosis)  Comment: No evidence of Plaquenil toxicity. Patient started Plaquenil 300mg daily in early 2018.   Plan: HVF 10-2 OU, SD-OCT Macula Optovue OU (both         eyes)        Discussed findings with patient as well as potential long term risks of continued medication use. Plan on monitoring annually with dilated exam for changes. Return to clinic sooner if vision changes are noted.     (H25.13) Senile nuclear sclerosis, bilateral  Comment: Not at surgical level  Plan: Monitor for now. No indication for surgery at this time.     (H52.13) Myopia of both eyes  Plan: REFRACTION [[id number]]        Discussed findings with patient. New spectacle prescription dispensed to patient. Patient is welcome to return to clinic with prolonged adaptation difficulties.       Complete documentation of historical and exam elements from today's encounter can  be found in the full encounter summary report (not reduplicated in this progress  note). I personally obtained the chief complaint(s) and history of present illness. I  confirmed and edited as necessary the review of systems, past medical/surgical  history, family history, social history, and examination findings as documented by  others; and I examined the patient myself. I personally reviewed the relevant tests,  images, and reports as documented above. I formulated and edited as necessary the  assessment and plan and discussed the findings and management plan with the  patient and family.    Leonardo Melgar OD

## 2021-10-30 NOTE — RESULT ENCOUNTER NOTE
Dear Marcelina  Your kidney function is slightly declined.  Make sure you are drinking plenty of water and avoid ibuprofen, aleve, and other nonsteroidal antiinflammatory medications.  Your hemoglobin is slightly low- but comparable to previous.    Please call or MyChart my office with any questions or concerns.   Kellee Guallpa, PAC

## 2021-11-01 ENCOUNTER — TELEPHONE (OUTPATIENT)
Dept: FAMILY MEDICINE | Facility: CLINIC | Age: 61
End: 2021-11-01

## 2021-11-01 NOTE — TELEPHONE ENCOUNTER
Chart reviewed and agree with assessment and plan.  Cary Turcios MD             Result Kellee Infante PA-C (Physician Assistant)     Family Medicine     Dear Marcelina  Your kidney function is slightly declined.  Make sure you are drinking plenty of water and avoid ibuprofen, aleve, and other nonsteroidal antiinflammatory medications.  Your hemoglobin is slightly low- but comparable to previous.    Please call or MyChart my office with any questions or concerns.   RAO Grant        Patient called back and informed on the results and clearance for the procedure.    Writer changed patient's [employer] password per patient.    Eloise Long RN

## 2021-11-04 ENCOUNTER — TELEPHONE (OUTPATIENT)
Dept: FAMILY MEDICINE | Facility: CLINIC | Age: 61
End: 2021-11-04
Payer: COMMERCIAL

## 2021-11-04 DIAGNOSIS — R94.4 DECREASED GFR: Primary | ICD-10-CM

## 2021-11-04 NOTE — LETTER
November 8, 2021      Marcelina Estevez  [address] [place] [address]        Dear ,    We are writing to inform you of your test results.    {results letter list:[id number]}    No results found from the In Basket message.    If you have any questions or concerns, please call the clinic at the number listed above.       Sincerely,

## 2021-11-04 NOTE — LETTER
November 4, 2021      Marcelina Estevez  [address] [place] [address]    Dear Marcelina  Your kidney function is slightly declined.  Make sure you are drinking plenty of water and avoid ibuprofen, aleve, and other nonsteroidal antiinflammatory medications.  Make sure you are well hydrated and schedule a lab only appointment in approximately 2 weeks to recheck this.   Your hemoglobin is slightly low- but comparable to previous.    Please call or MyChart my office with any questions or concerns.               Sincerely,        Kellee Guallpa PA-C

## 2021-11-09 ENCOUNTER — ANESTHESIA EVENT (OUTPATIENT)
Dept: SURGERY | Facility: AMBULATORY SURGERY CENTER | Age: 61
End: 2021-11-09
Payer: COMMERCIAL

## 2021-11-10 ENCOUNTER — HOSPITAL ENCOUNTER (OUTPATIENT)
Facility: AMBULATORY SURGERY CENTER | Age: 61
End: 2021-11-10
Attending: INTERNAL MEDICINE
Payer: COMMERCIAL

## 2021-11-10 ENCOUNTER — ANESTHESIA (OUTPATIENT)
Dept: SURGERY | Facility: AMBULATORY SURGERY CENTER | Age: 61
End: 2021-11-10
Payer: COMMERCIAL

## 2021-11-10 VITALS
WEIGHT: 140.1 LBS | SYSTOLIC BLOOD PRESSURE: 136 MMHG | BODY MASS INDEX: 26.47 KG/M2 | OXYGEN SATURATION: 97 % | RESPIRATION RATE: 16 BRPM | DIASTOLIC BLOOD PRESSURE: 86 MMHG | TEMPERATURE: 98.1 F

## 2021-11-10 VITALS — HEART RATE: 61 BPM

## 2021-11-10 DIAGNOSIS — D12.3 ADENOMATOUS POLYP OF TRANSVERSE COLON: Primary | ICD-10-CM

## 2021-11-10 LAB — COLONOSCOPY: NORMAL

## 2021-11-10 PROCEDURE — 45381 COLONOSCOPY SUBMUCOUS NJX: CPT

## 2021-11-10 PROCEDURE — 45385 COLONOSCOPY W/LESION REMOVAL: CPT

## 2021-11-10 PROCEDURE — G8918 PT W/O PREOP ORDER IV AB PRO: HCPCS

## 2021-11-10 PROCEDURE — G8907 PT DOC NO EVENTS ON DISCHARG: HCPCS

## 2021-11-10 DEVICE — IMPLANTABLE DEVICE: Type: IMPLANTABLE DEVICE | Site: TRANSVERSE COLON | Status: FUNCTIONAL

## 2021-11-10 RX ORDER — ONDANSETRON 2 MG/ML
4 INJECTION INTRAMUSCULAR; INTRAVENOUS
Status: DISCONTINUED | OUTPATIENT
Start: 2021-11-10 | End: 2021-11-11 | Stop reason: HOSPADM

## 2021-11-10 RX ORDER — ONDANSETRON 4 MG/1
4 TABLET, ORALLY DISINTEGRATING ORAL EVERY 30 MIN PRN
Status: DISCONTINUED | OUTPATIENT
Start: 2021-11-10 | End: 2021-11-11 | Stop reason: HOSPADM

## 2021-11-10 RX ORDER — LIDOCAINE 40 MG/G
CREAM TOPICAL
Status: DISCONTINUED | OUTPATIENT
Start: 2021-11-10 | End: 2021-11-11 | Stop reason: HOSPADM

## 2021-11-10 RX ORDER — PROCHLORPERAZINE MALEATE 10 MG
10 TABLET ORAL EVERY 6 HOURS PRN
Status: DISCONTINUED | OUTPATIENT
Start: 2021-11-10 | End: 2021-11-11 | Stop reason: HOSPADM

## 2021-11-10 RX ORDER — FLUMAZENIL 0.1 MG/ML
0.2 INJECTION, SOLUTION INTRAVENOUS
Status: DISCONTINUED | OUTPATIENT
Start: 2021-11-10 | End: 2021-11-11 | Stop reason: HOSPADM

## 2021-11-10 RX ORDER — ONDANSETRON 4 MG/1
4 TABLET, ORALLY DISINTEGRATING ORAL EVERY 6 HOURS PRN
Status: DISCONTINUED | OUTPATIENT
Start: 2021-11-10 | End: 2021-11-11 | Stop reason: HOSPADM

## 2021-11-10 RX ORDER — NALOXONE HYDROCHLORIDE 0.4 MG/ML
0.4 INJECTION, SOLUTION INTRAMUSCULAR; INTRAVENOUS; SUBCUTANEOUS
Status: DISCONTINUED | OUTPATIENT
Start: 2021-11-10 | End: 2021-11-11 | Stop reason: HOSPADM

## 2021-11-10 RX ORDER — SODIUM CHLORIDE, SODIUM LACTATE, POTASSIUM CHLORIDE, CALCIUM CHLORIDE 600; 310; 30; 20 MG/100ML; MG/100ML; MG/100ML; MG/100ML
INJECTION, SOLUTION INTRAVENOUS CONTINUOUS
Status: DISCONTINUED | OUTPATIENT
Start: 2021-11-10 | End: 2021-11-11 | Stop reason: HOSPADM

## 2021-11-10 RX ORDER — LIDOCAINE HYDROCHLORIDE 20 MG/ML
INJECTION, SOLUTION INFILTRATION; PERINEURAL PRN
Status: DISCONTINUED | OUTPATIENT
Start: 2021-11-10 | End: 2021-11-10

## 2021-11-10 RX ORDER — NALOXONE HYDROCHLORIDE 0.4 MG/ML
0.2 INJECTION, SOLUTION INTRAMUSCULAR; INTRAVENOUS; SUBCUTANEOUS
Status: DISCONTINUED | OUTPATIENT
Start: 2021-11-10 | End: 2021-11-11 | Stop reason: HOSPADM

## 2021-11-10 RX ORDER — PROPOFOL 10 MG/ML
INJECTION, EMULSION INTRAVENOUS CONTINUOUS PRN
Status: DISCONTINUED | OUTPATIENT
Start: 2021-11-10 | End: 2021-11-10

## 2021-11-10 RX ORDER — ONDANSETRON 2 MG/ML
4 INJECTION INTRAMUSCULAR; INTRAVENOUS EVERY 30 MIN PRN
Status: DISCONTINUED | OUTPATIENT
Start: 2021-11-10 | End: 2021-11-11 | Stop reason: HOSPADM

## 2021-11-10 RX ORDER — ONDANSETRON 2 MG/ML
4 INJECTION INTRAMUSCULAR; INTRAVENOUS EVERY 6 HOURS PRN
Status: DISCONTINUED | OUTPATIENT
Start: 2021-11-10 | End: 2021-11-11 | Stop reason: HOSPADM

## 2021-11-10 RX ORDER — PROPOFOL 10 MG/ML
INJECTION, EMULSION INTRAVENOUS PRN
Status: DISCONTINUED | OUTPATIENT
Start: 2021-11-10 | End: 2021-11-10

## 2021-11-10 RX ADMIN — LIDOCAINE HYDROCHLORIDE 60 MG: 20 INJECTION, SOLUTION INFILTRATION; PERINEURAL at 12:18

## 2021-11-10 RX ADMIN — PROPOFOL 30 MG: 10 INJECTION, EMULSION INTRAVENOUS at 12:38

## 2021-11-10 RX ADMIN — PROPOFOL 50 MG: 10 INJECTION, EMULSION INTRAVENOUS at 12:22

## 2021-11-10 RX ADMIN — SODIUM CHLORIDE, SODIUM LACTATE, POTASSIUM CHLORIDE, CALCIUM CHLORIDE: 600; 310; 30; 20 INJECTION, SOLUTION INTRAVENOUS at 12:16

## 2021-11-10 RX ADMIN — PROPOFOL 100 MG: 10 INJECTION, EMULSION INTRAVENOUS at 12:18

## 2021-11-10 RX ADMIN — PROPOFOL 50 MG: 10 INJECTION, EMULSION INTRAVENOUS at 12:20

## 2021-11-10 RX ADMIN — PROPOFOL 150 MCG/KG/MIN: 10 INJECTION, EMULSION INTRAVENOUS at 12:19

## 2021-11-10 ASSESSMENT — LIFESTYLE VARIABLES: TOBACCO_USE: 1

## 2021-11-10 NOTE — ANESTHESIA POSTPROCEDURE EVALUATION
Patient: Marcelina Estevez    Procedure: Procedure(s):  COLONOSCOPY, WITH CO2 INSUFFLATION  COLONOSCOPY, FLEXIBLE, WITH LESION REMOVAL USING SNARE  TATTOOING, WITH COLONOSCOPY  COLONOSCOPY, WITH POLYPECTOMY AND BIOPSY  COLONOSCOPY, WITH HEMORRHAGE CONTROL       Diagnosis:Adenomatous polyp of ascending colon [D12.2]  Diagnosis Additional Information: No value filed.    Anesthesia Type:  MAC    Note:  Disposition: Outpatient   Postop Pain Control: Uneventful            Sign Out: Well controlled pain   PONV: No   Neuro/Psych: Uneventful            Sign Out: Acceptable/Baseline neuro status   Airway/Respiratory: Uneventful            Sign Out: Acceptable/Baseline resp. status   CV/Hemodynamics: Uneventful            Sign Out: Acceptable CV status; No obvious hypovolemia; No obvious fluid overload   Other NRE: NONE   DID A NON-ROUTINE EVENT OCCUR? No           Last vitals:  Vitals Value Taken Time   /86 11/10/21 1352   Temp     Pulse     Resp 16 11/10/21 1352   SpO2 97 % 11/10/21 1352       Electronically Signed By: Diogo Chacko MD  November 10, 2021  3:26 PM

## 2021-11-10 NOTE — ANESTHESIA PREPROCEDURE EVALUATION
Anesthesia Pre-Procedure Evaluation    Patient: Marcelina Estevez   MRN: [id number] : 1960        Preoperative Diagnosis: Adenomatous polyp of ascending colon [D12.2]    Procedure : Procedure(s):  COLONOSCOPY, WITH CO2 INSUFFLATION          Past Medical History:   Diagnosis Date     CREST (calcinosis, Raynaud's phenomenon, esophageal dysfunction, sclerodactyly, telangiectasia) (H)      Hyperlipidemia      Hypertension      Limited systemic sclerosis (H)      Positive MINDY (antinuclear antibody)      Raynaud disease       Past Surgical History:   Procedure Laterality Date     APPENDECTOMY       BIOPSY      cervical node     COLONOSCOPY WITH CO2 INSUFFLATION N/A 10/26/2020    Procedure: COLONOSCOPY, WITH CO2 INSUFFLATION;  Surgeon: Phyllis Chaudhari MD;  Location: [facility]     DILATION AND CURETTAGE, HYSTEROSCOPY DIAGNOSTIC, COMBINED  2014    Procedure: COMBINED DILATION AND CURETTAGE, HYSTEROSCOPY DIAGNOSTIC;  Surgeon: Mana Cabrera DO;  Location: [facility] OR     ENT SURGERY      tonsillectomy and adnoid removal     HYSTERECTOMY TOTAL ABDOMINAL       ORTHOPEDIC SURGERY      left knee tear meniscus     RELEASE CARPAL TUNNEL BILATERAL  2009      Allergies   Allergen Reactions     Seasonal Allergies      Sulfa Drugs      Vomiting       Vicodin [Hydrocodone-Acetaminophen] Nausea     Other Environmental Allergy Other (See Comments)      Social History     Tobacco Use     Smoking status: Former Smoker     Quit date: 2001     Years since quittin.8     Smokeless tobacco: Never Used   Substance Use Topics     Alcohol use: Yes     Comment: social      Wt Readings from Last 1 Encounters:   10/29/21 63.5 kg (140 lb 1.6 oz)        Anesthesia Evaluation   Pt has had prior anesthetic. Type: MAC.    No history of anesthetic complications       ROS/MED HX  ENT/Pulmonary:     (+) tobacco use, Past use,     Neurologic:  - neg neurologic ROS     Cardiovascular: Comment: MI in past due to small vessel disease, not  coronary lesion.    (+) Dyslipidemia hypertension--CAD -past MI --    METS/Exercise Tolerance:     Hematologic:  - neg hematologic  ROS     Musculoskeletal: Comment: Limited systemic sclerosis, CREST syndrome      GI/Hepatic: Comment: Colon polyps      Renal/Genitourinary:     (+) renal disease, type: CRI, Pt does not require dialysis,     Endo:  - neg endo ROS     Psychiatric/Substance Use:     (+) psychiatric history anxiety     Infectious Disease:  - neg infectious disease ROS     Malignancy:  - neg malignancy ROS     Other:  - neg other ROS          Physical Exam    Airway  airway exam normal           Respiratory Devices and Support         Dental  no notable dental history         Cardiovascular   cardiovascular exam normal          Pulmonary   pulmonary exam normal                OUTSIDE LABS:  CBC:   Lab Results   Component Value Date    WBC 7.2 10/29/2021    WBC 6.5 04/19/2021    HGB 11.3 (L) 10/29/2021    HGB 11.3 (L) 09/17/2021    HCT 33.5 (L) 10/29/2021    HCT 34.7 (L) 04/19/2021     10/29/2021     04/19/2021     BMP:   Lab Results   Component Value Date     10/29/2021     (L) 09/17/2021    POTASSIUM 4.1 10/29/2021    POTASSIUM 4.1 09/17/2021    CHLORIDE 102 10/29/2021    CHLORIDE 98 09/17/2021    CO2 27 10/29/2021    CO2 25 09/17/2021    BUN 43 (H) 10/29/2021    BUN 28 09/17/2021    CR 1.01 10/29/2021    CR 0.94 09/17/2021     (H) 10/29/2021    GLC 97 09/17/2021     COAGS: No results found for: PTT, INR, FIBR  POC:   Lab Results   Component Value Date    HCG neg 07/01/2014     HEPATIC:   Lab Results   Component Value Date    ALBUMIN 4.5 04/19/2021    PROTTOTAL 7.8 04/19/2021    ALT 50 04/19/2021    AST 37 04/19/2021    ALKPHOS 73 04/19/2021    BILITOTAL 0.5 04/19/2021     OTHER:   Lab Results   Component Value Date    SERGIO 9.5 10/29/2021    MAG 1.9 05/22/2018    TSH 2.04 12/19/2018    CRP <2.9 06/12/2018    SED 18 05/22/2018       Anesthesia Plan    ASA Status:  3   NPO  Status:  NPO Appropriate    Anesthesia Type: MAC.   Induction: Intravenous, Propofol.   Maintenance: TIVA.        Consents    Anesthesia Plan(s) and associated risks, benefits, and realistic alternatives discussed. Questions answered and patient/representative(s) expressed understanding.     - Discussed with:  Patient      - Extended Intubation/Ventilatory Support Discussed: No.      - Patient is DNR/DNI Status: No    Use of blood products discussed: No .     Postoperative Care       PONV prophylaxis: Ondansetron (or other 5HT-3), Background Propofol Infusion (None required)     Comments:                Diogo Chacko MD

## 2021-11-10 NOTE — ANESTHESIA CARE TRANSFER NOTE
Patient: Marcelina Estevez    Procedure: Procedure(s):  COLONOSCOPY, WITH CO2 INSUFFLATION  COLONOSCOPY, FLEXIBLE, WITH LESION REMOVAL USING SNARE  TATTOOING, WITH COLONOSCOPY  COLONOSCOPY, WITH POLYPECTOMY AND BIOPSY  COLONOSCOPY, WITH HEMORRHAGE CONTROL       Diagnosis: Adenomatous polyp of ascending colon [D12.2]  Diagnosis Additional Information: No value filed.    Anesthesia Type:   MAC     Note:    Oropharynx: oropharynx clear of all foreign objects and spontaneously breathing  Level of Consciousness: awake  Oxygen Supplementation: room air    Independent Airway: airway patency satisfactory and stable  Dentition: dentition unchanged  Vital Signs Stable: post-procedure vital signs reviewed and stable  Report to RN Given: handoff report given  Patient transferred to: Phase II    Handoff Report: Identifed the Patient, Identified the Reponsible Provider, Reviewed the pertinent medical history, Discussed the surgical course, Reviewed Intra-OP anesthesia mangement and issues during anesthesia, Set expectations for post-procedure period and Allowed opportunity for questions and acknowledgement of understanding      Vitals:  Vitals Value Taken Time   BP     Temp     Pulse     Resp     SpO2         Electronically Signed By: EDUARD Benson CRNA  November 10, 2021  1:34 PM

## 2021-11-15 PROCEDURE — 88305 TISSUE EXAM BY PATHOLOGIST: CPT | Performed by: PATHOLOGY

## 2021-11-22 ENCOUNTER — TELEPHONE (OUTPATIENT)
Dept: FAMILY MEDICINE | Facility: CLINIC | Age: 61
End: 2021-11-22
Payer: COMMERCIAL

## 2021-11-22 DIAGNOSIS — I10 HYPERTENSION GOAL BP (BLOOD PRESSURE) < 140/90: ICD-10-CM

## 2021-11-22 RX ORDER — LOSARTAN POTASSIUM 50 MG/1
50 TABLET ORAL DAILY
Qty: 90 TABLET | Refills: 1 | Status: SHIPPED | OUTPATIENT
Start: 2021-11-22 | End: 2022-03-17

## 2021-11-22 NOTE — TELEPHONE ENCOUNTER
Patient wanting colonoscopy results from 11/10    Also needing refill of Losartan    Marleen Doherty BSN, RN

## 2021-11-30 ENCOUNTER — PRE VISIT (OUTPATIENT)
Dept: OTOLARYNGOLOGY | Facility: CLINIC | Age: 61
End: 2021-11-30

## 2021-12-06 NOTE — TELEPHONE ENCOUNTER
This writer attempted to contact Marcelina on 12/06/21      Reason for call Providers result note below and Left VM to give a return call to the nurses at [phone].        If patient calls back:   Relay message from provider, (read verbatim), document that pt called and close encounter        Sandra Campo RN BSN

## 2021-12-06 NOTE — TELEPHONE ENCOUNTER
Patient called back, read the letter per Dr. Sumner's that was forwarded by Dr. Painting.  Patient verbalized understanding and agreement to plan.    Discussed how to transfer refills from her pharmacy in [place] to the pharmacy in [place].  Gave her the fax number for the [facility] in case the [facility] pharmacy needs it.    Marcelle Rosa RN, [facility]

## 2021-12-06 NOTE — TELEPHONE ENCOUNTER
"Please relay result note from Dr. Wall (GI provider who did colonoscopy). This is available on result note.     Marcelina,     I am writing to let you know the results of the polyp that was removed at the time of your colonoscopy.  The polyp returned as an \"adenomatous polyp\".  An adenoma is a type of polyp that if left in the colon over a long enough period of time, and under the right circumstances, it could develop into a colon cancer.  There was no cancer in your polyp.  Your polyp was completely removed, however you are at risk to grow more adenomatous polyps in the future.       As we discussed at the time of your procedure, I recommend that we do a follow-up colonoscopy in 6 months to ensure that there is no residual polyp tissue.      Please review these findings and recommendations with your primary care provider.  Of course should you develop any symptoms (such as unintended weight loss, blood in your stool or change in bowel pattern), you should let myself or your primary care doctor know as you may need an earlier procedure.       If you have any questions, please don't hesitate to contact the GI care coordinator at [phone].     Domingo Wall MD  [facility]  Gastroenterology and Hepatology  [facility]   "

## 2022-01-10 DIAGNOSIS — E78.5 HYPERLIPIDEMIA LDL GOAL <100: ICD-10-CM

## 2022-01-10 NOTE — TELEPHONE ENCOUNTER
Patient calling about the status of this request.    [facility]ax number [facility] in [place] - [phone].    Pt currently out and prefers a 90 day prescription to be sent as soon as possible.    Pt was unable to get the prescription for lorazepam (0.5 mg tablet). Pt is wondering if this could be refilled, even though she was previously told that it would not be able to be refilled, but that she would have to see another provider in [place] to get that one refilled. If it can be refilled, it can be sent to the same pharmacy as the simvastatin.     Thank you,    Mandi Amador,   [facility]

## 2022-01-12 RX ORDER — SIMVASTATIN 40 MG
TABLET ORAL
Qty: 90 TABLET | Refills: 0 | Status: SHIPPED | OUTPATIENT
Start: 2022-01-12 | End: 2022-02-01

## 2022-01-12 NOTE — TELEPHONE ENCOUNTER
Patient calling to check on status of prescription.  She has only 1 left.  Pharmacy has already loaned her some but won't give her any more.

## 2022-01-12 NOTE — TELEPHONE ENCOUNTER
Patient called to check on the status of her simvastatin again. It was sent today by Dr. Painting and I let patient know.    Claritza Duffy RN [facility]

## 2022-02-10 DIAGNOSIS — I10 HYPERTENSION GOAL BP (BLOOD PRESSURE) < 140/90: ICD-10-CM

## 2022-02-10 DIAGNOSIS — G47.00 INSOMNIA, UNSPECIFIED TYPE: ICD-10-CM

## 2022-02-11 RX ORDER — LOSARTAN POTASSIUM 50 MG/1
TABLET ORAL
Qty: 90 TABLET | Refills: 1 | OUTPATIENT
Start: 2022-02-11

## 2022-02-11 RX ORDER — TRAZODONE HYDROCHLORIDE 50 MG/1
TABLET, FILM COATED ORAL
Qty: 90 TABLET | Refills: 0 | OUTPATIENT
Start: 2022-02-11

## 2022-02-17 PROBLEM — G44.209 TENSION HEADACHE: Status: ACTIVE | Noted: 2017-09-28

## 2022-02-21 ENCOUNTER — TELEPHONE (OUTPATIENT)
Dept: FAMILY MEDICINE | Facility: CLINIC | Age: 62
End: 2022-02-21
Payer: COMMERCIAL

## 2022-02-21 NOTE — TELEPHONE ENCOUNTER
Called and left a detailed message for patient to call pharmacy and have them transfer the prescription over.    Sent her a mychart message regarding as well.    Claritza Duffy RN [facility]

## 2022-02-21 NOTE — TELEPHONE ENCOUNTER
Patient is calling and is aware that she has 1 refill left in [place] of her losartan (COZAAR) 50 MG tablet, but she needs it sent to the [facility] [address] [place] [facility] [place], [place] - [address]  as she is completely out of her medication.  Thank you  Joslyn Mays

## 2022-03-17 ENCOUNTER — TELEPHONE (OUTPATIENT)
Dept: GASTROENTEROLOGY | Facility: CLINIC | Age: 62
End: 2022-03-17
Payer: COMMERCIAL

## 2022-03-17 NOTE — TELEPHONE ENCOUNTER
Screening Questions  BlueKIND OF PREP RedLOCATION [review exclusion criteria] GreenSEDATION TYPE  1. Have you had a positive covid test in the last 90 days? N     2. Are you active on mychart? Y    3. What insurance is in the chart? [facility]      3.   Ordering/Referring Provider: MARLON    4. BMI 25.5 [BMI OVER 40-EXTENDED PREP]  If greater than 40 review exclusion criteria [PAC APPT IF @ [facility]]        5.  Respiratory Screening :  [If yes to any of the following HOSPITAL setting only]     Do you use daily home oxygen? N    Do you have mod to severe Obstructive Sleep Apnea? N  [OKAY @ [facility] SH PH RI]   Do you have Pulmonary Hypertension? N     Do you have UNCONTROLLED asthma? N        6.   Have you had a heart or lung transplant? N      7.   Are you currently on dialysis? N [ If yes, G-PREP & HOSPITAL setting only]     8.   Do you have chronic kidney disease? N [ If yes, G-PREP ]    9.   Have you had a stroke or Transient ischemic attack (TIA - aka  mini stroke ) within 6 months?  N (If yes, please review exclusion criteria)    10.   In the past 6 months, have you had any heart related issues including cardiomyopathy or heart attack? N           If yes, did it require cardiac stenting or other implantable device? N      11.   Do you have any implantable devices in your body (pacemaker, defib, LVAD)? N (If yes, please review exclusion criteria)    12.   Do you take nitroglycerin? N           If yes, how often? N  (if yes, HOSPITAL setting ONLY)    13.   Are you currently taking any blood thinners? CHRISTOPHERAY ASP          [IF YES, INFORM PATIENT TO FOLLOW UP W/ ORDERING PROVIDER FOR BRIDGING INSTRUCTIONS]     14.   Do you have a diagnosis of diabetes? N   [ If yes, G-PREP ]    15.   [FEMALES] Are you currently pregnant? N    If yes, how many weeks? N    16.   Are you taking any prescription pain medications on a routine schedule?  N  [ If yes, EXTENDED PREP.] [If yes, MAC]    17.   Do you have any chemical  dependencies such as alcohol, street drugs, or methadone?  N [If yes, MAC]    18.   Do you have any history of post-traumatic stress syndrome, severe anxiety or history of psychosis?  Y  [If yes, MAC]    19.   Do you have a significant intellectual disability?  N [If yes, MAC]    20.   Do you transfer independently?  Y    21.  On a regular basis do you go 3-5 days between bowel movements? N   [ If yes, EXTENDED PREP.]    22.   Preferred LOCAL Pharmacy for Pre Prescription     [facility] [place], [place] - [address], [address]  [facility] [place], [place] - [address] [address] [place], [place] - [address]      Scheduling Details      Caller : QUAN  (Please ask for phone number if not scheduled by patient)    Type of Procedure Scheduled: COLON  Which Colonoscopy Prep was Sent?: DOLORES M   [facility] PATIENTS & GROEN'S PATIENTS NEEDS EXTENDED PREP  Surgeon: MARLON   Date of Procedure: 6/6  Location:       Sedation Type: MAC  Conscious Sedation- Needs  for 6 hours after the procedure  MAC/General-Needs  for 24 hours after procedure    Pre-op Required at [facility] [place], [place] and OR for MAC sedation: Y  (advise patient they will need a pre-op prior to procedure -)      Informed patient they will need an adult  Y  Cannot take any type of public or medical transportation alone    Pre-Procedure Covid test to be completed at [facility] or Externally: Y    Confirmed Nurse will call to complete assessment Y    Additional comments:

## 2022-03-28 ENCOUNTER — TRANSFERRED RECORDS (OUTPATIENT)
Dept: HEALTH INFORMATION MANAGEMENT | Facility: CLINIC | Age: 62
End: 2022-03-28
Payer: COMMERCIAL

## 2022-03-30 ENCOUNTER — TRANSFERRED RECORDS (OUTPATIENT)
Dept: HEALTH INFORMATION MANAGEMENT | Facility: CLINIC | Age: 62
End: 2022-03-30
Payer: COMMERCIAL

## 2022-04-04 ENCOUNTER — TELEPHONE (OUTPATIENT)
Dept: FAMILY MEDICINE | Facility: CLINIC | Age: 62
End: 2022-04-04
Payer: COMMERCIAL

## 2022-04-04 NOTE — TELEPHONE ENCOUNTER
Reason for Call:  Other appointment    Detailed comments: INF - Patient was at [facility] for 4 days with colisits?   Anytime with Dr. Painting as soon as possible. Patient is aware that Dr. Painting is out this week but next week will be okay.       Phone Number Patient can be reached at: Cell number on file:    Telephone Information:   Mobile [phone]       Best Time: Anytime    Can we leave a detailed message on this number? YES    Call taken on 4/4/2022 at 10:53 AM by Mila Wetzel

## 2022-04-08 ENCOUNTER — MYC MEDICAL ADVICE (OUTPATIENT)
Dept: FAMILY MEDICINE | Facility: CLINIC | Age: 62
End: 2022-04-08
Payer: COMMERCIAL

## 2022-04-08 DIAGNOSIS — E78.5 HYPERLIPIDEMIA LDL GOAL <100: ICD-10-CM

## 2022-04-08 NOTE — TELEPHONE ENCOUNTER
Received call from pharmacist at [facility] in [facility].   The patient is asking for a new Rx for Simvastatin medication.  Last prescribed 2/1/22 for 90 tabs to a Pharmacy in [place].  Patient should have enough until end of April.  Patient has video visit on 4/21/22 with .    Called patient and left message on voicemail.  Also left message that [employer] message sent.    Marcelle Rosa RN, [facility]

## 2022-04-11 ENCOUNTER — TELEPHONE (OUTPATIENT)
Dept: GASTROENTEROLOGY | Facility: CLINIC | Age: 62
End: 2022-04-11

## 2022-04-11 ENCOUNTER — OFFICE VISIT (OUTPATIENT)
Dept: FAMILY MEDICINE | Facility: CLINIC | Age: 62
End: 2022-04-11
Payer: COMMERCIAL

## 2022-04-11 ENCOUNTER — TELEPHONE (OUTPATIENT)
Dept: FAMILY MEDICINE | Facility: CLINIC | Age: 62
End: 2022-04-11

## 2022-04-11 VITALS
DIASTOLIC BLOOD PRESSURE: 60 MMHG | WEIGHT: 134.5 LBS | RESPIRATION RATE: 20 BRPM | SYSTOLIC BLOOD PRESSURE: 110 MMHG | BODY MASS INDEX: 26.41 KG/M2 | HEIGHT: 60 IN | TEMPERATURE: 98.5 F | HEART RATE: 68 BPM | OXYGEN SATURATION: 98 %

## 2022-04-11 DIAGNOSIS — F33.1 MODERATE EPISODE OF RECURRENT MAJOR DEPRESSIVE DISORDER (H): ICD-10-CM

## 2022-04-11 DIAGNOSIS — E87.1 HYPONATREMIA: ICD-10-CM

## 2022-04-11 DIAGNOSIS — K55.9 ISCHEMIC COLITIS (H): Primary | ICD-10-CM

## 2022-04-11 DIAGNOSIS — R94.4 DECREASED GFR: ICD-10-CM

## 2022-04-11 DIAGNOSIS — E78.5 HYPERLIPIDEMIA LDL GOAL <100: ICD-10-CM

## 2022-04-11 DIAGNOSIS — I20.1 CORONARY VASOSPASM (H): ICD-10-CM

## 2022-04-11 DIAGNOSIS — D64.9 ANEMIA, UNSPECIFIED TYPE: ICD-10-CM

## 2022-04-11 DIAGNOSIS — I10 HYPERTENSION GOAL BP (BLOOD PRESSURE) < 140/90: ICD-10-CM

## 2022-04-11 DIAGNOSIS — N18.1 CKD (CHRONIC KIDNEY DISEASE) STAGE 1, GFR 90 ML/MIN OR GREATER: ICD-10-CM

## 2022-04-11 DIAGNOSIS — M34.1 CREST (CALCINOSIS, RAYNAUD'S PHENOMENON, ESOPHAGEAL DYSFUNCTION, SCLERODACTYLY, TELANGIECTASIA) (H): ICD-10-CM

## 2022-04-11 DIAGNOSIS — Z12.31 ENCOUNTER FOR SCREENING MAMMOGRAM FOR BREAST CANCER: ICD-10-CM

## 2022-04-11 LAB
ALBUMIN SERPL-MCNC: 3.4 G/DL (ref 3.4–5)
ALP SERPL-CCNC: 55 U/L (ref 40–150)
ALT SERPL W P-5'-P-CCNC: 20 U/L (ref 0–50)
ANION GAP SERPL CALCULATED.3IONS-SCNC: 7 MMOL/L (ref 3–14)
AST SERPL W P-5'-P-CCNC: 15 U/L (ref 0–45)
BASOPHILS # BLD AUTO: 0 10E3/UL (ref 0–0.2)
BASOPHILS NFR BLD AUTO: 1 %
BILIRUB SERPL-MCNC: 0.3 MG/DL (ref 0.2–1.3)
BUN SERPL-MCNC: 30 MG/DL (ref 7–30)
CALCIUM SERPL-MCNC: 9.5 MG/DL (ref 8.5–10.1)
CHLORIDE BLD-SCNC: 101 MMOL/L (ref 94–109)
CO2 SERPL-SCNC: 25 MMOL/L (ref 20–32)
CREAT SERPL-MCNC: 1.13 MG/DL (ref 0.52–1.04)
EOSINOPHIL # BLD AUTO: 0.1 10E3/UL (ref 0–0.7)
EOSINOPHIL NFR BLD AUTO: 2 %
ERYTHROCYTE [DISTWIDTH] IN BLOOD BY AUTOMATED COUNT: 13.2 % (ref 10–15)
GFR SERPL CREATININE-BSD FRML MDRD: 55 ML/MIN/1.73M2
GLUCOSE BLD-MCNC: 94 MG/DL (ref 70–99)
HCT VFR BLD AUTO: 29.9 % (ref 35–47)
HGB BLD-MCNC: 9.8 G/DL (ref 11.7–15.7)
LYMPHOCYTES # BLD AUTO: 0.8 10E3/UL (ref 0.8–5.3)
LYMPHOCYTES NFR BLD AUTO: 10 %
MCH RBC QN AUTO: 31.3 PG (ref 26.5–33)
MCHC RBC AUTO-ENTMCNC: 32.8 G/DL (ref 31.5–36.5)
MCV RBC AUTO: 96 FL (ref 78–100)
MONOCYTES # BLD AUTO: 1.2 10E3/UL (ref 0–1.3)
MONOCYTES NFR BLD AUTO: 15 %
NEUTROPHILS # BLD AUTO: 6.2 10E3/UL (ref 1.6–8.3)
NEUTROPHILS NFR BLD AUTO: 73 %
PLATELET # BLD AUTO: 301 10E3/UL (ref 150–450)
POTASSIUM BLD-SCNC: 4.5 MMOL/L (ref 3.4–5.3)
PROT SERPL-MCNC: 7.2 G/DL (ref 6.8–8.8)
RBC # BLD AUTO: 3.13 10E6/UL (ref 3.8–5.2)
SODIUM SERPL-SCNC: 133 MMOL/L (ref 133–144)
WBC # BLD AUTO: 8.4 10E3/UL (ref 4–11)

## 2022-04-11 PROCEDURE — 85025 COMPLETE CBC W/AUTO DIFF WBC: CPT | Performed by: NURSE PRACTITIONER

## 2022-04-11 PROCEDURE — 82607 VITAMIN B-12: CPT | Performed by: NURSE PRACTITIONER

## 2022-04-11 PROCEDURE — 96127 BRIEF EMOTIONAL/BEHAV ASSMT: CPT | Performed by: NURSE PRACTITIONER

## 2022-04-11 PROCEDURE — 82728 ASSAY OF FERRITIN: CPT | Performed by: NURSE PRACTITIONER

## 2022-04-11 PROCEDURE — 83550 IRON BINDING TEST: CPT | Performed by: NURSE PRACTITIONER

## 2022-04-11 PROCEDURE — 36415 COLL VENOUS BLD VENIPUNCTURE: CPT | Performed by: NURSE PRACTITIONER

## 2022-04-11 PROCEDURE — 99214 OFFICE O/P EST MOD 30 MIN: CPT | Performed by: NURSE PRACTITIONER

## 2022-04-11 PROCEDURE — 80053 COMPREHEN METABOLIC PANEL: CPT | Performed by: NURSE PRACTITIONER

## 2022-04-11 RX ORDER — NIFEDIPINE 30 MG
30 TABLET, EXTENDED RELEASE ORAL DAILY
COMMUNITY
Start: 2022-03-14 | End: 2022-04-21

## 2022-04-11 RX ORDER — PANTOPRAZOLE SODIUM 40 MG/1
TABLET, DELAYED RELEASE ORAL
COMMUNITY
Start: 2022-03-31 | End: 2022-04-11

## 2022-04-11 RX ORDER — PANTOPRAZOLE SODIUM 40 MG/1
40 TABLET, DELAYED RELEASE ORAL DAILY
Qty: 90 TABLET | Refills: 1 | Status: SHIPPED | OUTPATIENT
Start: 2022-04-11 | End: 2022-06-16

## 2022-04-11 RX ORDER — HYDROCHLOROTHIAZIDE 25 MG/1
25 TABLET ORAL DAILY
Qty: 90 TABLET | Refills: 2 | Status: SHIPPED | OUTPATIENT
Start: 2022-04-11 | End: 2022-11-22

## 2022-04-11 ASSESSMENT — PATIENT HEALTH QUESTIONNAIRE - PHQ9
10. IF YOU CHECKED OFF ANY PROBLEMS, HOW DIFFICULT HAVE THESE PROBLEMS MADE IT FOR YOU TO DO YOUR WORK, TAKE CARE OF THINGS AT HOME, OR GET ALONG WITH OTHER PEOPLE: VERY DIFFICULT
SUM OF ALL RESPONSES TO PHQ QUESTIONS 1-9: 11
SUM OF ALL RESPONSES TO PHQ QUESTIONS 1-9: 11

## 2022-04-11 ASSESSMENT — ANXIETY QUESTIONNAIRES
GAD7 TOTAL SCORE: 6
7. FEELING AFRAID AS IF SOMETHING AWFUL MIGHT HAPPEN: SEVERAL DAYS
1. FEELING NERVOUS, ANXIOUS, OR ON EDGE: SEVERAL DAYS
6. BECOMING EASILY ANNOYED OR IRRITABLE: SEVERAL DAYS
GAD7 TOTAL SCORE: 6
7. FEELING AFRAID AS IF SOMETHING AWFUL MIGHT HAPPEN: SEVERAL DAYS
4. TROUBLE RELAXING: SEVERAL DAYS
2. NOT BEING ABLE TO STOP OR CONTROL WORRYING: SEVERAL DAYS
GAD7 TOTAL SCORE: 6
5. BEING SO RESTLESS THAT IT IS HARD TO SIT STILL: NOT AT ALL
3. WORRYING TOO MUCH ABOUT DIFFERENT THINGS: SEVERAL DAYS

## 2022-04-11 ASSESSMENT — PAIN SCALES - GENERAL: PAINLEVEL: MODERATE PAIN (5)

## 2022-04-11 NOTE — TELEPHONE ENCOUNTER
Patient called.      1.  She received a message over [employer] that her labs are resulted.  Would like to know the results. Please advise    2.  Her Virtual Visit for GI is not until September, in person GI is January.      Routing to provider to please review and advise.  Do you suggest [place] [facility]?    Marcelle Rosa RN, [facility]

## 2022-04-11 NOTE — TELEPHONE ENCOUNTER
[facility] Call Center    Phone Message    May a detailed message be left on voicemail: yes     Reason for Call: Other:      Pt is scheduled for an appt on 09/7/2022, but her referral states she will need to be seen in 1-2 weeks.    Please review and call pt to discuss an earlier appt.    Action Taken: Message routed to:  [facility] Gastro    Travel Screening: Not Applicable

## 2022-04-12 ENCOUNTER — TELEPHONE (OUTPATIENT)
Dept: FAMILY MEDICINE | Facility: CLINIC | Age: 62
End: 2022-04-12
Payer: COMMERCIAL

## 2022-04-12 LAB
FERRITIN SERPL-MCNC: 119 NG/ML (ref 8–252)
IRON SATN MFR SERPL: 13 % (ref 15–46)
IRON SERPL-MCNC: 41 UG/DL (ref 35–180)
TIBC SERPL-MCNC: 310 UG/DL (ref 240–430)
VIT B12 SERPL-MCNC: 854 PG/ML (ref 193–986)

## 2022-04-12 RX ORDER — SIMVASTATIN 40 MG
TABLET ORAL
Qty: 30 TABLET | Refills: 0 | Status: SHIPPED | OUTPATIENT
Start: 2022-04-12 | End: 2022-04-13

## 2022-04-12 ASSESSMENT — ANXIETY QUESTIONNAIRES: GAD7 TOTAL SCORE: 6

## 2022-04-12 ASSESSMENT — PATIENT HEALTH QUESTIONNAIRE - PHQ9: SUM OF ALL RESPONSES TO PHQ QUESTIONS 1-9: 11

## 2022-04-12 NOTE — TELEPHONE ENCOUNTER
Per Dr. Wall, okay for patient to be scheduled with him. LPN left message for patient requesting a return call to reschedule with Dr. Wall in a [place]AR[place] spot.     Magi Cross LPN

## 2022-04-12 NOTE — TELEPHONE ENCOUNTER
Called patient to inform of [place] [facility].    She got an appointment with Dr. Wall in [facility] on 5/17/22.       Read the [employer] message  from EDUARD Hooper CNP that was sent to the patient  Today    April 12, 2022    Keerthi Burr APRN CNP  to Marcelina Estevez      10:14 AM  Michael Hewitt   We will have you recheck your levels when you come in to see Dr Painting  Your labs actually did not look bad except anemia which is not unexpected based on your experience   Please let me know when you get into [facility]   Take care  Keerthi Burr, NP, EDUARD CNP      Can she slowly start to introduce fiber?   She has lost a little weight, not much though. She is feeling better lately.  Should patient wait until she sees Dr. Wall?    Patient needs simvastatin. She would like 30 days due to she does not want to be stuck with Rx if she needs to change dose.  This RN sent Rx per RN protocol in separate encounter.    Marcelle Rosa RN, [facility]

## 2022-04-12 NOTE — TELEPHONE ENCOUNTER
Patient calling because she isn't able to get the results form biopsies from colonoscopy, she was told her provider needs to request.  [facility]  [address]  [address]  [facility]  [address]    Dr. Sobia Tobias MD did the biopsy, patient wasn't sure of the exact date but thought it was about 3/28/22-3/31/22      Please advise

## 2022-04-12 NOTE — TELEPHONE ENCOUNTER
Patient returned call and has been scheduled for an in person visit with Dr. Wall on 5/17/22.     Magi Cross LPN

## 2022-04-12 NOTE — RESULT ENCOUNTER NOTE
Cyndie Estevez,    Attached are your test results.  -Hemoglobin is decreased indicating anemia.  Anemia can cause fatigue and, occasionally, light-headedness.  ADVISE: added some labs to your labs to check this out further   -White blood cell and platelet counts are normal.Will recheck in 2 weeks   -Liver and gallbladder tests (ALT,AST, Alk phos,bilirubin) are normal.  -Kidney function (GFR) is decreased.  ADVISE: rechecking this in 2 weeks   -Sodium is normal.  -Potassium is normal.  -Calcium is normal.  -Glucose is normal.   Please contact us if you have any questions.    Keerthi Burr, CNP

## 2022-04-12 NOTE — TELEPHONE ENCOUNTER
Spoke to patient. She would like 30 days due to she will have fasting labs soon.      Prescription approved per [facility] Refill Protocol.      Marcelle Rosa RN, [facility]

## 2022-04-13 RX ORDER — SIMVASTATIN 40 MG
TABLET ORAL
Qty: 30 TABLET | Refills: 0 | Status: SHIPPED | OUTPATIENT
Start: 2022-04-13 | End: 2022-06-16

## 2022-04-13 NOTE — TELEPHONE ENCOUNTER
Writer spoke with ROBERT at [facility], she was going to get the biopsy results faxed to us.  Spoke with patient, she requested records sent to her and a copy faxed to Dr. Donovan in GI

## 2022-04-13 NOTE — TELEPHONE ENCOUNTER
Called patient and relayed provider message.     Pt also wondering if Keerthi Burr was able to review her biopsy results that were faxed to the office.     Will route to provider to see if they were able to review results - Pt would like call back with any information.     Morenita Le RN, BSN  [facility]

## 2022-04-13 NOTE — TELEPHONE ENCOUNTER
Report received, mailed to patient, faxed to Dr. Donovan at [phone] and sent to abstraction for scanning into patients chart

## 2022-04-13 NOTE — TELEPHONE ENCOUNTER
I would stay bland and keep follow up appointment with Dr Painting as scheduled   Appointments in Next Year    Apr 21, 2022 10:40 AM  (Arrive by 10:20 AM)  Provider Visit with Annette Painting MD PhD  [facility] ([facility] ) [phone]   May 17, 2022 12:45 PM  New Visit with Domingo Wall MD  [facility] ([facility]) [phone]

## 2022-04-14 NOTE — TELEPHONE ENCOUNTER
TC team will assist with this. They will make copies of results and place on provider's desk for review.     FYI.     Thank you,   Morenita Le RN, BSN  [facility]

## 2022-04-17 PROBLEM — I20.1 CORONARY VASOSPASM (H): Status: ACTIVE | Noted: 2022-04-17

## 2022-04-21 ENCOUNTER — TELEPHONE (OUTPATIENT)
Dept: FAMILY MEDICINE | Facility: CLINIC | Age: 62
End: 2022-04-21
Payer: COMMERCIAL

## 2022-04-21 ENCOUNTER — VIRTUAL VISIT (OUTPATIENT)
Dept: FAMILY MEDICINE | Facility: CLINIC | Age: 62
End: 2022-04-21
Payer: COMMERCIAL

## 2022-04-21 DIAGNOSIS — I20.1 CORONARY VASOSPASM (H): ICD-10-CM

## 2022-04-21 DIAGNOSIS — N18.1 CKD (CHRONIC KIDNEY DISEASE) STAGE 1, GFR 90 ML/MIN OR GREATER: ICD-10-CM

## 2022-04-21 DIAGNOSIS — Z13.220 SCREENING FOR HYPERLIPIDEMIA: ICD-10-CM

## 2022-04-21 DIAGNOSIS — F41.0 PANIC ATTACK: ICD-10-CM

## 2022-04-21 DIAGNOSIS — F41.1 GAD (GENERALIZED ANXIETY DISORDER): ICD-10-CM

## 2022-04-21 DIAGNOSIS — Z09 HOSPITAL DISCHARGE FOLLOW-UP: Primary | ICD-10-CM

## 2022-04-21 DIAGNOSIS — F33.0 MILD EPISODE OF RECURRENT MAJOR DEPRESSIVE DISORDER (H): ICD-10-CM

## 2022-04-21 DIAGNOSIS — K55.039 ACUTE ISCHEMIC COLITIS (H): ICD-10-CM

## 2022-04-21 PROCEDURE — 96127 BRIEF EMOTIONAL/BEHAV ASSMT: CPT | Mod: TEL | Performed by: INTERNAL MEDICINE

## 2022-04-21 PROCEDURE — 99215 OFFICE O/P EST HI 40 MIN: CPT | Mod: TEL | Performed by: INTERNAL MEDICINE

## 2022-04-21 ASSESSMENT — PATIENT HEALTH QUESTIONNAIRE - PHQ9
SUM OF ALL RESPONSES TO PHQ QUESTIONS 1-9: 13
10. IF YOU CHECKED OFF ANY PROBLEMS, HOW DIFFICULT HAVE THESE PROBLEMS MADE IT FOR YOU TO DO YOUR WORK, TAKE CARE OF THINGS AT HOME, OR GET ALONG WITH OTHER PEOPLE: SOMEWHAT DIFFICULT
SUM OF ALL RESPONSES TO PHQ QUESTIONS 1-9: 13

## 2022-04-21 NOTE — PROGRESS NOTES
Marcelina is a 62 year old who is being evaluated via a billable telephone visit.      What phone number would you like to be contacted at? [phone]  How would you like to obtain your AVS? Mail a copy     Assessment & Plan     Marcelina was seen today for results, mental health problem and hospital f/u.    Diagnoses and all orders for this visit:    Hospital discharge follow-up    Screening for hyperlipidemia  -     Lipid panel reflex to direct LDL Fasting; Future    Coronary vasospasm (H)    CKD (chronic kidney disease) stage 1, GFR 90 ml/min or greater  -     Albumin Random Urine Quantitative with Creat Ratio; Future    MARCELINA (generalized anxiety disorder)    Mild episode of recurrent major depressive disorder (H)  -     OR BEHAV ASSMT W/SCORE & DOCD/STAND INSTRUMENT    Panic attack  -     LORazepam (ATIVAN) 0.5 MG tablet; Take 1 tablet (0.5 mg) by mouth daily as needed (panic attack)    Acute ischemic colitis (H)  Comments:  resolved. has GI follow up scheduled.          Review of prior external note(s) from - Outside records from [facility]      Overall patient has recovered for the episode of ischemic colitis. She has scheduled GI consult in May and follow up colonoscopy for June. Depression/anxiety not optimal but unable to increase wellbutrin or fluoxetine dose due to her creatinine clearance.     HTN: okay to go off hydrochlorothiazide as long as BP is < 140/90    Return in about 4 months (around 8/21/2022) for Physical Exam, Fasting Lab appointment.    I spent a total of 42 minutes on the day of the visit.  doing chart review, history and exam, documentation and further activities as noted above       Annette Painting MD PhD  [facility]   Marcelina is a 62 year old who presents for the following health issues     History of Present Illness       Mental Health Follow-up:                    Today's PHQ-9         PHQ-9 Total Score: 13  PHQ-9 Q9 Thoughts of better off  dead/self-harm past 2 weeks :   (P) Not at all    How difficult have these problems made it for you to do your work, take care of things at home, or get along with other people: Somewhat difficult        Reason for visit:  Medication check    She eats 0-1 servings of fruits and vegetables daily.She consumes 3 sweetened beverage(s) daily.She exercises with enough effort to increase her heart rate 9 or less minutes per day.  She exercises with enough effort to increase her heart rate 3 or less days per week.   She is taking medications regularly.         Hospital Follow-up Visit:    Hospital/Nursing Home/IP Rehab Facility: [facility]  Date of Admission: 3/28/2022  Date of Discharge: 3/31/2022  Reason(s) for Admission: Severe Ischemic Colitis   Pt has had a visit with Ira Burr regarding a hospital follow-up but pt was told to keep this appt to go over more complex concerns.       Was your hospitalization related to COVID-19? No   Problems taking medications regularly:  None  Medication changes since discharge: Added some medications for therapy.   Problems adhering to non-medication therapy:  None    Summary of hospitalization:    Diagnostic Tests/Treatments reviewed.  Follow up needed: none  Other Healthcare Providers Involved in Patient s Care:         Specialist appointment - Gastroenterology  Update since discharge: improved.     Post Discharge Medication Reconciliation: discharge medications reconciled and changed, per note/orders.  Plan of care communicated with patient        Medication check, pt has been having troubles getting them while she is in [place]  Pt is also concerned with her recent lab results   Pt has lots of concerns, she seems very confused on what her next steps in her health should be.      While she was in the hospital, they didn't give her medication. She was not taking ASA, Lorazepam, hydrochlorothiazide.     Her BP has been normal without hydrochlorothiazide     In  terms of anxiety. She has not been able to take Lorazepam. At times, she feels she could take it.         Review of Systems   Constitutional, HEENT, cardiovascular, pulmonary, gi and gu systems are negative, except as otherwise noted.      Objective           Vitals:  No vitals were obtained today due to virtual visit.    Physical Exam   healthy, alert and no distress  PSYCH: Alert and oriented times 3; coherent speech, normal   rate and volume, able to articulate logical thoughts, able   to abstract reason, no tangential thoughts, no hallucinations   or delusions  Her affect is normal  RESP: No cough, no audible wheezing, able to talk in full sentences  Remainder of exam unable to be completed due to telephone visits    PHQ-9 SCORE 8/17/2021 4/11/2022 4/21/2022   PHQ-9 Total Score - - -   PHQ-9 Total Score MyChart 7 (Mild depression) 11 (Moderate depression) 13 (Moderate depression)   PHQ-9 Total Score 7 11 13     MARCELINA-7 SCORE 5/13/2021 8/17/2021 4/11/2022   Total Score - - -   Total Score - 10 (moderate anxiety) 6 (mild anxiety)   Total Score 9 10 6       Phone call duration: 30 minutes

## 2022-04-22 ASSESSMENT — PATIENT HEALTH QUESTIONNAIRE - PHQ9: SUM OF ALL RESPONSES TO PHQ QUESTIONS 1-9: 13

## 2022-04-28 RX ORDER — LORAZEPAM 0.5 MG/1
0.5 TABLET ORAL DAILY PRN
Qty: 30 TABLET | Refills: 1 | Status: SHIPPED | OUTPATIENT
Start: 2022-04-28 | End: 2022-06-16

## 2022-04-29 DIAGNOSIS — F41.1 GAD (GENERALIZED ANXIETY DISORDER): ICD-10-CM

## 2022-04-29 RX ORDER — FLUOXETINE 40 MG/1
CAPSULE ORAL
Qty: 90 CAPSULE | Refills: 1 | Status: SHIPPED | OUTPATIENT
Start: 2022-04-29 | End: 2022-08-26

## 2022-05-09 ENCOUNTER — TELEPHONE (OUTPATIENT)
Dept: FAMILY MEDICINE | Facility: CLINIC | Age: 62
End: 2022-05-09
Payer: COMMERCIAL

## 2022-05-09 DIAGNOSIS — F41.1 GAD (GENERALIZED ANXIETY DISORDER): ICD-10-CM

## 2022-05-09 DIAGNOSIS — R30.0 DYSURIA: Primary | ICD-10-CM

## 2022-05-09 DIAGNOSIS — N89.8 VAGINAL ITCHING: ICD-10-CM

## 2022-05-09 NOTE — TELEPHONE ENCOUNTER
Patient is calling requesting Dr. Painting to send in an Rx for a possible vaginal infection.     Pt reports she has had frequent urination and discomfort with urinating this morning.     She is worried this could develop into something serious.   Advised we would need a visit to address and prescribe a new medication.     Pt is requesting message is sent to PCP anyways.   Advised I could send a message, she is requesting to meet with Dr. Painting virtually, unfortunately there is no appointments today.     Advised any new or worsening symptoms to be seen in UC (fever, abdominal pain, vomiting, blood with urination, etc.).     Routing to provider to review/advise.,     Morenita Le RN, BSN  [facility]

## 2022-05-10 NOTE — TELEPHONE ENCOUNTER
Please add on tomorrow for phone visit.   Should get UA/wet prep done before hand so we know if this is UTI or vaginal yeast infection. Ordered. Pt can go to any [place] clinic to do before the appointment.

## 2022-05-10 NOTE — TELEPHONE ENCOUNTER
Attempted to contact patient to add onto schedule and relay message below.   No answer, requested a call back to RN team.   Pt was also provided with FNA number for after hours.     If Pt returns call, please relay message below and add onto Dr. Painting's schedule tomorrow for a phone visit.     Morenita Le RN, BSN  [facility]

## 2022-05-10 NOTE — TELEPHONE ENCOUNTER
Patient returned call - added her on for a phone visit tomorrow at 2:00pm, 5/11/22.    She is unsure if she will be able to get in for labs prior to her phone visit.     Routing as CARMELITA Le RN, BSN  [facility]

## 2022-05-11 RX ORDER — FLUOXETINE 40 MG/1
CAPSULE ORAL
Qty: 90 CAPSULE | Refills: 1 | OUTPATIENT
Start: 2022-05-11

## 2022-05-11 NOTE — TELEPHONE ENCOUNTER
Duplicate order prescription filled on 4/29/22 with a 90 day supply and 1 refills.   Addie Tran RN, BSN   [facility]

## 2022-05-17 ENCOUNTER — OFFICE VISIT (OUTPATIENT)
Dept: GASTROENTEROLOGY | Facility: CLINIC | Age: 62
End: 2022-05-17
Payer: COMMERCIAL

## 2022-05-17 VITALS
SYSTOLIC BLOOD PRESSURE: 110 MMHG | OXYGEN SATURATION: 100 % | HEART RATE: 72 BPM | WEIGHT: 135.9 LBS | DIASTOLIC BLOOD PRESSURE: 69 MMHG | HEIGHT: 60 IN | BODY MASS INDEX: 26.68 KG/M2

## 2022-05-17 DIAGNOSIS — K55.9 ISCHEMIC COLITIS (H): ICD-10-CM

## 2022-05-17 DIAGNOSIS — Z86.0100 HISTORY OF COLONIC POLYPS: Primary | ICD-10-CM

## 2022-05-17 DIAGNOSIS — D50.0 IRON DEFICIENCY ANEMIA DUE TO CHRONIC BLOOD LOSS: ICD-10-CM

## 2022-05-17 PROCEDURE — 99214 OFFICE O/P EST MOD 30 MIN: CPT | Performed by: INTERNAL MEDICINE

## 2022-05-17 ASSESSMENT — PAIN SCALES - GENERAL: PAINLEVEL: NO PAIN (0)

## 2022-05-17 NOTE — LETTER
"    5/17/2022         RE: Marcelina Estevez  [id number] [place] [address]        Dear Colleague,    Thank you for referring your patient, Marcelina Estevez, to the [facility]. Please see a copy of my visit note below.          GASTROENTEROLOGY FOLLOW UP CLINIC VISIT    CC/REFERRING MD:    Annette Painting    REASON FOR CONSULTATION:   Keerthi Burr for   Chief Complaint   Patient presents with     New Patient     Ischemic Colitis       HISTORY OF PRESENT ILLNESS:    Marcelina Estevez is 62 year old female who presents for follow up of ischemic colitis.  We previously have known this patient due to history of large colon polyps.  In September 2021 she was noted to have a large hepatic flexure polyp which was resected and then in November 2021 a 20 mm transverse colon polyp was resected.  She was planned for another follow-up colonoscopy in June of this year.  However, she reports that in March 2022 while in [place] she developed abdominal pain along with blood in the stool.  The pain was quite severe.  She went to the emergency room and CT noncontrast noted proximal small bowel loops with enteritis.  A colonoscopy was performed which noted severe colitis with edema and luminal narrowing of the ascending colon which was not traversed.  Biopsies were taken and these were consistent with ulcerative colitis.  She reports that she had been golfing prior but had not been particularly dehydrated.  There were no new medications.  She has no history of thromboembolic disease.  There are no similar episodes in the past.  Colonoscopy currently scheduled a few days prior to her son's wedding which is on June 11.    PHYSICAL EXAMINATION:  Constitutional: aaox3, cooperative, pleasant, not dyspneic/diaphoretic, no acute distress  Vitals reviewed: /69 (BP Location: Left arm, Patient Position: Sitting, Cuff Size: Adult Regular)   Pulse 72   Ht 1.534 m (5' 0.4\")   Wt 61.6 kg (135 " lb 14.4 oz)   LMP 06/12/2014   SpO2 100%   BMI 26.19 kg/m    Wt:   Wt Readings from Last 2 Encounters:   05/17/22 61.6 kg (135 lb 14.4 oz)   04/11/22 61 kg (134 lb 8 oz)      Eyes: Sclera anicteric/injected  Ears/nose/mouth/throat: Normal oropharynx without ulcers or exudate, mucus membranes moist, hearing intact  Neck: supple, thyroid normal size  CV: No edema  Respiratory: Unlabored breathing  Lymph: No submandibular, supraclavicular or inguinal lymphadenopathy  Abd:  Nondistended, no masses, +bs, no hepatosplenomegaly, nontender, no peritoneal signs  Skin: warm, perfused, no jaundice  Psych: Normal affect  MSK: Normal gait    Pertinent labs and imaging have been reviewed.    ASSESSMENT/PLAN:    Marcelina Estevez is a 62 year old female who presents for follow up of ischemic colitis and history of large adenomatous colon polyps.  Review of records from [place] notes CT noncontrast with findings of possible small bowel enteritis and then colonoscopy with isolated right colonic ischemia.  We discussed today that the finding of isolated right colonic ischemia is concerning.  There can be underlying vascular compromise which can create small bowel along with right colonic presentations.  We note that she has mild renal insufficiency with recent creatinine of 1.13 with GFR 55.  While there is some risk of acute kidney injury with contrast administration, I think that it is prudent that she gets angiography protocol CT scan in order to evaluate her vasculature which is then the right colon distribution.  Additionally, we see that she has iron deficiency anemia.  I would like to do an upper endoscopy at the time of colonoscopy for her.  We will reschedule for later in June or early July so that both procedures can be accommodated at the same time and would be best to not do a procedure that carries a risk of perforation directly prior to her son's wedding.  She is in agreement with that plan.    RTC PRN    Thank you for  this consultation.  It was a pleasure to participate in the care of this patient; please contact us with any further questions.  A total of 45 minutes was spent with reviewing the chart, discussing with the patient, documentation and coordination of care.    This note was created with voice recognition software, and while reviewed for accuracy, typos may remain.     Domingo Wall MD  Adjunct  of Medicine  Division of Gastroenterology, Hepatology and Nutrition  [facility]  [phone]      Again, thank you for allowing me to participate in the care of your patient.        Sincerely,        Domingo Wall MD

## 2022-05-17 NOTE — PATIENT INSTRUCTIONS
CT scan now    Schedule CT scan. Please call [phone] to schedule.    Reschedule Colonoscopy and add EGD.  For later in June or July. Please call [phone] Option 2 to reschedule.    Add metamcil 1-2 tsp in 8 oz water. Drink lots of fluids.

## 2022-05-17 NOTE — NURSING NOTE
"Marcelina Estevez's goals for this visit include:   Chief Complaint   Patient presents with     New Patient     Ischemic Colitis       She requests these members of her care team be copied on today's visit information: PCP    PCP: Annette Painting    Referring Provider:  EDUARD Milner CNP  [address] [place] [address]    /69 (BP Location: Left arm, Patient Position: Sitting, Cuff Size: Adult Regular)   Pulse 72   Ht 1.534 m (5' 0.4\")   Wt 61.6 kg (135 lb 14.4 oz)   LMP 06/12/2014   SpO2 100%   BMI 26.19 kg/m      Do you need any medication refills at today's visit? Yes, Pantoprazole.    Magi Cross LPN      "

## 2022-05-17 NOTE — PROGRESS NOTES
"      GASTROENTEROLOGY FOLLOW UP CLINIC VISIT    CC/REFERRING MD:    Annette Painting    REASON FOR CONSULTATION:   Keerthi Burr for   Chief Complaint   Patient presents with     New Patient     Ischemic Colitis       HISTORY OF PRESENT ILLNESS:    Marcelina Estevez is 62 year old female who presents for follow up of ischemic colitis.  We previously have known this patient due to history of large colon polyps.  In September 2021 she was noted to have a large hepatic flexure polyp which was resected and then in November 2021 a 20 mm transverse colon polyp was resected.  She was planned for another follow-up colonoscopy in June of this year.  However, she reports that in March 2022 while in [place] she developed abdominal pain along with blood in the stool.  The pain was quite severe.  She went to the emergency room and CT noncontrast noted proximal small bowel loops with enteritis.  A colonoscopy was performed which noted severe colitis with edema and luminal narrowing of the ascending colon which was not traversed.  Biopsies were taken and these were consistent with ulcerative colitis.  She reports that she had been golfing prior but had not been particularly dehydrated.  There were no new medications.  She has no history of thromboembolic disease.  There are no similar episodes in the past.  Colonoscopy currently scheduled a few days prior to her son's wedding which is on June 11.    PHYSICAL EXAMINATION:  Constitutional: aaox3, cooperative, pleasant, not dyspneic/diaphoretic, no acute distress  Vitals reviewed: /69 (BP Location: Left arm, Patient Position: Sitting, Cuff Size: Adult Regular)   Pulse 72   Ht 1.534 m (5' 0.4\")   Wt 61.6 kg (135 lb 14.4 oz)   LMP 06/12/2014   SpO2 100%   BMI 26.19 kg/m    Wt:   Wt Readings from Last 2 Encounters:   05/17/22 61.6 kg (135 lb 14.4 oz)   04/11/22 61 kg (134 lb 8 oz)      Eyes: Sclera anicteric/injected  Ears/nose/mouth/throat: Normal " oropharynx without ulcers or exudate, mucus membranes moist, hearing intact  Neck: supple, thyroid normal size  CV: No edema  Respiratory: Unlabored breathing  Lymph: No submandibular, supraclavicular or inguinal lymphadenopathy  Abd:  Nondistended, no masses, +bs, no hepatosplenomegaly, nontender, no peritoneal signs  Skin: warm, perfused, no jaundice  Psych: Normal affect  MSK: Normal gait    Pertinent labs and imaging have been reviewed.    ASSESSMENT/PLAN:    Marcelina Esetvez is a 62 year old female who presents for follow up of ischemic colitis and history of large adenomatous colon polyps.  Review of records from [place] notes CT noncontrast with findings of possible small bowel enteritis and then colonoscopy with isolated right colonic ischemia.  We discussed today that the finding of isolated right colonic ischemia is concerning.  There can be underlying vascular compromise which can create small bowel along with right colonic presentations.  We note that she has mild renal insufficiency with recent creatinine of 1.13 with GFR 55.  While there is some risk of acute kidney injury with contrast administration, I think that it is prudent that she gets angiography protocol CT scan in order to evaluate her vasculature which is then the right colon distribution.  Additionally, we see that she has iron deficiency anemia.  I would like to do an upper endoscopy at the time of colonoscopy for her.  We will reschedule for later in June or early July so that both procedures can be accommodated at the same time and would be best to not do a procedure that carries a risk of perforation directly prior to her son's wedding.  She is in agreement with that plan.    RTC PRN    Thank you for this consultation.  It was a pleasure to participate in the care of this patient; please contact us with any further questions.  A total of 45 minutes was spent with reviewing the chart, discussing with the patient, documentation and  coordination of care.    This note was created with voice recognition software, and while reviewed for accuracy, typos may remain.     Domingo Wall MD  Adjunct  of Medicine  Division of Gastroenterology, Hepatology and Nutrition  [facility]  [phone]

## 2022-05-18 ENCOUNTER — TELEPHONE (OUTPATIENT)
Dept: GASTROENTEROLOGY | Facility: CLINIC | Age: 62
End: 2022-05-18
Payer: COMMERCIAL

## 2022-05-18 ENCOUNTER — MEDICAL CORRESPONDENCE (OUTPATIENT)
Dept: HEALTH INFORMATION MANAGEMENT | Facility: CLINIC | Age: 62
End: 2022-05-18
Payer: COMMERCIAL

## 2022-05-20 ENCOUNTER — TELEPHONE (OUTPATIENT)
Dept: MULTI SPECIALTY CLINIC | Facility: CLINIC | Age: 62
End: 2022-05-20

## 2022-05-20 ENCOUNTER — HOSPITAL ENCOUNTER (OUTPATIENT)
Dept: CT IMAGING | Facility: CLINIC | Age: 62
Discharge: HOME | End: 2022-05-20
Attending: INTERNAL MEDICINE | Admitting: INTERNAL MEDICINE
Payer: COMMERCIAL

## 2022-05-20 DIAGNOSIS — M34.9 LIMITED SYSTEMIC SCLEROSIS (H): Primary | ICD-10-CM

## 2022-05-20 DIAGNOSIS — K55.9 ISCHEMIC COLITIS (H): ICD-10-CM

## 2022-05-20 PROCEDURE — 250N000011 HC RX IP 250 OP 636: Performed by: INTERNAL MEDICINE

## 2022-05-20 PROCEDURE — 74177 CT ABD & PELVIS W/CONTRAST: CPT

## 2022-05-20 PROCEDURE — 250N000009 HC RX 250: Performed by: INTERNAL MEDICINE

## 2022-05-20 RX ORDER — IOPAMIDOL 755 MG/ML
500 INJECTION, SOLUTION INTRAVASCULAR ONCE
Status: COMPLETED | OUTPATIENT
Start: 2022-05-20 | End: 2022-05-20

## 2022-05-20 RX ADMIN — IOPAMIDOL 65 ML: 755 INJECTION, SOLUTION INTRAVENOUS at 08:29

## 2022-05-20 RX ADMIN — SODIUM CHLORIDE 60 ML: 9 INJECTION, SOLUTION INTRAVENOUS at 08:29

## 2022-05-20 NOTE — TELEPHONE ENCOUNTER
[facility] Call Center    Phone Message    May a detailed message be left on voicemail: no     Reason for Call: Appointment Intake    Referring Provider: SHAHRAM ADAM Referred To Provider: MARITA BRADFORD       Diagnosis: I73.00 (ICD-10-CM) - Raynaud's syndrome without gangrene  M34.9 (ICD-10-CM) - Scleroderma (H)  K55.9 (ICD-10-CM) - Ischemic colitis (H)       Action Taken: Message routed to:  [facility] Rheum    Travel Screening: Not Applicable           Referred to Dr. Bradford for Scleroderma

## 2022-05-24 NOTE — TELEPHONE ENCOUNTER
Pt is calling to follow up on her referral. Pt states she has not heard anyone call her.    Please reach out to the Pt to let her know if she can see Dr. Villalobos.

## 2022-05-26 NOTE — TELEPHONE ENCOUNTER
Placed call to patient to complete scleroderma intake form. Patient requested a return call after 4 pm today or anytime tomorrow as she is unable to talk. Will contact patient at a later time as requested.  Joanne Burr RN  [facility]

## 2022-05-27 NOTE — TELEPHONE ENCOUNTER
"Scleroderma/Myositis Patient Intake Form    Referring Provider: Dr. Gibbons    Primary Care Provider:Dr. Painting   Location: [facility]    A. Have you been told you have Scleroderma? YES    B. When were you diagnosed (month/year)? 10 years ago  1. Date when Raynaud's began (month/year)? 15 years   2. Date when skin swelling began (month/year) `15 years    C. Doctor who diagnosed you:    Name: Dr. Egan    Address/Clinic: [facility]    Specialty: Rheumatology     D. Have you been in the hospital due to scleroderma? If yes, location of hospitalization [place], [place] last year for ischemic colitis  Documentation in media tabe    E. Have you seen a Rheumatologist in the past? YES   List Rheumatologist/ Location: Dr. Gibbons     [place] Who currently manages your care for this issue now? Dr. Gibbons     Do you currently have or have you had in the past the following symptoms:    1. Hands cold or purple with cold weather: YES    2.  Puffy fingers: YES, swollen now due to arthritis    3.  Non-healing wounds on fingertips or knuckles: No    4.  Current wounds to fingers: No    5.  Skin thickening: (when you pinch your skin does it make a tent) YES  A. Skin thickening of fingers: YES  B. Skin thickening from the wrist to the elbow: No  C. Skin thickening of the face or feet: YES Unsure Top of feet was very itchy and developed a freckles, heels very thickened  D. Skin thickening of upper arms, trunk, thighs: NO    6. Joint swelling and/or stiffness: YES   A. Which joints: Hips. Thumbs and index finger in both hands     7. Muscle pain/ weakness: YES   A. Which muscles: Hands - can't open jars for a couple of years.    B. Describe the pain: Slight weakness going up stairs    8. Rash: YES   A. Describe Started with pruritis on the  top of both feet several months ago,  developed into \"freckles\"      9.  Diarrhea: YES   A. Previous diagnosis of SBO (Small Bowel Bacterial Overgrowth, SIBO)? No  B. Antibiotics for diarrhea: No      10: " "Unintentional weight loss: No       11. Bowel obstruction and you had to go to the hospital: YES   A. Name of Hospital: [facility]    12. Nutrients/vitamins through an IV: No    13. Heartburn or food/stomach acid comes up: Yes   A. Medications taken for this:  Protonix   B. Have a diagnosis of Gave (Gastric Antral Vascular Ectasia)?  No    14. Food or pills get stuck when you swallow: No   A. Have diagnosis of esophageal dysmotility? No    15. Chronic cough: Yes \"Covid cough\" for a year   A. Have you been told you have ILD or pulmonary fibrosis? No    16. Inflamed lining around lung (pleurisy): No    17. Shortness of breath needing home oxygen: No      18. Shortness of breath or fatigue with normal activity: YES, breathlessness going up stairs    19. Shortness of breath at rest: No    20. Known Heart problems: No       21. Inflamed lining around heart (pericarditis): No    22. Abnormal heart rhythms (arrhythmias): No      23. Other heart problems that were not mentioned: No   A. PAH/PH: No   B. Diastolic dysfunction: No    24. High blood pressure: YES on Losartan, nifedipine, Imdur  A. High blood pressure before scleroderma: Unsure    24. History of dialysis: No    25. History of blood clots: No    26. History of miscarriages when more than 2 months pregnant: YES   A. If yes, how far along before miscarriage? 3 months     27.  Have you ever been diagnosed with any other autoimmune diseases? (i.e.rheumatoid arthritis, lupus, sjogren's): No     28.  Loss of balance: Yes, recently has experienced diziness     29.  Migraine headaches: No    30. Seizures or strokes: No    31. Nerve problem's in face (Bell's Palsy): No    32. Numbness of hands, or been told you have carpal tunnel syndrome: YES Bilateral carpal tunnel surgery 20 years ago     33. Dry eyes: YES  A. Need to use artificial tears: YES  B. Gritty or diego feeling in eyes: YES  C. Dry mouth: YES, in AM       34. Do you smoke? " YES, quit 10 years ago     35. How much alcohol do you drink per week? 6-10 drink(s) per week     36. Do you use any stimulant drugs (i.e. Pseudoephedrine) No    37. Recreational drugs (i.e. Crystal meth, cocaine) YES. marijuana    38. Is there any family history or diagnosis of autoimmune disease? No Recently learned she is not biologically related to her father (artificial insemination)     Have you ever had any of the following tests? Where and when?    1. Upper endoscopy:  YES   Date/Where: Scheduled 6/29 at [facility]    2. Chest x-ray: YES  Date/Where:3/28/22 [facility]    3. High resolution CT scan: Unsure  Date/Where: 11/21/18 [facility]    4. Pulmonary function test:  YES  Date/Where: 5/10/22 [facility]    5. EKG: Not recently    6. Right heart Cath: No      7. ECHO cardiogram:  YES  [facility]    8. Barium swallow: No    Have you ever seen the following specialists: Where and when?    Pulmonologist: No       Gastroenterologist: YES   Name/Location: Dr. Wall [employer] [facility] [employer]    Cardiologist: No       Dermatologist: YES   Name/Location: [facility] for a skin cancer lesion 10 months ago       6. Are there any other symptoms or concerns I haven't mentioned that you would like to discuss with the doctor? She reports a recurring planter's wart under the nail on right foot which was surgically removed and has reoccurred.     Informed patient that she will be contacted again for scheduling after above information is reviewed with Dr. Villalobos.  Joanne Burr RN  [facility]

## 2022-06-01 DIAGNOSIS — F41.1 GAD (GENERALIZED ANXIETY DISORDER): ICD-10-CM

## 2022-06-06 ENCOUNTER — TELEPHONE (OUTPATIENT)
Dept: FAMILY MEDICINE | Facility: CLINIC | Age: 62
End: 2022-06-06
Payer: COMMERCIAL

## 2022-06-06 DIAGNOSIS — Z13.29 SCREENING FOR THYROID DISORDER: Primary | ICD-10-CM

## 2022-06-06 NOTE — TELEPHONE ENCOUNTER
"TSH   Date Value Ref Range Status   12/19/2018 2.04 0.40 - 4.00 mU/L Final     Patient reports her daughter was just diagnosed with Hyperthyroidism and it is related to family.  \"I am having almost all of the same symptoms, when was I last checked for my Thyroid? \".  Discussed results above.    Patient reports has a history of depression, anxiety, panic attacks, heart palpitations, \"That I have discussed for many years and this has not been addressed.\".  Advise appointment to discuss symptoms and lab work.  Patient states she has an appointment for a pre op 6/16/22, can't we address that then?    Discussed insurance issues, needs to be for Pre op only.  Offered appointment tomorrow, patient declines \"I live 2.5 hours away and I have a lot going on right now\".  Patient would like to be added onto schedule 6/16/22 or 6/17/22.   Can patient do a Virtual visit?  Would someone be willing to just order lab work?   Please advise.  Requests message be sent to Dr Annette Painting or Keerthi Burr CNP, MS.     Please advise  B[place] RN     "

## 2022-06-07 NOTE — TELEPHONE ENCOUNTER
I can place the order for her thyroid level to be check  They will likely need blood work for her preop and they can draw this at the same time

## 2022-06-07 NOTE — TELEPHONE ENCOUNTER
Can use SD on 6/17. If this time doesn't work for her, okay to schedule virtual visit. She can do TSH anytime. Ira ordered it.

## 2022-06-10 NOTE — TELEPHONE ENCOUNTER
Reviewed intake documentation with Dr. Villalobos, who ordered an echocardiogram and 6 minute walk test to be completed before patient is scheduled for a 60 minute new patient appointment with him.  Joanne Burr RN  [facility]

## 2022-06-15 NOTE — TELEPHONE ENCOUNTER
Pt states she has not heard Joanne call her back after discuss with Dr. Villalobos about getting the Pt in for an appointment? Pt states its been about 2 weeks.     unable to schedule Pt in for a NEW SCLERODERMA Pt visit. Writer tried to contact Joanne but was unsuccessful.    Please contact the Pt back; and please advise when and where to get the Pt schedule. Pt mentioned that Joanne was looking to get th Pt in sometime in June?    Ok to leave a detailed message if it goes to a voicemail.

## 2022-06-16 ENCOUNTER — OFFICE VISIT (OUTPATIENT)
Dept: FAMILY MEDICINE | Facility: CLINIC | Age: 62
End: 2022-06-16
Payer: COMMERCIAL

## 2022-06-16 VITALS
TEMPERATURE: 98.6 F | DIASTOLIC BLOOD PRESSURE: 70 MMHG | HEART RATE: 76 BPM | SYSTOLIC BLOOD PRESSURE: 113 MMHG | RESPIRATION RATE: 18 BRPM | BODY MASS INDEX: 27.05 KG/M2 | OXYGEN SATURATION: 98 % | WEIGHT: 137.8 LBS | HEIGHT: 60 IN

## 2022-06-16 DIAGNOSIS — E87.6 HYPOKALEMIA: Primary | ICD-10-CM

## 2022-06-16 DIAGNOSIS — I73.00 RAYNAUD'S DISEASE WITHOUT GANGRENE: ICD-10-CM

## 2022-06-16 DIAGNOSIS — F33.1 MODERATE EPISODE OF RECURRENT MAJOR DEPRESSIVE DISORDER (H): ICD-10-CM

## 2022-06-16 DIAGNOSIS — E78.5 HYPERLIPIDEMIA LDL GOAL <100: ICD-10-CM

## 2022-06-16 DIAGNOSIS — Z86.0100 HISTORY OF COLONIC POLYPS: ICD-10-CM

## 2022-06-16 DIAGNOSIS — G47.00 INSOMNIA, UNSPECIFIED TYPE: ICD-10-CM

## 2022-06-16 DIAGNOSIS — F41.0 PANIC ATTACK: ICD-10-CM

## 2022-06-16 DIAGNOSIS — R06.2 WHEEZING: ICD-10-CM

## 2022-06-16 DIAGNOSIS — K55.9 ISCHEMIC COLITIS (H): ICD-10-CM

## 2022-06-16 DIAGNOSIS — D64.9 ANEMIA, UNSPECIFIED TYPE: ICD-10-CM

## 2022-06-16 DIAGNOSIS — I10 HYPERTENSION GOAL BP (BLOOD PRESSURE) < 140/90: ICD-10-CM

## 2022-06-16 DIAGNOSIS — Z01.818 PRE-OP EXAM: Primary | ICD-10-CM

## 2022-06-16 LAB
ALBUMIN SERPL-MCNC: 3.9 G/DL (ref 3.4–5)
ALP SERPL-CCNC: 53 U/L (ref 40–150)
ALT SERPL W P-5'-P-CCNC: 29 U/L (ref 0–50)
ANION GAP SERPL CALCULATED.3IONS-SCNC: 7 MMOL/L (ref 3–14)
AST SERPL W P-5'-P-CCNC: 18 U/L (ref 0–45)
BILIRUB SERPL-MCNC: 0.2 MG/DL (ref 0.2–1.3)
BUN SERPL-MCNC: 36 MG/DL (ref 7–30)
CALCIUM SERPL-MCNC: 8.5 MG/DL (ref 8.5–10.1)
CHLORIDE BLD-SCNC: 101 MMOL/L (ref 94–109)
CHOLEST SERPL-MCNC: 205 MG/DL
CO2 SERPL-SCNC: 28 MMOL/L (ref 20–32)
CREAT SERPL-MCNC: 1.09 MG/DL (ref 0.52–1.04)
ERYTHROCYTE [DISTWIDTH] IN BLOOD BY AUTOMATED COUNT: 13.1 % (ref 10–15)
FASTING STATUS PATIENT QL REPORTED: NO
GFR SERPL CREATININE-BSD FRML MDRD: 57 ML/MIN/1.73M2
GLUCOSE BLD-MCNC: 91 MG/DL (ref 70–99)
HCT VFR BLD AUTO: 33.1 % (ref 35–47)
HDLC SERPL-MCNC: 127 MG/DL
HGB BLD-MCNC: 10.8 G/DL (ref 11.7–15.7)
LDLC SERPL CALC-MCNC: 66 MG/DL
MCH RBC QN AUTO: 31.3 PG (ref 26.5–33)
MCHC RBC AUTO-ENTMCNC: 32.6 G/DL (ref 31.5–36.5)
MCV RBC AUTO: 96 FL (ref 78–100)
NONHDLC SERPL-MCNC: 78 MG/DL
PLATELET # BLD AUTO: 270 10E3/UL (ref 150–450)
POTASSIUM BLD-SCNC: 3.2 MMOL/L (ref 3.4–5.3)
PROT SERPL-MCNC: 7 G/DL (ref 6.8–8.8)
RBC # BLD AUTO: 3.45 10E6/UL (ref 3.8–5.2)
SODIUM SERPL-SCNC: 136 MMOL/L (ref 133–144)
TRIGL SERPL-MCNC: 60 MG/DL
TSH SERPL DL<=0.005 MIU/L-ACNC: 2.01 MU/L (ref 0.4–4)
WBC # BLD AUTO: 10 10E3/UL (ref 4–11)

## 2022-06-16 PROCEDURE — 80061 LIPID PANEL: CPT | Performed by: NURSE PRACTITIONER

## 2022-06-16 PROCEDURE — 85027 COMPLETE CBC AUTOMATED: CPT | Performed by: NURSE PRACTITIONER

## 2022-06-16 PROCEDURE — 93000 ELECTROCARDIOGRAM COMPLETE: CPT | Performed by: NURSE PRACTITIONER

## 2022-06-16 PROCEDURE — 84443 ASSAY THYROID STIM HORMONE: CPT | Performed by: NURSE PRACTITIONER

## 2022-06-16 PROCEDURE — 80053 COMPREHEN METABOLIC PANEL: CPT | Performed by: NURSE PRACTITIONER

## 2022-06-16 PROCEDURE — 36415 COLL VENOUS BLD VENIPUNCTURE: CPT | Performed by: NURSE PRACTITIONER

## 2022-06-16 PROCEDURE — 99214 OFFICE O/P EST MOD 30 MIN: CPT | Performed by: NURSE PRACTITIONER

## 2022-06-16 RX ORDER — LOSARTAN POTASSIUM 50 MG/1
50 TABLET ORAL DAILY
Qty: 90 TABLET | Refills: 0 | Status: SHIPPED | OUTPATIENT
Start: 2022-06-16 | End: 2022-11-14

## 2022-06-16 RX ORDER — PANTOPRAZOLE SODIUM 40 MG/1
40 TABLET, DELAYED RELEASE ORAL 2 TIMES DAILY
Qty: 180 TABLET | Refills: 1 | Status: SHIPPED | OUTPATIENT
Start: 2022-06-16 | End: 2022-11-17

## 2022-06-16 RX ORDER — POTASSIUM CHLORIDE 1500 MG/1
20 TABLET, EXTENDED RELEASE ORAL 2 TIMES DAILY
Qty: 6 TABLET | Refills: 0 | Status: SHIPPED | OUTPATIENT
Start: 2022-06-16 | End: 2022-06-19

## 2022-06-16 RX ORDER — TRAZODONE HYDROCHLORIDE 50 MG/1
50 TABLET, FILM COATED ORAL AT BEDTIME
Qty: 90 TABLET | Refills: 0 | Status: SHIPPED | OUTPATIENT
Start: 2022-06-16 | End: 2022-08-26

## 2022-06-16 RX ORDER — ALBUTEROL SULFATE 90 UG/1
AEROSOL, METERED RESPIRATORY (INHALATION)
Qty: 8.5 G | Refills: 3 | Status: SHIPPED | OUTPATIENT
Start: 2022-06-16 | End: 2023-08-08

## 2022-06-16 RX ORDER — HYDROXYCHLOROQUINE SULFATE 200 MG/1
300 TABLET, FILM COATED ORAL DAILY
Qty: 120 TABLET | Refills: 1 | COMMUNITY
Start: 2022-06-16 | End: 2024-09-22

## 2022-06-16 RX ORDER — BENZONATATE 100 MG/1
100 CAPSULE ORAL 2 TIMES DAILY PRN
COMMUNITY
Start: 2022-06-15 | End: 2022-11-17

## 2022-06-16 RX ORDER — SIMVASTATIN 40 MG
TABLET ORAL
Qty: 30 TABLET | Refills: 0 | Status: SHIPPED | OUTPATIENT
Start: 2022-06-16 | End: 2022-08-03

## 2022-06-16 RX ORDER — FERROUS SULFATE 325(65) MG
325 TABLET ORAL 2 TIMES DAILY
Qty: 60 TABLET | Refills: 1 | Status: SHIPPED | OUTPATIENT
Start: 2022-06-16 | End: 2022-09-27

## 2022-06-16 RX ORDER — LORAZEPAM 0.5 MG/1
0.5 TABLET ORAL DAILY PRN
Qty: 30 TABLET | Refills: 1 | Status: SHIPPED | OUTPATIENT
Start: 2022-06-16 | End: 2022-09-09

## 2022-06-16 RX ORDER — BUPROPION HYDROCHLORIDE 150 MG/1
150 TABLET ORAL EVERY MORNING
Qty: 90 TABLET | Refills: 0 | Status: SHIPPED | OUTPATIENT
Start: 2022-06-16 | End: 2022-08-26

## 2022-06-16 ASSESSMENT — PAIN SCALES - GENERAL: PAINLEVEL: NO PAIN (0)

## 2022-06-16 NOTE — RESULT ENCOUNTER NOTE
Please call patient, let her know her hemoglobin was still low at 10.8.  She should start the iron 1 pill twice a day with meals.  This was sent to her pharmacy.  Other labs are pending.  Thank you,  EDUARD Tejeda, NP-C  [facility]

## 2022-06-16 NOTE — PATIENT INSTRUCTIONS
Patient should double check with rheumatology if Plaquenil should be held or not prior to colonoscopy.     Hemoglobin came back at 10.8, start iron 1 pill twice a day.  Take with food.  This was sent to your pharmacy

## 2022-06-16 NOTE — TELEPHONE ENCOUNTER
Placed call to patient to explain that Dr. Villalobos has requested she have an echocardiogram and a six minute walk test before she is scheduled to see him, and that clinic coordinator will be contacting her soon to assist in scheduling tests and appointment.   Joanne Burr RN  [facility]

## 2022-06-16 NOTE — H&P (VIEW-ONLY)
[address] [address]  Phone: [phone]  Primary Provider: Annette Painting  Pre-op Performing Provider: RANDA NIXON      PREOPERATIVE EVALUATION:  Today's date: 6/16/2022    Marcelina Estevez is a 62 year old female who presents for a preoperative evaluation.    Surgical Information:  Surgery/Procedure: COLONOSCOPY, WITH CO2 INSUFFLATION  Surgery Location:   [facility]     Surgeon: Domingo Wall MD    Surgery Date: 6/29/22   Time of Surgery: 1:10P  Where patient plans to recover: At home with family  Fax number for surgical facility: Note does not need to be faxed, will be available electronically in Epic.    Type of Anesthesia Anticipated: General    Assessment & Plan     The proposed surgical procedure is considered INTERMEDIATE risk.    Pre-op exam  No concerns, EKG sinus rhythm.  Other than wondering if Plaquenil should be held or not prior to, no other concerns.  She will have her COVID-19 swab done a few days prior.  - EKG 12-lead complete w/read - Clinics  - TSH with free T4 reflex; Future  - CBC with platelets; Future  - Comprehensive metabolic panel (BMP + Alb, Alk Phos, ALT, AST, Total. Bili, TP); Future  - TSH with free T4 reflex  - CBC with platelets  - Comprehensive metabolic panel (BMP + Alb, Alk Phos, ALT, AST, Total. Bili, TP)    History of colonic polyps  Needs repeat colonoscopy and endoscopy    Ischemic colitis (H)  Unsure why this occurred, denies any further rectal bleeding.  Continue on Protonix.  Is also having an endoscopy done  - pantoprazole (PROTONIX) 40 MG EC tablet; Take 1 tablet (40 mg) by mouth 2 times daily    Raynaud's disease without gangrene  Follows with rheumatology, has scleroderma also  - hydroxychloroquine (PLAQUENIL) 200 MG tablet; 1.5 tabs daily    Moderate episode of recurrent major depressive disorder (H)  Stable refill sent  - buPROPion (WELLBUTRIN XL) 150 MG  24 hr tablet; Take 1 tablet (150 mg) by mouth every morning    Hyperlipidemia LDL goal <100  No muscle aches or pains, is due for labs that she will return in the next couple weeks for fasting labs  - simvastatin (ZOCOR) 40 MG tablet; TAKE 1 TABLET BY MOUTH EVERYDAY AT BEDTIME  - Lipid panel reflex to direct LDL Fasting; Future  - Lipid panel reflex to direct LDL Fasting    Insomnia, unspecified type  Stable refill sent  - traZODone (DESYREL) 50 MG tablet; Take 1 tablet (50 mg) by mouth At Bedtime    Hypertension goal BP (blood pressure) < 140/90  Patient was noted to have vasospastic angina in January 2019 after hospitalization and angiogram.  It was determined that she did not have a heart attack.  Patient is stable on blood pressure medication  - losartan (COZAAR) 50 MG tablet; Take 1 tablet (50 mg) by mouth daily    Panic attack  Stable needs refill  - LORazepam (ATIVAN) 0.5 MG tablet; Take 1 tablet (0.5 mg) by mouth daily as needed (panic attack)    Wheezing  Uses occasionally refill sent as prescription expires in August  - albuterol (PROAIR HFA) 108 (90 Base) MCG/ACT inhaler; INHALE 2 PUFS BY MOUTH EVERY 6 HOURS AS NEEDED FOR SHORTNESS OF BREATH / DYSPNEA    Anemia  Hemoglobin is 10.8, start iron 1 pill twice a day with meals       Risks and Recommendations:  The patient has the following additional risks and recommendations for perioperative complications:  Cardiovascular:   - Patient was noted to have vasospastic angina in January 2019 after hospitalization and angiogram.  It was determined that she did not have a heart attack.  Patient is stable on blood pressure medication    Anemia:  -Hemoglobin came back at 10.8.  Recommending starting iron 1 pill twice a day.  This should not delay her procedures.    Medication Instructions:  Patient is to take all scheduled medications on the day of surgery    RECOMMENDATION:  APPROVAL GIVEN to proceed with proposed procedure, without further diagnostic  evaluation.      Subjective     HPI related to upcoming procedure: end of March was in [place], ended up in hospital for 4 days for ischemic colitis.     After angiogram: was told she didn't need to return to cardiology.  Rheumatology: will be seeing Dr. Villalobos. Needs 6 min walking test and ECHO before seeing him.    No dizziness. Some headaches but also just finished the prednisone yesterday. Doesn't have headache today.    Saw ENT in [place] for a cough, using benzonatate: helping some. Was told likely COVID-19 cough        Preop Questions 6/16/2022   1. Have you ever had a heart attack or stroke? No-was not MI, normal angiogram. Was told it was stress.    2. Have you ever had surgery on your heart or blood vessels, such as a stent placement, a coronary artery bypass, or surgery on an artery in your head, neck, heart, or legs? No   3. Do you have chest pain with activity? No   4. Do you have a history of  heart failure? No   5. Do you currently have a cold, bronchitis or symptoms of other infection? No   6. Do you have a cough, shortness of breath, or wheezing? YES: dry cough, not even daily, not new, on-going since having COVID-19   7. Do you or anyone in your family have previous history of blood clots? No   8. Do you or does anyone in your family have a serious bleeding problem such as prolonged bleeding following surgeries or cuts? No   9. Have you ever had problems with anemia or been told to take iron pills? YES - recent hospitalization had anemia   10. Have you had any abnormal blood loss such as black, tarry or bloody stools, or abnormal vaginal bleeding? YES - from last hospitalization, rectal bleeding   11. Have you ever had a blood transfusion? No   12. Are you willing to have a blood transfusion if it is medically needed before, during, or after your surgery? Yes   13. Have you or any of your relatives ever had problems with anesthesia? No   14. Do you have sleep apnea, excessive snoring or daytime  drowsiness? No   15. Do you have any artifical heart valves or other implanted medical devices like a pacemaker, defibrillator, or continuous glucose monitor? No   16. Do you have artificial joints? No   17. Are you allergic to latex? No     Health Care Directive:  Patient does not have a Health Care Directive or Living Will: Patient states has Advance Directive and will bring in a copy to clinic. once completed    Preoperative Review of :   reviewed - controlled substances reflected in medication list.      Status of Chronic Conditions:  See problem list for active medical problems.  Problems all longstanding and stable, except as noted/documented.  See ROS for pertinent symptoms related to these conditions.      Review of Systems  CONSTITUTIONAL: NEGATIVE for fever, chills, change in weight  INTEGUMENTARY/SKIN: NEGATIVE for worrisome rashes, moles or lesions  EYES: NEGATIVE for vision changes or irritation  ENT/MOUTH: NEGATIVE for ear, mouth and throat problems  RESP: NEGATIVE for significant cough or SOB  CV: NEGATIVE for chest pain, palpitations or peripheral edema  GI: NEGATIVE for nausea, abdominal pain, heartburn, or change in bowel habits  : NEGATIVE for frequency, dysuria, or hematuria  MUSCULOSKELETAL: NEGATIVE for significant arthralgias or myalgia  NEURO: NEGATIVE for weakness, dizziness or paresthesias  ENDOCRINE: NEGATIVE for temperature intolerance, skin/hair changes  HEME: NEGATIVE for bleeding problems  PSYCHIATRIC: NEGATIVE for changes in mood or affect    Patient Active Problem List    Diagnosis Date Noted     Coronary vasospasm (H) 04/17/2022     Priority: Medium     Panic attack 04/20/2021     Priority: Medium     Atrophic vaginitis 04/20/2021     Priority: Medium     NSTEMI (non-ST elevated myocardial infarction) (H) 12/05/2018     Priority: Medium     From small vessel ischemia.  No stents.  On nifedipine.       Marijuana use, episodic 09/10/2018     Priority: Medium     CREST  (calcinosis, Raynaud's phenomenon, esophageal dysfunction, sclerodactyly, telangiectasia) (H)      Priority: Medium     Limited systemic sclerosis (H)      Priority: Medium     Raynaud's disease without gangrene 01/29/2018     Priority: Medium     MARCELINA (generalized anxiety disorder) 01/08/2018     Priority: Medium     Tension headache 09/28/2017     Priority: Medium     Patient is followed by Annette Painting MD PhD for ongoing prescription of pain medication.  All refills should only be approved by this provider, or covering partner.    Medication(s): Tylenol with codeine (T#3).   Maximum quantity per month: 15  Clinic visit frequency required: Q 6  months     Controlled substance agreement:  Encounter-Level CSA - 08/07/2017:          Controlled Substance Agreement - Scan on 8/16/2017 10:21 AM : CONTROLLED SUBSTANCE AGREEMENT (below)              Pain Clinic evaluation in the past: No    DIRE Total Score(s):  No flowsheet data found.    Last [facility] website verification:  none   [email or web address]       ACP (advance care planning) 08/19/2015     Priority: Medium     Advance Care Planning 8/19/2015: ACP Review and Resources Provided:  Reviewed chart for advance care plan.  Marcelina Estevez has no plan or code status on file however states presence of ACP document. Copy requested.  Confirmed/documented legally designated decision maker(s). Added by Mary Padilla RN Advance Care Planning Liaison with [employer]           Anemia in other chronic diseases classified elsewhere 01/09/2015     Priority: Medium     B12, TSH, Iron study normal in 2018       Status post total hysterectomy 11/18/2014     Priority: Medium     Allergic rhinitis due to pollen 08/02/2014     Priority: Medium     Menopausal syndrome (hot flashes) 12/19/2013     Priority: Medium     Hyperlipidemia LDL goal <100 06/17/2013     Priority: Medium     CKD (chronic kidney disease) stage 1, GFR 90 ml/min or greater 06/17/2013     Priority: Medium      Moderate episode of recurrent major depressive disorder (H) 09/10/2012     Priority: Medium     Celexa not helping, fluoxetine caused rash.        Hypertension goal BP (blood pressure) < 140/90 01/18/2011     Priority: Medium     Keratitis sicca, bilateral (H) 06/10/2013     Priority: Low     Hypertriglyceridemia 01/18/2011     Priority: Low     Controlled with simvastatin.        Night sweats 01/18/2011     Priority: Low     Worse with menstruation. On Clonidine.        Atopic rhinitis 01/18/2011     Priority: Low     (Problem list name updated by automated process. Provider to review and confirm.)        Past Medical History:   Diagnosis Date     CREST (calcinosis, Raynaud's phenomenon, esophageal dysfunction, sclerodactyly, telangiectasia) (H)      Hyperlipidemia      Hypertension      Limited systemic sclerosis (H)      Positive MINDY (antinuclear antibody)      Raynaud disease      Past Surgical History:   Procedure Laterality Date     APPENDECTOMY       BIOPSY      cervical node     COLONOSCOPY N/A 11/10/2021    Procedure: COLONOSCOPY, FLEXIBLE, WITH LESION REMOVAL USING SNARE;  Surgeon: Domingo Wall MD;  Location: [facility] OR     COLONOSCOPY N/A 11/10/2021    Procedure: COLONOSCOPY, WITH POLYPECTOMY AND BIOPSY;  Surgeon: Domingo Wall MD;  Location: [facility]     COLONOSCOPY N/A 9/29/2021    Procedure: Colonoscopy, With Polypectomy And Biopsy;  Surgeon: Domingo Wall MD;  Location: [facility]     COLONOSCOPY N/A 9/29/2021    Procedure: Colonoscopy, Flexible, With Lesion Removal Using Snare;  Surgeon: Domingo Wall MD;  Location: [facility]     COLONOSCOPY WITH CO2 INSUFFLATION N/A 10/26/2020    Procedure: COLONOSCOPY, WITH CO2 INSUFFLATION;  Surgeon: Phyllis Chaudhari MD;  Location: [facility]     COLONOSCOPY WITH CO2 INSUFFLATION N/A 11/10/2021    Procedure: COLONOSCOPY, WITH CO2 INSUFFLATION;  Surgeon: Domingo Wall MD;  Location: [facility]     COLONOSCOPY WITH CO2  INSUFFLATION N/A 9/29/2021    Procedure: COLONOSCOPY, WITH CO2 INSUFFLATION;  Surgeon: Domingo Wall MD;  Location: [facility]     DILATION AND CURETTAGE, HYSTEROSCOPY DIAGNOSTIC, COMBINED  7/1/2014    Procedure: COMBINED DILATION AND CURETTAGE, HYSTEROSCOPY DIAGNOSTIC;  Surgeon: Mana Cabrera DO;  Location: [facility]     ENT SURGERY      tonsillectomy and adnoid removal     HYSTERECTOMY TOTAL ABDOMINAL       ORTHOPEDIC SURGERY      left knee tear meniscus     RELEASE CARPAL TUNNEL BILATERAL  2009     Current Outpatient Medications   Medication Sig Dispense Refill     albuterol (PROAIR HFA) 108 (90 Base) MCG/ACT inhaler INHALE 2 PUFS BY MOUTH EVERY 6 HOURS AS NEEDED FOR SHORTNESS OF BREATH / DYSPNEA 8.5 g 3     ALLEGRA ALLERGY 180 MG tablet Take 1 tablet (180 mg) by mouth daily 90 tablet 3     buPROPion (WELLBUTRIN XL) 150 MG 24 hr tablet Take 1 tablet (150 mg) by mouth every morning 90 tablet 0     Calcium Carbonate-Vit D-Min (RA CALCIUM 600/VIT D/MINERALS) 600-200 MG-UNIT TABS Take 1 tablet by mouth daily       FLUoxetine (PROZAC) 20 MG capsule Take 1 capsule (20 mg) by mouth daily 90 capsule 1     FLUoxetine (PROZAC) 40 MG capsule Take 1 capsule by mouth daily 90 capsule 1     fluticasone (FLONASE) 50 MCG/ACT nasal spray Spray 1-2 sprays into both nostrils daily (Patient taking differently: Spray 1-2 sprays into both nostrils 2 times daily as needed) 16 g 5     hydrochlorothiazide (HYDRODIURIL) 25 MG tablet Take 1 tablet (25 mg) by mouth in the morning. 90 tablet 2     hydroxychloroquine (PLAQUENIL) 200 MG tablet 1.5 tabs daily 120 tablet 1     isosorbide mononitrate (IMDUR) 30 MG 24 hr tablet Take 1 tablet (30 mg) by mouth daily 90 tablet 0     LORazepam (ATIVAN) 0.5 MG tablet Take 1 tablet (0.5 mg) by mouth daily as needed (panic attack) 30 tablet 1     losartan (COZAAR) 50 MG tablet Take 1 tablet (50 mg) by mouth daily 90 tablet 0     NIFEdipine ER (ADALAT CC) 30 MG 24 hr tablet Take 1 tablet (30  mg) by mouth daily 90 tablet 3     order for DME Equipment being ordered: light lamp for seasonal affective disorder 1 Device 0     pantoprazole (PROTONIX) 40 MG EC tablet Take 1 tablet (40 mg) by mouth 2 times daily 180 tablet 1     simvastatin (ZOCOR) 40 MG tablet TAKE 1 TABLET BY MOUTH EVERYDAY AT BEDTIME 30 tablet 0     traZODone (DESYREL) 50 MG tablet Take 1 tablet (50 mg) by mouth At Bedtime 90 tablet 0     VITAMIN D, CHOLECALCIFEROL, PO Take 2,000 Units by mouth daily        benzonatate (TESSALON) 100 MG capsule TAKE 1-2 CAPSULES BY MOUTH THREE TIMES DAILY AS NEEDED FOR cough         Allergies   Allergen Reactions     Seasonal Allergies      Sulfa Drugs      Vomiting       Vicodin [Hydrocodone-Acetaminophen] Nausea     Other Environmental Allergy Other (See Comments)        Social History     Tobacco Use     Smoking status: Former Smoker     Quit date: 2001     Years since quittin.4     Smokeless tobacco: Never Used   Substance Use Topics     Alcohol use: Not Currently     Comment: social     Family History   Problem Relation Age of Onset     Osteoporosis Mother      Eye Disorder Mother         cataract, mac degen     Macular Degeneration Mother      Dementia Mother      Hypertension Maternal Grandmother      Diabetes Maternal Grandfather      Eye Disorder Paternal Grandmother         glaucoma     Unknown/Adopted Paternal Grandfather      Hypertension Daughter      Graves' disease Daughter      Diabetes Other      Glaucoma No family hx of      History   Drug Use No         Objective     /70 (BP Location: Right arm, Patient Position: Sitting, Cuff Size: Adult Regular)   Pulse 76   Temp 98.6  F (37  C) (Oral)   Resp 18   Ht 1.524 m (5')   Wt 62.5 kg (137 lb 12.8 oz)   LMP 2014   SpO2 98%   BMI 26.91 kg/m      Physical Exam    GENERAL APPEARANCE: healthy, alert and no distress     EYES: EOMI, PERRL     HENT: ear canals and TM's normal and nose and mouth without ulcers or lesions      NECK: no adenopathy, no asymmetry, masses, or scars and thyroid normal to palpation     RESP: lungs clear to auscultation - no rales, rhonchi or wheezes     CV: regular rates and rhythm, normal S1 S2, no S3 or S4 and no murmur, click or rub     ABDOMEN:  soft, nontender, no HSM or masses and bowel sounds normal     MS: extremities normal- no gross deformities noted, no evidence of inflammation in joints, FROM in all extremities.     SKIN: no suspicious lesions or rashes     NEURO: Normal strength and tone, sensory exam grossly normal, mentation intact and speech normal     PSYCH: mentation appears normal. and affect normal/bright     LYMPHATICS: No cervical adenopathy    Recent Labs   Lab Test 04/11/22  0906 10/29/21  1419   HGB 9.8* 11.3*    227    134   POTASSIUM 4.5 4.1   CR 1.13* 1.01        Diagnostics:  Labs pending at this time.  Results will be reviewed when available.   EKG required for known coronary heart disease and not completed in the last 90 days.   EKG was normal sinus rhythm    Revised Cardiac Risk Index (RCRI):  The patient has the following serious cardiovascular risks for perioperative complications:   - Coronary Artery Disease (MI, positive stress test, angina, Qs on EKG) = 1 point     RCRI Interpretation: 1 point: Class II (low risk - 0.9% complication rate)           Signed Electronically by:   EDUARD Tejeda, NP-C  [facility]  Copy of this evaluation report is provided to requesting physician.

## 2022-06-16 NOTE — PROGRESS NOTES
[address] [address]  Phone: [phone]  Primary Provider: Annette Painting  Pre-op Performing Provider: RANDA NIXON      PREOPERATIVE EVALUATION:  Today's date: 6/16/2022    Marcelina Estevez is a 62 year old female who presents for a preoperative evaluation.    Surgical Information:  Surgery/Procedure: COLONOSCOPY, WITH CO2 INSUFFLATION  Surgery Location:   [facility]     Surgeon: Domingo Wall MD    Surgery Date: 6/29/22   Time of Surgery: 1:10P  Where patient plans to recover: At home with family  Fax number for surgical facility: Note does not need to be faxed, will be available electronically in Epic.    Type of Anesthesia Anticipated: General    Assessment & Plan     The proposed surgical procedure is considered INTERMEDIATE risk.    Pre-op exam  No concerns, EKG sinus rhythm.  Other than wondering if Plaquenil should be held or not prior to, no other concerns.  She will have her COVID-19 swab done a few days prior.  - EKG 12-lead complete w/read - Clinics  - TSH with free T4 reflex; Future  - CBC with platelets; Future  - Comprehensive metabolic panel (BMP + Alb, Alk Phos, ALT, AST, Total. Bili, TP); Future  - TSH with free T4 reflex  - CBC with platelets  - Comprehensive metabolic panel (BMP + Alb, Alk Phos, ALT, AST, Total. Bili, TP)    History of colonic polyps  Needs repeat colonoscopy and endoscopy    Ischemic colitis (H)  Unsure why this occurred, denies any further rectal bleeding.  Continue on Protonix.  Is also having an endoscopy done  - pantoprazole (PROTONIX) 40 MG EC tablet; Take 1 tablet (40 mg) by mouth 2 times daily    Raynaud's disease without gangrene  Follows with rheumatology, has scleroderma also  - hydroxychloroquine (PLAQUENIL) 200 MG tablet; 1.5 tabs daily    Moderate episode of recurrent major depressive disorder (H)  Stable refill sent  - buPROPion (WELLBUTRIN XL) 150 MG  24 hr tablet; Take 1 tablet (150 mg) by mouth every morning    Hyperlipidemia LDL goal <100  No muscle aches or pains, is due for labs that she will return in the next couple weeks for fasting labs  - simvastatin (ZOCOR) 40 MG tablet; TAKE 1 TABLET BY MOUTH EVERYDAY AT BEDTIME  - Lipid panel reflex to direct LDL Fasting; Future  - Lipid panel reflex to direct LDL Fasting    Insomnia, unspecified type  Stable refill sent  - traZODone (DESYREL) 50 MG tablet; Take 1 tablet (50 mg) by mouth At Bedtime    Hypertension goal BP (blood pressure) < 140/90  Patient was noted to have vasospastic angina in January 2019 after hospitalization and angiogram.  It was determined that she did not have a heart attack.  Patient is stable on blood pressure medication  - losartan (COZAAR) 50 MG tablet; Take 1 tablet (50 mg) by mouth daily    Panic attack  Stable needs refill  - LORazepam (ATIVAN) 0.5 MG tablet; Take 1 tablet (0.5 mg) by mouth daily as needed (panic attack)    Wheezing  Uses occasionally refill sent as prescription expires in August  - albuterol (PROAIR HFA) 108 (90 Base) MCG/ACT inhaler; INHALE 2 PUFS BY MOUTH EVERY 6 HOURS AS NEEDED FOR SHORTNESS OF BREATH / DYSPNEA    Anemia  Hemoglobin is 10.8, start iron 1 pill twice a day with meals       Risks and Recommendations:  The patient has the following additional risks and recommendations for perioperative complications:  Cardiovascular:   - Patient was noted to have vasospastic angina in January 2019 after hospitalization and angiogram.  It was determined that she did not have a heart attack.  Patient is stable on blood pressure medication    Anemia:  -Hemoglobin came back at 10.8.  Recommending starting iron 1 pill twice a day.  This should not delay her procedures.    Medication Instructions:  Patient is to take all scheduled medications on the day of surgery    RECOMMENDATION:  APPROVAL GIVEN to proceed with proposed procedure, without further diagnostic  evaluation.      Subjective     HPI related to upcoming procedure: end of March was in [place], ended up in hospital for 4 days for ischemic colitis.     After angiogram: was told she didn't need to return to cardiology.  Rheumatology: will be seeing Dr. Villalobos. Needs 6 min walking test and ECHO before seeing him.    No dizziness. Some headaches but also just finished the prednisone yesterday. Doesn't have headache today.    Saw ENT in [place] for a cough, using benzonatate: helping some. Was told likely COVID-19 cough        Preop Questions 6/16/2022   1. Have you ever had a heart attack or stroke? No-was not MI, normal angiogram. Was told it was stress.    2. Have you ever had surgery on your heart or blood vessels, such as a stent placement, a coronary artery bypass, or surgery on an artery in your head, neck, heart, or legs? No   3. Do you have chest pain with activity? No   4. Do you have a history of  heart failure? No   5. Do you currently have a cold, bronchitis or symptoms of other infection? No   6. Do you have a cough, shortness of breath, or wheezing? YES: dry cough, not even daily, not new, on-going since having COVID-19   7. Do you or anyone in your family have previous history of blood clots? No   8. Do you or does anyone in your family have a serious bleeding problem such as prolonged bleeding following surgeries or cuts? No   9. Have you ever had problems with anemia or been told to take iron pills? YES - recent hospitalization had anemia   10. Have you had any abnormal blood loss such as black, tarry or bloody stools, or abnormal vaginal bleeding? YES - from last hospitalization, rectal bleeding   11. Have you ever had a blood transfusion? No   12. Are you willing to have a blood transfusion if it is medically needed before, during, or after your surgery? Yes   13. Have you or any of your relatives ever had problems with anesthesia? No   14. Do you have sleep apnea, excessive snoring or daytime  drowsiness? No   15. Do you have any artifical heart valves or other implanted medical devices like a pacemaker, defibrillator, or continuous glucose monitor? No   16. Do you have artificial joints? No   17. Are you allergic to latex? No     Health Care Directive:  Patient does not have a Health Care Directive or Living Will: Patient states has Advance Directive and will bring in a copy to clinic. once completed    Preoperative Review of :   reviewed - controlled substances reflected in medication list.      Status of Chronic Conditions:  See problem list for active medical problems.  Problems all longstanding and stable, except as noted/documented.  See ROS for pertinent symptoms related to these conditions.      Review of Systems  CONSTITUTIONAL: NEGATIVE for fever, chills, change in weight  INTEGUMENTARY/SKIN: NEGATIVE for worrisome rashes, moles or lesions  EYES: NEGATIVE for vision changes or irritation  ENT/MOUTH: NEGATIVE for ear, mouth and throat problems  RESP: NEGATIVE for significant cough or SOB  CV: NEGATIVE for chest pain, palpitations or peripheral edema  GI: NEGATIVE for nausea, abdominal pain, heartburn, or change in bowel habits  : NEGATIVE for frequency, dysuria, or hematuria  MUSCULOSKELETAL: NEGATIVE for significant arthralgias or myalgia  NEURO: NEGATIVE for weakness, dizziness or paresthesias  ENDOCRINE: NEGATIVE for temperature intolerance, skin/hair changes  HEME: NEGATIVE for bleeding problems  PSYCHIATRIC: NEGATIVE for changes in mood or affect    Patient Active Problem List    Diagnosis Date Noted     Coronary vasospasm (H) 04/17/2022     Priority: Medium     Panic attack 04/20/2021     Priority: Medium     Atrophic vaginitis 04/20/2021     Priority: Medium     NSTEMI (non-ST elevated myocardial infarction) (H) 12/05/2018     Priority: Medium     From small vessel ischemia.  No stents.  On nifedipine.       Marijuana use, episodic 09/10/2018     Priority: Medium     CREST  (calcinosis, Raynaud's phenomenon, esophageal dysfunction, sclerodactyly, telangiectasia) (H)      Priority: Medium     Limited systemic sclerosis (H)      Priority: Medium     Raynaud's disease without gangrene 01/29/2018     Priority: Medium     MARCELINA (generalized anxiety disorder) 01/08/2018     Priority: Medium     Tension headache 09/28/2017     Priority: Medium     Patient is followed by Annette Painting MD PhD for ongoing prescription of pain medication.  All refills should only be approved by this provider, or covering partner.    Medication(s): Tylenol with codeine (T#3).   Maximum quantity per month: 15  Clinic visit frequency required: Q 6  months     Controlled substance agreement:  Encounter-Level CSA - 08/07/2017:          Controlled Substance Agreement - Scan on 8/16/2017 10:21 AM : CONTROLLED SUBSTANCE AGREEMENT (below)              Pain Clinic evaluation in the past: No    DIRE Total Score(s):  No flowsheet data found.    Last [facility] website verification:  none   [email or web address]       ACP (advance care planning) 08/19/2015     Priority: Medium     Advance Care Planning 8/19/2015: ACP Review and Resources Provided:  Reviewed chart for advance care plan.  Marcelina Estevez has no plan or code status on file however states presence of ACP document. Copy requested.  Confirmed/documented legally designated decision maker(s). Added by Mary Padilla RN Advance Care Planning Liaison with [employer]           Anemia in other chronic diseases classified elsewhere 01/09/2015     Priority: Medium     B12, TSH, Iron study normal in 2018       Status post total hysterectomy 11/18/2014     Priority: Medium     Allergic rhinitis due to pollen 08/02/2014     Priority: Medium     Menopausal syndrome (hot flashes) 12/19/2013     Priority: Medium     Hyperlipidemia LDL goal <100 06/17/2013     Priority: Medium     CKD (chronic kidney disease) stage 1, GFR 90 ml/min or greater 06/17/2013     Priority: Medium      Moderate episode of recurrent major depressive disorder (H) 09/10/2012     Priority: Medium     Celexa not helping, fluoxetine caused rash.        Hypertension goal BP (blood pressure) < 140/90 01/18/2011     Priority: Medium     Keratitis sicca, bilateral (H) 06/10/2013     Priority: Low     Hypertriglyceridemia 01/18/2011     Priority: Low     Controlled with simvastatin.        Night sweats 01/18/2011     Priority: Low     Worse with menstruation. On Clonidine.        Atopic rhinitis 01/18/2011     Priority: Low     (Problem list name updated by automated process. Provider to review and confirm.)        Past Medical History:   Diagnosis Date     CREST (calcinosis, Raynaud's phenomenon, esophageal dysfunction, sclerodactyly, telangiectasia) (H)      Hyperlipidemia      Hypertension      Limited systemic sclerosis (H)      Positive MINDY (antinuclear antibody)      Raynaud disease      Past Surgical History:   Procedure Laterality Date     APPENDECTOMY       BIOPSY      cervical node     COLONOSCOPY N/A 11/10/2021    Procedure: COLONOSCOPY, FLEXIBLE, WITH LESION REMOVAL USING SNARE;  Surgeon: Domingo Wall MD;  Location: [facility]     COLONOSCOPY N/A 11/10/2021    Procedure: COLONOSCOPY, WITH POLYPECTOMY AND BIOPSY;  Surgeon: Domingo Wall MD;  Location: [facility]     COLONOSCOPY N/A 9/29/2021    Procedure: Colonoscopy, With Polypectomy And Biopsy;  Surgeon: Domingo Wall MD;  Location: [facility]     COLONOSCOPY N/A 9/29/2021    Procedure: Colonoscopy, Flexible, With Lesion Removal Using Snare;  Surgeon: Domingo Wall MD;  Location: [facility]     COLONOSCOPY WITH CO2 INSUFFLATION N/A 10/26/2020    Procedure: COLONOSCOPY, WITH CO2 INSUFFLATION;  Surgeon: Phyllis Chaudhari MD;  Location: [facility]     COLONOSCOPY WITH CO2 INSUFFLATION N/A 11/10/2021    Procedure: COLONOSCOPY, WITH CO2 INSUFFLATION;  Surgeon: Domingo Wall MD;  Location: [facility]     COLONOSCOPY WITH CO2  INSUFFLATION N/A 9/29/2021    Procedure: COLONOSCOPY, WITH CO2 INSUFFLATION;  Surgeon: Domingo Wall MD;  Location: [facility]     DILATION AND CURETTAGE, HYSTEROSCOPY DIAGNOSTIC, COMBINED  7/1/2014    Procedure: COMBINED DILATION AND CURETTAGE, HYSTEROSCOPY DIAGNOSTIC;  Surgeon: Mana Cabrera DO;  Location: [facility]     ENT SURGERY      tonsillectomy and adnoid removal     HYSTERECTOMY TOTAL ABDOMINAL       ORTHOPEDIC SURGERY      left knee tear meniscus     RELEASE CARPAL TUNNEL BILATERAL  2009     Current Outpatient Medications   Medication Sig Dispense Refill     albuterol (PROAIR HFA) 108 (90 Base) MCG/ACT inhaler INHALE 2 PUFS BY MOUTH EVERY 6 HOURS AS NEEDED FOR SHORTNESS OF BREATH / DYSPNEA 8.5 g 3     ALLEGRA ALLERGY 180 MG tablet Take 1 tablet (180 mg) by mouth daily 90 tablet 3     buPROPion (WELLBUTRIN XL) 150 MG 24 hr tablet Take 1 tablet (150 mg) by mouth every morning 90 tablet 0     Calcium Carbonate-Vit D-Min (RA CALCIUM 600/VIT D/MINERALS) 600-200 MG-UNIT TABS Take 1 tablet by mouth daily       FLUoxetine (PROZAC) 20 MG capsule Take 1 capsule (20 mg) by mouth daily 90 capsule 1     FLUoxetine (PROZAC) 40 MG capsule Take 1 capsule by mouth daily 90 capsule 1     fluticasone (FLONASE) 50 MCG/ACT nasal spray Spray 1-2 sprays into both nostrils daily (Patient taking differently: Spray 1-2 sprays into both nostrils 2 times daily as needed) 16 g 5     hydrochlorothiazide (HYDRODIURIL) 25 MG tablet Take 1 tablet (25 mg) by mouth in the morning. 90 tablet 2     hydroxychloroquine (PLAQUENIL) 200 MG tablet 1.5 tabs daily 120 tablet 1     isosorbide mononitrate (IMDUR) 30 MG 24 hr tablet Take 1 tablet (30 mg) by mouth daily 90 tablet 0     LORazepam (ATIVAN) 0.5 MG tablet Take 1 tablet (0.5 mg) by mouth daily as needed (panic attack) 30 tablet 1     losartan (COZAAR) 50 MG tablet Take 1 tablet (50 mg) by mouth daily 90 tablet 0     NIFEdipine ER (ADALAT CC) 30 MG 24 hr tablet Take 1 tablet (30  mg) by mouth daily 90 tablet 3     order for DME Equipment being ordered: light lamp for seasonal affective disorder 1 Device 0     pantoprazole (PROTONIX) 40 MG EC tablet Take 1 tablet (40 mg) by mouth 2 times daily 180 tablet 1     simvastatin (ZOCOR) 40 MG tablet TAKE 1 TABLET BY MOUTH EVERYDAY AT BEDTIME 30 tablet 0     traZODone (DESYREL) 50 MG tablet Take 1 tablet (50 mg) by mouth At Bedtime 90 tablet 0     VITAMIN D, CHOLECALCIFEROL, PO Take 2,000 Units by mouth daily        benzonatate (TESSALON) 100 MG capsule TAKE 1-2 CAPSULES BY MOUTH THREE TIMES DAILY AS NEEDED FOR cough         Allergies   Allergen Reactions     Seasonal Allergies      Sulfa Drugs      Vomiting       Vicodin [Hydrocodone-Acetaminophen] Nausea     Other Environmental Allergy Other (See Comments)        Social History     Tobacco Use     Smoking status: Former Smoker     Quit date: 2001     Years since quittin.4     Smokeless tobacco: Never Used   Substance Use Topics     Alcohol use: Not Currently     Comment: social     Family History   Problem Relation Age of Onset     Osteoporosis Mother      Eye Disorder Mother         cataract, mac degen     Macular Degeneration Mother      Dementia Mother      Hypertension Maternal Grandmother      Diabetes Maternal Grandfather      Eye Disorder Paternal Grandmother         glaucoma     Unknown/Adopted Paternal Grandfather      Hypertension Daughter      Graves' disease Daughter      Diabetes Other      Glaucoma No family hx of      History   Drug Use No         Objective     /70 (BP Location: Right arm, Patient Position: Sitting, Cuff Size: Adult Regular)   Pulse 76   Temp 98.6  F (37  C) (Oral)   Resp 18   Ht 1.524 m (5')   Wt 62.5 kg (137 lb 12.8 oz)   LMP 2014   SpO2 98%   BMI 26.91 kg/m      Physical Exam    GENERAL APPEARANCE: healthy, alert and no distress     EYES: EOMI, PERRL     HENT: ear canals and TM's normal and nose and mouth without ulcers or lesions      NECK: no adenopathy, no asymmetry, masses, or scars and thyroid normal to palpation     RESP: lungs clear to auscultation - no rales, rhonchi or wheezes     CV: regular rates and rhythm, normal S1 S2, no S3 or S4 and no murmur, click or rub     ABDOMEN:  soft, nontender, no HSM or masses and bowel sounds normal     MS: extremities normal- no gross deformities noted, no evidence of inflammation in joints, FROM in all extremities.     SKIN: no suspicious lesions or rashes     NEURO: Normal strength and tone, sensory exam grossly normal, mentation intact and speech normal     PSYCH: mentation appears normal. and affect normal/bright     LYMPHATICS: No cervical adenopathy    Recent Labs   Lab Test 04/11/22  0906 10/29/21  1419   HGB 9.8* 11.3*    227    134   POTASSIUM 4.5 4.1   CR 1.13* 1.01        Diagnostics:  Labs pending at this time.  Results will be reviewed when available.   EKG required for known coronary heart disease and not completed in the last 90 days.   EKG was normal sinus rhythm    Revised Cardiac Risk Index (RCRI):  The patient has the following serious cardiovascular risks for perioperative complications:   - Coronary Artery Disease (MI, positive stress test, angina, Qs on EKG) = 1 point     RCRI Interpretation: 1 point: Class II (low risk - 0.9% complication rate)           Signed Electronically by:   EDUARD Tejeda, NP-C  [facility]  Copy of this evaluation report is provided to requesting physician.

## 2022-06-17 NOTE — RESULT ENCOUNTER NOTE
Please call patient: potassium slight low, likely due to diuretic she is on. Will send oral potassium to her pharmacy, take 1 pill twice a day for 3 days. Also recommend repeating labs in a month to ensure normalized.   Thank you,  EDUARD Tejeda, NP-C  [facility]

## 2022-06-22 NOTE — TELEPHONE ENCOUNTER
Pt is returning call to Renee, writer was unable to reach Renee at this time. Please call pt back at [phone].

## 2022-06-22 NOTE — TELEPHONE ENCOUNTER
Spoke to patient, who confirms she is scheduled for an echocardiogram and a 6 minute walk test on 7/13/22 at [facility]. Patient accepted an appointment with Dr. Villalobos on 7/21/22 at 8:30 am. Message sent to scheduling to add patient to schedule.  Joanne Burr RN  [facility]

## 2022-06-25 ENCOUNTER — LAB (OUTPATIENT)
Dept: LAB | Facility: CLINIC | Age: 62
End: 2022-06-25
Payer: COMMERCIAL

## 2022-06-25 DIAGNOSIS — Z11.52 ENCOUNTER FOR SCREENING FOR COVID-19: Primary | ICD-10-CM

## 2022-06-25 PROCEDURE — U0005 INFEC AGEN DETEC AMPLI PROBE: HCPCS

## 2022-06-25 PROCEDURE — U0003 INFECTIOUS AGENT DETECTION BY NUCLEIC ACID (DNA OR RNA); SEVERE ACUTE RESPIRATORY SYNDROME CORONAVIRUS 2 (SARS-COV-2) (CORONAVIRUS DISEASE [COVID-19]), AMPLIFIED PROBE TECHNIQUE, MAKING USE OF HIGH THROUGHPUT TECHNOLOGIES AS DESCRIBED BY CMS-2020-01-R: HCPCS

## 2022-06-26 LAB — SARS-COV-2 RNA RESP QL NAA+PROBE: NEGATIVE

## 2022-06-28 ENCOUNTER — ANESTHESIA EVENT (OUTPATIENT)
Dept: SURGERY | Facility: AMBULATORY SURGERY CENTER | Age: 62
End: 2022-06-28
Payer: COMMERCIAL

## 2022-06-29 ENCOUNTER — HOSPITAL ENCOUNTER (OUTPATIENT)
Facility: AMBULATORY SURGERY CENTER | Age: 62
Discharge: HOME | End: 2022-06-29
Attending: INTERNAL MEDICINE
Payer: COMMERCIAL

## 2022-06-29 ENCOUNTER — ANESTHESIA (OUTPATIENT)
Dept: SURGERY | Facility: AMBULATORY SURGERY CENTER | Age: 62
End: 2022-06-29
Payer: COMMERCIAL

## 2022-06-29 VITALS
DIASTOLIC BLOOD PRESSURE: 84 MMHG | SYSTOLIC BLOOD PRESSURE: 116 MMHG | RESPIRATION RATE: 16 BRPM | OXYGEN SATURATION: 99 % | HEART RATE: 71 BPM | TEMPERATURE: 96.8 F

## 2022-06-29 VITALS — HEART RATE: 68 BPM

## 2022-06-29 DIAGNOSIS — D12.3 ADENOMATOUS POLYP OF TRANSVERSE COLON: Primary | ICD-10-CM

## 2022-06-29 LAB
COLONOSCOPY: NORMAL
LAB DIRECTOR COMMENTS: NORMAL
LAB DIRECTOR DISCLAIMER: NORMAL
LAB DIRECTOR INTERPRETATION: NORMAL
LAB DIRECTOR METHODOLOGY: NORMAL
LAB DIRECTOR RESULTS: NORMAL
SPECIMEN DESCRIPTION: NORMAL
UPPER GI ENDOSCOPY: NORMAL

## 2022-06-29 PROCEDURE — 45386 COLONOSCOPY W/BALLOON DILAT: CPT | Mod: 52

## 2022-06-29 PROCEDURE — 43239 EGD BIOPSY SINGLE/MULTIPLE: CPT

## 2022-06-29 PROCEDURE — G8907 PT DOC NO EVENTS ON DISCHARG: HCPCS

## 2022-06-29 PROCEDURE — 45385 COLONOSCOPY W/LESION REMOVAL: CPT | Mod: 52

## 2022-06-29 PROCEDURE — 87624 HPV HI-RISK TYP POOLED RSLT: CPT | Performed by: INTERNAL MEDICINE

## 2022-06-29 PROCEDURE — G8918 PT W/O PREOP ORDER IV AB PRO: HCPCS

## 2022-06-29 RX ORDER — ONDANSETRON 4 MG/1
4 TABLET, ORALLY DISINTEGRATING ORAL EVERY 30 MIN PRN
Status: DISCONTINUED | OUTPATIENT
Start: 2022-06-29 | End: 2022-06-30 | Stop reason: HOSPADM

## 2022-06-29 RX ORDER — NALOXONE HYDROCHLORIDE 0.4 MG/ML
0.2 INJECTION, SOLUTION INTRAMUSCULAR; INTRAVENOUS; SUBCUTANEOUS
Status: DISCONTINUED | OUTPATIENT
Start: 2022-06-29 | End: 2022-06-30 | Stop reason: HOSPADM

## 2022-06-29 RX ORDER — FENTANYL CITRATE 50 UG/ML
25 INJECTION, SOLUTION INTRAMUSCULAR; INTRAVENOUS
Status: DISCONTINUED | OUTPATIENT
Start: 2022-06-29 | End: 2022-06-30 | Stop reason: HOSPADM

## 2022-06-29 RX ORDER — ONDANSETRON 2 MG/ML
4 INJECTION INTRAMUSCULAR; INTRAVENOUS EVERY 6 HOURS PRN
Status: DISCONTINUED | OUTPATIENT
Start: 2022-06-29 | End: 2022-06-30 | Stop reason: HOSPADM

## 2022-06-29 RX ORDER — LIDOCAINE 40 MG/G
CREAM TOPICAL
Status: DISCONTINUED | OUTPATIENT
Start: 2022-06-29 | End: 2022-06-30 | Stop reason: HOSPADM

## 2022-06-29 RX ORDER — ACETAMINOPHEN 325 MG/1
975 TABLET ORAL ONCE
Status: DISCONTINUED | OUTPATIENT
Start: 2022-06-29 | End: 2022-06-30 | Stop reason: HOSPADM

## 2022-06-29 RX ORDER — SODIUM CHLORIDE, SODIUM LACTATE, POTASSIUM CHLORIDE, CALCIUM CHLORIDE 600; 310; 30; 20 MG/100ML; MG/100ML; MG/100ML; MG/100ML
INJECTION, SOLUTION INTRAVENOUS CONTINUOUS
Status: DISCONTINUED | OUTPATIENT
Start: 2022-06-29 | End: 2022-06-30 | Stop reason: HOSPADM

## 2022-06-29 RX ORDER — PROPOFOL 10 MG/ML
INJECTION, EMULSION INTRAVENOUS CONTINUOUS PRN
Status: DISCONTINUED | OUTPATIENT
Start: 2022-06-29 | End: 2022-06-29

## 2022-06-29 RX ORDER — LIDOCAINE HYDROCHLORIDE 20 MG/ML
INJECTION, SOLUTION INFILTRATION; PERINEURAL PRN
Status: DISCONTINUED | OUTPATIENT
Start: 2022-06-29 | End: 2022-06-29

## 2022-06-29 RX ORDER — ONDANSETRON 2 MG/ML
4 INJECTION INTRAMUSCULAR; INTRAVENOUS
Status: DISCONTINUED | OUTPATIENT
Start: 2022-06-29 | End: 2022-06-30 | Stop reason: HOSPADM

## 2022-06-29 RX ORDER — NALOXONE HYDROCHLORIDE 0.4 MG/ML
0.4 INJECTION, SOLUTION INTRAMUSCULAR; INTRAVENOUS; SUBCUTANEOUS
Status: DISCONTINUED | OUTPATIENT
Start: 2022-06-29 | End: 2022-06-30 | Stop reason: HOSPADM

## 2022-06-29 RX ORDER — FENTANYL CITRATE 50 UG/ML
25 INJECTION, SOLUTION INTRAMUSCULAR; INTRAVENOUS EVERY 5 MIN PRN
Status: DISCONTINUED | OUTPATIENT
Start: 2022-06-29 | End: 2022-06-30 | Stop reason: HOSPADM

## 2022-06-29 RX ORDER — FLUMAZENIL 0.1 MG/ML
0.2 INJECTION, SOLUTION INTRAVENOUS
Status: DISCONTINUED | OUTPATIENT
Start: 2022-06-29 | End: 2022-06-30 | Stop reason: HOSPADM

## 2022-06-29 RX ORDER — METOPROLOL TARTRATE 1 MG/ML
1-2 INJECTION, SOLUTION INTRAVENOUS EVERY 5 MIN PRN
Status: DISCONTINUED | OUTPATIENT
Start: 2022-06-29 | End: 2022-06-30 | Stop reason: HOSPADM

## 2022-06-29 RX ORDER — PROCHLORPERAZINE MALEATE 10 MG
10 TABLET ORAL EVERY 6 HOURS PRN
Status: DISCONTINUED | OUTPATIENT
Start: 2022-06-29 | End: 2022-06-30 | Stop reason: HOSPADM

## 2022-06-29 RX ORDER — MEPERIDINE HYDROCHLORIDE 25 MG/ML
12.5 INJECTION INTRAMUSCULAR; INTRAVENOUS; SUBCUTANEOUS
Status: DISCONTINUED | OUTPATIENT
Start: 2022-06-29 | End: 2022-06-30 | Stop reason: HOSPADM

## 2022-06-29 RX ORDER — ONDANSETRON 2 MG/ML
4 INJECTION INTRAMUSCULAR; INTRAVENOUS EVERY 30 MIN PRN
Status: DISCONTINUED | OUTPATIENT
Start: 2022-06-29 | End: 2022-06-30 | Stop reason: HOSPADM

## 2022-06-29 RX ORDER — PROPOFOL 10 MG/ML
INJECTION, EMULSION INTRAVENOUS PRN
Status: DISCONTINUED | OUTPATIENT
Start: 2022-06-29 | End: 2022-06-29

## 2022-06-29 RX ORDER — OXYCODONE HYDROCHLORIDE 5 MG/1
5 TABLET ORAL EVERY 4 HOURS PRN
Status: DISCONTINUED | OUTPATIENT
Start: 2022-06-29 | End: 2022-06-30 | Stop reason: HOSPADM

## 2022-06-29 RX ORDER — ONDANSETRON 4 MG/1
4 TABLET, ORALLY DISINTEGRATING ORAL EVERY 6 HOURS PRN
Status: DISCONTINUED | OUTPATIENT
Start: 2022-06-29 | End: 2022-06-30 | Stop reason: HOSPADM

## 2022-06-29 RX ADMIN — LIDOCAINE HYDROCHLORIDE 100 MG: 20 INJECTION, SOLUTION INFILTRATION; PERINEURAL at 13:50

## 2022-06-29 RX ADMIN — PROPOFOL 150 MCG/KG/MIN: 10 INJECTION, EMULSION INTRAVENOUS at 13:50

## 2022-06-29 RX ADMIN — PROPOFOL 50 MG: 10 INJECTION, EMULSION INTRAVENOUS at 13:52

## 2022-06-29 RX ADMIN — PROPOFOL 100 MG: 10 INJECTION, EMULSION INTRAVENOUS at 13:50

## 2022-06-29 RX ADMIN — SODIUM CHLORIDE, SODIUM LACTATE, POTASSIUM CHLORIDE, CALCIUM CHLORIDE: 600; 310; 30; 20 INJECTION, SOLUTION INTRAVENOUS at 12:36

## 2022-06-29 NOTE — INTERVAL H&P NOTE
I have reviewed the surgical (or preoperative) H&P that is linked to this encounter, and examined the patient. There are no significant changes    Clinical Conditions Present on Arrival:  Clinically Significant Risk Factors Present on Admission                   # Overweight: Estimated body mass index is 26.91 kg/m  as calculated from the following:    Height as of 6/16/22: 1.524 m (5').    Weight as of 6/16/22: 62.5 kg (137 lb 12.8 oz).

## 2022-06-29 NOTE — LETTER
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  Mcgee's esophagus is considered a pre-cancerous condition.  It arises from longstanding damage from acid reflux.  There was no cancer in your biopsies and the change of developing cancer is very low.  However because of the Mcgee's esophagus, you should remain on an acid reducing medication such as pantoprazole (curently prescribed to you).  We will also need to repeat the upper endoscopy in 3 years to make sure there is no progress or other treatment needed.    I am also writing to let you know the results of the polyps that were removed at the time of your colonoscopy.  The polyps returned as \"adenomatous polyps\".  An adenoma is a type of polyp that if left in the colon over a long enough period of time, and under the right circumstances, it could develop into a colon cancer.  There was no cancer in your polyps.  Your polyps were completely removed, however you are at risk to grow more adenomatous polyps in the future.      Because of this I recommend that you repeat a colonoscopy in 6 month as we previously discussed.  Please review these findings and recommendations with your primary care provider.  Of course should you develop any symptoms (such as unintended weight loss, blood in your stool or change in bowel pattern), you should let myself or your primary care doctor know as you may need an earlier procedure.      If you have any questions, please don't hesitate to contact the GI care coordinator at [phone].     Domingo Wall MD  [facility]  Gastroenterology and Hepatology  [facility]     If you have any questions, please don't hesitate to contact the GI care coordinator at [phone].    Domingo Wall MD  [facility], Gastroenterology " and Hepatology  [facility]       Resulted Orders   Surgical Pathology Exam   Result Value Ref Range    Case Report       Surgical Pathology Report                         Case: [id number]                                  Authorizing Provider:  Domingo Wall      Collected:           06/29/2022 01:55 PM                                 MD Fausto                                                                     Ordering Location:     [facility]    Received:            06/29/2022 03:43 PM                                 [facility]                                                                     Pathologist:           Laurent Lucio DO                                                            Specimens:   A) - Gastric Esophageal Junction, Eval for barretts esophagus & dysplasia                           B) - Esophagus, @ 31 cm r/o SCC                                                                     C) - Large Intestine, Colon, Descending, Descending colon polyp x 2                                 D) - Large Intestine, Colon, Transverse, Transverse colon polyp x 2                        Final Diagnosis       A.  ESOPHAGUS, GE JUNCTION, BIOPSY:  - Squamocolumnar junctional mucosa with goblet cells, consistent with Mcgee's mucosa  - No dysplasia or malignancy identified    B.  ESOPHAGUS, 31 CM, BIOPSY:  - Glycogenic acanthosis with increased intraepithelial eosinophils (numbering up to 10/high magnification field); see comment  - No dysplasia or malignancy identified    C.  COLON, DESCENDING, POLYPS:  - Fragments of sessile serrated adenomas  - No cytologic dysplasia identified    D.  COLON, TRANSVERSE, POLYPS:  - Fragments of sessile serrated adenomas  - No cytologic dysplasia identified  - Tubular adenoma  - No high-grade dysplasia or malignancy identified        Comment       The squamous epithelial cells contain abundant clear cytoplasm, consistent  "with glycogen accumulation. This finding is usually of no clinical consquence but may appear endoscopically as white plaques, or nodules.  No other histologic evidence of a polyp or nodule is identified.  There is no submucosal tissue present for examination.      Clinical Information       Adenomatous polyps      Gross Description       A(1). Gastric Esophageal Junction, Eval for barretts esophagus & dysplasia:  The specimen is received in formalin with proper patient identification, labeled \"GE junction eval for Mcgee's esophagus and dysplasia\".  The specimen consists of 7 pieces of tan-white soft tissue, 0.3 to 0.4 cm in greatest dimension.  Submitted in A1.     B(2). Esophagus, @ 31 cm r/o SCC:  The specimen is received in formalin with proper patient identification, labeled \"esophagus at 31 cm rule out SCC\".  The specimen consists of 4 pieces of tan-white soft tissue, 0.4 to 0.6 cm in greatest dimension.  Submitted in B1.     C(3). Large Intestine, Colon, Descending, Descending colon polyp x 2:  The specimen is received in formalin with proper patient identification, labeled \"descending colon polyp x2\".  The specimen consists of multiple tan-brown soft tissue fragments ranging from 0.2 to 0.9 cm in greatest dimension.  Submitted in C1-2.     D(4). Large Intestine, Colon, Transverse, Transverse colon polyp x 2:  The specimen is received in formalin with proper patient identification, labeled \"transverse colon polyp x2\".  The specimen consists of 2 tan-white soft tissue fragments, 0.4 and 0.6 cm in greatest dimension.  Submitted in D1.         Microscopic Description       Microscopic examination was performed.        Performing Labs       The technical component of this testing was completed at [facility]      Case Images         If you have any questions or concerns, please call the clinic at the number listed above. "       Sincerely,      Domingo Wall MD

## 2022-06-29 NOTE — ANESTHESIA POSTPROCEDURE EVALUATION
Patient: Marcelina Estevez    Procedure: Procedure(s):  COLONOSCOPY, WITH CO2 INSUFFLATION  ESOPHAGOGASTRODUODENOSCOPY, WITH CO2 INSUFFLATION  ESOPHAGOGASTRODUODENOSCOPY, WITH BIOPSY  COLONOSCOPY, FLEXIBLE, WITH LESION REMOVAL USING SNARE  COLONOSCOPY, WITH BALLOON DILATION OF STRICTURE       Anesthesia Type:  No value filed.    Note:  Disposition: Outpatient   Postop Pain Control: Uneventful            Sign Out: Well controlled pain   PONV: No   Neuro/Psych: Uneventful            Sign Out: Acceptable/Baseline neuro status   Airway/Respiratory: Uneventful            Sign Out: Acceptable/Baseline resp. status   CV/Hemodynamics: Uneventful            Sign Out: Acceptable CV status   Other NRE: NONE   DID A NON-ROUTINE EVENT OCCUR? No           Last vitals:  Vitals Value Taken Time   /84 06/29/22 1545   Temp 36  C (96.8  F) 06/29/22 1520   Pulse     Resp 16 06/29/22 1545   SpO2 99 % 06/29/22 1545       Electronically Signed By: Will Joe MD  June 29, 2022  5:07 PM

## 2022-06-29 NOTE — ANESTHESIA PREPROCEDURE EVALUATION
Anesthesia Pre-Procedure Evaluation    Patient: Marcelina Estevez   MRN: [id number] : 1960        Preoperative Diagnosis: Screening for colon cancer [Z12.11]   Procedure : Procedure(s):  COLONOSCOPY, WITH CO2 INSUFFLATION, ESOPHAGOGASTRODUODENOSCOPY, WITH CO2 INSUFFLATION (N/A Esophagus)      Past Medical History:   Diagnosis Date     CREST (calcinosis, Raynaud's phenomenon, esophageal dysfunction, sclerodactyly, telangiectasia) (H)      Hyperlipidemia      Hypertension      Limited systemic sclerosis (H)      Positive MINDY (antinuclear antibody)      Raynaud disease       Past Surgical History:   Procedure Laterality Date     APPENDECTOMY       BIOPSY      cervical node     COLONOSCOPY N/A 11/10/2021    Procedure: COLONOSCOPY, FLEXIBLE, WITH LESION REMOVAL USING SNARE;  Surgeon: Domingo Wall MD;  Location: [facility]     COLONOSCOPY N/A 11/10/2021    Procedure: COLONOSCOPY, WITH POLYPECTOMY AND BIOPSY;  Surgeon: Domingo Wall MD;  Location: [facility]     COLONOSCOPY N/A 2021    Procedure: Colonoscopy, With Polypectomy And Biopsy;  Surgeon: Domingo Wall MD;  Location: [facility]     COLONOSCOPY N/A 2021    Procedure: Colonoscopy, Flexible, With Lesion Removal Using Snare;  Surgeon: Domingo Wall MD;  Location: [facility]     COLONOSCOPY WITH CO2 INSUFFLATION N/A 10/26/2020    Procedure: COLONOSCOPY, WITH CO2 INSUFFLATION;  Surgeon: Phyllis Chaudhari MD;  Location: [facility]     COLONOSCOPY WITH CO2 INSUFFLATION N/A 11/10/2021    Procedure: COLONOSCOPY, WITH CO2 INSUFFLATION;  Surgeon: Domingo Wall MD;  Location: [facility]     COLONOSCOPY WITH CO2 INSUFFLATION N/A 2021    Procedure: COLONOSCOPY, WITH CO2 INSUFFLATION;  Surgeon: Domingo Wall MD;  Location: [facility]     DILATION AND CURETTAGE, HYSTEROSCOPY DIAGNOSTIC, COMBINED  2014    Procedure: COMBINED DILATION AND CURETTAGE, HYSTEROSCOPY DIAGNOSTIC;  Surgeon: Mana Cabrera,  DO;  Location: [facility]     ENT SURGERY      tonsillectomy and adnoid removal     HYSTERECTOMY TOTAL ABDOMINAL       ORTHOPEDIC SURGERY      left knee tear meniscus     RELEASE CARPAL TUNNEL BILATERAL  2009      Allergies   Allergen Reactions     Seasonal Allergies      Sulfa Drugs      Vomiting       Vicodin [Hydrocodone-Acetaminophen] Nausea     Other Environmental Allergy Other (See Comments)      Social History     Tobacco Use     Smoking status: Former Smoker     Quit date: 2001     Years since quittin.5     Smokeless tobacco: Never Used   Substance Use Topics     Alcohol use: Yes     Comment: 5-8 drinks/week      Wt Readings from Last 1 Encounters:   22 62.5 kg (137 lb 12.8 oz)        Anesthesia Evaluation            ROS/MED HX  ENT/Pulmonary:       Neurologic:       Cardiovascular:     (+) Dyslipidemia hypertension-----    METS/Exercise Tolerance:     Hematologic:       Musculoskeletal: Comment: Systemic Sclerosis      GI/Hepatic:       Renal/Genitourinary: Comment: Esophageal dysmotility    (+) renal disease, type: CRI,     Endo:       Psychiatric/Substance Use:       Infectious Disease:       Malignancy:       Other: Comment: CREST           Physical Exam    Airway  airway exam normal      Mallampati: II   TM distance: > 3 FB   Neck ROM: full   Mouth opening: > 3 cm    Respiratory Devices and Support         Dental  no notable dental history         Cardiovascular          Rhythm and rate: regular and normal     Pulmonary   pulmonary exam normal        breath sounds clear to auscultation           OUTSIDE LABS:  CBC:   Lab Results   Component Value Date    WBC 10.0 2022    WBC 8.4 2022    HGB 10.8 (L) 2022    HGB 9.8 (L) 2022    HCT 33.1 (L) 2022    HCT 29.9 (L) 2022     2022     2022     BMP:   Lab Results   Component Value Date     2022     2022    POTASSIUM 3.2 (L) 2022    POTASSIUM 4.5 2022     CHLORIDE 101 06/16/2022    CHLORIDE 101 04/11/2022    CO2 28 06/16/2022    CO2 25 04/11/2022    BUN 36 (H) 06/16/2022    BUN 30 04/11/2022    CR 1.09 (H) 06/16/2022    CR 1.13 (H) 04/11/2022    GLC 91 06/16/2022    GLC 94 04/11/2022     COAGS: No results found for: PTT, INR, FIBR  POC:   Lab Results   Component Value Date    HCG neg 07/01/2014     HEPATIC:   Lab Results   Component Value Date    ALBUMIN 3.9 06/16/2022    PROTTOTAL 7.0 06/16/2022    ALT 29 06/16/2022    AST 18 06/16/2022    ALKPHOS 53 06/16/2022    BILITOTAL 0.2 06/16/2022     OTHER:   Lab Results   Component Value Date    SERGIO 8.5 06/16/2022    MAG 1.9 05/22/2018    TSH 2.01 06/16/2022    CRP <2.9 06/12/2018    SED 18 05/22/2018       Anesthesia Plan    ASA Status:  3   NPO Status:  NPO Appropriate    Anesthesia Type: MAC.     - Reason for MAC: immobility needed, straight local not clinically adequate   Induction: Intravenous.   Maintenance: TIVA.        Consents    Anesthesia Plan(s) and associated risks, benefits, and realistic alternatives discussed. Questions answered and patient/representative(s) expressed understanding.    - Discussed:     - Discussed with:  Patient      - Extended Intubation/Ventilatory Support Discussed: No.      - Patient is DNR/DNI Status: No    Use of blood products discussed: No .     Postoperative Care    Pain management: IV analgesics, Oral pain medications, Multi-modal analgesia.   PONV prophylaxis: Ondansetron (or other 5HT-3), Background Propofol Infusion     Comments:                    Will Joe MD

## 2022-06-29 NOTE — ANESTHESIA CARE TRANSFER NOTE
Patient: Marcelina Estevez    Procedure: Procedure(s):  COLONOSCOPY, WITH CO2 INSUFFLATION  ESOPHAGOGASTRODUODENOSCOPY, WITH CO2 INSUFFLATION  ESOPHAGOGASTRODUODENOSCOPY, WITH BIOPSY  COLONOSCOPY, FLEXIBLE, WITH LESION REMOVAL USING SNARE  COLONOSCOPY, WITH BALLOON DILATION OF STRICTURE       Diagnosis: Adenomatous polyp of transverse colon [D12.3]  Diagnosis Additional Information: No value filed.    Anesthesia Type:   No value filed.     Note:    Oropharynx: oropharynx clear of all foreign objects  Level of Consciousness: awake  Oxygen Supplementation: room air    Independent Airway: airway patency satisfactory and stable  Dentition: dentition unchanged  Vital Signs Stable: post-procedure vital signs reviewed and stable  Report to RN Given: handoff report given  Patient transferred to: Phase II  Comments: To Phase II. Report to RN.  VSS Resp status stable.  Handoff Report: Identifed the Patient, Identified the Reponsible Provider, Reviewed the pertinent medical history, Discussed the surgical course, Reviewed Intra-OP anesthesia mangement and issues during anesthesia, Set expectations for post-procedure period and Allowed opportunity for questions and acknowledgement of understanding      Vitals:  Vitals Value Taken Time   BP     Temp     Pulse     Resp     SpO2         Electronically Signed By: EDUARD Martin CRNA  June 29, 2022  3:19 PM

## 2022-06-30 ENCOUNTER — TELEPHONE (OUTPATIENT)
Dept: GASTROENTEROLOGY | Facility: CLINIC | Age: 62
End: 2022-06-30

## 2022-07-01 ENCOUNTER — TELEPHONE (OUTPATIENT)
Dept: GASTROENTEROLOGY | Facility: CLINIC | Age: 62
End: 2022-07-01

## 2022-07-01 NOTE — TELEPHONE ENCOUNTER
----- Message from Debra David sent at 7/1/2022  5:54 AM CDT -----  Regarding: RE: MAC IN NOV  Hello,  I have added MAC time on 11/9 and 11/23.  Please feel free to teams message or call us when you need to find MAC time and we can find it right away.   Thanks for your help with this!   Debra David  [phone]    ----- Message -----  From: Domingo Wall MD  Sent: 6/30/2022  12:49 PM CDT  To: Debra Degroot  Subject: RE: MAC IN NOV                                   To the best of my knowledge, MAC should be available on second and fourth Wednesday of the month, or second and 4th Thursday of the month.      ----- Message -----  From: Fatoumata Dennis  Sent: 6/30/2022  10:30 AM CDT  To: Domingo Wall MD, #  Subject: MAC IN NOV                                       Hello Team,       Pt/ Marcelina called today to coordinate her follow up Colonoscopy @ [facility] With [facility] with MAC in Nov 2022.       Pt was just seen yesterday. (06/29)       [place].     ##Current avaiable MAC time in Nov is NOV 30th.       Please advise on possible dates in NOV prior to pt's travels that we can look at, and we'll be happy to coordinate.       Thank you,    Fatoumata MATTHEW  Endoscopy Scheduling Team

## 2022-07-01 NOTE — TELEPHONE ENCOUNTER
Outbound Call made to: Marcelina Estevez     Procedure: COLONOSOCPY       Reason for call (please be detailed, any staff messages or encounters to note?):     SCHEDULE COLONOSCOPY W/DR [facility]ON W/ARON @  IN NOV 2022        Rescheduled:     YES      If rescheduled:    Date: 11/09/2022   Location:     Note any change or update to original order/sedation: N/A

## 2022-07-06 PROCEDURE — G0452 MOLECULAR PATHOLOGY INTERPR: HCPCS | Performed by: PATHOLOGY

## 2022-07-07 LAB
PATH REPORT.COMMENTS IMP SPEC: NORMAL
PATH REPORT.FINAL DX SPEC: NORMAL
PATH REPORT.GROSS SPEC: NORMAL
PATH REPORT.MICROSCOPIC SPEC OTHER STN: NORMAL
PATH REPORT.RELEVANT HX SPEC: NORMAL
PHOTO IMAGE: NORMAL

## 2022-07-07 PROCEDURE — 88305 TISSUE EXAM BY PATHOLOGIST: CPT | Mod: 26 | Performed by: PATHOLOGY

## 2022-07-13 ENCOUNTER — HOSPITAL ENCOUNTER (OUTPATIENT)
Dept: CARDIOLOGY | Facility: CLINIC | Age: 62
Discharge: HOME | End: 2022-07-13
Attending: INTERNAL MEDICINE
Payer: COMMERCIAL

## 2022-07-13 ENCOUNTER — HOSPITAL ENCOUNTER (OUTPATIENT)
Dept: CARDIAC REHAB | Facility: CLINIC | Age: 62
Discharge: HOME | End: 2022-07-13
Attending: INTERNAL MEDICINE
Payer: COMMERCIAL

## 2022-07-13 DIAGNOSIS — M34.9 LIMITED SYSTEMIC SCLEROSIS (H): ICD-10-CM

## 2022-07-13 LAB — LVEF ECHO: NORMAL

## 2022-07-13 PROCEDURE — 93306 TTE W/DOPPLER COMPLETE: CPT

## 2022-07-13 PROCEDURE — 93306 TTE W/DOPPLER COMPLETE: CPT | Mod: 26 | Performed by: INTERNAL MEDICINE

## 2022-07-13 PROCEDURE — 94618 PULMONARY STRESS TESTING: CPT | Performed by: REHABILITATION PRACTITIONER

## 2022-07-21 ENCOUNTER — OFFICE VISIT (OUTPATIENT)
Dept: PULMONOLOGY | Facility: CLINIC | Age: 62
End: 2022-07-21
Attending: INTERNAL MEDICINE
Payer: COMMERCIAL

## 2022-07-21 ENCOUNTER — PRE VISIT (OUTPATIENT)
Dept: RHEUMATOLOGY | Facility: CLINIC | Age: 62
End: 2022-07-21

## 2022-07-21 VITALS
SYSTOLIC BLOOD PRESSURE: 118 MMHG | OXYGEN SATURATION: 100 % | BODY MASS INDEX: 26.37 KG/M2 | WEIGHT: 135 LBS | DIASTOLIC BLOOD PRESSURE: 78 MMHG | HEART RATE: 69 BPM

## 2022-07-21 DIAGNOSIS — I73.00 RAYNAUD'S DISEASE WITHOUT GANGRENE: ICD-10-CM

## 2022-07-21 DIAGNOSIS — M34.9 LIMITED SYSTEMIC SCLEROSIS (H): ICD-10-CM

## 2022-07-21 DIAGNOSIS — M34.1 CREST (CALCINOSIS, RAYNAUD'S PHENOMENON, ESOPHAGEAL DYSFUNCTION, SCLERODACTYLY, TELANGIECTASIA) (H): Primary | ICD-10-CM

## 2022-07-21 PROCEDURE — 99204 OFFICE O/P NEW MOD 45 MIN: CPT | Mod: GC | Performed by: INTERNAL MEDICINE

## 2022-07-21 PROCEDURE — G0463 HOSPITAL OUTPT CLINIC VISIT: HCPCS

## 2022-07-21 RX ORDER — OMEPRAZOLE 40 MG/1
40 CAPSULE, DELAYED RELEASE ORAL DAILY
COMMUNITY
End: 2022-09-27

## 2022-07-21 ASSESSMENT — PAIN SCALES - GENERAL: PAINLEVEL: NO PAIN (0)

## 2022-07-21 NOTE — NURSING NOTE
Chief Complaint   Patient presents with     New Patient     New Scleroderma      Vitals were taken and medications were reconciled.     Dana ANGULO  8:17 AM

## 2022-07-21 NOTE — TELEPHONE ENCOUNTER
NOTES Status Details   OFFICE NOTE from referring provider Care Everywhere 05.16.2022 Bhavin Gibbons,      OFFICE NOTE from other specialist     DISCHARGE SUMMARY from hospital     DISCHARGE REPORT from the ER     MEDICATION LIST Internal  /Care Everywhere    LABS (Any and all labs)      Internal  /Care Everywhere    Biopsy/pathology (Anything related to diagnoses I.e. fluid aspirations, lip biopsy, muscle biopsy)               Imaging (All imaging related to diagnoses)     Echo Care Everywhere      Internal 06.08.2021 07.13.2022   HRCT     CXR     EMG                    Scleroderma/Dermatomyositis diagnoses     Previous Cardiology notes      Previous Pulmonary notes     Previous Dermatology notes     Previous GI notes Internal  05.17.2022 Domingo posadas MD   Lupus diagnoses     Previous Nephrology notes     Previous Dermatology notes     Previous Cardiology notes

## 2022-07-21 NOTE — LETTER
7/21/2022         RE: Marcelina Estevez  [address] [place] [address]        Dear Colleague,    Thank you for referring your patient, Marcelina Estevez, to the [facility] AND [facility]. Please see a copy of my visit note below.    Rheumatology Clinic Initial Consult  07/21/22    Reason for visit: limited scleroderma (Centromere Ab +)    HPI:    Marcelina Estevez is a 62 yr old female with history of raynaud's, osteoarthritis, ischemic colitis, Shayne'ts esophagus and depression who is being seen in consultation for management of her limited scleroderma.     Patient reports that she has joint pain mainly in 1st-2nd MCP joints bilaterally. Pain is associated with stiffness for about 20 minutes. She has long standing history of raynaud's for more than 20 yrs. States she was evaluated for persistent fatigue by Dr. Egan at [facility] [place] and fund to have limited scleroderma (+MINDY 1:1280/ Anti centromere ab). Her remained SUSIE panel, DsDNA and rheumatoid serologies were negative.     Hand pain has remained persistent and intermittent pain in her ankle joints. She denies pain in any other joints.  She has been maintained on Plaquenil 300 mg daily. She feels improvement with plaquenil and currently following rheumatology in [facility].     She thinks she has some skin tightness in her distal fingers in hands. Raynaud's is well manageable with conservative measures and Nifedipine ER. Apparently, She was tried on Cellcept and Azathioprine that didn't tolerate well. She gives history of dry cough for more than year. CT chest 08/2020 showed left lateral scar possible related to trauma without any evidence of interstitial lung disease.  Pulmonary function test 05/2022 showed normal lung volumes and mild reduction in diffusing capacity 13.15 ml/min/mmhg 72% predicted. Transthoracic echo 0608/21 normal right ventricle and without any evidence of pulmonary hypertension. Her cough is thought to  be possibly form COVID infection. She denies shortness of breath. She can go up 2 flights of stairs without getting short of breath    She is concerned about her recent diagnosis of ischemic colitis in march, 2022. During further work up she was also found to have chan's esophagus at [facility]. She is wondering if all these symptoms are related to her limited scleroderma. She denies any hx of food regurgitation, hurt burn, dry eyes, dry mouth, pleurisy, photosensitivity, blood clots, calcinosis, digital ulcers or telangiectasias. She has hx of 1 miscariages at 12 weeks after her 2 live kids.     She is tolerating plaquenil well. Eyes exam this year was unremarkable for plaquenil related toxicity. She is former smoker and quit 15 yrs ago. She denies family hx of autoimmune conditions.     Rest of review of system is unremarkable       Past Medical History  Past Medical History:   Diagnosis Date     CREST (calcinosis, Raynaud's phenomenon, esophageal dysfunction, sclerodactyly, telangiectasia) (H)      Hyperlipidemia      Hypertension      Limited systemic sclerosis (H)      Positive MINDY (antinuclear antibody)      Raynaud disease      Past Surgical History:   Procedure Laterality Date     APPENDECTOMY       BIOPSY      cervical node     COLONOSCOPY N/A 11/10/2021    Procedure: COLONOSCOPY, FLEXIBLE, WITH LESION REMOVAL USING SNARE;  Surgeon: Domingo Wall MD;  Location: [facility]     COLONOSCOPY N/A 11/10/2021    Procedure: COLONOSCOPY, WITH POLYPECTOMY AND BIOPSY;  Surgeon: Domingo Wall MD;  Location: [facility]     COLONOSCOPY N/A 9/29/2021    Procedure: Colonoscopy, With Polypectomy And Biopsy;  Surgeon: Domingo Wall MD;  Location: [facility]     COLONOSCOPY N/A 9/29/2021    Procedure: Colonoscopy, Flexible, With Lesion Removal Using Snare;  Surgeon: Domingo Wall MD;  Location: [facility]     COLONOSCOPY N/A 6/29/2022    Procedure: COLONOSCOPY, FLEXIBLE, WITH  LESION REMOVAL USING SNARE;  Surgeon: Domingo Wall MD;  Location: [facility]     COLONOSCOPY WITH CO2 INSUFFLATION N/A 10/26/2020    Procedure: COLONOSCOPY, WITH CO2 INSUFFLATION;  Surgeon: Phyllis Chaudhari MD;  Location: [facility]     COLONOSCOPY WITH CO2 INSUFFLATION N/A 11/10/2021    Procedure: COLONOSCOPY, WITH CO2 INSUFFLATION;  Surgeon: Domingo Wall MD;  Location: [facility]     COLONOSCOPY WITH CO2 INSUFFLATION N/A 2021    Procedure: COLONOSCOPY, WITH CO2 INSUFFLATION;  Surgeon: Domingo Wall MD;  Location[facility]     COLONOSCOPY WITH CO2 INSUFFLATION N/A 2022    Procedure: COLONOSCOPY, WITH CO2 INSUFFLATION;  Surgeon: Domingo Wall MD;  Location: [facility]     COMBINED ESOPHAGOSCOPY, GASTROSCOPY, DUODENOSCOPY (EGD) WITH CO2 INSUFFLATION N/A 2022    Procedure: ESOPHAGOGASTRODUODENOSCOPY, WITH CO2 INSUFFLATION;  Surgeon: Domingo Wall MD;  Location: [facility]     DILATION AND CURETTAGE, HYSTEROSCOPY DIAGNOSTIC, COMBINED  2014    Procedure: COMBINED DILATION AND CURETTAGE, HYSTEROSCOPY DIAGNOSTIC;  Surgeon: Mana Cabrera DO;  Location: [facility]     ENT SURGERY      tonsillectomy and adnoid removal     ESOPHAGOSCOPY, GASTROSCOPY, DUODENOSCOPY (EGD), COMBINED N/A 2022    Procedure: ESOPHAGOGASTRODUODENOSCOPY, WITH BIOPSY;  Surgeon: Domingo Wall MD;  Location: [facility]     HYSTERECTOMY TOTAL ABDOMINAL       ORTHOPEDIC SURGERY      left knee tear meniscus     RELEASE CARPAL TUNNEL BILATERAL       Social History     Socioeconomic History     Marital status:      Spouse name: Not on file     Number of children: Not on file     Years of education: Not on file     Highest education level: Not on file   Occupational History     Not on file   Tobacco Use     Smoking status: Former Smoker     Quit date: 2001     Years since quittin.5     Smokeless tobacco: Never Used   Vaping Use     Vaping Use: Never used    Substance and Sexual Activity     Alcohol use: Yes     Comment: 5-8 drinks/week     Drug use: No     Sexual activity: Yes     Partners: Male     Birth control/protection: Surgical     Comment: vasectomy   Other Topics Concern     Parent/sibling w/ CABG, MI or angioplasty before 65F 55M? 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     Hobby Hazards No     Sleep Concern No     Stress Concern No     Weight Concern No     Special Diet No     Back Care No     Exercise Yes     Comment: 4 days per week     Bike Helmet Yes     Seat Belt Yes     Self-Exams Yes   Social History Narrative     Not on file     Social Determinants of Health     Financial Resource Strain: Not on file   Food Insecurity: Not on file   Transportation Needs: Not on file   Physical Activity: Not on file   Stress: Not on file   Social Connections: Not on file   Intimate Partner Violence: Not on file   Housing Stability: Not on file     Family History   Problem Relation Age of Onset     Osteoporosis Mother      Eye Disorder Mother         cataract, mac degen     Macular Degeneration Mother      Dementia Mother      Hypertension Maternal Grandmother      Diabetes Maternal Grandfather      Eye Disorder Paternal Grandmother         glaucoma     Unknown/Adopted Paternal Grandfather      Hypertension Daughter      Graves' disease Daughter      Diabetes Other      Glaucoma No family hx of        Medications:  Current Outpatient Medications   Medication     diclofenac (VOLTAREN) 1 % topical gel     FLUoxetine (PROZAC) 20 MG capsule     FLUoxetine (PROZAC) 40 MG capsule     fluticasone (FLONASE) 50 MCG/ACT nasal spray     hydrochlorothiazide (HYDRODIURIL) 25 MG tablet     hydroxychloroquine (PLAQUENIL) 200 MG tablet     isosorbide mononitrate (IMDUR) 30 MG 24 hr tablet     LORazepam (ATIVAN) 0.5 MG tablet     losartan (COZAAR) 50 MG tablet     NIFEdipine ER (ADALAT CC)  30 MG 24 hr tablet     omeprazole (PRILOSEC) 40 MG DR capsule     order for DME     pantoprazole (PROTONIX) 40 MG EC tablet     simvastatin (ZOCOR) 40 MG tablet     traZODone (DESYREL) 50 MG tablet     VITAMIN D, CHOLECALCIFEROL, PO     albuterol (PROAIR HFA) 108 (90 Base) MCG/ACT inhaler     ALLEGRA ALLERGY 180 MG tablet     benzonatate (TESSALON) 100 MG capsule     buPROPion (WELLBUTRIN XL) 150 MG 24 hr tablet     Calcium Carbonate-Vit D-Min (RA CALCIUM 600/VIT D/MINERALS) 600-200 MG-UNIT TABS     ferrous sulfate (FEROSUL) 325 (65 Fe) MG tablet     No current facility-administered medications for this visit.     Facility-Administered Medications Ordered in Other Visits   Medication     sodium chloride (PF) 0.9% PF flush 10 mL       Allergies:     Allergies   Allergen Reactions     Seasonal Allergies      Sulfa Drugs      Vomiting       Vicodin [Hydrocodone-Acetaminophen] Nausea     Other Environmental Allergy Other (See Comments)       /78 (BP Location: Right arm, Patient Position: Chair, Cuff Size: Adult Regular)   Pulse 69   Wt 61.2 kg (135 lb)   LMP 06/12/2014   SpO2 100%   BMI 26.37 kg/m       Physical Exam:  Constitutional: well-developed, appearing stated age; cooperative,  Skin: no nail pitting, alopecia, rash, nodules or lesions, mild skin thickening distal to MCP joints, spider angioma on fore head, telangiectasias +   Eyes: nl EOM, vision, conjunctiva, sclera,   Neck: no mass, non-tender thyroid  Resp: lungs clear to auscultation, breathing comfortably on room air  CV: RRR, no murmurs, rubs or gallops, no edema  GI: soft, non-tender, non-distended  Lymph: no cervical, supraclavicular, or epitrochlear nodes  Neuro: nl cranial nerves, strength, sensation,   Psych: nl judgement, orientation, memory, affect.    MS: mild tenderness and fullness at 1st, 2nd and 3rd MCP joints, The TMJ, neck, shoulder, elbow, wrist, PIP/DIP, spine, hip, knee, ankle, and MTP/IP joints were examined and found normal.  No active synovitis or altered joint anatomy. Full joint ROM. Normal  strength. No dactylitis,  tenosynovitis, enthesopathy.    Lab/Imaging: Reviewed recent labs and imaging.     #. Limited scleroderma (Positive MINDY >-1:1280, +Ccentromere pattern)  #. Raynaud's   #. Telangiectasias  #. sclerodactyly   #. GERD  #. Hand joint pain       Assessment/Plan:    She has diagnosis of limited cutaneous systemic sclerosis /CREST syndrome based on her history of raynaud's, teleangiectasia, sclerodactyly, GERD, inflammatory arthritis and positive autoantibody (+ Centromere Ab).    She has been appropriate monitored for pulmonary hypertension. She has no evidence of pulmonary hypertension given her normal echocardiography and absence of symptoms. We have encouraged her to contact with her rheumatologist if she experiences any acute change in her breathing. If there is any evidence of pulmonary hypertension then she should follow up at pulmonary hypertension clinic here at [facility] or [facility]. She has been tried on Cellcept and Azathioprine previously that was discontinued due to intolerance apparently. Cellcept can be helpful for treatment of skin thickening and interstitial lung disease. However, She doesn't have any evidence of ILD as per CT chest report. We don't have access to actual images at this time.      She has some tenderness and fullness in her 1st -3rd MCP joints - more prominent in right than left hand. Some patients with limited scleroderma get an overlap with rheumatoid arthritis. However RA tends to be more symmetric. Her asymmetric joint involvement could possibly be CCPD arthropathy. She has suboptimal control of fatigue and joint pain with plaquenil.  I would suggest to try Diclofenac gel for pain control. We should avoid using systemic NSAIDs given her history Mcgee esophagus. If she has worsening hand joint pain with signs of acute synovitis- I think methotrexate can be considered. We discussed the  importance of physical activity and sleep hygiene to help with her chronic fatigue.     She is concerned if her recent diagnosis of ischemic colitis and Chan's esophagus has any association with her underlying  limited scleroderma. I am not sure if ischemic colitis is related to her hx of scleroderma but it seems unlikely. She has minimal symptoms of gastric reflux and her diagnosis of chan's esophagus could be related to her remote history of smoking. She should continue PPI and proper follow up with GI team.     She will continue to follow up with her rheumatologist in [place]. Please let us know if there is any question.       The Patient was seen and discussed with Harris De Luna,  Rheumatology fellow     I saw the patient with the fellow.  My exam and recomendations are as described.     Her disease process does not appear to be aggressive at this point in time.  She is being appropriately screened periodically for the development of pulmonary hypertension which would be the greatest risk in her situation.    She obviously requires ongoing endoscopic surveillance for her Chan's esophagus as well.    She otherwise appears overall stable.  I am happy to discuss her case with any of her providers as needed at any time.    Wander Villalobos MD

## 2022-07-21 NOTE — PROGRESS NOTES
Rheumatology Clinic Initial Consult  07/21/22    Reason for visit: limited scleroderma (Centromere Ab +)    HPI:    Marcelina Estevez is a 62 yr old female with history of raynaud's, osteoarthritis, ischemic colitis, Shayne'ts esophagus and depression who is being seen in consultation for management of her limited scleroderma.     Patient reports that she has joint pain mainly in 1st-2nd MCP joints bilaterally. Pain is associated with stiffness for about 20 minutes. She has long standing history of raynaud's for more than 20 yrs. States she was evaluated for persistent fatigue by Dr. Egan at [facility] [place] and fund to have limited scleroderma (+MINDY 1:1280/ Anti centromere ab). Her remained SUSIE panel, DsDNA and rheumatoid serologies were negative.     Hand pain has remained persistent and intermittent pain in her ankle joints. She denies pain in any other joints.  She has been maintained on Plaquenil 300 mg daily. She feels improvement with plaquenil and currently following rheumatology in [facility].     She thinks she has some skin tightness in her distal fingers in hands. Raynaud's is well manageable with conservative measures and Nifedipine ER. Apparently, She was tried on Cellcept and Azathioprine that didn't tolerate well. She gives history of dry cough for more than year. CT chest 08/2020 showed left lateral scar possible related to trauma without any evidence of interstitial lung disease.  Pulmonary function test 05/2022 showed normal lung volumes and mild reduction in diffusing capacity 13.15 ml/min/mmhg 72% predicted. Transthoracic echo 0608/21 normal right ventricle and without any evidence of pulmonary hypertension. Her cough is thought to be possibly form COVID infection. She denies shortness of breath. She can go up 2 flights of stairs without getting short of breath    She is concerned about her recent diagnosis of ischemic colitis in march, 2022. During further work up she was also found to have chan's esophagus  at [facility]. She is wondering if all these symptoms are related to her limited scleroderma. She denies any hx of food regurgitation, hurt burn, dry eyes, dry mouth, pleurisy, photosensitivity, blood clots, calcinosis, digital ulcers or telangiectasias. She has hx of 1 miscariages at 12 weeks after her 2 live kids.     She is tolerating plaquenil well. Eyes exam this year was unremarkable for plaquenil related toxicity. She is former smoker and quit 15 yrs ago. She denies family hx of autoimmune conditions.     Rest of review of system is unremarkable       Past Medical History  Past Medical History:   Diagnosis Date     CREST (calcinosis, Raynaud's phenomenon, esophageal dysfunction, sclerodactyly, telangiectasia) (H)      Hyperlipidemia      Hypertension      Limited systemic sclerosis (H)      Positive MINDY (antinuclear antibody)      Raynaud disease      Past Surgical History:   Procedure Laterality Date     APPENDECTOMY       BIOPSY      cervical node     COLONOSCOPY N/A 11/10/2021    Procedure: COLONOSCOPY, FLEXIBLE, WITH LESION REMOVAL USING SNARE;  Surgeon: Domingo Wall MD;  Location: [facility]     COLONOSCOPY N/A 11/10/2021    Procedure: COLONOSCOPY, WITH POLYPECTOMY AND BIOPSY;  Surgeon: Domingo Wall MD;  Location: [facility]     COLONOSCOPY N/A 9/29/2021    Procedure: Colonoscopy, With Polypectomy And Biopsy;  Surgeon: Domingo Wall MD;  Location: [facility]     COLONOSCOPY N/A 9/29/2021    Procedure: Colonoscopy, Flexible, With Lesion Removal Using Snare;  Surgeon: Domingo Wall MD;  Location: [facility]     COLONOSCOPY N/A 6/29/2022    Procedure: COLONOSCOPY, FLEXIBLE, WITH LESION REMOVAL USING SNARE;  Surgeon: Domingo Wall MD;  Location: [facility]     COLONOSCOPY WITH CO2 INSUFFLATION N/A 10/26/2020    Procedure: COLONOSCOPY, WITH CO2 INSUFFLATION;  Surgeon: Phyllis Chaudhari MD;  Location: [facility]     COLONOSCOPY WITH CO2 INSUFFLATION N/A  11/10/2021    Procedure: COLONOSCOPY, WITH CO2 INSUFFLATION;  Surgeon: Domingo Wall MD;  [facility]     COLONOSCOPY WITH CO2 INSUFFLATION N/A 2021    Procedure: COLONOSCOPY, WITH CO2 INSUFFLATION;  Surgeon: Domingo Wall MD;  [facility]     COLONOSCOPY WITH CO2 INSUFFLATION N/A 2022    Procedure: COLONOSCOPY, WITH CO2 INSUFFLATION;  Surgeon: Domingo Wall MD;  [facility]     COMBINED ESOPHAGOSCOPY, GASTROSCOPY, DUODENOSCOPY (EGD) WITH CO2 INSUFFLATION N/A 2022    Procedure: ESOPHAGOGASTRODUODENOSCOPY, WITH CO2 INSUFFLATION;  Surgeon: Domingo Wall MD;  Location: [facility]     DILATION AND CURETTAGE, HYSTEROSCOPY DIAGNOSTIC, COMBINED  2014    Procedure: COMBINED DILATION AND CURETTAGE, HYSTEROSCOPY DIAGNOSTIC;  Surgeon: Mana Cabrera DO;  Location: [facility]     ENT SURGERY      tonsillectomy and adnoid removal     ESOPHAGOSCOPY, GASTROSCOPY, DUODENOSCOPY (EGD), COMBINED N/A 2022    Procedure: ESOPHAGOGASTRODUODENOSCOPY, WITH BIOPSY;  Surgeon: Domingo Wall MD;  Location: [facility]     HYSTERECTOMY TOTAL ABDOMINAL       ORTHOPEDIC SURGERY      left knee tear meniscus     RELEASE CARPAL TUNNEL BILATERAL       Social History     Socioeconomic History     Marital status:      Spouse name: Not on file     Number of children: Not on file     Years of education: Not on file     Highest education level: Not on file   Occupational History     Not on file   Tobacco Use     Smoking status: Former Smoker     Quit date: 2001     Years since quittin.5     Smokeless tobacco: Never Used   Vaping Use     Vaping Use: Never used   Substance and Sexual Activity     Alcohol use: Yes     Comment: 5-8 drinks/week     Drug use: No     Sexual activity: Yes     Partners: Male     Birth control/protection: Surgical     Comment: vasectomy   Other Topics Concern     Parent/sibling w/ CABG, MI or angioplasty before 65F  55M? 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     Hobby Hazards No     Sleep Concern No     Stress Concern No     Weight Concern No     Special Diet No     Back Care No     Exercise Yes     Comment: 4 days per week     Bike Helmet Yes     Seat Belt Yes     Self-Exams Yes   Social History Narrative     Not on file     Social Determinants of Health     Financial Resource Strain: Not on file   Food Insecurity: Not on file   Transportation Needs: Not on file   Physical Activity: Not on file   Stress: Not on file   Social Connections: Not on file   Intimate Partner Violence: Not on file   Housing Stability: Not on file     Family History   Problem Relation Age of Onset     Osteoporosis Mother      Eye Disorder Mother         cataract, mac degen     Macular Degeneration Mother      Dementia Mother      Hypertension Maternal Grandmother      Diabetes Maternal Grandfather      Eye Disorder Paternal Grandmother         glaucoma     Unknown/Adopted Paternal Grandfather      Hypertension Daughter      Graves' disease Daughter      Diabetes Other      Glaucoma No family hx of        Medications:  Current Outpatient Medications   Medication     diclofenac (VOLTAREN) 1 % topical gel     FLUoxetine (PROZAC) 20 MG capsule     FLUoxetine (PROZAC) 40 MG capsule     fluticasone (FLONASE) 50 MCG/ACT nasal spray     hydrochlorothiazide (HYDRODIURIL) 25 MG tablet     hydroxychloroquine (PLAQUENIL) 200 MG tablet     isosorbide mononitrate (IMDUR) 30 MG 24 hr tablet     LORazepam (ATIVAN) 0.5 MG tablet     losartan (COZAAR) 50 MG tablet     NIFEdipine ER (ADALAT CC) 30 MG 24 hr tablet     omeprazole (PRILOSEC) 40 MG DR capsule     order for DME     pantoprazole (PROTONIX) 40 MG EC tablet     simvastatin (ZOCOR) 40 MG tablet     traZODone (DESYREL) 50 MG tablet     VITAMIN D, CHOLECALCIFEROL, PO     albuterol (PROAIR HFA) 108 (90 Base) MCG/ACT  inhaler     ALLEGRA ALLERGY 180 MG tablet     benzonatate (TESSALON) 100 MG capsule     buPROPion (WELLBUTRIN XL) 150 MG 24 hr tablet     Calcium Carbonate-Vit D-Min (RA CALCIUM 600/VIT D/MINERALS) 600-200 MG-UNIT TABS     ferrous sulfate (FEROSUL) 325 (65 Fe) MG tablet     No current facility-administered medications for this visit.     Facility-Administered Medications Ordered in Other Visits   Medication     sodium chloride (PF) 0.9% PF flush 10 mL       Allergies:     Allergies   Allergen Reactions     Seasonal Allergies      Sulfa Drugs      Vomiting       Vicodin [Hydrocodone-Acetaminophen] Nausea     Other Environmental Allergy Other (See Comments)       /78 (BP Location: Right arm, Patient Position: Chair, Cuff Size: Adult Regular)   Pulse 69   Wt 61.2 kg (135 lb)   LMP 06/12/2014   SpO2 100%   BMI 26.37 kg/m       Physical Exam:  Constitutional: well-developed, appearing stated age; cooperative,  Skin: no nail pitting, alopecia, rash, nodules or lesions, mild skin thickening distal to MCP joints, spider angioma on fore head, telangiectasias +   Eyes: nl EOM, vision, conjunctiva, sclera,   Neck: no mass, non-tender thyroid  Resp: lungs clear to auscultation, breathing comfortably on room air  CV: RRR, no murmurs, rubs or gallops, no edema  GI: soft, non-tender, non-distended  Lymph: no cervical, supraclavicular, or epitrochlear nodes  Neuro: nl cranial nerves, strength, sensation,   Psych: nl judgement, orientation, memory, affect.    MS: mild tenderness and fullness at 1st, 2nd and 3rd MCP joints, The TMJ, neck, shoulder, elbow, wrist, PIP/DIP, spine, hip, knee, ankle, and MTP/IP joints were examined and found normal. No active synovitis or altered joint anatomy. Full joint ROM. Normal  strength. No dactylitis,  tenosynovitis, enthesopathy.    Lab/Imaging: Reviewed recent labs and imaging.     #. Limited scleroderma (Positive MINDY >-1:1280, +Ccentromere pattern)  #. Raynaud's   #.  Telangiectasias  #. sclerodactyly   #. GERD  #. Hand joint pain       Assessment/Plan:    She has diagnosis of limited cutaneous systemic sclerosis /CREST syndrome based on her history of raynaud's, teleangiectasia, sclerodactyly, GERD, inflammatory arthritis and positive autoantibody (+ Centromere Ab).    She has been appropriate monitored for pulmonary hypertension. She has no evidence of pulmonary hypertension given her normal echocardiography and absence of symptoms. We have encouraged her to contact with her rheumatologist if she experiences any acute change in her breathing. If there is any evidence of pulmonary hypertension then she should follow up at pulmonary hypertension clinic here at [facility] or [facility]. She has been tried on Cellcept and Azathioprine previously that was discontinued due to intolerance apparently. Cellcept can be helpful for treatment of skin thickening and interstitial lung disease. However, She doesn't have any evidence of ILD as per CT chest report. We don't have access to actual images at this time.      She has some tenderness and fullness in her 1st -3rd MCP joints - more prominent in right than left hand. Some patients with limited scleroderma get an overlap with rheumatoid arthritis. However RA tends to be more symmetric. Her asymmetric joint involvement could possibly be CCPD arthropathy. She has suboptimal control of fatigue and joint pain with plaquenil.  I would suggest to try Diclofenac gel for pain control. We should avoid using systemic NSAIDs given her history Mcgee esophagus. If she has worsening hand joint pain with signs of acute synovitis- I think methotrexate can be considered. We discussed the importance of physical activity and sleep hygiene to help with her chronic fatigue.     She is concerned if her recent diagnosis of ischemic colitis and Mcgee's esophagus has any association with her underlying  limited scleroderma. I am not sure if ischemic colitis is  related to her hx of scleroderma but it seems unlikely. She has minimal symptoms of gastric reflux and her diagnosis of chan's esophagus could be related to her remote history of smoking. She should continue PPI and proper follow up with GI team.     She will continue to follow up with her rheumatologist in [place]. Please let us know if there is any question.       The Patient was seen and discussed with Harris De Luna,  Rheumatology fellow     I saw the patient with the fellow.  My exam and recomendations are as described.     Her disease process does not appear to be aggressive at this point in time.  She is being appropriately screened periodically for the development of pulmonary hypertension which would be the greatest risk in her situation.    She obviously requires ongoing endoscopic surveillance for her Chan's esophagus as well.    She otherwise appears overall stable.  I am happy to discuss her case with any of her providers as needed at any time.    Wander Villalobos MD

## 2022-08-03 DIAGNOSIS — E78.5 HYPERLIPIDEMIA LDL GOAL <100: ICD-10-CM

## 2022-08-03 RX ORDER — SIMVASTATIN 40 MG
TABLET ORAL
Qty: 90 TABLET | Refills: 3 | Status: SHIPPED | OUTPATIENT
Start: 2022-08-03 | End: 2023-08-10

## 2022-08-03 NOTE — TELEPHONE ENCOUNTER
simvastatin (ZOCOR) 40 MG tablet 30 tablet 0 6/16/2022  No     Pt requesting refill on simvastatin.

## 2022-08-26 ENCOUNTER — OFFICE VISIT (OUTPATIENT)
Dept: FAMILY MEDICINE | Facility: CLINIC | Age: 62
End: 2022-08-26
Payer: COMMERCIAL

## 2022-08-26 VITALS
BODY MASS INDEX: 27.08 KG/M2 | HEIGHT: 61 IN | SYSTOLIC BLOOD PRESSURE: 131 MMHG | DIASTOLIC BLOOD PRESSURE: 78 MMHG | RESPIRATION RATE: 15 BRPM | TEMPERATURE: 98.6 F | WEIGHT: 143.4 LBS | HEART RATE: 71 BPM | OXYGEN SATURATION: 94 %

## 2022-08-26 DIAGNOSIS — E87.6 HYPOKALEMIA: ICD-10-CM

## 2022-08-26 DIAGNOSIS — F41.0 ANXIETY ATTACK: ICD-10-CM

## 2022-08-26 DIAGNOSIS — Z12.31 ENCOUNTER FOR SCREENING MAMMOGRAM FOR MALIGNANT NEOPLASM OF BREAST: ICD-10-CM

## 2022-08-26 DIAGNOSIS — F33.0 MILD EPISODE OF RECURRENT MAJOR DEPRESSIVE DISORDER (H): ICD-10-CM

## 2022-08-26 DIAGNOSIS — Z00.00 ROUTINE GENERAL MEDICAL EXAMINATION AT A HEALTH CARE FACILITY: Primary | ICD-10-CM

## 2022-08-26 DIAGNOSIS — F41.1 GAD (GENERALIZED ANXIETY DISORDER): ICD-10-CM

## 2022-08-26 DIAGNOSIS — D63.8 ANEMIA IN OTHER CHRONIC DISEASES CLASSIFIED ELSEWHERE: ICD-10-CM

## 2022-08-26 DIAGNOSIS — G47.00 INSOMNIA, UNSPECIFIED TYPE: ICD-10-CM

## 2022-08-26 DIAGNOSIS — N32.81 OVERACTIVE BLADDER: ICD-10-CM

## 2022-08-26 DIAGNOSIS — N18.1 CKD (CHRONIC KIDNEY DISEASE) STAGE 1, GFR 90 ML/MIN OR GREATER: ICD-10-CM

## 2022-08-26 LAB
ANION GAP SERPL CALCULATED.3IONS-SCNC: 7 MMOL/L (ref 3–14)
BUN SERPL-MCNC: 26 MG/DL (ref 7–30)
CALCIUM SERPL-MCNC: 9.9 MG/DL (ref 8.5–10.1)
CHLORIDE BLD-SCNC: 98 MMOL/L (ref 94–109)
CO2 SERPL-SCNC: 27 MMOL/L (ref 20–32)
CREAT SERPL-MCNC: 1.01 MG/DL (ref 0.52–1.04)
CREAT UR-MCNC: 50 MG/DL
ERYTHROCYTE [DISTWIDTH] IN BLOOD BY AUTOMATED COUNT: 13.4 % (ref 10–15)
GFR SERPL CREATININE-BSD FRML MDRD: 63 ML/MIN/1.73M2
GLUCOSE BLD-MCNC: 84 MG/DL (ref 70–99)
HCT VFR BLD AUTO: 32.6 % (ref 35–47)
HGB BLD-MCNC: 10.8 G/DL (ref 11.7–15.7)
IRON SATN MFR SERPL: 14 % (ref 15–46)
IRON SERPL-MCNC: 57 UG/DL (ref 35–180)
MCH RBC QN AUTO: 30.9 PG (ref 26.5–33)
MCHC RBC AUTO-ENTMCNC: 33.1 G/DL (ref 31.5–36.5)
MCV RBC AUTO: 93 FL (ref 78–100)
MICROALBUMIN UR-MCNC: 75 MG/L
MICROALBUMIN/CREAT UR: 150 MG/G CR (ref 0–25)
PLATELET # BLD AUTO: 232 10E3/UL (ref 150–450)
POTASSIUM BLD-SCNC: 4.2 MMOL/L (ref 3.4–5.3)
RBC # BLD AUTO: 3.49 10E6/UL (ref 3.8–5.2)
SODIUM SERPL-SCNC: 132 MMOL/L (ref 133–144)
TIBC SERPL-MCNC: 402 UG/DL (ref 240–430)
WBC # BLD AUTO: 5.9 10E3/UL (ref 4–11)

## 2022-08-26 PROCEDURE — 90471 IMMUNIZATION ADMIN: CPT | Performed by: INTERNAL MEDICINE

## 2022-08-26 PROCEDURE — 90677 PCV20 VACCINE IM: CPT | Performed by: INTERNAL MEDICINE

## 2022-08-26 PROCEDURE — 36415 COLL VENOUS BLD VENIPUNCTURE: CPT | Performed by: INTERNAL MEDICINE

## 2022-08-26 PROCEDURE — 83550 IRON BINDING TEST: CPT | Performed by: INTERNAL MEDICINE

## 2022-08-26 PROCEDURE — 80048 BASIC METABOLIC PNL TOTAL CA: CPT | Performed by: INTERNAL MEDICINE

## 2022-08-26 PROCEDURE — 99214 OFFICE O/P EST MOD 30 MIN: CPT | Mod: 25 | Performed by: INTERNAL MEDICINE

## 2022-08-26 PROCEDURE — 96127 BRIEF EMOTIONAL/BEHAV ASSMT: CPT | Performed by: INTERNAL MEDICINE

## 2022-08-26 PROCEDURE — 82043 UR ALBUMIN QUANTITATIVE: CPT | Performed by: INTERNAL MEDICINE

## 2022-08-26 PROCEDURE — 85027 COMPLETE CBC AUTOMATED: CPT | Performed by: INTERNAL MEDICINE

## 2022-08-26 PROCEDURE — 99396 PREV VISIT EST AGE 40-64: CPT | Mod: 25 | Performed by: INTERNAL MEDICINE

## 2022-08-26 RX ORDER — TOLTERODINE TARTRATE 1 MG/1
1 TABLET, EXTENDED RELEASE ORAL 2 TIMES DAILY PRN
Qty: 60 TABLET | Refills: 0 | Status: SHIPPED | OUTPATIENT
Start: 2022-08-26 | End: 2022-09-19

## 2022-08-26 RX ORDER — FLUOXETINE 40 MG/1
40 CAPSULE ORAL DAILY
Qty: 90 CAPSULE | Refills: 1 | Status: SHIPPED | OUTPATIENT
Start: 2022-08-26 | End: 2022-11-01

## 2022-08-26 RX ORDER — BUPROPION HYDROCHLORIDE 150 MG/1
150 TABLET ORAL EVERY MORNING
Qty: 90 TABLET | Refills: 1 | Status: SHIPPED | OUTPATIENT
Start: 2022-08-26 | End: 2022-11-22

## 2022-08-26 RX ORDER — TRAZODONE HYDROCHLORIDE 50 MG/1
50 TABLET, FILM COATED ORAL AT BEDTIME
Qty: 90 TABLET | Refills: 1 | Status: SHIPPED | OUTPATIENT
Start: 2022-08-26 | End: 2022-09-27

## 2022-08-26 RX ORDER — HYDROXYZINE HYDROCHLORIDE 25 MG/1
25 TABLET, FILM COATED ORAL 2 TIMES DAILY PRN
Qty: 60 TABLET | Refills: 2 | Status: SHIPPED | OUTPATIENT
Start: 2022-08-26 | End: 2022-09-09 | Stop reason: SINTOL

## 2022-08-26 ASSESSMENT — ENCOUNTER SYMPTOMS
PALPITATIONS: 0
DIARRHEA: 1
JOINT SWELLING: 1
CONSTIPATION: 0
NERVOUS/ANXIOUS: 1
HEADACHES: 0
SORE THROAT: 0
HEMATOCHEZIA: 0
MYALGIAS: 0
FREQUENCY: 1
NAUSEA: 0
COUGH: 1
ABDOMINAL PAIN: 0
DYSURIA: 0
SHORTNESS OF BREATH: 0
HEMATURIA: 0
HEARTBURN: 0
DIZZINESS: 0
EYE PAIN: 0
ARTHRALGIAS: 1
CHILLS: 0
FEVER: 0
BREAST MASS: 0
WEAKNESS: 1
PARESTHESIAS: 1

## 2022-08-26 ASSESSMENT — PATIENT HEALTH QUESTIONNAIRE - PHQ9
SUM OF ALL RESPONSES TO PHQ QUESTIONS 1-9: 6
10. IF YOU CHECKED OFF ANY PROBLEMS, HOW DIFFICULT HAVE THESE PROBLEMS MADE IT FOR YOU TO DO YOUR WORK, TAKE CARE OF THINGS AT HOME, OR GET ALONG WITH OTHER PEOPLE: SOMEWHAT DIFFICULT
SUM OF ALL RESPONSES TO PHQ QUESTIONS 1-9: 6

## 2022-08-26 ASSESSMENT — PAIN SCALES - GENERAL: PAINLEVEL: NO PAIN (0)

## 2022-08-26 NOTE — NURSING NOTE
Screening Questionnaire for Adult Immunization     Are you sick today?   No    Do you have allergies to medications, food or any vaccine?   YES - provider reviewed    Have you ever had a serious reaction after receiving a vaccination?   No    Do you have a long-term health problem with heart disease, lung disease,  asthma, kidney disease, diabetes, anemia, metabolic or blood disease?   No    Do you have cancer, leukemia, AIDS, or any immune system problem?  Yes- provider aware and reviewed    Do you take cortisone, prednisone, other steroids, or anticancer drugs, or  have you had any x-ray (radiation) treatments?   No    Have you had a seizure, brain, or other nervous system problem?   No    During the past year, have you received a transfusion of blood or blood       products, or been given a medicine called immune (gamma) globulin?   No    For women: Are you pregnant or is there a chance you could become         pregnant during the next month?   No    Have you received any vaccinations in the past 4 weeks?   No     Immunization questionnaire answers were all negative.     Screening performed by Katharina Padilla CMA on 8/26/2022 at 11:49 AM.    Prior to injection verified patient identity using patient's name and date of birth. Patient instructed to remain in clinic for 15 minutes afterwards, and to report any adverse reaction to staff mmediately.

## 2022-08-26 NOTE — PROGRESS NOTES
Answers for HPI/ROS submitted by the patient on 8/26/2022  If you checked off any problems, how difficult have these problems made it for you to do your work, take care of things at home, or get along with other people?: Somewhat difficult  PHQ9 TOTAL SCORE: 6       SUBJECTIVE:   CC: Marcelina Estevez is an 62 year old woman who presents for preventive health visit.       Patient has been advised of split billing requirements and indicates understanding: Yes  Marcelina has Hypertension goal BP (blood pressure) < 140/90; Moderate episode of recurrent major depressive disorder (H); CKD (chronic kidney disease) stage 1, GFR 90 ml/min or greater; Anemia in other chronic diseases classified elsewhere; MARCELINA (generalized anxiety disorder); Raynaud's disease without gangrene; CREST (calcinosis, Raynaud's phenomenon, esophageal dysfunction, sclerodactyly, telangiectasia) (H); and NSTEMI (non-ST elevated myocardial infarction) (H) on their pertinent problem list.    Father passed away 3 weeks ago. Pt has good support. Not interested in grief counseling.     Saw rheumatology at the [facility] for scleroderma. Has dry and cracked heels.  Rheumatologist recommended urea cream.    Healthy Habits:     Getting at least 3 servings of Calcium per day:  Yes    Bi-annual eye exam:  Yes    Dental care twice a year:  Yes    Sleep apnea or symptoms of sleep apnea:  None    Diet:  Low fat/cholesterol    Frequency of exercise:  2-3 days/week    Duration of exercise:  15-30 minutes    Taking medications regularly:  Yes    PHQ-2 Total Score: 2    Additional concerns today:  Yes    Pt reports concerns on right foot - Derm concern    Today's PHQ-2 Score:   PHQ-2 ( 1999 Pfizer) 8/26/2022   Q1: Little interest or pleasure in doing things 1   Q2: Feeling down, depressed or hopeless 1   PHQ-2 Score 2   PHQ-2 Total Score (12-17 Years)- Positive if 3 or more points; Administer PHQ-A if positive -   Q1: Little interest or pleasure in doing things Several  days   Q2: Feeling down, depressed or hopeless Several days   PHQ-2 Score 2       PHQ-9 SCORE 2022   PHQ-9 Total Score - - -   PHQ-9 Total Score MyChart 11 (Moderate depression) 13 (Moderate depression) 6 (Mild depression)   PHQ-9 Total Score 11 13 6     MARCELINA-7 SCORE 2021   Total Score - - -   Total Score - 10 (moderate anxiety) 6 (mild anxiety)   Total Score 9 10 6       Abuse: Current or Past (Physical, Sexual or Emotional) - No  Do you feel safe in your environment? Yes        Social History     Tobacco Use     Smoking status: Former Smoker     Quit date: 2001     Years since quittin.6     Smokeless tobacco: Never Used   Substance Use Topics     Alcohol use: Yes     Comment: 5-8 drinks/week         Alcohol Use 2022   Prescreen: >3 drinks/day or >7 drinks/week? No   Prescreen: >3 drinks/day or >7 drinks/week? -       Reviewed orders with patient.  Reviewed health maintenance and updated orders accordingly - Yes  Labs reviewed in EPIC    Breast Cancer Screening:    FHS-7: No flowsheet data found.    Mammogram Screening: Recommended mammography every 1-2 years with patient discussion and risk factor consideration  Pertinent mammograms are reviewed under the imaging tab.    History of abnormal Pap smear: NO - age 30-65 PAP every 5 years with negative HPV co-testing recommended  Status post benign hysterectomy. Health Maintenance and Surgical History updated.  PAP / HPV 2014   PAP (Historical) OTHER-NIL, See Result NIL     Reviewed and updated as needed this visit by clinical staff   Tobacco  Allergies  Meds  Problems  Med Hx  Surg Hx  Fam Hx  Soc   Hx          Reviewed and updated as needed this visit by Provider    Allergies  Meds  Problems                  Review of Systems   Constitutional: Negative for chills and fever.   HENT: Negative for congestion, ear pain, hearing loss and sore throat.    Eyes: Negative for pain and  "visual disturbance.   Respiratory: Positive for cough (occasional dry cough. ENT said it was covid cough). Negative for shortness of breath.    Cardiovascular: Positive for peripheral edema. Negative for chest pain and palpitations.   Gastrointestinal: Positive for diarrhea (chronic. GI recommended metamucil). Negative for abdominal pain, constipation, heartburn, hematochezia and nausea.   Breasts:  Negative for tenderness, breast mass and discharge.   Genitourinary: Positive for frequency (get up 2-3 times a night. ) and urgency. Negative for dysuria, genital sores, hematuria, pelvic pain, vaginal bleeding and vaginal discharge.   Musculoskeletal: Positive for arthralgias (torn meniscu left knee, had surgery, wears a knee sleeve) and joint swelling. Negative for myalgias.   Skin: Negative for rash.   Neurological: Positive for weakness and paresthesias. Negative for dizziness and headaches.   Psychiatric/Behavioral: Positive for mood changes. The patient is nervous/anxious.           OBJECTIVE:   /78 (BP Location: Right arm, Patient Position: Sitting, Cuff Size: Adult Regular)   Pulse 71   Temp 98.6  F (37  C) (Oral)   Resp 15   Ht 1.545 m (5' 0.83\")   Wt 65 kg (143 lb 6.4 oz)   LMP 06/12/2014   SpO2 94%   BMI 27.25 kg/m    Physical Exam  GENERAL APPEARANCE: healthy, alert and no distress  EYES: Eyes grossly normal to inspection, PERRL and conjunctivae and sclerae normal  HENT: ear canals and TM's normal, nose and mouth without ulcers or lesions, oropharynx clear and oral mucous membranes moist  NECK: no adenopathy, no asymmetry, masses, or scars and thyroid normal to palpation  RESP: lungs clear to auscultation - no rales, rhonchi or wheezes  CV: regular rate and rhythm, normal S1 S2, no S3 or S4, no murmur, click or rub, no peripheral edema and peripheral pulses strong  ABDOMEN: soft, nontender, no hepatosplenomegaly, no masses and bowel sounds normal  MS: no musculoskeletal defects are noted and " gait is age appropriate without ataxia  SKIN: no suspicious lesions or rashes  NEURO: Normal strength and tone, sensory exam grossly normal, mentation intact and speech normal  PSYCH: mentation appears normal and affect normal/bright    Diagnostic Test Results:  Results for orders placed or performed in visit on 08/26/22 (from the past 24 hour(s))   CBC with platelets   Result Value Ref Range    WBC Count 5.9 4.0 - 11.0 10e3/uL    RBC Count 3.49 (L) 3.80 - 5.20 10e6/uL    Hemoglobin 10.8 (L) 11.7 - 15.7 g/dL    Hematocrit 32.6 (L) 35.0 - 47.0 %    MCV 93 78 - 100 fL    MCH 30.9 26.5 - 33.0 pg    MCHC 33.1 31.5 - 36.5 g/dL    RDW 13.4 10.0 - 15.0 %    Platelet Count 232 150 - 450 10e3/uL       ASSESSMENT/PLAN:   Marcelina was seen today for physical.    Diagnoses and all orders for this visit:    Routine general medical examination at a health care facility    Mild episode of recurrent major depressive disorder (H)  Comments:  depression/anxiety improved. continue fluoxetine 60 mg and wellbutrin 150 mg for now.   Orders:  -     buPROPion (WELLBUTRIN XL) 150 MG 24 hr tablet; Take 1 tablet (150 mg) by mouth every morning  -     IA BEHAV ASSMT W/SCORE & DOCD/STAND INSTRUMENT    MARCELINA (generalized anxiety disorder)  Comments:  depression/anxiety improved. continue fluoxetine 60 mg and wellbutrin 150 mg for now.   Orders:  -     FLUoxetine (PROZAC) 20 MG capsule; Take 1 capsule (20 mg) by mouth daily (take with 40 mg capsule for daily dose of 60 mg)  -     IA BEHAV ASSMT W/SCORE & DOCD/STAND INSTRUMENT    Anxiety attack  Comments:  advised to cut back on Lorazepam, use Hydroxyzine prn for this.   Orders:  -     hydrOXYzine (ATARAX) 25 MG tablet; Take 1 tablet (25 mg) by mouth 2 times daily as needed for itching    Encounter for screening mammogram for malignant neoplasm of breast  -     MA Screening with Implants Bilateral w/ Kho[facility]; Future    Insomnia, unspecified type  Comments:  use Hyroxyzine at bedtime, cut back on  "trazodone use if possible.   Orders:  -     traZODone (DESYREL) 50 MG tablet; Take 1 tablet (50 mg) by mouth At Bedtime    Overactive bladder  Comments:  trial of Detrol. follow up in 4 weeks.   Orders:  -     tolterodine (DETROL) 1 MG tablet; Take 1 tablet (1 mg) by mouth 2 times daily as needed for incontinence    CKD (chronic kidney disease) stage 1, GFR 90 ml/min or greater  -     Albumin Random Urine Quantitative with Creat Ratio    Hypokalemia  -     Basic metabolic panel  (Ca, Cl, CO2, Creat, Gluc, K, Na, BUN)    Anemia in other chronic diseases classified elsewhere  -     CBC with platelets; Future    Other orders  -     Pneumococcal 20 Valent Conjugate (Prevnar 20)  -     REVIEW OF HEALTH MAINTENANCE PROTOCOL ORDERS  -     FLUoxetine (PROZAC) 40 MG capsule; Take 1 capsule (40 mg) by mouth daily (take with 20 mg capsule for daily dose of 60 mg)  -     Iron and iron binding capacity; Future  -     Iron and iron binding capacity  -     CBC with platelets        Patient has been advised of split billing requirements and indicates understanding: Yes    COUNSELING:  Reviewed preventive health counseling, as reflected in patient instructions    Estimated body mass index is 27.25 kg/m  as calculated from the following:    Height as of this encounter: 1.545 m (5' 0.83\").    Weight as of this encounter: 65 kg (143 lb 6.4 oz).        She reports that she quit smoking about 21 years ago. She has never used smokeless tobacco.      Counseling Resources:  ATP IV Guidelines  Pooled Cohorts Equation Calculator  Breast Cancer Risk Calculator  BRCA-Related Cancer Risk Assessment: FHS-7 Tool  FRAX Risk Assessment  ICSI Preventive Guidelines  Dietary Guidelines for Americans, 2010  USDA's MyPlate  ASA Prophylaxis  Lung CA Screening    Annette Painting MD PhD  [facility]"

## 2022-08-26 NOTE — LETTER
August 29, 2022      Marcelina Estevez  [address] [place] [address]              Ms. Estevez,     Your sodium is slightly low at 132. Normal is 133. Recommend repeat lab in a month. Please follow up with Dr. Painting your PCP as last recommended.     Please contact the clinic if you have additional questions.  Thank you.     Sincerely,     EDUARD Tejeda, NP-C   [facility]       Resulted Orders   Basic metabolic panel  (Ca, Cl, CO2, Creat, Gluc, K, Na, BUN)   Result Value Ref Range    Sodium 132 (L) 133 - 144 mmol/L    Potassium 4.2 3.4 - 5.3 mmol/L    Chloride 98 94 - 109 mmol/L    Carbon Dioxide (CO2) 27 20 - 32 mmol/L    Anion Gap 7 3 - 14 mmol/L    Urea Nitrogen 26 7 - 30 mg/dL    Creatinine 1.01 0.52 - 1.04 mg/dL    Calcium 9.9 8.5 - 10.1 mg/dL    Glucose 84 70 - 99 mg/dL    GFR Estimate 63 >60 mL/min/1.73m2      Comment:      Effective December 21, 2021 eGFRcr in adults is calculated using the 2021 CKD-EPI creatinine equation which includes age and gender (Brittany et al., NEJM, DOI: 10.1056/LKVPjj5376638)

## 2022-08-26 NOTE — PATIENT INSTRUCTIONS

## 2022-08-29 NOTE — RESULT ENCOUNTER NOTE
Send letter and results    Ms. Estevez,    Your sodium is slightly low at 132. Normal is 133. Recommend repeat lab in a month. Please follow up with Dr. Painting your PCP as last recommended.     Please contact the clinic if you have additional questions.  Thank you.    Sincerely,    EDUARD Tejeda, NP-C  [facility]

## 2022-08-30 ENCOUNTER — TELEPHONE (OUTPATIENT)
Dept: FAMILY MEDICINE | Facility: CLINIC | Age: 62
End: 2022-08-30

## 2022-08-30 DIAGNOSIS — D50.0 IRON DEFICIENCY ANEMIA DUE TO CHRONIC BLOOD LOSS: Primary | ICD-10-CM

## 2022-08-30 NOTE — TELEPHONE ENCOUNTER
Patient called to clinic to get lab results from 8/26 labs.  Is not on MyChart, not able to view.  Patient requesting for provider to give interpretation.      Routing to provider to review and advise.      Etta Reyna RN  [facility]-Primary Care

## 2022-09-01 NOTE — TELEPHONE ENCOUNTER
Please call patient with lab results:  1.  Hemoglobin is stable at 10.8, iron level is still slightly low but improved by one-point, very close to normal range.  Continue with iron supplement 1 tablet twice a day.  We will recheck in 3 months.  2.  Urine protein still present relatively stable.  3.  She has an appointment in September.  We can review these results again at that time.

## 2022-09-01 NOTE — TELEPHONE ENCOUNTER
Pt returned call.     Relayed provider's message below.    Pt verbalized understanding, had no further questions.  Kath Moreno RN  [facility]

## 2022-09-09 ENCOUNTER — TELEPHONE (OUTPATIENT)
Dept: FAMILY MEDICINE | Facility: CLINIC | Age: 62
End: 2022-09-09

## 2022-09-09 DIAGNOSIS — F41.0 PANIC ATTACK: ICD-10-CM

## 2022-09-09 RX ORDER — LORAZEPAM 0.5 MG/1
0.5 TABLET ORAL DAILY PRN
Qty: 30 TABLET | Refills: 1 | Status: SHIPPED | OUTPATIENT
Start: 2022-09-09 | End: 2022-11-17

## 2022-09-09 NOTE — TELEPHONE ENCOUNTER
Medication Question or Refill    Contacts       Type Contact Phone/Fax    09/09/2022 09:36 AM CDT Phone (Incoming) Marcelina Estevez (Self) [phone] (H)          What medication are you calling about (include dose and sig)?: Hydroxyzine is making her sick. She has tried this before and also got sick,    Controlled Substance Agreement on file:   CSA -- Patient Level:    CSA: None found at the patient level.       Who prescribed the medication?: Dr Painting    Do you need a refill? No    When did you use the medication last?     Patient offered an appointment? No    Do you have any questions or concerns?  Yes: She would like a different prescription since the hydroxyzine is making her sick. She said she will end up in the [facility] health [facility].    Preferred Pharmacy:   [address] [address]  Phone: [phone] Fax: [phone]      Okay to leave a detailed message?: Yes at Home number on file [phone] (home)    Dilia Mays

## 2022-09-12 NOTE — TELEPHONE ENCOUNTER
This writer attempted to contact patient on 09/12/22      Reason for call relay message from provider below and left message.      If patient calls back:   Please relay message from provider below.       Marcelle Lomas RN

## 2022-09-13 NOTE — TELEPHONE ENCOUNTER
This writer attempted to contact patient on 09/13/22      Reason for call refill  and left message.      If patient calls back:   Registered Nurse called. Refer call to Dany Luong RN Team.      Mila Couch RN  [facility]

## 2022-09-14 NOTE — TELEPHONE ENCOUNTER
Patient notified of provider's message as written below. Patient verbalized good understanding, had no further questions and needed no further support.Edwina Alvarez R.N.

## 2022-09-20 ENCOUNTER — VIRTUAL VISIT (OUTPATIENT)
Dept: FAMILY MEDICINE | Facility: CLINIC | Age: 62
End: 2022-09-20
Payer: COMMERCIAL

## 2022-09-20 ENCOUNTER — TELEPHONE (OUTPATIENT)
Dept: FAMILY MEDICINE | Facility: CLINIC | Age: 62
End: 2022-09-20

## 2022-09-20 DIAGNOSIS — Z71.89 GRIEF COUNSELING: ICD-10-CM

## 2022-09-20 DIAGNOSIS — F33.1 MODERATE EPISODE OF RECURRENT MAJOR DEPRESSIVE DISORDER (H): Primary | ICD-10-CM

## 2022-09-20 DIAGNOSIS — F41.1 GAD (GENERALIZED ANXIETY DISORDER): ICD-10-CM

## 2022-09-20 DIAGNOSIS — N32.81 OVERACTIVE BLADDER: ICD-10-CM

## 2022-09-20 DIAGNOSIS — F41.0 PANIC ATTACK: ICD-10-CM

## 2022-09-20 DIAGNOSIS — F43.21 GRIEF: ICD-10-CM

## 2022-09-20 PROCEDURE — 99215 OFFICE O/P EST HI 40 MIN: CPT | Mod: 95 | Performed by: INTERNAL MEDICINE

## 2022-09-20 PROCEDURE — 96127 BRIEF EMOTIONAL/BEHAV ASSMT: CPT | Mod: 95 | Performed by: INTERNAL MEDICINE

## 2022-09-20 RX ORDER — TOLTERODINE TARTRATE 2 MG/1
2 TABLET, EXTENDED RELEASE ORAL AT BEDTIME
Qty: 90 TABLET | Refills: 2 | Status: SHIPPED | OUTPATIENT
Start: 2022-09-20 | End: 2022-11-17

## 2022-09-20 ASSESSMENT — ANXIETY QUESTIONNAIRES
7. FEELING AFRAID AS IF SOMETHING AWFUL MIGHT HAPPEN: SEVERAL DAYS
3. WORRYING TOO MUCH ABOUT DIFFERENT THINGS: NEARLY EVERY DAY
5. BEING SO RESTLESS THAT IT IS HARD TO SIT STILL: NOT AT ALL
6. BECOMING EASILY ANNOYED OR IRRITABLE: NEARLY EVERY DAY
IF YOU CHECKED OFF ANY PROBLEMS ON THIS QUESTIONNAIRE, HOW DIFFICULT HAVE THESE PROBLEMS MADE IT FOR YOU TO DO YOUR WORK, TAKE CARE OF THINGS AT HOME, OR GET ALONG WITH OTHER PEOPLE: VERY DIFFICULT
1. FEELING NERVOUS, ANXIOUS, OR ON EDGE: MORE THAN HALF THE DAYS
GAD7 TOTAL SCORE: 15
GAD7 TOTAL SCORE: 15
2. NOT BEING ABLE TO STOP OR CONTROL WORRYING: NEARLY EVERY DAY

## 2022-09-20 ASSESSMENT — PATIENT HEALTH QUESTIONNAIRE - PHQ9
SUM OF ALL RESPONSES TO PHQ QUESTIONS 1-9: 12
5. POOR APPETITE OR OVEREATING: NEARLY EVERY DAY

## 2022-09-20 NOTE — PROGRESS NOTES
Marcelina is a 62 year old who is being evaluated via a billable telephone visit.      What phone number would you like to be contacted at? [phone]  How would you like to obtain your AVS? Mail a copy    Assessment & Plan     Marcelina was seen today for recheck medication.    Diagnoses and all orders for this visit:    Moderate episode of recurrent major depressive disorder (H)  Comments:  depression/anxiety worsened. Max dose for fluoxetine 60 mg and wellbutrin 150 mg due to renal function. refer to [facility] for recommendation.   Orders:  -     Med Therapy Management Referral  -     Adult Mental Health  Referral; Future    MARCELINA (generalized anxiety disorder)  -     Med Therapy Management Referral  -     Adult Mental Health  Referral; Future    Panic attack  -     Med Therapy Management Referral  -     Adult Mental Health  Referral; Future    Grief counseling  -     Adult Mental Health  Referral; Future    Overactive bladder  Comments:  changed to 2 mg at bedtime.   Orders:  -     tolterodine (DETROL) 2 MG tablet; Take 1 tablet (2 mg) by mouth At Bedtime    Grief    Depression anxiety grief: Currently on max dose of fluoxetine and Wellbutrin.  Limited by her renal function.  She may continue to use lorazepam as needed for panic attack.  Refer to [facility] for medication adjustment recommendation, refer to counseling.    Overactive bladder: Patient reported 1 mg was helpful at that time, reducing frequency, still get up 1-2 times a night.  Increase tolterodine to 2 mg at bedtime.    Due for mammogram. Pt will call to schedule. Previously ordered    Colonoscopy: scheduled for November. GI will contact her when it is closer to the procedure date to send over the prep.     Return in about 2 months (around 11/20/2022) for Virtual visit.     I spent a total of 41 minutes on the day of the visit.   doing chart review, history and exam, documentation and further activities as noted above      Annette  MD Mert PhD  [facility] MING Hewitt is a 62 year old, presenting for the following health issues:  Recheck Medication      HPI     Depression and Anxiety Follow-Up    How are you doing with your depression since your last visit? Worsened     How are you doing with your anxiety since your last visit?  Worsened     Are you having other symptoms that might be associated with depression or anxiety? No    Have you had a significant life event? Grief or Loss     Do you have any concerns with your use of alcohol or other drugs? No    Overwhelming to go through paper work for her father who passed away. But the paperwork is coming to an end.     Panic attack a couple of times a week. Sometimes get work up about things. Uses Lorazepam prn. Hydroxyzine made her very tired, not liking the way it made her feel.     Heels peeling. Using Urea cream 40%. She gets it from [employer]. It helps but does not take it away.     Social History     Tobacco Use     Smoking status: Former Smoker     Quit date: 2001     Years since quittin.7     Smokeless tobacco: Never Used   Vaping Use     Vaping Use: Never used   Substance Use Topics     Alcohol use: Yes     Comment: 5-8 drinks/week     Drug use: No     PHQ 2022   PHQ-9 Total Score 13 6 12   Q9: Thoughts of better off dead/self-harm past 2 weeks Not at all Not at all Not at all     MARCELINA-7 SCORE 2021   Total Score - - -   Total Score 10 (moderate anxiety) 6 (mild anxiety) -   Total Score 10 6 15     Last PHQ-9 2022   1.  Little interest or pleasure in doing things 3   2.  Feeling down, depressed, or hopeless 3   3.  Trouble falling or staying asleep, or sleeping too much 2   4.  Feeling tired or having little energy 3   5.  Poor appetite or overeating 0   6.  Feeling bad about yourself 1   7.  Trouble concentrating 0   8.  Moving slowly or restless 0   Q9: Thoughts of better off dead/self-harm past  2 weeks 0   PHQ-9 Total Score 12   Difficulty at work, home, or with people Very difficult     MARCELINA-7  9/20/2022   1. Feeling nervous, anxious, or on edge 2   2. Not being able to stop or control worrying 3   3. Worrying too much about different things 3   4. Trouble relaxing 3   5. Being so restless that it is hard to sit still 0   6. Becoming easily annoyed or irritable 3   7. Feeling afraid, as if something awful might happen 1   MARCELINA-7 Total Score 15   If you checked any problems, how difficult have they made it for you to do your work, take care of things at home, or get along with other people? Very difficult       Suicide Assessment Five-step Evaluation and Treatment (SAFE-T)      How many servings of fruits and vegetables do you eat daily?  2-3    On average, how many sweetened beverages do you drink each day (Examples: soda, juice, sweet tea, etc.  Do NOT count diet or artificially sweetened beverages)?   0    How many days per week do you exercise enough to make your heart beat faster? 4    How many minutes a day do you exercise enough to make your heart beat faster? 30 - 60    How many days per week do you miss taking your medication? 0    Discuss colonoscopy prep.     Review of Systems   Constitutional, HEENT, cardiovascular, pulmonary, gi and gu systems are negative, except as otherwise noted.      Objective           Vitals:  No vitals were obtained today due to virtual visit.    Physical Exam   Exam not done due to phone visit.   No acute distress, spoke in full sentences.       Phone call duration: 20 minutes

## 2022-09-20 NOTE — TELEPHONE ENCOUNTER
Attempted to contact patient to assist with scheduling 2 month virtual follow up with Dr. Painting.   LVM @ 9:37 AM     09/20/22  Lavonne Rodney CMA

## 2022-09-26 NOTE — PROGRESS NOTES
Medication Therapy Management (MTM) Encounter    ASSESSMENT:                            Medication Adherence/Access: No issues identified    Depression/Anxiety: Pt could use improvement in both depression and anxiety, though anxiety is pt's priority. Both bupropion and fluoxetine can be more stimulating for some people, which could be contributing to her presentation. In reviewing past medication trials, she has been on multiple SSRIs with varied effect. Venlafaxine was helpful for depression, so she may benefit from another trial of SNRI medication rather than increasing fluoxetine further. As venlafaxine can more commonly cause side effects than other SNRIs, would consider duloxetine. Recommendations suggest that CrCl down to 30ml/min does not have an effect on duloxetine clearance, though the med is not recommended for use in <30ml/min. Reviewed potential for nausea, dry mouth, constipation and will monitor.  In regard to drug interactions, fluoxetine and bupropion are both strong CYP2D6 inhibitors and duloxetine is both a moderate 2D6 inhibitor and a major 2D6 substrate. Due to this interaction, would prefer to start duloxetine at lowest dose to ensure tolerability. Will continue to assess ongoing need for bupropion.  Improved mood and anxiety, in addition to reduced need to urinate during the night, will hopefully improve sleep, though discussed role of melatonin today. She would like to try low dose to see if helpful for staying asleep longer.    Allergies: Stable. Continue current medication.  Scleroderma- Continue current medication and follow up.    GERD: Stable. Current treatment is effective.  Cough: Continue medication and follow up with PCP for ongoing cough.  Supplements: Stable. Continue current medication.  Pain: Stable.    Hyperlipidemia: Stable. Continue current medication.  Hypertension/angina: Stable. Patient is meeting blood pressure goal of < 140/90mmHg. Will double check with PCP as to whether  "patient still ok to discontinue hydrochlorothiazide, which may be helpful for overactive bladder symptoms.    Overactive bladder: Medication has been effective. Pt plans to increase dose at this time and will follow up with PCP.   Tolterodine is a major CYP2D6 substrate and pt is currently taking two strong 2D6 inhibitors (fluoxetine and bupropion). Tolterodine has an active metabolite, so possible that inhibition of its metabolism may result in reduced efficacy. Discussed interaction with the patient today and advised ok to continue with dose increase, watching for side effects and efficacy.    PLAN:                            1. Reduce fluoxetine to 40mg daily x 1 week, then stop  2. After a week off of fluoxetine, start taking duloxetine 20mg once daily every morning.  3. Ok to start taking melatonin 1-3mg at bedtime  4. Consider discontinuing hydrochlorothiazide- will send message to PCP    Follow-up: 3 weeks    SUBJECTIVE/OBJECTIVE:                          Marcelina Estevez is a 62 year old female called for an initial visit. She was referred to me from Dr. Painting.      Reason for visit: Per referral note, \"medication management for depression/anxiety, maxed out on current dose due to GFR\"    Allergies/ADRs: Reviewed in chart  Past Medical History: Reviewed in chart  Tobacco: She reports that she quit smoking about 21 years ago. She has never used smokeless tobacco.  Alcohol: not currently using    Medication Adherence/Access: no issues reported    Depression/Anxiety: Pt is currently taking fluoxetine 60mg daily and bupropion ER 150mg daily. She also has lorazepam 0.5mg as needed for higher anxiety and panic attacks, which she takes a couple times per week. She has been having panic attacks for several years. She has seen psychiatry in the past and has a new intake with Irene Alex in October.  Her father recently passed away and she also typically has seasonal component to her mood, noting worsened depression in " "the later fall/winter months. She stated that she worries about everything \"that I'm not doing enough or I'm doing too much that can get me into a panic.\" Isn't always anxious at baseline. She does feel that anxiety symptoms are more prominent than depression, though both are a factor.    She usually falls asleep ok, but has difficulty falling back to sleep if she wakes to go to the bathroom. She had been taking trazodone, but was recently stopped due to questionable efficacy.    Current bupropion dose is limited by renal function, noted by GFR that hovers 60ml/min over the last year. Bupropion recommended max dose of 150mg/day in CrCl 15-60ml/min.    Creatinine   Date Value Ref Range Status   08/26/2022 1.01 0.52 - 1.04 mg/dL Final   04/19/2021 0.94 0.52 - 1.04 mg/dL Final       Past Medication Trials:     Medications Max dose Dates/Duration Discontinuation Reason   hydroxyzine 25mg 5/2018-9/2022 on and off Significant sedation   venlafaxine 300mg 11/2019-5/2021 Helpful for depression, but switched to paroxetine for possibly improved hot flashes   mirtazapine 30mg 9/2016-1/2017 \"didn't like it\"   paroxetine 60mg 3-6/2014 Helpful for depression, not hot flashes   sertraline 50mg 11/2013-3/2014 Rash?   citalopram 40mg 1/2011-3/2014 Not helpful   vilazodone 10mg?  Unsure if ever took this due to cost     Allergies: Pt takes fexofenadine 180mg daily and uses albuterol prn during allergy season when allergens may cause some shortness of breath. Medications work well and pt denied side effects.    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  She has follow up every 6 months, with yearly \"breathing tests\" and eye exam for monitoring.    GERD: Current medications include: Protonix (pantoprazole) 40mg twice daily. Patient reports no current symptoms.  Patient feels that current regimen is effective.    Cough: She is currently prescribed " benzonatate 100mg BID prn, which she has been using off and on. Seems variably helpful, but thinks better than nothing. Has ongoing follow up.    Supplements: Pt takes Vitamin D 2000 units daily and probiotic daily. No issues.  5/2018: Vitamin D 37ug/mL  Pain: Pt has diclofenac gel if needed, which she uses very rarely. Feels that overall pain is manageable.    Hyperlipidemia: Current therapy includes simvastatin 40mg daily.  Patient reports no significant myalgias or other side effects.  The ASCVD Risk score ([place] NICKI Jr., et al., 2013) failed to calculate for the following reasons:    The patient has a prior MI or stroke diagnosis  Recent Labs   Lab Test 06/16/22  1341 04/19/21  1015 06/29/16  1042 06/24/15  0751 08/01/14  1051   CHOL 205* 217*   < > 214* 189    131   < > 102 84   LDL 66 69   < > 95 90   TRIG 60 85   < > 83 74   CHOLHDLRATIO  --   --   --  2.1 2.3    < > = values in this interval not displayed.       Hypertension/angina: Current medications include hydrochlorothiazide 25mg daily, losartan 50mg daily, isosorbide mononitrate 30mg daily, nifedipine ER 30mg daily.  Pt denied any concern for chest pain. Patient does self-monitor blood pressure. Home BP monitoring in range of 120's systolic over 70's diastolic.  (Checked at pharmacy) Patient reports no current medication side effects.    PCP note in 4/2022, indicates that patient was not taking hydrochlorothiazide and was ok to continue off as long as BP remained <140/90.  BP Readings from Last 3 Encounters:   08/26/22 131/78   07/21/22 118/78   06/29/22 116/84       Overactive bladder: Pt recently started taking tolterodine and was increased from 1mg to 2mg and recent PCP visit last week due to still having overnight waking 1-2 times per night. She notes that medication has been very helpful, but hasn't yet increased the dose.    ----------------    I spent 55 minutes with this patient today. All changes were made via collaborative practice  agreement with Annette Painting. A copy of the visit note was provided to the patient's provider(s).    The patient was sent via [employer] a summary of these recommendations.     Deann Meyer PharmD  Medication Therapy Management Pharmacist  [facility]    Telemedicine Visit Details  Type of service:  Telephone visit  Start Time: 10:35am  End Time: 11:30am  Originating Location (patient location): [place]  Distant Location (provider location):  [facility]     Medication Therapy Recommendations  MARCELINA (generalized anxiety disorder)    Current Medication: FLUoxetine (PROZAC) 20 MG capsule (Discontinued)   Rationale: More effective medication available - Ineffective medication - Effectiveness   Recommendation: Change Medication - change fluoxetine to duloxetine   Status: Accepted per CPA         Moderate episode of recurrent major depressive disorder (H)    Current Medication: melatonin 1 MG TABS tablet   Rationale: Untreated condition - Needs additional medication therapy - Indication   Recommendation: Start Medication - ok to start taking melatonin 1-3mg at bedtime   Status: Accepted - no CPA Needed         Overactive bladder    Current Medication: hydrochlorothiazide (HYDRODIURIL) 25 MG tablet   Rationale: Undesirable effect - Adverse medication event - Safety   Recommendation: Discontinue Medication - consider stopping hydrochlorothiazide   Status: Contact Provider - Awaiting Response

## 2022-09-27 ENCOUNTER — VIRTUAL VISIT (OUTPATIENT)
Dept: PHARMACY | Facility: CLINIC | Age: 62
End: 2022-09-27
Attending: INTERNAL MEDICINE
Payer: COMMERCIAL

## 2022-09-27 DIAGNOSIS — N32.81 OVERACTIVE BLADDER: ICD-10-CM

## 2022-09-27 DIAGNOSIS — R05.9 COUGH: ICD-10-CM

## 2022-09-27 DIAGNOSIS — E78.5 HYPERLIPIDEMIA LDL GOAL <100: ICD-10-CM

## 2022-09-27 DIAGNOSIS — F41.1 GAD (GENERALIZED ANXIETY DISORDER): ICD-10-CM

## 2022-09-27 DIAGNOSIS — I10 HYPERTENSION GOAL BP (BLOOD PRESSURE) < 140/90: ICD-10-CM

## 2022-09-27 DIAGNOSIS — K21.00 GASTROESOPHAGEAL REFLUX DISEASE WITH ESOPHAGITIS WITHOUT HEMORRHAGE: ICD-10-CM

## 2022-09-27 DIAGNOSIS — Z78.9 TAKES DIETARY SUPPLEMENTS: ICD-10-CM

## 2022-09-27 DIAGNOSIS — F33.1 MODERATE EPISODE OF RECURRENT MAJOR DEPRESSIVE DISORDER (H): Primary | ICD-10-CM

## 2022-09-27 DIAGNOSIS — M34.9 SCLERODERMA (H): ICD-10-CM

## 2022-09-27 DIAGNOSIS — J30.1 NON-SEASONAL ALLERGIC RHINITIS DUE TO POLLEN: ICD-10-CM

## 2022-09-27 PROCEDURE — 99607 MTMS BY PHARM ADDL 15 MIN: CPT | Performed by: PHARMACIST

## 2022-09-27 PROCEDURE — 99605 MTMS BY PHARM NP 15 MIN: CPT | Performed by: PHARMACIST

## 2022-09-27 RX ORDER — LANOLIN ALCOHOL/MO/W.PET/CERES
3 CREAM (GRAM) TOPICAL
COMMUNITY
End: 2023-05-09

## 2022-09-27 RX ORDER — DULOXETIN HYDROCHLORIDE 20 MG/1
20 CAPSULE, DELAYED RELEASE ORAL DAILY
Qty: 30 CAPSULE | Refills: 0 | Status: SHIPPED | OUTPATIENT
Start: 2022-09-27 | End: 2022-10-14

## 2022-09-27 NOTE — Clinical Note
Hello! Thanks for the referral. Please see note for details. We are switching fluoxetine to duloxetine and I told her she could try melatonin, but I'm hopeful other changes might help. I saw your note in April that she was ok to continue off of hydrochlorothiazide after it had been held in the hospital, but she is still taking that. Ok for her to stop? Lastly, tolterodine is a CYP2D6 substrate and has an active metabolite, so the 2D6 inhibition from bupropion and current fluoxetine (but also some by duloxetine) may cause it to be less effective. I discussed with her and we'll just monitor. She thinks its been very effective. Thank you! Deann

## 2022-09-28 NOTE — PATIENT INSTRUCTIONS
"Recommendations from today's MTM visit:                                                    MTM (medication therapy management) is a service provided by a clinical pharmacist designed to help you get the most of out of your medicines.   Today we reviewed what your medicines are for, how to know if they are working, that your medicines are safe and how to make your medicine regimen as easy as possible.      1. Reduce fluoxetine to 40mg daily x 1 week, then stop  2. After a week off of fluoxetine, start taking duloxetine 20mg once daily every morning.  3. Ok to start taking melatonin 1-3mg at bedtime  4. Since your blood pressure has been good, I will send a message to Dr. Painting about possibility of stopping hydrochlorothiazide, which may also help with overactive bladder and sleep.    Follow-up: 3 weeks    It was great speaking with you today.  I value your experience and would be very thankful for your time in providing feedback in our clinic survey. In the next few days, you may receive an email or text message from [employer] with a link to a survey related to your  clinical pharmacist.\"     To schedule another MTM appointment, please call the clinic directly or you may call the MTM scheduling line at [phone] or toll-free at [phone].     My Clinical Pharmacist's contact information:                                                      Please feel free to contact me with any questions or concerns you have.      Deann Meyer, PharmD  Medication Therapy Management Pharmacist  [facility]  "

## 2022-10-11 ENCOUNTER — VIRTUAL VISIT (OUTPATIENT)
Dept: FAMILY MEDICINE | Facility: CLINIC | Age: 62
End: 2022-10-11
Payer: COMMERCIAL

## 2022-10-11 DIAGNOSIS — I21.4 NSTEMI (NON-ST ELEVATED MYOCARDIAL INFARCTION) (H): ICD-10-CM

## 2022-10-11 DIAGNOSIS — M34.1 CREST (CALCINOSIS, RAYNAUD'S PHENOMENON, ESOPHAGEAL DYSFUNCTION, SCLERODACTYLY, TELANGIECTASIA) (H): ICD-10-CM

## 2022-10-11 DIAGNOSIS — N18.1 CKD (CHRONIC KIDNEY DISEASE) STAGE 1, GFR 90 ML/MIN OR GREATER: ICD-10-CM

## 2022-10-11 DIAGNOSIS — U07.1 INFECTION DUE TO 2019 NOVEL CORONAVIRUS: Primary | ICD-10-CM

## 2022-10-11 PROCEDURE — 99214 OFFICE O/P EST MOD 30 MIN: CPT | Mod: CS | Performed by: FAMILY MEDICINE

## 2022-10-11 NOTE — H&P (VIEW-ONLY)
Marcelina is a 62 year old who is being evaluated via a billable telephone visit.      What phone number would you like to be contacted at? [phone]  How would you like to obtain your AVS? Mail a copy    Assessment & Plan     Infection due to 2019 novel coronavirus    - Covid Monoclonal Antibody Referral; Future    CKD (chronic kidney disease) stage 1, GFR 90 ml/min or greater    - Covid Monoclonal Antibody Referral; Future    CREST (calcinosis, Raynaud's phenomenon, esophageal dysfunction, sclerodactyly, telangiectasia) (H)    - Covid Monoclonal Antibody Referral; Future    NSTEMI (non-ST elevated myocardial infarction) (H)    - Covid Monoclonal Antibody Referral; Future    COVID-19 infection, with headaches cough, runny nose, chills etc, symptomatic care discussed, she has some risk factor included crest syndrome, mild CKD history of acute MI, she also has medication that would  contraindicated treatment with Paxlovid included Wellbutrin, statin therapy, chloro quinine, discussed alternative treatment with immunoglobulin infusion, patient is okay, new referral was placed.  Worsening symptoms discussed, patient instructed to call 911 or go the ER if symptoms worsen.    Review of external notes as documented elsewhere in note  13 minutes spent on the date of the encounter doing chart review, review of outside records, review of test results, interpretation of tests, patient visit and documentation            No follow-ups on file.    Terri Franklin MD  [facility]   Marcelina is a 62 year old, presenting for the following health issues:  Treatment Plan (Sx's started 10/8/22, tested positive 10/11/22)      HPI       COVID-19 Symptom Review  How many days ago did these symptoms start? 4 days     Are any of the following symptoms significant for you?    New or worsening difficulty breathing? No    Worsening cough? Yes, it's a dry cough.     Fever or chills? Yes, I felt feverish or  had chills.    Headache: YES    Sore throat: No    Chest pain: No    Diarrhea: No    Body aches? YES    What treatments has patient tried? Tylenol   Does patient live in a nursing home, group home, or shelter? No  Does patient have a way to get food/medications during quarantined? Yes, I have a friend or family member who can help me.     has COVID-19 infection, patient's symptoms started about 3 to 4 days ago with a runny nose, head cold type of symptoms, she woke up yesterday with headaches, body aches, home COVID-19 test came back positive.  Currently doing symptomatic care with fluids and Tylenol.  She is COVID 19 vaccinated and boosted x1 only.  She has a history of scleroderma, coronary artery disease status postacute MI, depression, anxiety.                    Review of Systems   Constitutional, HEENT, cardiovascular, pulmonary, gi and gu systems are negative, except as otherwise noted.      Objective           Vitals:  No vitals were obtained today due to virtual visit.    Physical Exam   healthy, alert and no distress  PSYCH: Alert and oriented times 3; coherent speech, normal   rate and volume, able to articulate logical thoughts, able   to abstract reason, no tangential thoughts, no hallucinations   or delusions  Her affect is normal  RESP: No cough, no audible wheezing, able to talk in full sentences  Remainder of exam unable to be completed due to telephone visits                Phone call duration: 13 minutes

## 2022-10-11 NOTE — PROGRESS NOTES
Marcelina is a 62 year old who is being evaluated via a billable telephone visit.      What phone number would you like to be contacted at? [phone]  How would you like to obtain your AVS? Mail a copy    Assessment & Plan     Infection due to 2019 novel coronavirus    - Covid Monoclonal Antibody Referral; Future    CKD (chronic kidney disease) stage 1, GFR 90 ml/min or greater    - Covid Monoclonal Antibody Referral; Future    CREST (calcinosis, Raynaud's phenomenon, esophageal dysfunction, sclerodactyly, telangiectasia) (H)    - Covid Monoclonal Antibody Referral; Future    NSTEMI (non-ST elevated myocardial infarction) (H)    - Covid Monoclonal Antibody Referral; Future    COVID-19 infection, with headaches cough, runny nose, chills etc, symptomatic care discussed, she has some risk factor included crest syndrome, mild CKD history of acute MI, she also has medication that would  contraindicated treatment with Paxlovid included Wellbutrin, statin therapy, chloro quinine, discussed alternative treatment with immunoglobulin infusion, patient is okay, new referral was placed.  Worsening symptoms discussed, patient instructed to call 911 or go the ER if symptoms worsen.    Review of external notes as documented elsewhere in note  13 minutes spent on the date of the encounter doing chart review, review of outside records, review of test results, interpretation of tests, patient visit and documentation            No follow-ups on file.    Terri Franklin MD  [facility]   Marcelina is a 62 year old, presenting for the following health issues:  Treatment Plan (Sx's started 10/8/22, tested positive 10/11/22)      HPI       COVID-19 Symptom Review  How many days ago did these symptoms start? 4 days     Are any of the following symptoms significant for you?    New or worsening difficulty breathing? No    Worsening cough? Yes, it's a dry cough.     Fever or chills? Yes, I felt feverish or  had chills.    Headache: YES    Sore throat: No    Chest pain: No    Diarrhea: No    Body aches? YES    What treatments has patient tried? Tylenol   Does patient live in a nursing home, group home, or shelter? No  Does patient have a way to get food/medications during quarantined? Yes, I have a friend or family member who can help me.     has COVID-19 infection, patient's symptoms started about 3 to 4 days ago with a runny nose, head cold type of symptoms, she woke up yesterday with headaches, body aches, home COVID-19 test came back positive.  Currently doing symptomatic care with fluids and Tylenol.  She is COVID 19 vaccinated and boosted x1 only.  She has a history of scleroderma, coronary artery disease status postacute MI, depression, anxiety.                    Review of Systems   Constitutional, HEENT, cardiovascular, pulmonary, gi and gu systems are negative, except as otherwise noted.      Objective           Vitals:  No vitals were obtained today due to virtual visit.    Physical Exam   healthy, alert and no distress  PSYCH: Alert and oriented times 3; coherent speech, normal   rate and volume, able to articulate logical thoughts, able   to abstract reason, no tangential thoughts, no hallucinations   or delusions  Her affect is normal  RESP: No cough, no audible wheezing, able to talk in full sentences  Remainder of exam unable to be completed due to telephone visits                Phone call duration: 13 minutes

## 2022-10-19 ENCOUNTER — HOME INFUSION (PRE-WILLOW HOME INFUSION) (OUTPATIENT)
Dept: PHARMACY | Facility: CLINIC | Age: 62
End: 2022-10-19

## 2022-10-23 NOTE — PROGRESS NOTES
This is a recent snapshot of the patient's [facility] medical record.  For current drug dose and complete information and questions, call [phone] or In Basket pool, [facility] home infusion ([id number])  CSN Number:  [id number]

## 2022-11-01 ENCOUNTER — VIRTUAL VISIT (OUTPATIENT)
Dept: PHARMACY | Facility: CLINIC | Age: 62
End: 2022-11-01
Payer: COMMERCIAL

## 2022-11-01 DIAGNOSIS — F41.1 GAD (GENERALIZED ANXIETY DISORDER): Primary | ICD-10-CM

## 2022-11-01 DIAGNOSIS — I10 HYPERTENSION GOAL BP (BLOOD PRESSURE) < 140/90: ICD-10-CM

## 2022-11-01 DIAGNOSIS — F33.1 MODERATE EPISODE OF RECURRENT MAJOR DEPRESSIVE DISORDER (H): ICD-10-CM

## 2022-11-01 DIAGNOSIS — N32.81 OVERACTIVE BLADDER: ICD-10-CM

## 2022-11-01 PROCEDURE — 99606 MTMS BY PHARM EST 15 MIN: CPT | Performed by: PHARMACIST

## 2022-11-01 RX ORDER — DULOXETIN HYDROCHLORIDE 20 MG/1
40 CAPSULE, DELAYED RELEASE ORAL DAILY
Qty: 60 CAPSULE | Refills: 0 | Status: SHIPPED | OUTPATIENT
Start: 2022-11-01 | End: 2022-11-17

## 2022-11-01 NOTE — Clinical Note
Hi Dr. Painting, We bumped up her duloxetine from 20mg to 40mg today. She's not noticing any benefit from tolterodine, but is going to double check her dose. The bupropion inhibits the tolterodine metabolism and there is an active metabolite, so it may have some reduced efficacy.  If she is taking 2mg at bedtime, what are your thoughts on increasing that dose? Would you add 1mg or 2mg in the AM? Thanks! Deann

## 2022-11-01 NOTE — PROGRESS NOTES
Medication Therapy Management (MTM) Encounter    ASSESSMENT:                            Medication Adherence/Access: No issues identified    Depression/anxiety: As patient has noticed mild improvement in first few weeks of taking duloxetine and is tolerating it well, will plan to increase dose today. Pt is leaving for 6 months in about 3 weeks, so would prefer to have stable dose prior to her departure. Will plan for increase from 20mg to 40mg daily in effort to assess more likely effective dose before she leaves. Discussed watching for increased agitation or anxiety, dry mouth, constipation, nausea.  Discussed that she could use melatonin 1-3mg at bedtime for sleep if she feels treatment is necessary.    Overactive Bladder: Patient will double check dose at home and respond with current info. May consider dose increase due to bupropion inhibition of active metabolite and potentially reduced efficacy.    Hypertension: Stable. Patient is meeting blood pressure goal of < 140/90mmHg.      PLAN:                            1. Double check your current dose of Detrol (tolterodine) and please let me know.  2. Consider using melatonin 1-3mg at bedtime if sleep becomes an issue  3. Increase duloxetine from 20mg to 40mg every morning.  I sent a prescription to take two 20mg capsules. At our visit on 11/22, I will send a prescription to take one 40mg capsule for your 90 day supply while you are away.    Follow-up: I will call you on 11/22 at 10am    SUBJECTIVE/OBJECTIVE:                          Marcelina Estevez is a 62 year old female called for a follow-up visit.  Today's visit is a follow-up MTM visit from 9/27/22.   Reason for visit: med follow up.    Allergies/ADRs: Reviewed in chart  Past Medical History: Reviewed in chart  Tobacco: She reports that she quit smoking about 21 years ago. She has never used smokeless tobacco.  Alcohol: not currently using    Medication Adherence/Access: no issues reported  Pt is leaving for  "[place] on 11/26 through mid April    Depression/Anxiety: At last visit, patient elected to pursue cross titration from fluoxetine to duloxetine 20mg daily. She continues to take bupropion ER 150mg daily, which is a strong 2D6 inhibitor and impacts duloxetine metabolism.     Today, she reported that she had been tolerating medication change well.  Denied any noticeable side effects, including dry mouth or constipation. Pt reported \"still feeling blah,\" but feels that it's easier to motivate self and participate in activities. She also still has lorazepam 0.5mg a couple times per week, but has \"been trying to take is less.\" Pt reported that sleep has been pretty good.  She is falling asleep ok, but does wake about 3 times per night to urinate. Has taken a couple trazodone doses on nights where she has difficulty getting to sleep, though this is rare.  She does usually have seasonal component to mood and is looking forward winter in [place].     Current bupropion dose is limited by renal function, noted by GFR that hovers 60ml/min over the last year. Bupropion recommended max dose of 150mg/day in Cr[facility] 15-60ml/min.    Creatinine   Date Value Ref Range Status   08/26/2022 1.01 0.52 - 1.04 mg/dL Final   04/19/2021 0.94 0.52 - 1.04 mg/dL Final       Past Medication Trials:     Medications Max dose Dates/Duration Discontinuation Reason   hydroxyzine 25mg 5/2018-9/2022 on and off Significant sedation   venlafaxine 300mg 11/2019-5/2021 Helpful for depression, but switched to paroxetine for possibly improved hot flashes   mirtazapine 30mg 9/2016-1/2017 \"didn't like it\"   paroxetine 60mg 3-6/2014 Helpful for depression, not hot flashes   sertraline 50mg 11/2013-3/2014 Rash?   citalopram 40mg 1/2011-3/2014 Not helpful   fluoxetine 60mg 4/2021-10/2022 Not helpful enough   vilazodone 10mg?  Unsure if ever took this due to cost     Overactive Bladder: Pt is not sure if she has increased the tolterodine dose from 1mg to 2mg and didn't " have medications with her at time of visit. As above, she continues to wake multiple times during the night and states she would rather just stop the medication if not going to be helpful.  Per last note, tolterodine is a major CYP2D6 substrate and has an active metabolite. Bupropion's 2D6 inhibitor may limit medication's efficacy due to inhibiting metabolism to secondary active component.      Hypertension: Current medications include hydrochlorothiazide 25mg daily, losartan 50mg daily, isosorbide mononitrate 30mg daily, nifedipine ER 30mg daily.  Pt denied any concern for chest pain. Patient does self-monitor blood pressure. Home BP monitoring in range of 120's systolic over 70's diastolic.  (Checked at pharmacy) Patient reports no current medication side effects.  Last visit, discussed previous recommendation (from April) to discontinue hydrochlorothiazide to determine if overnight need to urinate improves. Pt reported that she does think it is still helpful for ankle edema, so does not wish to stop.    BP Readings from Last 3 Encounters:   08/26/22 131/78   07/21/22 118/78   06/29/22 116/84     ----------------    I spent 15 minutes with this patient today. All changes were made via collaborative practice agreement with Annette Painting. A copy of the visit note was provided to the patient's provider(s).    The patient was sent via [employer] a summary of these recommendations.     Deann Meyer, PharmD  Medication Therapy Management Pharmacist  [facility]    Telemedicine Visit Details  Type of service:  Telephone visit  Start Time: 11:00am  End Time: 11:15am  Originating Location (pt. Location): Home      Distant Location (provider location):  Off-site  Provider has received verbal consent for a visit from the patient? Yes     Medication Therapy Recommendations  MARCELINA (generalized anxiety disorder)    Current Medication: DULoxetine (CYMBALTA) 20 MG capsule (Discontinued)   Rationale:  Dose too low - Dosage too low - Effectiveness   Recommendation: Increase Dose - increase from 20mg to 40mg every morning   Status: Accepted per CPA         Overactive bladder    Current Medication: hydrochlorothiazide (HYDRODIURIL) 25 MG tablet   Rationale: Undesirable effect - Adverse medication event - Safety   Recommendation: Discontinue Medication - consider stopping hydrochlorothiazide   Status: No Longer Relevant

## 2022-11-01 NOTE — PATIENT INSTRUCTIONS
"Recommendations from today's MTM visit:                                                    MTM (medication therapy management) is a service provided by a clinical pharmacist designed to help you get the most of out of your medicines.   Today we reviewed what your medicines are for, how to know if they are working, that your medicines are safe and how to make your medicine regimen as easy as possible.      1. Double check your current dose of Detrol (tolterodine) and please let me know.  2. Consider using melatonin 1-3mg at bedtime if sleep becomes an issue  3. Increase duloxetine from 20mg to 40mg every morning.  I sent a prescription to take two 20mg capsules. At our visit on 11/22, I will send a prescription to take one 40mg capsule for your 90 day supply while you are away.    Follow-up: I will call you on 11/22 at 10am    It was great speaking with you today.  I value your experience and would be very thankful for your time in providing feedback in our clinic survey. In the next few days, you may receive an email or text message from [employer] with a link to a survey related to your  clinical pharmacist.\"     To schedule another MTM appointment, please call the clinic directly or you may call the MTM scheduling line at [phone] or toll-free at [phone].     My Clinical Pharmacist's contact information:                                                      Please feel free to contact me with any questions or concerns you have.      Deann Meyer, PharmD  Medication Therapy Management Pharmacist  [facility]  "

## 2022-11-02 RX ORDER — BISACODYL 5 MG
TABLET, DELAYED RELEASE (ENTERIC COATED) ORAL
Qty: 4 TABLET | Refills: 0 | Status: SHIPPED | OUTPATIENT
Start: 2022-11-02 | End: 2022-11-17

## 2022-11-03 ENCOUNTER — TELEPHONE (OUTPATIENT)
Dept: FAMILY MEDICINE | Facility: CLINIC | Age: 62
End: 2022-11-03

## 2022-11-04 ENCOUNTER — OFFICE VISIT (OUTPATIENT)
Dept: OPTOMETRY | Facility: CLINIC | Age: 62
End: 2022-11-04
Payer: COMMERCIAL

## 2022-11-04 DIAGNOSIS — H25.13 SENILE NUCLEAR SCLEROSIS, BILATERAL: ICD-10-CM

## 2022-11-04 DIAGNOSIS — Z79.899 HIGH RISK MEDICATION USE: Primary | ICD-10-CM

## 2022-11-04 DIAGNOSIS — H52.13 MYOPIA OF BOTH EYES: ICD-10-CM

## 2022-11-04 PROCEDURE — 92015 DETERMINE REFRACTIVE STATE: CPT | Performed by: OPTOMETRIST

## 2022-11-04 PROCEDURE — 92014 COMPRE OPH EXAM EST PT 1/>: CPT | Performed by: OPTOMETRIST

## 2022-11-04 PROCEDURE — 92083 EXTENDED VISUAL FIELD XM: CPT | Performed by: OPTOMETRIST

## 2022-11-04 PROCEDURE — 92134 CPTRZ OPH DX IMG PST SGM RTA: CPT | Performed by: OPTOMETRIST

## 2022-11-04 ASSESSMENT — REFRACTION_WEARINGRX
OD_AXIS: 005
OS_SPHERE: -3.75
OD_ADD: +2.50
OS_CYLINDER: +1.50
OS_ADD: +2.50
OS_AXIS: 010
OD_SPHERE: -5.00
OD_CYLINDER: +3.50

## 2022-11-04 ASSESSMENT — EXTERNAL EXAM - RIGHT EYE: OD_EXAM: NORMAL

## 2022-11-04 ASSESSMENT — CONF VISUAL FIELD
OS_NORMAL: 1
OD_NORMAL: 1
OS_INFERIOR_NASAL_RESTRICTION: 0
OD_INFERIOR_TEMPORAL_RESTRICTION: 0
OS_INFERIOR_TEMPORAL_RESTRICTION: 0
OD_SUPERIOR_TEMPORAL_RESTRICTION: 0
OD_SUPERIOR_NASAL_RESTRICTION: 0
OS_SUPERIOR_TEMPORAL_RESTRICTION: 0
OS_SUPERIOR_NASAL_RESTRICTION: 0
OD_INFERIOR_NASAL_RESTRICTION: 0

## 2022-11-04 ASSESSMENT — REFRACTION_MANIFEST
OD_CYLINDER: +3.50
OS_CYLINDER: +1.50
OS_AXIS: 014
OS_SPHERE: -4.00
OS_ADD: +2.50
OD_ADD: +2.50
OD_AXIS: 178
OD_SPHERE: -5.25

## 2022-11-04 ASSESSMENT — VISUAL ACUITY
OD_CC: 20/25
OS_CC+: +2
OS_CC: 20/30
METHOD: SNELLEN - LINEAR
OD_CC+: +2
CORRECTION_TYPE: GLASSES
OD_CC: J1 BE

## 2022-11-04 ASSESSMENT — CUP TO DISC RATIO
OS_RATIO: 0.1
OD_RATIO: 0.1

## 2022-11-04 ASSESSMENT — EXTERNAL EXAM - LEFT EYE: OS_EXAM: NORMAL

## 2022-11-04 ASSESSMENT — TONOMETRY
OS_IOP_MMHG: 14
OD_IOP_MMHG: 14
IOP_METHOD: ICARE

## 2022-11-04 ASSESSMENT — SLIT LAMP EXAM - LIDS
COMMENTS: NORMAL
COMMENTS: NORMAL

## 2022-11-04 NOTE — NURSING NOTE
Chief Complaints and History of Present Illnesses   Patient presents with     Monitor Current High Risk Meds     Plaquenil       Chief Complaint(s) and History of Present Illness(es)     Monitor Current High Risk Meds            Comments: Plaquenil          Comments    Patient takes plaquenil 300mg once a day.   She notes her vision seems about the same. She got new glasses recently.   She denies pain/discomfort in either eye.   She is using Ats as needed.   She denies floaters/flashes of lights.                  MARYLOU Brown  11:00 AM 11/04/2022

## 2022-11-04 NOTE — PROGRESS NOTES
Assessment/Plan  (Z79.899) High risk medication use  (primary encounter diagnosis)  Comment: No evidence of Plaquenil toxicity. Patient started Plaquenil 300mg daily in early 2018.   Plan: Discussed findings with patient as well as potential long term risks of continued medication use. Plan on monitoring annually with dilated exam for changes. Return to clinic sooner if vision changes are noted.     (H25.13) Senile nuclear sclerosis, bilateral  Comment: Not at surgical level  Plan: Monitor for now. No indication for surgery at this time.     (H52.13) Myopia of both eyes  Plan: REFRACTION [[id number]]        Discussed findings with patient. New spectacle prescription dispensed to patient. Patient is welcome to return to clinic with prolonged adaptation difficulties.       Complete documentation of historical and exam elements from today's encounter can  be found in the full encounter summary report (not reduplicated in this progress  note). I personally obtained the chief complaint(s) and history of present illness. I  confirmed and edited as necessary the review of systems, past medical/surgical  history, family history, social history, and examination findings as documented by  others; and I examined the patient myself. I personally reviewed the relevant tests,  images, and reports as documented above. I formulated and edited as necessary the  assessment and plan and discussed the findings and management plan with the  patient and family.    Leonardo Melgar OD

## 2022-11-09 ENCOUNTER — HOSPITAL ENCOUNTER (OUTPATIENT)
Facility: AMBULATORY SURGERY CENTER | Age: 62
Discharge: HOME | End: 2022-11-09
Attending: INTERNAL MEDICINE
Payer: COMMERCIAL

## 2022-11-09 ENCOUNTER — ANESTHESIA (OUTPATIENT)
Dept: SURGERY | Facility: AMBULATORY SURGERY CENTER | Age: 62
End: 2022-11-09
Payer: COMMERCIAL

## 2022-11-09 ENCOUNTER — ANESTHESIA EVENT (OUTPATIENT)
Dept: SURGERY | Facility: AMBULATORY SURGERY CENTER | Age: 62
End: 2022-11-09
Payer: COMMERCIAL

## 2022-11-09 VITALS
SYSTOLIC BLOOD PRESSURE: 142 MMHG | RESPIRATION RATE: 18 BRPM | OXYGEN SATURATION: 96 % | TEMPERATURE: 97.1 F | DIASTOLIC BLOOD PRESSURE: 87 MMHG

## 2022-11-09 VITALS — HEART RATE: 69 BPM

## 2022-11-09 DIAGNOSIS — Z12.11 SCREEN FOR COLON CANCER: Primary | ICD-10-CM

## 2022-11-09 DIAGNOSIS — D12.6 ADENOMATOUS POLYP OF COLON, UNSPECIFIED PART OF COLON: ICD-10-CM

## 2022-11-09 LAB — COLONOSCOPY: NORMAL

## 2022-11-09 PROCEDURE — G8907 PT DOC NO EVENTS ON DISCHARG: HCPCS

## 2022-11-09 PROCEDURE — 45384 COLONOSCOPY W/LESION REMOVAL: CPT

## 2022-11-09 PROCEDURE — G8918 PT W/O PREOP ORDER IV AB PRO: HCPCS

## 2022-11-09 PROCEDURE — 88305 TISSUE EXAM BY PATHOLOGIST: CPT | Performed by: PATHOLOGY

## 2022-11-09 PROCEDURE — 45380 COLONOSCOPY AND BIOPSY: CPT | Mod: XS

## 2022-11-09 RX ORDER — PROCHLORPERAZINE MALEATE 10 MG
10 TABLET ORAL EVERY 6 HOURS PRN
Status: DISCONTINUED | OUTPATIENT
Start: 2022-11-09 | End: 2022-11-10 | Stop reason: HOSPADM

## 2022-11-09 RX ORDER — ONDANSETRON 2 MG/ML
4 INJECTION INTRAMUSCULAR; INTRAVENOUS
Status: DISCONTINUED | OUTPATIENT
Start: 2022-11-09 | End: 2022-11-10 | Stop reason: HOSPADM

## 2022-11-09 RX ORDER — FENTANYL CITRATE 0.05 MG/ML
INJECTION, SOLUTION INTRAMUSCULAR; INTRAVENOUS PRN
Status: DISCONTINUED | OUTPATIENT
Start: 2022-11-09 | End: 2022-11-09

## 2022-11-09 RX ORDER — NALOXONE HYDROCHLORIDE 0.4 MG/ML
0.4 INJECTION, SOLUTION INTRAMUSCULAR; INTRAVENOUS; SUBCUTANEOUS
Status: DISCONTINUED | OUTPATIENT
Start: 2022-11-09 | End: 2022-11-10 | Stop reason: HOSPADM

## 2022-11-09 RX ORDER — ONDANSETRON 4 MG/1
4 TABLET, ORALLY DISINTEGRATING ORAL EVERY 6 HOURS PRN
Status: DISCONTINUED | OUTPATIENT
Start: 2022-11-09 | End: 2022-11-10 | Stop reason: HOSPADM

## 2022-11-09 RX ORDER — LIDOCAINE HYDROCHLORIDE 20 MG/ML
INJECTION, SOLUTION INFILTRATION; PERINEURAL PRN
Status: DISCONTINUED | OUTPATIENT
Start: 2022-11-09 | End: 2022-11-09

## 2022-11-09 RX ORDER — FLUMAZENIL 0.1 MG/ML
0.2 INJECTION, SOLUTION INTRAVENOUS
Status: DISCONTINUED | OUTPATIENT
Start: 2022-11-09 | End: 2022-11-10 | Stop reason: HOSPADM

## 2022-11-09 RX ORDER — PROPOFOL 10 MG/ML
INJECTION, EMULSION INTRAVENOUS PRN
Status: DISCONTINUED | OUTPATIENT
Start: 2022-11-09 | End: 2022-11-09

## 2022-11-09 RX ORDER — SODIUM CHLORIDE, SODIUM LACTATE, POTASSIUM CHLORIDE, CALCIUM CHLORIDE 600; 310; 30; 20 MG/100ML; MG/100ML; MG/100ML; MG/100ML
INJECTION, SOLUTION INTRAVENOUS CONTINUOUS PRN
Status: DISCONTINUED | OUTPATIENT
Start: 2022-11-09 | End: 2022-11-09

## 2022-11-09 RX ORDER — ONDANSETRON 2 MG/ML
4 INJECTION INTRAMUSCULAR; INTRAVENOUS EVERY 6 HOURS PRN
Status: DISCONTINUED | OUTPATIENT
Start: 2022-11-09 | End: 2022-11-10 | Stop reason: HOSPADM

## 2022-11-09 RX ORDER — NALOXONE HYDROCHLORIDE 0.4 MG/ML
0.2 INJECTION, SOLUTION INTRAMUSCULAR; INTRAVENOUS; SUBCUTANEOUS
Status: DISCONTINUED | OUTPATIENT
Start: 2022-11-09 | End: 2022-11-10 | Stop reason: HOSPADM

## 2022-11-09 RX ORDER — PROPOFOL 10 MG/ML
INJECTION, EMULSION INTRAVENOUS CONTINUOUS PRN
Status: DISCONTINUED | OUTPATIENT
Start: 2022-11-09 | End: 2022-11-09

## 2022-11-09 RX ORDER — LIDOCAINE 40 MG/G
CREAM TOPICAL
Status: DISCONTINUED | OUTPATIENT
Start: 2022-11-09 | End: 2022-11-10 | Stop reason: HOSPADM

## 2022-11-09 RX ADMIN — PROPOFOL 30 MG: 10 INJECTION, EMULSION INTRAVENOUS at 12:06

## 2022-11-09 RX ADMIN — SODIUM CHLORIDE, SODIUM LACTATE, POTASSIUM CHLORIDE, CALCIUM CHLORIDE: 600; 310; 30; 20 INJECTION, SOLUTION INTRAVENOUS at 11:59

## 2022-11-09 RX ADMIN — PROPOFOL 20 MG: 10 INJECTION, EMULSION INTRAVENOUS at 12:38

## 2022-11-09 RX ADMIN — PROPOFOL 150 MCG/KG/MIN: 10 INJECTION, EMULSION INTRAVENOUS at 12:04

## 2022-11-09 RX ADMIN — PROPOFOL 20 MG: 10 INJECTION, EMULSION INTRAVENOUS at 12:42

## 2022-11-09 RX ADMIN — PROPOFOL 20 MG: 10 INJECTION, EMULSION INTRAVENOUS at 12:09

## 2022-11-09 RX ADMIN — FENTANYL CITRATE 25 MCG: 0.05 INJECTION, SOLUTION INTRAMUSCULAR; INTRAVENOUS at 12:20

## 2022-11-09 RX ADMIN — FENTANYL CITRATE 25 MCG: 0.05 INJECTION, SOLUTION INTRAMUSCULAR; INTRAVENOUS at 12:46

## 2022-11-09 RX ADMIN — SODIUM CHLORIDE, SODIUM LACTATE, POTASSIUM CHLORIDE, CALCIUM CHLORIDE: 600; 310; 30; 20 INJECTION, SOLUTION INTRAVENOUS at 12:52

## 2022-11-09 RX ADMIN — LIDOCAINE HYDROCHLORIDE 80 MG: 20 INJECTION, SOLUTION INFILTRATION; PERINEURAL at 12:00

## 2022-11-09 RX ADMIN — PROPOFOL 50 MG: 10 INJECTION, EMULSION INTRAVENOUS at 12:03

## 2022-11-09 RX ADMIN — PROPOFOL 20 MG: 10 INJECTION, EMULSION INTRAVENOUS at 12:30

## 2022-11-09 RX ADMIN — PROPOFOL 20 MG: 10 INJECTION, EMULSION INTRAVENOUS at 12:05

## 2022-11-09 RX ADMIN — FENTANYL CITRATE 25 MCG: 0.05 INJECTION, SOLUTION INTRAMUSCULAR; INTRAVENOUS at 12:15

## 2022-11-09 RX ADMIN — FENTANYL CITRATE 25 MCG: 0.05 INJECTION, SOLUTION INTRAMUSCULAR; INTRAVENOUS at 12:35

## 2022-11-09 NOTE — ANESTHESIA CARE TRANSFER NOTE
Patient: Marcelina Estevez    Procedure: Procedure(s):  COLONOSCOPY, WITH CO2 INSUFFLATION  COLONOSCOPY, WITH POLYPECTOMY AND BIOPSY  COLONOSCOPY, WITH LESION EXCISION USING HOT BIOPSY DEVICE  COLONOSCOPY, WITH HEMORRHAGE CONTROL       Diagnosis: Adenomatous polyp of transverse colon [D12.3]  Diagnosis Additional Information: No value filed.    Anesthesia Type:   MAC     Note:    Oropharynx: oropharynx clear of all foreign objects and spontaneously breathing  Level of Consciousness: awake  Oxygen Supplementation: room air    Independent Airway: airway patency satisfactory and stable    Vital Signs Stable: post-procedure vital signs reviewed and stable  Report to RN Given: handoff report given  Patient transferred to: Phase II    Handoff Report: Identifed the Patient, Identified the Reponsible Provider, Reviewed the pertinent medical history, Discussed the surgical course, Reviewed Intra-OP anesthesia mangement and issues during anesthesia, Set expectations for post-procedure period and Allowed opportunity for questions and acknowledgement of understanding      Vitals:  Vitals Value Taken Time   BP     Temp     Pulse 69 11/09/22 1250   Resp     SpO2         Electronically Signed By: EDURAD aMrquez CRNA  November 9, 2022  12:58 PM

## 2022-11-09 NOTE — H&P
ENDOSCOPY PRE-SEDATION H&P FOR OUTPATIENT PROCEDURES    Marcelina Estevez  [id number]  1960    Procedure: colonoscopy    Pre-procedure diagnosis: hx polyps    Past medical history:   Past Medical History:   Diagnosis Date     CREST (calcinosis, Raynaud's phenomenon, esophageal dysfunction, sclerodactyly, telangiectasia) (H)      Hyperlipidemia      Hypertension      Limited systemic sclerosis (H)      Positive MINDY (antinuclear antibody)      Raynaud disease        Past surgical history:   Past Surgical History:   Procedure Laterality Date     APPENDECTOMY       BIOPSY      cervical node     COLONOSCOPY N/A 11/10/2021    Procedure: COLONOSCOPY, FLEXIBLE, WITH LESION REMOVAL USING SNARE;  Surgeon: Domingo Wall MD;  [facility]     COLONOSCOPY N/A 11/10/2021    Procedure: COLONOSCOPY, WITH POLYPECTOMY AND BIOPSY;  Surgeon: Domingo Wall MD;  [facility]     COLONOSCOPY N/A 9/29/2021    Procedure: Colonoscopy, With Polypectomy And Biopsy;  Surgeon: Domingo Wall MD;  [facility]     COLONOSCOPY N/A 9/29/2021    Procedure: Colonoscopy, Flexible, With Lesion Removal Using Snare;  Surgeon: Domingo Wall MD;  [facility]     COLONOSCOPY N/A 6/29/2022    Procedure: COLONOSCOPY, FLEXIBLE, WITH LESION REMOVAL USING SNARE;  Surgeon: Domingo Wall MD;  [facility]     COLONOSCOPY WITH CO2 INSUFFLATION N/A 10/26/2020    Procedure: COLONOSCOPY, WITH CO2 INSUFFLATION;  Surgeon: Phyllis Chaudhari MD;  [facility]     COLONOSCOPY WITH CO2 INSUFFLATION N/A 11/10/2021    Procedure: COLONOSCOPY, WITH CO2 INSUFFLATION;  Surgeon: Domingo Wall MD;  [facility]     COLONOSCOPY WITH CO2 INSUFFLATION N/A 9/29/2021    Procedure: COLONOSCOPY, WITH CO2 INSUFFLATION;  Surgeon: Domingo Wall MD;  [facility]     COLONOSCOPY WITH CO2 INSUFFLATION N/A 6/29/2022    Procedure: COLONOSCOPY, WITH CO2 INSUFFLATION;   Surgeon: Domingo Wall MD;  [facility]     COMBINED ESOPHAGOSCOPY, GASTROSCOPY, DUODENOSCOPY (EGD) WITH CO2 INSUFFLATION N/A 6/29/2022    Procedure: ESOPHAGOGASTRODUODENOSCOPY, WITH CO2 INSUFFLATION;  Surgeon: Domingo Wall MD;  [facility]     DILATION AND CURETTAGE, HYSTEROSCOPY DIAGNOSTIC, COMBINED  7/1/2014    Procedure: COMBINED DILATION AND CURETTAGE, HYSTEROSCOPY DIAGNOSTIC;  Surgeon: Mana Cabrera DO;  [facility]     ENT SURGERY      tonsillectomy and adnoid removal     ESOPHAGOSCOPY, GASTROSCOPY, DUODENOSCOPY (EGD), COMBINED N/A 6/29/2022    Procedure: ESOPHAGOGASTRODUODENOSCOPY, WITH BIOPSY;  Surgeon: Domingo Wall MD;  [facility]     HYSTERECTOMY TOTAL ABDOMINAL       ORTHOPEDIC SURGERY      left knee tear meniscus     RELEASE CARPAL TUNNEL BILATERAL  2009       Current Outpatient Medications   Medication     bisacodyl (DULCOLAX) 5 MG EC tablet     bisacodyl (DULCOLAX) 5 MG EC tablet     buPROPion (WELLBUTRIN XL) 150 MG 24 hr tablet     DULoxetine (CYMBALTA) 20 MG capsule     hydrochlorothiazide (HYDRODIURIL) 25 MG tablet     hydroxychloroquine (PLAQUENIL) 200 MG tablet     isosorbide mononitrate (IMDUR) 30 MG 24 hr tablet     losartan (COZAAR) 50 MG tablet     melatonin 1 MG TABS tablet     NIFEdipine ER (ADALAT CC) 30 MG 24 hr tablet     pantoprazole (PROTONIX) 40 MG EC tablet     polyethylene glycol (GOLYTELY) 236 g suspension     polyethylene glycol (GOLYTELY) 236 g suspension     simvastatin (ZOCOR) 40 MG tablet     tolterodine (DETROL) 2 MG tablet     albuterol (PROAIR HFA) 108 (90 Base) MCG/ACT inhaler     ALLEGRA ALLERGY 180 MG tablet     benzonatate (TESSALON) 100 MG capsule     diclofenac (VOLTAREN) 1 % topical gel     LORazepam (ATIVAN) 0.5 MG tablet     order for DME     VITAMIN D, CHOLECALCIFEROL, PO     No current facility-administered medications for this encounter.     Facility-Administered Medications Ordered in Other  Encounters   Medication     lidocaine 2% injection (MDV)     sodium chloride (PF) 0.9% PF flush 10 mL       Allergies   Allergen Reactions     Hydroxyzine      Made her very tired     Other Environmental Allergy Other (See Comments)     Seasonal Allergies      Sulfa Drugs      Vomiting       Vicodin [Hydrocodone-Acetaminophen] Nausea       History of Anesthesia/Sedation Problems: no    Physical Exam:    Mental status: alert  Heart: Normal  Lung: Normal  Assessment of patient's airway: Normal  Other as pertinent for procedure: None     ASA Score: See Provation note    Mallampati score:  I - Faucial pillars, soft palate, and uvula are visible    Assessment/Plan:     The patient is an appropriate candidate to receive sedation.    Informed consent was discussed with the patient/family, including the risks, benefits, potential complications and any alternative options associated with sedation.    Patient assessment completed just prior to sedation and while under constant observation by the provider. Condition determined to be adequate for proceeding with sedation.    The specific risks for the procedure were discussed with the patient at the time of informed consent and include but are not limited to perforation which could require surgery, missing significant neoplasm or lesion, hemorrhage and adverse sedative complication.      Domingo Wall MD

## 2022-11-09 NOTE — ANESTHESIA POSTPROCEDURE EVALUATION
Patient: Marcelina Estevez    Procedure: Procedure(s):  COLONOSCOPY, WITH CO2 INSUFFLATION  COLONOSCOPY, WITH POLYPECTOMY AND BIOPSY  COLONOSCOPY, WITH LESION EXCISION USING HOT BIOPSY DEVICE  COLONOSCOPY, WITH HEMORRHAGE CONTROL       Anesthesia Type:  MAC    Note:  Disposition: Outpatient   Postop Pain Control: Uneventful            Sign Out: Well controlled pain   PONV: No   Neuro/Psych: Uneventful            Sign Out: Acceptable/Baseline neuro status   Airway/Respiratory: Uneventful            Sign Out: Acceptable/Baseline resp. status   CV/Hemodynamics: Uneventful            Sign Out: Acceptable CV status; No obvious hypovolemia; No obvious fluid overload   Other NRE: NONE   DID A NON-ROUTINE EVENT OCCUR? No           Last vitals:  Vitals Value Taken Time   /81 11/09/22 1315   Temp 96.8  F (36  C) 11/09/22 1300   Pulse     Resp 18 11/09/22 1315   SpO2 98 % 11/09/22 1315       Electronically Signed By: Diogo Chacko MD  November 9, 2022  1:30 PM

## 2022-11-09 NOTE — ANESTHESIA PREPROCEDURE EVALUATION
Anesthesia Pre-Procedure Evaluation    Patient: Marcelina Estevez   MRN: [id number] : 1960        Procedure : Procedure(s):  COLONOSCOPY, WITH CO2 INSUFFLATION          Past Medical History:   Diagnosis Date     CREST (calcinosis, Raynaud's phenomenon, esophageal dysfunction, sclerodactyly, telangiectasia) (H)      Hyperlipidemia      Hypertension      Limited systemic sclerosis (H)      Positive MINDY (antinuclear antibody)      Raynaud disease       Past Surgical History:   Procedure Laterality Date     APPENDECTOMY       BIOPSY      cervical node     COLONOSCOPY N/A 11/10/2021    Procedure: COLONOSCOPY, FLEXIBLE, WITH LESION REMOVAL USING SNARE;  Surgeon: Domingo Wall MD;  [facility]     COLONOSCOPY N/A 11/10/2021    Procedure: COLONOSCOPY, WITH POLYPECTOMY AND BIOPSY;  Surgeon: Domingo Wall MD;  [facility]     COLONOSCOPY N/A 2021    Procedure: Colonoscopy, With Polypectomy And Biopsy;  Surgeon: Domingo Wall MD;  [facility]     COLONOSCOPY N/A 2021    Procedure: Colonoscopy, Flexible, With Lesion Removal Using Snare;  Surgeon: Domingo Wall MD;  [facility]     COLONOSCOPY N/A 2022    Procedure: COLONOSCOPY, FLEXIBLE, WITH LESION REMOVAL USING SNARE;  Surgeon: Domingo Wall MD;  [facility]     COLONOSCOPY WITH CO2 INSUFFLATION N/A 10/26/2020    Procedure: COLONOSCOPY, WITH CO2 INSUFFLATION;  Surgeon: Phyllis Chaudhari MD;  [facility]     COLONOSCOPY WITH CO2 INSUFFLATION N/A 11/10/2021    Procedure: COLONOSCOPY, WITH CO2 INSUFFLATION;  Surgeon: Domingo Wall MD;  [facility]     COLONOSCOPY WITH CO2 INSUFFLATION N/A 2021    Procedure: COLONOSCOPY, WITH CO2 INSUFFLATION;  Surgeon: Domingo Wall MD;  [facility]     COLONOSCOPY WITH CO2 INSUFFLATION N/A 2022    Procedure: COLONOSCOPY, WITH CO2 INSUFFLATION;  Surgeon: Domingo Wall MD;   [facility]     COMBINED ESOPHAGOSCOPY, GASTROSCOPY, DUODENOSCOPY (EGD) WITH CO2 INSUFFLATION N/A 2022    Procedure: ESOPHAGOGASTRODUODENOSCOPY, WITH CO2 INSUFFLATION;  Surgeon: Domingo Wall MD;  [facility]     DILATION AND CURETTAGE, HYSTEROSCOPY DIAGNOSTIC, COMBINED  2014    Procedure: COMBINED DILATION AND CURETTAGE, HYSTEROSCOPY DIAGNOSTIC;  Surgeon: Mana Cabrera DO;  [facility]     ENT SURGERY      tonsillectomy and adnoid removal     ESOPHAGOSCOPY, GASTROSCOPY, DUODENOSCOPY (EGD), COMBINED N/A 2022    Procedure: ESOPHAGOGASTRODUODENOSCOPY, WITH BIOPSY;  Surgeon: Domingo Wall MD;  [facility]     HYSTERECTOMY TOTAL ABDOMINAL       ORTHOPEDIC SURGERY      left knee tear meniscus     RELEASE CARPAL TUNNEL BILATERAL  2009      Allergies   Allergen Reactions     Hydroxyzine      Made her very tired     Other Environmental Allergy Other (See Comments)     Seasonal Allergies      Sulfa Drugs      Vomiting       Vicodin [Hydrocodone-Acetaminophen] Nausea      Social History     Tobacco Use     Smoking status: Former     Types: Cigarettes     Quit date: 2001     Years since quittin.8     Smokeless tobacco: Never   Substance Use Topics     Alcohol use: Yes     Comment: 5-8 drinks/week      Wt Readings from Last 1 Encounters:   22 65 kg (143 lb 6.4 oz)        Anesthesia Evaluation   Pt has had prior anesthetic. Type: MAC.        ROS/MED HX  ENT/Pulmonary:     (+) recent URI, resolved, COVID 3 weeks ago.  Resolved with monoclonal antibody therapy:     Neurologic:  - neg neurologic ROS     Cardiovascular: Comment: Non-STEMI probably related to her small; vessel disease from scleroderma     (+) hypertension--CAD -past MI --    METS/Exercise Tolerance:     Hematologic:  - neg hematologic  ROS     Musculoskeletal: Comment: Scleroderma.  Treated with Plaquenil      GI/Hepatic: Comment: Ischemic colitis      Renal/Genitourinary:     (+) renal  disease, type: CRI, Pt does not require dialysis,     Endo:  - neg endo ROS     Psychiatric/Substance Use:     (+) psychiatric history anxiety and depression     Infectious Disease:  - neg infectious disease ROS     Malignancy:  - neg malignancy ROS     Other:  - neg other ROS          Physical Exam    Airway  airway exam normal           Respiratory Devices and Support         Dental  no notable dental history         Cardiovascular   cardiovascular exam normal          Pulmonary   pulmonary exam normal                OUTSIDE LABS:  CBC:   Lab Results   Component Value Date    WBC 5.9 08/26/2022    WBC 10.0 06/16/2022    HGB 10.8 (L) 08/26/2022    HGB 10.8 (L) 06/16/2022    HCT 32.6 (L) 08/26/2022    HCT 33.1 (L) 06/16/2022     08/26/2022     06/16/2022     BMP:   Lab Results   Component Value Date     (L) 08/26/2022     06/16/2022    POTASSIUM 4.2 08/26/2022    POTASSIUM 3.2 (L) 06/16/2022    CHLORIDE 98 08/26/2022    CHLORIDE 101 06/16/2022    CO2 27 08/26/2022    CO2 28 06/16/2022    BUN 26 08/26/2022    BUN 36 (H) 06/16/2022    CR 1.01 08/26/2022    CR 1.09 (H) 06/16/2022    GLC 84 08/26/2022    GLC 91 06/16/2022     COAGS: No results found for: PTT, INR, FIBR  POC:   Lab Results   Component Value Date    HCG neg 07/01/2014     HEPATIC:   Lab Results   Component Value Date    ALBUMIN 3.9 06/16/2022    PROTTOTAL 7.0 06/16/2022    ALT 29 06/16/2022    AST 18 06/16/2022    ALKPHOS 53 06/16/2022    BILITOTAL 0.2 06/16/2022     OTHER:   Lab Results   Component Value Date    SERGIO 9.9 08/26/2022    MAG 1.9 05/22/2018    TSH 2.01 06/16/2022    CRP <2.9 06/12/2018    SED 18 05/22/2018       Anesthesia Plan    ASA Status:  3   NPO Status:  NPO Appropriate    Anesthesia Type: MAC.     - Reason for MAC: chronic cardiopulmonary disease   Induction: Intravenous, Propofol.   Maintenance: TIVA.        Consents    Anesthesia Plan(s) and associated risks, benefits, and realistic alternatives discussed.  Questions answered and patient/representative(s) expressed understanding.    - Discussed:     - Discussed with:  Patient      - Extended Intubation/Ventilatory Support Discussed: No.      - Patient is DNR/DNI Status: No    Use of blood products discussed: No .     Postoperative Care    Post procedure pain management: None required.   PONV prophylaxis: Ondansetron (or other 5HT-3), Background Propofol Infusion     Comments:                Diogo Chacko MD

## 2022-11-14 DIAGNOSIS — I10 HYPERTENSION GOAL BP (BLOOD PRESSURE) < 140/90: ICD-10-CM

## 2022-11-14 RX ORDER — LOSARTAN POTASSIUM 50 MG/1
50 TABLET ORAL DAILY
Qty: 90 TABLET | Refills: 0 | Status: SHIPPED | OUTPATIENT
Start: 2022-11-14 | End: 2022-11-17

## 2022-11-17 ENCOUNTER — VIRTUAL VISIT (OUTPATIENT)
Dept: FAMILY MEDICINE | Facility: CLINIC | Age: 62
End: 2022-11-17
Payer: COMMERCIAL

## 2022-11-17 ENCOUNTER — TELEPHONE (OUTPATIENT)
Dept: FAMILY MEDICINE | Facility: CLINIC | Age: 62
End: 2022-11-17

## 2022-11-17 DIAGNOSIS — M34.1 CREST (CALCINOSIS, RAYNAUD'S PHENOMENON, ESOPHAGEAL DYSFUNCTION, SCLERODACTYLY, TELANGIECTASIA) (H): ICD-10-CM

## 2022-11-17 DIAGNOSIS — I10 HYPERTENSION GOAL BP (BLOOD PRESSURE) < 140/90: ICD-10-CM

## 2022-11-17 DIAGNOSIS — F41.0 PANIC ATTACK: ICD-10-CM

## 2022-11-17 DIAGNOSIS — F33.42 RECURRENT MAJOR DEPRESSIVE DISORDER, IN FULL REMISSION (H): Primary | ICD-10-CM

## 2022-11-17 DIAGNOSIS — K55.9 ISCHEMIC COLITIS (H): ICD-10-CM

## 2022-11-17 DIAGNOSIS — F41.1 GAD (GENERALIZED ANXIETY DISORDER): ICD-10-CM

## 2022-11-17 PROCEDURE — 99214 OFFICE O/P EST MOD 30 MIN: CPT | Mod: 95 | Performed by: INTERNAL MEDICINE

## 2022-11-17 PROCEDURE — 96127 BRIEF EMOTIONAL/BEHAV ASSMT: CPT | Mod: 95 | Performed by: INTERNAL MEDICINE

## 2022-11-17 RX ORDER — ISOSORBIDE MONONITRATE 30 MG/1
30 TABLET, EXTENDED RELEASE ORAL DAILY
Qty: 90 TABLET | Refills: 2 | Status: SHIPPED | OUTPATIENT
Start: 2022-11-17 | End: 2022-12-28

## 2022-11-17 RX ORDER — LOSARTAN POTASSIUM 50 MG/1
50 TABLET ORAL DAILY
Qty: 90 TABLET | Refills: 1 | Status: SHIPPED | OUTPATIENT
Start: 2022-11-17 | End: 2023-02-15

## 2022-11-17 RX ORDER — LORAZEPAM 0.5 MG/1
0.5 TABLET ORAL DAILY PRN
Qty: 30 TABLET | Refills: 1 | Status: SHIPPED | OUTPATIENT
Start: 2022-11-17 | End: 2023-05-09

## 2022-11-17 RX ORDER — DULOXETIN HYDROCHLORIDE 20 MG/1
40 CAPSULE, DELAYED RELEASE ORAL DAILY
Qty: 180 CAPSULE | Refills: 1 | Status: SHIPPED | OUTPATIENT
Start: 2022-11-17 | End: 2022-11-22

## 2022-11-17 RX ORDER — PANTOPRAZOLE SODIUM 40 MG/1
40 TABLET, DELAYED RELEASE ORAL 2 TIMES DAILY
Qty: 180 TABLET | Refills: 1 | Status: SHIPPED | OUTPATIENT
Start: 2022-11-17 | End: 2023-09-08

## 2022-11-17 ASSESSMENT — ANXIETY QUESTIONNAIRES
GAD7 TOTAL SCORE: 3
7. FEELING AFRAID AS IF SOMETHING AWFUL MIGHT HAPPEN: NOT AT ALL
6. BECOMING EASILY ANNOYED OR IRRITABLE: SEVERAL DAYS
7. FEELING AFRAID AS IF SOMETHING AWFUL MIGHT HAPPEN: NOT AT ALL
5. BEING SO RESTLESS THAT IT IS HARD TO SIT STILL: NOT AT ALL
IF YOU CHECKED OFF ANY PROBLEMS ON THIS QUESTIONNAIRE, HOW DIFFICULT HAVE THESE PROBLEMS MADE IT FOR YOU TO DO YOUR WORK, TAKE CARE OF THINGS AT HOME, OR GET ALONG WITH OTHER PEOPLE: NOT DIFFICULT AT ALL
4. TROUBLE RELAXING: NOT AT ALL
3. WORRYING TOO MUCH ABOUT DIFFERENT THINGS: SEVERAL DAYS
2. NOT BEING ABLE TO STOP OR CONTROL WORRYING: SEVERAL DAYS
1. FEELING NERVOUS, ANXIOUS, OR ON EDGE: NOT AT ALL
8. IF YOU CHECKED OFF ANY PROBLEMS, HOW DIFFICULT HAVE THESE MADE IT FOR YOU TO DO YOUR WORK, TAKE CARE OF THINGS AT HOME, OR GET ALONG WITH OTHER PEOPLE?: NOT DIFFICULT AT ALL
GAD7 TOTAL SCORE: 3
GAD7 TOTAL SCORE: 3

## 2022-11-17 ASSESSMENT — PATIENT HEALTH QUESTIONNAIRE - PHQ9
SUM OF ALL RESPONSES TO PHQ QUESTIONS 1-9: 1
10. IF YOU CHECKED OFF ANY PROBLEMS, HOW DIFFICULT HAVE THESE PROBLEMS MADE IT FOR YOU TO DO YOUR WORK, TAKE CARE OF THINGS AT HOME, OR GET ALONG WITH OTHER PEOPLE: NOT DIFFICULT AT ALL
SUM OF ALL RESPONSES TO PHQ QUESTIONS 1-9: 1

## 2022-11-17 NOTE — PROGRESS NOTES
Marcelina is a 62 year old who is being evaluated via a billable telephone visit.      What phone number would you like to be contacted at? [phone]  [facility]    Assessment & Plan     Marcelina was seen today for depression, blood pressure check and lipids.    Diagnoses and all orders for this visit:    Recurrent major depressive disorder, in full remission (H)  -     DULoxetine (CYMBALTA) 20 MG capsule; Take 2 capsules (40 mg) by mouth daily  -     [place] [facility] ASSMT W/SCORE & DOCD/STAND INSTRUMENT    CREST (calcinosis, Raynaud's phenomenon, esophageal dysfunction, sclerodactyly, telangiectasia) (H)  -     diclofenac (VOLTAREN) 1 % topical gel; Apply 2 g topically 4 times daily as needed for moderate pain (4-6)    Panic attack  Comments:  Cutting down on the use of lorazepam.  Will be done in [place] for 5 months.  Would like to get a refill for just in case.  Orders:  -     LORazepam (ATIVAN) 0.5 MG tablet; Take 1 tablet (0.5 mg) by mouth daily as needed (panic attack)    Hypertension goal BP (blood pressure) < 140/90  Comments:  Well-controlled per blood pressure at rheumatologist office.  No change in her current medications.  Orders:  -     isosorbide mononitrate (IMDUR) 30 MG 24 hr tablet; Take 1 tablet (30 mg) by mouth daily  -     losartan (COZAAR) 50 MG tablet; Take 1 tablet (50 mg) by mouth daily    Ischemic colitis (H)  -     pantoprazole (PROTONIX) 40 MG EC tablet; Take 1 tablet (40 mg) by mouth 2 times daily    MARCELINA (generalized anxiety disorder)  -     [place] [facility] ASSMT W/SCORE & DOCD/STAND INSTRUMENT        Return in about 9 months (around 8/26/2023) for Physical Exam, Fasting Lab appointment.    Annette Painting MD PhD  [facility]   Marcelina is a 62 year old, presenting for the following health issues:  Depression, Blood Pressure Check, and Lipids      History of Present Illness       Mental Health Follow-up:  Patient presents to follow-up on  Depression & Anxiety.Patient's depression since last visit has been:  No change  The patient is not having other symptoms associated with depression.  Patient's anxiety since last visit has been:  No change  The patient is having other symptoms associated with anxiety.  Any significant life events: health concerns  Patient is not feeling anxious or having panic attacks.  Patient has no concerns about alcohol or drug use.    Hypertension: She presents for follow up of hypertension.  She does not check blood pressure  regularly outside of the clinic. Outpatient blood pressures have not been over 140/90. She does not follow a low salt diet.      Today's PHQ-9         PHQ-9 Total Score: 1    PHQ-9 Q9 Thoughts of better off dead/self-harm past 2 weeks :   Not at all    How difficult have these problems made it for you to do your work, take care of things at home, or get along with other people: Not difficult at all  Today's MARCELINA-7 Score: 3     Patient reported doing well in terms of depression/anxiety.  [facility] pharmacist changed Effexor to duloxetine.  This is more effective.    Pretension: She does not check her blood pressure.  She had a rheumatology visit on November 14, 2022, blood pressure was 110/60    Review of Systems   Constitutional, HEENT, cardiovascular, pulmonary, gi and gu systems are negative, except as otherwise noted.      Objective    Vitals - Patient Reported  Systolic (Patient Reported): 1.83 (on 11/14/2022 at rheumatologist's office)  Pain Score: Severe Pain (7)  Pain Loc:  (joints)        Physical Exam   healthy, alert and no distress  PSYCH: Alert and oriented times 3; coherent speech, normal   rate and volume, able to articulate logical thoughts, able   to abstract reason, no tangential thoughts, no hallucinations   or delusions  Her affect is normal  RESP: No cough, no audible wheezing, able to talk in full sentences  Remainder of exam unable to be completed due to telephone visits        Phone call  duration: 11 minutes

## 2022-11-22 ENCOUNTER — VIRTUAL VISIT (OUTPATIENT)
Dept: PHARMACY | Facility: CLINIC | Age: 62
End: 2022-11-22
Payer: COMMERCIAL

## 2022-11-22 DIAGNOSIS — F33.42 RECURRENT MAJOR DEPRESSIVE DISORDER, IN FULL REMISSION (H): Primary | ICD-10-CM

## 2022-11-22 DIAGNOSIS — F41.1 GAD (GENERALIZED ANXIETY DISORDER): ICD-10-CM

## 2022-11-22 DIAGNOSIS — I10 HYPERTENSION GOAL BP (BLOOD PRESSURE) < 140/90: ICD-10-CM

## 2022-11-22 PROCEDURE — 99607 MTMS BY PHARM ADDL 15 MIN: CPT | Performed by: PHARMACIST

## 2022-11-22 PROCEDURE — 99606 MTMS BY PHARM EST 15 MIN: CPT | Performed by: PHARMACIST

## 2022-11-22 RX ORDER — HYDROCHLOROTHIAZIDE 25 MG/1
25 TABLET ORAL DAILY
Qty: 90 TABLET | Refills: 3 | Status: SHIPPED | OUTPATIENT
Start: 2022-11-22 | End: 2022-12-16

## 2022-11-22 RX ORDER — BUPROPION HYDROCHLORIDE 150 MG/1
150 TABLET ORAL EVERY MORNING
Qty: 90 TABLET | Refills: 3 | Status: SHIPPED | OUTPATIENT
Start: 2022-11-22 | End: 2023-11-29

## 2022-11-22 RX ORDER — DULOXETINE 40 MG/1
40 CAPSULE, DELAYED RELEASE ORAL DAILY
Qty: 90 CAPSULE | Refills: 3 | Status: SHIPPED | OUTPATIENT
Start: 2022-11-22 | End: 2023-03-06

## 2022-11-22 NOTE — PROGRESS NOTES
"Medication Therapy Management (MTM) Encounter    ASSESSMENT:                            Medication Adherence/Access: No issues identified    Depression/Anxiety: Symptoms are notably improved. Will continue with duloxetine 40mg daily and sent new Rx with that capsule strength.    Overactive bladder: Stable without medication.    PLAN:                            Continue current medications.   PCP sent many med refills last week, though not everything. Today, I sent year Rx's for duloxetine 40mg, bupropion and hydrochlorothiazide.    Follow-up: I will send you a [employer] message mid May to check in    SUBJECTIVE/OBJECTIVE:                          Marcelina Estevez is a 62 year old female called for a follow-up visit.  Today's visit is a follow-up MTM visit from 11/1/22.     Reason for visit: med follow up.    Allergies/ADRs: Reviewed in chart  Past Medical History: Reviewed in chart  Tobacco: She reports that she quit smoking about 21 years ago. She has never used smokeless tobacco.  Alcohol: not currently using    Medication Adherence/Access: no issues reported    Depression/Anxiety: At last visit, patient elected to increase duloxetine from 20mg to 40mg daily. She continues to take bupropion ER 150mg daily, which is a strong 2D6 inhibitor and impacts duloxetine metabolism. She reported that mood feels better and no longer feeling 'blah,\" as she had previously reported. She is feeling lower motivation with less daylight and described \"working on pushing myself\" to do more activities, which is feeling easier to do. Leaving for [place] this Saturday and will return mid April.    She tried melatonin 3mg when she was having trouble falling asleep, which was helpful. Sleep has been good.    Current bupropion dose is limited by renal function, noted by GFR that hovers 60ml/min over the last year. Bupropion recommended max dose of 150mg/day in CrCl 15-60ml/min.    Overactive Bladder:  Since last visit, she stopped taking the " tolterodine. Has been drinking less fluid in the hours before bed and has seen improvement in needing to wake during the night. Feels stable.  ----------------    I spent 20 minutes with this patient today. All changes were made via collaborative practice agreement with Annette Painting. A copy of the visit note was provided to the patient's provider(s).    The patient declined a summary of these recommendations.     Areli JacobsenD  Medication Therapy Management Pharmacist  [facility]    Telemedicine Visit Details  Type of service:  Telephone visit  Start Time: 2:00pm  End Time: 2:20pm  Originating Location (pt. Location): Home      Distant Location (provider location):  On-site  Provider has received verbal consent for a visit from the patient? Yes     Medication Therapy Recommendations  No medication therapy recommendations to display

## 2022-11-22 NOTE — PATIENT INSTRUCTIONS
"Recommendations from today's MTM visit:                                                    MTM (medication therapy management) is a service provided by a clinical pharmacist designed to help you get the most of out of your medicines.   Today we reviewed what your medicines are for, how to know if they are working, that your medicines are safe and how to make your medicine regimen as easy as possible.      Continue current medications    Follow-up: I will send you a [employer] message in mid May    It was great speaking with you today.  I value your experience and would be very thankful for your time in providing feedback in our clinic survey. In the next few days, you may receive an email or text message from [employer] with a link to a survey related to your  clinical pharmacist.\"     To schedule another MTM appointment, please call the clinic directly or you may call the MTM scheduling line at [phone] or toll-free at [phone].     My Clinical Pharmacist's contact information:                                                      Please feel free to contact me with any questions or concerns you have.      Deann Meyer, PharmD  Medication Therapy Management Pharmacist  [facility]  "

## 2022-12-13 DIAGNOSIS — I10 HYPERTENSION GOAL BP (BLOOD PRESSURE) < 140/90: ICD-10-CM

## 2022-12-15 DIAGNOSIS — I10 HYPERTENSION GOAL BP (BLOOD PRESSURE) < 140/90: ICD-10-CM

## 2022-12-16 RX ORDER — HYDROCHLOROTHIAZIDE 25 MG/1
TABLET ORAL
Qty: 90 TABLET | Refills: 1 | Status: SHIPPED | OUTPATIENT
Start: 2022-12-16 | End: 2023-09-26

## 2022-12-16 RX ORDER — NIFEDIPINE 30 MG
TABLET, EXTENDED RELEASE ORAL
Qty: 90 TABLET | Refills: 1 | Status: SHIPPED | OUTPATIENT
Start: 2022-12-16 | End: 2023-07-05

## 2022-12-26 ENCOUNTER — HEALTH MAINTENANCE LETTER (OUTPATIENT)
Age: 62
End: 2022-12-26

## 2023-02-15 DIAGNOSIS — I10 HYPERTENSION GOAL BP (BLOOD PRESSURE) < 140/90: ICD-10-CM

## 2023-02-15 RX ORDER — LOSARTAN POTASSIUM 50 MG/1
50 TABLET ORAL DAILY
Qty: 90 TABLET | Refills: 1 | Status: SHIPPED | OUTPATIENT
Start: 2023-02-15 | End: 2023-11-10

## 2023-04-07 ENCOUNTER — TELEPHONE (OUTPATIENT)
Dept: GASTROENTEROLOGY | Facility: CLINIC | Age: 63
End: 2023-04-07
Payer: COMMERCIAL

## 2023-04-07 NOTE — TELEPHONE ENCOUNTER
Screening Questions  BLUE  KIND OF PREP RED  LOCATION [review exclusion criteria] GREEN  SEDATION TYPE    Y - PREFERS LETTER  Are you active on mychart?   Domingo Wall MD  Ordering/Referring Provider?    MEDICA  What type of coverage do you have?  N  Have you had a positive covid test in the last 14 days?    25.5  1. BMI  [BMI 40+ - review exclusion criteria - MAC + [facility]]            *NEED PAC APPT AT [facility] + MAC (ONLY)*     SELF   2. Are you able to give consent for your medical care? [IF NO,RN REVIEW]          N  3. Are you taking any prescription pain medications on a routine schedule   (ex narcotics: tramadol, oxycodone, roxicodone, oxycontin,  and percocet)?    [RN Review]             N  3a. EXTENDED PREP What kind of prescription?     N 4. Do you have any chemical dependencies such as alcohol, street drugs, or methadone?    **If yes 3- 5 , please schedule with MAC sedation.**          IF YES TO ANY 6 - 10 - HOSPITAL SETTING ONLY.     N 6.   Do you need assistance transferring?     N 7.   Have you had a heart or lung transplant?    N 8.   Are you currently on dialysis?   N 9.   Do you use daily home oxygen?   N 10. Do you take nitroglycerin?   10a. N If yes, how often?     11. [FEMALES]   Are you currently pregnant?     11a.  If yes, how many weeks? [ Greater than 12 weeks, OR NEEDED]    N 12. [review exclusion criteria]  Do you have any implantable devices in your body (pacemaker, defib, LVAD)?            *NEED PAC APPT AT [facility]*     N 13. Do you have Pulmonary Hypertension?             *NEED PAC APPT AT [facility]*     N 14. In the past 6 months, have you had any heart related issues including cardiomyopathy or heart attack?     N 14a. If yes, did it require cardiac stenting if so when?     N 15. Have you had a stroke or Transient ischemic attack (TIA - aka  mini stroke ) within 6 months?      N 16. Do you have mod to severe Obstructive Sleep Apnea?  [Hospital only]    N 17. Do you have SEVERE AND  "UNCONTROLLED asthma?              *NEED PAC APPT AT [facility]*     N 18. Are you currently taking any blood thinners?     18a. No. Continue to 19.   18b. Yes/no Blood Thinner: No [CONTINUE TO #19]    N 19. Do you take the medication Phentermine?    19a. If yes, \"Hold for 7 days before procedure.  Please consult your prescribing provider if you have questions about holding this medication.\"     N  20. Do you have chronic kidney disease?      N  21. Do you have a diagnosis of diabetes?     N  22. On a regular basis do you go 3-5 days between bowel movements?     23. Preferred LOCAL Pharmacy for Pre Prescription    [ LIST ONLY ONE PHARMACY]          [facility]  [place], [place] - [address]       - [facility] REMINDERS -    Informed patient they will need an adult          Cannot take any type of public or medical transportation alone    Conscious Sedation- Needs  for 6 hours after the procedure        MAC/General-Needs  for 24 hours after procedure    Pre-Procedure Covid test to be completed         [Viola PCR/HOME Testing Required] + ADULT to ACCOMPANY     Confirmed Nurse will call to complete assessment       - SCHEDULING DETAILS -      Hospital Setting Required? If yes, what is the exclusion?:  N      Additional comments:  SCHEDULE WITH MARLON WITH MAC PER ORDER       STEEVENS   Surgeon   06/12/2023  Date of Procedure  MAC PER ORDER  Sedation Type     N PAC / Pre-op Required   Location  [facility] [place]       Type of Procedure Scheduled  Lower Endoscopy [Colonoscopy]      Which Colonoscopy Prep was Sent?     MIRALAX GATORADE WITHOUT MAGNEISUM CITRATE           "

## 2023-05-05 PROBLEM — D12.6 SERRATED POLYPOSIS SYNDROME: Status: ACTIVE | Noted: 2023-05-05

## 2023-05-09 ENCOUNTER — OFFICE VISIT (OUTPATIENT)
Dept: FAMILY MEDICINE | Facility: CLINIC | Age: 63
End: 2023-05-09
Payer: COMMERCIAL

## 2023-05-09 VITALS
TEMPERATURE: 98.5 F | DIASTOLIC BLOOD PRESSURE: 78 MMHG | RESPIRATION RATE: 20 BRPM | BODY MASS INDEX: 28.62 KG/M2 | HEART RATE: 96 BPM | OXYGEN SATURATION: 97 % | HEIGHT: 60 IN | SYSTOLIC BLOOD PRESSURE: 128 MMHG | WEIGHT: 145.8 LBS

## 2023-05-09 DIAGNOSIS — M25.561 CHRONIC PAIN OF RIGHT KNEE: Primary | ICD-10-CM

## 2023-05-09 DIAGNOSIS — G89.29 CHRONIC PAIN OF RIGHT KNEE: Primary | ICD-10-CM

## 2023-05-09 DIAGNOSIS — F41.0 PANIC ATTACK: ICD-10-CM

## 2023-05-09 PROCEDURE — 99213 OFFICE O/P EST LOW 20 MIN: CPT | Mod: 25 | Performed by: FAMILY MEDICINE

## 2023-05-09 PROCEDURE — 20610 DRAIN/INJ JOINT/BURSA W/O US: CPT | Mod: RT | Performed by: FAMILY MEDICINE

## 2023-05-09 RX ORDER — LORAZEPAM 0.5 MG/1
0.5 TABLET ORAL DAILY PRN
Qty: 30 TABLET | Refills: 0 | Status: SHIPPED | OUTPATIENT
Start: 2023-05-09 | End: 2023-08-08

## 2023-05-09 RX ORDER — LIDOCAINE HYDROCHLORIDE 10 MG/ML
5 INJECTION, SOLUTION INFILTRATION; PERINEURAL ONCE
Status: COMPLETED | OUTPATIENT
Start: 2023-05-09 | End: 2023-05-20

## 2023-05-09 RX ORDER — BETAMETHASONE DIPROPIONATE 0.5 MG/G
OINTMENT, AUGMENTED TOPICAL
Status: ON HOLD | COMMUNITY
Start: 2023-03-03 | End: 2024-09-10

## 2023-05-09 RX ORDER — TRIAMCINOLONE ACETONIDE 40 MG/ML
40 INJECTION, SUSPENSION INTRA-ARTICULAR; INTRAMUSCULAR ONCE
Status: COMPLETED | OUTPATIENT
Start: 2023-05-09 | End: 2023-05-09

## 2023-05-09 RX ADMIN — TRIAMCINOLONE ACETONIDE 40 MG: 40 INJECTION, SUSPENSION INTRA-ARTICULAR; INTRAMUSCULAR at 14:44

## 2023-05-09 ASSESSMENT — PAIN SCALES - GENERAL: PAINLEVEL: SEVERE PAIN (7)

## 2023-05-09 NOTE — PROGRESS NOTES
"  Assessment & Plan     (M25.561,  G89.29) Chronic pain of right knee  (primary encounter diagnosis)  Comment: Likely arthritis.  Discussed with her about the nature of the conditions and its treatment options.  Discussed about getting the knee x-ray but she declined.  She would like to try the steroid injection first, will consider x-ray if she fails the injection.  Been having this pain for about 6 months and it again worse slightly.  No trauma or unusual activities.    She received steroid injection today, please see the procedure note for further details.  In the meantime, continue her normal activities as tolerated.  Symptoms that need to be seen or call in discussed.  Continue with Tylenol or Motrin as needed for pain.  Follow-up if it persists, worsen or if there are any concerns.    Plan: Large Joint/Bursa injection and/or drainage         (Shoulder, Knee), triamcinolone (KENALOG-40)         injection 40 mg, lidocaine 1 % injection 5 mL            (F41.0) Panic attack  Comment: Cutting down on the use of lorazepam significantly, uses rarely.  The last prescription was 6 months ago with 30 tablets.  The Wellbutrin and the Cymbalta have been working well.  Lorazepam was refilled today.    Plan: LORazepam (ATIVAN) 0.5 MG tablet               BMI:   Estimated body mass index is 28.38 kg/m  as calculated from the following:    Height as of this encounter: 1.527 m (5' 0.1\").    Weight as of this encounter: 66.1 kg (145 lb 12.8 oz).   Weight management plan: Discussed healthy diet and exercise guidelines        MD EMA [place] [facility]   Marcelina is a 63 year old, presenting for the following health issues:  Knee Pain        5/9/2023     1:41 PM   Additional Questions   Roomed by Shanice BOO.     Knee Pain         Marcelina was here today for a couple concerns.  First is about the right knee pain - been having on and off in the last 6 months.  Stated about 10 years ago, she had " "similar pain on the left knee.  She had scope for it and has been doing well since then.  He was told that she had meniscal tear.  Started having similar pain on the right knee in the last 6 months and it is overall getting worse slowly.  It is usually triggered and worsened when she turned a certain way e with golfing.  She golfs a lot.  When she is not moving around or on her feet a lot, the pain would go away.  No hip or lower back pain.  OTC meds did not help.  No history of knee injury or trauma.  No swelling or redness.  Prolonged walking or standing also make it worse.    She also has anxiety for which has been well controlled with the Cymbalta and Wellbutrin.  She has Ativan to be taken as needed for severe anxiety attack which happens about 3 times a month.  Been on Ativan for years and has been working well.  The last prescription was 6 months ago with 30 tablets and she still have a few left.  She needs a refill for it now as she is leaving for [place] for 5 months in a couple days.  No other concerns.      Review of Systems   Constitutional, HEENT, cardiovascular, pulmonary, gi and gu systems are negative, except as otherwise noted.      Objective    /78   Pulse 96   Temp 98.5  F (36.9  C) (Temporal)   Resp 20   Ht 1.527 m (5' 0.1\")   Wt 66.1 kg (145 lb 12.8 oz)   LMP 06/12/2014   SpO2 97%   BMI 28.38 kg/m    Body mass index is 28.38 kg/m .  Physical Exam   GENERAL: healthy, alert and no distress  RESP: lungs clear to auscultation - no rales, rhonchi or wheezes  CV: regular rate and rhythm, no murmur.  MS: no gross musculoskeletal defects noted, no edema.  Walk with limping to the left side.  Hips and ankle exams were normal.  Right knee with no effusion or redness.  Mild tender with palpation the right knee. No crepitus.  No tender with palpation to medial condyle on left knee.  Negative anterior and posterior draw test.  Negative Latchmen's test. Collateral ligamen exam was stable.  PSYCH: " mentation appears normal, affect normal/bright    No results found for any visits on 05/09/23.

## 2023-05-20 RX ADMIN — LIDOCAINE HYDROCHLORIDE 5 ML: 10 INJECTION, SOLUTION INFILTRATION; PERINEURAL at 17:53

## 2023-05-20 NOTE — PROCEDURES
Procedure:  Knee Kenolog injection  Indication:  Knee pain with arthritis.    The whole precedure discussed with the patient.  Risks associated with the procedure was also discussed, which include but not limited pain, bleeding and infection.  Alternatives were also discussed.  Patient requested to have the injection on his right knee today.  Consent was obtained.    Time out performed - he was identified times three.    The whole procedure was done in an usual sterile maner.  The inserted site was cleaned with Iodine solution.  A mixture of 4 cc of 1% Lidocain and 1 cc of Kenolog (40 mg) inject directly into the right knee, entering at the upper outer quadrant.  Succeeded with first attempt.  Patient tolerates well and has significant relief from the injection.  Post procedure care discussed and educate about symptoms that need to be seen or call in.    Zoey Louis MD.

## 2023-05-25 ENCOUNTER — TELEPHONE (OUTPATIENT)
Dept: GASTROENTEROLOGY | Facility: CLINIC | Age: 63
End: 2023-05-25
Payer: COMMERCIAL

## 2023-05-25 DIAGNOSIS — Z86.0100 HISTORY OF COLONIC POLYPS: Primary | ICD-10-CM

## 2023-05-25 NOTE — TELEPHONE ENCOUNTER
Attempted to contact patient regarding upcoming Colonoscopy  procedure on 06.12.2023 for pre assessment questions. No answer.     Left message to return call to [phone] #4    Discuss Covid policy and designated  policy.    Pre op exam? N/A    Arrival time: 1225. Procedure time: 1310    Facility location: [facility] [place]; [address], [place] [address]    Sedation type: MAC    NSAIDs? Yes.  Diclofenac (Voltaren).  Holding interval of 1 day before procedure.    Anticoagulants: No    Electronic implanted devices? No    Diabetic? No    Indication for procedure: Adenomatous polyp of colon, unspecified part of colon    Bowel prep recommendation: Miralax prep without magnesium citrate     Prep instructions sent via letter.     Marcelle Khoury RN  Endoscopy Procedure Pre Assessment RN

## 2023-05-26 NOTE — TELEPHONE ENCOUNTER
Patient returned call.     Pre assessment questions completed for upcoming Colonoscopy procedure scheduled on 6/12/23    COVID policy reviewed.     Reviewed procedural arrival time 1225, procedure time 1310 and facility location [facility]; [address], [place] [address]    Designated  policy reviewed. Instructed to have someone stay 24 hours post procedure.     NSAIDs? No    Anticoagulation/blood thinners? No    Electronic implanted devices? No    Diabetic? No    Bowel prep recommendation: Standard Golytely per patient request    Reviewed procedure prep instructions.     Updated prep instructions sent via email per patient request, said she doesn't use MyChart and preferred email instead of mailed letter.     [email or web address]    Patient requests communication via unencrypted e-mail. This includes procedure prep instructions.  Patient acknowledges that they have agreed to accept the risks and receive unencrypted communications for this incidence.     Patient stated she already has Dulcolax tablets and only requests for Golytely script to be sent in.   Bowel prep script sent to:  [facility]  [place], [place] - [address] [facility]    Patient verbalized understanding and had no questions or concerns at this time.      Nani Rausch RN  Endoscopy Procedure Pre Assessment RN

## 2023-06-12 ENCOUNTER — ANESTHESIA EVENT (OUTPATIENT)
Dept: SURGERY | Facility: AMBULATORY SURGERY CENTER | Age: 63
End: 2023-06-12
Payer: COMMERCIAL

## 2023-06-12 ENCOUNTER — HOSPITAL ENCOUNTER (OUTPATIENT)
Facility: AMBULATORY SURGERY CENTER | Age: 63
Discharge: HOME | End: 2023-06-12
Attending: INTERNAL MEDICINE
Payer: COMMERCIAL

## 2023-06-12 ENCOUNTER — ANESTHESIA (OUTPATIENT)
Dept: SURGERY | Facility: AMBULATORY SURGERY CENTER | Age: 63
End: 2023-06-12
Payer: COMMERCIAL

## 2023-06-12 VITALS
TEMPERATURE: 97.9 F | SYSTOLIC BLOOD PRESSURE: 156 MMHG | RESPIRATION RATE: 16 BRPM | OXYGEN SATURATION: 99 % | DIASTOLIC BLOOD PRESSURE: 82 MMHG

## 2023-06-12 VITALS — HEART RATE: 67 BPM

## 2023-06-12 DIAGNOSIS — D12.6 SERRATED POLYPOSIS SYNDROME: Primary | ICD-10-CM

## 2023-06-12 LAB — COLONOSCOPY: NORMAL

## 2023-06-12 PROCEDURE — 88305 TISSUE EXAM BY PATHOLOGIST: CPT | Performed by: PATHOLOGY

## 2023-06-12 PROCEDURE — 45385 COLONOSCOPY W/LESION REMOVAL: CPT

## 2023-06-12 PROCEDURE — G8918 PT W/O PREOP ORDER IV AB PRO: HCPCS

## 2023-06-12 PROCEDURE — G8907 PT DOC NO EVENTS ON DISCHARG: HCPCS

## 2023-06-12 RX ORDER — FLUMAZENIL 0.1 MG/ML
0.2 INJECTION, SOLUTION INTRAVENOUS
Status: DISCONTINUED | OUTPATIENT
Start: 2023-06-12 | End: 2023-06-13 | Stop reason: HOSPADM

## 2023-06-12 RX ORDER — NALOXONE HYDROCHLORIDE 0.4 MG/ML
0.4 INJECTION, SOLUTION INTRAMUSCULAR; INTRAVENOUS; SUBCUTANEOUS
Status: DISCONTINUED | OUTPATIENT
Start: 2023-06-12 | End: 2023-06-13 | Stop reason: HOSPADM

## 2023-06-12 RX ORDER — PROPOFOL 10 MG/ML
INJECTION, EMULSION INTRAVENOUS PRN
Status: DISCONTINUED | OUTPATIENT
Start: 2023-06-12 | End: 2023-06-12

## 2023-06-12 RX ORDER — PROPOFOL 10 MG/ML
INJECTION, EMULSION INTRAVENOUS CONTINUOUS PRN
Status: DISCONTINUED | OUTPATIENT
Start: 2023-06-12 | End: 2023-06-12

## 2023-06-12 RX ORDER — SODIUM CHLORIDE, SODIUM LACTATE, POTASSIUM CHLORIDE, CALCIUM CHLORIDE 600; 310; 30; 20 MG/100ML; MG/100ML; MG/100ML; MG/100ML
INJECTION, SOLUTION INTRAVENOUS CONTINUOUS
Status: DISCONTINUED | OUTPATIENT
Start: 2023-06-12 | End: 2023-06-13 | Stop reason: HOSPADM

## 2023-06-12 RX ORDER — ONDANSETRON 2 MG/ML
4 INJECTION INTRAMUSCULAR; INTRAVENOUS EVERY 6 HOURS PRN
Status: DISCONTINUED | OUTPATIENT
Start: 2023-06-12 | End: 2023-06-13 | Stop reason: HOSPADM

## 2023-06-12 RX ORDER — ONDANSETRON 4 MG/1
4 TABLET, ORALLY DISINTEGRATING ORAL EVERY 6 HOURS PRN
Status: DISCONTINUED | OUTPATIENT
Start: 2023-06-12 | End: 2023-06-13 | Stop reason: HOSPADM

## 2023-06-12 RX ORDER — NALOXONE HYDROCHLORIDE 0.4 MG/ML
0.2 INJECTION, SOLUTION INTRAMUSCULAR; INTRAVENOUS; SUBCUTANEOUS
Status: DISCONTINUED | OUTPATIENT
Start: 2023-06-12 | End: 2023-06-13 | Stop reason: HOSPADM

## 2023-06-12 RX ORDER — ONDANSETRON 2 MG/ML
4 INJECTION INTRAMUSCULAR; INTRAVENOUS
Status: DISCONTINUED | OUTPATIENT
Start: 2023-06-12 | End: 2023-06-13 | Stop reason: HOSPADM

## 2023-06-12 RX ORDER — LIDOCAINE HYDROCHLORIDE 20 MG/ML
INJECTION, SOLUTION INFILTRATION; PERINEURAL PRN
Status: DISCONTINUED | OUTPATIENT
Start: 2023-06-12 | End: 2023-06-12

## 2023-06-12 RX ORDER — LIDOCAINE 40 MG/G
CREAM TOPICAL
Status: DISCONTINUED | OUTPATIENT
Start: 2023-06-12 | End: 2023-06-13 | Stop reason: HOSPADM

## 2023-06-12 RX ORDER — PROCHLORPERAZINE MALEATE 10 MG
10 TABLET ORAL EVERY 6 HOURS PRN
Status: DISCONTINUED | OUTPATIENT
Start: 2023-06-12 | End: 2023-06-13 | Stop reason: HOSPADM

## 2023-06-12 RX ADMIN — LIDOCAINE HYDROCHLORIDE 60 MG: 20 INJECTION, SOLUTION INFILTRATION; PERINEURAL at 13:11

## 2023-06-12 RX ADMIN — SODIUM CHLORIDE, SODIUM LACTATE, POTASSIUM CHLORIDE, CALCIUM CHLORIDE: 600; 310; 30; 20 INJECTION, SOLUTION INTRAVENOUS at 12:38

## 2023-06-12 RX ADMIN — PROPOFOL 150 MCG/KG/MIN: 10 INJECTION, EMULSION INTRAVENOUS at 13:11

## 2023-06-12 RX ADMIN — PROPOFOL 80 MG: 10 INJECTION, EMULSION INTRAVENOUS at 13:11

## 2023-06-12 RX ADMIN — PROPOFOL 30 MG: 10 INJECTION, EMULSION INTRAVENOUS at 13:22

## 2023-06-12 NOTE — ANESTHESIA CARE TRANSFER NOTE
Patient: Marcelnia Estevez    Procedure: Procedure(s):  Colonoscopy with CO2 insufflation  COLONOSCOPY, FLEXIBLE, WITH LESION REMOVAL USING SNARE       Diagnosis: Adenomatous polyp of colon, unspecified part of colon [D12.6]  Diagnosis Additional Information: No value filed.    Anesthesia Type:   MAC     Note:    Oropharynx: oropharynx clear of all foreign objects  Level of Consciousness: awake  Oxygen Supplementation: room air    Independent Airway: airway patency satisfactory and stable  Dentition: dentition unchanged  Vital Signs Stable: post-procedure vital signs reviewed and stable  Report to RN Given: handoff report given  Patient transferred to: Phase II    Handoff Report: Identifed the Patient, Identified the Reponsible Provider, Reviewed the pertinent medical history, Discussed the surgical course, Reviewed Intra-OP anesthesia mangement and issues during anesthesia, Set expectations for post-procedure period and Allowed opportunity for questions and acknowledgement of understanding      Vitals:  Vitals Value Taken Time   BP     Temp     Pulse     Resp     SpO2         Electronically Signed By: EDUARD Meza CRNA  June 12, 2023  1:51 PM

## 2023-06-12 NOTE — ANESTHESIA POSTPROCEDURE EVALUATION
Patient: Marcelina Estevez    Procedure: Procedure(s):  Colonoscopy with CO2 insufflation  COLONOSCOPY, FLEXIBLE, WITH LESION REMOVAL USING SNARE       Anesthesia Type:  MAC    Note:  Disposition: Outpatient   Postop Pain Control: Uneventful            Sign Out: Well controlled pain   PONV: No   Neuro/Psych: Uneventful            Sign Out: Acceptable/Baseline neuro status   Airway/Respiratory: Uneventful            Sign Out: Acceptable/Baseline resp. status   CV/Hemodynamics: Uneventful            Sign Out: Acceptable CV status; No obvious hypovolemia; No obvious fluid overload   Other NRE: NONE   DID A NON-ROUTINE EVENT OCCUR? No           Last vitals:  Vitals Value Taken Time   /77 06/12/23 1410   Temp 97.9  F (36.6  C) 06/12/23 1354   Pulse     Resp 16 06/12/23 1354   SpO2 94 % 06/12/23 1410       Electronically Signed By: Diogo Chacko MD  June 12, 2023  2:19 PM

## 2023-06-12 NOTE — ANESTHESIA PREPROCEDURE EVALUATION
Anesthesia Pre-Procedure Evaluation    Patient: Marcelina Estevez   MRN: [id number] : 1960        Procedure : Procedure(s):  Colonoscopy with CO2 insufflation          Past Medical History:   Diagnosis Date     CREST (calcinosis, Raynaud's phenomenon, esophageal dysfunction, sclerodactyly, telangiectasia) (H)      Hyperlipidemia      Hypertension      Limited systemic sclerosis (H)      Positive MINDY (antinuclear antibody)      Raynaud disease       Past Surgical History:   Procedure Laterality Date     APPENDECTOMY       BIOPSY      cervical node     COLONOSCOPY N/A 11/10/2021    Procedure: COLONOSCOPY, FLEXIBLE, WITH LESION REMOVAL USING SNARE;  Surgeon: Domingo Wall MD;  Location: [facility]     COLONOSCOPY N/A 11/10/2021    Procedure: COLONOSCOPY, WITH POLYPECTOMY AND BIOPSY;  Surgeon: Domingo Wall MD;  Location: [facility]     COLONOSCOPY N/A 2021    Procedure: Colonoscopy, With Polypectomy And Biopsy;  Surgeon: Domingo Wall MD;  Location: [facility]     COLONOSCOPY N/A 2021    Procedure: Colonoscopy, Flexible, With Lesion Removal Using Snare;  Surgeon: Domingo Wall MD;  Location: [facility]     COLONOSCOPY N/A 2022    Procedure: COLONOSCOPY, FLEXIBLE, WITH LESION REMOVAL USING SNARE;  Surgeon: Domingo Wall MD;  Location: [facility]     COLONOSCOPY N/A 2022    Procedure: COLONOSCOPY, WITH POLYPECTOMY AND BIOPSY;  Surgeon: Domingo Wall MD;  Location: [facility]     COLONOSCOPY WITH CO2 INSUFFLATION N/A 10/26/2020    Procedure: COLONOSCOPY, WITH CO2 INSUFFLATION;  Surgeon: Phyllis Chaudhari MD;  Location: [facility]     COLONOSCOPY WITH CO2 INSUFFLATION N/A 11/10/2021    Procedure: COLONOSCOPY, WITH CO2 INSUFFLATION;  Surgeon: Domingo Wall MD;  [facility]     COLONOSCOPY WITH CO2 INSUFFLATION N/A 2021    Procedure: COLONOSCOPY, WITH CO2 INSUFFLATION;  Surgeon: Domingo Wall MD;  [facility]      COLONOSCOPY WITH CO2 INSUFFLATION N/A 2022    Procedure: COLONOSCOPY, WITH CO2 INSUFFLATION;  Surgeon: Domingo Wall MD;  [facility]     COLONOSCOPY WITH CO2 INSUFFLATION N/A 2022    Procedure: COLONOSCOPY, WITH CO2 INSUFFLATION;  Surgeon: Domingo Wall MD;  [facility]     COMBINED ESOPHAGOSCOPY, GASTROSCOPY, DUODENOSCOPY (EGD) WITH CO2 INSUFFLATION N/A 2022    Procedure: ESOPHAGOGASTRODUODENOSCOPY, WITH CO2 INSUFFLATION;  Surgeon: Domingo Wall MD;  Location: [facility]     DILATION AND CURETTAGE, HYSTEROSCOPY DIAGNOSTIC, COMBINED  2014    Procedure: COMBINED DILATION AND CURETTAGE, HYSTEROSCOPY DIAGNOSTIC;  Surgeon: Mana Cabrera DO;  Location: [facility]     ENT SURGERY      tonsillectomy and adnoid removal     ESOPHAGOSCOPY, GASTROSCOPY, DUODENOSCOPY (EGD), COMBINED N/A 2022    Procedure: ESOPHAGOGASTRODUODENOSCOPY, WITH BIOPSY;  Surgeon: Domingo Wall MD;  Location: [facility]     HYSTERECTOMY TOTAL ABDOMINAL       ORTHOPEDIC SURGERY      left knee tear meniscus     RELEASE CARPAL TUNNEL BILATERAL  2009      Allergies   Allergen Reactions     Hydroxyzine      Made her very tired     Other Environmental Allergy Other (See Comments)     Seasonal Allergies      Sulfa Antibiotics      Vomiting       Vicodin [Hydrocodone-Acetaminophen] Nausea      Social History     Tobacco Use     Smoking status: Former     Types: Cigarettes     Quit date: 2001     Years since quittin.4     Smokeless tobacco: Never   Vaping Use     Vaping status: Never Used   Substance Use Topics     Alcohol use: Yes     Comment: 5-8 drinks/week      Wt Readings from Last 1 Encounters:   23 66.1 kg (145 lb 12.8 oz)        Anesthesia Evaluation   Pt has had prior anesthetic. Type: MAC and General.    No history of anesthetic complications       ROS/MED HX  ENT/Pulmonary:  - neg pulmonary ROS     Neurologic:  - neg neurologic ROS     Cardiovascular:      (+) Dyslipidemia hypertension-----    METS/Exercise Tolerance:     Hematologic:  - neg hematologic  ROS     Musculoskeletal: Comment: Limited systemic sclerosis      GI/Hepatic:  - neg GI/hepatic ROS     Renal/Genitourinary:     (+) renal disease, type: CRI, Pt does not require dialysis,     Endo:  - neg endo ROS     Psychiatric/Substance Use:     (+) Recreational drug usage: Cannabis.    Infectious Disease:  - neg infectious disease ROS     Malignancy:  - neg malignancy ROS     Other:  - neg other ROS          Physical Exam    Airway  airway exam normal           Respiratory Devices and Support         Dental    unable to assess        Cardiovascular   cardiovascular exam normal          Pulmonary   pulmonary exam normal                OUTSIDE LABS:  CBC:   Lab Results   Component Value Date    WBC 5.9 08/26/2022    WBC 10.0 06/16/2022    HGB 10.8 (L) 08/26/2022    HGB 10.8 (L) 06/16/2022    HCT 32.6 (L) 08/26/2022    HCT 33.1 (L) 06/16/2022     08/26/2022     06/16/2022     BMP:   Lab Results   Component Value Date     (L) 08/26/2022     06/16/2022    POTASSIUM 4.2 08/26/2022    POTASSIUM 3.2 (L) 06/16/2022    CHLORIDE 98 08/26/2022    CHLORIDE 101 06/16/2022    CO2 27 08/26/2022    CO2 28 06/16/2022    BUN 26 08/26/2022    BUN 36 (H) 06/16/2022    CR 1.01 08/26/2022    CR 1.09 (H) 06/16/2022    GLC 84 08/26/2022    GLC 91 06/16/2022     COAGS: No results found for: PTT, INR, FIBR  POC:   Lab Results   Component Value Date    HCG neg 07/01/2014     HEPATIC:   Lab Results   Component Value Date    ALBUMIN 3.9 06/16/2022    PROTTOTAL 7.0 06/16/2022    ALT 29 06/16/2022    AST 18 06/16/2022    ALKPHOS 53 06/16/2022    BILITOTAL 0.2 06/16/2022     OTHER:   Lab Results   Component Value Date    SERGIO 9.9 08/26/2022    MAG 1.9 05/22/2018    TSH 2.01 06/16/2022    CRP <2.9 06/12/2018    SED 18 05/22/2018       Anesthesia Plan    ASA Status:  3   NPO Status:  NPO Appropriate    Anesthesia Type:  MAC.     - Reason for MAC: chronic cardiopulmonary disease   Induction: N/a.   Maintenance: TIVA.        Consents    Anesthesia Plan(s) and associated risks, benefits, and realistic alternatives discussed. Questions answered and patient/representative(s) expressed understanding.    - Discussed:     - Discussed with:  Patient      - Extended Intubation/Ventilatory Support Discussed: No.      - Patient is DNR/DNI Status: No    Use of blood products discussed: No .     Postoperative Care    Post procedure pain management: None required.   PONV prophylaxis: Ondansetron (or other 5HT-3), Background Propofol Infusion     Comments:           H&P reviewed: Unable to attach H&P to encounter due to EHR limitations. H&P Update: appropriate H&P reviewed, patient examined. No interval changes since H&P (within 30 days).         Diogo Chacko MD

## 2023-06-12 NOTE — H&P
ENDOSCOPY PRE-SEDATION H&P FOR OUTPATIENT PROCEDURES    Marcelina Estevez  [id number]  1960    Procedure: colonoscopy    Pre-procedure diagnosis: SSP syndrome    Past medical history:   Past Medical History:   Diagnosis Date     CREST (calcinosis, Raynaud's phenomenon, esophageal dysfunction, sclerodactyly, telangiectasia) (H)      Hyperlipidemia      Hypertension      Limited systemic sclerosis (H)      Positive MINDY (antinuclear antibody)      Raynaud disease        Past surgical history:   Past Surgical History:   Procedure Laterality Date     APPENDECTOMY       BIOPSY      cervical node     COLONOSCOPY N/A 11/10/2021    Procedure: COLONOSCOPY, FLEXIBLE, WITH LESION REMOVAL USING SNARE;  Surgeon: Domingo Wall MD;  Location: [facility]     COLONOSCOPY N/A 11/10/2021    Procedure: COLONOSCOPY, WITH POLYPECTOMY AND BIOPSY;  Surgeon: Domingo Wall MD;  [facility]     COLONOSCOPY N/A 9/29/2021    Procedure: Colonoscopy, With Polypectomy And Biopsy;  Surgeon: Domingo Wall MD;  [facility]     COLONOSCOPY N/A 9/29/2021    Procedure: Colonoscopy, Flexible, With Lesion Removal Using Snare;  Surgeon: Domingo Wall MD;  [facility]     COLONOSCOPY N/A 6/29/2022    Procedure: COLONOSCOPY, FLEXIBLE, WITH LESION REMOVAL USING SNARE;  Surgeon: Domingo Wall MD;  [facility]     COLONOSCOPY N/A 11/9/2022    Procedure: COLONOSCOPY, WITH POLYPECTOMY AND BIOPSY;  Surgeon: Domingo Wall MD;  [facility]     COLONOSCOPY WITH CO2 INSUFFLATION N/A 10/26/2020    Procedure: COLONOSCOPY, WITH CO2 INSUFFLATION;  Surgeon: Phyllis Chaudhari MD;  [facility]     COLONOSCOPY WITH CO2 INSUFFLATION N/A 11/10/2021    Procedure: COLONOSCOPY, WITH CO2 INSUFFLATION;  Surgeon: Domingo Wall MD;  [facility]     COLONOSCOPY WITH CO2 INSUFFLATION N/A 9/29/2021    Procedure: COLONOSCOPY, WITH CO2 INSUFFLATION;  Surgeon:  Domingo Wall MD;  [facility]     COLONOSCOPY WITH CO2 INSUFFLATION N/A 6/29/2022    Procedure: COLONOSCOPY, WITH CO2 INSUFFLATION;  Surgeon: Domingo Wall MD;  [facility]     COLONOSCOPY WITH CO2 INSUFFLATION N/A 11/9/2022    Procedure: COLONOSCOPY, WITH CO2 INSUFFLATION;  Surgeon: Domingo Wall MD;  [facility]     COMBINED ESOPHAGOSCOPY, GASTROSCOPY, DUODENOSCOPY (EGD) WITH CO2 INSUFFLATION N/A 6/29/2022    Procedure: ESOPHAGOGASTRODUODENOSCOPY, WITH CO2 INSUFFLATION;  Surgeon: Domingo Wall MD;  [facility]     DILATION AND CURETTAGE, HYSTEROSCOPY DIAGNOSTIC, COMBINED  7/1/2014    Procedure: COMBINED DILATION AND CURETTAGE, HYSTEROSCOPY DIAGNOSTIC;  Surgeon: Mana Cabrera DO;  Location[facility]     ENT SURGERY      tonsillectomy and adnoid removal     ESOPHAGOSCOPY, GASTROSCOPY, DUODENOSCOPY (EGD), COMBINED N/A 6/29/2022    Procedure: ESOPHAGOGASTRODUODENOSCOPY, WITH BIOPSY;  Surgeon: Domingo Wall MD;  [facility]     HYSTERECTOMY TOTAL ABDOMINAL       ORTHOPEDIC SURGERY      left knee tear meniscus     RELEASE CARPAL TUNNEL BILATERAL  2009       Current Outpatient Medications   Medication     albuterol (PROAIR HFA) 108 (90 Base) MCG/ACT inhaler     ALLEGRA ALLERGY 180 MG tablet     augmented betamethasone dipropionate (DIPROLENE-AF) 0.05 % external ointment     buPROPion (WELLBUTRIN XL) 150 MG 24 hr tablet     diclofenac (VOLTAREN) 1 % topical gel     DULoxetine HCl 40 MG CPEP     hydrochlorothiazide (HYDRODIURIL) 25 MG tablet     hydroxychloroquine (PLAQUENIL) 200 MG tablet     isosorbide mononitrate (IMDUR) 30 MG 24 hr tablet     LORazepam (ATIVAN) 0.5 MG tablet     losartan (COZAAR) 50 MG tablet     NIFEdipine ER (ADALAT CC) 30 MG 24 hr tablet     pantoprazole (PROTONIX) 40 MG EC tablet     simvastatin (ZOCOR) 40 MG tablet     VITAMIN D, CHOLECALCIFEROL, PO     order for DME     polyethylene glycol (GOLYTELY)  236 g suspension     Current Facility-Administered Medications   Medication     lactated ringers infusion     lidocaine (LMX4) kit     lidocaine 1 % 0.1-1 mL     ondansetron (ZOFRAN) injection 4 mg     sodium chloride (PF) 0.9% PF flush 3 mL     sodium chloride (PF) 0.9% PF flush 3 mL     Facility-Administered Medications Ordered in Other Encounters   Medication     sodium chloride (PF) 0.9% PF flush 10 mL       Allergies   Allergen Reactions     Hydroxyzine      Made her very tired     Other Environmental Allergy Other (See Comments)     Seasonal Allergies      Sulfa Antibiotics      Vomiting       Vicodin [Hydrocodone-Acetaminophen] Nausea       History of Anesthesia/Sedation Problems: no    PHYSICAL EXAMINATION:  Constitutional: aaox3, cooperative, pleasant  Vitals reviewed: BP (!) 167/81   Temp 98  F (36.7  C) (Temporal)   Resp 16   LMP 06/12/2014   SpO2 98%   Wt:   Wt Readings from Last 2 Encounters:   05/09/23 66.1 kg (145 lb 12.8 oz)   08/26/22 65 kg (143 lb 6.4 oz)      Eyes: Sclera anicteric/injected  Ears/nose/mouth/throat: Normal oropharynx without ulcers or exudate, mucus membranes moist, hearing intact  Neck: supple, thyroid normal size  CV: No edema  Respiratory: Unlabored breathing  Lymph: No submandibular, supraclavicular or inguinal lymphadenopathy  Abd: Nondistended, no masses, nontender  Skin: warm, perfused, no jaundice  Psych: Normal affect  MSK: normal movement on limited exam.    ASA Score: See Provation note    Assessment/Plan:     The patient is an appropriate candidate to receive sedation.    Informed consent was discussed with the patient/family, including the risks, benefits, potential complications and any alternative options associated with sedation.    Patient assessment completed just prior to sedation and while under constant observation by the provider. Condition determined to be adequate for proceeding with sedation.    The specific risks for the procedure were discussed with  the patient at the time of informed consent and include but are not limited to perforation which could require surgery, missing significant neoplasm or lesion, hemorrhage and adverse sedative complication.      Domingo Wall MD

## 2023-08-01 ENCOUNTER — TELEPHONE (OUTPATIENT)
Dept: FAMILY MEDICINE | Facility: CLINIC | Age: 63
End: 2023-08-01
Payer: COMMERCIAL

## 2023-08-01 NOTE — TELEPHONE ENCOUNTER
Reason for Call:  Appointment Request    Patient requesting this type of appt: Procedure: knee injection    Requested provider:  Zoey Louis    Reason patient unable to be scheduled: Not within requested timeframe    When does patient want to be seen/preferred time: 3-7 days    Comments: Patient would like a knee injection by Dr. Louis    Could we send this information to you in [employer] or would you prefer to receive a phone call?:   Patient would prefer a phone call   Okay to leave a detailed message?: Yes at Cell number on file:    Telephone Information:   Mobile [phone]       Call taken on 8/1/2023 at 2:39 PM by Blayne Waters

## 2023-08-08 ENCOUNTER — OFFICE VISIT (OUTPATIENT)
Dept: FAMILY MEDICINE | Facility: CLINIC | Age: 63
End: 2023-08-08
Payer: COMMERCIAL

## 2023-08-08 VITALS
BODY MASS INDEX: 29.02 KG/M2 | RESPIRATION RATE: 16 BRPM | TEMPERATURE: 97.3 F | SYSTOLIC BLOOD PRESSURE: 124 MMHG | DIASTOLIC BLOOD PRESSURE: 72 MMHG | OXYGEN SATURATION: 97 % | HEART RATE: 80 BPM | HEIGHT: 60 IN | WEIGHT: 147.8 LBS

## 2023-08-08 DIAGNOSIS — F41.1 GAD (GENERALIZED ANXIETY DISORDER): ICD-10-CM

## 2023-08-08 DIAGNOSIS — N18.31 STAGE 3A CHRONIC KIDNEY DISEASE (H): ICD-10-CM

## 2023-08-08 DIAGNOSIS — R80.9 PROTEINURIA, UNSPECIFIED TYPE: ICD-10-CM

## 2023-08-08 DIAGNOSIS — J30.1 SEASONAL ALLERGIC RHINITIS DUE TO POLLEN: ICD-10-CM

## 2023-08-08 DIAGNOSIS — M25.562 CHRONIC PAIN OF LEFT KNEE: Primary | ICD-10-CM

## 2023-08-08 DIAGNOSIS — G89.29 CHRONIC PAIN OF LEFT KNEE: Primary | ICD-10-CM

## 2023-08-08 DIAGNOSIS — I10 HYPERTENSION GOAL BP (BLOOD PRESSURE) < 140/90: ICD-10-CM

## 2023-08-08 PROBLEM — J30.2 SEASONAL ALLERGIES: Status: ACTIVE | Noted: 2023-08-08

## 2023-08-08 PROBLEM — F41.9 ANXIETY: Status: RESOLVED | Noted: 2018-11-20 | Resolved: 2023-08-08

## 2023-08-08 PROBLEM — K21.9 GASTROESOPHAGEAL REFLUX DISEASE: Status: ACTIVE | Noted: 2019-05-23

## 2023-08-08 PROBLEM — F41.9 ANXIETY: Status: ACTIVE | Noted: 2018-11-20

## 2023-08-08 PROBLEM — I73.00 RAYNAUD'S PHENOMENON WITHOUT GANGRENE: Status: ACTIVE | Noted: 2017-03-01

## 2023-08-08 PROBLEM — I20.1 CORONARY VASOSPASM (H): Status: ACTIVE | Noted: 2018-12-07

## 2023-08-08 LAB
CREAT UR-MCNC: 105 MG/DL
MICROALBUMIN UR-MCNC: 308.5 MG/L
MICROALBUMIN/CREAT UR: 293.81 MG/G CR (ref 0–25)

## 2023-08-08 PROCEDURE — 82570 ASSAY OF URINE CREATININE: CPT | Performed by: FAMILY MEDICINE

## 2023-08-08 PROCEDURE — 20610 DRAIN/INJ JOINT/BURSA W/O US: CPT | Mod: LT | Performed by: FAMILY MEDICINE

## 2023-08-08 PROCEDURE — 99214 OFFICE O/P EST MOD 30 MIN: CPT | Mod: 25 | Performed by: FAMILY MEDICINE

## 2023-08-08 PROCEDURE — 82043 UR ALBUMIN QUANTITATIVE: CPT | Performed by: FAMILY MEDICINE

## 2023-08-08 RX ORDER — LORAZEPAM 0.5 MG/1
0.5 TABLET ORAL DAILY PRN
Qty: 30 TABLET | Refills: 0 | Status: SHIPPED | OUTPATIENT
Start: 2023-08-08 | End: 2024-05-29

## 2023-08-08 RX ORDER — ALBUTEROL SULFATE 90 UG/1
AEROSOL, METERED RESPIRATORY (INHALATION)
Qty: 8.5 G | Refills: 11 | Status: SHIPPED | OUTPATIENT
Start: 2023-08-08 | End: 2024-07-09

## 2023-08-08 RX ORDER — TRIAMCINOLONE ACETONIDE 40 MG/ML
40 INJECTION, SUSPENSION INTRA-ARTICULAR; INTRAMUSCULAR ONCE
Status: COMPLETED | OUTPATIENT
Start: 2023-08-08 | End: 2023-08-08

## 2023-08-08 RX ORDER — LIDOCAINE HYDROCHLORIDE 10 MG/ML
5 INJECTION, SOLUTION INFILTRATION; PERINEURAL ONCE
Status: COMPLETED | OUTPATIENT
Start: 2023-08-08 | End: 2023-08-08

## 2023-08-08 RX ADMIN — LIDOCAINE HYDROCHLORIDE 5 ML: 10 INJECTION, SOLUTION INFILTRATION; PERINEURAL at 14:05

## 2023-08-08 RX ADMIN — TRIAMCINOLONE ACETONIDE 40 MG: 40 INJECTION, SUSPENSION INTRA-ARTICULAR; INTRAMUSCULAR at 14:05

## 2023-08-08 ASSESSMENT — PATIENT HEALTH QUESTIONNAIRE - PHQ9
SUM OF ALL RESPONSES TO PHQ QUESTIONS 1-9: 2
SUM OF ALL RESPONSES TO PHQ QUESTIONS 1-9: 2
10. IF YOU CHECKED OFF ANY PROBLEMS, HOW DIFFICULT HAVE THESE PROBLEMS MADE IT FOR YOU TO DO YOUR WORK, TAKE CARE OF THINGS AT HOME, OR GET ALONG WITH OTHER PEOPLE: SOMEWHAT DIFFICULT

## 2023-08-08 ASSESSMENT — PAIN SCALES - GENERAL: PAINLEVEL: SEVERE PAIN (7)

## 2023-08-08 NOTE — PROGRESS NOTES
Assessment & Plan     (M25.562,  G89.29) Chronic pain of left knee  (primary encounter diagnosis)  Comment: Been having the knee pain for over 2 years, getting significantly worse in the last couple months with no known trigger.  The pain prevented her from prolonged standing/walking or going up and down the stairs.  No trauma or unusual activity.  Had meniscus tear that was cleaned several years ago.  No swelling or stiffness.  Similar pain on the right knee which went with steroid injection about 4 months ago.  She would like to try the steroid injections again.    Likely her pain is due to arthritis.  Recommend the knee x-ray but she declined; will consider getting x-ray if failed the injection.  She received a steroid injection today, please see the procedure note for further details.  In the meantime, she was encouraged to continue her normal activity as tolerated.  Symptoms the need to be seen or call in discussed.  Continue with Tylenol or Motrin as needed for pain.  Follow-up if it persists, worsen or if has any concern.      Plan: Large Joint/Bursa injection and/or drainage         (Shoulder, Knee), triamcinolone (KENALOG-40)         injection 40 mg, lidocaine 1 % injection 5 mL            (I10) Hypertension goal BP (blood pressure) < 140/90  Comment: BP is stable and controlled with hydrochlorothiazide, losartan, isosorbide and Procardia.  Tolerating the medication well.  Recent labs showed creatinine of 1.24 with a GFR of 49% which are worsening over the year. Will continue with the current medications for now.  Healthy/low salt diet, daily excercise and weight management discussed and encouraged.  Encouraged to monitor her blood pressure closely, let me know if any persistently above 140/90.  Lab as ordered.  Follow up in 6 month, earlier as needed.     Plan: Albumin Random Urine Quantitative with Creat         Ratio            (N18.31) CKD (chronic kidney disease) stage IIIa,   (R80.9) Proteinuria,  unspecified type  Comment: She has a complex immunological history which being managed by the rheumatologist.  The rheumatologist recommended to see a nephrologist due to worsening of kidney functions and the proteinuria.  No history of diabetes.  She is on losartan at 50 mg daily.  She also has so has high blood pressure which has been controlled.  Medication review, still not sure why her kidney function is decreasing - concerning of immunological cause.  Recheck the urine protein level today and it was elevated.  Will refer her to nephrologist for further evaluation and management.    Plan: Albumin Random Urine Quantitative with Creat         Ratio              (F41.1) MARCELINA (generalized anxiety disorder)  Comment: High was on high-dose Ativan for years.  Been cutting down on the use of lorazepam significantly, has been using it rarely.  The last prescription was 3 months ago with 30 tablets and still has a few tablets left.  The Wellbutrin and the Cymbalta have been working well with not side effect.  Lorazepam was refilled today and will continue with the Wellbutrin and Cymbalta.    Plan: LORazepam (ATIVAN) 0.5 MG tablet            (J30.1) Seasonal allergic rhinitis due to pollen  Comment: Stable and is doing well.  Continue with the albuterol inhaler as needed.  The inhaler was refilled today.    Plan: albuterol (PROAIR HFA) 108 (90 Base) MCG/ACT         inhaler               Work on weight loss  Regular exercise    MD EMA [place] [facility]    Elyssa Hewitt is a 63 year old, presenting for the following health issues:  Knee Pain      8/8/2023     1:22 PM   Additional Questions   Roomed by Shanice PEARSON       History of Present Illness       Reason for visit:  Severe pain left knee    She eats 2-3 servings of fruits and vegetables daily.She consumes 1 sweetened beverage(s) daily.She exercises with enough effort to increase her heart rate 20 to 29 minutes per day.  She exercises with  enough effort to increase her heart rate 3 or less days per week.   She is taking medications regularly.       Marcelina is here today for couple concerns.  First is about the worsening of the left knee pain.  She had a meniscal tear several years ago which was cleaned and repaired.  Was doing well after that.  In the last couple years, she has been having the knee pain that was tolerable.  In the last couple months, the pain has gotten significantly worsen witch prevented her from going for a walk walking, standing too long or going up and down the stairs.  No unusual activities or trauma.  No swelling or redness.  Constant achy pain that is worsened with weightbearing activities.  No back or hip pain.  OTC meds did not help.  Similar to the pain on her right knee which has resolved with the steroid injection about 4 months ago.  She would like to try the steroid injection again.    She also has a complex immunological history that being managed by the rheumatologist.  She is known to have high blood pressure which has been controlled with hydrochlorothiazide, Procardia, isosorbide and losartan.  She saw the rheumatologist recently who has been monitoring her kidney function and noted that her kidney function has gotten worse gradually and proteinuria.  She was recommended to be referred to nephrology for further evaluation management.  She requested its referral.  No problem with urination.  No abdominal pain or leg swelling.    She also had general anxiety for years, used to be on Ativan at high dose chronically.  Been tapering and has been taking only as needed which is rarely.  No anxiety has been fairly controlled with the Wellbutrin and Cymbalta.  Last prescriptions of 30 tablets of Ativan was 4 months ago and still has a few left.  She needs it refilled.  No drugs or alcohol.  Not on opioid.  Also needs a refill for albuterol inhaler for her seasonal allergy.  No other concerns.      Review of Systems    Constitutional, HEENT, cardiovascular, pulmonary, gi and gu systems are negative, except as otherwise noted.      Objective    /72   Pulse 80   Temp 97.3  F (36.3  C) (Temporal)   Resp 16   Ht 1.524 m (5')   Wt 67 kg (147 lb 12.8 oz)   LMP 06/12/2014   SpO2 97%   BMI 28.87 kg/m    There is no height or weight on file to calculate BMI.  Physical Exam   GENERAL: healthy, alert and no distress  RESP: lungs clear to auscultation - no rales, rhonchi or wheezes  CV: regular rate and rhythm, no murmur.  MS: no gross musculoskeletal defects noted, no edema.  Walk with limping to the left side.  Hips and ankle exams were normal.  Left knee with no effusion or redness.  Mild tender with palpation to the left knee diffusely. No crepitus.  No tender with palpation to medial condyle.  Negative anterior and posterior draw test.  Negative Latchmen's test. Collateral ligamen exam was stable.  Right knee exam was normal  PSYCH: mentation appears normal, affect normal/bright      Results for orders placed or performed in visit on 08/08/23   Albumin Random Urine Quantitative with Creat Ratio     Status: Abnormal   Result Value Ref Range    Creatinine Urine mg/dL 105.0 mg/dL    Albumin Urine mg/L 308.5 mg/L    Albumin Urine mg/g Cr 293.81 (H) 0.00 - 25.00 mg/g Cr

## 2023-08-09 DIAGNOSIS — E78.5 HYPERLIPIDEMIA LDL GOAL <100: ICD-10-CM

## 2023-08-10 RX ORDER — SIMVASTATIN 40 MG
TABLET ORAL
Qty: 90 TABLET | Refills: 0 | Status: SHIPPED | OUTPATIENT
Start: 2023-08-10 | End: 2023-11-30

## 2023-08-11 ENCOUNTER — TELEPHONE (OUTPATIENT)
Dept: FAMILY MEDICINE | Facility: CLINIC | Age: 63
End: 2023-08-11
Payer: COMMERCIAL

## 2023-08-11 DIAGNOSIS — R80.9 PROTEINURIA, UNSPECIFIED TYPE: Primary | ICD-10-CM

## 2023-08-11 DIAGNOSIS — N18.1 CKD (CHRONIC KIDNEY DISEASE) STAGE 1, GFR 90 ML/MIN OR GREATER: ICD-10-CM

## 2023-08-11 NOTE — TELEPHONE ENCOUNTER
Patient calling and requesting results of her lab. Patient stating she is having issues getting into her mychart. Informed of her result as noted by the provider. Patient is needing referral placed for Nephrology and would like this done as soon as possible. Order placed for provider to complete. Patient is aware she will be called for scheduling. Joanne De Jesus LPN

## 2023-08-12 PROBLEM — R80.9 PROTEINURIA, UNSPECIFIED TYPE: Status: ACTIVE | Noted: 2023-08-12

## 2023-08-13 NOTE — PROCEDURES
Procedure:  Knee Kenolog injection  Indication:  Knee pain with arthritis.    The whole precedure discussed with the patient.  Risks associated with the procedure was also discussed, which include but not limited pain, bleeding and infection.  Alternatives were also discussed.  Patient requested to have the injection on his left knee today.  Consent was obtained.    Time out performed - he was identified times three.       The whole procedure was done in an usual sterile maner.  The inserted site was cleaned with Iodine solution.  A mixture of 4 cc of 1% Lidocain and 1 cc of Kenolog (40 mg) inject directly into the lefft knee, entering at the lower medial quadrant.  Succeeded with first attempt.  Patient tolerates well and has significant relief from the injection.  Post procedure care discussed and educate about symptoms that need to be seen or call in.     Zoey Louis MD.

## 2023-08-14 ENCOUNTER — TELEPHONE (OUTPATIENT)
Dept: NEPHROLOGY | Facility: CLINIC | Age: 63
End: 2023-08-14
Payer: COMMERCIAL

## 2023-08-14 DIAGNOSIS — R80.9 PROTEINURIA, UNSPECIFIED TYPE: Primary | ICD-10-CM

## 2023-08-14 NOTE — TELEPHONE ENCOUNTER
Called and spoke with pt. Offered to schedule a Lab appt at the [place] site prior to her appt with Dr. Brooke. Informed pt the labs are non fasting and will be both blood and urine collection. The las are typically resulted in time for the appt.    Pt verbalized understanding and agreed with plan.  Lab appt scheduled 9/26/23 at 2:20pm.  Confirmed clinic location.    Deepthi Merritt LPN

## 2023-08-14 NOTE — TELEPHONE ENCOUNTER
[facility] Call Center    Phone Message    May a detailed message be left on voicemail: yes     Reason for Call: Order(s): New patient appointment  Reason for requested: New patient appointment with Dr. Brooke in [place]. Patient is requesting lab orders be sent to [facility] on [address]  Date needed: ASAP  Provider name: Mendy    Action Taken: Message routed to:  Adult Clinics: Nephrology p [id number]    Travel Screening: Not Applicable

## 2023-08-31 ENCOUNTER — TRANSFERRED RECORDS (OUTPATIENT)
Dept: HEALTH INFORMATION MANAGEMENT | Facility: CLINIC | Age: 63
End: 2023-08-31

## 2023-08-31 LAB
ALBUMIN (URINE) MG/SPEC: 215 MG/L (ref 0–30)
ALBUMIN/CREATININE RATIO: 188 (ref 0–29)
CREATININE (EXTERNAL): 1.3 MG/DL (ref 0.5–1.1)
CREATININE (URINE): 114.4 MG/DL (ref 20–320)
GFR ESTIMATED (EXTERNAL): 44 ML/MIN/1.73M2
GLUCOSE (EXTERNAL): 95 MG/DL (ref 70–115)
POTASSIUM (EXTERNAL): 3.8 MMOL/L (ref 3.5–5.3)

## 2023-09-08 ENCOUNTER — TRANSFERRED RECORDS (OUTPATIENT)
Dept: HEALTH INFORMATION MANAGEMENT | Facility: CLINIC | Age: 63
End: 2023-09-08
Payer: COMMERCIAL

## 2023-09-08 DIAGNOSIS — I10 HYPERTENSION GOAL BP (BLOOD PRESSURE) < 140/90: ICD-10-CM

## 2023-09-08 DIAGNOSIS — K55.9 ISCHEMIC COLITIS (H): ICD-10-CM

## 2023-09-08 RX ORDER — PANTOPRAZOLE SODIUM 40 MG/1
40 TABLET, DELAYED RELEASE ORAL 2 TIMES DAILY
Qty: 180 TABLET | Refills: 0 | Status: SHIPPED | OUTPATIENT
Start: 2023-09-08 | End: 2023-11-29

## 2023-09-08 RX ORDER — ISOSORBIDE MONONITRATE 30 MG/1
30 TABLET, EXTENDED RELEASE ORAL DAILY
Qty: 90 TABLET | Refills: 0 | Status: SHIPPED | OUTPATIENT
Start: 2023-09-08 | End: 2023-11-29

## 2023-09-26 ENCOUNTER — OFFICE VISIT (OUTPATIENT)
Dept: NEPHROLOGY | Facility: CLINIC | Age: 63
End: 2023-09-26
Attending: FAMILY MEDICINE
Payer: COMMERCIAL

## 2023-09-26 ENCOUNTER — LAB (OUTPATIENT)
Dept: LAB | Facility: CLINIC | Age: 63
End: 2023-09-26
Payer: COMMERCIAL

## 2023-09-26 VITALS
SYSTOLIC BLOOD PRESSURE: 125 MMHG | BODY MASS INDEX: 28.98 KG/M2 | HEART RATE: 77 BPM | DIASTOLIC BLOOD PRESSURE: 83 MMHG | WEIGHT: 148.4 LBS | OXYGEN SATURATION: 100 %

## 2023-09-26 DIAGNOSIS — M34.9 SCLERODERMA (H): ICD-10-CM

## 2023-09-26 DIAGNOSIS — D50.0 IRON DEFICIENCY ANEMIA DUE TO CHRONIC BLOOD LOSS: Primary | ICD-10-CM

## 2023-09-26 DIAGNOSIS — D50.0 IRON DEFICIENCY ANEMIA DUE TO CHRONIC BLOOD LOSS: ICD-10-CM

## 2023-09-26 DIAGNOSIS — I10 HYPERTENSION GOAL BP (BLOOD PRESSURE) < 140/90: ICD-10-CM

## 2023-09-26 DIAGNOSIS — N18.1 CKD (CHRONIC KIDNEY DISEASE) STAGE 1, GFR 90 ML/MIN OR GREATER: ICD-10-CM

## 2023-09-26 DIAGNOSIS — N18.31 STAGE 3A CHRONIC KIDNEY DISEASE (H): ICD-10-CM

## 2023-09-26 DIAGNOSIS — R80.9 PROTEINURIA, UNSPECIFIED TYPE: ICD-10-CM

## 2023-09-26 LAB
ALBUMIN MFR UR ELPH: 25.9 MG/DL
ALBUMIN SERPL BCG-MCNC: 4.6 G/DL (ref 3.5–5.2)
ALBUMIN UR-MCNC: 30 MG/DL
ANION GAP SERPL CALCULATED.3IONS-SCNC: 14 MMOL/L (ref 7–15)
APPEARANCE UR: CLEAR
BACTERIA #/AREA URNS HPF: ABNORMAL /HPF
BILIRUB UR QL STRIP: NEGATIVE
BUN SERPL-MCNC: 39.4 MG/DL (ref 8–23)
CALCIUM SERPL-MCNC: 9.5 MG/DL (ref 8.8–10.2)
CHLORIDE SERPL-SCNC: 94 MMOL/L (ref 98–107)
COLOR UR AUTO: ABNORMAL
CREAT SERPL-MCNC: 1.17 MG/DL (ref 0.51–0.95)
CREAT UR-MCNC: 62.9 MG/DL
CREAT UR-MCNC: 64.9 MG/DL
DEPRECATED HCO3 PLAS-SCNC: 22 MMOL/L (ref 22–29)
EGFRCR SERPLBLD CKD-EPI 2021: 52 ML/MIN/1.73M2
ERYTHROCYTE [DISTWIDTH] IN BLOOD BY AUTOMATED COUNT: 13 % (ref 10–15)
FERRITIN SERPL-MCNC: 63 NG/ML (ref 11–328)
GLUCOSE SERPL-MCNC: 113 MG/DL (ref 70–99)
GLUCOSE UR STRIP-MCNC: NEGATIVE MG/DL
HCT VFR BLD AUTO: 31.1 % (ref 35–47)
HGB BLD-MCNC: 10.7 G/DL (ref 11.7–15.7)
HGB UR QL STRIP: NEGATIVE
IRON BINDING CAPACITY (ROCHE): 337 UG/DL (ref 240–430)
IRON SATN MFR SERPL: 16 % (ref 15–46)
IRON SERPL-MCNC: 53 UG/DL (ref 37–145)
KETONES UR STRIP-MCNC: NEGATIVE MG/DL
LEUKOCYTE ESTERASE UR QL STRIP: NEGATIVE
MCH RBC QN AUTO: 32.2 PG (ref 26.5–33)
MCHC RBC AUTO-ENTMCNC: 34.4 G/DL (ref 31.5–36.5)
MCV RBC AUTO: 94 FL (ref 78–100)
MICROALBUMIN UR-MCNC: 140 MG/L
MICROALBUMIN/CREAT UR: 222.58 MG/G CR (ref 0–25)
NITRATE UR QL: NEGATIVE
PH UR STRIP: 5.5 [PH] (ref 5–7)
PHOSPHATE SERPL-MCNC: 3.8 MG/DL (ref 2.5–4.5)
PLATELET # BLD AUTO: 251 10E3/UL (ref 150–450)
POTASSIUM SERPL-SCNC: 4.1 MMOL/L (ref 3.4–5.3)
PROT/CREAT 24H UR: 0.4 MG/MG CR (ref 0–0.2)
PTH-INTACT SERPL-MCNC: 94 PG/ML (ref 15–65)
RBC # BLD AUTO: 3.32 10E6/UL (ref 3.8–5.2)
RBC #/AREA URNS AUTO: ABNORMAL /HPF
SKIP: ABNORMAL
SODIUM SERPL-SCNC: 130 MMOL/L (ref 135–145)
SP GR UR STRIP: 1.01 (ref 1–1.03)
SQUAMOUS #/AREA URNS AUTO: ABNORMAL /LPF
UROBILINOGEN UR STRIP-MCNC: NORMAL MG/DL
VIT D+METAB SERPL-MCNC: 86 NG/ML (ref 20–50)
WBC # BLD AUTO: 6.8 10E3/UL (ref 4–11)
WBC #/AREA URNS AUTO: ABNORMAL /HPF

## 2023-09-26 PROCEDURE — 86200 CCP ANTIBODY: CPT

## 2023-09-26 PROCEDURE — 85027 COMPLETE CBC AUTOMATED: CPT

## 2023-09-26 PROCEDURE — 84156 ASSAY OF PROTEIN URINE: CPT

## 2023-09-26 PROCEDURE — 81001 URINALYSIS AUTO W/SCOPE: CPT

## 2023-09-26 PROCEDURE — 82043 UR ALBUMIN QUANTITATIVE: CPT

## 2023-09-26 PROCEDURE — 82570 ASSAY OF URINE CREATININE: CPT

## 2023-09-26 PROCEDURE — 82306 VITAMIN D 25 HYDROXY: CPT

## 2023-09-26 PROCEDURE — 82728 ASSAY OF FERRITIN: CPT

## 2023-09-26 PROCEDURE — 99205 OFFICE O/P NEW HI 60 MIN: CPT | Performed by: INTERNAL MEDICINE

## 2023-09-26 PROCEDURE — 83540 ASSAY OF IRON: CPT

## 2023-09-26 PROCEDURE — 83550 IRON BINDING TEST: CPT

## 2023-09-26 PROCEDURE — 36415 COLL VENOUS BLD VENIPUNCTURE: CPT

## 2023-09-26 PROCEDURE — 83970 ASSAY OF PARATHORMONE: CPT

## 2023-09-26 PROCEDURE — 80069 RENAL FUNCTION PANEL: CPT

## 2023-09-26 RX ORDER — ALLOPURINOL 100 MG/1
TABLET ORAL
COMMUNITY
Start: 2023-08-31 | End: 2024-07-16

## 2023-09-26 NOTE — PATIENT INSTRUCTIONS
It was a pleasure taking care of you today.  I've included a brief summary of our discussion and care plan from today's visit below.  Please review this information with your primary care provider.     My recommendations are summarized as follows:  -will stop Hydrochlorothiazide because of the low sodium; please check your BP at home. If elevated > 130, then we can  discuss increasing losartan.  -will check Iron studies  -Labs early november    Who do I call with any questions after my visit?  Please be in touch if there are any further questions that arise following today's visit.  There are multiple ways to contact your nephrology care team.       During business hours, you may reach your Nephrology Care Team or schedule or reschedule an appointment or lab at [phone].       If you need to schedule imaging, please call [phone].   To schedule a COVID test, please call [phone].     You can always send a secure message through [employer]. [employer] messages are answered by your nurse or doctor typically within 24-48 hours. Please allow extra time on weekends and holidays.       For urgent/emergent questions after business hours, you may reach the on-call Nephrology Fellow by contacting the [facility]  at [phone].     How will I get the results of any tests ordered?    You will receive all of your results.  If you have signed up for [employer], any tests ordered at your visit will be available to you once resulted on [employer]. Typically the physician reviews them and may or may not make further recommendations. If there are urgent results that require a change in your care plan, your physician or nurse will call you to discuss the next steps. If you are not on [employer], a letter may be generated and mailed to you with your results.

## 2023-09-26 NOTE — NURSING NOTE
Marcelina Estevez's goals for this visit include:   Chief Complaint   Patient presents with    Consult     Referred by Dr. Louis   Hypertension goal BP (blood pressure) < 140/90  Proteinuria, unspecified type  Stage 3a chronic kidney disease    Was seen at [facility] - unable to get Care Everywhere          She requests these members of her care team be copied on today's visit information: yes     PCP: Annette Painting    Referring Provider:  Zoey Clements Mai, MD  [address] [place] [address]    /83 (BP Location: Left arm, Patient Position: Chair, Cuff Size: Adult Regular)   Pulse 77   Wt 67.3 kg (148 lb 6.4 oz)   LMP 06/12/2014   SpO2 100%   BMI 28.98 kg/m      Do you need any medication refills at today's visit? No       KEV Long   Neph/Pulm [facility]      
Per patient was seen earlier by a Nephrologist at [facility], unable to access Care Everywhere. Patient signed ROBERT and was faxed to [phone] for Nephrology related records.     KEV Long   Neph/Pulm [facility]    
Increased ascites, back, abdominal pain, and chills/subjective fever

## 2023-09-26 NOTE — PROGRESS NOTES
I was asked to see this patient by Dr. Zoey Louis.    CC: CKD3a    HPI:   Thank you for the referral. I had the pleasure today of seeing Marcelina Estevez. She is a 63 year old female  who presents for evaluation of CKD.  She is here by herself.  She has seen nephrology but she wanted to get this appointment as a second opinion.  She spends her rangel in [place].    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  She has a history of Raynaud's phenomenon x 20+ years.  She also has pain in her hands as well as some swelling.  Initially had an autoimmune work-up which revealed MINDY = 1:1280 (centromere pattern).    She is currently maintained on hydroxy chloroquine.  She might also have some sort of inflammatory arthritis in her hands.  She has seen rheumatology at the  previously.  Looks like she was intolerant to CellCept and Imuran in the past.  She never had any scleroderma renal crisis.  She follows with ophthalmology regularly.    She has a longstanding history of hypertension for more than 15 years. she is currently maintained on nifedipine 30 mg, losartan 50 mg and hydrochlorothiazide 25 mg daily.  Her blood pressure at home is rather well controlled.    Her other medical history includes seasonal allergies.  At one point she saw a cardiologist but she was reassured.  She had an angiogram which was negative.  She has a history of ischemic colitis while she was in [place]; the etiology of which is unclear.  She has been doing colonoscopy every 6 months for high risk colon cancer surveillance, secondary to a personal history of serrated polyposis syndrome and history of advanced adenoma in the transverse colon.    She is here today for chronic kidney disease.  She saw a nephrologist locally who started her on allopurinol 100 mg daily for a uric acid of 6.8 mg/dL.  Her rosuvastatin was also decreased to every other day dosing.  She denies any history of  gout or kidney stones.  She does have a history of intermittent nonsteroidal anti-inflammatory drug in the past.    Overall she is doing fine.  She denies any dysphagia or heartburn.  She denies any shortness of breath or wheezing.  She does not currently have any joint pain or active swelling.  She does not have any oral or genital ulcers.  She denies any skin rash.    Social history: She is  with a son and a daughter.  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  She was conceived by IVF via a sperm donation.  She does not know her biologic father.  She does not smoke and does not drink alcohol      Allergies   Allergen Reactions    Hydroxyzine      Made her very tired    Other Environmental Allergy Other (See Comments)    Seasonal Allergies     Sulfa Antibiotics      Vomiting      Vicodin [Hydrocodone-Acetaminophen] Nausea       albuterol (PROAIR HFA) 108 (90 Base) MCG/ACT inhaler, INHALE 2 PUFS BY MOUTH EVERY 6 HOURS AS NEEDED FOR SHORTNESS OF BREATH / DYSPNEA  ALLEGRA ALLERGY 180 MG tablet, Take 1 tablet (180 mg) by mouth daily  allopurinol (ZYLOPRIM) 100 MG tablet, Take 1 tablet by mouth once a day as directed  buPROPion (WELLBUTRIN XL) 150 MG 24 hr tablet, Take 1 tablet (150 mg) by mouth every morning  diclofenac (VOLTAREN) 1 % topical gel, Apply 2 g topically 4 times daily as needed for moderate pain (4-6)  DULoxetine HCl 40 MG CPEP, Take 40 mg by mouth daily  hydrochlorothiazide (HYDRODIURIL) 25 MG tablet, TAKE 1 TABLET BY MOUTH EVERY MORNING  hydroxychloroquine (PLAQUENIL) 200 MG tablet, Take 300 mg by mouth daily Take 1 200 mg tablet and 1/2 of 200mg  isosorbide mononitrate (IMDUR) 30 MG 24 hr tablet, Take 1 Tablet by mouth once daily  LORazepam (ATIVAN) 0.5 MG tablet, Take 1 tablet (0.5 mg) by mouth daily as needed (panic attack)  losartan (COZAAR) 50 MG tablet, Take 1 tablet (50 mg) by mouth daily  NIFEdipine ER (ADALAT CC) 30 MG 24 hr tablet, Take 1 tablet (30 mg) by mouth  daily  pantoprazole (PROTONIX) 40 MG EC tablet, Take 1 Tablet by mouth twice daily  simvastatin (ZOCOR) 40 MG tablet, TAKE 1 TABLET BY MOUTH DAILY AT BEDTIME  VITAMIN D, CHOLECALCIFEROL, PO, Take 2,000 Units by mouth daily   augmented betamethasone dipropionate (DIPROLENE-AF) 0.05 % external ointment, APPLY TO PSORIASIS ON FEET DAILY UNTIL CLEAR THEN STOP.RESTART AS NEEDED FOR FLARES (Patient not taking: Reported on 9/26/2023)  order for DME, Equipment being ordered: light lamp for seasonal affective disorder (Patient not taking: Reported on 8/8/2023)    sodium chloride (PF) 0.9% PF flush 10 mL      Past Medical History:   Diagnosis Date    CREST (calcinosis, Raynaud's phenomenon, esophageal dysfunction, sclerodactyly, telangiectasia) (H)     Hyperlipidemia     Hypertension     Limited systemic sclerosis (H)     Positive MINDY (antinuclear antibody)     Raynaud disease        Past Surgical History:   Procedure Laterality Date    APPENDECTOMY      BIOPSY      cervical node    COLONOSCOPY N/A 11/10/2021    Procedure: COLONOSCOPY, FLEXIBLE, WITH LESION REMOVAL USING SNARE;  Surgeon: Domingo Wall MD;  Location: [facility]    COLONOSCOPY N/A 11/10/2021    Procedure: COLONOSCOPY, WITH POLYPECTOMY AND BIOPSY;  Surgeon: Domingo Wall MD;  Location: [facility]    COLONOSCOPY N/A 9/29/2021    Procedure: Colonoscopy, With Polypectomy And Biopsy;  Surgeon: Domingo Wall MD;  Location: [facility]    COLONOSCOPY N/A 9/29/2021    Procedure: Colonoscopy, Flexible, With Lesion Removal Using Snare;  Surgeon: Domingo Wall MD;  Location: [facility]    COLONOSCOPY N/A 6/29/2022    Procedure: COLONOSCOPY, FLEXIBLE, WITH LESION REMOVAL USING SNARE;  Surgeon: Domingo Wall MD;  [facility]    COLONOSCOPY N/A 11/9/2022    Procedure: COLONOSCOPY, WITH POLYPECTOMY AND BIOPSY;  Surgeon: Domingo Wall MD;  Location[facility]    COLONOSCOPY N/A 6/12/2023    Procedure: COLONOSCOPY,  FLEXIBLE, WITH LESION REMOVAL USING SNARE;  Surgeon: Domingo Wall MD;  [facility]    COLONOSCOPY WITH CO2 INSUFFLATION N/A 10/26/2020    Procedure: COLONOSCOPY, WITH CO2 INSUFFLATION;  Surgeon: Phyllis Chaudhari MD;  Location[facility]    COLONOSCOPY WITH CO2 INSUFFLATION N/A 11/10/2021    Procedure: COLONOSCOPY, WITH CO2 INSUFFLATION;  Surgeon: Domingo Wall MD;  [facility]    COLONOSCOPY WITH CO2 INSUFFLATION N/A 9/29/2021    Procedure: COLONOSCOPY, WITH CO2 INSUFFLATION;  Surgeon: Domingo Wall MD;  [facility]    COLONOSCOPY WITH CO2 INSUFFLATION N/A 6/29/2022    Procedure: COLONOSCOPY, WITH CO2 INSUFFLATION;  Surgeon: Domingo Wall MD;  [facility]    COLONOSCOPY WITH CO2 INSUFFLATION N/A 11/9/2022    Procedure: COLONOSCOPY, WITH CO2 INSUFFLATION;  Surgeon: Domingo Wall MD;  [facility]    COLONOSCOPY WITH CO2 INSUFFLATION N/A 6/12/2023    Procedure: Colonoscopy with CO2 insufflation;  Surgeon: Domingo Wall MD;  [facility]    COMBINED ESOPHAGOSCOPY, GASTROSCOPY, DUODENOSCOPY (EGD) WITH CO2 INSUFFLATION N/A 6/29/2022    Procedure: ESOPHAGOGASTRODUODENOSCOPY, WITH CO2 INSUFFLATION;  Surgeon: Domingo Wall MD;  [facility]    DILATION AND CURETTAGE, HYSTEROSCOPY DIAGNOSTIC, COMBINED  7/1/2014    Procedure: COMBINED DILATION AND CURETTAGE, HYSTEROSCOPY DIAGNOSTIC;  Surgeon: Mana Cabrera DO;  Location: [facility]    ENT SURGERY      tonsillectomy and adnoid removal    ESOPHAGOSCOPY, GASTROSCOPY, DUODENOSCOPY (EGD), COMBINED N/A 6/29/2022    Procedure: ESOPHAGOGASTRODUODENOSCOPY, WITH BIOPSY;  Surgeon: Domingo Wall MD;  Location: [facility]    HYSTERECTOMY TOTAL ABDOMINAL      ORTHOPEDIC SURGERY      left knee tear meniscus    RELEASE CARPAL TUNNEL BILATERAL  2009       Social History     Tobacco Use    Smoking status: Former     Types: Cigarettes     Quit date: 1/1/2001      Years since quittin.7    Smokeless tobacco: Never   Vaping Use    Vaping Use: Never used   Substance Use Topics    Alcohol use: Yes     Comment: 5-8 drinks/week    Drug use: No       Family History   Problem Relation Age of Onset    Osteoporosis Mother     Eye Disorder Mother         cataract, mac degen    Macular Degeneration Mother     Dementia Mother     Hypertension Maternal Grandmother     Diabetes Maternal Grandfather     Eye Disorder Paternal Grandmother         glaucoma    Unknown/Adopted Paternal Grandfather     Hypertension Daughter     Graves' disease Daughter     Diabetes Other     Glaucoma No family hx of        ROS: A 12 system review of systems was negative other than noted here or above.     Exam:  /83 (BP Location: Left arm, Patient Position: Chair, Cuff Size: Adult Regular)   Pulse 77   Wt 67.3 kg (148 lb 6.4 oz)   LMP 2014   SpO2 100%   BMI 28.98 kg/m    GENERAL APPEARANCE: alert and no distress  EYES: PERRL  HENT: mouth without ulcers or lesions  NECK: supple, no adenopathy  RESP: lungs clear to auscultation - no rales, rhonchi or wheezes  CV: regular rhythm, normal rate, no rub   ABDOMEN:  soft, nontender, no HSM or masses and bowel sounds normal  MS: Some mild nontender joint swelling in her metacarpal joints, of both hands no skin rash  NEURO: Normal strength and tone, sensory exam grossly normal, mentation intact and speech normal  PSYCH: mentation appears normal. and affect normal/bright  No Le edema    Results: Reviewed in details with the patient    Assessment/Plan:   Problem #1 Limited scleroderma and crest syndrome: Her symptoms seem to be well controlled at this point. Follows with rheumatology locally.    Problem #2 proteinuric CKD: Mild, stage IIIa.  This could be secondary to vascular changes from longstanding hypertension.   - We had a long discussion today about the strategies to halt  the progression of chronic kidney disease including maintaining blood  pressure control, ideal body weight and a low-salt diet.  - She is on RAAS blockade with losartan 50 mg  - SGLT2 inhibitor can be discussed at a later stage    Problem #3 essential hypertension: Her blood pressure is currently at goal.  She is on nifedipine 30 mg daily, hydrochlorothiazide 25 mg daily and losartan 50 mg daily.  -I am stopping her hydrochlorothiazide given her hyponatremia.   -If BP goes up, then we can increase lisinopril.    Problem #4 hyponatremia: It seems that hyponatremia dates back to 3 years ago.  It might have coincided to when she was started on  hydrochlorothiazide.  - We will stop the hydrochlorothiazide and recheck BMP and 1 month    Problem #5 anemia: We will check the iron studies.  Her hemoglobin is currently at goal.  No need for DEEPA at this point.    Problem #6 hyperuricemia: She denies any history of gout.  She was started on allopurinol by a local nephrologist.  I do not think it is indicated in the absence of gout.  She is also on hydrochlorothiazide which can also increase uric acid.    Problem #7 possible inflammatory arthritis: We will check rheumatoid factors and anti-CCP    The total time of this encounter amounted to 65 minutes on the day of the encounter 9/26/2023. This time included face to face time spent with the patient, preparatory work and chart review, ordering tests, and performing post visit documentation.    *This dictation was prepared in part using Dragon recognition software.  As a result errors may occur. When identified these transcription errors have been corrected.  While every attempt is made to correct errors during dictation, errors may still exist.     Becky Brooke MD   [facility]   Department of Medicine   Division of Renal Disease and Hypertension

## 2023-09-27 ENCOUNTER — TELEPHONE (OUTPATIENT)
Dept: FAMILY MEDICINE | Facility: CLINIC | Age: 63
End: 2023-09-27
Payer: COMMERCIAL

## 2023-09-27 LAB — CCP AB SER IA-ACNC: 0.5 U/ML

## 2023-09-27 NOTE — TELEPHONE ENCOUNTER
Patient would like to have an appointment for some injections in her knees prior to an upcoming vacation - she is leaving on 10-.    Patient is wondering if you can work her in?    Sarahi Cuello XRO/

## 2023-09-27 NOTE — TELEPHONE ENCOUNTER
Patient was notified he is very booked out and she is already scheduled with sports med so she will just keep that appt.  Miryam Pearl MA 9/27/2023

## 2023-09-28 ENCOUNTER — TELEPHONE (OUTPATIENT)
Dept: NEPHROLOGY | Facility: CLINIC | Age: 63
End: 2023-09-28
Payer: COMMERCIAL

## 2023-09-28 NOTE — TELEPHONE ENCOUNTER
Received medical records from [facility], medical records sent to scanning to be scanned into patient's chart.     KEV Long   Neph/Pulm [facility]

## 2023-10-03 NOTE — PROGRESS NOTES
Marcelina Estevez  :  1960  DOS: 10/5/2023  MRN: [id number]  PCP: [facility]    Sports Medicine Clinic Visit      HPI  Marcelina Estevez is a 63 year old female who is seen as a self referral presenting with bilateral knee pain.    - Mechanism of Injury:  No inciting injury.   - Prior evaluation:  Dr. Louis on 2023, Left knee steroid injection done with this office visit provided some relief, Right knee, 2023 injection done with Dr. Louis.    - Pain Character:  Pain has been present for  2+ years .  Pain is well localized to the medial knee without significant radiation.   - Endorses:  Weakness occasionally which may be antalgic, pain as described above, mild swelling.  - Denies:  clicking, popping, grinding, mechanical locking symptoms, instability, numbness, tingling, radicular shooting pain.   - Alleviating factors: Rest, activity modifications  - Aggravating factors:  stairs, prolonged walking, prolonged standing, uneven ground  - Treatments tried:   injections, topicals or creams , home exercises from PT from previous surgery in the left knee    - Patient Goals:  be able to manage the symptoms successfully, injections today  - Social History: Retired but works at a [employer]    - Pertinent PMH:  Left knee meniscectomy surgery 10+ years ago.  Leaves for a trip, cruise around [place], on 10/11/2023 and is interested in injections before her trip.      Review of Systems  Musculoskeletal: as above  Remainder of review of systems is negative including constitutional, CV, pulmonary, GI, Skin and Neurologic except as noted in HPI or medical history.    Past Medical History:   Diagnosis Date    CREST (calcinosis, Raynaud's phenomenon, esophageal dysfunction, sclerodactyly, telangiectasia) (H)     Hyperlipidemia     Hypertension     Limited systemic sclerosis (H)     Positive MINDY (antinuclear antibody)     Raynaud disease      Past Surgical History:   Procedure Laterality Date    APPENDECTOMY       BIOPSY      cervical node    COLONOSCOPY N/A 11/10/2021    Procedure: COLONOSCOPY, FLEXIBLE, WITH LESION REMOVAL USING SNARE;  Surgeon: Domingo Wall MD;  Location: [facility]    COLONOSCOPY N/A 11/10/2021    Procedure: COLONOSCOPY, WITH POLYPECTOMY AND BIOPSY;  Surgeon: Domingo Wall MD;  Location: [facility]    COLONOSCOPY N/A 9/29/2021    Procedure: Colonoscopy, With Polypectomy And Biopsy;  Surgeon: Domingo Wall MD;  Location: [facility]    COLONOSCOPY N/A 9/29/2021    Procedure: Colonoscopy, Flexible, With Lesion Removal Using Snare;  Surgeon: Domingo Wall MD;  Location: [facility]    COLONOSCOPY N/A 6/29/2022    Procedure: COLONOSCOPY, FLEXIBLE, WITH LESION REMOVAL USING SNARE;  Surgeon: Domingo Wall MD;  [facility]    COLONOSCOPY N/A 11/9/2022    Procedure: COLONOSCOPY, WITH POLYPECTOMY AND BIOPSY;  Surgeon: Domingo Wall MD;  [facility]    COLONOSCOPY N/A 6/12/2023    Procedure: COLONOSCOPY, FLEXIBLE, WITH LESION REMOVAL USING SNARE;  Surgeon: Domingo Wall MD;  [facility]    COLONOSCOPY WITH CO2 INSUFFLATION N/A 10/26/2020    Procedure: COLONOSCOPY, WITH CO2 INSUFFLATION;  Surgeon: Phyllis Chaudhari MD;  [facility]    COLONOSCOPY WITH CO2 INSUFFLATION N/A 11/10/2021    Procedure: COLONOSCOPY, WITH CO2 INSUFFLATION;  Surgeon: Domingo Wall MD;  [facility]    COLONOSCOPY WITH CO2 INSUFFLATION N/A 9/29/2021    Procedure: COLONOSCOPY, WITH CO2 INSUFFLATION;  Surgeon: Domingo Wall MD;  [facility]    COLONOSCOPY WITH CO2 INSUFFLATION N/A 6/29/2022    Procedure: COLONOSCOPY, WITH CO2 INSUFFLATION;  Surgeon: Domingo Wall MD;  [facility]    COLONOSCOPY WITH CO2 INSUFFLATION N/A 11/9/2022    Procedure: COLONOSCOPY, WITH CO2 INSUFFLATION;  Surgeon: Domingo Wall MD;  [facility]    COLONOSCOPY WITH CO2 INSUFFLATION N/A 6/12/2023     Procedure: Colonoscopy with CO2 insufflation;  Surgeon: Domingo Wall MD;  [facility]    COMBINED ESOPHAGOSCOPY, GASTROSCOPY, DUODENOSCOPY (EGD) WITH CO2 INSUFFLATION N/A 6/29/2022    Procedure: ESOPHAGOGASTRODUODENOSCOPY, WITH CO2 INSUFFLATION;  Surgeon: Domingo Wall MD;  [facility]    DILATION AND CURETTAGE, HYSTEROSCOPY DIAGNOSTIC, COMBINED  7/1/2014    Procedure: COMBINED DILATION AND CURETTAGE, HYSTEROSCOPY DIAGNOSTIC;  Surgeon: Mana Cabrera DO;  Location: [facility]    ENT SURGERY      tonsillectomy and adnoid removal    ESOPHAGOSCOPY, GASTROSCOPY, DUODENOSCOPY (EGD), COMBINED N/A 6/29/2022    Procedure: ESOPHAGOGASTRODUODENOSCOPY, WITH BIOPSY;  Surgeon: Domingo Wall MD;  Location[facility]    HYSTERECTOMY TOTAL ABDOMINAL      ORTHOPEDIC SURGERY      left knee tear meniscus    RELEASE CARPAL TUNNEL BILATERAL  2009     Family History   Problem Relation Age of Onset    Osteoporosis Mother     Eye Disorder Mother         cataract, mac degen    Macular Degeneration Mother     Dementia Mother     Hypertension Maternal Grandmother     Diabetes Maternal Grandfather     Eye Disorder Paternal Grandmother         glaucoma    Unknown/Adopted Paternal Grandfather     Hypertension Daughter     Graves' disease Daughter     Diabetes Other     Glaucoma No family hx of          Objective  Ht 1.524 m (5')   Wt 67.1 kg (148 lb)   LMP 06/12/2014   BMI 28.90 kg/m      General: healthy, alert and in no acute distress.    HEENT: no scleral icterus or conjunctival erythema.   Skin: no suspicious lesions or rash. No jaundice.   CV: regular rhythm by palpation, 2+ distal pulses.  Resp: normal respiratory effort without conversational dyspnea.   Psych: normal mood and affect.    Gait: nonantalgic, appropriate coordination and balance.     Neuro:        - Sensation to light touch:    - Intact throughout the BLE including all peripheral nerve distributions.        - MSR:      RLE   LLE  - Patella 2+ 2+  - Achilles 2+ 2+       - Special tests:   - Slump/SLR:  Neg b/l    MSK - Knee:       - Inspection:    - No significant effusion, erythema, warmth, ecchymosis, lesion.        - ROM:    - Full AROM/PROM with pain during knee flexion/extension.        - Palpation:    - TTP at the medial joint line.  - NTTP elsewhere.        - Strength:  (*antalgic)  RLE LLE  - Hip Flexion  5 5    - Hip Abduction 5 5   - Hip Adduction 5 5  - Knee Flexion  5 5  - Knee Extension 5 5  - Dorsiflexion  5 5  - Plantarflexion 5 5  - Ext. Jeffrey. Longus 5 5  - Inversion  5 5  - Eversion  5 5       - Special tests:        - Lachman:  Neg         - A/P drawer:  Neg        - Pivot shift:  Neg    - Donna:  Neg      - Varus stress:  Neg for laxity or pain     - Valgus stress:  Neg for laxity or pain    - Patellar grind: Positive      Radiology  I independently reviewed today's new relevant imaging, with the following interpretation:  - XR B/L knees 10/5/2023 shows superior patellar enthesopathy bilaterally, small joint effusions bilaterally, mild to moderate medial compartment degenerative changes.      Procedure  Large Joint Injection/Arthocentesis: bilateral knee    Date/Time: 10/5/2023 12:41 PM    Performed by: Ancelmo Da Silva DO  Authorized by: Ancelmo Da Silva DO    Indications:  Osteoarthritis  Needle Size:  22 G  Guidance: landmark guided    Approach:  Anterolateral  Location:  Knee  Laterality:  Bilateral      Medications (Right):  2 mL lidocaine 1 %; 2 mL BUPivacaine 0.5 %; 40 mg triamcinolone 40 MG/ML  Medications (Left):  2 mL lidocaine 1 %; 2 mL BUPivacaine 0.5 %; 40 mg triamcinolone 40 MG/ML  Outcome:  Tolerated well, no immediate complications  Procedure discussed: discussed risks, benefits, and alternatives    Consent Given by:  Patient  Timeout: timeout called immediately prior to procedure    Prep: patient was prepped and draped in usual sterile fashion         PROCEDURE  Intraarticular Knee Joint Injection -  Bilateral  The patient was informed of the risks and benefits of the procedure and alternatives were discussed. A written consent was signed by the patient.   The injection site was prepped with chlorhexidine in sterile fashion.   An injectate solution containing 2 mL of 1% lidocaine, 2 mL of 0.5% bupivacaine, and 1 mL of Kenalog (40 mg/mL) was drawn up into a 5 mL syringe.  Injection was performed using sterile technique.  A 1.5-inch 22-gauge needle was used to enter the knee joint using a lateral infrapatellar approach and injectate was injected successfully. After the injection, the site was cleaned and a bandage applied. The procedure was duplicated on the contralateral side using an identical protocol. The patient tolerated the procedure well without complications.         Assessment  1. Primary osteoarthritis of both knees        Plan  Marcelina Estevez is a 63 year old female that presents with chronic bilateral knee pain secondary to primary osteoarthritis.  Significant history of arthroscopic medial meniscectomy on the left 10+ years ago.  She has managed her symptoms well conservatively with medications and corticosteroid injections usually through her PCP.  Presents today for access to care and hopeful for repeat injections before an important vacation to [place] coming up next week. History and physical exam today continue to appear most consistent with bilateral primary osteoarthritis of the knees.     Discussed the nature of the condition and treatment options and mutually agreed upon the following plan:    - Imaging:          - Reviewed relevant imaging in the chart.  -XR B/L knees ordered today pre-clinic and independently interpreted by myself.    - Reviewed results and images with patient.   - Medications:          - Discussed pharmacologic options for pain relief.   - May use Acetaminophen (Tylenol) as needed for pain control.  Tends to avoid oral NSAIDs, which is reasonable.  - May also use topical  medications such as lidocaine, IcyHot, BioFreeze, or Voltaren gel as needed for pain control.  May use Voltaren gel up to 4 times per day as needed over the bilateral knees.  - Also discussed supplements for joint health and pain control and recommended consideration of glucosamine-chondroitin and turmeric as acceptable supplements that have been proven to help joint health and pain.  - Injections:          - Discussed possible injection options and alternatives.    - Options include intra-articular knee joint injections with corticosteroid, viscosupplementation, or PRP.  We discussed that it is a little early to get an injection in the left knee since she just had 1 on 8/8/2023, however her upcoming vacation is very important to her and steroid injections have been very helpful to get her through, so it is medically reasonable to repeat an injection early for her on the side.  Discussed risks/benefits and she would like the injection.  Right knee has been 3+ months since injection and is medically reasonable to perform today.  - Performed a corticosteroid injection of the bilateral intra-articular knee joints today in clinic. Patient tolerated the procedure well without complications.    - Post-procedure instructions:    - Keep the injection site clean and dry.   - Do not submerge the injection site for 24 hours (no baths, pools). Showers are ok.   - Rest the area for 24-48 hours before resuming normal activities. Avoid overexerting the area for the first few weeks.   - It may take 2-3 days to start noticing the effects of the injection and up to 3-4 weeks to feel significant benefits.   - Therapy:          - Discussed the benefits of physical therapy vs home exercise program for optimization of range of motion, flexibility, strength, stability and function.   - Preference is for a home exercise program.   - Home Exercise Program given today in clinic and recommendation given to perform HEP daily and after  exacerbations.  - Modalities:          - May use ice, heat, massage or other modalities as needed.   - Bracing:          - Discussed bracing options and may consider using a knee stability brace for stability and symptom control when ambulating and prolonged walking.  May let us know if she is interested in obtaining a brace.  - Activity:          - Encouraged to remain active and participate in regular activities as symptoms allow.    - Follow up:          - In 3 months as needed for re-evaluation and update to treatment plan, or sooner for new/worsening symptoms.  - Patient has clinic contact information for questions or concerns.       Ancelmo Da Silva DO, PAULETTE  [facility]  [facility] - Department of Orthopedic Surgery       Disclaimer:  This note was prepared and written using Dragon Medical dictation software. As a result, there may be errors in the script that have gone undetected. Please consider this when interpreting the information in this note.

## 2023-10-05 ENCOUNTER — OFFICE VISIT (OUTPATIENT)
Dept: ORTHOPEDICS | Facility: CLINIC | Age: 63
End: 2023-10-05
Payer: COMMERCIAL

## 2023-10-05 ENCOUNTER — ANCILLARY ORDERS (OUTPATIENT)
Dept: ORTHOPEDICS | Facility: CLINIC | Age: 63
End: 2023-10-05

## 2023-10-05 ENCOUNTER — ANCILLARY PROCEDURE (OUTPATIENT)
Dept: GENERAL RADIOLOGY | Facility: CLINIC | Age: 63
End: 2023-10-05
Attending: STUDENT IN AN ORGANIZED HEALTH CARE EDUCATION/TRAINING PROGRAM
Payer: COMMERCIAL

## 2023-10-05 VITALS — HEIGHT: 60 IN | WEIGHT: 148 LBS | BODY MASS INDEX: 29.06 KG/M2

## 2023-10-05 DIAGNOSIS — M25.562 CHRONIC PAIN OF BOTH KNEES: ICD-10-CM

## 2023-10-05 DIAGNOSIS — G89.29 CHRONIC PAIN OF BOTH KNEES: ICD-10-CM

## 2023-10-05 DIAGNOSIS — M25.561 CHRONIC PAIN OF BOTH KNEES: ICD-10-CM

## 2023-10-05 DIAGNOSIS — M17.0 PRIMARY OSTEOARTHRITIS OF BOTH KNEES: Primary | ICD-10-CM

## 2023-10-05 DIAGNOSIS — M25.562 CHRONIC PAIN OF BOTH KNEES: Primary | ICD-10-CM

## 2023-10-05 DIAGNOSIS — M25.561 CHRONIC PAIN OF BOTH KNEES: Primary | ICD-10-CM

## 2023-10-05 DIAGNOSIS — G89.29 CHRONIC PAIN OF BOTH KNEES: Primary | ICD-10-CM

## 2023-10-05 PROCEDURE — 99204 OFFICE O/P NEW MOD 45 MIN: CPT | Mod: 25 | Performed by: STUDENT IN AN ORGANIZED HEALTH CARE EDUCATION/TRAINING PROGRAM

## 2023-10-05 PROCEDURE — 20610 DRAIN/INJ JOINT/BURSA W/O US: CPT | Mod: 50 | Performed by: STUDENT IN AN ORGANIZED HEALTH CARE EDUCATION/TRAINING PROGRAM

## 2023-10-05 PROCEDURE — 73564 X-RAY EXAM KNEE 4 OR MORE: CPT | Mod: TC | Performed by: RADIOLOGY

## 2023-10-05 RX ADMIN — BUPIVACAINE HYDROCHLORIDE 2 ML: 5 INJECTION, SOLUTION PERINEURAL at 12:41

## 2023-10-05 RX ADMIN — LIDOCAINE HYDROCHLORIDE 2 ML: 10 INJECTION, SOLUTION INFILTRATION; PERINEURAL at 12:41

## 2023-10-05 RX ADMIN — TRIAMCINOLONE ACETONIDE 40 MG: 40 INJECTION, SUSPENSION INTRA-ARTICULAR; INTRAMUSCULAR at 12:41

## 2023-10-05 NOTE — LETTER
10/5/2023         RE: Marcelina Estevez  [address] [place] [address]        Dear Colleague,    Thank you for referring your patient, Marcelina Estevez, to the [facility] [place]. Please see a copy of my visit note below.    Marcelina Estevez  :  1960  DOS: 10/5/2023  MRN: [id number]  PCP: [facility]    Sports Medicine Clinic Visit      HPI  Marcelina Estevez is a 63 year old female who is seen as a self referral presenting with bilateral knee pain.    - Mechanism of Injury:  No inciting injury.   - Prior evaluation:  Dr. Louis on 2023, Left knee steroid injection done with this office visit provided some relief, Right knee, 2023 injection done with Dr. Louis.    - Pain Character:  Pain has been present for  2+ years .  Pain is well localized to the medial knee without significant radiation.   - Endorses:  Weakness occasionally which may be antalgic, pain as described above, mild swelling.  - Denies:  clicking, popping, grinding, mechanical locking symptoms, instability, numbness, tingling, radicular shooting pain.   - Alleviating factors: Rest, activity modifications  - Aggravating factors:  stairs, prolonged walking, prolonged standing, uneven ground  - Treatments tried:   injections, topicals or creams , home exercises from PT from previous surgery in the left knee    - Patient Goals:  be able to manage the symptoms successfully, injections today  - Social History: Retired but works at a [employer]    - Pertinent PMH:  Left knee meniscectomy surgery 10+ years ago.  Leaves for a trip, [place] around [place] on 10/11/2023 and is interested in injections before her trip.      Review of Systems  Musculoskeletal: as above  Remainder of review of systems is negative including constitutional, CV, pulmonary, GI, Skin and Neurologic except as noted in HPI or medical history.    Past Medical History:   Diagnosis Date     CREST (calcinosis, Raynaud's phenomenon,  esophageal dysfunction, sclerodactyly, telangiectasia) (H)      Hyperlipidemia      Hypertension      Limited systemic sclerosis (H)      Positive MINDY (antinuclear antibody)      Raynaud disease      Past Surgical History:   Procedure Laterality Date     APPENDECTOMY       BIOPSY      cervical node     COLONOSCOPY N/A 11/10/2021    Procedure: COLONOSCOPY, FLEXIBLE, WITH LESION REMOVAL USING SNARE;  Surgeon: Domingo Wall MD;  Location: [facility]     COLONOSCOPY N/A 11/10/2021    Procedure: COLONOSCOPY, WITH POLYPECTOMY AND BIOPSY;  Surgeon: Domingo Wall MD;  [facility]     COLONOSCOPY N/A 9/29/2021    Procedure: Colonoscopy, With Polypectomy And Biopsy;  Surgeon: Domingo Wall MD;  [facility]     COLONOSCOPY N/A 9/29/2021    Procedure: Colonoscopy, Flexible, With Lesion Removal Using Snare;  Surgeon: Domingo Wall MD;  [facility]     COLONOSCOPY N/A 6/29/2022    Procedure: COLONOSCOPY, FLEXIBLE, WITH LESION REMOVAL USING SNARE;  Surgeon: Domingo Wall MD;  [facility]     COLONOSCOPY N/A 11/9/2022    Procedure: COLONOSCOPY, WITH POLYPECTOMY AND BIOPSY;  Surgeon: Domingo Wall MD;  [facility]     COLONOSCOPY N/A 6/12/2023    Procedure: COLONOSCOPY, FLEXIBLE, WITH LESION REMOVAL USING SNARE;  Surgeon: Domingo Wall MD;  [facility]     COLONOSCOPY WITH CO2 INSUFFLATION N/A 10/26/2020    Procedure: COLONOSCOPY, WITH CO2 INSUFFLATION;  Surgeon: Phyllis Chaudhari MD;  [facility]     COLONOSCOPY WITH CO2 INSUFFLATION N/A 11/10/2021    Procedure: COLONOSCOPY, WITH CO2 INSUFFLATION;  Surgeon: Domingo Wall MD;  [facility]     COLONOSCOPY WITH CO2 INSUFFLATION N/A 9/29/2021    Procedure: COLONOSCOPY, WITH CO2 INSUFFLATION;  Surgeon: Domingo Wall MD;  [facility]     COLONOSCOPY WITH CO2 INSUFFLATION N/A 6/29/2022    Procedure: COLONOSCOPY, WITH CO2  INSUFFLATION;  Surgeon: Domingo Wall MD;  [facility]     COLONOSCOPY WITH CO2 INSUFFLATION N/A 11/9/2022    Procedure: COLONOSCOPY, WITH CO2 INSUFFLATION;  Surgeon: Domingo Wall MD;  [facility]     COLONOSCOPY WITH CO2 INSUFFLATION N/A 6/12/2023    Procedure: Colonoscopy with CO2 insufflation;  Surgeon: Domingo Wall MD;  [facility]     COMBINED ESOPHAGOSCOPY, GASTROSCOPY, DUODENOSCOPY (EGD) WITH CO2 INSUFFLATION N/A 6/29/2022    Procedure: ESOPHAGOGASTRODUODENOSCOPY, WITH CO2 INSUFFLATION;  Surgeon: Domingo Wall MD;  [facility]     DILATION AND CURETTAGE, HYSTEROSCOPY DIAGNOSTIC, COMBINED  7/1/2014    Procedure: COMBINED DILATION AND CURETTAGE, HYSTEROSCOPY DIAGNOSTIC;  Surgeon: Mana Cabrera DO;  [facility]: [facility]     ENT SURGERY      tonsillectomy and adnoid removal     ESOPHAGOSCOPY, GASTROSCOPY, DUODENOSCOPY (EGD), COMBINED N/A 6/29/2022    Procedure: ESOPHAGOGASTRODUODENOSCOPY, WITH BIOPSY;  Surgeon: Domingo Wall MD;  [facility]     HYSTERECTOMY TOTAL ABDOMINAL       ORTHOPEDIC SURGERY      left knee tear meniscus     RELEASE CARPAL TUNNEL BILATERAL  2009     Family History   Problem Relation Age of Onset     Osteoporosis Mother      Eye Disorder Mother         cataract, mac degen     Macular Degeneration Mother      Dementia Mother      Hypertension Maternal Grandmother      Diabetes Maternal Grandfather      Eye Disorder Paternal Grandmother         glaucoma     Unknown/Adopted Paternal Grandfather      Hypertension Daughter      Graves' disease Daughter      Diabetes Other      Glaucoma No family hx of          Objective  Ht 1.524 m (5')   Wt 67.1 kg (148 lb)   LMP 06/12/2014   BMI 28.90 kg/m      General: healthy, alert and in no acute distress.    HEENT: no scleral icterus or conjunctival erythema.   Skin: no suspicious lesions or rash. No jaundice.   CV: regular rhythm by palpation, 2+ distal  pulses.  Resp: normal respiratory effort without conversational dyspnea.   Psych: normal mood and affect.    Gait: nonantalgic, appropriate coordination and balance.     Neuro:        - Sensation to light touch:    - Intact throughout the BLE including all peripheral nerve distributions.        - MSR:      RLE  LLE  - Patella 2+ 2+  - Achilles 2+ 2+       - Special tests:   - Slump/SLR:  Neg b/l    MSK - Knee:       - Inspection:    - No significant effusion, erythema, warmth, ecchymosis, lesion.        - ROM:    - Full AROM/PROM with pain during knee flexion/extension.        - Palpation:    - TTP at the medial joint line.  - NTTP elsewhere.        - Strength:  (*antalgic)  RLE LLE  - Hip Flexion  5 5    - Hip Abduction 5 5   - Hip Adduction 5 5  - Knee Flexion  5 5  - Knee Extension 5 5  - Dorsiflexion  5 5  - Plantarflexion 5 5  - Ext. Jeffrey. Longus 5 5  - Inversion  5 5  - Eversion  5 5       - Special tests:        - Lachman:  Neg         - A/P drawer:  Neg        - Pivot shift:  Neg    - Donna:  Neg      - Varus stress:  Neg for laxity or pain     - Valgus stress:  Neg for laxity or pain    - Patellar grind: Positive      Radiology  I independently reviewed today's new relevant imaging, with the following interpretation:  - XR B/L knees 10/5/2023 shows superior patellar enthesopathy bilaterally, small joint effusions bilaterally, mild to moderate medial compartment degenerative changes.      Procedure  Large Joint Injection/Arthocentesis: bilateral knee    Date/Time: 10/5/2023 12:41 PM    Performed by: Ancelmo Da Silva DO  Authorized by: Ancelmo Da Silva DO    Indications:  Osteoarthritis  Needle Size:  22 G  Guidance: landmark guided    Approach:  Anterolateral  Location:  Knee  Laterality:  Bilateral      Medications (Right):  2 mL lidocaine 1 %; 2 mL BUPivacaine 0.5 %; 40 mg triamcinolone 40 MG/ML  Medications (Left):  2 mL lidocaine 1 %; 2 mL BUPivacaine 0.5 %; 40 mg triamcinolone 40 MG/ML  Outcome:   Tolerated well, no immediate complications  Procedure discussed: discussed risks, benefits, and alternatives    Consent Given by:  Patient  Timeout: timeout called immediately prior to procedure    Prep: patient was prepped and draped in usual sterile fashion         PROCEDURE  Intraarticular Knee Joint Injection - Bilateral  The patient was informed of the risks and benefits of the procedure and alternatives were discussed. A written consent was signed by the patient.   The injection site was prepped with chlorhexidine in sterile fashion.   An injectate solution containing 2 mL of 1% lidocaine, 2 mL of 0.5% bupivacaine, and 1 mL of Kenalog (40 mg/mL) was drawn up into a 5 mL syringe.  Injection was performed using sterile technique.  A 1.5-inch 22-gauge needle was used to enter the knee joint using a lateral infrapatellar approach and injectate was injected successfully. After the injection, the site was cleaned and a bandage applied. The procedure was duplicated on the contralateral side using an identical protocol. The patient tolerated the procedure well without complications.         Assessment  1. Primary osteoarthritis of both knees        Plan  Marcelina Estevez is a 63 year old female that presents with chronic bilateral knee pain secondary to primary osteoarthritis.  Significant history of arthroscopic medial meniscectomy on the left 10+ years ago.  She has managed her symptoms well conservatively with medications and corticosteroid injections usually through her PCP.  Presents today for access to care and hopeful for repeat injections before an important vacation to [place] coming up next week. History and physical exam today continue to appear most consistent with bilateral primary osteoarthritis of the knees.     Discussed the nature of the condition and treatment options and mutually agreed upon the following plan:    - Imaging:          - Reviewed relevant imaging in the chart.  -XR B/L knees ordered  today pre-clinic and independently interpreted by myself.    - Reviewed results and images with patient.   - Medications:          - Discussed pharmacologic options for pain relief.   - May use Acetaminophen (Tylenol) as needed for pain control.  Tends to avoid oral NSAIDs, which is reasonable.  - May also use topical medications such as lidocaine, IcyHot, BioFreeze, or Voltaren gel as needed for pain control.  May use Voltaren gel up to 4 times per day as needed over the bilateral knees.  - Also discussed supplements for joint health and pain control and recommended consideration of glucosamine-chondroitin and turmeric as acceptable supplements that have been proven to help joint health and pain.  - Injections:          - Discussed possible injection options and alternatives.    - Options include intra-articular knee joint injections with corticosteroid, viscosupplementation, or PRP.  We discussed that it is a little early to get an injection in the left knee since she just had 1 on 8/8/2023, however her upcoming vacation is very important to her and steroid injections have been very helpful to get her through, so it is medically reasonable to repeat an injection early for her on the side.  Discussed risks/benefits and she would like the injection.  Right knee has been 3+ months since injection and is medically reasonable to perform today.  - Performed a corticosteroid injection of the bilateral intra-articular knee joints today in clinic. Patient tolerated the procedure well without complications.    - Post-procedure instructions:    - Keep the injection site clean and dry.   - Do not submerge the injection site for 24 hours (no baths, pools). Showers are ok.   - Rest the area for 24-48 hours before resuming normal activities. Avoid overexerting the area for the first few weeks.   - It may take 2-3 days to start noticing the effects of the injection and up to 3-4 weeks to feel significant benefits.   - Therapy:           - Discussed the benefits of physical therapy vs home exercise program for optimization of range of motion, flexibility, strength, stability and function.   - Preference is for a home exercise program.   - Home Exercise Program given today in clinic and recommendation given to perform HEP daily and after exacerbations.  - Modalities:          - May use ice, heat, massage or other modalities as needed.   - Bracing:          - Discussed bracing options and may consider using a knee stability brace for stability and symptom control when ambulating and prolonged walking.  May let us know if she is interested in obtaining a brace.  - Activity:          - Encouraged to remain active and participate in regular activities as symptoms allow.    - Follow up:          - In 3 months as needed for re-evaluation and update to treatment plan, or sooner for new/worsening symptoms.  - Patient has clinic contact information for questions or concerns.       Ancelmo Da Silva DO, CAQSM  [facility] Sports Medicine  [facility] - Department of Orthopedic Surgery       Disclaimer:  This note was prepared and written using Dragon Medical dictation software. As a result, there may be errors in the script that have gone undetected. Please consider this when interpreting the information in this note.       Again, thank you for allowing me to participate in the care of your patient.        Sincerely,        Ancelmo Da Silva DO

## 2023-10-05 NOTE — PATIENT INSTRUCTIONS
Cyndie Marcelina Estevez ,     A copy of your assessment and our treatment plan that we discussed together is included below, as written in your medical chart.   If you have any questions, please feel free to call the clinic.     --------------------------------------------------  Marcelina Estevez is a 63 year old female that presents with chronic bilateral knee pain secondary to primary osteoarthritis.  Significant history of arthroscopic medial meniscectomy on the left 10+ years ago.  She has managed her symptoms well conservatively with medications and corticosteroid injections usually through her PCP.  Presents today for access to care and hopeful for repeat injections before an important vacation to [place] coming up next week. History and physical exam today continue to appear most consistent with bilateral primary osteoarthritis of the knees.     Discussed the nature of the condition and treatment options and mutually agreed upon the following plan:    - Imaging:          - Reviewed relevant imaging in the chart.  -XR B/L knees ordered today pre-clinic and independently interpreted by myself.    - Reviewed results and images with patient.   - Medications:          - Discussed pharmacologic options for pain relief.   - May use Acetaminophen (Tylenol) as needed for pain control.  Tends to avoid oral NSAIDs, which is reasonable.  - May also use topical medications such as lidocaine, IcyHot, BioFreeze, or Voltaren gel as needed for pain control.  May use Voltaren gel up to 4 times per day as needed over the bilateral knees.  - Also discussed supplements for joint health and pain control and recommended consideration of glucosamine-chondroitin and turmeric as acceptable supplements that have been proven to help joint health and pain.  - Injections:          - Discussed possible injection options and alternatives.    - Options include intra-articular knee joint injections with corticosteroid, viscosupplementation, or PRP.   We discussed that it is a little early to get an injection in the left knee since she just had 1 on 8/8/2023, however her upcoming vacation is very important to her and steroid injections have been very helpful to get her through, so it is medically reasonable to repeat an injection early for her on the side.  Discussed risks/benefits and she would like the injection.  Right knee has been 3+ months since injection and is medically reasonable to perform today.  - Performed a corticosteroid injection of the bilateral intra-articular knee joints today in clinic. Patient tolerated the procedure well without complications.    - Post-procedure instructions:    - Keep the injection site clean and dry.   - Do not submerge the injection site for 24 hours (no baths, pools). Showers are ok.   - Rest the area for 24-48 hours before resuming normal activities. Avoid overexerting the area for the first few weeks.   - It may take 2-3 days to start noticing the effects of the injection and up to 3-4 weeks to feel significant benefits.   - Therapy:          - Discussed the benefits of physical therapy vs home exercise program for optimization of range of motion, flexibility, strength, stability and function.   - Preference is for a home exercise program.   - Home Exercise Program given today in clinic and recommendation given to perform HEP daily and after exacerbations.  - Modalities:          - May use ice, heat, massage or other modalities as needed.   - Bracing:          - Discussed bracing options and may consider using a knee stability brace for stability and symptom control when ambulating and prolonged walking.  May let us know if she is interested in obtaining a brace.  - Activity:          - Encouraged to remain active and participate in regular activities as symptoms allow.    - Follow up:          - In 3 months as needed for re-evaluation and update to treatment plan, or sooner for new/worsening symptoms.  - Patient has  clinic contact information for questions or concerns.   --------------------------------------------------    It was a pleasure seeing you today. Thank you for choosing [facility] for your care.       Ancelmo Da Silva DO, CAQSM  [facility] - Sports Medicine  [facility] - Department of Orthopedic Surgery     Disclaimer:  This note was prepared and written using Dragon Medical dictation software. As a result, there may be errors in the script that have gone undetected. Please consider this when interpreting the information in this note.

## 2023-10-09 RX ORDER — LIDOCAINE HYDROCHLORIDE 10 MG/ML
2 INJECTION, SOLUTION INFILTRATION; PERINEURAL
Status: DISCONTINUED | OUTPATIENT
Start: 2023-10-05 | End: 2024-09-10

## 2023-10-09 RX ORDER — BUPIVACAINE HYDROCHLORIDE 5 MG/ML
2 INJECTION, SOLUTION PERINEURAL
Status: DISCONTINUED | OUTPATIENT
Start: 2023-10-05 | End: 2024-09-10

## 2023-10-09 RX ORDER — TRIAMCINOLONE ACETONIDE 40 MG/ML
40 INJECTION, SUSPENSION INTRA-ARTICULAR; INTRAMUSCULAR
Status: DISCONTINUED | OUTPATIENT
Start: 2023-10-05 | End: 2024-09-10

## 2023-11-09 ENCOUNTER — TRANSFERRED RECORDS (OUTPATIENT)
Dept: MULTI SPECIALTY CLINIC | Facility: CLINIC | Age: 63
End: 2023-11-09

## 2023-11-09 DIAGNOSIS — I10 HYPERTENSION GOAL BP (BLOOD PRESSURE) < 140/90: ICD-10-CM

## 2023-11-09 RX ORDER — LOSARTAN POTASSIUM 50 MG/1
50 TABLET ORAL DAILY
Qty: 90 TABLET | Refills: 1 | OUTPATIENT
Start: 2023-11-09

## 2023-11-10 ENCOUNTER — TRANSFERRED RECORDS (OUTPATIENT)
Dept: HEALTH INFORMATION MANAGEMENT | Facility: CLINIC | Age: 63
End: 2023-11-10

## 2023-11-10 ENCOUNTER — LAB (OUTPATIENT)
Dept: LAB | Facility: CLINIC | Age: 63
End: 2023-11-10
Payer: COMMERCIAL

## 2023-11-10 ENCOUNTER — OFFICE VISIT (OUTPATIENT)
Dept: OPHTHALMOLOGY | Facility: CLINIC | Age: 63
End: 2023-11-10
Payer: COMMERCIAL

## 2023-11-10 ENCOUNTER — MYC MEDICAL ADVICE (OUTPATIENT)
Dept: FAMILY MEDICINE | Facility: CLINIC | Age: 63
End: 2023-11-10

## 2023-11-10 DIAGNOSIS — N18.1 CKD (CHRONIC KIDNEY DISEASE) STAGE 1, GFR 90 ML/MIN OR GREATER: ICD-10-CM

## 2023-11-10 DIAGNOSIS — H25.13 SENILE NUCLEAR SCLEROSIS, BILATERAL: ICD-10-CM

## 2023-11-10 DIAGNOSIS — Z79.899 LONG-TERM USE OF PLAQUENIL: Primary | ICD-10-CM

## 2023-11-10 DIAGNOSIS — H52.4 PRESBYOPIA: ICD-10-CM

## 2023-11-10 LAB
ANION GAP SERPL CALCULATED.3IONS-SCNC: 15 MMOL/L (ref 7–15)
BUN SERPL-MCNC: 31.9 MG/DL (ref 8–23)
CALCIUM SERPL-MCNC: 9.8 MG/DL (ref 8.8–10.2)
CHLORIDE SERPL-SCNC: 93 MMOL/L (ref 98–107)
CREAT SERPL-MCNC: 1.16 MG/DL (ref 0.51–0.95)
DEPRECATED HCO3 PLAS-SCNC: 22 MMOL/L (ref 22–29)
EGFRCR SERPLBLD CKD-EPI 2021: 53 ML/MIN/1.73M2
GLUCOSE SERPL-MCNC: 118 MG/DL (ref 70–99)
POTASSIUM SERPL-SCNC: 4 MMOL/L (ref 3.4–5.3)
SODIUM SERPL-SCNC: 130 MMOL/L (ref 135–145)

## 2023-11-10 PROCEDURE — 36415 COLL VENOUS BLD VENIPUNCTURE: CPT

## 2023-11-10 PROCEDURE — 92015 DETERMINE REFRACTIVE STATE: CPT | Performed by: OPTOMETRIST

## 2023-11-10 PROCEDURE — 92083 EXTENDED VISUAL FIELD XM: CPT | Performed by: OPTOMETRIST

## 2023-11-10 PROCEDURE — 92134 CPTRZ OPH DX IMG PST SGM RTA: CPT | Performed by: OPTOMETRIST

## 2023-11-10 PROCEDURE — 92014 COMPRE OPH EXAM EST PT 1/>: CPT | Performed by: OPTOMETRIST

## 2023-11-10 PROCEDURE — 80048 BASIC METABOLIC PNL TOTAL CA: CPT

## 2023-11-10 PROCEDURE — 99207 FUNDUS PHOTOS OU (BOTH EYES): CPT | Performed by: OPTOMETRIST

## 2023-11-10 RX ORDER — LOSARTAN POTASSIUM 50 MG/1
50 TABLET ORAL DAILY
Qty: 30 TABLET | Refills: 0 | Status: SHIPPED | OUTPATIENT
Start: 2023-11-10 | End: 2023-11-21

## 2023-11-10 ASSESSMENT — EXTERNAL EXAM - RIGHT EYE: OD_EXAM: NORMAL

## 2023-11-10 ASSESSMENT — VISUAL ACUITY
OD_CC: 20/20
METHOD: SNELLEN - LINEAR
OS_CC+: -1
OS_CC: 20/30
CORRECTION_TYPE: GLASSES
OD_CC+: -1

## 2023-11-10 ASSESSMENT — REFRACTION_MANIFEST
OS_SPHERE: -4.75
OS_AXIS: 015
OD_CYLINDER: +3.50
OD_AXIS: 178
OD_ADD: +2.50
OS_ADD: +2.50
OS_CYLINDER: +1.50
OD_SPHERE: -4.75

## 2023-11-10 ASSESSMENT — TONOMETRY
OS_IOP_MMHG: 19
IOP_METHOD: TONOPEN
OD_IOP_MMHG: 20

## 2023-11-10 ASSESSMENT — CUP TO DISC RATIO
OS_RATIO: 0.1
OD_RATIO: 0.1

## 2023-11-10 ASSESSMENT — CONF VISUAL FIELD
OS_SUPERIOR_TEMPORAL_RESTRICTION: 0
OS_INFERIOR_NASAL_RESTRICTION: 0
OS_SUPERIOR_NASAL_RESTRICTION: 0
OD_SUPERIOR_NASAL_RESTRICTION: 0
OD_INFERIOR_TEMPORAL_RESTRICTION: 0
COMMENTS: HVF 10-2 PERFORMED TODAY
OS_INFERIOR_TEMPORAL_RESTRICTION: 0
OD_SUPERIOR_TEMPORAL_RESTRICTION: 0
OD_INFERIOR_NASAL_RESTRICTION: 0

## 2023-11-10 ASSESSMENT — REFRACTION_WEARINGRX
OD_CYLINDER: +3.50
SPECS_TYPE: PAL
OS_CYLINDER: +1.50
OD_SPHERE: -5.25
OD_AXIS: 178
OS_ADD: +2.50
OS_SPHERE: -4.00
OS_AXIS: 014
OD_ADD: +2.50

## 2023-11-10 ASSESSMENT — EXTERNAL EXAM - LEFT EYE: OS_EXAM: NORMAL

## 2023-11-10 ASSESSMENT — SLIT LAMP EXAM - LIDS
COMMENTS: NORMAL
COMMENTS: NORMAL

## 2023-11-10 NOTE — LETTER
[address] [address]  Phone: [phone]  Fax: [phone]    November 13, 2023      Marcelina Estevez                                                                                                                                [address] [facility] [address]        Dear Ms. Herb,    We are concerned about your health care.  We recently provided you with a medication refill.  Many medications require routine follow-up with your Doctor.       At this time we ask that: You schedule a routine office visit with your physician to follow your medications.  Call the clinic at [phone] Option 1 to schedule.      Your prescription:  Has been filled. Please schedule a follow up visit for first available appointment. Courtesy refills will be given if needed until your scheduled appointment.         Thank you   [facility] Care Team  [phone]

## 2023-11-10 NOTE — TELEPHONE ENCOUNTER
Patient has been notified of the note below via [employer]. Reminder set for 3 days to send letter if not read.

## 2023-11-10 NOTE — PROGRESS NOTES
Assessment/Plan  (Z79.899) High risk medication use  (primary encounter diagnosis)  Comment: No evidence of Plaquenil toxicity. Patient started Plaquenil 300mg daily in early 2018.   Plan: Discussed findings with patient as well as potential long term risks of continued medication use. Plan on monitoring annually with dilated exam for changes. Return to clinic sooner if vision changes are noted.     (H25.13) Senile nuclear sclerosis, bilateral  Comment: Not at surgical level  Plan: Monitor for now. No indication for surgery at this time.     (H52.4) Presbyopia  Plan: REFRACTION [[id number]]        Discussed findings with patient. New spectacle prescription dispensed to patient. Patient is welcome to return to clinic with prolonged adaptation difficulties.       Complete documentation of historical and exam elements from today's encounter can  be found in the full encounter summary report (not reduplicated in this progress  note). I personally obtained the chief complaint(s) and history of present illness. I  confirmed and edited as necessary the review of systems, past medical/surgical  history, family history, social history, and examination findings as documented by  others; and I examined the patient myself. I personally reviewed the relevant tests,  images, and reports as documented above. I formulated and edited as necessary the  assessment and plan and discussed the findings and management plan with the  patient and family.    Leonardo Melgar OD

## 2023-11-10 NOTE — TELEPHONE ENCOUNTER
Please let patient know that I refilled her losartan for 1 month supply.  Please follow-up for physical and general follow-up in the next couple week before the medication runs out.  May use one of my same-day or virtual or provider request slots.

## 2023-11-13 ENCOUNTER — TELEPHONE (OUTPATIENT)
Dept: FAMILY MEDICINE | Facility: CLINIC | Age: 63
End: 2023-11-13
Payer: COMMERCIAL

## 2023-11-13 NOTE — TELEPHONE ENCOUNTER
Huddled with Dr. Louis and he will fill medications for patient unless there is an acute issue in [place]. Called patient and updated her that and she is in agreement with plan and agrees to see a doctor as needed in [place].    Courtney Gutierrez,ERINN, RN

## 2023-11-13 NOTE — TELEPHONE ENCOUNTER
I need to know what medications he is needing.  She should have the pharmacist to send the request review.  Otherwise please set up the refill request the I will look into it.

## 2023-11-13 NOTE — TELEPHONE ENCOUNTER
Medication Question or Refill        What medication are you calling about (include dose and sig)?: All medications    Preferred Pharmacy:   [facility] - [place], [place] - [address], [address]  [address]  [facility] [address]  Phone: [phone] Fax: [phone]    [facility] - [place], [place] - [address]  [address] [facility] [address]  Phone: [phone] Fax: [phone]    [facility] - [place], [place] - [address]  [address]  [facility] [place] [address]  Phone: [phone] Fax: [phone]      Controlled Substance Agreement on file:   CSA -- Patient Level:    CSA: None found at the patient level.       Who prescribed the medication?: Missy    Do you need a refill? Patient will be going to [place] on Nov 27th and returning in May. She is wondering if she will be able to get her meds refilled at this time?  She does plan on seeing a doctor in [place] if needed.     Would you like her to do an appointment prior to her leaving this month?        Patient offered an appointment? No        Could we send this information to you in [facility] or would you prefer to receive a phone call?:   Patient would prefer a phone call   Okay to leave a detailed message?: No at Cell number on file:    Telephone Information:   Mobile [phone]     ERIN OrtizN, RN

## 2023-11-14 ENCOUNTER — TELEPHONE (OUTPATIENT)
Dept: FAMILY MEDICINE | Facility: CLINIC | Age: 63
End: 2023-11-14

## 2023-11-14 NOTE — TELEPHONE ENCOUNTER
Patient calling in looking for interpretation of results from 11/10/23, states she hasn't had access to MyChart for a while and not sure about results.    Writer reviewed chart, noted these were ordered by nephrologist, Dr. Brooke's office. Writer informed patient that the results are back from provider hasn't reviewed them yet. Patient aware, asking for specific values for Creat as patient states this is what they were checking - informed patient that Creat value went from 1.17 one month ago to 1.16 four days ago. Also let patient know that sdoium level is still 130. Patient verbalized understanding, patient asking writer if we can reach out to the nephrology team to help advise on what these values mean and if she needs to adjust anything - asking for general update on plan of care.    Informed patient writer can send them a message, also gave patient nephrology clinic number if she wants to reach their team (writer is located at [facility], patient's old PCPs office). Patient verbalized understanding.      Routing to Dr. Brooke to review/advise, please have nephrology staff reach out to patient with response, thank you!      Anna Goins, ERINN, RN  [facility]

## 2023-11-15 ENCOUNTER — TRANSFERRED RECORDS (OUTPATIENT)
Dept: HEALTH INFORMATION MANAGEMENT | Facility: CLINIC | Age: 63
End: 2023-11-15
Payer: COMMERCIAL

## 2023-11-16 NOTE — TELEPHONE ENCOUNTER
11/16 Called patient,  (1st attempt) left voicemail and provided [phone] for patient to call back and reschedule appointment with  from March 2024 to May 2024.     Carline jasmine Complex   Gastroenterology, Infectious Diseases, Nephrology, Pulmonology and Rheumatology Specialties  [facility]

## 2023-11-16 NOTE — TELEPHONE ENCOUNTER
Dr. Brooke commented:    Please reassure her that her creatinine is stable and her kidney function is stable.   Her sodium is low but it is stable. Not sure if she stopped her hydrochlorothiazide, if not she should. If she stopped her hydrochlorothiazide already, then she need to cut down on her fluid intake  to 1.5 liters per day.   She can come see me when she come back from [place].     Writer contacted patient and reviewed the above information. She had not stopped the hydrochlorothiazide, though coincidently did stop today. She is agreeable to make this change now. She plans to purchase a blood pressure montior. Advised her to let clinic know if SBP is consisently greater than 140 as Dr. Brooke would consider making an alternative adjustment with Losartan as needed. Marcelina verbalized understanding and will await next visit or contact clinic sooner with any BP concerns.     Patient is in [place] until May, therefore will need to reschedule her March currently scheduled visit. Advised a  would reach out to assist.

## 2023-11-21 ENCOUNTER — TELEPHONE (OUTPATIENT)
Dept: NEPHROLOGY | Facility: CLINIC | Age: 63
End: 2023-11-21
Payer: COMMERCIAL

## 2023-11-21 DIAGNOSIS — I10 HYPERTENSION GOAL BP (BLOOD PRESSURE) < 140/90: ICD-10-CM

## 2023-11-21 DIAGNOSIS — N28.89 RIGHT RENAL MASS: Primary | ICD-10-CM

## 2023-11-21 RX ORDER — LOSARTAN POTASSIUM 50 MG/1
50 TABLET ORAL DAILY
Qty: 30 TABLET | Refills: 0 | Status: SHIPPED | OUTPATIENT
Start: 2023-11-21 | End: 2023-12-11

## 2023-11-21 NOTE — TELEPHONE ENCOUNTER
[facility] Call Center    Phone Message    May a detailed message be left on voicemail: yes     Reason for Call: Other: pt had a CT chest done last Friday, where they saw a 1.5 mass on right kidney Pt will get results faxed over. Please advise pt. Thank you.      Action Taken: Message routed to:  Other: [facility]    Travel Screening: Not Applicable

## 2023-11-21 NOTE — TELEPHONE ENCOUNTER
[facility] Call Center    Phone Message    May a detailed message be left on voicemail: yes     Reason for Call: Medication Refill Request    Has the patient contacted the pharmacy for the refill? Yes   Name of medication being requested: losartan (COZAAR) 50 MG tablet [41208] (Order [id number])     Provider who prescribed the medication: olga    Pharmacy: [address]  Date medication is needed: Asap before 11/27     Action Taken: Message routed to:  Other: [facility]    Travel Screening: Not Applicable

## 2023-11-21 NOTE — TELEPHONE ENCOUNTER
Received via Fax CT chest results dated 11/15/23 [facility].  Original placed in Dr. Brooke's review folder.

## 2023-11-22 ENCOUNTER — TELEPHONE (OUTPATIENT)
Dept: NEPHROLOGY | Facility: CLINIC | Age: 63
End: 2023-11-22
Payer: COMMERCIAL

## 2023-11-22 NOTE — TELEPHONE ENCOUNTER
"Spoke with patient regarding her concern to get a CT scan completed earlier that what was available. Patient expressed anxiety of the kidney mass that was discovered by [facility]. Patient asked if Dr Brooke \"could pull some strings\" to get her CT scheduled sooner. Patient informed Dr Brooke does not manage the schedule for the imaging department. Patient stated \"That's crazy\".   Marcelina was informed if she called the imaging scheduling line that they can look at other facilities to get her in sooner than Feb 2024. She stated she would call rheumatology and see if they can \"pull some strings\".  Sheron RN Care Coordinator  Nephrology    "

## 2023-11-22 NOTE — TELEPHONE ENCOUNTER
Reviewed with Dr. Brooke. Dr. Brooke advised urology referral for renal mass found on outside provider/clinic imaging. Internal referral placed.     Left message for patient. Advised that a [email or web address] message would be sent to her with this information and she should contact clinic with any further questions.

## 2023-11-22 NOTE — TELEPHONE ENCOUNTER
[facility] Call Center    Phone Message    May a detailed message be left on voicemail: yes     Reason for Call: Other: Patient was told she needs to get a CT done, she called [facility] to book that and they are booked out until Feb. Patient does not want to wait that long, she is requesting a call back to discuss other location options.      Action Taken: Message routed to:  Other: neph    Travel Screening: Not Applicable

## 2023-11-24 ENCOUNTER — PATIENT OUTREACH (OUTPATIENT)
Dept: ONCOLOGY | Facility: CLINIC | Age: 63
End: 2023-11-24
Payer: COMMERCIAL

## 2023-11-24 DIAGNOSIS — N28.89 RENAL MASS: Primary | ICD-10-CM

## 2023-11-28 NOTE — TELEPHONE ENCOUNTER
11/28 Called patient,  (2nd attempt) left voicemail and provided [phone] for patient to call back and reschedule appointment with  from March 2024 to May 2024.      Carline jasmine Complex   Gastroenterology, Infectious Diseases, Nephrology, Pulmonology and Rheumatology Specialties  [facility]

## 2023-11-29 DIAGNOSIS — E78.5 HYPERLIPIDEMIA LDL GOAL <100: ICD-10-CM

## 2023-11-29 DIAGNOSIS — I10 HYPERTENSION GOAL BP (BLOOD PRESSURE) < 140/90: ICD-10-CM

## 2023-11-29 DIAGNOSIS — K55.9 ISCHEMIC COLITIS (H): ICD-10-CM

## 2023-11-29 RX ORDER — NIFEDIPINE 30 MG
30 TABLET, EXTENDED RELEASE ORAL DAILY
Qty: 90 TABLET | Refills: 1 | OUTPATIENT
Start: 2023-11-29

## 2023-11-29 RX ORDER — PANTOPRAZOLE SODIUM 40 MG/1
40 TABLET, DELAYED RELEASE ORAL 2 TIMES DAILY
Qty: 180 TABLET | Refills: 2 | Status: SHIPPED | OUTPATIENT
Start: 2023-11-29 | End: 2024-04-18

## 2023-11-29 RX ORDER — BUPROPION HYDROCHLORIDE 150 MG/1
150 TABLET ORAL EVERY MORNING
Qty: 90 TABLET | Refills: 2 | Status: SHIPPED | OUTPATIENT
Start: 2023-11-29 | End: 2024-06-25

## 2023-11-29 RX ORDER — ISOSORBIDE MONONITRATE 30 MG/1
30 TABLET, EXTENDED RELEASE ORAL DAILY
Qty: 90 TABLET | Refills: 2 | Status: SHIPPED | OUTPATIENT
Start: 2023-11-29 | End: 2024-02-28

## 2023-11-30 ENCOUNTER — PRE VISIT (OUTPATIENT)
Dept: ONCOLOGY | Facility: CLINIC | Age: 63
End: 2023-11-30
Payer: COMMERCIAL

## 2023-11-30 RX ORDER — SIMVASTATIN 40 MG
TABLET ORAL
Qty: 90 TABLET | Refills: 0 | Status: SHIPPED | OUTPATIENT
Start: 2023-11-30 | End: 2024-04-16

## 2023-12-02 ENCOUNTER — HEALTH MAINTENANCE LETTER (OUTPATIENT)
Age: 63
End: 2023-12-02

## 2023-12-04 ENCOUNTER — TELEPHONE (OUTPATIENT)
Dept: FAMILY MEDICINE | Facility: CLINIC | Age: 63
End: 2023-12-04
Payer: COMMERCIAL

## 2023-12-04 NOTE — TELEPHONE ENCOUNTER
----- Message from Zoey Clements Mai, MD sent at 12/1/2023  5:44 PM CST -----  Please let patient know that I reviewed the CT scan with offered that she got from the [facility] system -I recommend her to follow-up to ask her earliest convenience to discuss blood results and further evaluation.  There was new finding that need to be further evaluated.  Perhaps have her follow-up for physical exam and general follow-up.  Thank you

## 2023-12-04 NOTE — LETTER
December 5, 2023      Marcelina Estevez  [address]   [place] [address]          Dear ,    We are writing to inform you of your test results.    Here is the results you requested.    If you have any questions or concerns, please call the clinic at the number listed above.       Sincerely,

## 2023-12-05 NOTE — TELEPHONE ENCOUNTER
Patient is currently in [place] and did schedule t  o see a doctor there. She would like her labs mailed to her so she can review them with a doctor there.  Miryam Pearl MA 12/5/2023

## 2023-12-11 DIAGNOSIS — I10 HYPERTENSION GOAL BP (BLOOD PRESSURE) < 140/90: ICD-10-CM

## 2023-12-11 RX ORDER — LOSARTAN POTASSIUM 50 MG/1
50 TABLET ORAL DAILY
Qty: 90 TABLET | Refills: 0 | Status: SHIPPED | OUTPATIENT
Start: 2023-12-11 | End: 2024-01-31

## 2023-12-11 NOTE — TELEPHONE ENCOUNTER
Patient is calling and requesting losartan refill.  She stated she scheduled a visit with a provider in [place] per Dr. Missy MD request, but her appointment is not until 12/18/2023 and she will be out of her medication prior to that appointment.    Will forward to PCP for review.  Pending medication.    Violeta Mcguire RN

## 2023-12-11 NOTE — TELEPHONE ENCOUNTER
Patient called back and stated she has 4 pills left, her appointment is 12/18/23 with a provider in [place]. She just doesn't want to run out.

## 2023-12-27 ENCOUNTER — TELEPHONE (OUTPATIENT)
Dept: FAMILY MEDICINE | Facility: CLINIC | Age: 63
End: 2023-12-27
Payer: COMMERCIAL

## 2023-12-27 NOTE — TELEPHONE ENCOUNTER
Patient Quality Outreach    Patient is due for the following:   Breast Cancer Screening - Mammogram  IVD  -  Aspirin and BP Check  Physical Preventive Adult Physical    Next Steps:   Schedule a Adult Preventative    Type of outreach:    Sent [employer] message.    Next Steps:  Reach out within 90 days via [employer].    Max number of attempts reached: Yes. Will try again in 90 days if patient still on fail list.    Questions for provider review:    None           Ghazal Lovell

## 2024-01-05 ENCOUNTER — TELEPHONE (OUTPATIENT)
Dept: FAMILY MEDICINE | Facility: CLINIC | Age: 64
End: 2024-01-05
Payer: COMMERCIAL

## 2024-01-05 DIAGNOSIS — I10 HYPERTENSION GOAL BP (BLOOD PRESSURE) < 140/90: ICD-10-CM

## 2024-01-05 RX ORDER — NIFEDIPINE 30 MG
30 TABLET, EXTENDED RELEASE ORAL DAILY
Qty: 90 TABLET | Refills: 0 | Status: SHIPPED | OUTPATIENT
Start: 2024-01-05 | End: 2024-01-31

## 2024-01-09 NOTE — TELEPHONE ENCOUNTER
Patient is in [place] for 5 months.  Patient is currently sitting in a Doctors Office having a physical to establish care in [place].    Sarahi Cuello XRO/

## 2024-01-30 DIAGNOSIS — I10 HYPERTENSION GOAL BP (BLOOD PRESSURE) < 140/90: ICD-10-CM

## 2024-01-31 RX ORDER — LOSARTAN POTASSIUM 50 MG/1
50 TABLET ORAL DAILY
Qty: 90 TABLET | Refills: 0 | Status: SHIPPED | OUTPATIENT
Start: 2024-01-31 | End: 2024-04-16

## 2024-01-31 RX ORDER — NIFEDIPINE 30 MG
30 TABLET, EXTENDED RELEASE ORAL DAILY
Qty: 90 TABLET | Refills: 0 | Status: SHIPPED | OUTPATIENT
Start: 2024-01-31 | End: 2024-06-25

## 2024-01-31 RX ORDER — NIFEDIPINE 30 MG
30 TABLET, EXTENDED RELEASE ORAL DAILY
Qty: 30 TABLET | Refills: 0 | Status: SHIPPED | OUTPATIENT
Start: 2024-01-31 | End: 2024-01-31

## 2024-01-31 RX ORDER — LOSARTAN POTASSIUM 50 MG/1
50 TABLET ORAL DAILY
Qty: 30 TABLET | Refills: 0 | Status: SHIPPED | OUTPATIENT
Start: 2024-01-31 | End: 2024-01-31

## 2024-01-31 RX ORDER — HYDROCHLOROTHIAZIDE 25 MG/1
25 TABLET ORAL EVERY MORNING
Qty: 90 TABLET | Refills: 1 | OUTPATIENT
Start: 2024-01-31

## 2024-01-31 NOTE — TELEPHONE ENCOUNTER
This medication was stopped by her nephrologist.  Not sure if she been taking it.  Due to her kidney condition, I recommend to continue hold off on this medication unless her blood pressure is not controlled.

## 2024-02-04 ENCOUNTER — HEALTH MAINTENANCE LETTER (OUTPATIENT)
Age: 64
End: 2024-02-04

## 2024-02-09 ENCOUNTER — TELEPHONE (OUTPATIENT)
Dept: GASTROENTEROLOGY | Facility: CLINIC | Age: 64
End: 2024-02-09
Payer: COMMERCIAL

## 2024-02-09 DIAGNOSIS — D12.6 SERRATED POLYPOSIS SYNDROME: Primary | ICD-10-CM

## 2024-02-09 NOTE — TELEPHONE ENCOUNTER
"Endoscopy Scheduling Screen    Have you had a positive Covid test in the last 14 days?  No    Are you active on MyChart?   No-Letter sent     What insurance is in the chart?  Other:  [employer]    Ordering/Referring Provider:     Domingo Wall MD      (If ordering provider performs procedure, schedule with ordering provider unless otherwise instructed. )    BMI: Estimated body mass index is 28.9 kg/m  as calculated from the following:    Height as of 10/5/23: 1.524 m (5').    Weight as of 10/5/23: 67.1 kg (148 lb).     Sedation Ordered  MAC/deep sedation.   BMI<= 45 45 < BMI <= 48 48 < BMI < = 50  BMI > 50   No Restrictions No [facility]  No [facility]  [facility] with exceptions Hospital Only OR Only       Are you taking any prescription medications for pain 3 or more times per week?   NO - No RN review required.    Do you have a history of malignant hyperthermia or adverse reaction to anesthesia?  No    (Females) Are you currently pregnant?   No     Have you been diagnosed or told you have pulmonary hypertension?   No    Do you have an LVAD?  No    Have you been told you have moderate to severe sleep apnea?  No    Have you been told you have COPD, asthma, or any other lung disease?  No    Do you have any heart conditions?  No     Have you ever had an organ transplant?   No    Have you ever had or are you awaiting a heart or lung transplant?   No    Have you had a stroke or transient ischemic attack (TIA aka \"mini stroke\" in the last 6 months?   No    Have you been diagnosed with or been told you have cirrhosis of the liver?   No    Are you currently on dialysis?   No    Do you need assistance transferring?   No    BMI: Estimated body mass index is 28.9 kg/m  as calculated from the following:    Height as of 10/5/23: 1.524 m (5').    Weight as of 10/5/23: 67.1 kg (148 lb).     Is patients BMI > 40 and scheduling location [facility]?  No    Do you take an injectable medication for weight loss or diabetes (excluding " insulin)?  No    Do you take the medication Naltrexone?  No    Do you take blood thinners?  No       Prep   Are you currently on dialysis or do you have chronic kidney disease?  Yes (Golytely Prep)    Do you have a diagnosis of diabetes?  No    Do you have a diagnosis of cystic fibrosis (CF)?  No    On a regular basis do you go 3 -5 days between bowel movements?  No    BMI > 40?  No    Preferred Pharmacy:    [address] [address]  Phone: [phone] Fax: [phone]      Final Scheduling Details   Colonoscopy prep sent?  N/A    Procedure scheduled  Colonoscopy    Surgeon:  [facility]      Date of procedure:  07/03/2024     Pre-OP / PAC:   No - Not required for this site.    Location  [facility] - Patient preference.    Sedation   MAC/Deep Sedation - Per order.      Patient Reminders:   You will receive a call from a Nurse to review instructions and health history.  This assessment must be completed prior to your procedure.  Failure to complete the Nurse assessment may result in the procedure being cancelled.      On the day of your procedure, please designate an adult(s) who can drive you home stay with you for the next 24 hours. The medicines used in the exam will make you sleepy. You will not be able to drive.      You cannot take public transportation, ride share services, or non-medical taxi service without a responsible caregiver.  Medical transport services are allowed with the requirement that a responsible caregiver will receive you at your destination.  We require that drivers and caregivers are confirmed prior to your procedure.

## 2024-02-09 NOTE — LETTER
June 5, 2024      Marcelina Estevez  [address] [place] [address]              Dear Jamel Hewitt Extended Colonoscopy Prep  Prep instructions for colonoscopy   Pre-Assessment Phone Number: [facility]; [phone] option 4  Bowel prep has been sent to  [facility] - [facility], [place] - [address] [facility]    Please read these instructions carefully at least 7 days prior to your colonoscopy procedure. Be sure to follow all directions completely. The inside of your colon must be clean to allow for a complete examination for the presence of any growths, polyps, and/or abnormalities, as well as their biopsy or removal. A number of tips are included in order to make this part of the procedure as comfortable as possible.    Immediately:  A nurse will call you to go over instructions and your health history.  It's important to complete the nurse assessment before your procedure. If you don't, your procedure might have to be canceled.  You must arrange for an adult to drive you home after your exam. Your colonoscopy cannot be done unless you have a ride. If you need to use public transportation, someone must ride with you and stay with you for a minimum of 6-24 hours.  Check with your insurance company to be sure they will cover this exam.Arrange for a responsible adult to drive you home on the day of the exam. This cannot be a taxi or a bus as you will need someone with you after the procedure.    7 days prior:  Talk to your prescribing provider: If you take blood thinners (such as Coumadin, Plavix, Xarelto, Eliquis, Lovenox, or others), these medications may need to be stopped temporarily before your procedure. Your prescribing provider will tell you what to do.   Talk to your prescribing provider: If you take prescription NSAIDS (such as Sulindac, Celebrex, Mobic, Relafen). Your prescribing provider will tell you what to do.   If you have diabetes, you should request an early  morning appointment.  Stop taking fiber supplements and multivitamins containing iron, or any other medications containing iron.  Fill your prescription for 2 containers of Golytely and 4 Dulcolax tablets at the pharmacy.  It is very important that you stay well hydrated during the colonoscopy prep. The Golytely bowel prep is designed to clean out your colon, but it will not provide hydration. While you are taking the prescribed prep, you should also drink 64 oz. of Gatorade or similar sports drink product to drink for staying hydrated. (Avoid red and purple colors)  Stop eating corn, popcorn, nuts and foods with seeds.   Begin a restricted or low fiber diet (see list below).    2 days prior:  Drink fluids to be well hydrated, this is important. Drink at least 4 large glasses of water, Gatorade, or other similar sports drinks.  It is a good idea to use Vaseline on the skin around your anus after each bowel movement to prevent irritation. Wet wipes also help to reduce irritation.   You can have a light, low-fiber breakfast. You may also have a light, low-fiber lunch.  No solid foods after 1pm. Begin a clear liquid diet. (see list below).  At 4pm, take 2 Dulcolax (bisacodyl) tablets.  At 5pm, mix and drink half of a jug of Golytely bowel prep. Drink an 8 oz. Glass of Golytely every 10-15 minutes until half of the jug is gone. Place the remainder of the Golytely in the refrigerator. Stay close to the bathroom.     1 day prior:  Continue clear liquid diet only (see examples below). Do not eat solid food this day.  Do not drink any red or purple colored drinks.  Stop taking NSAID pain relievers, such as Advil, Ibuprofen, Motrin, etc.  You may take Tylenol.  At 4 pm, take 2 Dulcolax (bisacodyl) tablets.  At 5 pm, drink the 2nd half of a jug of Golytely bowel prep. Drink an 8 oz. glass of Golytely every 10-15 minutes until the 1st jug of Golytely is gone.   The Golytely bowel prep will not keep you hydrated. You should  drink 8-10 glasses of clear liquids throughout the day.  Before you go to bed, mix the 2nd container of Golytely and place in the refrigerator for the morning.      Procedure day:  6 hours before your check-in time, drink an 8 oz. Glass of Golytely every 10-15 minutes until half of the 2nd jug of Golytely is gone. You WILL NOT drink the entire 2nd jug of Golytely.   You may take your necessary morning medications with sips of water  Do not take diabetes medicine by mouth until after your exam.  You may drink clear liquids only up until 2 hours before your arrival time.  Do not smoke or swallow anything, including water or gum for at least 2 hours before your arrival time. This is a safety issue. Your procedure could be cancelled if you do not follow directions.  No chewing tobacco 6 hours prior to procedure arrival time.   Please do not wear jewelry (i.e. earrings, rings, necklaces, watches, etc) . Leave your purse, billfold, credit cards, and other valuables at home.   Please arrive with a responsible adult who can take you home after the test and stay with you for a minimum of 24 hours: The medicine used will make you sleepy and forgetful. If you do not have someone to take you home, we will cancel your procedure. If using public transportation you must have someone to ride with you.  Please perform your nebulizer treatments and airway clearance therapy in the morning prior to the procedure (if applicable).  If you have asthma, bring your inhalers.    CLEAR LIQUID DIET   You may have:  Water, tea, coffee (no milk or cream)  Soda pop, Gatorade (not red or purple)  Jell-O, Popsicles (no milk or fruit pieces - not red or purple)  Fat-free soup broth or bouillon  Plain hard candy, such as clear life savers (not red or purple)  Clear juices and fruit-flavored drinks, such as apple juice, white grape juice, Hi-C, and Kenyon-Aid (not red or purple)   Do not have:  Milk or milk products such as ice cream, malts or shakes,  or coffee creamer  Red or purple drinks of any kind such as cranberry juice or grape juice. Avoid red or purple Jell-O, Popsicles, Kenyon-Aid, sorbet, sherbet and candy  Juices with pulp such as orange, grapefruit, pineapple or tomato juice  Cream soups of any kind  Alcohol and beer  Protein drinks or protein powder     LOW FIBER DIET   You may have:    Starches: White bread, rolls, biscuits, croissants, Aleah toast, white flour tortillas, waffles, pancakes, Cambodian toast; white rice, noodles, pasta, macaroni; cooked and peeled potatoes; plain crackers, saltines; cooked farina or cream of rice; puffed rice, corn flakes, Rice Krispies, Special K   Vegetables: tender cooked and canned, vegetable broths  Fruits and fruit juices: Strained fruit juice, canned fruit without seeds or skin (not pineapple), applesauce, pear sauce, ripe bananas, melons (not watermelon)   Milk products: Milk (plain or flavored), cheese, cottage cheese, yogurt (no berries), custard, ice cream    Proteins: Tender, well-cooked ground beef, lamb, veal, ham, pork, chicken, turkey, fish or organ meats; eggs; creamy peanut butter   Fats and condiments:  Margarine, butter, oils, mayonnaise, sour cream, salad dressing, plain gravy; spices, cooked herbs; sugar, clear jelly, honey, syrup   Snacks, sweets and drinks: Pretzels, hard candy; plain cakes and cookies (no nuts or seeds); gelatin, plain pudding, sherbet, Popsicles; coffee, tea, carbonated ( fizzy ) drinks Do not have:    Starches: Breads or rolls that contain nuts, seeds or fruit; whole wheat or whole grain breads that contain more than 1 gram of fiber per slice; cornbread; corn or whole wheat tortillas; potatoes with skin; brown rice, wild rice, kasha (buckwheat), and oatmeal   Vegetables: Any raw or steamed vegetables; vegetables with seeds; corn in any form   Fruits and fruit juices: Prunes, prune juice, raisins and other dried fruits, berries and other fruits with seeds, canned pineapple juices  with pulp such as orange, grapefruit, pineapple or tomato juice  Milk products: Any yogurt with nuts, seeds or berries   Proteins: Tough, fibrous meats with gristle; cooked dried beans, peas or lentils; crunchy peanut butter  Fats and condiments: Pickles, olives, relish, horseradish; jam, marmalade, preserves   Snacks, sweets and drinks: Popcorn, nuts, seeds, granola, coconut, candies made with nuts or seeds; all desserts that contain nuts, seeds, raisins and other dried fruits, coconut, whole grains or bran.      FAQ:    How do you know if your colon is cleaned out?   After completing the bowel prep, your bowel movements should be all liquid and yellow. Your bowel movements will look similar to urine in the toilet. If there are pieces of stool (poop) in the toilet, or if you can't see to the bottom of the toilet, please call our office for advice. Call [phone] and ask to speak with a nurse.   Why do you need a responsible  to take you home and stay with you?  We require a responsible adult to take you home for your safety. The sedation medicines used to relax you during the procedure can impair your judgement and reaction time, make you forgetful and possible a little unsteady. Do not drive, make any important decisions, or sign any legal documents for 24 hours after your procedure.   It is normal to feel bloated and gassy after your procedure. Walking will help move the air through your colon. You can take non-aspirin pain relievers that contain acetaminophen (Tylenol).   When can you eat after your procedure?  You may resume your normal diet when you feel ready, unless advised otherwise by the doctor performing your procedure. Do not drink alcohol for 24 hours after your procedure.   You many resume normal activities (work, exercise, etc.) after 24 hours.   When will you get test results?  You should have your procedure results and any lab results (if applicable) by letter, MyChart message, or phone  call within 2 weeks. If you have any questions, please call the doctor that referred you for the procedure.       Thank you for choosing [facility], for your procedure. If you are sent a survey regarding your care, please take the time to complete the questionnaire. We value your feedback!

## 2024-02-26 ENCOUNTER — PATIENT OUTREACH (OUTPATIENT)
Dept: GASTROENTEROLOGY | Facility: CLINIC | Age: 64
End: 2024-02-26
Payer: COMMERCIAL

## 2024-02-28 DIAGNOSIS — I10 HYPERTENSION GOAL BP (BLOOD PRESSURE) < 140/90: ICD-10-CM

## 2024-02-28 RX ORDER — ISOSORBIDE MONONITRATE 30 MG/1
30 TABLET, EXTENDED RELEASE ORAL DAILY
Qty: 90 TABLET | Refills: 1 | Status: SHIPPED | OUTPATIENT
Start: 2024-02-28

## 2024-03-15 NOTE — TELEPHONE ENCOUNTER
1 month supply refilled, please follow-up before med run out.  Needed physical and general follow-up.  Thank you  
In that case, 3-month supply refilled.  If she stopped the hydrochlorothiazide, please be sure to monitor her blood pressure closely.  If it is persistently above 140/90 then please let me know.  Thank you  
Patient is in [place] and will not be back until end of April and she will schedule when she returns. She stated she told you this before she left and that she was going to get a doctor for when she is there the 5 months a year she is there. She didn't know she wasn't suppose to be taking the hydrochlorothiazide she she is going to stop taking it.     Miryam Pearl MA 1/31/2024    
She has been notified. And she will let us know.     Miryam Pearl MA 1/31/2024    
23.6

## 2024-03-18 DIAGNOSIS — F33.42 RECURRENT MAJOR DEPRESSIVE DISORDER, IN FULL REMISSION (H): ICD-10-CM

## 2024-03-20 ENCOUNTER — TELEPHONE (OUTPATIENT)
Dept: NEPHROLOGY | Facility: CLINIC | Age: 64
End: 2024-03-20
Payer: COMMERCIAL

## 2024-03-20 DIAGNOSIS — R80.9 PROTEINURIA, UNSPECIFIED TYPE: ICD-10-CM

## 2024-03-20 DIAGNOSIS — N18.31 STAGE 3A CHRONIC KIDNEY DISEASE (H): Primary | ICD-10-CM

## 2024-03-20 RX ORDER — DULOXETINE 40 MG/1
40 CAPSULE, DELAYED RELEASE ORAL DAILY
Qty: 90 CAPSULE | Refills: 0 | Status: SHIPPED | OUTPATIENT
Start: 2024-03-20 | End: 2024-04-18

## 2024-03-20 NOTE — TELEPHONE ENCOUNTER
Attempted to reach patient to schedule lab appointment prior to their upcoming virtual visit on 03/26/24. Left brief message to call back and schedule lab appointment prior to 03/26/24.    KEV Long   Neph/Pulm [facility]

## 2024-04-16 ENCOUNTER — OFFICE VISIT (OUTPATIENT)
Dept: FAMILY MEDICINE | Facility: CLINIC | Age: 64
End: 2024-04-16
Payer: COMMERCIAL

## 2024-04-16 VITALS
TEMPERATURE: 97.8 F | SYSTOLIC BLOOD PRESSURE: 128 MMHG | BODY MASS INDEX: 28.57 KG/M2 | HEIGHT: 60 IN | OXYGEN SATURATION: 100 % | DIASTOLIC BLOOD PRESSURE: 68 MMHG | HEART RATE: 83 BPM | RESPIRATION RATE: 18 BRPM | WEIGHT: 145.5 LBS

## 2024-04-16 DIAGNOSIS — I20.1 CORONARY VASOSPASM (H): ICD-10-CM

## 2024-04-16 DIAGNOSIS — E78.5 HYPERLIPIDEMIA LDL GOAL <160: ICD-10-CM

## 2024-04-16 DIAGNOSIS — N18.31 STAGE 3A CHRONIC KIDNEY DISEASE (H): ICD-10-CM

## 2024-04-16 DIAGNOSIS — E78.5 HYPERLIPIDEMIA LDL GOAL <100: ICD-10-CM

## 2024-04-16 DIAGNOSIS — I10 HYPERTENSION GOAL BP (BLOOD PRESSURE) < 140/90: Primary | ICD-10-CM

## 2024-04-16 PROBLEM — K55.039 ACUTE ISCHEMIC COLITIS (H): Status: ACTIVE | Noted: 2024-04-16

## 2024-04-16 LAB
ALBUMIN SERPL BCG-MCNC: 4.9 G/DL (ref 3.5–5.2)
ALP SERPL-CCNC: 73 U/L (ref 40–150)
ALT SERPL W P-5'-P-CCNC: 29 U/L (ref 0–50)
ANION GAP SERPL CALCULATED.3IONS-SCNC: 14 MMOL/L (ref 7–15)
AST SERPL W P-5'-P-CCNC: 42 U/L (ref 0–45)
BILIRUB SERPL-MCNC: 0.5 MG/DL
BUN SERPL-MCNC: 25.1 MG/DL (ref 8–23)
CALCIUM SERPL-MCNC: 10.3 MG/DL (ref 8.8–10.2)
CHLORIDE SERPL-SCNC: 99 MMOL/L (ref 98–107)
CHOLEST SERPL-MCNC: 240 MG/DL
CREAT SERPL-MCNC: 1.11 MG/DL (ref 0.51–0.95)
DEPRECATED HCO3 PLAS-SCNC: 23 MMOL/L (ref 22–29)
EGFRCR SERPLBLD CKD-EPI 2021: 55 ML/MIN/1.73M2
FASTING STATUS PATIENT QL REPORTED: YES
GLUCOSE SERPL-MCNC: 85 MG/DL (ref 70–99)
HDLC SERPL-MCNC: 156 MG/DL
HGB BLD-MCNC: 11.2 G/DL (ref 11.7–15.7)
LDLC SERPL CALC-MCNC: 74 MG/DL
NONHDLC SERPL-MCNC: 84 MG/DL
POTASSIUM SERPL-SCNC: 4.7 MMOL/L (ref 3.4–5.3)
PROT SERPL-MCNC: 7.7 G/DL (ref 6.4–8.3)
SODIUM SERPL-SCNC: 136 MMOL/L (ref 135–145)
TRIGL SERPL-MCNC: 52 MG/DL

## 2024-04-16 PROCEDURE — 99214 OFFICE O/P EST MOD 30 MIN: CPT | Performed by: FAMILY MEDICINE

## 2024-04-16 PROCEDURE — 80053 COMPREHEN METABOLIC PANEL: CPT | Performed by: FAMILY MEDICINE

## 2024-04-16 PROCEDURE — G2211 COMPLEX E/M VISIT ADD ON: HCPCS | Performed by: FAMILY MEDICINE

## 2024-04-16 PROCEDURE — 85018 HEMOGLOBIN: CPT | Performed by: FAMILY MEDICINE

## 2024-04-16 PROCEDURE — 36415 COLL VENOUS BLD VENIPUNCTURE: CPT | Performed by: FAMILY MEDICINE

## 2024-04-16 PROCEDURE — 80061 LIPID PANEL: CPT | Performed by: FAMILY MEDICINE

## 2024-04-16 RX ORDER — LOSARTAN POTASSIUM 100 MG/1
1 TABLET ORAL
COMMUNITY
Start: 2024-01-30 | End: 2024-08-07

## 2024-04-16 RX ORDER — ATORVASTATIN CALCIUM 40 MG/1
1 TABLET, FILM COATED ORAL
COMMUNITY
Start: 2024-02-26 | End: 2024-07-23

## 2024-04-16 RX ORDER — DULOXETIN HYDROCHLORIDE 60 MG/1
60 CAPSULE, DELAYED RELEASE ORAL DAILY
Qty: 30 CAPSULE | Refills: 1 | Status: SHIPPED | OUTPATIENT
Start: 2024-04-16 | End: 2024-05-08

## 2024-04-16 ASSESSMENT — ANXIETY QUESTIONNAIRES
4. TROUBLE RELAXING: SEVERAL DAYS
7. FEELING AFRAID AS IF SOMETHING AWFUL MIGHT HAPPEN: SEVERAL DAYS
GAD7 TOTAL SCORE: 8
3. WORRYING TOO MUCH ABOUT DIFFERENT THINGS: MORE THAN HALF THE DAYS
1. FEELING NERVOUS, ANXIOUS, OR ON EDGE: SEVERAL DAYS
2. NOT BEING ABLE TO STOP OR CONTROL WORRYING: MORE THAN HALF THE DAYS
7. FEELING AFRAID AS IF SOMETHING AWFUL MIGHT HAPPEN: SEVERAL DAYS
8. IF YOU CHECKED OFF ANY PROBLEMS, HOW DIFFICULT HAVE THESE MADE IT FOR YOU TO DO YOUR WORK, TAKE CARE OF THINGS AT HOME, OR GET ALONG WITH OTHER PEOPLE?: SOMEWHAT DIFFICULT
GAD7 TOTAL SCORE: 8
IF YOU CHECKED OFF ANY PROBLEMS ON THIS QUESTIONNAIRE, HOW DIFFICULT HAVE THESE PROBLEMS MADE IT FOR YOU TO DO YOUR WORK, TAKE CARE OF THINGS AT HOME, OR GET ALONG WITH OTHER PEOPLE: SOMEWHAT DIFFICULT
GAD7 TOTAL SCORE: 8
5. BEING SO RESTLESS THAT IT IS HARD TO SIT STILL: NOT AT ALL
6. BECOMING EASILY ANNOYED OR IRRITABLE: SEVERAL DAYS

## 2024-04-16 ASSESSMENT — PATIENT HEALTH QUESTIONNAIRE - PHQ9
SUM OF ALL RESPONSES TO PHQ QUESTIONS 1-9: 10
SUM OF ALL RESPONSES TO PHQ QUESTIONS 1-9: 10
10. IF YOU CHECKED OFF ANY PROBLEMS, HOW DIFFICULT HAVE THESE PROBLEMS MADE IT FOR YOU TO DO YOUR WORK, TAKE CARE OF THINGS AT HOME, OR GET ALONG WITH OTHER PEOPLE: SOMEWHAT DIFFICULT

## 2024-04-16 ASSESSMENT — PAIN SCALES - GENERAL: PAINLEVEL: EXTREME PAIN (8)

## 2024-04-16 NOTE — PATIENT INSTRUCTIONS
Get labs today, will let you know if medications need to be changed  Increase the Cymbalta to 60 mg a day from 40 mg a day.  Prescription was sent - for depression  Your blood pressure looked good.  Will continue to keep an eye on it  If you start the Atorvastatin then hold off on the Simvastatin

## 2024-04-16 NOTE — PROGRESS NOTES
Assessment & Plan     (I10) Hypertension goal BP (blood pressure) < 140/90  (primary encounter diagnosis)  Comment: Also has chronic kidney disease, high cholesterol, and CREST syndrome.  The goal for her blood pressure is to be less than 140/90.  BP is stable and controlled with losartan, isosorbide and Procardia.  Hydrochlorothiazide was taken off by a provider in [place] recently.  Tolerating the medications well.  Not checking her blood pressure at home.  Labs today showed creatinine of 1.11 with a GFR of 55% which is at their baseline. Will continue with the current medications for now.  Healthy/low salt diet, exercising and weight management discussed and encouraged.  Encouraged to monitor her blood pressure closely, let me know if any persistently above 140/90.  Follow up in 6 month, earlier as needed.       (E78.5) Hyperlipidemia LDL goal <160  Comment: Also has high blood pressure, stage III-A chronic kidney disease and with coronary vasospasm.  No history of CVA, CAD, PAD or DM.  The goal for her cholesterol to be less than 160.  Tolerating the Lipitor well.  No history of liver disease and her liver enzymes today was normal.  Denies of excessive alcohol intake.  Cholesterol level today was excellent with a LDL of 74.  Will continue with the Lipitor, consider to lower down to 20 mg daily.  She would like to stay at the same dose for now.  Exercising and healthy diet as discussed as above.  Recheck the cholesterol level in a year.    Plan: Comprehensive metabolic panel (BMP + Alb, Alk         Phos, ALT, AST, Total. Bili, TP)            (N18.31) Stage 3a chronic kidney disease (H)  Comment: She has a complex immunological history which being managed by the rheumatologist.  Been seeing nephrologist and rheumatologist.  No history of diabetes; her blood pressure has been well-controlled.  She was recently increased her losartan from 50 mg to 100 mg daily and her hydrochlorothiazide was stopped by a provider in  [place].  Her creatinine and GFR level were at baseline today.  No change in medication today.  Encouraged to follow-up with the nephrologist as per his/her recommendation.  Encouraged to drink a lot of water.  Avoid all NSAID intake as she has been.  She is remains to be mildly anemic but overall it has improved slightly as compared to 7 months ago.    Plan: Hemoglobin, Comprehensive metabolic panel (BMP         + Alb, Alk Phos, ALT, AST, Total. Bili, TP),         Protein  random urine            (I20.1) Coronary vasospasm (H24)  Comment: Overall stable.  She has no concern about it.  Procardia and Imdur have been working well.  Last echocardiogram was in 2022 and it was normal with the ED ejection fraction of 60 to 65%.  She has no concern about it.  No change in medication.  Symptoms that need to be since occurring discussed.    Plan: Lipid panel reflex to direct LDL Non-fasting,         Comprehensive metabolic panel (BMP + Alb, Alk         Phos, ALT, AST, Total. Bili, TP)            The longitudinal plan of care for the diagnosis(es)/condition(s) as documented were addressed during this visit. Due to the added complexity in care, I will continue to support Marcelina in the subsequent management and with ongoing continuity of care.       Work on weight loss  Regular exercise    Subjective   Marcelina is a 64 year old, presenting for the following health issues:  Knee Pain (Left knee)      4/16/2024    11:16 AM   Additional Questions   Roomed by Ansley     Via the Health Maintenance questionnaire, the patient has reported the following services have been completed -Mammogram, this information has been sent to the abstraction team.  Knee Pain    History of Present Illness       CKD: She uses over the counter pain medication, including tylenol, two times daily.    Mental Health Follow-up:  Patient presents to follow-up on Depression & Anxiety.Patient's depression since last visit has been:  Worse  The patient is having  other symptoms associated with depression.  Patient's anxiety since last visit has been:  Medium  The patient is having other symptoms associated with anxiety.  Any significant life events: health concerns and other  Patient is feeling anxious or having panic attacks.  Patient has no concerns about alcohol or drug use.    Hypertension: She presents for follow up of hypertension.  She does check blood pressure  regularly outside of the clinic. Outpatient blood pressures have not been over 140/90. She does not follow a low salt diet.     Reason for visit:  Severe knee pain    She eats 2-3 servings of fruits and vegetables daily.She consumes 1 sweetened beverage(s) daily.She exercises with enough effort to increase her heart rate 9 or less minutes per day.  She exercises with enough effort to increase her heart rate 3 or less days per week.   She is taking medications regularly.       Marcelina is here today for general follow-up.  She just got back from [place] where she stayed for several months.  She has high blood pressure, high cholesterol and chronic kidney disease.  Been seeing nephrologist regularly for her kidney condition.  She also sees rheumatologist for her CREST and Raynaud's syndrome.  She takes isosorbide, losartan and Procardia chronically for high blood pressure, coronary vasospasm, Raynaud's and CREST syndrome as prescribed.  Recently saw a provider in [place] for lightheadedness after a long day of golfing, he took her off the hydrochlorothiazide increase the losartan from 50 mg to 100 mg.  Not taking her blood pressure at home.  No headache or dizziness.  No chest pain or shortness of breath.  No leg swelling, orthopnea or dyspnea.  She also takes Lipitor for high cholesterol.  Not exercising but she keeps herself active.  No excessive salt intake.  No nausea, vomiting, diarrhea or constipation.  No other concerns.        Review of Systems  Constitutional, neuro, ENT, endocrine, pulmonary, cardiac,  "gastrointestinal, genitourinary, musculoskeletal, integument and psychiatric systems are negative, except as otherwise noted.      Objective    /82   Pulse 83   Temp 97.8  F (36.6  C) (Temporal)   Resp 18   Ht 1.52 m (4' 11.84\")   Wt 66 kg (145 lb 8 oz)   LMP 06/12/2014   SpO2 100%   BMI 28.57 kg/m    Body mass index is 28.57 kg/m .  Physical Exam   GENERAL: alert and no distress, speaking full sentences.  NECK: Supple, no lymphadenopathy or thyromegaly. No JV distention or carotid bruits.  RESP: lungs clear to auscultation - no rales, rhonchi or wheezes.  Good respiratory effort throughout.  CV: regular rate and rhythm, no murmur, no peripheral edema  ABDOMEN: soft, nontender, nondistended, no palpable masses organomegaly with normal bowel sounds.  MS: no gross musculoskeletal defects noted, no edema.  No focal weakness.  NEURO: Normal strength and tone, mentation intact and speech normal.  No focal neurological deficit.  PSYCH: mentation appears normal, affect normal/bright    Results for orders placed or performed in visit on 04/16/24   Lipid panel reflex to direct LDL Non-fasting     Status: Abnormal   Result Value Ref Range    Cholesterol 240 (H) <200 mg/dL    Triglycerides 52 <150 mg/dL    Direct Measure  >=50 mg/dL    LDL Cholesterol Calculated 74 <=100 mg/dL    Non HDL Cholesterol 84 <130 mg/dL    Patient Fasting > 8hrs? Yes     Narrative    Cholesterol  Desirable:  <200 mg/dL    Triglycerides  Normal:  Less than 150 mg/dL  Borderline High:  150-199 mg/dL  High:  200-499 mg/dL  Very High:  Greater than or equal to 500 mg/dL    Direct Measure HDL  Female:  Greater than or equal to 50 mg/dL   Male:  Greater than or equal to 40 mg/dL    LDL Cholesterol  Desirable:  <100mg/dL  Above Desirable:  100-129 mg/dL   Borderline High:  130-159 mg/dL   High:  160-189 mg/dL   Very High:  >= 190 mg/dL    Non HDL Cholesterol  Desirable:  130 mg/dL  Above Desirable:  130-159 mg/dL  Borderline High:  " 160-189 mg/dL  High:  190-219 mg/dL  Very High:  Greater than or equal to 220 mg/dL   Hemoglobin     Status: Abnormal   Result Value Ref Range    Hemoglobin 11.2 (L) 11.7 - 15.7 g/dL   Comprehensive metabolic panel (BMP + Alb, Alk Phos, ALT, AST, Total. Bili, TP)     Status: Abnormal   Result Value Ref Range    Sodium 136 135 - 145 mmol/L    Potassium 4.7 3.4 - 5.3 mmol/L    Carbon Dioxide (CO2) 23 22 - 29 mmol/L    Anion Gap 14 7 - 15 mmol/L    Urea Nitrogen 25.1 (H) 8.0 - 23.0 mg/dL    Creatinine 1.11 (H) 0.51 - 0.95 mg/dL    GFR Estimate 55 (L) >60 mL/min/1.73m2    Calcium 10.3 (H) 8.8 - 10.2 mg/dL    Chloride 99 98 - 107 mmol/L    Glucose 85 70 - 99 mg/dL    Alkaline Phosphatase 73 40 - 150 U/L    AST 42 0 - 45 U/L    ALT 29 0 - 50 U/L    Protein Total 7.7 6.4 - 8.3 g/dL    Albumin 4.9 3.5 - 5.2 g/dL    Bilirubin Total 0.5 <=1.2 mg/dL   Protein  random urine     Status: Abnormal   Result Value Ref Range    Total Protein Urine mg/dL 22.8   mg/dL    Total Protein Urine mg/mg Creat 0.33 (H) 0.00 - 0.20 mg/mg Cr    Creatinine Urine mg/dL 70.1 mg/dL           Signed Electronically by: Zoey Clements Mai, MD

## 2024-04-18 ENCOUNTER — OFFICE VISIT (OUTPATIENT)
Dept: FAMILY MEDICINE | Facility: CLINIC | Age: 64
End: 2024-04-18
Payer: COMMERCIAL

## 2024-04-18 VITALS
BODY MASS INDEX: 28.47 KG/M2 | TEMPERATURE: 97.8 F | HEIGHT: 60 IN | DIASTOLIC BLOOD PRESSURE: 66 MMHG | OXYGEN SATURATION: 96 % | SYSTOLIC BLOOD PRESSURE: 118 MMHG | HEART RATE: 85 BPM | WEIGHT: 145 LBS | RESPIRATION RATE: 17 BRPM

## 2024-04-18 DIAGNOSIS — K55.9 ISCHEMIC COLITIS (H): ICD-10-CM

## 2024-04-18 DIAGNOSIS — M34.1 CREST (CALCINOSIS, RAYNAUD'S PHENOMENON, ESOPHAGEAL DYSFUNCTION, SCLERODACTYLY, TELANGIECTASIA) (H): ICD-10-CM

## 2024-04-18 DIAGNOSIS — M17.10 ARTHRITIS OF KNEE: Primary | ICD-10-CM

## 2024-04-18 DIAGNOSIS — J30.1 SEASONAL ALLERGIC RHINITIS DUE TO POLLEN: ICD-10-CM

## 2024-04-18 DIAGNOSIS — F33.1 MODERATE EPISODE OF RECURRENT MAJOR DEPRESSIVE DISORDER (H): ICD-10-CM

## 2024-04-18 DIAGNOSIS — Z87.19 HISTORY OF ISCHEMIC COLITIS: ICD-10-CM

## 2024-04-18 PROBLEM — K55.039 ACUTE ISCHEMIC COLITIS (H): Status: RESOLVED | Noted: 2024-04-16 | Resolved: 2024-04-18

## 2024-04-18 LAB
ALBUMIN MFR UR ELPH: 22.8 MG/DL
CREAT UR-MCNC: 70.1 MG/DL
PROT/CREAT 24H UR: 0.33 MG/MG CR (ref 0–0.2)

## 2024-04-18 PROCEDURE — 84156 ASSAY OF PROTEIN URINE: CPT | Performed by: FAMILY MEDICINE

## 2024-04-18 PROCEDURE — 99214 OFFICE O/P EST MOD 30 MIN: CPT | Mod: 25 | Performed by: FAMILY MEDICINE

## 2024-04-18 PROCEDURE — 20610 DRAIN/INJ JOINT/BURSA W/O US: CPT | Performed by: FAMILY MEDICINE

## 2024-04-18 RX ORDER — LIDOCAINE HYDROCHLORIDE 10 MG/ML
10 INJECTION, SOLUTION INFILTRATION; PERINEURAL ONCE
Status: COMPLETED | OUTPATIENT
Start: 2024-04-18 | End: 2024-04-18

## 2024-04-18 RX ORDER — PANTOPRAZOLE SODIUM 40 MG/1
40 TABLET, DELAYED RELEASE ORAL DAILY
Qty: 90 TABLET | Refills: 3 | Status: SHIPPED | OUTPATIENT
Start: 2024-04-18 | End: 2024-06-24

## 2024-04-18 RX ORDER — ASPIRIN 81 MG/1
81 TABLET ORAL DAILY
COMMUNITY
Start: 2024-04-18 | End: 2024-08-26

## 2024-04-18 RX ORDER — FLUTICASONE PROPIONATE 50 MCG
1 SPRAY, SUSPENSION (ML) NASAL DAILY
Qty: 16 ML | Refills: 5 | Status: SHIPPED | OUTPATIENT
Start: 2024-04-18

## 2024-04-18 RX ORDER — FLUTICASONE PROPIONATE 50 MCG
1 SPRAY, SUSPENSION (ML) NASAL DAILY
COMMUNITY
End: 2024-04-18

## 2024-04-18 RX ORDER — TRIAMCINOLONE ACETONIDE 40 MG/ML
80 INJECTION, SUSPENSION INTRA-ARTICULAR; INTRAMUSCULAR ONCE
Status: COMPLETED | OUTPATIENT
Start: 2024-04-18 | End: 2024-04-18

## 2024-04-18 RX ADMIN — LIDOCAINE HYDROCHLORIDE 10 ML: 10 INJECTION, SOLUTION INFILTRATION; PERINEURAL at 14:35

## 2024-04-18 RX ADMIN — TRIAMCINOLONE ACETONIDE 80 MG: 40 INJECTION, SUSPENSION INTRA-ARTICULAR; INTRAMUSCULAR at 14:35

## 2024-04-18 ASSESSMENT — PAIN SCALES - GENERAL: PAINLEVEL: EXTREME PAIN (8)

## 2024-04-18 NOTE — PROGRESS NOTES
Assessment & Plan     (M17.10) Arthritis of knee  (primary encounter diagnosis)  Comment: She is known to have arthritis of both knees for years with recurrent pain, left > right.  Responded to the steroid in the past.  She had injection about 4 months ago before today for [place] and it lasted for 3 months.  She had another injection in [place] about couple months ago which lasted only couple weeks.  Stated that the injection in [place] was injected at the site - did not feel it was doing right.  The pain has gotten worse and is affecting her daily tremendously.  Constant achy pain, worse with weightbearing activity - feels miserable.  Both knees, worse on the left side.      I reviewed x-rays that were taken 4 months ago which showed arthritis.  Exam today is also indicated for arthritis.  Discussed with her about treatment options.  She would like to try the injection again - she received the steroid injections on both knees today.  Please see procedure note for further details.  She tolerated the procedure well.  She was encouraged to continue with the normal activities as tolerated.  Continue with Tylenol/Motrin for pain.    If not respond to this steroid injection, will refer to orthopedics for further evaluation and treatment option.  She felt comfortable with the plan and all of her questions were answered.    Plan: Large Joint/Bursa injection and/or drainage         (Shoulder, Knee), triamcinolone (KENALOG-40)         injection 80 mg, lidocaine 1 % injection 10 mL            (J30.1) Seasonal allergic rhinitis due to pollen  Comment: With acute flareup due to weather change.  Been having the runny nose and nasal congestion.  Responded well to the Flonase and Allegra in the past - ran out of the Flonase.  Recommend to continue with Allegra as needed.  Flonase was refilled.  She was taught how to use the nasal spray correctly.  Follow-up as needed.  Encouraged to use them only as needed.    Plan: fluticasone  "(FLONASE) 50 MCG/ACT nasal spray            (Z87.19) History of ischemic colitis  Comment: Overall doing well.  Been taking Protonix twice a day.  No acid reflux symptoms.  Will have her taper down the Protonix to once a day.  Also will restart her back on the low-dose aspirin.  She has been off the aspirin for couple years.    Plan: aspirin (ASPIRIN LOW DOSE) 81 MG EC tablet            (F33.1) Moderate episode of recurrent major depressive disorder (H)  Comment: Was seen for this last week, medication adjusted.  She feels her depression and anxiety are tolerable.  Cymbalta has been effective.  No suicidal or homicidal ideation.  No hallucination.  Continue Cymbalta at 60 mg daily and Wellbutrin 150 mg daily.  Symptoms that need to be seen or call and discuss.  ER or call 911 if develop suicidal/homicidal thoughts, plan or intention.      (M34.1) CREST (calcinosis, Raynaud's phenomenon, esophageal dysfunction, sclerodactyly, telangiectasia) (H)  Comment: Stable, no concern.  Managed by rheumatologist.  Tolerating the Plaquenil well.  She was encouraged to work with the rheumatologist closely.  Also recommended eye exam at least annually due to the Plaquenil.          BMI  Estimated body mass index is 28.46 kg/m  as calculated from the following:    Height as of this encounter: 1.52 m (4' 11.85\").    Weight as of this encounter: 65.8 kg (145 lb).   Weight management plan: Patient was referred to their PCP to discuss a diet and exercise plan.        Elyssa Hewitt is a 64 year old, presenting for the following health issues:  Knee Pain (Left knee scoped 10 years ago and helped for about 8 years, then started getting injections/Injections not longer helping with pain )      4/18/2024     1:53 PM   Additional Questions   Roomed by Denisse     Via the Health Maintenance questionnaire, the patient has reported the following services have been completed -Mammogram, this information has been sent to the abstraction " team.  Knee Pain       Marcelina is here today for couple concerns.  First is for worsening of the knee pain.  She is known to have arthritis for years.  Responded to the steroid injection in the past.  Last injection was about 4-5 months ago before she left [place] which lasted for about 3 months.  While in [place], she was more active than usual and the pain got worse.  Were not able to walk, unable to play golf.  Saw a physician there and had an injection.  She felt the injection was given at the wrong location.  The injection helped for only couple weeks.  Her pain has become significantly worse in the last months which has affected her daily activities.  Constant achy pain which becomes sharp and unbearable with weightbearing activity.  No swelling or redness.  No effusion.  No fever or chills.  No unusual activities or trauma.  She would like to try the injections again.      She also has seasonal allergies, which is typically flaring up during this time a year.  Her allergy typically is controlled with Allegra and Flonase.  Been taking Allegra but ran out of Flonase.  Allergy symptoms have been acting up with runny nose, nasal congestions and sneezing.  Would like a refill for the Flonase.    Otherwise doing well.  She is seeing rheumatologist for her crest syndrome.  Tolerating the Plaquenil well.  No visual concern.  Sees her eye doctor regularly.    She also has a history of ischemic colitis.  Overall doing well, no melena or hematochezia.  No abdominal pain.  No diarrhea or constipation.  Been taking the Protonix twice a day ever since he was diagnosed with ischemic colitis.  No acid reflux symptoms.  Wondering if she still needs to take it twice a day.  She also was told to stop the aspirin.  Was on on aspirin before the colitis.      Pain History:  When did you first notice your pain? Over 10 years   Have you seen this provider for your pain in the past? Yes   Where in your body do you have pain? L knee   Are  "you seeing anyone else for your pain? Yes - did see sports medicine last year  and xrays  had shots at that time         11/17/2022    10:36 AM 8/8/2023     1:08 PM 4/16/2024    10:40 AM   PHQ-9 SCORE   PHQ-9 Total Score MyChart 1 (Minimal depression) 2 (Minimal depression) 10 (Moderate depression)   PHQ-9 Total Score 1 2 10           9/20/2022     8:25 AM 11/17/2022    10:39 AM 4/16/2024    10:47 AM   MARCELINA-7 SCORE   Total Score  3 (minimal anxiety) 8 (mild anxiety)   Total Score 15 3 8               11/17/2022    10:36 AM 8/8/2023     1:08 PM 4/16/2024    10:40 AM   PHQ-9 SCORE   PHQ-9 Total Score MyChart 1 (Minimal depression) 2 (Minimal depression) 10 (Moderate depression)   PHQ-9 Total Score 1 2 10           9/20/2022     8:25 AM 11/17/2022    10:39 AM 4/16/2024    10:47 AM   MARCELINA-7 SCORE   Total Score  3 (minimal anxiety) 8 (mild anxiety)   Total Score 15 3 8       Chronic Pain Follow Up:    Location of pain: L knee`  Analgesia/pain control:    - Recent changes:  pain in increasing      - Overall control: Inadequate pain control    - Current treatments: essential oils and tylenol    Adherence:     - Do you ever take more pain medicine than prescribed? No    - When did you take your last dose of pain medicine?  This morning 9am   Adverse effects: No   PDMP Review         Value Time User    State PDMP site checked  Yes 8/12/2023  8:30 PM Zoey Louis MD          Last CSA Agreement:   CSA -- Patient Level:    CSA: None found at the patient level.             Review of Systems  Constitutional, HEENT, cardiovascular, pulmonary, gi and gu systems are negative, except as otherwise noted.      Objective    /66 (Cuff Size: Adult Regular)   Pulse 85   Temp 97.8  F (36.6  C) (Temporal)   Resp 17   Ht 1.52 m (4' 11.85\")   Wt 65.8 kg (145 lb)   LMP 06/12/2014   SpO2 96%   BMI 28.46 kg/m    Body mass index is 28.46 kg/m .  Physical Exam   GENERAL: healthy, alert and no distress  HEENT:  Nares are congested with " clear drainage. Oropharynx is pink and moist. No tender with palpation to the sinuses.   RESP: lungs clear to auscultation - no rales, rhonchi or wheezes  CV: regular rate and rhythm, no murmur.  Abd: Soft, nondistended, nontender, no palpable masses or organomegaly with normal bowel sounds.  MS: no gross musculoskeletal defects noted, no edema.  Walk without limping.  Hips and ankle exams were normal. Knees with no effusion or redness.  Tender with palpation the knees most tender spot is at the medial aspect of the knee joint. No crepitus. Tender with palpation to medial condyle bilaterally.  Negative anterior and posterior draw test.  Negative Latchmen's test. Collateral ligamen exam was stable.  Pain with both valgus and valgus stress test.      No results found for any visits on 04/18/24.        Signed Electronically by: Zoey Clements Mai, MD

## 2024-04-19 NOTE — PROCEDURES
Procedure:  Knee Kenolog injection  Indication: Bilateral knee pain with arthritis.    The whole precedure discussed with the patient.  Risks associated with the procedure was also discussed, which include but not limited pain, bleeding and infection.  Alternatives were also discussed.  Patient requested to have the injection on both knees today.  Consent was obtained.    Time out performed - he was identified times three.       The whole procedure was done in an usual sterile maner.  The inserted site on the left knee was cleaned with alcohol swab.  A mixture of  4 cc of 1% Lidocain and 1 cc of Kenolog (40 mg) inject directly into the right knee, entering at the upper medial quadrant.  Was a very difficult injection due to severe arthritis, succeeded with the second attempt.      Identical procedure performed on the right knee with no complication.  Again, it was a difficult injection due to severe arthritis, succeeded with the third attempts.    Marcelina tolerated the procedure well and has some relief from the injections.  Post procedure care discussed and educate about symptoms that need to be seen or call in.  She felt comfortable with the plan and all of her questions were answered.    Zoey Louis MD.

## 2024-04-23 ENCOUNTER — ANCILLARY PROCEDURE (OUTPATIENT)
Dept: GENERAL RADIOLOGY | Facility: CLINIC | Age: 64
End: 2024-04-23
Attending: PHYSICIAN ASSISTANT
Payer: COMMERCIAL

## 2024-04-23 ENCOUNTER — OFFICE VISIT (OUTPATIENT)
Dept: GASTROENTEROLOGY | Facility: CLINIC | Age: 64
End: 2024-04-23
Payer: COMMERCIAL

## 2024-04-23 VITALS
SYSTOLIC BLOOD PRESSURE: 110 MMHG | HEIGHT: 60 IN | DIASTOLIC BLOOD PRESSURE: 74 MMHG | WEIGHT: 149.2 LBS | OXYGEN SATURATION: 100 % | BODY MASS INDEX: 29.29 KG/M2 | HEART RATE: 89 BPM

## 2024-04-23 DIAGNOSIS — R15.9 INCONTINENCE OF FECES, UNSPECIFIED FECAL INCONTINENCE TYPE: ICD-10-CM

## 2024-04-23 DIAGNOSIS — R15.0 INCOMPLETE DEFECATION: Primary | ICD-10-CM

## 2024-04-23 DIAGNOSIS — R15.0 INCOMPLETE DEFECATION: ICD-10-CM

## 2024-04-23 DIAGNOSIS — R19.4 ALTERED BOWEL HABITS: ICD-10-CM

## 2024-04-23 DIAGNOSIS — Z86.0100 HISTORY OF COLONIC POLYPS: ICD-10-CM

## 2024-04-23 PROCEDURE — 74019 RADEX ABDOMEN 2 VIEWS: CPT | Mod: GC | Performed by: STUDENT IN AN ORGANIZED HEALTH CARE EDUCATION/TRAINING PROGRAM

## 2024-04-23 PROCEDURE — 99213 OFFICE O/P EST LOW 20 MIN: CPT | Performed by: PHYSICIAN ASSISTANT

## 2024-04-23 ASSESSMENT — PAIN SCALES - GENERAL: PAINLEVEL: NO PAIN (0)

## 2024-04-23 NOTE — NURSING NOTE
"Chief Complaint   Patient presents with    Follow Up     Follow up of ischemic colitis, urgency, changes in bowel habits, mucus in stool, fecal incontinence.  Last seen per Dr. Wall on 05/17/2022     She requests these members of her care team be copied on today's visit information:  PCP: Zoey Louis    Vitals:    04/23/24 1345   BP: 110/74   BP Location: Left arm   Patient Position: Sitting   Cuff Size: Adult Regular   Pulse: 89   SpO2: 100%   Weight: 67.7 kg (149 lb 3.2 oz)   Height: 1.52 m (4' 11.85\")     Body mass index is 29.28 kg/m .    Medications were reconciled.    Does patient need any medication refills at today's visit? None        Jackie Olivares CMA    "

## 2024-04-23 NOTE — LETTER
4/23/2024         RE: Marcelina Estevez  [address] [place] [address]        Dear Colleague,    Thank you for referring your patient, Marcelina Estevez, to the [facility]. Please see a copy of my visit note below.    FOLLOW UP GI CLINIC VISIT    CC/REFERRING MD:  No ref. provider found  REASON FOR CONSULTATION:   No ref. provider found for   Chief Complaint   Patient presents with     Follow Up     Follow up of ischemic colitis, urgency, changes in bowel habits, mucus in stool, fecal incontinence.  Last seen per Dr. Wall on 05/17/2022       ASSESSMENT/PLAN: Patient is here for evaluation of some fluctuating bowel habits, describing some intermittent fecal incontinence.  We spent some time reviewing potential etiologies, which would include chronic constipation with overflow, pelvic floor dysfunction, less likely proctitis.  Will plan for abdominal x-ray today to assess for stool burden.  If significant, will consider sustained use of osmotic laxatives.  If only mild to moderate, would consider bulk forming options such as Benefiber.  She also does have colonoscopy scheduled a few months from now.  If bowel regimen medications do not work, we will make sure that evaluation includes assessment of the rectal area and detailed digital rectal examination to assess for tone and pelvic organ prolapse.  Patient stated understanding of plan and I will reach out when x-ray report is read.    1. Incomplete defecation  - XR Abdomen 2 Views; Future    2. Incontinence of feces, unspecified fecal incontinence type  - XR Abdomen 2 Views; Future    3. Altered bowel habits  - XR Abdomen 2 Views; Future    4. History of colonic polyps    Thank you for this consultation.  It was a pleasure to participate in the care of this patient; please contact us with any further questions.      20 minutes spent on the date of the encounter doing chart review, patient visit, and documentation    This note was  created with voice recognition software, and while reviewed for accuracy, typos may remain.     Laurent Guerrero PA-C  Division of Gastroenterology, Hepatology and Nutrition  [facility]  Marcelina Estevez is a 64 year old female with a past medical history significant for crest, hypertension, stage III CKD, depression, anxiety that is seen for follow up in the GI clinic today for follow-up.  She is previously known to our office for history of serrated polyposis syndrome and history of advanced colon polyp, undergoing annual colonoscopy surveillance, as well as history of ischemic colitis.  Most recently, she has been dealing with fluctuating bowel habits, occasionally seeing [place] stool chart 1 stools, other times [place] stool chart 4-5.  She has had episodes of fecal incontinence, passing a little bit of stool with urinating.  Other times, she has seen a little bit of stool and undergarments.  She is generally not aware of these episodes.  She has not had any abdominal pain, bloating, tenesmus.  She has not seen any blood in the stool but is occasionally seen some mucus.  She is currently scheduled for her follow-up colonoscopy in July.    As of now, she is not using any specific bowel regimen medications.  She has not had BM for about a day now.  Most recent abdominal imaging was a CT scan in 2022 in the context of history of ischemic colitis, this was essentially normal.     ROS:    10 point ROS neg other than the symptoms noted above in the HPI.    PREVIOUS ENDOSCOPY:  Reviewed, see HPI    PERTINENT RELEVANT IMAGING OR LABS:  Reviewed    ALLERGIES:     Allergies   Allergen Reactions     Hydroxyzine      Made her very tired     Other Environmental Allergy Other (See Comments)     Seasonal Allergies      Sulfa Antibiotics      Vomiting       Vicodin [Hydrocodone-Acetaminophen] Nausea       PERTINENT MEDICATIONS:    Current Outpatient Medications:      ALLEGRA  ALLERGY 180 MG tablet, Take 1 tablet (180 mg) by mouth daily, Disp: 90 tablet, Rfl: 3     allopurinol (ZYLOPRIM) 100 MG tablet, Take 1 tablet by mouth once a day as directed, Disp: , Rfl:      aspirin (ASPIRIN LOW DOSE) 81 MG EC tablet, Take 1 tablet (81 mg) by mouth daily, Disp: , Rfl:      atorvastatin (LIPITOR) 40 MG tablet, Take 1 tablet by mouth daily at 2 pm, Disp: , Rfl:      buPROPion (WELLBUTRIN XL) 150 MG 24 hr tablet, TAKE 1 TABLET BY MOUTH EVERY MORNING, Disp: 90 tablet, Rfl: 2     DULoxetine (CYMBALTA) 60 MG capsule, Take 1 capsule (60 mg) by mouth daily, Disp: 30 capsule, Rfl: 1     fluticasone (FLONASE) 50 MCG/ACT nasal spray, Spray 1 spray into both nostrils daily, Disp: 16 mL, Rfl: 5     hydroxychloroquine (PLAQUENIL) 200 MG tablet, Take 300 mg by mouth daily Take 1 200 mg tablet and 1/2 of 200mg, Disp: 120 tablet, Rfl: 1     isosorbide mononitrate (IMDUR) 30 MG 24 hr tablet, Take 1 Tablet by mouth once daily, Disp: 90 tablet, Rfl: 1     losartan (COZAAR) 100 MG tablet, Take 1 tablet by mouth daily at 2 pm, Disp: , Rfl:      NIFEdipine ER (ADALAT CC) 30 MG 24 hr tablet, Take 1 tablet (30 mg) by mouth daily, Disp: 90 tablet, Rfl: 0     pantoprazole (PROTONIX) 40 MG EC tablet, Take 1 tablet (40 mg) by mouth daily, Disp: 90 tablet, Rfl: 3     VITAMIN D, CHOLECALCIFEROL, PO, Take 2,000 Units by mouth daily , Disp: , Rfl:      albuterol (PROAIR HFA) 108 (90 Base) MCG/ACT inhaler, INHALE 2 PUFS BY MOUTH EVERY 6 HOURS AS NEEDED FOR SHORTNESS OF BREATH / DYSPNEA (Patient not taking: Reported on 4/16/2024), Disp: 8.5 g, Rfl: 11     augmented betamethasone dipropionate (DIPROLENE-AF) 0.05 % external ointment, APPLY TO PSORIASIS ON FEET DAILY UNTIL CLEAR THEN STOP.RESTART AS NEEDED FOR FLARES (Patient not taking: Reported on 9/26/2023), Disp: , Rfl:      diclofenac (VOLTAREN) 1 % topical gel, Apply 2 g topically 4 times daily as needed for moderate pain (4-6) (Patient not taking: Reported on 4/16/2024), Disp:  150 g, Rfl: 2     LORazepam (ATIVAN) 0.5 MG tablet, Take 1 tablet (0.5 mg) by mouth daily as needed (panic attack) (Patient not taking: Reported on 4/16/2024), Disp: 30 tablet, Rfl: 0     order for DME, Equipment being ordered: light lamp for seasonal affective disorder (Patient not taking: Reported on 4/16/2024), Disp: 1 Device, Rfl: 0    Current Facility-Administered Medications:      2.0 mL bupivacaine (MARCAINE) 0.5% injection (50 mL vial), 2 mL, , , Avinash, Ancelmo, DO, 2 mL at 10/05/23 1241     2.0 mL bupivacaine (MARCAINE) 0.5% injection (50 mL vial), 2 mL, , , Avinash, Ancelmo, DO, 2 mL at 10/05/23 1241     lidocaine 1 % injection 2 mL, 2 mL, , , Avinash, Ancelmo, DO, 2 mL at 10/05/23 1241     lidocaine 1 % injection 2 mL, 2 mL, , , Avinash, Ancelmo, DO, 2 mL at 10/05/23 1241     triamcinolone (KENALOG-40) injection 40 mg, 40 mg, , , Avinash, Ancelmo, DO, 40 mg at 10/05/23 1241     triamcinolone (KENALOG-40) injection 40 mg, 40 mg, , , Avinash, Ancelmo, DO, 40 mg at 10/05/23 1241    Facility-Administered Medications Ordered in Other Visits:      sodium chloride (PF) 0.9% PF flush 10 mL, 10 mL, Intravenous, Once, Hilda Lopez MD    PROBLEM LIST  Patient Active Problem List    Diagnosis Date Noted     Proteinuria, unspecified type 08/12/2023     Priority: Medium     Seasonal allergies 08/08/2023     Priority: Medium     Serrated polyposis syndrome 05/05/2023     Priority: Medium     Panic attack 04/20/2021     Priority: Medium     Atrophic vaginitis 04/20/2021     Priority: Medium     Gastroesophageal reflux disease 05/23/2019     Priority: Medium     Coronary vasospasm (H24) 12/07/2018     Priority: Medium     Marijuana use, episodic 09/10/2018     Priority: Medium     CREST (calcinosis, Raynaud's phenomenon, esophageal dysfunction, sclerodactyly, telangiectasia) (H)      Priority: Medium     Limited systemic sclerosis (H)      Priority: Medium     Raynaud's disease without gangrene 01/29/2018     Priority: Medium      MARCELINA (generalized anxiety disorder) 01/08/2018     Priority: Medium     Tension headache 09/28/2017     Priority: Medium     Patient is followed by Annette Painting MD PhD for ongoing prescription of pain medication.  All refills should only be approved by this provider, or covering partner.    Medication(s): Tylenol with codeine (T#3).   Maximum quantity per month: 15  Clinic visit frequency required: Q 6  months     Controlled substance agreement:  Encounter-Level CSA - 08/07/2017:              Controlled Substance Agreement - Scan on 8/16/2017 10:21 AM : CONTROLLED SUBSTANCE AGREEMENT (below)                Pain Clinic evaluation in the past: No    DIRE Total Score(s):  No flowsheet data found.    Last [facility] website verification:  none   [email or web address]       Raynaud's phenomenon without gangrene 03/01/2017     Priority: Medium     ACP (advance care planning) 08/19/2015     Priority: Medium     Advance Care Planning 8/19/2015: ACP Review and Resources Provided:  Reviewed chart for advance care plan.  Marcelina NEVAREZ Daynerobel has no plan or code status on file however states presence of ACP document. Copy requested.  Confirmed/documented legally designated decision maker(s). Added by Mary Padilla RN Advance Care Planning Liaison with [employer]           Anemia in other chronic diseases classified elsewhere 01/09/2015     Priority: Medium     B12, TSH, Iron study normal in 2018       Status post total hysterectomy 11/18/2014     Priority: Medium     Allergic rhinitis due to pollen 08/02/2014     Priority: Medium     Menopausal syndrome (hot flashes) 12/19/2013     Priority: Medium     Hyperlipidemia LDL goal <100 06/17/2013     Priority: Medium     Stage 3a chronic kidney disease (H) 06/17/2013     Priority: Medium     Moderate episode of recurrent major depressive disorder (H) 09/10/2012     Priority: Medium     Celexa not helping, fluoxetine caused rash.        Hypertension goal BP (blood pressure) <  140/90 01/18/2011     Priority: Medium     Keratitis sicca, bilateral (H24) 06/10/2013     Priority: Low     Chronic night sweats 01/18/2011     Priority: Low     Worse with menstruation. On Clonidine.       Atopic rhinitis 01/18/2011     Priority: Low     (Problem list name updated by automated process. Provider to review and confirm.)         PERTINENT PAST MEDICAL HISTORY:  Past Medical History:   Diagnosis Date     CREST (calcinosis, Raynaud's phenomenon, esophageal dysfunction, sclerodactyly, telangiectasia) (H)      Hyperlipidemia      Hypertension      Hypertriglyceridemia 01/18/2011    Controlled with simvastatin.        Limited systemic sclerosis (H)      Positive MINDY (antinuclear antibody)      Raynaud disease        PREVIOUS SURGERIES:  Past Surgical History:   Procedure Laterality Date     APPENDECTOMY       BIOPSY      cervical node     COLONOSCOPY N/A 11/10/2021    Procedure: COLONOSCOPY, FLEXIBLE, WITH LESION REMOVAL USING SNARE;  Surgeon: Domingo Wall MD;  Location: [facility]     COLONOSCOPY N/A 11/10/2021    Procedure: COLONOSCOPY, WITH POLYPECTOMY AND BIOPSY;  Surgeon: Domingo Wall MD;  Location: [facility]     COLONOSCOPY N/A 9/29/2021    Procedure: Colonoscopy, With Polypectomy And Biopsy;  Surgeon: Domingo Wall MD;  Location: [facility]     COLONOSCOPY N/A 9/29/2021    Procedure: Colonoscopy, Flexible, With Lesion Removal Using Snare;  Surgeon: Domingo Wall MD;  Location: [facility]     COLONOSCOPY N/A 6/29/2022    Procedure: COLONOSCOPY, FLEXIBLE, WITH LESION REMOVAL USING SNARE;  Surgeon: Domingo Wall MD;  [facility]     COLONOSCOPY N/A 11/9/2022    Procedure: COLONOSCOPY, WITH POLYPECTOMY AND BIOPSY;  Surgeon: Domingo Wall MD;  [facility]     COLONOSCOPY N/A 6/12/2023    Procedure: COLONOSCOPY, FLEXIBLE, WITH LESION REMOVAL USING SNARE;  Surgeon: Domingo Wall MD;  [facility]     COLONOSCOPY  WITH CO2 INSUFFLATION N/A 10/26/2020    Procedure: COLONOSCOPY, WITH CO2 INSUFFLATION;  Surgeon: Phyllis Chaudhari MD;  Location: [facility]     COLONOSCOPY WITH CO2 INSUFFLATION N/A 11/10/2021    Procedure: COLONOSCOPY, WITH CO2 INSUFFLATION;  Surgeon: Domingo Wall MD;  [facility]     COLONOSCOPY WITH CO2 INSUFFLATION N/A 9/29/2021    Procedure: COLONOSCOPY, WITH CO2 INSUFFLATION;  Surgeon: Domingo Wall MD;  [facility]     COLONOSCOPY WITH CO2 INSUFFLATION N/A 6/29/2022    Procedure: COLONOSCOPY, WITH CO2 INSUFFLATION;  Surgeon: Domingo Wall MD;  [facility]     COLONOSCOPY WITH CO2 INSUFFLATION N/A 11/9/2022    Procedure: COLONOSCOPY, WITH CO2 INSUFFLATION;  Surgeon: Domingo Wall MD;  [facility]     COLONOSCOPY WITH CO2 INSUFFLATION N/A 6/12/2023    Procedure: Colonoscopy with CO2 insufflation;  Surgeon: Domingo Wall MD;  [facility]     COMBINED ESOPHAGOSCOPY, GASTROSCOPY, DUODENOSCOPY (EGD) WITH CO2 INSUFFLATION N/A 6/29/2022    Procedure: ESOPHAGOGASTRODUODENOSCOPY, WITH CO2 INSUFFLATION;  Surgeon: Domingo Wall MD;  [facility]     DILATION AND CURETTAGE, HYSTEROSCOPY DIAGNOSTIC, COMBINED  7/1/2014    Procedure: COMBINED DILATION AND CURETTAGE, HYSTEROSCOPY DIAGNOSTIC;  Surgeon: Mana Cabrera DO;  Location: [facility]     ENT SURGERY      tonsillectomy and adnoid removal     ESOPHAGOSCOPY, GASTROSCOPY, DUODENOSCOPY (EGD), COMBINED N/A 6/29/2022    Procedure: ESOPHAGOGASTRODUODENOSCOPY, WITH BIOPSY;  Surgeon: Domingo Wall MD;  Location: [facility]     HYSTERECTOMY TOTAL ABDOMINAL       ORTHOPEDIC SURGERY      left knee tear meniscus     RELEASE CARPAL TUNNEL BILATERAL  2009       SOCIAL HISTORY:  Social History     Socioeconomic History     Marital status:      Spouse name: Not on file     Number of children: Not on file     Years of education: Not on file     Highest education  level: Not on file   Occupational History     Not on file   Tobacco Use     Smoking status: Former     Current packs/day: 0.00     Types: Cigarettes     Quit date: 2001     Years since quittin.3     Smokeless tobacco: Never   Vaping Use     Vaping status: Never Used   Substance and Sexual Activity     Alcohol use: Yes     Comment: 5-8 drinks/week     Drug use: No     Sexual activity: Yes     Partners: Male     Birth control/protection: Surgical     Comment: vasectomy   Other Topics Concern     Parent/sibling w/ CABG, MI or angioplasty before 65F 55M? 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     Hobby Hazards No     Sleep Concern No     Stress Concern No     Weight Concern No     Special Diet No     Back Care No     Exercise Yes     Comment: 4 days per week     Bike Helmet Yes     Seat Belt Yes     Self-Exams Yes   Social History Narrative     Not on file     Social Determinants of Health     Financial Resource Strain: Not on file   Food Insecurity: Not on file   Transportation Needs: Not on file   Physical Activity: Not on file   Stress: Not on file   Social Connections: Not on file   Interpersonal Safety: Low Risk  (2024)    Interpersonal Safety      Do you feel physically and emotionally safe where you currently live?: Yes      Within the past 12 months, have you been hit, slapped, kicked or otherwise physically hurt by someone?: No      Within the past 12 months, have you been humiliated or emotionally abused in other ways by your partner or ex-partner?: No   Housing Stability: Not on file       FAMILY HISTORY:  Family History   Problem Relation Age of Onset     Osteoporosis Mother      Eye Disorder Mother         cataract, mac degen     Macular Degeneration Mother      Dementia Mother      Hypertension Maternal Grandmother      Diabetes Maternal Grandfather      Eye Disorder Paternal Grandmother          "glaucoma     Unknown/Adopted Paternal Grandfather      Hypertension Daughter      Graves' disease Daughter      Diabetes Other      Glaucoma No family hx of        Past/family/social history reviewed and no changes    PHYSICAL EXAMINATION:  Constitutional: aaox3, cooperative, pleasant, not dyspneic/diaphoretic, no acute distress  Vitals reviewed: /74 (BP Location: Left arm, Patient Position: Sitting, Cuff Size: Adult Regular)   Pulse 89   Ht 1.52 m (4' 11.85\")   Wt 67.7 kg (149 lb 3.2 oz)   LMP 06/12/2014   SpO2 100%   BMI 29.28 kg/m    Wt:   Wt Readings from Last 2 Encounters:   04/23/24 67.7 kg (149 lb 3.2 oz)   04/18/24 65.8 kg (145 lb)      Eyes: Sclera anicteric/injected  CV: No edema  Respiratory: Unlabored breathing  Skin: warm, perfused, no jaundice  Psych: Normal affect  MSK: Normal gait                      Again, thank you for allowing me to participate in the care of your patient.        Sincerely,        aLurent Guerrero PA-C  "

## 2024-04-23 NOTE — PROGRESS NOTES
FOLLOW UP GI CLINIC VISIT    CC/REFERRING MD:  No ref. provider found  REASON FOR CONSULTATION:   No ref. provider found for   Chief Complaint   Patient presents with    Follow Up     Follow up of ischemic colitis, urgency, changes in bowel habits, mucus in stool, fecal incontinence.  Last seen per Dr. Wall on 05/17/2022       ASSESSMENT/PLAN: Patient is here for evaluation of some fluctuating bowel habits, describing some intermittent fecal incontinence.  We spent some time reviewing potential etiologies, which would include chronic constipation with overflow, pelvic floor dysfunction, less likely proctitis.  Will plan for abdominal x-ray today to assess for stool burden.  If significant, will consider sustained use of osmotic laxatives.  If only mild to moderate, would consider bulk forming options such as Benefiber.  She also does have colonoscopy scheduled a few months from now.  If bowel regimen medications do not work, we will make sure that evaluation includes assessment of the rectal area and detailed digital rectal examination to assess for tone and pelvic organ prolapse.  Patient stated understanding of plan and I will reach out when x-ray report is read.    1. Incomplete defecation  - XR Abdomen 2 Views; Future    2. Incontinence of feces, unspecified fecal incontinence type  - XR Abdomen 2 Views; Future    3. Altered bowel habits  - XR Abdomen 2 Views; Future    4. History of colonic polyps    Thank you for this consultation.  It was a pleasure to participate in the care of this patient; please contact us with any further questions.      20 minutes spent on the date of the encounter doing chart review, patient visit, and documentation    This note was created with voice recognition software, and while reviewed for accuracy, typos may remain.     Laurent Guerrero PA-C  Division of Gastroenterology, Hepatology and Nutrition  [facility]  Marcelina Estevez  is a 64 year old female with a past medical history significant for crest, hypertension, stage III CKD, depression, anxiety that is seen for follow up in the GI clinic today for follow-up.  She is previously known to our office for history of serrated polyposis syndrome and history of advanced colon polyp, undergoing annual colonoscopy surveillance, as well as history of ischemic colitis.  Most recently, she has been dealing with fluctuating bowel habits, occasionally seeing [place] stool chart 1 stools, other times [place] stool chart 4-5.  She has had episodes of fecal incontinence, passing a little bit of stool with urinating.  Other times, she has seen a little bit of stool and undergarments.  She is generally not aware of these episodes.  She has not had any abdominal pain, bloating, tenesmus.  She has not seen any blood in the stool but is occasionally seen some mucus.  She is currently scheduled for her follow-up colonoscopy in July.    As of now, she is not using any specific bowel regimen medications.  She has not had BM for about a day now.  Most recent abdominal imaging was a CT scan in 2022 in the context of history of ischemic colitis, this was essentially normal.     ROS:    10 point ROS neg other than the symptoms noted above in the HPI.    PREVIOUS ENDOSCOPY:  Reviewed, see HPI    PERTINENT RELEVANT IMAGING OR LABS:  Reviewed    ALLERGIES:     Allergies   Allergen Reactions    Hydroxyzine      Made her very tired    Other Environmental Allergy Other (See Comments)    Seasonal Allergies     Sulfa Antibiotics      Vomiting      Vicodin [Hydrocodone-Acetaminophen] Nausea       PERTINENT MEDICATIONS:    Current Outpatient Medications:     ALLEGRA ALLERGY 180 MG tablet, Take 1 tablet (180 mg) by mouth daily, Disp: 90 tablet, Rfl: 3    allopurinol (ZYLOPRIM) 100 MG tablet, Take 1 tablet by mouth once a day as directed, Disp: , Rfl:     aspirin (ASPIRIN LOW DOSE) 81 MG EC tablet, Take 1 tablet (81 mg) by  mouth daily, Disp: , Rfl:     atorvastatin (LIPITOR) 40 MG tablet, Take 1 tablet by mouth daily at 2 pm, Disp: , Rfl:     buPROPion (WELLBUTRIN XL) 150 MG 24 hr tablet, TAKE 1 TABLET BY MOUTH EVERY MORNING, Disp: 90 tablet, Rfl: 2    DULoxetine (CYMBALTA) 60 MG capsule, Take 1 capsule (60 mg) by mouth daily, Disp: 30 capsule, Rfl: 1    fluticasone (FLONASE) 50 MCG/ACT nasal spray, Spray 1 spray into both nostrils daily, Disp: 16 mL, Rfl: 5    hydroxychloroquine (PLAQUENIL) 200 MG tablet, Take 300 mg by mouth daily Take 1 200 mg tablet and 1/2 of 200mg, Disp: 120 tablet, Rfl: 1    isosorbide mononitrate (IMDUR) 30 MG 24 hr tablet, Take 1 Tablet by mouth once daily, Disp: 90 tablet, Rfl: 1    losartan (COZAAR) 100 MG tablet, Take 1 tablet by mouth daily at 2 pm, Disp: , Rfl:     NIFEdipine ER (ADALAT CC) 30 MG 24 hr tablet, Take 1 tablet (30 mg) by mouth daily, Disp: 90 tablet, Rfl: 0    pantoprazole (PROTONIX) 40 MG EC tablet, Take 1 tablet (40 mg) by mouth daily, Disp: 90 tablet, Rfl: 3    VITAMIN D, CHOLECALCIFEROL, PO, Take 2,000 Units by mouth daily , Disp: , Rfl:     albuterol (PROAIR HFA) 108 (90 Base) MCG/ACT inhaler, INHALE 2 PUFS BY MOUTH EVERY 6 HOURS AS NEEDED FOR SHORTNESS OF BREATH / DYSPNEA (Patient not taking: Reported on 4/16/2024), Disp: 8.5 g, Rfl: 11    augmented betamethasone dipropionate (DIPROLENE-AF) 0.05 % external ointment, APPLY TO PSORIASIS ON FEET DAILY UNTIL CLEAR THEN STOP.RESTART AS NEEDED FOR FLARES (Patient not taking: Reported on 9/26/2023), Disp: , Rfl:     diclofenac (VOLTAREN) 1 % topical gel, Apply 2 g topically 4 times daily as needed for moderate pain (4-6) (Patient not taking: Reported on 4/16/2024), Disp: 150 g, Rfl: 2    LORazepam (ATIVAN) 0.5 MG tablet, Take 1 tablet (0.5 mg) by mouth daily as needed (panic attack) (Patient not taking: Reported on 4/16/2024), Disp: 30 tablet, Rfl: 0    order for DME, Equipment being ordered: light lamp for seasonal affective disorder  (Patient not taking: Reported on 4/16/2024), Disp: 1 Device, Rfl: 0    Current Facility-Administered Medications:     2.0 mL bupivacaine (MARCAINE) 0.5% injection (50 mL vial), 2 mL, , , Avinash, Ancelmo, DO, 2 mL at 10/05/23 1241    2.0 mL bupivacaine (MARCAINE) 0.5% injection (50 mL vial), 2 mL, , , Avinash, Ancelmo, DO, 2 mL at 10/05/23 1241    lidocaine 1 % injection 2 mL, 2 mL, , , Avinash, Ancelmo, DO, 2 mL at 10/05/23 1241    lidocaine 1 % injection 2 mL, 2 mL, , , Avinash, Ancelmo, DO, 2 mL at 10/05/23 1241    triamcinolone (KENALOG-40) injection 40 mg, 40 mg, , , Avinash, Ancelmo, DO, 40 mg at 10/05/23 1241    triamcinolone (KENALOG-40) injection 40 mg, 40 mg, , , Avinash, Ancelmo, DO, 40 mg at 10/05/23 1241    Facility-Administered Medications Ordered in Other Visits:     sodium chloride (PF) 0.9% PF flush 10 mL, 10 mL, Intravenous, Once, Hilda Lopez MD    PROBLEM LIST  Patient Active Problem List    Diagnosis Date Noted    Proteinuria, unspecified type 08/12/2023     Priority: Medium    Seasonal allergies 08/08/2023     Priority: Medium    Serrated polyposis syndrome 05/05/2023     Priority: Medium    Panic attack 04/20/2021     Priority: Medium    Atrophic vaginitis 04/20/2021     Priority: Medium    Gastroesophageal reflux disease 05/23/2019     Priority: Medium    Coronary vasospasm (H24) 12/07/2018     Priority: Medium    Marijuana use, episodic 09/10/2018     Priority: Medium    CREST (calcinosis, Raynaud's phenomenon, esophageal dysfunction, sclerodactyly, telangiectasia) (H)      Priority: Medium    Limited systemic sclerosis (H)      Priority: Medium    Raynaud's disease without gangrene 01/29/2018     Priority: Medium    MARCELINA (generalized anxiety disorder) 01/08/2018     Priority: Medium    Tension headache 09/28/2017     Priority: Medium     Patient is followed by Annette Painting MD PhD for ongoing prescription of pain medication.  All refills should only be approved by this provider, or covering  partner.    Medication(s): Tylenol with codeine (T#3).   Maximum quantity per month: 15  Clinic visit frequency required: Q 6  months     Controlled substance agreement:  Encounter-Level CSA - 08/07/2017:              Controlled Substance Agreement - Scan on 8/16/2017 10:21 AM : CONTROLLED SUBSTANCE AGREEMENT (below)                Pain Clinic evaluation in the past: No    DIRE Total Score(s):  No flowsheet data found.    Last [facility] website verification:  none   [email or web address]      Raynaud's phenomenon without gangrene 03/01/2017     Priority: Medium    ACP (advance care planning) 08/19/2015     Priority: Medium     Advance Care Planning 8/19/2015: ACP Review and Resources Provided:  Reviewed chart for advance care plan.  Marcelina Estevez has no plan or code status on file however states presence of ACP document. Copy requested.  Confirmed/documented legally designated decision maker(s). Added by Mary Padilla RN Advance Care Planning Liaison with [employer]          Anemia in other chronic diseases classified elsewhere 01/09/2015     Priority: Medium     B12, TSH, Iron study normal in 2018      Status post total hysterectomy 11/18/2014     Priority: Medium    Allergic rhinitis due to pollen 08/02/2014     Priority: Medium    Menopausal syndrome (hot flashes) 12/19/2013     Priority: Medium    Hyperlipidemia LDL goal <100 06/17/2013     Priority: Medium    Stage 3a chronic kidney disease (H) 06/17/2013     Priority: Medium    Moderate episode of recurrent major depressive disorder (H) 09/10/2012     Priority: Medium     Celexa not helping, fluoxetine caused rash.       Hypertension goal BP (blood pressure) < 140/90 01/18/2011     Priority: Medium    Keratitis sicca, bilateral (H24) 06/10/2013     Priority: Low    Chronic night sweats 01/18/2011     Priority: Low     Worse with menstruation. On Clonidine.      Atopic rhinitis 01/18/2011     Priority: Low     (Problem list name updated by automated  process. Provider to review and confirm.)         PERTINENT PAST MEDICAL HISTORY:  Past Medical History:   Diagnosis Date    CREST (calcinosis, Raynaud's phenomenon, esophageal dysfunction, sclerodactyly, telangiectasia) (H)     Hyperlipidemia     Hypertension     Hypertriglyceridemia 01/18/2011    Controlled with simvastatin.       Limited systemic sclerosis (H)     Positive MINDY (antinuclear antibody)     Raynaud disease        PREVIOUS SURGERIES:  Past Surgical History:   Procedure Laterality Date    APPENDECTOMY      BIOPSY      cervical node    COLONOSCOPY N/A 11/10/2021    Procedure: COLONOSCOPY, FLEXIBLE, WITH LESION REMOVAL USING SNARE;  Surgeon: Domingo Wall MD;  Location: [facility]    COLONOSCOPY N/A 11/10/2021    Procedure: COLONOSCOPY, WITH POLYPECTOMY AND BIOPSY;  Surgeon: Domingo Wall MD;  Location: [facility]    COLONOSCOPY N/A 9/29/2021    Procedure: Colonoscopy, With Polypectomy And Biopsy;  Surgeon: Domingo Wall MD;  Location: [facility]    COLONOSCOPY N/A 9/29/2021    Procedure: Colonoscopy, Flexible, With Lesion Removal Using Snare;  Surgeon: Domingo Wall MD;  Location: [facility]    COLONOSCOPY N/A 6/29/2022    Procedure: COLONOSCOPY, FLEXIBLE, WITH LESION REMOVAL USING SNARE;  Surgeon: Domingo Wall MD;  [facility]    COLONOSCOPY N/A 11/9/2022    Procedure: COLONOSCOPY, WITH POLYPECTOMY AND BIOPSY;  Surgeon: Domingo Wall MD;  [facility]    COLONOSCOPY N/A 6/12/2023    Procedure: COLONOSCOPY, FLEXIBLE, WITH LESION REMOVAL USING SNARE;  Surgeon: Domingo Wall MD;  [facility]    COLONOSCOPY WITH CO2 INSUFFLATION N/A 10/26/2020    Procedure: COLONOSCOPY, WITH CO2 INSUFFLATION;  Surgeon: Phyllis Chaudhari MD;  Location: [facility]    COLONOSCOPY WITH CO2 INSUFFLATION N/A 11/10/2021    Procedure: COLONOSCOPY, WITH CO2 INSUFFLATION;  Surgeon: Domingo Wall MD;  [facility]    COLONOSCOPY  WITH CO2 INSUFFLATION N/A 2021    Procedure: COLONOSCOPY, WITH CO2 INSUFFLATION;  Surgeon: Domingo Wall MD;  [facility]    COLONOSCOPY WITH CO2 INSUFFLATION N/A 2022    Procedure: COLONOSCOPY, WITH CO2 INSUFFLATION;  Surgeon: Domingo Wall MD;  [facility]    COLONOSCOPY WITH CO2 INSUFFLATION N/A 2022    Procedure: COLONOSCOPY, WITH CO2 INSUFFLATION;  Surgeon: Domingo Wall MD;  [facility]    COLONOSCOPY WITH CO2 INSUFFLATION N/A 2023    Procedure: Colonoscopy with CO2 insufflation;  Surgeon: Domingo Wall MD;  [facility]    COMBINED ESOPHAGOSCOPY, GASTROSCOPY, DUODENOSCOPY (EGD) WITH CO2 INSUFFLATION N/A 2022    Procedure: ESOPHAGOGASTRODUODENOSCOPY, WITH CO2 INSUFFLATION;  Surgeon: Domingo Wall MD;  [facility]    DILATION AND CURETTAGE, HYSTEROSCOPY DIAGNOSTIC, COMBINED  2014    Procedure: COMBINED DILATION AND CURETTAGE, HYSTEROSCOPY DIAGNOSTIC;  Surgeon: Mana Cabrera DO;  Location: [facility]    ENT SURGERY      tonsillectomy and adnoid removal    ESOPHAGOSCOPY, GASTROSCOPY, DUODENOSCOPY (EGD), COMBINED N/A 2022    Procedure: ESOPHAGOGASTRODUODENOSCOPY, WITH BIOPSY;  Surgeon: Domingo Wall MD;  Location: [facility]    HYSTERECTOMY TOTAL ABDOMINAL      ORTHOPEDIC SURGERY      left knee tear meniscus    RELEASE CARPAL TUNNEL BILATERAL         SOCIAL HISTORY:  Social History     Socioeconomic History    Marital status:      Spouse name: Not on file    Number of children: Not on file    Years of education: Not on file    Highest education level: Not on file   Occupational History    Not on file   Tobacco Use    Smoking status: Former     Current packs/day: 0.00     Types: Cigarettes     Quit date: 2001     Years since quittin.3    Smokeless tobacco: Never   Vaping Use    Vaping status: Never Used   Substance and Sexual Activity    Alcohol use: Yes      Comment: 5-8 drinks/week    Drug use: No    Sexual activity: Yes     Partners: Male     Birth control/protection: Surgical     Comment: vasectomy   Other Topics Concern    Parent/sibling w/ CABG, MI or angioplasty before 65F 55M? 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    Hobby Hazards No    Sleep Concern No    Stress Concern No    Weight Concern No    Special Diet No    Back Care No    Exercise Yes     Comment: 4 days per week    Bike Helmet Yes    Seat Belt Yes    Self-Exams Yes   Social History Narrative    Not on file     Social Determinants of Health     Financial Resource Strain: Not on file   Food Insecurity: Not on file   Transportation Needs: Not on file   Physical Activity: Not on file   Stress: Not on file   Social Connections: Not on file   Interpersonal Safety: Low Risk  (4/16/2024)    Interpersonal Safety     Do you feel physically and emotionally safe where you currently live?: Yes     Within the past 12 months, have you been hit, slapped, kicked or otherwise physically hurt by someone?: No     Within the past 12 months, have you been humiliated or emotionally abused in other ways by your partner or ex-partner?: No   Housing Stability: Not on file       FAMILY HISTORY:  Family History   Problem Relation Age of Onset    Osteoporosis Mother     Eye Disorder Mother         cataract, mac degen    Macular Degeneration Mother     Dementia Mother     Hypertension Maternal Grandmother     Diabetes Maternal Grandfather     Eye Disorder Paternal Grandmother         glaucoma    Unknown/Adopted Paternal Grandfather     Hypertension Daughter     Graves' disease Daughter     Diabetes Other     Glaucoma No family hx of        Past/family/social history reviewed and no changes    PHYSICAL EXAMINATION:  Constitutional: aaox3, cooperative, pleasant, not dyspneic/diaphoretic, no acute distress  Vitals reviewed: /74 (BP  "Location: Left arm, Patient Position: Sitting, Cuff Size: Adult Regular)   Pulse 89   Ht 1.52 m (4' 11.85\")   Wt 67.7 kg (149 lb 3.2 oz)   LMP 06/12/2014   SpO2 100%   BMI 29.28 kg/m    Wt:   Wt Readings from Last 2 Encounters:   04/23/24 67.7 kg (149 lb 3.2 oz)   04/18/24 65.8 kg (145 lb)      Eyes: Sclera anicteric/injected  CV: No edema  Respiratory: Unlabored breathing  Skin: warm, perfused, no jaundice  Psych: Normal affect  MSK: Normal gait                    "

## 2024-04-24 ENCOUNTER — TELEPHONE (OUTPATIENT)
Dept: GASTROENTEROLOGY | Facility: CLINIC | Age: 64
End: 2024-04-24
Payer: COMMERCIAL

## 2024-04-24 NOTE — TELEPHONE ENCOUNTER
I called patient and reviewed x-ray findings, mild to moderate amount of stool, not concerning for overflow.  Plan for fiber supplement as we previously discussed.    Laurent Guerrero PA-C

## 2024-05-04 PROBLEM — I73.00 RAYNAUD'S DISEASE WITHOUT GANGRENE: Status: RESOLVED | Noted: 2018-01-29 | Resolved: 2024-05-04

## 2024-05-06 ENCOUNTER — TELEPHONE (OUTPATIENT)
Dept: FAMILY MEDICINE | Facility: CLINIC | Age: 64
End: 2024-05-06
Payer: COMMERCIAL

## 2024-05-06 DIAGNOSIS — G89.29 CHRONIC PAIN OF BOTH KNEES: Primary | ICD-10-CM

## 2024-05-06 DIAGNOSIS — M25.561 CHRONIC PAIN OF BOTH KNEES: Primary | ICD-10-CM

## 2024-05-06 DIAGNOSIS — M25.562 CHRONIC PAIN OF BOTH KNEES: Primary | ICD-10-CM

## 2024-05-06 NOTE — TELEPHONE ENCOUNTER
[facility] Call Center    Phone Message    May a detailed message be left on voicemail: yes     Reason for Call: Other: PER PATIENT: I am calling to request for orthopedic referral for  location .      Action Taken: Other:  primary care clinic pool     Travel Screening: Not Applicable

## 2024-05-14 NOTE — PROGRESS NOTES
Marcelina Estevez  :  1960  DOS: 2024  MRN: [id number]  PCP: Annette Painting    Sports Medicine Clinic Visit    Interim History - May 16, 2024  - Last seen on 10/5/2023 for bilateral knee pain.   - Bilateral knee corticosteroid injection completed on 10/5/2023 provided great relief for 2+ months.    - Since the last visit, she notes that she has had continued bilateral knee pain with left worse then right. 2024 corticosteroid injection of bilateral knees with Dr. Louis that offered no relief. Over the winter, was unable to walk while golfing due to pain. Pain is over the bilateral anteromedial knees. Takes Tylenol. Is interested in other options besides corticosteroid injections. Is having less and less relief with each subsequent injection.  - No interim injury.       Initial Visit: 2023  HPI  Marcelina Estevez is a 63 year old female who is seen as a self referral presenting with bilateral knee pain.    - Mechanism of Injury:  No inciting injury.   - Prior evaluation:  Dr. Louis on 2023, Left knee steroid injection done with this office visit provided some relief, Right knee, 2023 injection done with Dr. Louis.    - Pain Character:  Pain has been present for  2+ years .  Pain is well localized to the medial knee without significant radiation.   - Endorses:  Weakness occasionally which may be antalgic, pain as described above, mild swelling.  - Denies:  clicking, popping, grinding, mechanical locking symptoms, instability, numbness, tingling, radicular shooting pain.   - Alleviating factors: Rest, activity modifications  - Aggravating factors:  stairs, prolonged walking, prolonged standing, uneven ground  - Treatments tried:   injections, topicals or creams , home exercises from PT from previous surgery in the left knee    - Patient Goals:  be able to manage the symptoms successfully, injections today  - Social History: Retired but works at a [employer]    - Pertinent PMH:  Left knee meniscectomy  surgery 10+ years ago.  Leaves for a trip, cruise around [place], on 10/11/2023 and is interested in injections before her trip.      Review of Systems  Musculoskeletal: as above  Remainder of review of systems is negative including constitutional, CV, pulmonary, GI, Skin and Neurologic except as noted in HPI or medical history.    Past Medical History:   Diagnosis Date    CREST (calcinosis, Raynaud's phenomenon, esophageal dysfunction, sclerodactyly, telangiectasia) (H)     Hyperlipidemia     Hypertension     Hypertriglyceridemia 01/18/2011    Controlled with simvastatin.       Limited systemic sclerosis (H)     Menopausal syndrome (hot flashes) 12/19/2013    Positive MINDY (antinuclear antibody)     Raynaud disease      Past Surgical History:   Procedure Laterality Date    APPENDECTOMY      BIOPSY      cervical node    COLONOSCOPY N/A 11/10/2021    Procedure: COLONOSCOPY, FLEXIBLE, WITH LESION REMOVAL USING SNARE;  Surgeon: Domingo Wall MD;  [facility]    COLONOSCOPY N/A 11/10/2021    Procedure: COLONOSCOPY, WITH POLYPECTOMY AND BIOPSY;  Surgeon: Domingo Wall MD;  [facility]    COLONOSCOPY N/A 9/29/2021    Procedure: Colonoscopy, With Polypectomy And Biopsy;  Surgeon: Domingo Wall MD;  [facility]    COLONOSCOPY N/A 9/29/2021    Procedure: Colonoscopy, Flexible, With Lesion Removal Using Snare;  Surgeon: Domingo Wall MD;  [facility]    COLONOSCOPY N/A 6/29/2022    Procedure: COLONOSCOPY, FLEXIBLE, WITH LESION REMOVAL USING SNARE;  Surgeon: Domingo Wall MD;  [facility]    COLONOSCOPY N/A 11/9/2022    Procedure: COLONOSCOPY, WITH POLYPECTOMY AND BIOPSY;  Surgeon: Domingo Wall MD;  [facility]    COLONOSCOPY N/A 6/12/2023    Procedure: COLONOSCOPY, FLEXIBLE, WITH LESION REMOVAL USING SNARE;  Surgeon: Domingo Wall MD;  [facility]    COLONOSCOPY WITH CO2  INSUFFLATION N/A 10/26/2020    Procedure: COLONOSCOPY, WITH CO2 INSUFFLATION;  Surgeon: Phyllis Chaudhari MD;  [facility]    COLONOSCOPY WITH CO2 INSUFFLATION N/A 11/10/2021    Procedure: COLONOSCOPY, WITH CO2 INSUFFLATION;  Surgeon: Domingo Wall MD;  [facility]    COLONOSCOPY WITH CO2 INSUFFLATION N/A 9/29/2021    Procedure: COLONOSCOPY, WITH CO2 INSUFFLATION;  Surgeon: Domingo Wall MD;  [facility]    COLONOSCOPY WITH CO2 INSUFFLATION N/A 6/29/2022    Procedure: COLONOSCOPY, WITH CO2 INSUFFLATION;  Surgeon: Domingo Wall MD;  [facility]    COLONOSCOPY WITH CO2 INSUFFLATION N/A 11/9/2022    Procedure: COLONOSCOPY, WITH CO2 INSUFFLATION;  Surgeon: Domingo Wall MD;  [facility]    COLONOSCOPY WITH CO2 INSUFFLATION N/A 6/12/2023    Procedure: Colonoscopy with CO2 insufflation;  Surgeon: Domingo Wall MD;  [facility]    COMBINED ESOPHAGOSCOPY, GASTROSCOPY, DUODENOSCOPY (EGD) WITH CO2 INSUFFLATION N/A 6/29/2022    Procedure: ESOPHAGOGASTRODUODENOSCOPY, WITH CO2 INSUFFLATION;  Surgeon: Domingo Wall MD;  [facility]    DILATION AND CURETTAGE, HYSTEROSCOPY DIAGNOSTIC, COMBINED  7/1/2014    Procedure: COMBINED DILATION AND CURETTAGE, HYSTEROSCOPY DIAGNOSTIC;  Surgeon: Mana Cabrera DO;  [facility]    ENT SURGERY      tonsillectomy and adnoid removal    ESOPHAGOSCOPY, GASTROSCOPY, DUODENOSCOPY (EGD), COMBINED N/A 6/29/2022    Procedure: ESOPHAGOGASTRODUODENOSCOPY, WITH BIOPSY;  Surgeon: Domingo Wall MD;  [facility]    HYSTERECTOMY TOTAL ABDOMINAL      ORTHOPEDIC SURGERY      left knee tear meniscus    RELEASE CARPAL TUNNEL BILATERAL  2009     Family History   Problem Relation Age of Onset    Osteoporosis Mother     Eye Disorder Mother         cataract, mac degen    Macular Degeneration Mother     Dementia Mother     Hypertension Maternal Grandmother     Diabetes Maternal  Grandfather     Eye Disorder Paternal Grandmother         glaucoma    Unknown/Adopted Paternal Grandfather     Hypertension Daughter     Graves' disease Daughter     Diabetes Other     Glaucoma No family hx of          Objective  /83   Wt 67.7 kg (149 lb 3.2 oz)   LMP 06/12/2014   BMI 29.28 kg/m      General: healthy, alert and in no acute distress.    HEENT: no scleral icterus or conjunctival erythema.   Skin: no suspicious lesions or rash. No jaundice.   CV: regular rhythm by palpation, 2+ distal pulses.  Resp: normal respiratory effort without conversational dyspnea.   Psych: normal mood and affect.    Gait: nonantalgic, appropriate coordination and balance.     Neuro:        - Sensation to light touch:    - Intact throughout the BLE including all peripheral nerve distributions.        - MSR:      RLE  LLE  - Patella 2+ 2+  - Achilles 2+ 2+       - Special tests:   - Slump/SLR:  Neg b/l    MSK - Knee:       - Inspection:    - No significant effusion, erythema, warmth, ecchymosis, lesion.        - ROM:    - Full AROM/PROM with pain during knee flexion/extension.        - Palpation:    - TTP at the medial joint line.  - NTTP elsewhere.        - Strength:  (*antalgic)  RLE LLE  - Hip Flexion  5 5    - Hip Abduction 5 5   - Hip Adduction 5 5  - Knee Flexion  5 5  - Knee Extension 5 5  - Dorsiflexion  5 5  - Plantarflexion 5 5  - Ext. Jeffrey. Longus 5 5  - Inversion  5 5  - Eversion  5 5       - Special tests:        - Lachman:  Neg         - A/P drawer:  Neg        - Pivot shift:  Neg    - Donna:  Neg      - Varus stress:  Neg for laxity or pain     - Valgus stress:  Neg for laxity or pain    - Patellar grind: Positive      Radiology  I independently reviewed relevant imaging, with the following interpretation:  - XR B/L knees 10/5/2023 shows superior patellar enthesopathy bilaterally, small joint effusions bilaterally, mild to moderate medial compartment degenerative changes.        Assessment  1. Primary  osteoarthritis of both knees    2. Chronic pain of both knees          Plan  Marcelina Estevez is a 63 year old female that presents for follow-up of her chronic bilateral knee pain secondary to primary osteoarthritis.  Significant history of arthroscopic medial meniscectomy on the left 10+ years ago.  She has managed her symptoms well conservatively with medications and corticosteroid injections usually through her PCP.  We performed knee corticosteroid injections on 10/5/2023 that provided 4+ months of relief.  More recent injections with PCP has not provided significant benefit, performed last month.  She feels that the corticosteroid injections have become less efficacious over time and she would like to discuss additional treatment options.    History and physical exam today continue to appear most consistent with bilateral primary osteoarthritis of the knees.     Discussed the nature of the condition and treatment options and mutually agreed upon the following plan:    - Imaging:          - Reviewed relevant imaging in the chart.    - Reviewed results and images with patient.   - Medications:          - Discussed pharmacologic options for pain relief.   - May use Acetaminophen (Tylenol) as needed for pain control.  Tends to avoid oral NSAIDs, which is reasonable.  - May also use topical medications such as lidocaine, IcyHot, BioFreeze, or Voltaren gel as needed for pain control.  May use Voltaren gel up to 4 times per day as needed over the bilateral knees.  - Also discussed supplements for joint health and pain control and recommended consideration of glucosamine-chondroitin and turmeric as acceptable supplements that have been proven to help joint health and pain.  - Injections:          - Discussed possible injection options and alternatives.    - Options include intra-articular knee joint injections with corticosteroid, viscosupplementation, or PRP.    -Corticosteroid injections have become less efficacious  over time.  She is interested in other options.  I offered ultrasound-guided corticosteroid injections, viscosupplementation, and PRP.  She is interested in trying viscosupplementation.  We have submitted prior authorization and will perform the injections when they are approved.  - Therapy:          - Recommend continuing with home exercise program as instructed.  May consider formal physical therapy referral if requested.  - Modalities:          - May use ice, heat, massage or other modalities as needed.   - Bracing:          - Discussed bracing options and may consider using a knee stability brace for stability and symptom control when ambulating and prolonged walking.  May let us know if she is interested in obtaining a brace.  - Activity:          - Encouraged to remain active and participate in regular activities as symptoms allow.    - Follow up:          - In 2 weeks for viscosupplementation injections if approved by her insurance.  - Patient has clinic contact information for questions or concerns.       Ancelmo Da Silva DO, CAQSM  [facility] - Sports Medicine  [facility] - Department of Orthopedic Surgery       Disclaimer:  This note was prepared and written using Dragon Medical dictation software. As a result, there may be errors in the script that have gone undetected. Please consider this when interpreting the information in this note.

## 2024-05-16 ENCOUNTER — OFFICE VISIT (OUTPATIENT)
Dept: ORTHOPEDICS | Facility: CLINIC | Age: 64
End: 2024-05-16
Attending: FAMILY MEDICINE
Payer: COMMERCIAL

## 2024-05-16 VITALS — DIASTOLIC BLOOD PRESSURE: 83 MMHG | SYSTOLIC BLOOD PRESSURE: 128 MMHG | BODY MASS INDEX: 29.28 KG/M2 | WEIGHT: 149.2 LBS

## 2024-05-16 DIAGNOSIS — M25.562 CHRONIC PAIN OF BOTH KNEES: ICD-10-CM

## 2024-05-16 DIAGNOSIS — M25.561 CHRONIC PAIN OF BOTH KNEES: ICD-10-CM

## 2024-05-16 DIAGNOSIS — G89.29 CHRONIC PAIN OF BOTH KNEES: ICD-10-CM

## 2024-05-16 DIAGNOSIS — M17.0 PRIMARY OSTEOARTHRITIS OF BOTH KNEES: Primary | ICD-10-CM

## 2024-05-16 PROCEDURE — 99213 OFFICE O/P EST LOW 20 MIN: CPT | Performed by: STUDENT IN AN ORGANIZED HEALTH CARE EDUCATION/TRAINING PROGRAM

## 2024-05-16 ASSESSMENT — PAIN SCALES - GENERAL: PAINLEVEL: MILD PAIN (2)

## 2024-05-16 NOTE — LETTER
2024         RE: Marcelina Estevez  [address] [place] [address]        Dear Colleague,    Thank you for referring your patient, Marcelina Estevez, to the [facility] [place]. Please see a copy of my visit note below.    Marcelina Estevez  :  1960  DOS: 2024  MRN: [id number]  PCP: Annette Painting    Sports Medicine Clinic Visit    Interim History - May 16, 2024  - Last seen on 10/5/2023 for bilateral knee pain.   - Bilateral knee corticosteroid injection completed on 10/5/2023 provided great relief for 2+ months.    - Since the last visit, she notes that she has had continued bilateral knee pain with left worse then right. 2024 corticosteroid injection of bilateral knees with Dr. Louis that offered no relief. Over the winter, was unable to walk while golfing due to pain. Pain is over the bilateral anteromedial knees. Takes Tylenol. Is interested in other options besides corticosteroid injections. Is having less and less relief with each subsequent injection.  - No interim injury.       Initial Visit: 2023  HPI  Marcelina Estevez is a 63 year old female who is seen as a self referral presenting with bilateral knee pain.    - Mechanism of Injury:  No inciting injury.   - Prior evaluation:  Dr. Louis on 2023, Left knee steroid injection done with this office visit provided some relief, Right knee, 2023 injection done with Dr. Louis.    - Pain Character:  Pain has been present for  2+ years .  Pain is well localized to the medial knee without significant radiation.   - Endorses:  Weakness occasionally which may be antalgic, pain as described above, mild swelling.  - Denies:  clicking, popping, grinding, mechanical locking symptoms, instability, numbness, tingling, radicular shooting pain.   - Alleviating factors: Rest, activity modifications  - Aggravating factors:  stairs, prolonged walking, prolonged standing, uneven ground  - Treatments tried:    injections, topicals or creams , home exercises from PT from previous surgery in the left knee    - Patient Goals:  be able to manage the symptoms successfully, injections today  - Social History: Retired but works at a [employer]    - Pertinent PMH:  Left knee meniscectomy surgery 10+ years ago.  Leaves for a trip, cruise around [place], on 10/11/2023 and is interested in injections before her trip.      Review of Systems  Musculoskeletal: as above  Remainder of review of systems is negative including constitutional, CV, pulmonary, GI, Skin and Neurologic except as noted in HPI or medical history.    Past Medical History:   Diagnosis Date     CREST (calcinosis, Raynaud's phenomenon, esophageal dysfunction, sclerodactyly, telangiectasia) (H)      Hyperlipidemia      Hypertension      Hypertriglyceridemia 01/18/2011    Controlled with simvastatin.        Limited systemic sclerosis (H)      Menopausal syndrome (hot flashes) 12/19/2013     Positive MINDY (antinuclear antibody)      Raynaud disease      Past Surgical History:   Procedure Laterality Date     APPENDECTOMY       BIOPSY      cervical node     COLONOSCOPY N/A 11/10/2021    Procedure: COLONOSCOPY, FLEXIBLE, WITH LESION REMOVAL USING SNARE;  Surgeon: Domingo Wall MD;  [facility]     COLONOSCOPY N/A 11/10/2021    Procedure: COLONOSCOPY, WITH POLYPECTOMY AND BIOPSY;  Surgeon: Domigno Wall MD;  [facility]     COLONOSCOPY N/A 9/29/2021    Procedure: Colonoscopy, With Polypectomy And Biopsy;  Surgeon: Domingo Wall MD;  [facility]     COLONOSCOPY N/A 9/29/2021    Procedure: Colonoscopy, Flexible, With Lesion Removal Using Snare;  Surgeon: Domingo Wall MD;  [facility]     COLONOSCOPY N/A 6/29/2022    Procedure: COLONOSCOPY, FLEXIBLE, WITH LESION REMOVAL USING SNARE;  Surgeon: Domingo Wall MD;  [facility]     COLONOSCOPY N/A 11/9/2022    Procedure:  COLONOSCOPY, WITH POLYPECTOMY AND BIOPSY;  Surgeon: Domingo Wall MD;  [facility]     COLONOSCOPY N/A 6/12/2023    Procedure: COLONOSCOPY, FLEXIBLE, WITH LESION REMOVAL USING SNARE;  Surgeon: Domingo Wall MD;  [facility]     COLONOSCOPY WITH CO2 INSUFFLATION N/A 10/26/2020    Procedure: COLONOSCOPY, WITH CO2 INSUFFLATION;  Surgeon: Phyllis Chaudhari MD;  [facility]     COLONOSCOPY WITH CO2 INSUFFLATION N/A 11/10/2021    Procedure: COLONOSCOPY, WITH CO2 INSUFFLATION;  Surgeon: Domingo Wall MD;  [facility]     COLONOSCOPY WITH CO2 INSUFFLATION N/A 9/29/2021    Procedure: COLONOSCOPY, WITH CO2 INSUFFLATION;  Surgeon: Domingo Wall MD;  [facility]     COLONOSCOPY WITH CO2 INSUFFLATION N/A 6/29/2022    Procedure: COLONOSCOPY, WITH CO2 INSUFFLATION;  Surgeon: Domingo Wall MD;  [facility]     COLONOSCOPY WITH CO2 INSUFFLATION N/A 11/9/2022    Procedure: COLONOSCOPY, WITH CO2 INSUFFLATION;  Surgeon: Domingo Wall MD;  [facility]     COLONOSCOPY WITH CO2 INSUFFLATION N/A 6/12/2023    Procedure: Colonoscopy with CO2 insufflation;  Surgeon: Domingo Wall MD;  [facility]     COMBINED ESOPHAGOSCOPY, GASTROSCOPY, DUODENOSCOPY (EGD) WITH CO2 INSUFFLATION N/A 6/29/2022    Procedure: ESOPHAGOGASTRODUODENOSCOPY, WITH CO2 INSUFFLATION;  Surgeon: Domingo Wall MD;  [facility]     DILATION AND CURETTAGE, HYSTEROSCOPY DIAGNOSTIC, COMBINED  7/1/2014    Procedure: COMBINED DILATION AND CURETTAGE, HYSTEROSCOPY DIAGNOSTIC;  Surgeon: Mana Cabrera DO;  [facility]     ENT SURGERY      tonsillectomy and adnoid removal     ESOPHAGOSCOPY, GASTROSCOPY, DUODENOSCOPY (EGD), COMBINED N/A 6/29/2022    Procedure: ESOPHAGOGASTRODUODENOSCOPY, WITH BIOPSY;  Surgeon: Domingo Wall MD;  [facility]     HYSTERECTOMY TOTAL ABDOMINAL       ORTHOPEDIC SURGERY      left  knee tear meniscus     RELEASE CARPAL TUNNEL BILATERAL  2009     Family History   Problem Relation Age of Onset     Osteoporosis Mother      Eye Disorder Mother         cataract, mac degen     Macular Degeneration Mother      Dementia Mother      Hypertension Maternal Grandmother      Diabetes Maternal Grandfather      Eye Disorder Paternal Grandmother         glaucoma     Unknown/Adopted Paternal Grandfather      Hypertension Daughter      Graves' disease Daughter      Diabetes Other      Glaucoma No family hx of          Objective  /83   Wt 67.7 kg (149 lb 3.2 oz)   LMP 06/12/2014   BMI 29.28 kg/m      General: healthy, alert and in no acute distress.    HEENT: no scleral icterus or conjunctival erythema.   Skin: no suspicious lesions or rash. No jaundice.   CV: regular rhythm by palpation, 2+ distal pulses.  Resp: normal respiratory effort without conversational dyspnea.   Psych: normal mood and affect.    Gait: nonantalgic, appropriate coordination and balance.     Neuro:        - Sensation to light touch:    - Intact throughout the BLE including all peripheral nerve distributions.        - MSR:      RLE  LLE  - Patella 2+ 2+  - Achilles 2+ 2+       - Special tests:   - Slump/SLR:  Neg b/l    MSK - Knee:       - Inspection:    - No significant effusion, erythema, warmth, ecchymosis, lesion.        - ROM:    - Full AROM/PROM with pain during knee flexion/extension.        - Palpation:    - TTP at the medial joint line.  - NTTP elsewhere.        - Strength:  (*antalgic)  RLE LLE  - Hip Flexion  5 5    - Hip Abduction 5 5   - Hip Adduction 5 5  - Knee Flexion  5 5  - Knee Extension 5 5  - Dorsiflexion  5 5  - Plantarflexion 5 5  - Ext. Jeffrey. Longus 5 5  - Inversion  5 5  - Eversion  5 5       - Special tests:        - Lachman:  Neg         - A/P drawer:  Neg        - Pivot shift:  Neg    - Donna:  Neg      - Varus stress:  Neg for laxity or pain     - Valgus stress:  Neg for laxity or pain    - Patellar  grind: Positive      Radiology  I independently reviewed relevant imaging, with the following interpretation:  - XR B/L knees 10/5/2023 shows superior patellar enthesopathy bilaterally, small joint effusions bilaterally, mild to moderate medial compartment degenerative changes.        Assessment  1. Primary osteoarthritis of both knees    2. Chronic pain of both knees          Plan  Marcelina Estevez is a 63 year old female that presents for follow-up of her chronic bilateral knee pain secondary to primary osteoarthritis.  Significant history of arthroscopic medial meniscectomy on the left 10+ years ago.  She has managed her symptoms well conservatively with medications and corticosteroid injections usually through her PCP.  We performed knee corticosteroid injections on 10/5/2023 that provided 4+ months of relief.  More recent injections with PCP has not provided significant benefit, performed last month.  She feels that the corticosteroid injections have become less efficacious over time and she would like to discuss additional treatment options.    History and physical exam today continue to appear most consistent with bilateral primary osteoarthritis of the knees.     Discussed the nature of the condition and treatment options and mutually agreed upon the following plan:    - Imaging:          - Reviewed relevant imaging in the chart.    - Reviewed results and images with patient.   - Medications:          - Discussed pharmacologic options for pain relief.   - May use Acetaminophen (Tylenol) as needed for pain control.  Tends to avoid oral NSAIDs, which is reasonable.  - May also use topical medications such as lidocaine, IcyHot, BioFreeze, or Voltaren gel as needed for pain control.  May use Voltaren gel up to 4 times per day as needed over the bilateral knees.  - Also discussed supplements for joint health and pain control and recommended consideration of glucosamine-chondroitin and turmeric as acceptable  supplements that have been proven to help joint health and pain.  - Injections:          - Discussed possible injection options and alternatives.    - Options include intra-articular knee joint injections with corticosteroid, viscosupplementation, or PRP.    -Corticosteroid injections have become less efficacious over time.  She is interested in other options.  I offered ultrasound-guided corticosteroid injections, viscosupplementation, and PRP.  She is interested in trying viscosupplementation.  We have submitted prior authorization and will perform the injections when they are approved.  - Therapy:          - Recommend continuing with home exercise program as instructed.  May consider formal physical therapy referral if requested.  - Modalities:          - May use ice, heat, massage or other modalities as needed.   - Bracing:          - Discussed bracing options and may consider using a knee stability brace for stability and symptom control when ambulating and prolonged walking.  May let us know if she is interested in obtaining a brace.  - Activity:          - Encouraged to remain active and participate in regular activities as symptoms allow.    - Follow up:          - In 2 weeks for viscosupplementation injections if approved by her insurance.  - Patient has clinic contact information for questions or concerns.       Ancelmo Da Silva DO, CAQSM  [facility] - Sports Medicine  [facility] - Department of Orthopedic Surgery       Disclaimer:  This note was prepared and written using Dragon Medical dictation software. As a result, there may be errors in the script that have gone undetected. Please consider this when interpreting the information in this note.       Again, thank you for allowing me to participate in the care of your patient.        Sincerely,        Ancelmo Da Silva DO

## 2024-05-22 NOTE — PROGRESS NOTES
Marcelina Estevez  :  1960  DOS: 2024  MRN: [id number]    Sports Medicine Clinic Procedure    Ultrasound Guided Bilateral Intra-Articular Knee SynviscOne Injection, +/- Aspiration    Clinical History: Primary osteoarthritis of both knees    Diagnosis:   1. Primary osteoarthritis of both knees        Large Joint Injection/Arthocentesis: bilateral knee    Date/Time: 2024 9:38 AM    Performed by: Ancelmo Da Silva DO  Authorized by: Ancelmo Da Silva DO    Indications:  Osteoarthritis  Needle Size:  22 G  Guidance: ultrasound    Approach:  Anterolateral  Location:  Knee  Laterality:  Bilateral      Medications (Right):  48 mg hylan 48 MG/6ML  Medications (Left):  48 mg hylan 48 MG/6ML  Outcome:  Tolerated well, no immediate complications  Procedure discussed: discussed risks, benefits, and alternatives    Consent Given by:  Patient  Prep: patient was prepped and draped in usual sterile fashion     Ultrasound images of procedure were permanently stored.         Ultrasound Guided Intra-articular Knee Viscosupplementation Injection  The knee was prepped and draped in a sterile manner.  Ultrasound identification of the patella, suprapatellar pouch, quadriceps tendon and femur in both long and short axis was obtained. The probe was placed in short axis to the femur.  A 1.5 inch 22 gauge needle was placed under ultrasound guidance into the superior knee joint using a lateral approach.  A prefilled syringe of SynviscOne solution was injected without difficulty. The needle was removed and there was good hemostasis without complications.  Patient tolerated procedure well.  There was ultrasound documentation of needle placement and injection.  The procedure was duplicated on the contralateral side using an identical protocol.       Impression:  Successful bilateral knee joint viscosupplementation injection.    Plan:  - Injection:    - Expectations and goals of the injection were discussed and verbal and written  consent was obtained.  - Performed a viscosupplementation injection of the bilateral knee joints today in clinic. Patient tolerated the procedure well without complications.    - Post-procedure instructions:    - Keep the injection site clean and dry.   - Do not submerge the injection site for 24 hours (no baths, pools). Showers are ok.   - Rest the area for 24-48 hours before resuming normal activities. Avoid overexerting the area for the first few weeks.   - It may take up to 3-4 weeks to feel significant benefits.   - Follow up:          - In 6+ months as needed for updates to treatment plan, or sooner for new/worsening symptoms.  - Patient has clinic contact information for questions or concerns.      Ancelmo Da Silva DO, PAULETTE  [facility] - Sports Medicine  [facility] - Department of Orthopedic Surgery     Disclaimer:  This note was prepared and written using Dragon Medical dictation software. As a result, there may be errors in the script that have gone undetected. Please consider this when interpreting the information in this note.

## 2024-05-30 ENCOUNTER — OFFICE VISIT (OUTPATIENT)
Dept: ORTHOPEDICS | Facility: CLINIC | Age: 64
End: 2024-05-30
Payer: COMMERCIAL

## 2024-05-30 DIAGNOSIS — M17.0 PRIMARY OSTEOARTHRITIS OF BOTH KNEES: Primary | ICD-10-CM

## 2024-05-30 PROCEDURE — 20611 DRAIN/INJ JOINT/BURSA W/US: CPT | Mod: 50 | Performed by: STUDENT IN AN ORGANIZED HEALTH CARE EDUCATION/TRAINING PROGRAM

## 2024-05-30 NOTE — LETTER
2024         RE: Marcelina Estevez  [address] [place] [address]        Dear Colleague,    Thank you for referring your patient, Marcelina Estevez, to the [facility] [place]. Please see a copy of my visit note below.    Marcelina Estevez  :  1960  DOS: 2024  MRN: [id number]    Sports Medicine Clinic Procedure    Ultrasound Guided Bilateral Intra-Articular Knee SynviscOne Injection, +/- Aspiration    Clinical History: Primary osteoarthritis of both knees    Diagnosis:   1. Primary osteoarthritis of both knees        Large Joint Injection/Arthocentesis: bilateral knee    Date/Time: 2024 9:38 AM    Performed by: Ancelmo Da Silva DO  Authorized by: Ancelmo Da Silva DO    Indications:  Osteoarthritis  Needle Size:  22 G  Guidance: ultrasound    Approach:  Anterolateral  Location:  Knee  Laterality:  Bilateral      Medications (Right):  48 mg hylan 48 MG/6ML  Medications (Left):  48 mg hylan 48 MG/6ML  Outcome:  Tolerated well, no immediate complications  Procedure discussed: discussed risks, benefits, and alternatives    Consent Given by:  Patient  Prep: patient was prepped and draped in usual sterile fashion     Ultrasound images of procedure were permanently stored.         Ultrasound Guided Intra-articular Knee Viscosupplementation Injection  The knee was prepped and draped in a sterile manner.  Ultrasound identification of the patella, suprapatellar pouch, quadriceps tendon and femur in both long and short axis was obtained. The probe was placed in short axis to the femur.  A 1.5 inch 22 gauge needle was placed under ultrasound guidance into the superior knee joint using a lateral approach.  A prefilled syringe of SynviscOne solution was injected without difficulty. The needle was removed and there was good hemostasis without complications.  Patient tolerated procedure well.  There was ultrasound documentation of needle placement and injection.  The procedure  was duplicated on the contralateral side using an identical protocol.       Impression:  Successful bilateral knee joint viscosupplementation injection.    Plan:  - Injection:    - Expectations and goals of the injection were discussed and verbal and written consent was obtained.  - Performed a viscosupplementation injection of the bilateral knee joints today in clinic. Patient tolerated the procedure well without complications.    - Post-procedure instructions:    - Keep the injection site clean and dry.   - Do not submerge the injection site for 24 hours (no baths, pools). Showers are ok.   - Rest the area for 24-48 hours before resuming normal activities. Avoid overexerting the area for the first few weeks.   - It may take up to 3-4 weeks to feel significant benefits.   - Follow up:          - In 6+ months as needed for updates to treatment plan, or sooner for new/worsening symptoms.  - Patient has clinic contact information for questions or concerns.      Ancelmo Da Silva DO, CAQSM  [facility] Sports Medicine  [facility] - Department of Orthopedic Surgery     Disclaimer:  This note was prepared and written using Dragon Medical dictation software. As a result, there may be errors in the script that have gone undetected. Please consider this when interpreting the information in this note.       Again, thank you for allowing me to participate in the care of your patient.        Sincerely,        Ancelmo Da Silva DO

## 2024-06-05 RX ORDER — BISACODYL 5 MG/1
TABLET, DELAYED RELEASE ORAL
Qty: 4 TABLET | Refills: 0 | Status: SHIPPED | OUTPATIENT
Start: 2024-06-05 | End: 2024-08-14

## 2024-06-05 NOTE — TELEPHONE ENCOUNTER
Extended Golytely Bowel Prep . Instructions were sent via letter. Bowel prep was sent 6/5/2024 to      [facility] - [place], [place] - [address] [place] [address]

## 2024-06-20 ENCOUNTER — TELEPHONE (OUTPATIENT)
Dept: FAMILY MEDICINE | Facility: CLINIC | Age: 64
End: 2024-06-20
Payer: COMMERCIAL

## 2024-06-20 NOTE — TELEPHONE ENCOUNTER
Patient states her liver enzymes are elevated again and she was told to contact her primary, her rheumatologist told her to contact us.    Patient takes tylenol arthritis 8 hour (650 mg) and she takes 2 of these 2-3 times daily.    Please advise what she should do next?    Summer Julian RN on 6/20/2024 at 11:50 AM

## 2024-06-20 NOTE — TELEPHONE ENCOUNTER
Tylenol is processed in the liver and can cause injury to the liver itself. I would recommend that she stop all tylenol products and any alcohol, since alcohol use or overuse can injure the liver also.  Generally I recommend rechecking labs in 2 months to see if they are improving.  Please have Dr. Louis placed those orders so the lab results go to him.      Electronically signed by:  Stephon Carter M.D.  6/20/2024

## 2024-06-21 NOTE — TELEPHONE ENCOUNTER
Talked to Marcelina, she has been seeing her specialist, and has had 3 labs drawn.  The second lab it went down, this last lab draw she had her numbers were very high and escalated.      The soonest I could schedule her was Aug 12 and she was not comfortable with that.  She wants Dr. Louis to find a spot for her ASAP.  She called [facility] where her labs were drawn and requested a STAT fax of her labs to be faxed her.

## 2024-06-22 NOTE — TELEPHONE ENCOUNTER
Her liver enzymes is quite elevated.  Does seem to be an acute problem.  She need to be seen for further evaluation.  I will not be able to get her in for a while.  Please have her follow-up with any available providers.  Thank you

## 2024-06-24 ENCOUNTER — OFFICE VISIT (OUTPATIENT)
Dept: FAMILY MEDICINE | Facility: CLINIC | Age: 64
End: 2024-06-24
Payer: COMMERCIAL

## 2024-06-24 ENCOUNTER — TELEPHONE (OUTPATIENT)
Dept: FAMILY MEDICINE | Facility: CLINIC | Age: 64
End: 2024-06-24

## 2024-06-24 VITALS
OXYGEN SATURATION: 100 % | TEMPERATURE: 97.1 F | DIASTOLIC BLOOD PRESSURE: 82 MMHG | HEIGHT: 60 IN | SYSTOLIC BLOOD PRESSURE: 130 MMHG | WEIGHT: 145 LBS | BODY MASS INDEX: 28.47 KG/M2 | HEART RATE: 75 BPM

## 2024-06-24 DIAGNOSIS — G89.29 CHRONIC PAIN OF LEFT KNEE: ICD-10-CM

## 2024-06-24 DIAGNOSIS — R74.8 ELEVATED ALKALINE PHOSPHATASE LEVEL: ICD-10-CM

## 2024-06-24 DIAGNOSIS — M25.562 CHRONIC PAIN OF LEFT KNEE: ICD-10-CM

## 2024-06-24 DIAGNOSIS — G89.29 CHRONIC PAIN OF RIGHT KNEE: ICD-10-CM

## 2024-06-24 DIAGNOSIS — R79.89 ELEVATED LFTS: Primary | ICD-10-CM

## 2024-06-24 DIAGNOSIS — M25.561 CHRONIC PAIN OF RIGHT KNEE: ICD-10-CM

## 2024-06-24 LAB
ALBUMIN SERPL BCG-MCNC: 4.6 G/DL (ref 3.5–5.2)
ALP SERPL-CCNC: 987 U/L (ref 40–150)
ALT SERPL W P-5'-P-CCNC: 391 U/L (ref 0–50)
AST SERPL W P-5'-P-CCNC: 298 U/L (ref 0–45)
BILIRUB DIRECT SERPL-MCNC: <0.2 MG/DL (ref 0–0.3)
BILIRUB SERPL-MCNC: 0.5 MG/DL
HAV AB SER QL IA: NONREACTIVE
HBV SURFACE AB SERPL IA-ACNC: <3.5 M[IU]/ML
HBV SURFACE AB SERPL IA-ACNC: NONREACTIVE M[IU]/ML
HBV SURFACE AG SERPL QL IA: NONREACTIVE
HCV AB SERPL QL IA: NONREACTIVE
HIV 1+2 AB+HIV1 P24 AG SERPL QL IA: NONREACTIVE
PROT SERPL-MCNC: 7.5 G/DL (ref 6.4–8.3)

## 2024-06-24 PROCEDURE — 87389 HIV-1 AG W/HIV-1&-2 AB AG IA: CPT

## 2024-06-24 PROCEDURE — 86706 HEP B SURFACE ANTIBODY: CPT

## 2024-06-24 PROCEDURE — 36415 COLL VENOUS BLD VENIPUNCTURE: CPT

## 2024-06-24 PROCEDURE — 87340 HEPATITIS B SURFACE AG IA: CPT

## 2024-06-24 PROCEDURE — 86803 HEPATITIS C AB TEST: CPT

## 2024-06-24 PROCEDURE — 86708 HEPATITIS A ANTIBODY: CPT

## 2024-06-24 PROCEDURE — 80076 HEPATIC FUNCTION PANEL: CPT

## 2024-06-24 PROCEDURE — 99214 OFFICE O/P EST MOD 30 MIN: CPT

## 2024-06-24 ASSESSMENT — PAIN SCALES - GENERAL: PAINLEVEL: EXTREME PAIN (9)

## 2024-06-24 NOTE — PATIENT INSTRUCTIONS
Labs today    Stop taking tylenol    Stop drinking alcohol    Follow up in 1 week to check liver enzymes    Follow up with liver specialist if needed, call to schedule    Voltaren gel 4 times a day     Lidocaine patches 12 hours during the day (take them off when you go to bed)    Ice as needed    Follow up with orthopedics, call to schedule    Assessment  - Elevated liver enzymes possibly due to frequent Tylenol use and alcohol consumption  - Knee pain possibly requiring orthopedic consultation    Plan  - Stop Tylenol and alcohol use  - Follow up in a week to check liver enzymes  - Referral to orthopedics for knee pain  - Possible referral to hepatologist if liver enzymes do not improve  - Voltaren gel four times a day for knee pain  - Lidocaine patches for knee pain  - Pre-op appointment for upcoming colonoscopy    Prescription  - Voltaren gel, four times a day  - Lidocaine patches, 12 hours during the day    Appointments  Follow-up appointment on July 1st at 10:40 for pre-op and to check liver enzymes      Patient understood and verbally consented to the treatment plan. Discussed symptoms that would warrant an urgent or emergent visit. All of the patients' questions were answered. Patient was instructed to contact the clinic if questions or concerns arise. Recommend follow up appointments if symptoms worsen or fail to improve. Recommend follow up as needed. Recommend ER in the case of an emergency.    Aydee Rosario PA-C    Please note: Voice recognition software may have been used in preparing this note, unintended word substitutions may be present.

## 2024-06-24 NOTE — TELEPHONE ENCOUNTER
Patient called to check on plan of care from PCP after LFT's in thousand range from [facility].    Patient having them fax results now to Dr. Louis fax station at [facility].     Dr. Louis response on Saturday     Zoey Louis MD         6/22/24  1:22 PM  Note  Her liver enzymes is quite elevated.  Does seem to be an acute problem.  She need to be seen for further evaluation.  I will not be able to get her in for a while.  Please have her follow-up with any available providers.  Thank you        Appointments in Next Year      Jun 24, 2024 10:30 AM  (Arrive by 10:10 AM)  Provider Visit with Aydee Rosario PA-C  [facility] ([facility] ) [phone]     Symone Yung RN  [facility] - Registered Nurse  Clinic Triage Jose Alberto   June 24, 2024

## 2024-06-24 NOTE — TELEPHONE ENCOUNTER
Left detailed message for patient to call and schedule an appointment.    - instructed to follow up with any available provider    Sarahi Cuello XRO/

## 2024-06-24 NOTE — PROGRESS NOTES
Assessment & Plan     Elevated LFTs  - Hepatic panel (Albumin, ALT, AST, Bili, Alk Phos, TP); Future  - Hepatitis A Antibody Total; Future  - Hepatitis B Surface Antibody; Future  - Hepatitis B surface antigen; Future  - Hepatitis C antibody; Future  - HIV Antigen Antibody Combo; Future  - Adult GI  Referral - Consult Only; Future  - Hepatic panel (Albumin, ALT, AST, Bili, Alk Phos, TP)  - Hepatitis A Antibody Total  - Hepatitis B Surface Antibody  - Hepatitis B surface antigen  - Hepatitis C antibody  - HIV Antigen Antibody Combo    Chronic pain of left knee  - Orthopedic  Referral; Future    Chronic pain of right knee  - Orthopedic  Referral; Future      I spent a total of 30 minutes on the day of the visit.   Time spent by me doing chart review, history and exam, documentation and further activities per the note          See Patient Instructions  Patient Instructions   Labs today    Stop taking tylenol    Stop drinking alcohol    Follow up in 1 week to check liver enzymes    Follow up with liver specialist if needed, call to schedule    Voltaren gel 4 times a day     Lidocaine patches 12 hours during the day (take them off when you go to bed)    Ice as needed    Follow up with orthopedics, call to schedule    Assessment  - Elevated liver enzymes possibly due to frequent Tylenol use and alcohol consumption  - Knee pain possibly requiring orthopedic consultation    Plan  - Stop Tylenol and alcohol use  - Follow up in a week to check liver enzymes  - Referral to orthopedics for knee pain  - Possible referral to hepatologist if liver enzymes do not improve  - Voltaren gel four times a day for knee pain  - Lidocaine patches for knee pain  - Pre-op appointment for upcoming colonoscopy    Prescription  - Voltaren gel, four times a day  - Lidocaine patches, 12 hours during the day    Appointments  Follow-up appointment on July 1st at 10:40 for pre-op and to check liver enzymes      Patient  understood and verbally consented to the treatment plan. Discussed symptoms that would warrant an urgent or emergent visit. All of the patients' questions were answered. Patient was instructed to contact the clinic if questions or concerns arise. Recommend follow up appointments if symptoms worsen or fail to improve. Recommend follow up as needed. Recommend ER in the case of an emergency.    Aydee Rosario PA-C    Please note: Voice recognition software may have been used in preparing this note, unintended word substitutions may be present.      Elyssa Hewitt is a 64 year old, presenting for the following health issues:  elevated labs        6/24/2024    10:08 AM   Additional Questions   Roomed by Anna Ayala, liver enzymes in the thousands. Called this am, rheumatology said see primary right away, noone called,     Tylenol 2 tablets 2-3 x a day (650 mg), said concerned with so much tylenol, can't take IBU due to ischemic colitis    Drinks rum and diaet coke, 2-3 and 2-3 days    Havent been drinking      May 18th vomiting    Chief complaint  Elevated liver enzymes    History of present illness  - Patient has been seeing a rheumatologist every six months  - Recent liver panel showed elevated liver enzymes  - Patient has been taking Tylenol frequently due to knee pain  - Patient drinks alcohol a few times a week  - Patient experienced vomiting on May 18th  - Patient has scleroderma and kidney disease    Past medical history  - Ischemic colitis a few years ago  - Scleroderma  - Kidney disease    Past surgical history  Knee scoped years ago    Social history  - Retired  - Drinks alcohol a few times a week  - Lives on [facility]    Current medications  - Tylenol, 650mg, 2-3 times a day  - Medication for kidney disease, doubled dose recently (name begins with an A)    Lab results  Elevated liver enzymes                Review of Systems  Constitutional, HEENT, cardiovascular, pulmonary, GI, ,  "musculoskeletal, neuro, skin, endocrine and psych systems are negative, except as otherwise noted.      Objective    /82   Pulse 75   Temp 97.1  F (36.2  C) (Temporal)   Ht 1.519 m (4' 11.8\")   Wt 65.8 kg (145 lb)   LMP 06/12/2014   SpO2 100%   BMI 28.51 kg/m    Body mass index is 28.51 kg/m .  Physical Exam   GENERAL: alert and no distress  EYES: Eyes grossly normal to inspection, PERRL and conjunctivae and sclerae normal  MS: no gross musculoskeletal defects noted, no edema  SKIN: no suspicious lesions or rashes  NEURO: Normal strength and tone, mentation intact and speech normal  PSYCH: mentation appears normal, affect normal/bright tearful.  Crying during the visit when talking about being frustrated that she was not able to be seen sooner    Office Visit on 04/16/2024   Component Date Value Ref Range Status    Cholesterol 04/16/2024 240 (H)  <200 mg/dL Final    Triglycerides 04/16/2024 52  <150 mg/dL Final    Direct Measure HDL 04/16/2024 156  >=50 mg/dL Final    LDL Cholesterol Calculated 04/16/2024 74  <=100 mg/dL Final    Non HDL Cholesterol 04/16/2024 84  <130 mg/dL Final    Patient Fasting > 8hrs? 04/16/2024 Yes   Final    Hemoglobin 04/16/2024 11.2 (L)  11.7 - 15.7 g/dL Final    Sodium 04/16/2024 136  135 - 145 mmol/L Final    Reference intervals for this test were updated on 09/26/2023 to more accurately reflect our healthy population. There may be differences in the flagging of prior results with similar values performed with this method. Interpretation of those prior results can be made in the context of the updated reference intervals.     Potassium 04/16/2024 4.7  3.4 - 5.3 mmol/L Final    Carbon Dioxide (CO2) 04/16/2024 23  22 - 29 mmol/L Final    Anion Gap 04/16/2024 14  7 - 15 mmol/L Final    Urea Nitrogen 04/16/2024 25.1 (H)  8.0 - 23.0 mg/dL Final    Creatinine 04/16/2024 1.11 (H)  0.51 - 0.95 mg/dL Final    GFR Estimate 04/16/2024 55 (L)  >60 mL/min/1.73m2 Final    Calcium " 04/16/2024 10.3 (H)  8.8 - 10.2 mg/dL Final    Chloride 04/16/2024 99  98 - 107 mmol/L Final    Glucose 04/16/2024 85  70 - 99 mg/dL Final    Alkaline Phosphatase 04/16/2024 73  40 - 150 U/L Final    Reference intervals for this test were updated on 11/14/2023 to more accurately reflect our healthy population. There may be differences in the flagging of prior results with similar values performed with this method. Interpretation of those prior results can be made in the context of the updated reference intervals.    AST 04/16/2024 42  0 - 45 U/L Final    Reference intervals for this test were updated on 6/12/2023 to more accurately reflect our healthy population. There may be differences in the flagging of prior results with similar values performed with this method. Interpretation of those prior results can be made in the context of the updated reference intervals.    ALT 04/16/2024 29  0 - 50 U/L Final    Reference intervals for this test were updated on 6/12/2023 to more accurately reflect our healthy population. There may be differences in the flagging of prior results with similar values performed with this method. Interpretation of those prior results can be made in the context of the updated reference intervals.      Protein Total 04/16/2024 7.7  6.4 - 8.3 g/dL Final    Albumin 04/16/2024 4.9  3.5 - 5.2 g/dL Final    Bilirubin Total 04/16/2024 0.5  <=1.2 mg/dL Final    Total Protein Urine mg/dL 04/18/2024 22.8    mg/dL Final    The reference ranges have not been established in urine protein. The results should be integrated into the clinical context for interpretation.    Total Protein Urine mg/mg Creat 04/18/2024 0.33 (H)  0.00 - 0.20 mg/mg Cr Final    Creatinine Urine mg/dL 04/18/2024 70.1  mg/dL Final    The reference ranges have not been established in urine creatinine. The results should be integrated into the clinical context for interpretation.     No results found for any visits on 06/24/24.  No  results found.        Signed Electronically by: Aydee Rosario PA-C

## 2024-06-25 ENCOUNTER — HOSPITAL ENCOUNTER (OUTPATIENT)
Dept: ULTRASOUND IMAGING | Facility: CLINIC | Age: 64
Discharge: HOME | End: 2024-06-25
Payer: COMMERCIAL

## 2024-06-25 DIAGNOSIS — R79.89 ELEVATED LFTS: ICD-10-CM

## 2024-06-25 DIAGNOSIS — R74.8 ELEVATED ALKALINE PHOSPHATASE LEVEL: ICD-10-CM

## 2024-06-25 PROCEDURE — 76705 ECHO EXAM OF ABDOMEN: CPT

## 2024-06-25 NOTE — RESULT ENCOUNTER NOTE
Hello -    Here are my comments about the recent results. IMPRESSION:  1.  Unremarkable limited abdominal ultrasound.      Please let us know if you have any questions or concerns.    Regards,  Aydee Rosario PA-C

## 2024-06-26 ENCOUNTER — TELEPHONE (OUTPATIENT)
Dept: GASTROENTEROLOGY | Facility: CLINIC | Age: 64
End: 2024-06-26
Payer: COMMERCIAL

## 2024-06-26 NOTE — TELEPHONE ENCOUNTER
Pre assessment completed for upcoming procedure.   (Please see previous telephone encounter notes for complete details)    Patient  returned call.       Procedure details:    Arrival time and facility location reviewed.    Pre op exam needed? N/A    Designated  policy reviewed. Instructed to have someone stay 24  hours post procedure.       Medication review:    Medications reviewed. Please see supporting documentation below. Holding recommendations discussed (if applicable).       Prep for procedure:     Procedure prep instructions reviewed.        Any additional information needed:  N/A      Patient  verbalized understanding and had no questions or concerns at this time.      Marcelle Black RN  Endoscopy Procedure Pre Assessment   [phone] option 4

## 2024-06-26 NOTE — TELEPHONE ENCOUNTER
Pre visit planning completed.      Procedure details:    Patient scheduled for Colonoscopy  on 7/3/24.     Arrival time: 1255. Procedure time 1340    Facility location: [facility]; [address], [place] [address]. Check in location: 2nd Floor at Surgery desk.    Sedation type: MAC    Pre op exam needed? N/A    Indication for procedure: hx of polyps, 1 year follow up      Chart review:     Electronic implanted devices? No    Recent diagnosis of diverticulitis within the last 6 weeks? No    Diabetic? No      Medication review:    Anticoagulants? No    NSAIDS? No    Other medication HOLDING recommendations:  Cannabis/Marijuana: Stop night before procedure.      Prep for procedure:     Bowel prep recommendation: Extended Golytely. Bowel prep prescription sent to    [facility] [place], [place] - [address], [address]  Due to: constipation noted or reported. Office visit 4/23/24    Prep instructions sent via letter -6/5/24. Writer will also send via [employer].         Marcelle Black RN  Endoscopy Procedure Pre Assessment RN  [phone] option 4

## 2024-06-26 NOTE — TELEPHONE ENCOUNTER
Attempted to contact patient in order to complete pre assessment questions.     No answer. Left message to return call to [phone] option 4    Callback required communication sent via [employer].      Marcelle Black RN  Endoscopy Procedure Pre Assessment

## 2024-06-28 ENCOUNTER — TELEPHONE (OUTPATIENT)
Dept: GASTROENTEROLOGY | Facility: CLINIC | Age: 64
End: 2024-06-28
Payer: COMMERCIAL

## 2024-06-29 ENCOUNTER — ANESTHESIA EVENT (OUTPATIENT)
Dept: SURGERY | Facility: AMBULATORY SURGERY CENTER | Age: 64
End: 2024-06-29
Payer: COMMERCIAL

## 2024-06-29 NOTE — ANESTHESIA PREPROCEDURE EVALUATION
Anesthesia Pre-Procedure Evaluation    Patient: Marcelina Estevez   MRN: [id number] : 1960        Procedure : Procedure(s):  Colonoscopy with CO2 insufflation          Past Medical History:   Diagnosis Date    CREST (calcinosis, Raynaud's phenomenon, esophageal dysfunction, sclerodactyly, telangiectasia) (H)     Hyperlipidemia     Hypertension     Hypertriglyceridemia 2011    Controlled with simvastatin.       Limited systemic sclerosis (H)     Menopausal syndrome (hot flashes) 2013    Positive MINDY (antinuclear antibody)     Raynaud disease       Past Surgical History:   Procedure Laterality Date    APPENDECTOMY      BIOPSY      cervical node    COLONOSCOPY N/A 11/10/2021    Procedure: COLONOSCOPY, FLEXIBLE, WITH LESION REMOVAL USING SNARE;  Surgeon: Domingo Wall MD;  [facility]    COLONOSCOPY N/A 11/10/2021    Procedure: COLONOSCOPY, WITH POLYPECTOMY AND BIOPSY;  Surgeon: Domingo Wall MD;  [facility]    COLONOSCOPY N/A 2021    Procedure: Colonoscopy, With Polypectomy And Biopsy;  Surgeon: Domingo Wall MD;  [facility]    COLONOSCOPY N/A 2021    Procedure: Colonoscopy, Flexible, With Lesion Removal Using Snare;  Surgeon: Domingo Wall MD;  [facility]    COLONOSCOPY N/A 2022    Procedure: COLONOSCOPY, FLEXIBLE, WITH LESION REMOVAL USING SNARE;  Surgeon: Domingo Wall MD;  [facility]    COLONOSCOPY N/A 2022    Procedure: COLONOSCOPY, WITH POLYPECTOMY AND BIOPSY;  Surgeon: Domingo Wall MD;  [facility]    COLONOSCOPY N/A 2023    Procedure: COLONOSCOPY, FLEXIBLE, WITH LESION REMOVAL USING SNARE;  Surgeon: Domingo Wall MD;  [facility]    COLONOSCOPY WITH CO2 INSUFFLATION N/A 10/26/2020    Procedure: COLONOSCOPY, WITH CO2 INSUFFLATION;  Surgeon: Phyllis Chaudhari MD;  [facility]    COLONOSCOPY WITH CO2 INSUFFLATION N/A 11/10/2021     Procedure: COLONOSCOPY, WITH CO2 INSUFFLATION;  Surgeon: Domingo Wall MD;  [facility]    COLONOSCOPY WITH CO2 INSUFFLATION N/A 2021    Procedure: COLONOSCOPY, WITH CO2 INSUFFLATION;  Surgeon: Domingo Wall MD;  [facility]    COLONOSCOPY WITH CO2 INSUFFLATION N/A 2022    Procedure: COLONOSCOPY, WITH CO2 INSUFFLATION;  Surgeon: Domingo Wall MD;  [facility]    COLONOSCOPY WITH CO2 INSUFFLATION N/A 2022    Procedure: COLONOSCOPY, WITH CO2 INSUFFLATION;  Surgeon: Domingo Wall MD;  [facility]    COLONOSCOPY WITH CO2 INSUFFLATION N/A 2023    Procedure: Colonoscopy with CO2 insufflation;  Surgeon: Domingo Wall MD;  [facility]    COMBINED ESOPHAGOSCOPY, GASTROSCOPY, DUODENOSCOPY (EGD) WITH CO2 INSUFFLATION N/A 2022    Procedure: ESOPHAGOGASTRODUODENOSCOPY, WITH CO2 INSUFFLATION;  Surgeon: Domingo Wall MD;  [facility]    DILATION AND CURETTAGE, HYSTEROSCOPY DIAGNOSTIC, COMBINED  2014    Procedure: COMBINED DILATION AND CURETTAGE, HYSTEROSCOPY DIAGNOSTIC;  Surgeon: Mana Cabrera DO;  [facility]    ENT SURGERY      tonsillectomy and adnoid removal    ESOPHAGOSCOPY, GASTROSCOPY, DUODENOSCOPY (EGD), COMBINED N/A 2022    Procedure: ESOPHAGOGASTRODUODENOSCOPY, WITH BIOPSY;  Surgeon: Domingo Wall MD;  [facility]    HYSTERECTOMY TOTAL ABDOMINAL      ORTHOPEDIC SURGERY      left knee tear meniscus    RELEASE CARPAL TUNNEL BILATERAL  2009      Allergies   Allergen Reactions    Hydroxyzine      Made her very tired    Other Environmental Allergy Other (See Comments)    Seasonal Allergies     Sulfa Antibiotics      Vomiting      Vicodin [Hydrocodone-Acetaminophen] Nausea      Social History     Tobacco Use    Smoking status: Former     Current packs/day: 0.00     Types: Cigarettes     Quit date: 2001     Years since quittin.5    Smokeless  tobacco: Never   Substance Use Topics    Alcohol use: Yes     Comment: 5-8 drinks/week      Wt Readings from Last 1 Encounters:   06/24/24 65.8 kg (145 lb)        Anesthesia Evaluation   Pt has had prior anesthetic. Type: MAC and General.    No history of anesthetic complications       ROS/MED HX  ENT/Pulmonary:  - neg pulmonary ROS     Neurologic:  - neg neurologic ROS     Cardiovascular:     (+) Dyslipidemia hypertension- -   -  - -                                      METS/Exercise Tolerance:     Hematologic:  - neg hematologic  ROS     Musculoskeletal: Comment: Limited systemic sclerosis      GI/Hepatic:  - neg GI/hepatic ROS     Renal/Genitourinary:     (+) renal disease, type: CRI, Pt does not require dialysis,           Endo:  - neg endo ROS     Psychiatric/Substance Use:     (+)     Recreational drug usage: Cannabis.    Infectious Disease:  - neg infectious disease ROS     Malignancy:  - neg malignancy ROS     Other:  - neg other ROS          Physical Exam    Airway  airway exam normal      Mallampati: II   TM distance: > 3 FB   Neck ROM: full   Mouth opening: > 3 cm    Respiratory Devices and Support         Dental       (+) Minor Abnormalities - some fillings, tiny chips      Cardiovascular             Pulmonary   pulmonary exam normal                OUTSIDE LABS:  CBC:   Lab Results   Component Value Date    WBC 6.8 09/26/2023    WBC 5.9 08/26/2022    HGB 11.2 (L) 04/16/2024    HGB 10.7 (L) 09/26/2023    HCT 31.1 (L) 09/26/2023    HCT 32.6 (L) 08/26/2022     09/26/2023     08/26/2022     BMP:   Lab Results   Component Value Date     04/16/2024     (L) 11/10/2023    POTASSIUM 4.7 04/16/2024    POTASSIUM 4.0 11/10/2023    CHLORIDE 99 04/16/2024    CHLORIDE 93 (L) 11/10/2023    CO2 23 04/16/2024    CO2 22 11/10/2023    BUN 25.1 (H) 04/16/2024    BUN 31.9 (H) 11/10/2023    CR 1.11 (H) 04/16/2024    CR 1.16 (H) 11/10/2023    GLC 85 04/16/2024     (H) 11/10/2023     COAGS: No  "results found for: \"PTT\", \"INR\", \"FIBR\"  POC:   Lab Results   Component Value Date    HCG neg 07/01/2014     HEPATIC:   Lab Results   Component Value Date    ALBUMIN 4.6 06/24/2024    PROTTOTAL 7.5 06/24/2024     (H) 06/24/2024     (H) 06/24/2024    ALKPHOS 987 (H) 06/24/2024    BILITOTAL 0.5 06/24/2024     OTHER:   Lab Results   Component Value Date    SERGIO 10.3 (H) 04/16/2024    PHOS 3.8 09/26/2023    MAG 1.9 05/22/2018    TSH 2.01 06/16/2022    CRP <2.9 06/12/2018    SED 18 05/22/2018       Anesthesia Plan    ASA Status:  3    NPO Status:  NPO Appropriate    Anesthesia Type: MAC.     - Reason for MAC: straight local not clinically adequate   Induction: Intravenous, Propofol.   Maintenance: TIVA.        Consents    Anesthesia Plan(s) and associated risks, benefits, and realistic alternatives discussed. Questions answered and patient/representative(s) expressed understanding.     - Discussed: Risks, Benefits and Alternatives for BOTH SEDATION and the PROCEDURE were discussed     - Discussed with:  Patient      - Extended Intubation/Ventilatory Support Discussed: No.      - Patient is DNR/DNI Status: No     Use of blood products discussed: No .     Postoperative Care       PONV prophylaxis: Ondansetron (or other 5HT-3), Background Propofol Infusion     Comments:               Brian Landers MD    I have reviewed the pertinent notes and labs in the chart from the past 30 days and (re)examined the patient.  Any updates or changes from those notes are reflected in this note.              # Overweight: Estimated body mass index is 28.51 kg/m  as calculated from the following:    Height as of 6/24/24: 1.519 m (4' 11.8\").    Weight as of 6/24/24: 65.8 kg (145 lb).      "

## 2024-07-01 DIAGNOSIS — I10 HYPERTENSION GOAL BP (BLOOD PRESSURE) < 140/90: ICD-10-CM

## 2024-07-01 NOTE — TELEPHONE ENCOUNTER
Please confirm pharmacy preference with patient.   Current request is for pharmacy in [place], but was just sent on 6/25/24 to [facility] in [place], [place].    Gabbi Hernandez BSN, RN

## 2024-07-02 ENCOUNTER — MYC MEDICAL ADVICE (OUTPATIENT)
Dept: FAMILY MEDICINE | Facility: CLINIC | Age: 64
End: 2024-07-02
Payer: COMMERCIAL

## 2024-07-02 NOTE — TELEPHONE ENCOUNTER
Patient returning call from message left. She is home from [place]. She did/does want the nifedipine sent to the pharmacy in [place]      Rola Fuller RN on 7/2/2024 at 9:52 AM

## 2024-07-02 NOTE — TELEPHONE ENCOUNTER
RN TRIAGE CALL:    Patient Contact    Attempt # 1    Was call answered?  No.  Left message on voicemail with information to call me back. Also sent [facility] message.    SIMÓN Garcia, RN

## 2024-07-03 ENCOUNTER — HOSPITAL ENCOUNTER (OUTPATIENT)
Facility: AMBULATORY SURGERY CENTER | Age: 64
Discharge: HOME | End: 2024-07-03
Attending: INTERNAL MEDICINE
Payer: COMMERCIAL

## 2024-07-03 ENCOUNTER — ANESTHESIA (OUTPATIENT)
Dept: SURGERY | Facility: AMBULATORY SURGERY CENTER | Age: 64
End: 2024-07-03
Payer: COMMERCIAL

## 2024-07-03 VITALS
TEMPERATURE: 98 F | SYSTOLIC BLOOD PRESSURE: 126 MMHG | DIASTOLIC BLOOD PRESSURE: 93 MMHG | RESPIRATION RATE: 16 BRPM | HEART RATE: 74 BPM | OXYGEN SATURATION: 97 %

## 2024-07-03 VITALS — HEART RATE: 69 BPM

## 2024-07-03 LAB — COLONOSCOPY: NORMAL

## 2024-07-03 PROCEDURE — G8907 PT DOC NO EVENTS ON DISCHARG: HCPCS

## 2024-07-03 PROCEDURE — G8918 PT W/O PREOP ORDER IV AB PRO: HCPCS

## 2024-07-03 PROCEDURE — 45378 DIAGNOSTIC COLONOSCOPY: CPT | Performed by: STUDENT IN AN ORGANIZED HEALTH CARE EDUCATION/TRAINING PROGRAM

## 2024-07-03 PROCEDURE — 45378 DIAGNOSTIC COLONOSCOPY: CPT | Performed by: NURSE ANESTHETIST, CERTIFIED REGISTERED

## 2024-07-03 PROCEDURE — 45378 DIAGNOSTIC COLONOSCOPY: CPT

## 2024-07-03 RX ORDER — LIDOCAINE HYDROCHLORIDE 20 MG/ML
INJECTION, SOLUTION INFILTRATION; PERINEURAL PRN
Status: DISCONTINUED | OUTPATIENT
Start: 2024-07-03 | End: 2024-07-03

## 2024-07-03 RX ORDER — PROPOFOL 10 MG/ML
INJECTION, EMULSION INTRAVENOUS CONTINUOUS PRN
Status: DISCONTINUED | OUTPATIENT
Start: 2024-07-03 | End: 2024-07-03

## 2024-07-03 RX ORDER — LIDOCAINE 40 MG/G
CREAM TOPICAL
Status: DISCONTINUED | OUTPATIENT
Start: 2024-07-03 | End: 2024-07-04 | Stop reason: HOSPADM

## 2024-07-03 RX ORDER — ONDANSETRON 4 MG/1
4 TABLET, ORALLY DISINTEGRATING ORAL EVERY 6 HOURS PRN
Status: DISCONTINUED | OUTPATIENT
Start: 2024-07-03 | End: 2024-07-04 | Stop reason: HOSPADM

## 2024-07-03 RX ORDER — NALOXONE HYDROCHLORIDE 0.4 MG/ML
0.2 INJECTION, SOLUTION INTRAMUSCULAR; INTRAVENOUS; SUBCUTANEOUS
Status: DISCONTINUED | OUTPATIENT
Start: 2024-07-03 | End: 2024-07-04 | Stop reason: HOSPADM

## 2024-07-03 RX ORDER — PROPOFOL 10 MG/ML
INJECTION, EMULSION INTRAVENOUS PRN
Status: DISCONTINUED | OUTPATIENT
Start: 2024-07-03 | End: 2024-07-03

## 2024-07-03 RX ORDER — FLUMAZENIL 0.1 MG/ML
0.2 INJECTION, SOLUTION INTRAVENOUS
Status: ACTIVE | OUTPATIENT
Start: 2024-07-03 | End: 2024-07-03

## 2024-07-03 RX ORDER — NALOXONE HYDROCHLORIDE 0.4 MG/ML
0.4 INJECTION, SOLUTION INTRAMUSCULAR; INTRAVENOUS; SUBCUTANEOUS
Status: DISCONTINUED | OUTPATIENT
Start: 2024-07-03 | End: 2024-07-04 | Stop reason: HOSPADM

## 2024-07-03 RX ORDER — NIFEDIPINE 30 MG
30 TABLET, EXTENDED RELEASE ORAL DAILY
Qty: 90 TABLET | Refills: 1 | OUTPATIENT
Start: 2024-07-03

## 2024-07-03 RX ORDER — ONDANSETRON 2 MG/ML
4 INJECTION INTRAMUSCULAR; INTRAVENOUS EVERY 6 HOURS PRN
Status: DISCONTINUED | OUTPATIENT
Start: 2024-07-03 | End: 2024-07-04 | Stop reason: HOSPADM

## 2024-07-03 RX ORDER — SODIUM CHLORIDE, SODIUM LACTATE, POTASSIUM CHLORIDE, CALCIUM CHLORIDE 600; 310; 30; 20 MG/100ML; MG/100ML; MG/100ML; MG/100ML
INJECTION, SOLUTION INTRAVENOUS CONTINUOUS
Status: DISCONTINUED | OUTPATIENT
Start: 2024-07-03 | End: 2024-07-04 | Stop reason: HOSPADM

## 2024-07-03 RX ORDER — PROCHLORPERAZINE MALEATE 10 MG
10 TABLET ORAL EVERY 6 HOURS PRN
Status: DISCONTINUED | OUTPATIENT
Start: 2024-07-03 | End: 2024-07-04 | Stop reason: HOSPADM

## 2024-07-03 RX ORDER — ONDANSETRON 2 MG/ML
4 INJECTION INTRAMUSCULAR; INTRAVENOUS
Status: DISCONTINUED | OUTPATIENT
Start: 2024-07-03 | End: 2024-07-04 | Stop reason: HOSPADM

## 2024-07-03 RX ADMIN — PROPOFOL 50 MG: 10 INJECTION, EMULSION INTRAVENOUS at 10:04

## 2024-07-03 RX ADMIN — PROPOFOL 50 MG: 10 INJECTION, EMULSION INTRAVENOUS at 10:01

## 2024-07-03 RX ADMIN — LIDOCAINE HYDROCHLORIDE 40 MG: 20 INJECTION, SOLUTION INFILTRATION; PERINEURAL at 09:47

## 2024-07-03 RX ADMIN — SODIUM CHLORIDE, SODIUM LACTATE, POTASSIUM CHLORIDE, CALCIUM CHLORIDE: 600; 310; 30; 20 INJECTION, SOLUTION INTRAVENOUS at 09:20

## 2024-07-03 RX ADMIN — PROPOFOL 50 MG: 10 INJECTION, EMULSION INTRAVENOUS at 09:50

## 2024-07-03 RX ADMIN — PROPOFOL 150 MCG/KG/MIN: 10 INJECTION, EMULSION INTRAVENOUS at 09:47

## 2024-07-03 RX ADMIN — PROPOFOL 80 MG: 10 INJECTION, EMULSION INTRAVENOUS at 09:47

## 2024-07-03 NOTE — ANESTHESIA POSTPROCEDURE EVALUATION
Patient: Marcelina Estevez    Procedure: Procedure(s):  Colonoscopy with CO2 insufflation       Anesthesia Type:  MAC    Note:  Disposition: Outpatient   Postop Pain Control: Uneventful            Sign Out: Well controlled pain   PONV: No   Neuro/Psych: Uneventful            Sign Out: Acceptable/Baseline neuro status   Airway/Respiratory: Uneventful            Sign Out: Acceptable/Baseline resp. status   CV/Hemodynamics: Uneventful            Sign Out: Acceptable CV status; No obvious hypovolemia; No obvious fluid overload   Other NRE:    DID A NON-ROUTINE EVENT OCCUR?            Last vitals:  Vitals Value Taken Time   /93 07/03/24 1028   Temp     Pulse     Resp 16 07/03/24 1028   SpO2 97 % 07/03/24 1028       Electronically Signed By: Brian Landers MD  July 3, 2024  12:54 PM

## 2024-07-03 NOTE — H&P
ENDOSCOPY PRE-SEDATION H&P FOR OUTPATIENT PROCEDURES    Marcelina Estevez  [id number]  1960    Procedure: colonoscopy    Pre-procedure diagnosis: SPS    Past medical history:   Past Medical History:   Diagnosis Date    CREST (calcinosis, Raynaud's phenomenon, esophageal dysfunction, sclerodactyly, telangiectasia) (H)     Hyperlipidemia     Hypertension     Hypertriglyceridemia 01/18/2011    Controlled with simvastatin.       Limited systemic sclerosis (H)     Menopausal syndrome (hot flashes) 12/19/2013    Positive MINDY (antinuclear antibody)     Raynaud disease        Past surgical history:   Past Surgical History:   Procedure Laterality Date    APPENDECTOMY      BIOPSY      cervical node    COLONOSCOPY N/A 11/10/2021    Procedure: COLONOSCOPY, FLEXIBLE, WITH LESION REMOVAL USING SNARE;  Surgeon: Domingo Wall MD;  [facility]    COLONOSCOPY N/A 11/10/2021    Procedure: COLONOSCOPY, WITH POLYPECTOMY AND BIOPSY;  Surgeon: Domingo Wall MD;  [facility]    COLONOSCOPY N/A 9/29/2021    Procedure: Colonoscopy, With Polypectomy And Biopsy;  Surgeon: Domingo Wall MD;  [facility]    COLONOSCOPY N/A 9/29/2021    Procedure: Colonoscopy, Flexible, With Lesion Removal Using Snare;  Surgeon: Domingo Wall MD;  [facility]    COLONOSCOPY N/A 6/29/2022    Procedure: COLONOSCOPY, FLEXIBLE, WITH LESION REMOVAL USING SNARE;  Surgeon: Domingo Wall MD;  [facility]    COLONOSCOPY N/A 11/9/2022    Procedure: COLONOSCOPY, WITH POLYPECTOMY AND BIOPSY;  Surgeon: Domingo Wall MD;  [facility]    COLONOSCOPY N/A 6/12/2023    Procedure: COLONOSCOPY, FLEXIBLE, WITH LESION REMOVAL USING SNARE;  Surgeon: Domingo Wall MD;  [facility]    COLONOSCOPY WITH CO2 INSUFFLATION N/A 10/26/2020    Procedure: COLONOSCOPY, WITH CO2 INSUFFLATION;  Surgeon: Phyllis Chaudhari MD;  [facility]    COLONOSCOPY WITH CO2  INSUFFLATION N/A 11/10/2021    Procedure: COLONOSCOPY, WITH CO2 INSUFFLATION;  Surgeon: Domingo Wall MD;  [facility]    COLONOSCOPY WITH CO2 INSUFFLATION N/A 9/29/2021    Procedure: COLONOSCOPY, WITH CO2 INSUFFLATION;  Surgeon: Domingo Wall MD;  [facility]    COLONOSCOPY WITH CO2 INSUFFLATION N/A 6/29/2022    Procedure: COLONOSCOPY, WITH CO2 INSUFFLATION;  Surgeon: Domingo Wall MD;  [facility]    COLONOSCOPY WITH CO2 INSUFFLATION N/A 11/9/2022    Procedure: COLONOSCOPY, WITH CO2 INSUFFLATION;  Surgeon: Domingo Wall MD;  [facility]    COLONOSCOPY WITH CO2 INSUFFLATION N/A 6/12/2023    Procedure: Colonoscopy with CO2 insufflation;  Surgeon: Domingo Wall MD;  [facility]    COMBINED ESOPHAGOSCOPY, GASTROSCOPY, DUODENOSCOPY (EGD) WITH CO2 INSUFFLATION N/A 6/29/2022    Procedure: ESOPHAGOGASTRODUODENOSCOPY, WITH CO2 INSUFFLATION;  Surgeon: Domingo Wall MD;  [facility]    DILATION AND CURETTAGE, HYSTEROSCOPY DIAGNOSTIC, COMBINED  7/1/2014    Procedure: COMBINED DILATION AND CURETTAGE, HYSTEROSCOPY DIAGNOSTIC;  Surgeon: Mana Cabrera DO;  [facility]    ENT SURGERY      tonsillectomy and adnoid removal    ESOPHAGOSCOPY, GASTROSCOPY, DUODENOSCOPY (EGD), COMBINED N/A 6/29/2022    Procedure: ESOPHAGOGASTRODUODENOSCOPY, WITH BIOPSY;  Surgeon: Domingo Wall MD;  [facility]    HYSTERECTOMY TOTAL ABDOMINAL      ORTHOPEDIC SURGERY      left knee tear meniscus    RELEASE CARPAL TUNNEL BILATERAL  2009       Current Outpatient Medications   Medication Sig Dispense Refill    albuterol (PROAIR HFA) 108 (90 Base) MCG/ACT inhaler INHALE 2 PUFS BY MOUTH EVERY 6 HOURS AS NEEDED FOR SHORTNESS OF BREATH / DYSPNEA 8.5 g 11    ALLEGRA ALLERGY 180 MG tablet Take 1 tablet (180 mg) by mouth daily 90 tablet 3    allopurinol (ZYLOPRIM) 100 MG tablet Take 1 tablet by mouth once a day as directed       atorvastatin (LIPITOR) 40 MG tablet Take 1 tablet by mouth daily at 2 pm      buPROPion (WELLBUTRIN XL) 150 MG 24 hr tablet Take 1 Tablet by mouth in the morning 90 tablet 0    diclofenac (VOLTAREN) 1 % topical gel Apply 2 g topically 4 times daily as needed for moderate pain (4-6) 150 g 2    DULoxetine (CYMBALTA) 60 MG capsule Take 1 Capsule by mouth once daily 90 capsule 0    fluticasone (FLONASE) 50 MCG/ACT nasal spray Spray 1 spray into both nostrils daily 16 mL 5    hydrochlorothiazide (HYDRODIURIL) 25 MG tablet Take 25 mg by mouth daily      hydroxychloroquine (PLAQUENIL) 200 MG tablet Take 300 mg by mouth daily Take 1 200 mg tablet and 1/2 of 200mg 120 tablet 1    isosorbide mononitrate (IMDUR) 30 MG 24 hr tablet Take 1 Tablet by mouth once daily 90 tablet 1    LORazepam (ATIVAN) 0.5 MG tablet Take 1 tablet (0.5 mg) by mouth daily as needed (panic attack) 15 tablet 0    losartan (COZAAR) 100 MG tablet Take 1 tablet by mouth daily at 2 pm      NIFEdipine ER (ADALAT CC) 30 MG 24 hr tablet Take 1 Tablet by mouth once daily 90 tablet 1    pantoprazole (PROTONIX) 40 MG EC tablet Take 1 tablet (40 mg) by mouth daily 90 tablet 1    VITAMIN D, CHOLECALCIFEROL, PO Take 2,000 Units by mouth daily       aspirin (ASPIRIN LOW DOSE) 81 MG EC tablet Take 1 tablet (81 mg) by mouth daily      augmented betamethasone dipropionate (DIPROLENE-AF) 0.05 % external ointment       bisacodyl (DULCOLAX) 5 MG EC tablet Two days prior to exam take two (2) tablets at 4pm. One day prior to exam take two (2) tablets at 4pm 4 tablet 0    order for DME Equipment being ordered: light lamp for seasonal affective disorder 1 Device 0    polyethylene glycol (GOLYTELY) 236 g suspension Two days before procedure at 5PM fill first container with water. Mix and drink an 8 oz glass every 10-15 minutes until HALF of the container is gone. Place the remainder in the refrigerator. One day before procedure at 5PM drink second half of bowel prep. Drink an  8 oz glass every 10-15 minutes until it is gone. Day of procedure 6 hours before arrival time fill the 2nd container with water. Mix and drink an 8 oz glass every 10-15 minutes until HALF of the container is gone. Discard the remaining solution. 8000 mL 0     Current Facility-Administered Medications   Medication Dose Route Frequency Provider Last Rate Last Admin    2.0 mL bupivacaine (MARCAINE) 0.5% injection (50 mL vial)  2 mL   Ancelmo Da Silva DO   2 mL at 10/05/23 1241    2.0 mL bupivacaine (MARCAINE) 0.5% injection (50 mL vial)  2 mL   Ancelmo Da Silva DO   2 mL at 10/05/23 1241    hylan (SYNVISC ONE) injection 48 mg  48 mg      48 mg at 05/30/24 0938    hylan (SYNVISC ONE) injection 48 mg  48 mg      48 mg at 05/30/24 0938    lactated ringers infusion   Intravenous Continuous Brian Landers MD 10 mL/hr at 07/03/24 0920 New Bag at 07/03/24 0920    lidocaine (LMX4) kit   Topical Q1H PRN Domingo Wall MD        lidocaine 1 % 0.1-1 mL  0.1-1 mL Other Q1H PRN Domingo Wall MD        lidocaine 1 % injection 2 mL  2 mL   Ancelmo Da Silva DO   2 mL at 10/05/23 1241    lidocaine 1 % injection 2 mL  2 mL   Ancelmo Da Silva DO   2 mL at 10/05/23 1241    ondansetron (ZOFRAN) injection 4 mg  4 mg Intravenous Once PRN Domingo Wall MD        sodium chloride (PF) 0.9% PF flush 3 mL  3 mL Intracatheter Q8H Domingo Wall MD        sodium chloride (PF) 0.9% PF flush 3 mL  3 mL Intracatheter q1 min prn Domingo Wall MD        triamcinolone (KENALOG-40) injection 40 mg  40 mg   Ancelmo Da Silva DO   40 mg at 10/05/23 1241    triamcinolone (KENALOG-40) injection 40 mg  40 mg   Ancelmo Da Silva DO   40 mg at 10/05/23 1241     Facility-Administered Medications Ordered in Other Encounters   Medication Dose Route Frequency Provider Last Rate Last Admin    sodium chloride (PF) 0.9% PF flush 10 mL  10 mL Intravenous Once Hilda Lopez MD           Allergies   Allergen  Reactions    Hydroxyzine      Made her very tired    Other Environmental Allergy Other (See Comments)    Seasonal Allergies     Sulfa Antibiotics      Vomiting      Vicodin [Hydrocodone-Acetaminophen] Nausea       History of Anesthesia/Sedation Problems: no    PHYSICAL EXAMINATION:  Constitutional: aaox3, cooperative, pleasant  Vitals reviewed: BP (!) 148/86   Pulse 74   Temp 98  F (36.7  C) (Temporal)   Resp 16   LMP 06/12/2014   SpO2 100%   Wt:   Wt Readings from Last 2 Encounters:   06/24/24 65.8 kg (145 lb)   05/16/24 67.7 kg (149 lb 3.2 oz)      Eyes: Sclera anicteric/injected  Ears/nose/mouth/throat: Normal oropharynx without ulcers or exudate, mucus membranes moist, hearing intact  Neck: supple, thyroid normal size  CV: No edema  Respiratory: Unlabored breathing  Lymph: No submandibular, supraclavicular or inguinal lymphadenopathy  Abd: Nondistended, no masses, nontender  Skin: warm, perfused, no jaundice  Psych: Normal affect  MSK: normal movement on limited exam.    ASA Score: See Provation note    Assessment/Plan:     The patient is an appropriate candidate to receive sedation.    Informed consent was discussed with the patient/family, including the risks, benefits, potential complications and any alternative options associated with sedation.    Patient assessment completed just prior to sedation and while under constant observation by the provider. Condition determined to be adequate for proceeding with sedation.    The specific risks for the procedure were discussed with the patient at the time of informed consent and include but are not limited to perforation which could require surgery, missing significant neoplasm or lesion, hemorrhage and adverse sedative complication.      Domingo Wall MD

## 2024-07-03 NOTE — ANESTHESIA CARE TRANSFER NOTE
Patient: Marcelina Estevez    Procedure: Procedure(s):  Colonoscopy with CO2 insufflation       Diagnosis: Serrated polyposis syndrome [D12.6]  Diagnosis Additional Information: No value filed.    Anesthesia Type:   MAC     Note:      Level of Consciousness: awake  Oxygen Supplementation: room air    Independent Airway: airway patency satisfactory and stable  Dentition: dentition unchanged  Vital Signs Stable: post-procedure vital signs reviewed and stable  Report to RN Given: handoff report given  Patient transferred to: Phase II    Handoff Report: Identifed the Patient, Identified the Reponsible Provider, Reviewed the pertinent medical history, Discussed the surgical course, Reviewed Intra-OP anesthesia mangement and issues during anesthesia, Set expectations for post-procedure period and Allowed opportunity for questions and acknowledgement of understanding      Vitals:  Vitals Value Taken Time   /72    Temp 98    Pulse 68    Resp 16    SpO2 99        Electronically Signed By: EDUARD Gomez CRNA  July 3, 2024  10:12 AM

## 2024-07-09 ENCOUNTER — MYC REFILL (OUTPATIENT)
Dept: FAMILY MEDICINE | Facility: CLINIC | Age: 64
End: 2024-07-09
Payer: COMMERCIAL

## 2024-07-09 DIAGNOSIS — M34.1 CREST (CALCINOSIS, RAYNAUD'S PHENOMENON, ESOPHAGEAL DYSFUNCTION, SCLERODACTYLY, TELANGIECTASIA) (H): ICD-10-CM

## 2024-07-10 ENCOUNTER — OFFICE VISIT (OUTPATIENT)
Dept: ORTHOPEDICS | Facility: CLINIC | Age: 64
End: 2024-07-10
Payer: COMMERCIAL

## 2024-07-10 ENCOUNTER — PREP FOR PROCEDURE (OUTPATIENT)
Dept: ORTHOPEDICS | Facility: CLINIC | Age: 64
End: 2024-07-10

## 2024-07-10 VITALS — SYSTOLIC BLOOD PRESSURE: 124 MMHG | HEART RATE: 83 BPM | DIASTOLIC BLOOD PRESSURE: 77 MMHG | OXYGEN SATURATION: 98 %

## 2024-07-10 DIAGNOSIS — M25.561 CHRONIC PAIN OF RIGHT KNEE: ICD-10-CM

## 2024-07-10 DIAGNOSIS — M17.12 PRIMARY OSTEOARTHRITIS OF LEFT KNEE: Primary | ICD-10-CM

## 2024-07-10 DIAGNOSIS — M17.0 PRIMARY OSTEOARTHRITIS OF BOTH KNEES: Primary | ICD-10-CM

## 2024-07-10 DIAGNOSIS — G89.29 CHRONIC PAIN OF LEFT KNEE: ICD-10-CM

## 2024-07-10 DIAGNOSIS — G89.29 CHRONIC PAIN OF RIGHT KNEE: ICD-10-CM

## 2024-07-10 DIAGNOSIS — M25.562 CHRONIC PAIN OF LEFT KNEE: ICD-10-CM

## 2024-07-10 PROCEDURE — 99244 OFF/OP CNSLTJ NEW/EST MOD 40: CPT | Performed by: ORTHOPAEDIC SURGERY

## 2024-07-10 ASSESSMENT — PAIN SCALES - GENERAL: PAINLEVEL: SEVERE PAIN (6)

## 2024-07-10 NOTE — PROGRESS NOTES
SUBJECTIVE:   Marcelina Estevez is a 64 year old female who is seen in consultation at the request of Dr. Rosario for evaluation of bilateral knee pain.  Duration: problems x years.   No known injury.    Present symptoms:   Both hurt similarly  Can't play with kids.   Can't walk more that a block  Stairs are very difficult, painful  +catching, locking and giving-way      Treatments tried to this point: previous corticosteroid injections, had poor response to recent shots. And they were image guided..  viscosupplementation injection last month, no help  Tylenol helped some, but can't take it anymore due to liver issues  Lidocaine patches  Voltaren gel   Physical therapy   Copper knee braces.     Previous knee issues: LEFT knee arthroscopy 10 years     Past Medical History:   Past Medical History:   Diagnosis Date    CREST (calcinosis, Raynaud's phenomenon, esophageal dysfunction, sclerodactyly, telangiectasia) (H)     Hyperlipidemia     Hypertension     Hypertriglyceridemia 01/18/2011    Controlled with simvastatin.       Limited systemic sclerosis (H)     Menopausal syndrome (hot flashes) 12/19/2013    Positive MINDY (antinuclear antibody)     Raynaud disease      Past Surgical History:   Past Surgical History:   Procedure Laterality Date    APPENDECTOMY      BIOPSY      cervical node    COLONOSCOPY N/A 11/10/2021    Procedure: COLONOSCOPY, FLEXIBLE, WITH LESION REMOVAL USING SNARE;  Surgeon: Domingo Wall MD;  [facility]: [facility]    COLONOSCOPY N/A 11/10/2021    Procedure: COLONOSCOPY, WITH POLYPECTOMY AND BIOPSY;  Surgeon: Domingo Wall MD;  [facility]    COLONOSCOPY N/A 9/29/2021    Procedure: Colonoscopy, With Polypectomy And Biopsy;  Surgeon: Domingo Wall MD;  [facility]    COLONOSCOPY N/A 9/29/2021    Procedure: Colonoscopy, Flexible, With Lesion Removal Using Snare;  Surgeon: Domingo Wall MD;  [facility]    COLONOSCOPY N/A 6/29/2022    Procedure:  COLONOSCOPY, FLEXIBLE, WITH LESION REMOVAL USING SNARE;  Surgeon: Domingo Wall MD;  [facility]    COLONOSCOPY N/A 11/9/2022    Procedure: COLONOSCOPY, WITH POLYPECTOMY AND BIOPSY;  Surgeon: Domingo Wall MD;  [facility]    COLONOSCOPY N/A 6/12/2023    Procedure: COLONOSCOPY, FLEXIBLE, WITH LESION REMOVAL USING SNARE;  Surgeon: Domingo Wall MD;  [facility]    COLONOSCOPY WITH CO2 INSUFFLATION N/A 10/26/2020    Procedure: COLONOSCOPY, WITH CO2 INSUFFLATION;  Surgeon: Phyllis Chaudhari MD;  [facility]    COLONOSCOPY WITH CO2 INSUFFLATION N/A 11/10/2021    Procedure: COLONOSCOPY, WITH CO2 INSUFFLATION;  Surgeon: Domingo Wall MD;  [facility]    COLONOSCOPY WITH CO2 INSUFFLATION N/A 9/29/2021    Procedure: COLONOSCOPY, WITH CO2 INSUFFLATION;  Surgeon: Domingo Wall MD;  [facility]    COLONOSCOPY WITH CO2 INSUFFLATION N/A 6/29/2022    Procedure: COLONOSCOPY, WITH CO2 INSUFFLATION;  Surgeon: Domingo Wall MD;  [facility]    COLONOSCOPY WITH CO2 INSUFFLATION N/A 11/9/2022    Procedure: COLONOSCOPY, WITH CO2 INSUFFLATION;  Surgeon: Domingo Wall MD;  [facility]    COLONOSCOPY WITH CO2 INSUFFLATION N/A 6/12/2023    Procedure: Colonoscopy with CO2 insufflation;  Surgeon: Domingo Wall MD;  [facility]    COLONOSCOPY WITH CO2 INSUFFLATION N/A 7/3/2024    Procedure: Colonoscopy with CO2 insufflation;  Surgeon: Domingo Wall MD;  [facility]    COMBINED ESOPHAGOSCOPY, GASTROSCOPY, DUODENOSCOPY (EGD) WITH CO2 INSUFFLATION N/A 6/29/2022    Procedure: ESOPHAGOGASTRODUODENOSCOPY, WITH CO2 INSUFFLATION;  Surgeon: Domingo Wall MD;  [facility]    DILATION AND CURETTAGE, HYSTEROSCOPY DIAGNOSTIC, COMBINED  7/1/2014    Procedure: COMBINED DILATION AND CURETTAGE, HYSTEROSCOPY DIAGNOSTIC;  Surgeon: Mana Cabrera DO;  Location:  OR     ENT SURGERY      tonsillectomy and adnoid removal    ESOPHAGOSCOPY, GASTROSCOPY, DUODENOSCOPY (EGD), COMBINED N/A 2022    Procedure: ESOPHAGOGASTRODUODENOSCOPY, WITH BIOPSY;  Surgeon: Domingo Wall MD;  [facility]    HYSTERECTOMY TOTAL ABDOMINAL      ORTHOPEDIC SURGERY      left knee tear meniscus    RELEASE CARPAL TUNNEL BILATERAL       Family History:   Family History   Problem Relation Age of Onset    Osteoporosis Mother     Eye Disorder Mother         cataract, mac degen    Macular Degeneration Mother     Dementia Mother     Hypertension Maternal Grandmother     Diabetes Maternal Grandfather     Eye Disorder Paternal Grandmother         glaucoma    Unknown/Adopted Paternal Grandfather     Hypertension Daughter     Graves' disease Daughter     Diabetes Other     Glaucoma No family hx of      Social History:   Social History     Tobacco Use    Smoking status: Former     Current packs/day: 0.00     Types: Cigarettes     Quit date: 2001     Years since quittin.5    Smokeless tobacco: Never   Substance Use Topics    Alcohol use: Yes     Comment: 5-8 drinks/week       Review of Systems:  Constitutional:  NEGATIVE for fever, chills, change in weight  Integumentary/Skin:  NEGATIVE for worrisome rashes, moles or lesions  Eyes:  NEGATIVE for vision changes or irritation  ENT/Mouth:  NEGATIVE for ear, mouth and throat problems  Resp:  NEGATIVE for significant cough or SOB  Breast:  NEGATIVE for masses, tenderness or discharge  CV:  NEGATIVE for chest pain, palpitations or peripheral edema  GI:  NEGATIVE for nausea, abdominal pain, heartburn, or change in bowel habits  :  Negative   Musculoskeletal:  See HPI above  Neuro:  NEGATIVE for weakness, dizziness or paresthesias  Endocrine:  NEGATIVE for temperature intolerance, skin/hair changes  Heme/allergy/immune:  NEGATIVE for bleeding problems  Psychiatric:  NEGATIVE for changes in mood or affect      OBJECTIVE:  Physical Exam:  BP  124/77 (BP Location: Right arm, Patient Position: Sitting, Cuff Size: Adult Regular)   Pulse 83   LMP 06/12/2014   SpO2 98%   General Appearance: healthy, alert and no distress   Skin: no suspicious lesions or rashes  Neuro: Normal strength and tone, mentation intact and speech normal  Vascular: good pulses, and cappillary refill   Lymph: no lymphadenopathy   Psych:  mentation appears normal and affect normal/bright  Resp: no increased work of breathing     Bilateral Knee Exam:  Gait: walks with normal gait  Alignment: mild varus    Patellofemoral joint: mild crepitations in the patellofemoral joints.  Effusion: mild bilateral   ROM: 0-125+  Tender: medial joint line  Masses: none  Ligaments:   Lachman's: stable   Anterior/Posterior drawer: stable,   Varus/Valgus stress: stable to varus and valgus stress some pseudolaxity in valgus.  McMurrays: negative    X-rays:  EXAM: XR KNEE BILATERAL G/E 4 VIEWS  DATE/TIME: 10/5/2023 10:03 AM  reviewed in the office with the patient today:      Right: moderate tricompartmental degenerative arthritis of the right knee  with small marginal osteophytes. Moderate narrowing of the medial  compartment. Small joint effusion. No acute fracture. Superior  patellar enthesophyte. Small calcification of the patellar tendon seen on the lateral and sunrise views.     Left: Mild tricompartmental degenerative arthritis of the left knee  with small marginal osteophytes. Moderate narrowing of the medial  compartment. Small joint effusion. No acute fracture. Superior  patellar enthesophyte.     Notch views do not reveal much narrowing.     MRI:    none     ASSESSMENT:   Bilateral knee osteoarthritis, moderate.  Likely inflammatory arthritis component as well.  Possible medial meniscus tear, with mechanical symptoms    PLAN:    brace ordered.   MRI of the knee ordered  We had a very long discussion about total knee arthroplasty.  She really wants total knee arthroplasty. She is not  bone-bone, so total knee arthroplasty in my experience is less likely to be successful.   We discussed the possible complications including a 10%+ risk of a painful total knee.  Unicompartmental knee arthroplasty not indicated in patients with inflammatory arthritis.  She wanted something for the pain. With her colitis and her high LFT's, any nsaid, and tylenol are not meds I can give her. She should check with her internist/primary care provider for appropriate meds for her.   No arthroplasty within 4 months of any injection.     Return to clinic: as needed     KOFI Abrams MD  Dept. Orthopedic Surgery  [facility]

## 2024-07-10 NOTE — LETTER
7/10/2024      Marcelina Estevez  [address] [place] [address]      Dear Colleague,    Thank you for referring your patient, Marcelina Estevez, to the [facility]. Please see a copy of my visit note below.    SUBJECTIVE:   Marcelina Estevez is a 64 year old female who is seen in consultation at the request of Dr. Rosario for evaluation of bilateral knee pain.  Duration: problems x years.   No known injury.    Present symptoms:   Both hurt similarly  Can't play with kids.   Can't walk more that a block  Stairs are very difficult, painful  +catching, locking and giving-way      Treatments tried to this point: previous corticosteroid injections, had poor response to recent shots. And they were image guided..  viscosupplementation injection last month, no help  Tylenol helped some, but can't take it anymore due to liver issues  Lidocaine patches  Voltaren gel   Physical therapy   Copper knee braces.     Previous knee issues: LEFT knee arthroscopy 10 years     Past Medical History:   Past Medical History:   Diagnosis Date     CREST (calcinosis, Raynaud's phenomenon, esophageal dysfunction, sclerodactyly, telangiectasia) (H)      Hyperlipidemia      Hypertension      Hypertriglyceridemia 01/18/2011    Controlled with simvastatin.        Limited systemic sclerosis (H)      Menopausal syndrome (hot flashes) 12/19/2013     Positive MINDY (antinuclear antibody)      Raynaud disease      Past Surgical History:   Past Surgical History:   Procedure Laterality Date     APPENDECTOMY       BIOPSY      cervical node     COLONOSCOPY N/A 11/10/2021    Procedure: COLONOSCOPY, FLEXIBLE, WITH LESION REMOVAL USING SNARE;  Surgeon: Domingo Wall MD;  [facility]     COLONOSCOPY N/A 11/10/2021    Procedure: COLONOSCOPY, WITH POLYPECTOMY AND BIOPSY;  Surgeon: Domingo Wall MD;  [facility]     COLONOSCOPY N/A 9/29/2021    Procedure: Colonoscopy, With Polypectomy And Biopsy;  Surgeon:  Domingo Wall MD;  [facility]     COLONOSCOPY N/A 9/29/2021    Procedure: Colonoscopy, Flexible, With Lesion Removal Using Snare;  Surgeon: Domingo Wall MD;  [facility]     COLONOSCOPY N/A 6/29/2022    Procedure: COLONOSCOPY, FLEXIBLE, WITH LESION REMOVAL USING SNARE;  Surgeon: Domingo Wall MD;  [facility]     COLONOSCOPY N/A 11/9/2022    Procedure: COLONOSCOPY, WITH POLYPECTOMY AND BIOPSY;  Surgeon: Domingo Wall MD;  [facility]     COLONOSCOPY N/A 6/12/2023    Procedure: COLONOSCOPY, FLEXIBLE, WITH LESION REMOVAL USING SNARE;  Surgeon: Domingo Wall MD;  [facility]     COLONOSCOPY WITH CO2 INSUFFLATION N/A 10/26/2020    Procedure: COLONOSCOPY, WITH CO2 INSUFFLATION;  Surgeon: Phyllis Chaudhari MD;  [facility]     COLONOSCOPY WITH CO2 INSUFFLATION N/A 11/10/2021    Procedure: COLONOSCOPY, WITH CO2 INSUFFLATION;  Surgeon: Domingo Wall MD;  [facility]     COLONOSCOPY WITH CO2 INSUFFLATION N/A 9/29/2021    Procedure: COLONOSCOPY, WITH CO2 INSUFFLATION;  Surgeon: Domingo Wall MD;  [facility]     COLONOSCOPY WITH CO2 INSUFFLATION N/A 6/29/2022    Procedure: COLONOSCOPY, WITH CO2 INSUFFLATION;  Surgeon: Domingo Wall MD;  [facility]     COLONOSCOPY WITH CO2 INSUFFLATION N/A 11/9/2022    Procedure: COLONOSCOPY, WITH CO2 INSUFFLATION;  Surgeon: Domingo Wall MD;  [facility]     COLONOSCOPY WITH CO2 INSUFFLATION N/A 6/12/2023    Procedure: Colonoscopy with CO2 insufflation;  Surgeon: Domingo Wall MD;  [facility]     COLONOSCOPY WITH CO2 INSUFFLATION N/A 7/3/2024    Procedure: Colonoscopy with CO2 insufflation;  Surgeon: Domingo Wall MD;  [facility]     COMBINED ESOPHAGOSCOPY, GASTROSCOPY, DUODENOSCOPY (EGD) WITH CO2 INSUFFLATION N/A 6/29/2022    Procedure: ESOPHAGOGASTRODUODENOSCOPY, WITH CO2 INSUFFLATION;   Surgeon: Domingo Wall MD;  [facility]     DILATION AND CURETTAGE, HYSTEROSCOPY DIAGNOSTIC, COMBINED  2014    Procedure: COMBINED DILATION AND CURETTAGE, HYSTEROSCOPY DIAGNOSTIC;  Surgeon: Mana Cabrera DO;  [facility]     ENT SURGERY      tonsillectomy and adnoid removal     ESOPHAGOSCOPY, GASTROSCOPY, DUODENOSCOPY (EGD), COMBINED N/A 2022    Procedure: ESOPHAGOGASTRODUODENOSCOPY, WITH BIOPSY;  Surgeon: Domingo Wall MD;  [facility]     HYSTERECTOMY TOTAL ABDOMINAL       ORTHOPEDIC SURGERY      left knee tear meniscus     RELEASE CARPAL TUNNEL BILATERAL       Family History:   Family History   Problem Relation Age of Onset     Osteoporosis Mother      Eye Disorder Mother         cataract, mac degen     Macular Degeneration Mother      Dementia Mother      Hypertension Maternal Grandmother      Diabetes Maternal Grandfather      Eye Disorder Paternal Grandmother         glaucoma     Unknown/Adopted Paternal Grandfather      Hypertension Daughter      Graves' disease Daughter      Diabetes Other      Glaucoma No family hx of      Social History:   Social History     Tobacco Use     Smoking status: Former     Current packs/day: 0.00     Types: Cigarettes     Quit date: 2001     Years since quittin.5     Smokeless tobacco: Never   Substance Use Topics     Alcohol use: Yes     Comment: 5-8 drinks/week       Review of Systems:  Constitutional:  NEGATIVE for fever, chills, change in weight  Integumentary/Skin:  NEGATIVE for worrisome rashes, moles or lesions  Eyes:  NEGATIVE for vision changes or irritation  ENT/Mouth:  NEGATIVE for ear, mouth and throat problems  Resp:  NEGATIVE for significant cough or SOB  Breast:  NEGATIVE for masses, tenderness or discharge  CV:  NEGATIVE for chest pain, palpitations or peripheral edema  GI:  NEGATIVE for nausea, abdominal pain, heartburn, or change in bowel habits  :  Negative   Musculoskeletal:  See HPI  above  Neuro:  NEGATIVE for weakness, dizziness or paresthesias  Endocrine:  NEGATIVE for temperature intolerance, skin/hair changes  Heme/allergy/immune:  NEGATIVE for bleeding problems  Psychiatric:  NEGATIVE for changes in mood or affect      OBJECTIVE:  Physical Exam:  /77 (BP Location: Right arm, Patient Position: Sitting, Cuff Size: Adult Regular)   Pulse 83   LMP 06/12/2014   SpO2 98%   General Appearance: healthy, alert and no distress   Skin: no suspicious lesions or rashes  Neuro: Normal strength and tone, mentation intact and speech normal  Vascular: good pulses, and cappillary refill   Lymph: no lymphadenopathy   Psych:  mentation appears normal and affect normal/bright  Resp: no increased work of breathing     Bilateral Knee Exam:  Gait: walks with normal gait  Alignment: mild varus    Patellofemoral joint: mild crepitations in the patellofemoral joints.  Effusion: mild bilateral   ROM: 0-125+  Tender: medial joint line  Masses: none  Ligaments:   Lachman's: stable   Anterior/Posterior drawer: stable,   Varus/Valgus stress: stable to varus and valgus stress some pseudolaxity in valgus.  McMurrays: negative    X-rays:  EXAM: XR KNEE BILATERAL G/E 4 VIEWS  DATE/TIME: 10/5/2023 10:03 AM  reviewed in the office with the patient today:      Right: moderate tricompartmental degenerative arthritis of the right knee  with small marginal osteophytes. Moderate narrowing of the medial  compartment. Small joint effusion. No acute fracture. Superior  patellar enthesophyte. Small calcification of the patellar tendon seen on the lateral and sunrise views.     Left: Mild tricompartmental degenerative arthritis of the left knee  with small marginal osteophytes. Moderate narrowing of the medial  compartment. Small joint effusion. No acute fracture. Superior  patellar enthesophyte.     Notch views do not reveal much narrowing.     MRI:    none     ASSESSMENT:   Bilateral knee osteoarthritis, moderate.  Likely  inflammatory arthritis component as well.  Possible medial meniscus tear, with mechanical symptoms    PLAN:    brace ordered.   MRI of the knee ordered  We had a very long discussion about total knee arthroplasty.  She really wants total knee arthroplasty. She is not bone-bone, so total knee arthroplasty in my experience is less likely to be successful.   We discussed the possible complications including a 10%+ risk of a painful total knee.  Unicompartmental knee arthroplasty not indicated in patients with inflammatory arthritis.  She wanted something for the pain. With her colitis and her high LFT's, any nsaid, and tylenol are not meds I can give her. She should check with her internist/primary care provider for appropriate meds for her.   No arthroplasty within 4 months of any injection.     Return to clinic: as needed     KOFI Abrams MD  Dept. Orthopedic Surgery  [facility]        Again, thank you for allowing me to participate in the care of your patient.        Sincerely,        Jaciel Abrams MD

## 2024-07-11 ENCOUNTER — MYC MEDICAL ADVICE (OUTPATIENT)
Dept: FAMILY MEDICINE | Facility: CLINIC | Age: 64
End: 2024-07-11
Payer: COMMERCIAL

## 2024-07-11 DIAGNOSIS — R74.8 ELEVATED ALKALINE PHOSPHATASE LEVEL: Primary | ICD-10-CM

## 2024-07-11 NOTE — TELEPHONE ENCOUNTER
Patient notified of providers advice.  She was also advised to go off her Allopurinol for 1 week.    Summer Julian RN on 7/11/2024 at 5:16 PM

## 2024-07-11 NOTE — TELEPHONE ENCOUNTER
Please assist patient in scheduling a follow-up visit with me to discuss and get labs.  Please notify the following.  We will plan on rechecking her liver enzymes at that time.  Encouraged her to follow-up with GI, referral was placed.  Do not take Tylenol and do not drink alcohol as this can further damage the liver.  Follow-up sooner with any new or worsening symptoms.    Aydee Rosario PA-C

## 2024-07-12 ENCOUNTER — PREP FOR PROCEDURE (OUTPATIENT)
Dept: ORTHOPEDICS | Facility: CLINIC | Age: 64
End: 2024-07-12
Payer: COMMERCIAL

## 2024-07-16 ENCOUNTER — OFFICE VISIT (OUTPATIENT)
Dept: FAMILY MEDICINE | Facility: CLINIC | Age: 64
End: 2024-07-16
Payer: COMMERCIAL

## 2024-07-16 VITALS
RESPIRATION RATE: 18 BRPM | HEIGHT: 60 IN | OXYGEN SATURATION: 99 % | SYSTOLIC BLOOD PRESSURE: 116 MMHG | HEART RATE: 78 BPM | WEIGHT: 147 LBS | DIASTOLIC BLOOD PRESSURE: 72 MMHG | TEMPERATURE: 97.6 F | BODY MASS INDEX: 28.86 KG/M2

## 2024-07-16 DIAGNOSIS — F33.1 MODERATE EPISODE OF RECURRENT MAJOR DEPRESSIVE DISORDER (H): ICD-10-CM

## 2024-07-16 DIAGNOSIS — R79.89 ELEVATED LFTS: Primary | ICD-10-CM

## 2024-07-16 DIAGNOSIS — M25.561 CHRONIC PAIN OF RIGHT KNEE: ICD-10-CM

## 2024-07-16 DIAGNOSIS — G89.29 CHRONIC PAIN OF LEFT KNEE: ICD-10-CM

## 2024-07-16 DIAGNOSIS — G89.29 CHRONIC PAIN OF RIGHT KNEE: ICD-10-CM

## 2024-07-16 DIAGNOSIS — R74.8 ELEVATED ALKALINE PHOSPHATASE LEVEL: ICD-10-CM

## 2024-07-16 DIAGNOSIS — M25.562 CHRONIC PAIN OF LEFT KNEE: ICD-10-CM

## 2024-07-16 LAB
ALBUMIN SERPL BCG-MCNC: 4.6 G/DL (ref 3.5–5.2)
ALP SERPL-CCNC: 919 U/L (ref 40–150)
ALT SERPL W P-5'-P-CCNC: 189 U/L (ref 0–50)
AST SERPL W P-5'-P-CCNC: 122 U/L (ref 0–45)
BILIRUB DIRECT SERPL-MCNC: 0.25 MG/DL (ref 0–0.3)
BILIRUB SERPL-MCNC: 0.7 MG/DL
PROT SERPL-MCNC: 7.3 G/DL (ref 6.4–8.3)

## 2024-07-16 PROCEDURE — 84080 ASSAY ALKALINE PHOSPHATASES: CPT | Mod: 90

## 2024-07-16 PROCEDURE — 99214 OFFICE O/P EST MOD 30 MIN: CPT

## 2024-07-16 PROCEDURE — 36415 COLL VENOUS BLD VENIPUNCTURE: CPT

## 2024-07-16 PROCEDURE — 80076 HEPATIC FUNCTION PANEL: CPT | Mod: 90

## 2024-07-16 PROCEDURE — 99000 SPECIMEN HANDLING OFFICE-LAB: CPT

## 2024-07-16 RX ORDER — DULOXETIN HYDROCHLORIDE 60 MG/1
120 CAPSULE, DELAYED RELEASE ORAL DAILY
Qty: 180 CAPSULE | Refills: 3 | Status: SHIPPED | OUTPATIENT
Start: 2024-07-16 | End: 2025-07-11

## 2024-07-16 RX ORDER — GABAPENTIN 100 MG/1
100 CAPSULE ORAL AT BEDTIME
Qty: 90 CAPSULE | Refills: 0 | Status: ON HOLD | OUTPATIENT
Start: 2024-07-16 | End: 2024-09-11

## 2024-07-16 ASSESSMENT — PAIN SCALES - GENERAL: PAINLEVEL: EXTREME PAIN (9)

## 2024-07-16 ASSESSMENT — PATIENT HEALTH QUESTIONNAIRE - PHQ9
10. IF YOU CHECKED OFF ANY PROBLEMS, HOW DIFFICULT HAVE THESE PROBLEMS MADE IT FOR YOU TO DO YOUR WORK, TAKE CARE OF THINGS AT HOME, OR GET ALONG WITH OTHER PEOPLE: SOMEWHAT DIFFICULT
SUM OF ALL RESPONSES TO PHQ QUESTIONS 1-9: 8
SUM OF ALL RESPONSES TO PHQ QUESTIONS 1-9: 8

## 2024-07-16 NOTE — PROGRESS NOTES
Assessment & Plan     Elevated LFTs  - Hepatic panel (Albumin, ALT, AST, Bili, Alk Phos, TP); Future  - Hepatic panel (Albumin, ALT, AST, Bili, Alk Phos, TP)  - US Abdomen Limited; Future    Chronic pain of left knee  - Orthopedic  Referral; Future  - gabapentin (NEURONTIN) 100 MG capsule; Take 1 capsule (100 mg) by mouth at bedtime for 90 days    Chronic pain of right knee  - DULoxetine (CYMBALTA) 60 MG capsule; Take 2 capsules (120 mg) by mouth daily for 360 days  - gabapentin (NEURONTIN) 100 MG capsule; Take 1 capsule (100 mg) by mouth at bedtime for 90 days    Moderate episode of recurrent major depressive disorder (H)  - DULoxetine (CYMBALTA) 60 MG capsule; Take 2 capsules (120 mg) by mouth daily for 360 days        I spent a total of 30 minutes on the day of the visit.   Time spent by me doing chart review, history and exam, documentation and further activities per the note      See Patient Instructions  Patient Instructions   New ortho referral, call and schedule    MRI left knee    Lidocaine patches with Kerlix or knee compression sleeve    Increase Cymbalta to 120 mg each day    Start gabapentin 100 mg at bedtime as needed for pain    Labs today    Avoid ibuprofen    Avoid Tylenol    Avoid alcohol    Call and schedule liver ultrasound for further evaluation    Patient understood and verbally consented to the treatment plan. Discussed symptoms that would warrant an urgent or emergent visit. All of the patients' questions were answered. Patient was instructed to contact the clinic if questions or concerns arise. Recommend follow up appointments if symptoms worsen or fail to improve. Recommend follow up as needed. Recommend ER in the case of an emergency.    Aydee Rosario PA-C    Please note: Voice recognition software may have been used in preparing this note, unintended word substitutions may be present.              Elyssa Hewitt is a 64 year old, presenting for the following health  issues:  Liver        7/16/2024     1:01 PM   Additional Questions   Roomed by Anna WOMACK     History of Present Illness       Reason for visit:  High liver panel and knee pain    She eats 2-3 servings of fruits and vegetables daily.She consumes 1 sweetened beverage(s) daily.She exercises with enough effort to increase her heart rate 10 to 19 minutes per day.  She exercises with enough effort to increase her heart rate 3 or less days per week.   She is taking medications regularly.     History of present illness  - Chronic knee pain, worse on one side  - Previous surgery 15 years ago for torn meniscus  - Previous treatments include shots and gel injections, both stopped working  - Current use of Voltaren and lidocaine patches for pain relief  - Pain is affecting ability to walk, exercise, and play with grandkids    Past medical history  - History of torn meniscus  - History of hepatic disorders, with recent improvement in liver enzymes    Past surgical history  Knee surgery 15 years ago for torn meniscus    Social history  Previously consumed alcohol, currently abstaining due to hepatic disorders    Current medications  - Voltaren  - Lidocaine patches  - Cymbalta for mood, dose increased during this consultation  - Gabapentin newly prescribed for pain    Lab results  Recent improvement in liver enzymes    Imaging results  - Previous x-rays showed degenerative arthritis and osteophytes in knees  - MRI of left knee ordered    Assessment  - Chronic knee pain likely due to degenerative arthritis  - Possible need for knee replacement surgery    Plan  - Increase dose of Cymbalta  - Start Gabapentin for pain  - Referral to a different orthopedic doctor for possible knee surgery  - MRI of left knee  - Recheck liver enzymes    Prescription  - Increased dose of Cymbalta  - New prescription for Gabapentin    Appointments  - Follow-up appointment with orthopedic doctor for possible knee surgery  - Lab appointment for liver enzyme  "recheck    Reports that she was disappointed with her orthopedic follow-up and she was told that provide was apprehensive about pursuing surgical options for the knee.  She has tried injections and gel injections into the knee multiple times without relief.  Offered x-ray, patient declined.  Patient is getting an MRI of the left knee in the future.          Review of Systems  Constitutional, HEENT, cardiovascular, pulmonary, GI, , musculoskeletal, neuro, skin, endocrine and psych systems are negative, except as otherwise noted.      Objective    /72   Pulse 78   Temp 97.6  F (36.4  C) (Temporal)   Resp 18   Ht 1.518 m (4' 11.75\")   Wt 66.7 kg (147 lb)   LMP 06/12/2014   SpO2 99%   BMI 28.95 kg/m    Body mass index is 28.95 kg/m .  Physical Exam   GENERAL: alert and no distress  EYES: Eyes grossly normal to inspection, PERRL and conjunctivae and sclerae normal  SKIN: no suspicious lesions or rashes  NEURO: Normal strength and tone, mentation intact and speech normal  PSYCH: mentation appears normal, affect normal/[facility] Out[facility] Visit on 07/03/2024   Component Date Value Ref Range Status    COLONOSCOPY 07/03/2024    Final                    Value:[facility]  Endoscopy Department-[place]  _______________________________________________________________________________  Patient Name: Marcelina Estevez            Procedure Date: 7/3/2024 9:38 AM  MRN: [id number]                       YOB: 1960  Admit Type: Outpatient                Age: 64  Gender: Female                        Note Status: Finalized  Attending MD: LEANA MASON MD,   Instrument Name: [id number]   [id number]  _______________________________________________________________________________     Procedure:                Colonoscopy  Indications:              High risk colon cancer surveillance: Personal                             history of Serrated Polyposis " Syndrome  Providers:                LEANA MASON MD  Referring MD:               Medicines:                Monitored Anesthesia Care  Complications:            No immediate complications.  ___________________________________________________                          ____________________________  Procedure:                Pre-Anesthesia Assessment:                            - Prior to the procedure, a History and Physical                             was performed, and patient medications and                             allergies were reviewed. The patient is competent.                             The risks and benefits of the procedure and the                             sedation options and risks were discussed with the                             patient. All questions were answered and informed                             consent was obtained. Patient identification and                             proposed procedure were verified by the physician,                             the nurse and the anesthetist in the pre-procedure                             area in the procedure room. Mental Status                             Examination: alert and oriented. Airway                             Examination: normal oropharyngeal airway and neck                                                       mobility. Respiratory Examination: clear to                             auscultation. CV Examination: normal. Prophylactic                             Antibiotics: The patient does not require                             prophylactic antibiotics. Prior Anticoagulants: The                             patient has taken no anticoagulant or antiplatelet                             agents. ASA Grade Assessment: II - A patient with                             mild systemic disease. After reviewing the risks                             and benefits, the patient was deemed in                             satisfactory  condition to undergo the procedure.                             The anesthesia plan was to use monitored anesthesia                             care (MAC). Immediately prior to administration of                             medications, the patient was re-assessed for                             adequacy to receive sedatives. The heart rate,                                                       respiratory rate, oxygen saturations, blood                             pressure, adequacy of pulmonary ventilation, and                             response to care were monitored throughout the                             procedure. The physical status of the patient was                             re-assessed after the procedure.                            After obtaining informed consent, the colonoscope                             was passed under direct vision. Throughout the                             procedure, the patient's blood pressure, pulse, and                             oxygen saturations were monitored continuously. The                             was introduced through the anus and advanced to the                             cecum, identified by appendiceal orifice and                             ileocecal valve. The colonoscopy was performed                             without difficulty. The patient tolerated the                             procedure well.                           The quality of the bowel                             preparation was evaluated using the BBPS ([place]                             Bowel Preparation Scale) with scores of: Right                             Colon = 3, Transverse Colon = 3 and Left Colon = 3                             (entire mucosa seen well with no residual staining,                             small fragments of stool or opaque liquid). The                             total BBPS score equals 9.                                                                                    Findings:       The perianal and digital rectal examinations were normal.       A tattoo was seen in the transverse colon. A post-polypectomy scar was        found at the tattoo site. There was no evidence of residual polyp tissue.       Additional large scar noted in transverse colon, no polyp recurrence.       Multiple small-mouthed diverticula were found in the sigmoid colon.       The retroflexed view of the distal rectum and anal verge was n                          ormal and        showed no anal or rectal abnormalities.                                                                                   Moderate Sedation:       Moderate Sedation was not administered  Impression:               - A tattoo was seen in the transverse colon. A                             post-polypectomy scar was found at the tattoo site.                             There was no evidence of residual polyp tissue.                            - Additional large scar noted in transverse colon,                             no polyp recurrence.                            - Diverticulosis in the sigmoid colon.                            - The distal rectum and anal verge are normal on                             retroflexion view.                            - No specimens collected.                            - No new polyps noted today.  Recommendation:           - Discharge patient to home (ambulatory).                            - Patient has a contact number available for                                                       emergencies. The signs and symptoms of potential                             delayed complications were discussed with the                             patient. Return to normal activities tomorrow.                             Written discharge instructions were provided to the                             patient.                            - Repeat colonoscopy in 2 years for surveillance.                             - Return to primary care physician.                                                                                       _____________________________  LEANA MASON MD  7/3/2024 10:20:00 AM  I was physically present for the entire viewing portion of the exam.LEANA MASON MD  Number of Addenda: 0    Note Initiated On: 7/3/2024 9:38 AM  Scope In:  Scope Out:       No results found for any visits on 07/16/24.  No results found.        Signed Electronically by: Aydee Rosario PA-C

## 2024-07-16 NOTE — PATIENT INSTRUCTIONS
New ortho referral, call and schedule    MRI left knee    Lidocaine patches with Kerlix or knee compression sleeve    Increase Cymbalta to 120 mg each day    Start gabapentin 100 mg at bedtime as needed for pain    Labs today    Avoid ibuprofen    Avoid Tylenol    Avoid alcohol    Call and schedule liver ultrasound for further evaluation    Patient understood and verbally consented to the treatment plan. Discussed symptoms that would warrant an urgent or emergent visit. All of the patients' questions were answered. Patient was instructed to contact the clinic if questions or concerns arise. Recommend follow up appointments if symptoms worsen or fail to improve. Recommend follow up as needed. Recommend ER in the case of an emergency.    Aydee Rosario PA-C    Please note: Voice recognition software may have been used in preparing this note, unintended word substitutions may be present.

## 2024-07-16 NOTE — RESULT ENCOUNTER NOTE
Hello -    Here are my comments about the recent results.  Your liver enzymes and alk phos have elevated.  I recommend that we get a liver ultrasound for further evaluation.  I placed an order for this.  I recommend that you follow-up with GI liver specialist, referral has been placed.    Please let us know if you have any questions or concerns.    Regards,  Aydee Rosario PA-C

## 2024-07-17 ENCOUNTER — HOSPITAL ENCOUNTER (OUTPATIENT)
Facility: CLINIC | Age: 64
End: 2024-07-17
Attending: ORTHOPAEDIC SURGERY | Admitting: ORTHOPAEDIC SURGERY
Payer: COMMERCIAL

## 2024-07-17 ENCOUNTER — TELEPHONE (OUTPATIENT)
Dept: ORTHOPEDICS | Facility: CLINIC | Age: 64
End: 2024-07-17

## 2024-07-17 NOTE — TELEPHONE ENCOUNTER
Type of surgery: LEFT TOTAL KNEE ARTHROPLASTY (Left)   Location of surgery: [facility] OR  Date and time of surgery: 09/09/2024  Surgeon: DONOVAN  Pre-Op Appt Date: 08/26/2024  Post-Op Appt Date: 09/25/2024   Packet sent out: Yes  Pre-cert/Authorization completed:  No  Date:

## 2024-07-18 NOTE — TELEPHONE ENCOUNTER
Patient is wondering what changed the providers mind about doing surgery?    She agrees with it. She would just like to know.    Summer Julian RN on 7/18/2024 at 10:44 AM

## 2024-07-22 LAB
ALP BONE SERPL-CCNC: 47 U/L
ALP LIVER SERPL-CCNC: 743 U/L
ALP OTHER SERPL-CCNC: 0 U/L
ALP SERPL-CCNC: 790 U/L

## 2024-07-22 NOTE — RESULT ENCOUNTER NOTE
Hello -    Here are my comments about the recent results.  Your liver enzymes are elevated.    Regards,  Aydee Rosario PA-C

## 2024-07-23 ENCOUNTER — TELEPHONE (OUTPATIENT)
Dept: ORTHOPEDICS | Facility: CLINIC | Age: 64
End: 2024-07-23
Payer: COMMERCIAL

## 2024-07-23 DIAGNOSIS — E78.5 HYPERLIPIDEMIA LDL GOAL <160: Primary | ICD-10-CM

## 2024-07-23 RX ORDER — ATORVASTATIN CALCIUM 40 MG/1
40 TABLET, FILM COATED ORAL
Qty: 90 TABLET | Refills: 0 | Status: SHIPPED | OUTPATIENT
Start: 2024-07-23 | End: 2024-08-26

## 2024-07-23 NOTE — TELEPHONE ENCOUNTER
"Pt's pharmacy calling for refill of Atorvastatin 40mg tabs.    Of note, this is charted as a \"patient reported medication\" on her med list as of 02/26/2024. Pt had previously been prescribed Simvastatin (discontinued April 2024).    Medication and preferred pharmacy pended. Routing to provider to review and sign if appropriate.     Mar Maurice RN, BSN  [facility]       "

## 2024-07-23 NOTE — TELEPHONE ENCOUNTER
Patient contacted today at 1 pm with Dr Abrams suggestion she not pursue total joint arthroplasty at this stage ( despite being scheduled) with the understanding he would honor her pending Sept 2024 surgery date if she feels there are no other options.  We also gave her the name of other Orthopedic surgery staff at [facility] to consider a second opinion regarding her left knee arthritis. She is pending a 7/25/24 L knee MRI and will hear back with any change in treatment plan from Dr Abrams based on these results.  Ankur HUNTER

## 2024-07-25 ENCOUNTER — HOSPITAL ENCOUNTER (OUTPATIENT)
Dept: MRI IMAGING | Facility: CLINIC | Age: 64
Discharge: HOME | End: 2024-07-25
Attending: ORTHOPAEDIC SURGERY | Admitting: ORTHOPAEDIC SURGERY
Payer: COMMERCIAL

## 2024-07-25 DIAGNOSIS — M17.0 PRIMARY OSTEOARTHRITIS OF BOTH KNEES: ICD-10-CM

## 2024-07-25 DIAGNOSIS — M25.562 CHRONIC PAIN OF LEFT KNEE: ICD-10-CM

## 2024-07-25 DIAGNOSIS — G89.29 CHRONIC PAIN OF LEFT KNEE: ICD-10-CM

## 2024-07-25 PROCEDURE — 73721 MRI JNT OF LWR EXTRE W/O DYE: CPT | Mod: 26 | Performed by: RADIOLOGY

## 2024-07-25 PROCEDURE — 73721 MRI JNT OF LWR EXTRE W/O DYE: CPT | Mod: LT

## 2024-08-05 DIAGNOSIS — E78.5 HYPERLIPIDEMIA LDL GOAL <160: ICD-10-CM

## 2024-08-05 NOTE — TELEPHONE ENCOUNTER
Surgery is canceled per Charley Carranza Nathan; Jailyn Ruiz-    I received this  from , Can you please make sure this surgery gets cancelled.      Next Steps:               Recieved referral message from Dr. Abrams.  Jaciel Abrams MD sent to [facility]  Please stop trying to get her approved for surgery.  The procedure request was an error. We let the schedulers know this.  We'll keep the therapy recommendation in mind. Thank you,    --Dr. Abrams

## 2024-08-06 RX ORDER — ATORVASTATIN CALCIUM 40 MG/1
TABLET, FILM COATED ORAL
Qty: 90 TABLET | Refills: 0 | OUTPATIENT
Start: 2024-08-06

## 2024-08-07 ENCOUNTER — MYC REFILL (OUTPATIENT)
Dept: FAMILY MEDICINE | Facility: CLINIC | Age: 64
End: 2024-08-07
Payer: COMMERCIAL

## 2024-08-07 DIAGNOSIS — I10 HYPERTENSION GOAL BP (BLOOD PRESSURE) < 140/90: Primary | ICD-10-CM

## 2024-08-07 DIAGNOSIS — M25.561 CHRONIC PAIN OF RIGHT KNEE: ICD-10-CM

## 2024-08-07 DIAGNOSIS — F41.1 GAD (GENERALIZED ANXIETY DISORDER): ICD-10-CM

## 2024-08-07 DIAGNOSIS — M34.1 CREST (CALCINOSIS, RAYNAUD'S PHENOMENON, ESOPHAGEAL DYSFUNCTION, SCLERODACTYLY, TELANGIECTASIA) (H): ICD-10-CM

## 2024-08-07 DIAGNOSIS — G89.29 CHRONIC PAIN OF RIGHT KNEE: ICD-10-CM

## 2024-08-07 DIAGNOSIS — F33.1 MODERATE EPISODE OF RECURRENT MAJOR DEPRESSIVE DISORDER (H): ICD-10-CM

## 2024-08-07 RX ORDER — LOSARTAN POTASSIUM 100 MG/1
100 TABLET ORAL
Qty: 90 TABLET | Refills: 3 | Status: ON HOLD | OUTPATIENT
Start: 2024-08-07 | End: 2024-09-10

## 2024-08-07 RX ORDER — DULOXETIN HYDROCHLORIDE 60 MG/1
120 CAPSULE, DELAYED RELEASE ORAL DAILY
Qty: 180 CAPSULE | Refills: 3 | OUTPATIENT
Start: 2024-08-07

## 2024-08-07 RX ORDER — LORAZEPAM 0.5 MG/1
0.5 TABLET ORAL DAILY PRN
Qty: 15 TABLET | Refills: 0 | Status: SHIPPED | OUTPATIENT
Start: 2024-08-07 | End: 2024-09-22

## 2024-08-07 ASSESSMENT — KOOS JR
KOOS JR SCORING: 20.94
STANDING UPRIGHT: EXTREME
STRAIGHTENING KNEE FULLY: EXTREME
TWISING OR PIVOTING ON KNEE: EXTREME
GOING UP OR DOWN STAIRS: EXTREME
BENDING TO THE FLOOR TO PICK UP OBJECT: SEVERE
RISING FROM SITTING: SEVERE
HOW SEVERE IS YOUR KNEE STIFFNESS AFTER FIRST WAKING IN MORNING: SEVERE

## 2024-08-13 NOTE — PROGRESS NOTES
ORTHOPEDIC CONSULT      Chief Complaint: Marcelina Estevez is a 64 year old female who is being seen for   Chief Complaint   Patient presents with    Left Knee - Pain     2nd opinion       History of Present Illness:   Previously seen by Dr. Abrams for left knee pain.  His visit on July 10, 2024 was reviewed.  Concerns for inflammatory arthritis.  Recent pulmonology visit on July 31, 2020 for review as well.   She reports 20+ years of progressive left knee pain. Now reports the right knee and hip are starting to hurt due to increasing pain and limping with the left knee. No specific injuries recently. Reports just getting progressively worse.   Treatments tried: previous knee arthroscopy with meniscus debridement, multiple cortisone injections, gel injections, physical therapy in the past, rest, activity modification, OTC medications including nsaids and tylenol.  Despite this progressive constant pain limiting her activity, she reports able to walk less than 1 block without stopping due to pain, limited other rec. activities as well as limiting ability to play with her grandkids, clean, and kneel,. These symptoms have been ongoing for long time but worse the last year.      Reports seen by pulmonologist recently due to long term systemic sclerosis.  Was told pulmonary function and chest imaging was largely unchanged but was recommended for further cardiac workup and testing. Denies any home 02 use. States lungs and breathing has felt at baseline and not limiting.     Patient's past medical, surgical, social and family histories reviewed.     Past Medical History:   Diagnosis Date    CREST (calcinosis, Raynaud's phenomenon, esophageal dysfunction, sclerodactyly, telangiectasia) (H)     Hyperlipidemia     Hypertension     Hypertriglyceridemia 01/18/2011    Controlled with simvastatin.       Limited systemic sclerosis (H)     Menopausal syndrome (hot flashes) 12/19/2013    Positive MINDY (antinuclear antibody)     Raynaud  disease        Past Surgical History:   Procedure Laterality Date    APPENDECTOMY      BIOPSY      cervical node    COLONOSCOPY N/A 11/10/2021    Procedure: COLONOSCOPY, FLEXIBLE, WITH LESION REMOVAL USING SNARE;  Surgeon: Domingo Wall MD;  Location: [facility]    COLONOSCOPY N/A 11/10/2021    Procedure: COLONOSCOPY, WITH POLYPECTOMY AND BIOPSY;  Surgeon: Domingo Wall MD;  Location: [facility]    COLONOSCOPY N/A 9/29/2021    Procedure: Colonoscopy, With Polypectomy And Biopsy;  Surgeon: Domingo Wall MD;  Location: [facility]    COLONOSCOPY N/A 9/29/2021    Procedure: Colonoscopy, Flexible, With Lesion Removal Using Snare;  Surgeon: Domingo Wall MD;  Location: [facility]    COLONOSCOPY N/A 6/29/2022    Procedure: COLONOSCOPY, FLEXIBLE, WITH LESION REMOVAL USING SNARE;  Surgeon: Domingo Wall MD;  [facility]    COLONOSCOPY N/A 11/9/2022    Procedure: COLONOSCOPY, WITH POLYPECTOMY AND BIOPSY;  Surgeon: Domingo Wall MD;  [facility]    COLONOSCOPY N/A 6/12/2023    Procedure: COLONOSCOPY, FLEXIBLE, WITH LESION REMOVAL USING SNARE;  Surgeon: Domingo Wall MD;  [facility]    COLONOSCOPY WITH CO2 INSUFFLATION N/A 10/26/2020    Procedure: COLONOSCOPY, WITH CO2 INSUFFLATION;  Surgeon: Phyllis Chaudhari MD;  Location[facility]    COLONOSCOPY WITH CO2 INSUFFLATION N/A 11/10/2021    Procedure: COLONOSCOPY, WITH CO2 INSUFFLATION;  Surgeon: Domingo Wall MD;  [facility]    COLONOSCOPY WITH CO2 INSUFFLATION N/A 9/29/2021    Procedure: COLONOSCOPY, WITH CO2 INSUFFLATION;  Surgeon: Domingo Wall MD;  [facility]    COLONOSCOPY WITH CO2 INSUFFLATION N/A 6/29/2022    Procedure: COLONOSCOPY, WITH CO2 INSUFFLATION;  Surgeon: Domingo Wall MD;  [facility]    COLONOSCOPY WITH CO2 INSUFFLATION N/A 11/9/2022    Procedure: COLONOSCOPY, WITH CO2 INSUFFLATION;  Surgeon: Domingo Wall  MD Fausto;  [facility]    COLONOSCOPY WITH CO2 INSUFFLATION N/A 6/12/2023    Procedure: Colonoscopy with CO2 insufflation;  Surgeon: Domingo Wall MD;  [facility]    COLONOSCOPY WITH CO2 INSUFFLATION N/A 7/3/2024    Procedure: Colonoscopy with CO2 insufflation;  Surgeon: Domingo Wall MD;  [facility]    COMBINED ESOPHAGOSCOPY, GASTROSCOPY, DUODENOSCOPY (EGD) WITH CO2 INSUFFLATION N/A 6/29/2022    Procedure: ESOPHAGOGASTRODUODENOSCOPY, WITH CO2 INSUFFLATION;  Surgeon: Domingo Wall MD;  [facility]    DILATION AND CURETTAGE, HYSTEROSCOPY DIAGNOSTIC, COMBINED  7/1/2014    Procedure: COMBINED DILATION AND CURETTAGE, HYSTEROSCOPY DIAGNOSTIC;  Surgeon: Mana Cabrera DO;  Location: [facility]    ENT SURGERY      tonsillectomy and adnoid removal    ESOPHAGOSCOPY, GASTROSCOPY, DUODENOSCOPY (EGD), COMBINED N/A 6/29/2022    Procedure: ESOPHAGOGASTRODUODENOSCOPY, WITH BIOPSY;  Surgeon: Domingo Wall MD;  Location: [facility]    HYSTERECTOMY TOTAL ABDOMINAL      ORTHOPEDIC SURGERY      left knee tear meniscus    RELEASE CARPAL TUNNEL BILATERAL  2009       Medications:  Current Outpatient Medications   Medication Sig Dispense Refill    albuterol (PROAIR HFA) 108 (90 Base) MCG/ACT inhaler INHALE 2 PUFS BY MOUTH EVERY 6 HOURS AS NEEDED FOR SHORTNESS OF BREATH / DYSPNEA 18 g 3    ALLEGRA ALLERGY 180 MG tablet Take 1 tablet (180 mg) by mouth daily 90 tablet 3    aspirin (ASPIRIN LOW DOSE) 81 MG EC tablet Take 1 tablet (81 mg) by mouth daily      atorvastatin (LIPITOR) 40 MG tablet Take 1 tablet (40 mg) by mouth daily at 2 pm 90 tablet 0    augmented betamethasone dipropionate (DIPROLENE-AF) 0.05 % external ointment       buPROPion (WELLBUTRIN XL) 150 MG 24 hr tablet Take 1 Tablet by mouth in the morning 90 tablet 0    diclofenac (VOLTAREN) 1 % topical gel Apply 2 g topically 4 times daily as needed for moderate pain 150 g 2    DULoxetine (CYMBALTA) 60 MG  capsule Take 2 capsules (120 mg) by mouth daily for 360 days 180 capsule 3    DULoxetine (CYMBALTA) 60 MG capsule Take 1 Capsule by mouth once daily 90 capsule 0    fluticasone (FLONASE) 50 MCG/ACT nasal spray Spray 1 spray into both nostrils daily 16 mL 5    gabapentin (NEURONTIN) 100 MG capsule Take 1 capsule (100 mg) by mouth at bedtime for 90 days 90 capsule 0    hydrochlorothiazide (HYDRODIURIL) 25 MG tablet Take 25 mg by mouth daily      hydroxychloroquine (PLAQUENIL) 200 MG tablet Take 300 mg by mouth daily Take 1 200 mg tablet and 1/2 of 200mg 120 tablet 1    isosorbide mononitrate (IMDUR) 30 MG 24 hr tablet Take 1 Tablet by mouth once daily 90 tablet 1    LORazepam (ATIVAN) 0.5 MG tablet Take 1 tablet (0.5 mg) by mouth daily as needed (panic attack) 15 tablet 0    losartan (COZAAR) 100 MG tablet Take 1 tablet (100 mg) by mouth daily at 2 pm 90 tablet 3    NIFEdipine ER (ADALAT CC) 30 MG 24 hr tablet Take 1 Tablet by mouth once daily 90 tablet 1    order for DME Equipment being ordered: light lamp for seasonal affective disorder 1 Device 0    pantoprazole (PROTONIX) 40 MG EC tablet Take 1 tablet (40 mg) by mouth daily 90 tablet 1    VITAMIN D, CHOLECALCIFEROL, PO Take 2,000 Units by mouth daily        Current Facility-Administered Medications   Medication Dose Route Frequency Provider Last Rate Last Admin    2.0 mL bupivacaine (MARCAINE) 0.5% injection (50 mL vial)  2 mL   Ancelmo Da Silva,    2 mL at 10/05/23 1241    2.0 mL bupivacaine (MARCAINE) 0.5% injection (50 mL vial)  2 mL   Ancelmo Da Silva, DO   2 mL at 10/05/23 1241    hylan (SYNVISC ONE) injection 48 mg  48 mg      48 mg at 05/30/24 0938    hylan (SYNVISC ONE) injection 48 mg  48 mg      48 mg at 05/30/24 0938    lidocaine 1 % injection 2 mL  2 mL   Ancelmo Da Silva,    2 mL at 10/05/23 1241    lidocaine 1 % injection 2 mL  2 mL   Ancelmo Da Silva,    2 mL at 10/05/23 1241    triamcinolone (KENALOG-40) injection 40 mg  40 mg   Ancelmo Da Silva DO    "40 mg at 10/05/23 1241    triamcinolone (KENALOG-40) injection 40 mg  40 mg   Ancelmo Da Silva DO   40 mg at 10/05/23 1241     Facility-Administered Medications Ordered in Other Visits   Medication Dose Route Frequency Provider Last Rate Last Admin    sodium chloride (PF) 0.9% PF flush 10 mL  10 mL Intravenous Once Hilda Lopez MD           Allergies   Allergen Reactions    Hydroxyzine      Made her very tired    Other Environmental Allergy Other (See Comments)    Seasonal Allergies     Sulfa Antibiotics      Vomiting      Vicodin [Hydrocodone-Acetaminophen] Nausea       Social History     Occupational History    Not on file   Tobacco Use    Smoking status: Former     Current packs/day: 0.00     Types: Cigarettes     Quit date: 2001     Years since quittin.6    Smokeless tobacco: Never   Vaping Use    Vaping status: Never Used   Substance and Sexual Activity    Alcohol use: Yes     Comment: 5-8 drinks/week    Drug use: No    Sexual activity: Yes     Partners: Male     Birth control/protection: Surgical     Comment: vasectomy       Family History   Problem Relation Age of Onset    Osteoporosis Mother     Eye Disorder Mother         cataract, mac degen    Macular Degeneration Mother     Dementia Mother     Hypertension Maternal Grandmother     Diabetes Maternal Grandfather     Eye Disorder Paternal Grandmother         glaucoma    Unknown/Adopted Paternal Grandfather     Hypertension Daughter     Graves' disease Daughter     Diabetes Other     Glaucoma No family hx of        REVIEW OF SYSTEMS  10 point review systems performed otherwise negative as noted as per history of present illness.    Physical Exam:  Vitals: /80   Temp 98.6  F (37  C)   Ht 1.555 m (5' 1.22\")   Wt 66.5 kg (146 lb 8 oz)   LMP 2014   BMI 27.48 kg/m    BMI= Body mass index is 27.48 kg/m .  Constitutional: healthy, alert and no acute distress   Psychiatric: mentation appears normal and affect normal/bright  NEURO: no " focal deficits  RESP: Normal with easy respirations and no use of accessory muscles to breathe, no audible wheezing or retractions  CV: No peripheral edema  SKIN: No erythema, rashes, excoriation, or breakdown. No evidence of infection. Previous well healed surgical incisions consistent with prior knee arthroscopies.  JOINT/EXTREMITIES:left knee: small effusion. AROM 0-110. NO pain with motion, some stiffness and hamstring tightness. Tender along medial joint line.  Collaterals intact. Extensor intact.    GAIT: not tested     Diagnostic Modalities:  Left knee x-ray: Taken October 2023 weightbearing view shows moderate to severe medial compartment narrowing with rimming osteophytes.  Patellofemoral and lateral compartment fairly well-preserved.    MR left knee without contrast 7/25/2024 10:15 AM     Techniques: Multiplanar multisequence imaging of the left knee was  obtained without administration of intra-articular or intravenous  contrast using routine protocol.     History: Chronic pain of left knee; Chronic pain of left knee; Primary  osteoarthritis of both knees     Comparison: Knee radiographs 10/5/2023     Findings:     MENISCI:  Medial meniscus: Predominantly horizontal tear of the body and  posterior horn of medial meniscus extending into the posterior root  ligament with 9 mm displaced meniscal flap in the medial femoral  gutter.     Lateral meniscus: Diffuse intermediate signal posterior to thickening,  may be partially torn versus magic angle artifact. Otherwise intact.     LIGAMENTS  Cruciate ligaments: Intact.  Medial supporting structures: Peripheral bowing of the tibial  collateral ligament, moderate sprain meniscofemoral and meniscotibial  ligaments, small tibial collateral ligament bursal fluid are likely  secondary to altered biomechanics due to underlying meniscal  pathology.  Lateral supporting structures: Intact. Popliteus tendinosis. Mild  edema superficial to the biceps femoris/conjoined  tendon.     EXTENSOR MECHANISM  Suprapatellar enthesophyte. Focal low to moderate grade  partial-thickness tear in the medial aspect of the quadriceps tendon.  Patellar tendon is intact.     FLUID  Small to moderate joint effusion. Moderate-sized Baker's cyst with  internal septation, which extends/communicates with semimembranosus  and pes anserine bursae.      OSSEOUS and ARTICULAR STRUCTURES  Bones: Peripheral edema-like marrow signal intensity in the medial  compartment with associated subchondral hypointensities, likely  reactive to meniscal pathology.     Osteophytosis. No fracture, or other osseous lesion is seen.     Patellofemoral compartment: Up to grade II chondromalacia with  moderate chondral loss in the medial patellar facet. Additionally,  increased T2 signal in the the lateral patellar facet. Trochlear  cartilage relatively preserved.     Edema in the suprapatellar fat pad without posterior convexity,  nonspecific.     Medial compartment: Up to grade IV chondromalacia in the medial  compartment with full thickness chondral loss and opposing edema and  subchondral bone plate thickening sclerosis.     Lateral compartment: No high-grade chondromalacia.     ANCILLARY FINDINGS  Mild subcutaneous edema especially anteriorly.                                                                   Impression:  1. Horizontal tear of the medial meniscus body and posterior horn with  tear extension into posterior root ligament. Associated meniscal flap  in medial femoral gutter.  2. Grade 4 in the medial compartment and 2 in the patellofemoral  compartment.  3. Small to moderate joint effusion.  4. Moderate-sized Baker's cyst with extension into semimembranosus and  pes anserine bursae.   Independent visualization of the images was performed.      Impression: left knee primary osteoarthritis (moderate-severe medial compartment) with degenerative meniscus tear    Plan:  All of the above pertinent physical exam and  imaging modalities findings was reviewed with Marcelina and her spouse.  Exam, history and imaging is all consistent with advanced arthritis. She has failed extensive conservative therapy over the past. She is interested in proceeding with knee replacement. However she does have some recommendations from her pulmonologist about further cardiac workup.   Will have patient reach out to her pulmonologist and cardiologist to get further clarification.  If they feel she is medically optimized and safe to proceed without further workup then would proceed with surgery.                                             ELECTIVE SURGERY:  The patient has attempted conservative treatments that include previous knee arthroscopy with meniscus debridement, multiple cortisone injections, gel injections, physical therapy in the past, rest, activity modification, OTC medications including nsaids and tylenol.  Despite this progressive constant pain limiting her activity, she reports able to walk less than 1 block without stopping due to pain, limited other rec. activities as well as limiting ability to play with her grandkids, clean, and kneel.  Given the level of severity and no reported benefits from physical therapy in the past along do not feel she would benefit from current physical therapy and would be contraindicated as more likely to exacerbate her current symptoms leading to further decrease in quality of life. Secondary to these reasons I have offered surgery in the form of left ramila assisted total knee replacement.       Risks, benefits, complications, alternatives, limitations, and post operative course were discussed. Risks including infection (requiring long-term antibiotics and repeat surgeries), implant loosening (requiring revision surgery), heart attacks, strokes, bleeding (possibly requiring blood transfusion), scars, instability, numbness around the knee, stiffness (requiring manipulations or repeat surgeries),  instability and dislocations, fractures, blood clots the legs and lungs, nerve injury (possibly leading to foot drop).  Postoperative course was discussed as far as limitations and expected recovery times. It was also discussed that after surgery there is a need for blood thinners to prevent blood clots, but even when being treated it is possible to develop a blood clot.  Anticipate post-op day 1 discharge (NOT fast-track)  We discussed physical therapy being an important component of postop success.  Will wait for patient to contact us back.  If pulmonology and cardiology ok with proceeding will place case request, order for CT at that time.     Return to clinic: contact clinic with requested information. If proceeding with surgery then will need to be seen 1 week prior (telephone call ok) and 14 days post-op along with preop H&P. If not cleared for surgery to contact us once she is or sooner as needed for changes.  Re-x-ray on return: No (except if does proceed with surgery then at the 2 week post-op visit).     Vince Spring D.O.

## 2024-08-14 ENCOUNTER — OFFICE VISIT (OUTPATIENT)
Dept: ORTHOPEDICS | Facility: CLINIC | Age: 64
End: 2024-08-14
Payer: COMMERCIAL

## 2024-08-14 ENCOUNTER — TELEPHONE (OUTPATIENT)
Dept: ORTHOPEDICS | Facility: OTHER | Age: 64
End: 2024-08-14

## 2024-08-14 VITALS
SYSTOLIC BLOOD PRESSURE: 122 MMHG | HEIGHT: 61 IN | TEMPERATURE: 98.6 F | BODY MASS INDEX: 27.66 KG/M2 | WEIGHT: 146.5 LBS | DIASTOLIC BLOOD PRESSURE: 80 MMHG

## 2024-08-14 DIAGNOSIS — M17.12 PRIMARY OSTEOARTHRITIS OF LEFT KNEE: Primary | ICD-10-CM

## 2024-08-14 PROCEDURE — 99213 OFFICE O/P EST LOW 20 MIN: CPT | Performed by: ORTHOPAEDIC SURGERY

## 2024-08-14 NOTE — TELEPHONE ENCOUNTER
Other: Pt is requesting a call back regarding scheduling surgery     Could we send this information to you in [facility] or would you prefer to receive a phone call?:   Patient would prefer a phone call   Okay to leave a detailed message?: Yes at Home number on file [phone] (home)

## 2024-08-14 NOTE — TELEPHONE ENCOUNTER
Patient states she was proactive and contacted her pulmonologist for clearance. She state she can wait until after knee surgery to have angiogram. She stated her pulmonologist, Dr. Tillman, will place a note in her chart.     Patient wants to schedule knee surgery ASAP.     Aline Ortiz RN on 8/14/2024 at 2:38 PM

## 2024-08-14 NOTE — LETTER
8/14/2024      Marcelina Estevez  [address] [place] [address]      Dear Colleague,    Thank you for referring your patient, Marcelina Estevez, to the [facility]. Please see a copy of my visit note below.    ORTHOPEDIC CONSULT      Chief Complaint: Marcelina Estevez is a 64 year old female who is being seen for   Chief Complaint   Patient presents with     Left Knee - Pain     2nd opinion       History of Present Illness:   Previously seen by Dr. Abrams for left knee pain.  His visit on July 10, 2024 was reviewed.  Concerns for inflammatory arthritis.  Recent pulmonology visit on July 31, 2020 for review as well.   She reports 20+ years of progressive left knee pain. Now reports the right knee and hip are starting to hurt due to increasing pain and limping with the left knee. No specific injuries recently. Reports just getting progressively worse.   Treatments tried: previous knee arthroscopy with meniscus debridement, multiple cortisone injections, gel injections, physical therapy in the past, rest, activity modification, OTC medications including nsaids and tylenol.  Despite this progressive constant pain limiting her activity, she reports able to walk less than 1 block without stopping due to pain, limited other rec. activities as well as limiting ability to play with her grandkids, clean, and kneel,. These symptoms have been ongoing for long time but worse the last year.      Reports seen by pulmonologist recently due to long term systemic sclerosis.  Was told pulmonary function and chest imaging was largely unchanged but was recommended for further cardiac workup and testing. Denies any home 02 use. States lungs and breathing has felt at baseline and not limiting.     Patient's past medical, surgical, social and family histories reviewed.     Past Medical History:   Diagnosis Date     CREST (calcinosis, Raynaud's phenomenon, esophageal dysfunction, sclerodactyly, telangiectasia) (H)       Hyperlipidemia      Hypertension      Hypertriglyceridemia 01/18/2011    Controlled with simvastatin.        Limited systemic sclerosis (H)      Menopausal syndrome (hot flashes) 12/19/2013     Positive MINDY (antinuclear antibody)      Raynaud disease        Past Surgical History:   Procedure Laterality Date     APPENDECTOMY       BIOPSY      cervical node     COLONOSCOPY N/A 11/10/2021    Procedure: COLONOSCOPY, FLEXIBLE, WITH LESION REMOVAL USING SNARE;  Surgeon: Domingo Wall MD;  Location: [facility]     COLONOSCOPY N/A 11/10/2021    Procedure: COLONOSCOPY, WITH POLYPECTOMY AND BIOPSY;  Surgeon: Domingo Wall MD;  Location: [facility]     COLONOSCOPY N/A 9/29/2021    Procedure: Colonoscopy, With Polypectomy And Biopsy;  Surgeon: Domingo Wall MD;  Location: [facility]     COLONOSCOPY N/A 9/29/2021    Procedure: Colonoscopy, Flexible, With Lesion Removal Using Snare;  Surgeon: Domingo Wall MD;  [facility]     COLONOSCOPY N/A 6/29/2022    Procedure: COLONOSCOPY, FLEXIBLE, WITH LESION REMOVAL USING SNARE;  Surgeon: Domingo Wall MD;  [facility]     COLONOSCOPY N/A 11/9/2022    Procedure: COLONOSCOPY, WITH POLYPECTOMY AND BIOPSY;  Surgeon: Domingo Wall MD;  [facility]     COLONOSCOPY N/A 6/12/2023    Procedure: COLONOSCOPY, FLEXIBLE, WITH LESION REMOVAL USING SNARE;  Surgeon: Domingo Wall MD;  [facility]     COLONOSCOPY WITH CO2 INSUFFLATION N/A 10/26/2020    Procedure: COLONOSCOPY, WITH CO2 INSUFFLATION;  Surgeon: Phyllis Chaudhari MD;  [facility]     COLONOSCOPY WITH CO2 INSUFFLATION N/A 11/10/2021    Procedure: COLONOSCOPY, WITH CO2 INSUFFLATION;  Surgeon: Domingo Wall MD;  [facility]     COLONOSCOPY WITH CO2 INSUFFLATION N/A 9/29/2021    Procedure: COLONOSCOPY, WITH CO2 INSUFFLATION;  Surgeon: Domingo Wall MD;  [facility]     COLONOSCOPY WITH CO2 INSUFFLATION N/A  6/29/2022    Procedure: COLONOSCOPY, WITH CO2 INSUFFLATION;  Surgeon: Domingo Wall MD;  [facility]     COLONOSCOPY WITH CO2 INSUFFLATION N/A 11/9/2022    Procedure: COLONOSCOPY, WITH CO2 INSUFFLATION;  Surgeon: Domingo Wall MD;  [facility]     COLONOSCOPY WITH CO2 INSUFFLATION N/A 6/12/2023    Procedure: Colonoscopy with CO2 insufflation;  Surgeon: Domingo Wall MD;  [facility]     COLONOSCOPY WITH CO2 INSUFFLATION N/A 7/3/2024    Procedure: Colonoscopy with CO2 insufflation;  Surgeon: Domingo Wall MD;  [facility]     COMBINED ESOPHAGOSCOPY, GASTROSCOPY, DUODENOSCOPY (EGD) WITH CO2 INSUFFLATION N/A 6/29/2022    Procedure: ESOPHAGOGASTRODUODENOSCOPY, WITH CO2 INSUFFLATION;  Surgeon: Domingo Wall MD;  [facility]     DILATION AND CURETTAGE, HYSTEROSCOPY DIAGNOSTIC, COMBINED  7/1/2014    Procedure: COMBINED DILATION AND CURETTAGE, HYSTEROSCOPY DIAGNOSTIC;  Surgeon: Mana Cabrera DO;  Location: [facility]     ENT SURGERY      tonsillectomy and adnoid removal     ESOPHAGOSCOPY, GASTROSCOPY, DUODENOSCOPY (EGD), COMBINED N/A 6/29/2022    Procedure: ESOPHAGOGASTRODUODENOSCOPY, WITH BIOPSY;  Surgeon: Domingo Wall MD;  [facility]     HYSTERECTOMY TOTAL ABDOMINAL       ORTHOPEDIC SURGERY      left knee tear meniscus     RELEASE CARPAL TUNNEL BILATERAL  2009       Medications:  Current Outpatient Medications   Medication Sig Dispense Refill     albuterol (PROAIR HFA) 108 (90 Base) MCG/ACT inhaler INHALE 2 PUFS BY MOUTH EVERY 6 HOURS AS NEEDED FOR SHORTNESS OF BREATH / DYSPNEA 18 g 3     ALLEGRA ALLERGY 180 MG tablet Take 1 tablet (180 mg) by mouth daily 90 tablet 3     aspirin (ASPIRIN LOW DOSE) 81 MG EC tablet Take 1 tablet (81 mg) by mouth daily       atorvastatin (LIPITOR) 40 MG tablet Take 1 tablet (40 mg) by mouth daily at 2 pm 90 tablet 0     augmented betamethasone dipropionate (DIPROLENE-AF) 0.05 %  external ointment        buPROPion (WELLBUTRIN XL) 150 MG 24 hr tablet Take 1 Tablet by mouth in the morning 90 tablet 0     diclofenac (VOLTAREN) 1 % topical gel Apply 2 g topically 4 times daily as needed for moderate pain 150 g 2     DULoxetine (CYMBALTA) 60 MG capsule Take 2 capsules (120 mg) by mouth daily for 360 days 180 capsule 3     DULoxetine (CYMBALTA) 60 MG capsule Take 1 Capsule by mouth once daily 90 capsule 0     fluticasone (FLONASE) 50 MCG/ACT nasal spray Spray 1 spray into both nostrils daily 16 mL 5     gabapentin (NEURONTIN) 100 MG capsule Take 1 capsule (100 mg) by mouth at bedtime for 90 days 90 capsule 0     hydrochlorothiazide (HYDRODIURIL) 25 MG tablet Take 25 mg by mouth daily       hydroxychloroquine (PLAQUENIL) 200 MG tablet Take 300 mg by mouth daily Take 1 200 mg tablet and 1/2 of 200mg 120 tablet 1     isosorbide mononitrate (IMDUR) 30 MG 24 hr tablet Take 1 Tablet by mouth once daily 90 tablet 1     LORazepam (ATIVAN) 0.5 MG tablet Take 1 tablet (0.5 mg) by mouth daily as needed (panic attack) 15 tablet 0     losartan (COZAAR) 100 MG tablet Take 1 tablet (100 mg) by mouth daily at 2 pm 90 tablet 3     NIFEdipine ER (ADALAT CC) 30 MG 24 hr tablet Take 1 Tablet by mouth once daily 90 tablet 1     order for DME Equipment being ordered: light lamp for seasonal affective disorder 1 Device 0     pantoprazole (PROTONIX) 40 MG EC tablet Take 1 tablet (40 mg) by mouth daily 90 tablet 1     VITAMIN D, CHOLECALCIFEROL, PO Take 2,000 Units by mouth daily        Current Facility-Administered Medications   Medication Dose Route Frequency Provider Last Rate Last Admin     2.0 mL bupivacaine (MARCAINE) 0.5% injection (50 mL vial)  2 mL   Ancelmo Da Silva, DO   2 mL at 10/05/23 1241     2.0 mL bupivacaine (MARCAINE) 0.5% injection (50 mL vial)  2 mL   Ancelmo Da Silva, DO   2 mL at 10/05/23 1241     hylan (SYNVISC ONE) injection 48 mg  48 mg      48 mg at 05/30/24 0938     hylan (SYNVISC ONE) injection 48  mg  48 mg      48 mg at 24 0938     lidocaine 1 % injection 2 mL  2 mL   Ancelmo Da Silva, DO   2 mL at 10/05/23 1241     lidocaine 1 % injection 2 mL  2 mL   Ancelmo Da Silva, DO   2 mL at 10/05/23 1241     triamcinolone (KENALOG-40) injection 40 mg  40 mg   Ancelmo Da Silva, DO   40 mg at 10/05/23 1241     triamcinolone (KENALOG-40) injection 40 mg  40 mg   Ancelmo Da Silva, DO   40 mg at 10/05/23 1241     Facility-Administered Medications Ordered in Other Visits   Medication Dose Route Frequency Provider Last Rate Last Admin     sodium chloride (PF) 0.9% PF flush 10 mL  10 mL Intravenous Once Hilda Lopez MD           Allergies   Allergen Reactions     Hydroxyzine      Made her very tired     Other Environmental Allergy Other (See Comments)     Seasonal Allergies      Sulfa Antibiotics      Vomiting       Vicodin [Hydrocodone-Acetaminophen] Nausea       Social History     Occupational History     Not on file   Tobacco Use     Smoking status: Former     Current packs/day: 0.00     Types: Cigarettes     Quit date: 2001     Years since quittin.6     Smokeless tobacco: Never   Vaping Use     Vaping status: Never Used   Substance and Sexual Activity     Alcohol use: Yes     Comment: 5-8 drinks/week     Drug use: No     Sexual activity: Yes     Partners: Male     Birth control/protection: Surgical     Comment: vasectomy       Family History   Problem Relation Age of Onset     Osteoporosis Mother      Eye Disorder Mother         cataract, mac degen     Macular Degeneration Mother      Dementia Mother      Hypertension Maternal Grandmother      Diabetes Maternal Grandfather      Eye Disorder Paternal Grandmother         glaucoma     Unknown/Adopted Paternal Grandfather      Hypertension Daughter      Graves' disease Daughter      Diabetes Other      Glaucoma No family hx of        REVIEW OF SYSTEMS  10 point review systems performed otherwise negative as noted as per history of present illness.    Physical  "Exam:  Vitals: /80   Temp 98.6  F (37  C)   Ht 1.555 m (5' 1.22\")   Wt 66.5 kg (146 lb 8 oz)   LMP 06/12/2014   BMI 27.48 kg/m    BMI= Body mass index is 27.48 kg/m .  Constitutional: healthy, alert and no acute distress   Psychiatric: mentation appears normal and affect normal/bright  NEURO: no focal deficits  RESP: Normal with easy respirations and no use of accessory muscles to breathe, no audible wheezing or retractions  CV: No peripheral edema  SKIN: No erythema, rashes, excoriation, or breakdown. No evidence of infection. Previous well healed surgical incisions consistent with prior knee arthroscopies.  JOINT/EXTREMITIES:left knee: small effusion. AROM 0-110. NO pain with motion, some stiffness and hamstring tightness. Tender along medial joint line.  Collaterals intact. Extensor intact.    GAIT: not tested     Diagnostic Modalities:  Left knee x-ray: Taken October 2023 weightbearing view shows moderate to severe medial compartment narrowing with rimming osteophytes.  Patellofemoral and lateral compartment fairly well-preserved.    MR left knee without contrast 7/25/2024 10:15 AM     Techniques: Multiplanar multisequence imaging of the left knee was  obtained without administration of intra-articular or intravenous  contrast using routine protocol.     History: Chronic pain of left knee; Chronic pain of left knee; Primary  osteoarthritis of both knees     Comparison: Knee radiographs 10/5/2023     Findings:     MENISCI:  Medial meniscus: Predominantly horizontal tear of the body and  posterior horn of medial meniscus extending into the posterior root  ligament with 9 mm displaced meniscal flap in the medial femoral  gutter.     Lateral meniscus: Diffuse intermediate signal posterior to thickening,  may be partially torn versus magic angle artifact. Otherwise intact.     LIGAMENTS  Cruciate ligaments: Intact.  Medial supporting structures: Peripheral bowing of the tibial  collateral ligament, " moderate sprain meniscofemoral and meniscotibial  ligaments, small tibial collateral ligament bursal fluid are likely  secondary to altered biomechanics due to underlying meniscal  pathology.  Lateral supporting structures: Intact. Popliteus tendinosis. Mild  edema superficial to the biceps femoris/conjoined tendon.     EXTENSOR MECHANISM  Suprapatellar enthesophyte. Focal low to moderate grade  partial-thickness tear in the medial aspect of the quadriceps tendon.  Patellar tendon is intact.     FLUID  Small to moderate joint effusion. Moderate-sized Baker's cyst with  internal septation, which extends/communicates with semimembranosus  and pes anserine bursae.      OSSEOUS and ARTICULAR STRUCTURES  Bones: Peripheral edema-like marrow signal intensity in the medial  compartment with associated subchondral hypointensities, likely  reactive to meniscal pathology.     Osteophytosis. No fracture, or other osseous lesion is seen.     Patellofemoral compartment: Up to grade II chondromalacia with  moderate chondral loss in the medial patellar facet. Additionally,  increased T2 signal in the the lateral patellar facet. Trochlear  cartilage relatively preserved.     Edema in the suprapatellar fat pad without posterior convexity,  nonspecific.     Medial compartment: Up to grade IV chondromalacia in the medial  compartment with full thickness chondral loss and opposing edema and  subchondral bone plate thickening sclerosis.     Lateral compartment: No high-grade chondromalacia.     ANCILLARY FINDINGS  Mild subcutaneous edema especially anteriorly.                                                                   Impression:  1. Horizontal tear of the medial meniscus body and posterior horn with  tear extension into posterior root ligament. Associated meniscal flap  in medial femoral gutter.  2. Grade 4 in the medial compartment and 2 in the patellofemoral  compartment.  3. Small to moderate joint effusion.  4. Moderate-sized  Baker's cyst with extension into semimembranosus and  pes anserine bursae.   Independent visualization of the images was performed.      Impression: left knee primary osteoarthritis (moderate-severe medial compartment) with degenerative meniscus tear    Plan:  All of the above pertinent physical exam and imaging modalities findings was reviewed with Marcelina and her spouse.  Exam, history and imaging is all consistent with advanced arthritis. She has failed extensive conservative therapy over the past. She is interested in proceeding with knee replacement. However she does have some recommendations from her pulmonologist about further cardiac workup.   Will have patient reach out to her pulmonologist and cardiologist to get further clarification.  If they feel she is medically optimized and safe to proceed without further workup then would proceed with surgery.                                             ELECTIVE SURGERY:  The patient has attempted conservative treatments that include previous knee arthroscopy with meniscus debridement, multiple cortisone injections, gel injections, physical therapy in the past, rest, activity modification, OTC medications including nsaids and tylenol.  Despite this progressive constant pain limiting her activity, she reports able to walk less than 1 block without stopping due to pain, limited other rec. activities as well as limiting ability to play with her grandkids, clean, and kneel.  Given the level of severity and no reported benefits from physical therapy in the past along do not feel she would benefit from current physical therapy and would be contraindicated as more likely to exacerbate her current symptoms leading to further decrease in quality of life. Secondary to these reasons I have offered surgery in the form of left ramila assisted total knee replacement.       Risks, benefits, complications, alternatives, limitations, and post operative course were discussed. Risks  including infection (requiring long-term antibiotics and repeat surgeries), implant loosening (requiring revision surgery), heart attacks, strokes, bleeding (possibly requiring blood transfusion), scars, instability, numbness around the knee, stiffness (requiring manipulations or repeat surgeries), instability and dislocations, fractures, blood clots the legs and lungs, nerve injury (possibly leading to foot drop).  Postoperative course was discussed as far as limitations and expected recovery times. It was also discussed that after surgery there is a need for blood thinners to prevent blood clots, but even when being treated it is possible to develop a blood clot.  Anticipate post-op day 1 discharge (NOT fast-track)  We discussed physical therapy being an important component of postop success.  Will wait for patient to contact us back.  If pulmonology and cardiology ok with proceeding will place case request, order for CT at that time.     Return to clinic: contact clinic with requested information. If proceeding with surgery then will need to be seen 1 week prior (telephone call ok) and 14 days post-op along with preop H&P. If not cleared for surgery to contact us once she is or sooner as needed for changes.  Re-x-ray on return: No (except if does proceed with surgery then at the 2 week post-op visit).     Vince Spring D.O.      Again, thank you for allowing me to participate in the care of your patient.        Sincerely,        Rehan Spring, DO

## 2024-08-15 ENCOUNTER — TELEPHONE (OUTPATIENT)
Dept: ORTHOPEDICS | Facility: OTHER | Age: 64
End: 2024-08-15
Payer: COMMERCIAL

## 2024-08-15 NOTE — TELEPHONE ENCOUNTER
Patient calling again to schedule surgery with Dr. Spring. Informed her no one was available at the time, but that someone will reach out to assist with scheduling surgery. She is reachable at [phone].

## 2024-08-15 NOTE — TELEPHONE ENCOUNTER
Type of surgery: left knee ramila assisted total arthroplasty   Location of surgery: [facility]  Date and time of surgery: 9/10  Surgeon: Saw  Pre-Op Appt Date: 8/26  Post-Op Appt Date: 9/25   Packet sent out: Yes  Pre-cert/Authorization completed:  Not Applicable  Date: na  Warm transferred to schedule CT

## 2024-08-15 NOTE — TELEPHONE ENCOUNTER
Marcelina can be reached at [phone] to schedule and it's okay to leave a message if you don't reach her.     Thank you! Rosibel

## 2024-08-15 NOTE — TELEPHONE ENCOUNTER
Patient will be going to [facility] for Physical Therapy. Please fax her PT order to fax # [phone]    Thank you

## 2024-08-15 NOTE — TELEPHONE ENCOUNTER
Referral was faxed as requested and patient was updated.     Brenda Goff RN   [facility]

## 2024-08-26 ENCOUNTER — HOSPITAL ENCOUNTER (OUTPATIENT)
Dept: CT IMAGING | Facility: CLINIC | Age: 64
Discharge: HOME | End: 2024-08-26
Attending: NURSE PRACTITIONER | Admitting: NURSE PRACTITIONER
Payer: COMMERCIAL

## 2024-08-26 ENCOUNTER — OFFICE VISIT (OUTPATIENT)
Dept: FAMILY MEDICINE | Facility: CLINIC | Age: 64
End: 2024-08-26
Payer: COMMERCIAL

## 2024-08-26 VITALS
TEMPERATURE: 97 F | WEIGHT: 147 LBS | DIASTOLIC BLOOD PRESSURE: 84 MMHG | BODY MASS INDEX: 27.58 KG/M2 | SYSTOLIC BLOOD PRESSURE: 132 MMHG

## 2024-08-26 DIAGNOSIS — M17.12 PRIMARY OSTEOARTHRITIS OF LEFT KNEE: ICD-10-CM

## 2024-08-26 DIAGNOSIS — R80.9 PROTEINURIA, UNSPECIFIED TYPE: ICD-10-CM

## 2024-08-26 DIAGNOSIS — N18.31 STAGE 3A CHRONIC KIDNEY DISEASE (H): ICD-10-CM

## 2024-08-26 DIAGNOSIS — G89.29 CHRONIC PAIN OF LEFT KNEE: ICD-10-CM

## 2024-08-26 DIAGNOSIS — M25.562 CHRONIC PAIN OF LEFT KNEE: ICD-10-CM

## 2024-08-26 DIAGNOSIS — Z01.818 PREOP GENERAL PHYSICAL EXAM: Primary | ICD-10-CM

## 2024-08-26 LAB
ALBUMIN MFR UR ELPH: 22.1 MG/DL
ALBUMIN SERPL BCG-MCNC: 4.5 G/DL (ref 3.5–5.2)
ALBUMIN UR-MCNC: NEGATIVE MG/DL
ANION GAP SERPL CALCULATED.3IONS-SCNC: 16 MMOL/L (ref 7–15)
APPEARANCE UR: CLEAR
BACTERIA #/AREA URNS HPF: ABNORMAL /HPF
BILIRUB UR QL STRIP: NEGATIVE
BUN SERPL-MCNC: 30.6 MG/DL (ref 8–23)
CALCIUM SERPL-MCNC: 9.4 MG/DL (ref 8.8–10.4)
CHLORIDE SERPL-SCNC: 95 MMOL/L (ref 98–107)
COLOR UR AUTO: YELLOW
CREAT SERPL-MCNC: 1.23 MG/DL (ref 0.51–0.95)
CREAT UR-MCNC: 87 MG/DL
CREAT UR-MCNC: 87 MG/DL
EGFRCR SERPLBLD CKD-EPI 2021: 49 ML/MIN/1.73M2
ERYTHROCYTE [DISTWIDTH] IN BLOOD BY AUTOMATED COUNT: 13.9 % (ref 10–15)
GLUCOSE SERPL-MCNC: 90 MG/DL (ref 70–99)
GLUCOSE UR STRIP-MCNC: NEGATIVE MG/DL
HCO3 SERPL-SCNC: 22 MMOL/L (ref 22–29)
HCT VFR BLD AUTO: 28.5 % (ref 35–47)
HGB BLD-MCNC: 9.7 G/DL (ref 11.7–15.7)
HGB UR QL STRIP: NEGATIVE
KETONES UR STRIP-MCNC: NEGATIVE MG/DL
LEUKOCYTE ESTERASE UR QL STRIP: NEGATIVE
MCH RBC QN AUTO: 32.9 PG (ref 26.5–33)
MCHC RBC AUTO-ENTMCNC: 34 G/DL (ref 31.5–36.5)
MCV RBC AUTO: 97 FL (ref 78–100)
MICROALBUMIN UR-MCNC: 104.8 MG/L
MICROALBUMIN/CREAT UR: 120.46 MG/G CR (ref 0–25)
NITRATE UR QL: NEGATIVE
PH UR STRIP: 6 [PH] (ref 5–7)
PHOSPHATE SERPL-MCNC: 3.2 MG/DL (ref 2.5–4.5)
PLATELET # BLD AUTO: 226 10E3/UL (ref 150–450)
POTASSIUM SERPL-SCNC: 4 MMOL/L (ref 3.4–5.3)
PROT/CREAT 24H UR: 0.25 MG/MG CR (ref 0–0.2)
RBC # BLD AUTO: 2.95 10E6/UL (ref 3.8–5.2)
RBC URINE: 0 /HPF
SODIUM SERPL-SCNC: 133 MMOL/L (ref 135–145)
SP GR UR STRIP: 1.02 (ref 1–1.03)
SQUAMOUS EPITHELIAL: 1 /HPF
UROBILINOGEN UR STRIP-MCNC: NORMAL MG/DL
WBC # BLD AUTO: 7.1 10E3/UL (ref 4–11)
WBC URINE: 0 /HPF

## 2024-08-26 PROCEDURE — 81001 URINALYSIS AUTO W/SCOPE: CPT | Performed by: FAMILY MEDICINE

## 2024-08-26 PROCEDURE — 36415 COLL VENOUS BLD VENIPUNCTURE: CPT | Performed by: FAMILY MEDICINE

## 2024-08-26 PROCEDURE — 82043 UR ALBUMIN QUANTITATIVE: CPT | Performed by: FAMILY MEDICINE

## 2024-08-26 PROCEDURE — 85027 COMPLETE CBC AUTOMATED: CPT | Performed by: FAMILY MEDICINE

## 2024-08-26 PROCEDURE — 84156 ASSAY OF PROTEIN URINE: CPT | Performed by: FAMILY MEDICINE

## 2024-08-26 PROCEDURE — 80069 RENAL FUNCTION PANEL: CPT | Performed by: FAMILY MEDICINE

## 2024-08-26 PROCEDURE — 73700 CT LOWER EXTREMITY W/O DYE: CPT | Mod: LT

## 2024-08-26 PROCEDURE — 99214 OFFICE O/P EST MOD 30 MIN: CPT | Performed by: FAMILY MEDICINE

## 2024-08-26 PROCEDURE — 83970 ASSAY OF PARATHORMONE: CPT | Performed by: FAMILY MEDICINE

## 2024-08-26 PROCEDURE — 82570 ASSAY OF URINE CREATININE: CPT | Performed by: FAMILY MEDICINE

## 2024-08-26 PROCEDURE — 93000 ELECTROCARDIOGRAM COMPLETE: CPT | Performed by: FAMILY MEDICINE

## 2024-08-26 ASSESSMENT — PATIENT HEALTH QUESTIONNAIRE - PHQ9
SUM OF ALL RESPONSES TO PHQ QUESTIONS 1-9: 9
SUM OF ALL RESPONSES TO PHQ QUESTIONS 1-9: 9
10. IF YOU CHECKED OFF ANY PROBLEMS, HOW DIFFICULT HAVE THESE PROBLEMS MADE IT FOR YOU TO DO YOUR WORK, TAKE CARE OF THINGS AT HOME, OR GET ALONG WITH OTHER PEOPLE: SOMEWHAT DIFFICULT

## 2024-08-26 NOTE — PATIENT INSTRUCTIONS
How to Take Your Medication Before Surgery  Preoperative Medication Instructions   Antiplatelet or Anticoagulation Medication Instructions   - aspirin: Discontinue aspirin 7-10 days prior to procedure to reduce bleeding risk. It should be resumed postoperatively.     Additional Medication Instructions  Take all scheduled medications on the day of surgery EXCEPT for modifications listed below:   - ACE/ARB: DO NOT TAKE on day of surgery (minimum 11 hours for general anesthesia).   - Calcium Channel Blockers: May be continued on the day of surgery.   - Statins: Continue taking on the day of surgery.    - SSRIs, SNRIs, TCAs, Antipsychotics: Continue without modification.        Patient Education   Preparing for Your Surgery  Getting started  A nurse will call you to review your health history and instructions. They will give you an arrival time based on your scheduled surgery time. Please be ready to share:  Your doctor's clinic name and phone number  Your medical, surgical, and anesthesia history  A list of allergies and sensitivities  A list of medicines, including herbal treatments and over-the-counter drugs  Whether the patient has a legal guardian (ask how to send us the papers in advance)  Please tell us if you're pregnant--or if there's any chance you might be pregnant. Some surgeries may injure a fetus (unborn baby), so they require a pregnancy test. Surgeries that are safe for a fetus don't always need a test, and you can choose whether to have one.   If you have a child who's having surgery, please ask for a copy of Preparing for Your Child's Surgery.    Preparing for surgery  Within 10 to 30 days of surgery: Have a pre-op exam (sometimes called an H&P, or History and Physical). This can be done at a clinic or pre-operative center.  If you're having a , you may not need this exam. Talk to your care team.  At your pre-op exam, talk to your care team about all medicines you take. If you need to stop any  medicines before surgery, ask when to start taking them again.  We do this for your safety. Many medicines can make you bleed too much during surgery. Some change how well surgery (anesthesia) drugs work.  Call your insurance company to let them know you're having surgery. (If you don't have insurance, call [phone].)  Call your clinic if there's any change in your health. This includes signs of a cold or flu (sore throat, runny nose, cough, rash, fever). It also includes a scrape or scratch near the surgery site.  If you have questions on the day of surgery, call your hospital or surgery center.  Eating and drinking guidelines  For your safety: Unless your surgeon tells you otherwise, follow the guidelines below.  Eat and drink as usual until 8 hours before you arrive for surgery. After that, no food or milk.  Drink clear liquids until 2 hours before you arrive. These are liquids you can see through, like water, Gatorade, and Propel Water. They also include plain black coffee and tea (no cream or milk), candy, and breath mints. You can spit out gum when you arrive.  If you drink alcohol: Stop drinking it the night before surgery.  If your care team tells you to take medicine on the morning of surgery, it's okay to take it with a sip of water.  Preventing infection  Shower or bathe the night before and morning of your surgery. Follow the instructions your clinic gave you. (If no instructions, use regular soap.)  Don't shave or clip hair near your surgery site. We'll remove the hair if needed.  Don't smoke or vape the morning of surgery. You may chew nicotine gum up to 2 hours before surgery. A nicotine patch is okay.  Note: Some surgeries require you to completely quit smoking and nicotine. Check with your surgeon.  Your care team will make every effort to keep you safe from infection. We will:  Clean our hands often with soap and water (or an alcohol-based hand rub).  Clean the skin at your surgery site with a  special soap that kills germs.  Give you a special gown to keep you warm. (Cold raises the risk of infection.)  Wear special hair covers, masks, gowns and gloves during surgery.  Give antibiotic medicine, if prescribed. Not all surgeries need antibiotics.  What to bring on the day of surgery  Photo ID and insurance card  Copy of your health care directive, if you have one  Glasses and hearing aids (bring cases)  You can't wear contacts during surgery  Inhaler and eye drops, if you use them (tell us about these when you arrive)  CPAP machine or breathing device, if you use them  A few personal items, if spending the night  If you have . . .  A pacemaker, ICD (cardiac defibrillator) or other implant: Bring the ID card.  An implanted stimulator: Bring the remote control.  A legal guardian: Bring a copy of the certified (court-stamped) guardianship papers.  Please remove any jewelry, including body piercings. Leave jewelry and other valuables at home.  If you're going home the day of surgery  You must have a responsible adult drive you home. They should stay with you overnight as well.  If you don't have someone to stay with you, and you aren't safe to go home alone, we may keep you overnight. Insurance often won't pay for this.  After surgery  If it's hard to control your pain or you need more pain medicine, please call your surgeon's office.  Questions?   If you have any questions for your care team, list them here: _________________________________________________________________________________________________________________________________________________________________________ ____________________________________ ____________________________________ ____________________________________  For informational purposes only. Not to replace the advice of your health care provider. Copyright   2003, 2019 [employer]. All rights reserved. Clinically reviewed by Nolvia Patterson MD. [employer] 495297 - REV  12/22.

## 2024-08-26 NOTE — H&P (VIEW-ONLY)
Preoperative Evaluation  [address] [address]  Phone: [phone]  Fax: [phone]  Primary Provider: Zoey Clements Mai, MD  Pre-op Performing Provider: Harjinder Calvin MD  Aug 26, 2024             8/26/2024   Surgical Information   What procedure is being done? Total left knee replacement.   Facility or Hospital where procedure/surgery will be performed: [facility]   Who is doing the procedure / surgery? Dr Spring   Date of surgery / procedure: 9/10/24   Time of surgery / procedure: Don t know yet   Where do you plan to recover after surgery? at home with family        Fax number for surgical facility: Note does not need to be faxed, will be available electronically in Epic.    Assessment & Plan     The proposed surgical procedure is considered INTERMEDIATE risk.      ICD-10-CM    1. Preop general physical exam  Z01.818 CBC with platelets     Basic metabolic panel  (Ca, Cl, CO2, Creat, Gluc, K, Na, BUN)     EKG 12-lead complete w/read - Clinics     CANCELED: CBC with platelets     CANCELED: Basic metabolic panel  (Ca, Cl, CO2, Creat, Gluc, K, Na, BUN)      2. Chronic pain of left knee  M25.562     G89.29       3. Stage 3a chronic kidney disease (H)  N18.31 CBC with platelets     Parathyroid Hormone Intact     Protein  random urine     Renal panel     UA with Microscopic     Albumin Random Urine Quantitative with Creat Ratio      4. Proteinuria, unspecified type  R80.9 CBC with platelets     Parathyroid Hormone Intact     Protein  random urine     Renal panel     UA with Microscopic     Albumin Random Urine Quantitative with Creat Ratio                  - No identified additional risk factors other than previously addressed    Preoperative Medication Instructions  Antiplatelet or Anticoagulation Medication Instructions   - aspirin: Discontinue aspirin 7-10 days prior to procedure to reduce bleeding risk. It should be resumed postoperatively.      Additional Medication Instructions  Take all scheduled medications on the day of surgery EXCEPT for modifications listed below:   - ACE/ARB: DO NOT TAKE on day of surgery (minimum 11 hours for general anesthesia).   - Calcium Channel Blockers: May be continued on the day of surgery.   - Statins: Continue taking on the day of surgery.    - SSRIs, SNRIs, TCAs, Antipsychotics: Continue without modification.     Recommendation  Approval given to proceed with proposed procedure, without further diagnostic evaluation.    Subjective   Marcelina is a 64 year old, presenting for the following:  Pre-Op Exam          8/26/2024     2:41 PM   Additional Questions   Roomed by Karena BARRERA related to upcoming procedure: Marcelina Estevez is a 64 year old female who presents with chronic left knee osteoarthritis.         8/26/2024   Pre-Op Questionnaire   Have you ever had a heart attack or stroke? No   Have you ever had surgery on your heart or blood vessels, such as a stent placement, a coronary artery bypass, or surgery on an artery in your head, neck, heart, or legs? No   Do you have chest pain with activity? No   Do you have a history of heart failure? No   Do you currently have a cold, bronchitis or symptoms of other infection? No   Do you have a cough, shortness of breath, or wheezing? No   Do you or anyone in your family have previous history of blood clots? No   Do you or does anyone in your family have a serious bleeding problem such as prolonged bleeding following surgeries or cuts? No   Have you ever had problems with anemia or been told to take iron pills? (!) YES    Have you had any abnormal blood loss such as black, tarry or bloody stools, or abnormal vaginal bleeding? No   Have you ever had a blood transfusion? No   Are you willing to have a blood transfusion if it is medically needed before, during, or after your surgery? Yes   Have you or any of your relatives ever had problems with anesthesia? No   Do you have  sleep apnea, excessive snoring or daytime drowsiness? No   Do you have any artifical heart valves or other implanted medical devices like a pacemaker, defibrillator, or continuous glucose monitor? No   Do you have artificial joints? No   Are you allergic to latex? No        Health Care Directive  Patient does not have a Health Care Directive or Living Will: Discussed advance care planning with patient; however, patient declined at this time.    Preoperative Review of    reviewed - controlled substances reflected in medication list.      Status of Chronic Conditions:  See problem list for active medical problems.  Problems all longstanding and stable, except as noted/documented.  See ROS for pertinent symptoms related to these conditions.    ANEMIA - Patient has a recent history of moderate-severe anemia, which has not been symptomatic. Work up to date has revealed ACD. Treatment has beenoral iron.     CAD - Patient has a longstanding history of moderate-severe CAD. Patient denies recent chest pain or NTG use, denies exercise induced dyspnea or PND. Last Stress test 08/2024, EKG August 26, 2024  .     HYPERTENSION - Patient has longstanding history of HTN , currently denies any symptoms referable to elevated blood pressure. Specifically denies chest pain, palpitations, dyspnea, orthopnea, PND or peripheral edema. Blood pressure readings have been in normal range. Current medication regimen is as listed below. Patient denies any side effects of medication.     Patient Active Problem List    Diagnosis Date Noted    Proteinuria, unspecified type 08/12/2023     Priority: Medium    Seasonal allergies 08/08/2023     Priority: Medium    Serrated polyposis syndrome 05/05/2023     Priority: Medium    Panic attack 04/20/2021     Priority: Medium    Atrophic vaginitis 04/20/2021     Priority: Medium    Gastroesophageal reflux disease 05/23/2019     Priority: Medium    Coronary vasospasm (H24) 12/07/2018     Priority:  Medium    Marijuana use, episodic 09/10/2018     Priority: Medium    CREST (calcinosis, Raynaud's phenomenon, esophageal dysfunction, sclerodactyly, telangiectasia) (H)      Priority: Medium    Limited systemic sclerosis (H)      Priority: Medium    MARCELINA (generalized anxiety disorder) 01/08/2018     Priority: Medium    Tension headache 09/28/2017     Priority: Medium     Patient is followed by Annette Painting MD PhD for ongoing prescription of pain medication.  All refills should only be approved by this provider, or covering partner.    Medication(s): Tylenol with codeine (T#3).   Maximum quantity per month: 15  Clinic visit frequency required: Q 6  months     Controlled substance agreement:  Encounter-Level CSA - 08/07/2017:              Controlled Substance Agreement - Scan on 8/16/2017 10:21 AM : CONTROLLED SUBSTANCE AGREEMENT (below)                Pain Clinic evaluation in the past: No    DIRE Total Score(s):  No flowsheet data found.    Last [facility] website verification:  none   [email or web address]      Raynaud's phenomenon without gangrene 03/01/2017     Priority: Medium    ACP (advance care planning) 08/19/2015     Priority: Medium     Advance Care Planning 8/19/2015: ACP Review and Resources Provided:  Reviewed chart for advance care plan.  Marcelina Estevez has no plan or code status on file however states presence of ACP document. Copy requested.  Confirmed/documented legally designated decision maker(s). Added by Mary Padilla RN Advance Care Planning Liaison with [employer]          Anemia in other chronic diseases classified elsewhere 01/09/2015     Priority: Medium     B12, TSH, Iron study normal in 2018      Status post total hysterectomy 11/18/2014     Priority: Medium    Allergic rhinitis due to pollen 08/02/2014     Priority: Medium    Hyperlipidemia LDL goal <160 06/17/2013     Priority: Medium    Stage 3a chronic kidney disease (H) 06/17/2013     Priority: Medium    Moderate episode of  recurrent major depressive disorder (H) 09/10/2012     Priority: Medium     Celexa not helping, fluoxetine caused rash.       Hypertension goal BP (blood pressure) < 140/90 01/18/2011     Priority: Medium    Keratitis sicca, bilateral (H24) 06/10/2013     Priority: Low    Chronic night sweats 01/18/2011     Priority: Low     Worse with menstruation. On Clonidine.      Atopic rhinitis 01/18/2011     Priority: Low     (Problem list name updated by automated process. Provider to review and confirm.)        Past Medical History:   Diagnosis Date    CREST (calcinosis, Raynaud's phenomenon, esophageal dysfunction, sclerodactyly, telangiectasia) (H)     Hyperlipidemia     Hypertension     Hypertriglyceridemia 01/18/2011    Controlled with simvastatin.       Limited systemic sclerosis (H)     Menopausal syndrome (hot flashes) 12/19/2013    Positive MINDY (antinuclear antibody)     Raynaud disease      Past Surgical History:   Procedure Laterality Date    APPENDECTOMY      BIOPSY      cervical node    COLONOSCOPY N/A 11/10/2021    Procedure: COLONOSCOPY, FLEXIBLE, WITH LESION REMOVAL USING SNARE;  Surgeon: Domingo Wall MD;  Location: [facility]    COLONOSCOPY N/A 11/10/2021    Procedure: COLONOSCOPY, WITH POLYPECTOMY AND BIOPSY;  Surgeon: Domingo Wall MD;  Location: [facility]    COLONOSCOPY N/A 9/29/2021    Procedure: Colonoscopy, With Polypectomy And Biopsy;  Surgeon: Domingo Wall MD;  Location: [facility]    COLONOSCOPY N/A 9/29/2021    Procedure: Colonoscopy, Flexible, With Lesion Removal Using Snare;  Surgeon: Domingo Wall MD;  Location: [facility]    COLONOSCOPY N/A 6/29/2022    Procedure: COLONOSCOPY, FLEXIBLE, WITH LESION REMOVAL USING SNARE;  Surgeon: Domingo Wall MD;  [facility]    COLONOSCOPY N/A 11/9/2022    Procedure: COLONOSCOPY, WITH POLYPECTOMY AND BIOPSY;  Surgeon: Domingo Wall MD;  [facility]    COLONOSCOPY N/A 6/12/2023     Procedure: COLONOSCOPY, FLEXIBLE, WITH LESION REMOVAL USING SNARE;  Surgeon: Domingo Wall MD;  Location[facility]    COLONOSCOPY WITH CO2 INSUFFLATION N/A 10/26/2020    Procedure: COLONOSCOPY, WITH CO2 INSUFFLATION;  Surgeon: Phyllis Chaudhari MD;  Location: [facility]    COLONOSCOPY WITH CO2 INSUFFLATION N/A 11/10/2021    Procedure: COLONOSCOPY, WITH CO2 INSUFFLATION;  Surgeon: Domingo Wall MD;  [facility]    COLONOSCOPY WITH CO2 INSUFFLATION N/A 9/29/2021    Procedure: COLONOSCOPY, WITH CO2 INSUFFLATION;  Surgeon: Domingo Wall MD;  [facility]    COLONOSCOPY WITH CO2 INSUFFLATION N/A 6/29/2022    Procedure: COLONOSCOPY, WITH CO2 INSUFFLATION;  Surgeon: Domingo Wall MD;  [facility]    COLONOSCOPY WITH CO2 INSUFFLATION N/A 11/9/2022    Procedure: COLONOSCOPY, WITH CO2 INSUFFLATION;  Surgeon: Domingo Wall MD;  [facility]    COLONOSCOPY WITH CO2 INSUFFLATION N/A 6/12/2023    Procedure: Colonoscopy with CO2 insufflation;  Surgeon: Domingo Wall MD;  [facility]    COLONOSCOPY WITH CO2 INSUFFLATION N/A 7/3/2024    Procedure: Colonoscopy with CO2 insufflation;  Surgeon: Domingo Wall MD;  [facility]    COMBINED ESOPHAGOSCOPY, GASTROSCOPY, DUODENOSCOPY (EGD) WITH CO2 INSUFFLATION N/A 6/29/2022    Procedure: ESOPHAGOGASTRODUODENOSCOPY, WITH CO2 INSUFFLATION;  Surgeon: Domingo Wall MD;  Location[facility]    DILATION AND CURETTAGE, HYSTEROSCOPY DIAGNOSTIC, COMBINED  7/1/2014    Procedure: COMBINED DILATION AND CURETTAGE, HYSTEROSCOPY DIAGNOSTIC;  Surgeon: Mana Cabrera DO;  Location: [facility]    ENT SURGERY      tonsillectomy and adnoid removal    ESOPHAGOSCOPY, GASTROSCOPY, DUODENOSCOPY (EGD), COMBINED N/A 6/29/2022    Procedure: ESOPHAGOGASTRODUODENOSCOPY, WITH BIOPSY;  Surgeon: Domingo Wall MD;  Location: [facility]    HYSTERECTOMY TOTAL ABDOMINAL      ORTHOPEDIC SURGERY       left knee tear meniscus    RELEASE CARPAL TUNNEL BILATERAL  2009     Current Outpatient Medications   Medication Sig Dispense Refill    albuterol (PROAIR HFA) 108 (90 Base) MCG/ACT inhaler INHALE 2 PUFS BY MOUTH EVERY 6 HOURS AS NEEDED FOR SHORTNESS OF BREATH / DYSPNEA 18 g 3    ALLEGRA ALLERGY 180 MG tablet Take 1 tablet (180 mg) by mouth daily 90 tablet 3    augmented betamethasone dipropionate (DIPROLENE-AF) 0.05 % external ointment       buPROPion (WELLBUTRIN XL) 150 MG 24 hr tablet Take 1 Tablet by mouth in the morning 90 tablet 0    diclofenac (VOLTAREN) 1 % topical gel Apply 2 g topically 4 times daily as needed for moderate pain 150 g 2    DULoxetine (CYMBALTA) 60 MG capsule Take 2 capsules (120 mg) by mouth daily for 360 days 180 capsule 3    fluticasone (FLONASE) 50 MCG/ACT nasal spray Spray 1 spray into both nostrils daily 16 mL 5    gabapentin (NEURONTIN) 100 MG capsule Take 1 capsule (100 mg) by mouth at bedtime for 90 days 90 capsule 0    hydrochlorothiazide (HYDRODIURIL) 25 MG tablet Take 25 mg by mouth daily      hydroxychloroquine (PLAQUENIL) 200 MG tablet Take 300 mg by mouth daily Take 1 200 mg tablet and 1/2 of 200mg 120 tablet 1    isosorbide mononitrate (IMDUR) 30 MG 24 hr tablet Take 1 Tablet by mouth once daily 90 tablet 1    LORazepam (ATIVAN) 0.5 MG tablet Take 1 tablet (0.5 mg) by mouth daily as needed (panic attack) 15 tablet 0    losartan (COZAAR) 100 MG tablet Take 1 tablet (100 mg) by mouth daily at 2 pm 90 tablet 3    NIFEdipine ER (ADALAT CC) 30 MG 24 hr tablet Take 1 Tablet by mouth once daily 90 tablet 1    order for DME Equipment being ordered: light lamp for seasonal affective disorder 1 Device 0    pantoprazole (PROTONIX) 40 MG EC tablet Take 1 tablet (40 mg) by mouth daily 90 tablet 1    VITAMIN D, CHOLECALCIFEROL, PO Take 2,000 Units by mouth daily       aspirin (ASPIRIN LOW DOSE) 81 MG EC tablet Take 1 tablet (81 mg) by mouth daily      atorvastatin (LIPITOR) 40 MG  "tablet Take 1 tablet (40 mg) by mouth daily at 2 pm 90 tablet 0    DULoxetine (CYMBALTA) 60 MG capsule Take 1 Capsule by mouth once daily 90 capsule 0       Allergies   Allergen Reactions    Hydroxyzine      Made her very tired    Other Environmental Allergy Other (See Comments)    Seasonal Allergies     Sulfa Antibiotics      Vomiting      Vicodin [Hydrocodone-Acetaminophen] Nausea        Social History     Tobacco Use    Smoking status: Former     Current packs/day: 0.00     Types: Cigarettes     Quit date: 2001     Years since quittin.6    Smokeless tobacco: Never   Substance Use Topics    Alcohol use: Yes     Comment: 5-8 drinks/week     Family History   Problem Relation Age of Onset    Osteoporosis Mother     Eye Disorder Mother         cataract, mac degen    Macular Degeneration Mother     Dementia Mother     Hypertension Maternal Grandmother     Diabetes Maternal Grandfather     Eye Disorder Paternal Grandmother         glaucoma    Unknown/Adopted Paternal Grandfather     Hypertension Daughter     Graves' disease Daughter     Diabetes Other     Glaucoma No family hx of      History   Drug Use No             Review of Systems  CONSTITUTIONAL: NEGATIVE for fever, chills, change in weight  ENT/MOUTH: NEGATIVE for ear, mouth and throat problems  RESP: NEGATIVE for significant cough or SOB  CV: NEGATIVE for chest pain, palpitations or peripheral edema  MUSCULOSKELETAL: POSITIVE  for joint pain chronic left knee  ROS otherwise negative    Objective    /84   Temp 97  F (36.1  C)   Wt 66.7 kg (147 lb)   LMP 2014   BMI 27.58 kg/m     Estimated body mass index is 27.58 kg/m  as calculated from the following:    Height as of 24: 1.555 m (5' 1.22\").    Weight as of this encounter: 66.7 kg (147 lb).  Physical Exam  GENERAL: alert and no distress  EYES: Eyes grossly normal to inspection, PERRL and conjunctivae and sclerae normal  HENT: ear canals and TM's normal, nose and mouth without ulcers " or lesions  NECK: no adenopathy, no asymmetry, masses, or scars  RESP: lungs clear to auscultation - no rales, rhonchi or wheezes  CV: regular rate and rhythm, normal S1 S2, no S3 or S4, no murmur, click or rub, no peripheral edema  ABDOMEN: soft, nontender, no hepatosplenomegaly, no masses and bowel sounds normal  MS: Knee exam: left positive for moderate crepitations, some mild tenderness and pain on range of motion, no effusion is present, no pseudolaxity noted.   NEURO: Normal strength and tone, mentation intact and speech normal  PSYCH: mentation appears normal, affect normal/bright  LYMPH: no cervical, supraclavicular, axillary, or inguinal adenopathy    Recent Labs   Lab Test 04/16/24  1217 11/10/23  1007 09/26/23  1418   HGB 11.2*  --  10.7*   PLT  --   --  251    130* 130*   POTASSIUM 4.7 4.0 4.1   CR 1.11* 1.16* 1.17*        Diagnostics  Recent Results (from the past 24 hour(s))   CBC with platelets    Collection Time: 08/26/24  3:45 PM   Result Value Ref Range    WBC Count 7.1 4.0 - 11.0 10e3/uL    RBC Count 2.95 (L) 3.80 - 5.20 10e6/uL    Hemoglobin 9.7 (L) 11.7 - 15.7 g/dL    Hematocrit 28.5 (L) 35.0 - 47.0 %    MCV 97 78 - 100 fL    MCH 32.9 26.5 - 33.0 pg    MCHC 34.0 31.5 - 36.5 g/dL    RDW 13.9 10.0 - 15.0 %    Platelet Count 226 150 - 450 10e3/uL   Renal panel    Collection Time: 08/26/24  3:45 PM   Result Value Ref Range    Sodium 133 (L) 135 - 145 mmol/L    Potassium 4.0 3.4 - 5.3 mmol/L    Chloride 95 (L) 98 - 107 mmol/L    Carbon Dioxide (CO2) 22 22 - 29 mmol/L    Anion Gap 16 (H) 7 - 15 mmol/L    Glucose 90 70 - 99 mg/dL    Urea Nitrogen 30.6 (H) 8.0 - 23.0 mg/dL    Creatinine 1.23 (H) 0.51 - 0.95 mg/dL    GFR Estimate 49 (L) >60 mL/min/1.73m2    Calcium 9.4 8.8 - 10.4 mg/dL    Albumin 4.5 3.5 - 5.2 g/dL    Phosphorus 3.2 2.5 - 4.5 mg/dL   Protein  random urine    Collection Time: 08/26/24  3:51 PM   Result Value Ref Range    Total Protein Urine mg/dL 22.1   mg/dL    Total Protein Urine  mg/mg Creat 0.25 (H) 0.00 - 0.20 mg/mg Cr    Creatinine Urine mg/dL 87.0 mg/dL   UA with Microscopic    Collection Time: 08/26/24  3:51 PM   Result Value Ref Range    Color Urine Yellow Colorless, Straw, Light Yellow, Yellow    Appearance Urine Clear Clear    Glucose Urine Negative Negative mg/dL    Bilirubin Urine Negative Negative    Ketones Urine Negative Negative mg/dL    Specific Gravity Urine 1.020 1.003 - 1.035    Blood Urine Negative Negative    pH Urine 6.0 5.0 - 7.0    Protein Albumin Urine Negative Negative mg/dL    Urobilinogen Urine Normal Normal, 2.0 mg/dL    Nitrite Urine Negative Negative    Leukocyte Esterase Urine Negative Negative    Bacteria Urine Few (A) None Seen /HPF    RBC Urine 0 <=2 /HPF    WBC Urine 0 <=5 /HPF    Squamous Epithelials Urine 1 <=1 /HPF   Albumin Random Urine Quantitative with Creat Ratio    Collection Time: 08/26/24  3:51 PM   Result Value Ref Range    Creatinine Urine mg/dL 87.0 mg/dL    Albumin Urine mg/L 104.8 mg/L    Albumin Urine mg/g Cr 120.46 (H) 0.00 - 25.00 mg/g Cr      EKG: appears normal, NSR, normal axis, normal intervals, no acute ST/T changes c/w ischemia, no LVH by voltage criteria, unchanged from previous tracings    Revised Cardiac Risk Index (RCRI)  The patient has the following serious cardiovascular risks for perioperative complications:   - No serious cardiac risks = 0 points     RCRI Interpretation: 0 points: Class I (very low risk - 0.4% complication rate)         Signed Electronically by: Harjinder Calvin MD  A copy of this evaluation report is provided to the requesting physician.         Answers submitted by the patient for this visit:  Patient Health Questionnaire (Submitted on 8/26/2024)  If you checked off any problems, how difficult have these problems made it for you to do your work, take care of things at home, or get along with other people?: Somewhat difficult  PHQ9 TOTAL SCORE: 9

## 2024-08-26 NOTE — PROGRESS NOTES
Preoperative Evaluation  [address] [address]  Phone: [phone]  Fax: [phone]  Primary Provider: Zoey Clements Mai, MD  Pre-op Performing Provider: Harjinder Calvin MD  Aug 26, 2024             8/26/2024   Surgical Information   What procedure is being done? Total left knee replacement.   Facility or Hospital where procedure/surgery will be performed: [facility]   Who is doing the procedure / surgery? Dr Spring   Date of surgery / procedure: 9/10/24   Time of surgery / procedure: Don t know yet   Where do you plan to recover after surgery? at home with family        Fax number for surgical facility: Note does not need to be faxed, will be available electronically in Epic.    Assessment & Plan     The proposed surgical procedure is considered INTERMEDIATE risk.      ICD-10-CM    1. Preop general physical exam  Z01.818 CBC with platelets     Basic metabolic panel  (Ca, Cl, CO2, Creat, Gluc, K, Na, BUN)     EKG 12-lead complete w/read - Clinics     CANCELED: CBC with platelets     CANCELED: Basic metabolic panel  (Ca, Cl, CO2, Creat, Gluc, K, Na, BUN)      2. Chronic pain of left knee  M25.562     G89.29       3. Stage 3a chronic kidney disease (H)  N18.31 CBC with platelets     Parathyroid Hormone Intact     Protein  random urine     Renal panel     UA with Microscopic     Albumin Random Urine Quantitative with Creat Ratio      4. Proteinuria, unspecified type  R80.9 CBC with platelets     Parathyroid Hormone Intact     Protein  random urine     Renal panel     UA with Microscopic     Albumin Random Urine Quantitative with Creat Ratio                  - No identified additional risk factors other than previously addressed    Preoperative Medication Instructions  Antiplatelet or Anticoagulation Medication Instructions   - aspirin: Discontinue aspirin 7-10 days prior to procedure to reduce bleeding risk. It should be resumed postoperatively.      Additional Medication Instructions  Take all scheduled medications on the day of surgery EXCEPT for modifications listed below:   - ACE/ARB: DO NOT TAKE on day of surgery (minimum 11 hours for general anesthesia).   - Calcium Channel Blockers: May be continued on the day of surgery.   - Statins: Continue taking on the day of surgery.    - SSRIs, SNRIs, TCAs, Antipsychotics: Continue without modification.     Recommendation  Approval given to proceed with proposed procedure, without further diagnostic evaluation.    Subjective   Marcelina is a 64 year old, presenting for the following:  Pre-Op Exam          8/26/2024     2:41 PM   Additional Questions   Roomed by Karena BARRERA related to upcoming procedure: Marcelina Estevez is a 64 year old female who presents with chronic left knee osteoarthritis.         8/26/2024   Pre-Op Questionnaire   Have you ever had a heart attack or stroke? No   Have you ever had surgery on your heart or blood vessels, such as a stent placement, a coronary artery bypass, or surgery on an artery in your head, neck, heart, or legs? No   Do you have chest pain with activity? No   Do you have a history of heart failure? No   Do you currently have a cold, bronchitis or symptoms of other infection? No   Do you have a cough, shortness of breath, or wheezing? No   Do you or anyone in your family have previous history of blood clots? No   Do you or does anyone in your family have a serious bleeding problem such as prolonged bleeding following surgeries or cuts? No   Have you ever had problems with anemia or been told to take iron pills? (!) YES    Have you had any abnormal blood loss such as black, tarry or bloody stools, or abnormal vaginal bleeding? No   Have you ever had a blood transfusion? No   Are you willing to have a blood transfusion if it is medically needed before, during, or after your surgery? Yes   Have you or any of your relatives ever had problems with anesthesia? No   Do you have  sleep apnea, excessive snoring or daytime drowsiness? No   Do you have any artifical heart valves or other implanted medical devices like a pacemaker, defibrillator, or continuous glucose monitor? No   Do you have artificial joints? No   Are you allergic to latex? No        Health Care Directive  Patient does not have a Health Care Directive or Living Will: Discussed advance care planning with patient; however, patient declined at this time.    Preoperative Review of    reviewed - controlled substances reflected in medication list.      Status of Chronic Conditions:  See problem list for active medical problems.  Problems all longstanding and stable, except as noted/documented.  See ROS for pertinent symptoms related to these conditions.    ANEMIA - Patient has a recent history of moderate-severe anemia, which has not been symptomatic. Work up to date has revealed ACD. Treatment has beenoral iron.     CAD - Patient has a longstanding history of moderate-severe CAD. Patient denies recent chest pain or NTG use, denies exercise induced dyspnea or PND. Last Stress test 08/2024, EKG August 26, 2024  .     HYPERTENSION - Patient has longstanding history of HTN , currently denies any symptoms referable to elevated blood pressure. Specifically denies chest pain, palpitations, dyspnea, orthopnea, PND or peripheral edema. Blood pressure readings have been in normal range. Current medication regimen is as listed below. Patient denies any side effects of medication.     Patient Active Problem List    Diagnosis Date Noted    Proteinuria, unspecified type 08/12/2023     Priority: Medium    Seasonal allergies 08/08/2023     Priority: Medium    Serrated polyposis syndrome 05/05/2023     Priority: Medium    Panic attack 04/20/2021     Priority: Medium    Atrophic vaginitis 04/20/2021     Priority: Medium    Gastroesophageal reflux disease 05/23/2019     Priority: Medium    Coronary vasospasm (H24) 12/07/2018     Priority:  Medium    Marijuana use, episodic 09/10/2018     Priority: Medium    CREST (calcinosis, Raynaud's phenomenon, esophageal dysfunction, sclerodactyly, telangiectasia) (H)      Priority: Medium    Limited systemic sclerosis (H)      Priority: Medium    MARCELINA (generalized anxiety disorder) 01/08/2018     Priority: Medium    Tension headache 09/28/2017     Priority: Medium     Patient is followed by Annette Painting MD PhD for ongoing prescription of pain medication.  All refills should only be approved by this provider, or covering partner.    Medication(s): Tylenol with codeine (T#3).   Maximum quantity per month: 15  Clinic visit frequency required: Q 6  months     Controlled substance agreement:  Encounter-Level CSA - 08/07/2017:              Controlled Substance Agreement - Scan on 8/16/2017 10:21 AM : CONTROLLED SUBSTANCE AGREEMENT (below)                Pain Clinic evaluation in the past: No    DIRE Total Score(s):  No flowsheet data found.    Last [facility] website verification:  none   [email or web address]      Raynaud's phenomenon without gangrene 03/01/2017     Priority: Medium    ACP (advance care planning) 08/19/2015     Priority: Medium     Advance Care Planning 8/19/2015: ACP Review and Resources Provided:  Reviewed chart for advance care plan.  Marcelina Estevez has no plan or code status on file however states presence of ACP document. Copy requested.  Confirmed/documented legally designated decision maker(s). Added by Mary Padilla RN Advance Care Planning Liaison with [employer]          Anemia in other chronic diseases classified elsewhere 01/09/2015     Priority: Medium     B12, TSH, Iron study normal in 2018      Status post total hysterectomy 11/18/2014     Priority: Medium    Allergic rhinitis due to pollen 08/02/2014     Priority: Medium    Hyperlipidemia LDL goal <160 06/17/2013     Priority: Medium    Stage 3a chronic kidney disease (H) 06/17/2013     Priority: Medium    Moderate episode of  recurrent major depressive disorder (H) 09/10/2012     Priority: Medium     Celexa not helping, fluoxetine caused rash.       Hypertension goal BP (blood pressure) < 140/90 01/18/2011     Priority: Medium    Keratitis sicca, bilateral (H24) 06/10/2013     Priority: Low    Chronic night sweats 01/18/2011     Priority: Low     Worse with menstruation. On Clonidine.      Atopic rhinitis 01/18/2011     Priority: Low     (Problem list name updated by automated process. Provider to review and confirm.)        Past Medical History:   Diagnosis Date    CREST (calcinosis, Raynaud's phenomenon, esophageal dysfunction, sclerodactyly, telangiectasia) (H)     Hyperlipidemia     Hypertension     Hypertriglyceridemia 01/18/2011    Controlled with simvastatin.       Limited systemic sclerosis (H)     Menopausal syndrome (hot flashes) 12/19/2013    Positive MINDY (antinuclear antibody)     Raynaud disease      Past Surgical History:   Procedure Laterality Date    APPENDECTOMY      BIOPSY      cervical node    COLONOSCOPY N/A 11/10/2021    Procedure: COLONOSCOPY, FLEXIBLE, WITH LESION REMOVAL USING SNARE;  Surgeon: Domingo Wall MD;  Location: [facility]    COLONOSCOPY N/A 11/10/2021    Procedure: COLONOSCOPY, WITH POLYPECTOMY AND BIOPSY;  Surgeon: Domingo Wall MD;  Location: [facility]    COLONOSCOPY N/A 9/29/2021    Procedure: Colonoscopy, With Polypectomy And Biopsy;  Surgeon: Domingo Wall MD;  Location: [facility]    COLONOSCOPY N/A 9/29/2021    Procedure: Colonoscopy, Flexible, With Lesion Removal Using Snare;  Surgeon: Domingo Wall MD;  [facility]    COLONOSCOPY N/A 6/29/2022    Procedure: COLONOSCOPY, FLEXIBLE, WITH LESION REMOVAL USING SNARE;  Surgeon: Domingo Wall MD;  [facility]    COLONOSCOPY N/A 11/9/2022    Procedure: COLONOSCOPY, WITH POLYPECTOMY AND BIOPSY;  Surgeon: Domingo Wall MD;  [facility]    COLONOSCOPY N/A 6/12/2023     Procedure: COLONOSCOPY, FLEXIBLE, WITH LESION REMOVAL USING SNARE;  Surgeon: Domingo Wall MD;  [facility]    COLONOSCOPY WITH CO2 INSUFFLATION N/A 10/26/2020    Procedure: COLONOSCOPY, WITH CO2 INSUFFLATION;  Surgeon: Phyllis Chaudhari MD;  [facility]    COLONOSCOPY WITH CO2 INSUFFLATION N/A 11/10/2021    Procedure: COLONOSCOPY, WITH CO2 INSUFFLATION;  Surgeon: Domingo Wall MD;  [facility]    COLONOSCOPY WITH CO2 INSUFFLATION N/A 9/29/2021    Procedure: COLONOSCOPY, WITH CO2 INSUFFLATION;  Surgeon: Domingo Wall MD;  [facility]    COLONOSCOPY WITH CO2 INSUFFLATION N/A 6/29/2022    Procedure: COLONOSCOPY, WITH CO2 INSUFFLATION;  Surgeon: Domingo Wall MD;  [facility]    COLONOSCOPY WITH CO2 INSUFFLATION N/A 11/9/2022    Procedure: COLONOSCOPY, WITH CO2 INSUFFLATION;  Surgeon: Domingo Wall MD;  [facility]    COLONOSCOPY WITH CO2 INSUFFLATION N/A 6/12/2023    Procedure: Colonoscopy with CO2 insufflation;  Surgeon: Domingo Wall MD;  [facility]    COLONOSCOPY WITH CO2 INSUFFLATION N/A 7/3/2024    Procedure: Colonoscopy with CO2 insufflation;  Surgeon: Domingo Wall MD;  [facility]    COMBINED ESOPHAGOSCOPY, GASTROSCOPY, DUODENOSCOPY (EGD) WITH CO2 INSUFFLATION N/A 6/29/2022    Procedure: ESOPHAGOGASTRODUODENOSCOPY, WITH CO2 INSUFFLATION;  Surgeon: Domingo Wall MD;  [facility]    DILATION AND CURETTAGE, HYSTEROSCOPY DIAGNOSTIC, COMBINED  7/1/2014    Procedure: COMBINED DILATION AND CURETTAGE, HYSTEROSCOPY DIAGNOSTIC;  Surgeon: Mana Cabrera DO;  Location: [facility]    ENT SURGERY      tonsillectomy and adnoid removal    ESOPHAGOSCOPY, GASTROSCOPY, DUODENOSCOPY (EGD), COMBINED N/A 6/29/2022    Procedure: ESOPHAGOGASTRODUODENOSCOPY, WITH BIOPSY;  Surgeon: Domingo Wall MD;  Location: [facility]    HYSTERECTOMY TOTAL ABDOMINAL      ORTHOPEDIC SURGERY       left knee tear meniscus    RELEASE CARPAL TUNNEL BILATERAL  2009     Current Outpatient Medications   Medication Sig Dispense Refill    albuterol (PROAIR HFA) 108 (90 Base) MCG/ACT inhaler INHALE 2 PUFS BY MOUTH EVERY 6 HOURS AS NEEDED FOR SHORTNESS OF BREATH / DYSPNEA 18 g 3    ALLEGRA ALLERGY 180 MG tablet Take 1 tablet (180 mg) by mouth daily 90 tablet 3    augmented betamethasone dipropionate (DIPROLENE-AF) 0.05 % external ointment       buPROPion (WELLBUTRIN XL) 150 MG 24 hr tablet Take 1 Tablet by mouth in the morning 90 tablet 0    diclofenac (VOLTAREN) 1 % topical gel Apply 2 g topically 4 times daily as needed for moderate pain 150 g 2    DULoxetine (CYMBALTA) 60 MG capsule Take 2 capsules (120 mg) by mouth daily for 360 days 180 capsule 3    fluticasone (FLONASE) 50 MCG/ACT nasal spray Spray 1 spray into both nostrils daily 16 mL 5    gabapentin (NEURONTIN) 100 MG capsule Take 1 capsule (100 mg) by mouth at bedtime for 90 days 90 capsule 0    hydrochlorothiazide (HYDRODIURIL) 25 MG tablet Take 25 mg by mouth daily      hydroxychloroquine (PLAQUENIL) 200 MG tablet Take 300 mg by mouth daily Take 1 200 mg tablet and 1/2 of 200mg 120 tablet 1    isosorbide mononitrate (IMDUR) 30 MG 24 hr tablet Take 1 Tablet by mouth once daily 90 tablet 1    LORazepam (ATIVAN) 0.5 MG tablet Take 1 tablet (0.5 mg) by mouth daily as needed (panic attack) 15 tablet 0    losartan (COZAAR) 100 MG tablet Take 1 tablet (100 mg) by mouth daily at 2 pm 90 tablet 3    NIFEdipine ER (ADALAT CC) 30 MG 24 hr tablet Take 1 Tablet by mouth once daily 90 tablet 1    order for DME Equipment being ordered: light lamp for seasonal affective disorder 1 Device 0    pantoprazole (PROTONIX) 40 MG EC tablet Take 1 tablet (40 mg) by mouth daily 90 tablet 1    VITAMIN D, CHOLECALCIFEROL, PO Take 2,000 Units by mouth daily       aspirin (ASPIRIN LOW DOSE) 81 MG EC tablet Take 1 tablet (81 mg) by mouth daily      atorvastatin (LIPITOR) 40 MG  "tablet Take 1 tablet (40 mg) by mouth daily at 2 pm 90 tablet 0    DULoxetine (CYMBALTA) 60 MG capsule Take 1 Capsule by mouth once daily 90 capsule 0       Allergies   Allergen Reactions    Hydroxyzine      Made her very tired    Other Environmental Allergy Other (See Comments)    Seasonal Allergies     Sulfa Antibiotics      Vomiting      Vicodin [Hydrocodone-Acetaminophen] Nausea        Social History     Tobacco Use    Smoking status: Former     Current packs/day: 0.00     Types: Cigarettes     Quit date: 2001     Years since quittin.6    Smokeless tobacco: Never   Substance Use Topics    Alcohol use: Yes     Comment: 5-8 drinks/week     Family History   Problem Relation Age of Onset    Osteoporosis Mother     Eye Disorder Mother         cataract, mac degen    Macular Degeneration Mother     Dementia Mother     Hypertension Maternal Grandmother     Diabetes Maternal Grandfather     Eye Disorder Paternal Grandmother         glaucoma    Unknown/Adopted Paternal Grandfather     Hypertension Daughter     Graves' disease Daughter     Diabetes Other     Glaucoma No family hx of      History   Drug Use No             Review of Systems  CONSTITUTIONAL: NEGATIVE for fever, chills, change in weight  ENT/MOUTH: NEGATIVE for ear, mouth and throat problems  RESP: NEGATIVE for significant cough or SOB  CV: NEGATIVE for chest pain, palpitations or peripheral edema  MUSCULOSKELETAL: POSITIVE  for joint pain chronic left knee  ROS otherwise negative    Objective    /84   Temp 97  F (36.1  C)   Wt 66.7 kg (147 lb)   LMP 2014   BMI 27.58 kg/m     Estimated body mass index is 27.58 kg/m  as calculated from the following:    Height as of 24: 1.555 m (5' 1.22\").    Weight as of this encounter: 66.7 kg (147 lb).  Physical Exam  GENERAL: alert and no distress  EYES: Eyes grossly normal to inspection, PERRL and conjunctivae and sclerae normal  HENT: ear canals and TM's normal, nose and mouth without ulcers " or lesions  NECK: no adenopathy, no asymmetry, masses, or scars  RESP: lungs clear to auscultation - no rales, rhonchi or wheezes  CV: regular rate and rhythm, normal S1 S2, no S3 or S4, no murmur, click or rub, no peripheral edema  ABDOMEN: soft, nontender, no hepatosplenomegaly, no masses and bowel sounds normal  MS: Knee exam: left positive for moderate crepitations, some mild tenderness and pain on range of motion, no effusion is present, no pseudolaxity noted.   NEURO: Normal strength and tone, mentation intact and speech normal  PSYCH: mentation appears normal, affect normal/bright  LYMPH: no cervical, supraclavicular, axillary, or inguinal adenopathy    Recent Labs   Lab Test 04/16/24  1217 11/10/23  1007 09/26/23  1418   HGB 11.2*  --  10.7*   PLT  --   --  251    130* 130*   POTASSIUM 4.7 4.0 4.1   CR 1.11* 1.16* 1.17*        Diagnostics  Recent Results (from the past 24 hour(s))   CBC with platelets    Collection Time: 08/26/24  3:45 PM   Result Value Ref Range    WBC Count 7.1 4.0 - 11.0 10e3/uL    RBC Count 2.95 (L) 3.80 - 5.20 10e6/uL    Hemoglobin 9.7 (L) 11.7 - 15.7 g/dL    Hematocrit 28.5 (L) 35.0 - 47.0 %    MCV 97 78 - 100 fL    MCH 32.9 26.5 - 33.0 pg    MCHC 34.0 31.5 - 36.5 g/dL    RDW 13.9 10.0 - 15.0 %    Platelet Count 226 150 - 450 10e3/uL   Renal panel    Collection Time: 08/26/24  3:45 PM   Result Value Ref Range    Sodium 133 (L) 135 - 145 mmol/L    Potassium 4.0 3.4 - 5.3 mmol/L    Chloride 95 (L) 98 - 107 mmol/L    Carbon Dioxide (CO2) 22 22 - 29 mmol/L    Anion Gap 16 (H) 7 - 15 mmol/L    Glucose 90 70 - 99 mg/dL    Urea Nitrogen 30.6 (H) 8.0 - 23.0 mg/dL    Creatinine 1.23 (H) 0.51 - 0.95 mg/dL    GFR Estimate 49 (L) >60 mL/min/1.73m2    Calcium 9.4 8.8 - 10.4 mg/dL    Albumin 4.5 3.5 - 5.2 g/dL    Phosphorus 3.2 2.5 - 4.5 mg/dL   Protein  random urine    Collection Time: 08/26/24  3:51 PM   Result Value Ref Range    Total Protein Urine mg/dL 22.1   mg/dL    Total Protein Urine  mg/mg Creat 0.25 (H) 0.00 - 0.20 mg/mg Cr    Creatinine Urine mg/dL 87.0 mg/dL   UA with Microscopic    Collection Time: 08/26/24  3:51 PM   Result Value Ref Range    Color Urine Yellow Colorless, Straw, Light Yellow, Yellow    Appearance Urine Clear Clear    Glucose Urine Negative Negative mg/dL    Bilirubin Urine Negative Negative    Ketones Urine Negative Negative mg/dL    Specific Gravity Urine 1.020 1.003 - 1.035    Blood Urine Negative Negative    pH Urine 6.0 5.0 - 7.0    Protein Albumin Urine Negative Negative mg/dL    Urobilinogen Urine Normal Normal, 2.0 mg/dL    Nitrite Urine Negative Negative    Leukocyte Esterase Urine Negative Negative    Bacteria Urine Few (A) None Seen /HPF    RBC Urine 0 <=2 /HPF    WBC Urine 0 <=5 /HPF    Squamous Epithelials Urine 1 <=1 /HPF   Albumin Random Urine Quantitative with Creat Ratio    Collection Time: 08/26/24  3:51 PM   Result Value Ref Range    Creatinine Urine mg/dL 87.0 mg/dL    Albumin Urine mg/L 104.8 mg/L    Albumin Urine mg/g Cr 120.46 (H) 0.00 - 25.00 mg/g Cr      EKG: appears normal, NSR, normal axis, normal intervals, no acute ST/T changes c/w ischemia, no LVH by voltage criteria, unchanged from previous tracings    Revised Cardiac Risk Index (RCRI)  The patient has the following serious cardiovascular risks for perioperative complications:   - No serious cardiac risks = 0 points     RCRI Interpretation: 0 points: Class I (very low risk - 0.4% complication rate)         Signed Electronically by: Harjinder Calvin MD  A copy of this evaluation report is provided to the requesting physician.         Answers submitted by the patient for this visit:  Patient Health Questionnaire (Submitted on 8/26/2024)  If you checked off any problems, how difficult have these problems made it for you to do your work, take care of things at home, or get along with other people?: Somewhat difficult  PHQ9 TOTAL SCORE: 9

## 2024-08-27 LAB — PTH-INTACT SERPL-MCNC: 61 PG/ML (ref 15–65)

## 2024-08-27 NOTE — PROGRESS NOTES
Ortho Navigator Note      Pre-op Date 8/26/24     Medical Clearance  CLEARED     Labs Hgb 9.7 - patient checking with PCP about iron infusions.   ADDENDUM: CLEARED by surgeon      COVID Test Date No longer indicated      Skin  Intact      Activity: Ambulates independently with cane      Equipment Need Patient will NOT likely need a walker for discharge. Defer to PT/OT for recs.       Meds to Hold Held all supplements 14 days prior to surgery  -HOLD Voltaren gel 3 days prior to surgery   Antiplatelet or Anticoagulation Medication Instructions   - aspirin: Discontinue aspirin 7-10 days prior to procedure to reduce bleeding risk. It should be resumed postoperatively.      Additional Medication Instructions  Take all scheduled medications on the day of surgery EXCEPT for modifications listed below:   - ACE/ARB: DO NOT TAKE on day of surgery (minimum 11 hours for general anesthesia).   - Calcium Channel Blockers: May be continued on the day of surgery.   - Statins: Continue taking on the day of surgery.    - SSRIs, SNRIs, TCAs, Antipsychotics: Continue without modification  * Medication recommendations are not intended to be exhaustive; they are limited to common medications that are potentially dangerous if incorrectly managed   NPO Instructions  Instructed to stop solids 8 hr prior to arrival and clears 2 hr prior to arrival.       Pre-op Joint Education Complete? Link to Classes sent via [employer]   Discharge Plan Patient has plan to discharge home on morning of POD 1.   Spouse Ramakrishna will arrive at hospital at 0800 to participate in therapy and discharge education. They will then transport patient home    /Transportation Spouse Ramakrishna will be  and transportation.  is physically able to care for patient.      Barriers to Discharge No barriers to discharge.      Additional Info/   Special Needs : Patient had no unanswered questions or concerns.               08/27/24 1023   Discharge Planning   Patient/Family  Anticipates Transition to home with family   Concerns to be Addressed denies needs/concerns at this time   Living Arrangements   People in Home spouse   Type of Residence Private Residence   Is your private residence a single family home or apartment? Single family home   Number of Stairs, Within Home, Primary one   Stair Railings, Within Home, Primary railings safe and in good condition   Once home, are you able to live on one level? Yes   Bathroom Shower/Tub Walk-in shower   Equipment Currently Used at Home cane, straight;walker, rolling;raised toilet seat;shower chair   Support System   Support Systems Spouse/Significant Other   Do you have someone available to stay with you one or two nights once you are home? Yes   Medical Clearance   Date of Physical 08/26/24   Clinic Name [facility]   It is recommended that you call and check with any specialty providers before surgery to see if you need surgical clearance.  Do you see any specialty providers outside of your primary care provider? No   Blood   Known Bleeding Disorder or Coagulopathy No   Does the patient have any Christian/cultural preferences related to blood products? No   Education   Has the patient scheduled or completed pre-op total joint education, either in class or online, in the last 12 months? No   Patient attended total joint pre-op class/received pre-op teaching  email/phone call   Relationship/Living Environment   Name(s) of People in Home Spouse Ramakrishna

## 2024-09-04 ENCOUNTER — OFFICE VISIT (OUTPATIENT)
Dept: ORTHOPEDICS | Facility: CLINIC | Age: 64
End: 2024-09-04
Payer: COMMERCIAL

## 2024-09-04 VITALS
HEIGHT: 60 IN | WEIGHT: 146 LBS | BODY MASS INDEX: 28.66 KG/M2 | SYSTOLIC BLOOD PRESSURE: 126 MMHG | TEMPERATURE: 97.8 F | DIASTOLIC BLOOD PRESSURE: 78 MMHG

## 2024-09-04 DIAGNOSIS — M17.12 PRIMARY OSTEOARTHRITIS OF LEFT KNEE: Primary | ICD-10-CM

## 2024-09-04 PROCEDURE — 99207 PR NO CHARGE LOS: CPT | Performed by: ORTHOPAEDIC SURGERY

## 2024-09-04 ASSESSMENT — PAIN SCALES - GENERAL: PAINLEVEL: SEVERE PAIN (7)

## 2024-09-04 NOTE — PROGRESS NOTES
1 week prior to left total knee arthroplasty    Due to her cardiac and respiratory history will plan for pod1    Hx of ACD, last hgb 9.7. is on iron from PCP.  Is getting iron infusions currently. Will recheck AM of surgery to ensure no further drop.     Direct to outpatient physical therapy at [place] with [facility]. Order was sent.  She is scheduled to start 1 week after.    CT was completed.     She mentions she had liver disease after heavy tylenol use due to knee pain. Has been recommended for no tylenol use. Will avoid this post-op.     She reports hx of ischemic colitis 5 years ago. Had severe abd pain and bloody stools. Was recommended against regular NSAID use. Will avoid aspirin post-op. Eliquis or Xarelto.  She does ok with occasional nsaids. Will still plan for toradol in block.     Has  at home that will be helping.       EDUARD Lopez, CNP  Orthopedic Surgery

## 2024-09-04 NOTE — LETTER
9/4/2024      Marcelina Estevez  [address] [place] [address]      Dear Colleague,    Thank you for referring your patient, Marcelina Estevez, to the [facility]. Please see a copy of my visit note below.    1 week prior to left total knee arthroplasty    Due to her cardiac and respiratory history will plan for pod1    Hx of ACD, last hgb 9.7. is on iron from PCP.  Is getting iron infusions currently. Will recheck AM of surgery to ensure no further drop.     Direct to outpatient physical therapy at [place] with [facility]. Order was sent.  She is scheduled to start 1 week after.    CT was completed.     She mentions she had liver disease after heavy tylenol use due to knee pain. Has been recommended for no tylenol use. Will avoid this post-op.     She reports hx of ischemic colitis 5 years ago. Had severe abd pain and bloody stools. Was recommended against regular NSAID use. Will avoid aspirin post-op. Eliquis or Xarelto.  She does ok with occasional nsaids. Will still plan for toradol in block.     Has  at home that will be helping.       EDUARD Lopez, CNP  Orthopedic Surgery      Again, thank you for allowing me to participate in the care of your patient.        Sincerely,        Rehan Spring, DO

## 2024-09-09 ENCOUNTER — ANESTHESIA EVENT (OUTPATIENT)
Dept: SURGERY | Facility: CLINIC | Age: 64
End: 2024-09-09
Payer: COMMERCIAL

## 2024-09-10 ENCOUNTER — HOSPITAL ENCOUNTER (OUTPATIENT)
Facility: CLINIC | Age: 64
Discharge: HOME | End: 2024-09-11
Attending: ORTHOPAEDIC SURGERY | Admitting: ORTHOPAEDIC SURGERY
Payer: COMMERCIAL

## 2024-09-10 ENCOUNTER — APPOINTMENT (OUTPATIENT)
Dept: PHYSICAL THERAPY | Facility: CLINIC | Age: 64
End: 2024-09-10
Attending: ORTHOPAEDIC SURGERY
Payer: COMMERCIAL

## 2024-09-10 ENCOUNTER — ANESTHESIA (OUTPATIENT)
Dept: SURGERY | Facility: CLINIC | Age: 64
End: 2024-09-10
Payer: COMMERCIAL

## 2024-09-10 ENCOUNTER — APPOINTMENT (OUTPATIENT)
Dept: GENERAL RADIOLOGY | Facility: CLINIC | Age: 64
End: 2024-09-10
Attending: NURSE PRACTITIONER
Payer: COMMERCIAL

## 2024-09-10 DIAGNOSIS — M25.561 CHRONIC PAIN OF RIGHT KNEE: ICD-10-CM

## 2024-09-10 DIAGNOSIS — G89.29 CHRONIC PAIN OF LEFT KNEE: ICD-10-CM

## 2024-09-10 DIAGNOSIS — M25.562 CHRONIC PAIN OF LEFT KNEE: ICD-10-CM

## 2024-09-10 DIAGNOSIS — I73.00 RAYNAUD'S DISEASE WITHOUT GANGRENE: ICD-10-CM

## 2024-09-10 DIAGNOSIS — Z96.652 S/P TOTAL KNEE REPLACEMENT USING CEMENT, LEFT: Primary | ICD-10-CM

## 2024-09-10 DIAGNOSIS — G89.29 CHRONIC PAIN OF RIGHT KNEE: ICD-10-CM

## 2024-09-10 PROBLEM — Z96.659 S/P TOTAL KNEE ARTHROPLASTY: Status: ACTIVE | Noted: 2024-09-10

## 2024-09-10 LAB — HGB BLD-MCNC: 10 G/DL (ref 11.7–15.7)

## 2024-09-10 PROCEDURE — 272N000001 HC OR GENERAL SUPPLY STERILE: Performed by: ORTHOPAEDIC SURGERY

## 2024-09-10 PROCEDURE — 85018 HEMOGLOBIN: CPT | Performed by: NURSE PRACTITIONER

## 2024-09-10 PROCEDURE — 27447 TOTAL KNEE ARTHROPLASTY: CPT | Mod: LT | Performed by: ORTHOPAEDIC SURGERY

## 2024-09-10 PROCEDURE — 258N000003 HC RX IP 258 OP 636: Performed by: ORTHOPAEDIC SURGERY

## 2024-09-10 PROCEDURE — 250N000009 HC RX 250: Performed by: ORTHOPAEDIC SURGERY

## 2024-09-10 PROCEDURE — 250N000013 HC RX MED GY IP 250 OP 250 PS 637: Performed by: NURSE PRACTITIONER

## 2024-09-10 PROCEDURE — 271N000001 HC OR GENERAL SUPPLY NON-STERILE: Performed by: ORTHOPAEDIC SURGERY

## 2024-09-10 PROCEDURE — 710N000010 HC RECOVERY PHASE 1, LEVEL 2, PER MIN: Performed by: ORTHOPAEDIC SURGERY

## 2024-09-10 PROCEDURE — 97110 THERAPEUTIC EXERCISES: CPT | Mod: GP

## 2024-09-10 PROCEDURE — 250N000009 HC RX 250: Performed by: NURSE ANESTHETIST, CERTIFIED REGISTERED

## 2024-09-10 PROCEDURE — 97162 PT EVAL MOD COMPLEX 30 MIN: CPT | Mod: GP

## 2024-09-10 PROCEDURE — 258N000003 HC RX IP 258 OP 636: Performed by: NURSE ANESTHETIST, CERTIFIED REGISTERED

## 2024-09-10 PROCEDURE — 250N000011 HC RX IP 250 OP 636: Performed by: NURSE PRACTITIONER

## 2024-09-10 PROCEDURE — C1776 JOINT DEVICE (IMPLANTABLE): HCPCS | Performed by: ORTHOPAEDIC SURGERY

## 2024-09-10 PROCEDURE — 258N000003 HC RX IP 258 OP 636: Performed by: NURSE PRACTITIONER

## 2024-09-10 PROCEDURE — 370N000017 HC ANESTHESIA TECHNICAL FEE, PER MIN: Performed by: ORTHOPAEDIC SURGERY

## 2024-09-10 PROCEDURE — 999N000063 XR KNEE PORT LEFT 1/2 VIEWS: Mod: LT

## 2024-09-10 PROCEDURE — C1713 ANCHOR/SCREW BN/BN,TIS/BN: HCPCS | Performed by: ORTHOPAEDIC SURGERY

## 2024-09-10 PROCEDURE — 360N000080 HC SURGERY LEVEL 7, PER MIN: Performed by: ORTHOPAEDIC SURGERY

## 2024-09-10 PROCEDURE — 97116 GAIT TRAINING THERAPY: CPT | Mod: GP

## 2024-09-10 PROCEDURE — 27447 TOTAL KNEE ARTHROPLASTY: CPT | Mod: AS | Performed by: NURSE PRACTITIONER

## 2024-09-10 PROCEDURE — 250N000011 HC RX IP 250 OP 636: Performed by: ORTHOPAEDIC SURGERY

## 2024-09-10 PROCEDURE — 250N000011 HC RX IP 250 OP 636: Performed by: NURSE ANESTHETIST, CERTIFIED REGISTERED

## 2024-09-10 PROCEDURE — 999N000141 HC STATISTIC PRE-PROCEDURE NURSING ASSESSMENT: Performed by: ORTHOPAEDIC SURGERY

## 2024-09-10 DEVICE — PIN FIXATION STRAIGHT L140 MM OD4 MM KNEE STERILE 144140: Type: IMPLANTABLE DEVICE | Site: KNEE | Status: FUNCTIONAL

## 2024-09-10 DEVICE — IMPLANTABLE DEVICE: Type: IMPLANTABLE DEVICE | Site: KNEE | Status: FUNCTIONAL

## 2024-09-10 DEVICE — IMP COMP FEM STRK TRIATHLN CR LT 3 5510-F-301: Type: IMPLANTABLE DEVICE | Site: KNEE | Status: FUNCTIONAL

## 2024-09-10 DEVICE — IMP BONE CEMENT SIMPLEX W/GENTAMICIN 6195-1-001: Type: IMPLANTABLE DEVICE | Site: KNEE | Status: FUNCTIONAL

## 2024-09-10 DEVICE — PIN FIXATION L110 MM OD4 MM BONE STERILE 144110: Type: IMPLANTABLE DEVICE | Site: KNEE | Status: FUNCTIONAL

## 2024-09-10 DEVICE — IMP BASEPLATE TIBIAL HOWM TRI 3 5520-B-300: Type: IMPLANTABLE DEVICE | Site: KNEE | Status: FUNCTIONAL

## 2024-09-10 RX ORDER — FENTANYL CITRATE-0.9 % NACL/PF 10 MCG/ML
PLASTIC BAG, INJECTION (ML) INTRAVENOUS PRN
Status: DISCONTINUED | OUTPATIENT
Start: 2024-09-10 | End: 2024-09-10

## 2024-09-10 RX ORDER — ONDANSETRON 2 MG/ML
4 INJECTION INTRAMUSCULAR; INTRAVENOUS EVERY 30 MIN PRN
Status: DISCONTINUED | OUTPATIENT
Start: 2024-09-10 | End: 2024-09-11 | Stop reason: HOSPADM

## 2024-09-10 RX ORDER — NIFEDIPINE 30 MG/1
30 TABLET, EXTENDED RELEASE ORAL DAILY
Status: DISCONTINUED | OUTPATIENT
Start: 2024-09-11 | End: 2024-09-11 | Stop reason: HOSPADM

## 2024-09-10 RX ORDER — SODIUM CHLORIDE 9 MG/ML
INJECTION, SOLUTION INTRAVENOUS CONTINUOUS
Status: DISCONTINUED | OUTPATIENT
Start: 2024-09-10 | End: 2024-09-11 | Stop reason: HOSPADM

## 2024-09-10 RX ORDER — NALOXONE HYDROCHLORIDE 0.4 MG/ML
0.1 INJECTION, SOLUTION INTRAMUSCULAR; INTRAVENOUS; SUBCUTANEOUS
Status: DISCONTINUED | OUTPATIENT
Start: 2024-09-10 | End: 2024-09-11 | Stop reason: HOSPADM

## 2024-09-10 RX ORDER — LIDOCAINE 40 MG/G
CREAM TOPICAL
Status: DISCONTINUED | OUTPATIENT
Start: 2024-09-10 | End: 2024-09-10 | Stop reason: HOSPADM

## 2024-09-10 RX ORDER — BUDESONIDE AND FORMOTEROL FUMARATE DIHYDRATE 160; 4.5 UG/1; UG/1
2 AEROSOL RESPIRATORY (INHALATION)
Status: ON HOLD | COMMUNITY
Start: 2024-07-31 | End: 2024-09-11

## 2024-09-10 RX ORDER — HYDROMORPHONE HCL IN WATER/PF 6 MG/30 ML
0.2 PATIENT CONTROLLED ANALGESIA SYRINGE INTRAVENOUS
Status: DISCONTINUED | OUTPATIENT
Start: 2024-09-10 | End: 2024-09-11 | Stop reason: HOSPADM

## 2024-09-10 RX ORDER — FENTANYL CITRATE 50 UG/ML
INJECTION, SOLUTION INTRAMUSCULAR; INTRAVENOUS PRN
Status: DISCONTINUED | OUTPATIENT
Start: 2024-09-10 | End: 2024-09-10

## 2024-09-10 RX ORDER — PROPOFOL 10 MG/ML
INJECTION, EMULSION INTRAVENOUS CONTINUOUS PRN
Status: DISCONTINUED | OUTPATIENT
Start: 2024-09-10 | End: 2024-09-10

## 2024-09-10 RX ORDER — LOSARTAN POTASSIUM 50 MG/1
50 TABLET ORAL 2 TIMES DAILY
COMMUNITY
Start: 2024-07-23

## 2024-09-10 RX ORDER — CEFAZOLIN SODIUM/WATER 2 G/20 ML
2 SYRINGE (ML) INTRAVENOUS SEE ADMIN INSTRUCTIONS
Status: DISCONTINUED | OUTPATIENT
Start: 2024-09-10 | End: 2024-09-10 | Stop reason: HOSPADM

## 2024-09-10 RX ORDER — FENTANYL CITRATE-0.9 % NACL/PF 10 MCG/ML
PLASTIC BAG, INJECTION (ML) INTRAVENOUS CONTINUOUS PRN
Status: DISCONTINUED | OUTPATIENT
Start: 2024-09-10 | End: 2024-09-10

## 2024-09-10 RX ORDER — CEFAZOLIN SODIUM/WATER 2 G/20 ML
2 SYRINGE (ML) INTRAVENOUS
Status: COMPLETED | OUTPATIENT
Start: 2024-09-10 | End: 2024-09-10

## 2024-09-10 RX ORDER — BUPIVACAINE HYDROCHLORIDE AND EPINEPHRINE 5; 5 MG/ML; UG/ML
INJECTION, SOLUTION PERINEURAL PRN
Status: DISCONTINUED | OUTPATIENT
Start: 2024-09-10 | End: 2024-09-10

## 2024-09-10 RX ORDER — TRANEXAMIC ACID 650 MG/1
1950 TABLET ORAL ONCE
Status: COMPLETED | OUTPATIENT
Start: 2024-09-10 | End: 2024-09-10

## 2024-09-10 RX ORDER — ONDANSETRON 2 MG/ML
4 INJECTION INTRAMUSCULAR; INTRAVENOUS EVERY 6 HOURS PRN
Status: DISCONTINUED | OUTPATIENT
Start: 2024-09-10 | End: 2024-09-11 | Stop reason: HOSPADM

## 2024-09-10 RX ORDER — LIDOCAINE HYDROCHLORIDE 20 MG/ML
INJECTION, SOLUTION INFILTRATION; PERINEURAL PRN
Status: DISCONTINUED | OUTPATIENT
Start: 2024-09-10 | End: 2024-09-10

## 2024-09-10 RX ORDER — DEXAMETHASONE SODIUM PHOSPHATE 4 MG/ML
INJECTION, SOLUTION INTRA-ARTICULAR; INTRALESIONAL; INTRAMUSCULAR; INTRAVENOUS; SOFT TISSUE PRN
Status: DISCONTINUED | OUTPATIENT
Start: 2024-09-10 | End: 2024-09-10

## 2024-09-10 RX ORDER — HYDROMORPHONE HCL IN WATER/PF 6 MG/30 ML
0.4 PATIENT CONTROLLED ANALGESIA SYRINGE INTRAVENOUS
Status: DISCONTINUED | OUTPATIENT
Start: 2024-09-10 | End: 2024-09-11 | Stop reason: HOSPADM

## 2024-09-10 RX ORDER — SODIUM CHLORIDE, SODIUM LACTATE, POTASSIUM CHLORIDE, CALCIUM CHLORIDE 600; 310; 30; 20 MG/100ML; MG/100ML; MG/100ML; MG/100ML
INJECTION, SOLUTION INTRAVENOUS CONTINUOUS
Status: DISCONTINUED | OUTPATIENT
Start: 2024-09-10 | End: 2024-09-11 | Stop reason: HOSPADM

## 2024-09-10 RX ORDER — POLYETHYLENE GLYCOL 3350 17 G/17G
17 POWDER, FOR SOLUTION ORAL DAILY
Status: DISCONTINUED | OUTPATIENT
Start: 2024-09-11 | End: 2024-09-11 | Stop reason: HOSPADM

## 2024-09-10 RX ORDER — OXYCODONE HYDROCHLORIDE 5 MG/1
5-10 TABLET ORAL EVERY 4 HOURS PRN
Qty: 30 TABLET | Refills: 0 | Status: SHIPPED | OUTPATIENT
Start: 2024-09-10 | End: 2024-09-16

## 2024-09-10 RX ORDER — DULOXETIN HYDROCHLORIDE 30 MG/1
120 CAPSULE, DELAYED RELEASE ORAL DAILY
Status: DISCONTINUED | OUTPATIENT
Start: 2024-09-11 | End: 2024-09-11 | Stop reason: HOSPADM

## 2024-09-10 RX ORDER — BUPROPION HYDROCHLORIDE 150 MG/1
150 TABLET ORAL EVERY MORNING
Status: DISCONTINUED | OUTPATIENT
Start: 2024-09-11 | End: 2024-09-11 | Stop reason: HOSPADM

## 2024-09-10 RX ORDER — BUPIVACAINE HYDROCHLORIDE 7.5 MG/ML
INJECTION, SOLUTION INTRASPINAL
Status: COMPLETED | OUTPATIENT
Start: 2024-09-10 | End: 2024-09-10

## 2024-09-10 RX ORDER — GABAPENTIN 100 MG/1
100 CAPSULE ORAL 3 TIMES DAILY PRN
Status: DISCONTINUED | OUTPATIENT
Start: 2024-09-10 | End: 2024-09-11 | Stop reason: HOSPADM

## 2024-09-10 RX ORDER — HYDROMORPHONE HYDROCHLORIDE 1 MG/ML
0.2 INJECTION, SOLUTION INTRAMUSCULAR; INTRAVENOUS; SUBCUTANEOUS EVERY 5 MIN PRN
Status: DISCONTINUED | OUTPATIENT
Start: 2024-09-10 | End: 2024-09-11 | Stop reason: HOSPADM

## 2024-09-10 RX ORDER — OXYCODONE HYDROCHLORIDE 5 MG/1
10 TABLET ORAL EVERY 4 HOURS PRN
Status: DISCONTINUED | OUTPATIENT
Start: 2024-09-10 | End: 2024-09-11 | Stop reason: HOSPADM

## 2024-09-10 RX ORDER — GABAPENTIN 100 MG/1
100 CAPSULE ORAL AT BEDTIME
Status: DISCONTINUED | OUTPATIENT
Start: 2024-09-10 | End: 2024-09-10

## 2024-09-10 RX ORDER — FLUTICASONE FUROATE AND VILANTEROL 100; 25 UG/1; UG/1
1 POWDER RESPIRATORY (INHALATION) DAILY
Status: DISCONTINUED | OUTPATIENT
Start: 2024-09-11 | End: 2024-09-10

## 2024-09-10 RX ORDER — ONDANSETRON 4 MG/1
4 TABLET, ORALLY DISINTEGRATING ORAL EVERY 30 MIN PRN
Status: DISCONTINUED | OUTPATIENT
Start: 2024-09-10 | End: 2024-09-11 | Stop reason: HOSPADM

## 2024-09-10 RX ORDER — ONDANSETRON 2 MG/ML
INJECTION INTRAMUSCULAR; INTRAVENOUS PRN
Status: DISCONTINUED | OUTPATIENT
Start: 2024-09-10 | End: 2024-09-10

## 2024-09-10 RX ORDER — FAMOTIDINE 20 MG/1
20 TABLET, FILM COATED ORAL 2 TIMES DAILY
Status: DISCONTINUED | OUTPATIENT
Start: 2024-09-10 | End: 2024-09-11 | Stop reason: HOSPADM

## 2024-09-10 RX ORDER — FENTANYL CITRATE 50 UG/ML
50 INJECTION, SOLUTION INTRAMUSCULAR; INTRAVENOUS EVERY 5 MIN PRN
Status: DISCONTINUED | OUTPATIENT
Start: 2024-09-10 | End: 2024-09-10

## 2024-09-10 RX ORDER — FENTANYL CITRATE 50 UG/ML
25 INJECTION, SOLUTION INTRAMUSCULAR; INTRAVENOUS EVERY 5 MIN PRN
Status: DISCONTINUED | OUTPATIENT
Start: 2024-09-10 | End: 2024-09-10

## 2024-09-10 RX ORDER — ALBUTEROL SULFATE 90 UG/1
1-2 AEROSOL, METERED RESPIRATORY (INHALATION)
Status: DISCONTINUED | OUTPATIENT
Start: 2024-09-10 | End: 2024-09-11

## 2024-09-10 RX ORDER — OXYCODONE HYDROCHLORIDE 5 MG/1
5 TABLET ORAL EVERY 4 HOURS PRN
Status: DISCONTINUED | OUTPATIENT
Start: 2024-09-10 | End: 2024-09-11 | Stop reason: HOSPADM

## 2024-09-10 RX ORDER — SODIUM CHLORIDE, SODIUM LACTATE, POTASSIUM CHLORIDE, CALCIUM CHLORIDE 600; 310; 30; 20 MG/100ML; MG/100ML; MG/100ML; MG/100ML
INJECTION, SOLUTION INTRAVENOUS CONTINUOUS
Status: DISCONTINUED | OUTPATIENT
Start: 2024-09-10 | End: 2024-09-10 | Stop reason: HOSPADM

## 2024-09-10 RX ORDER — CEFAZOLIN SODIUM 1 G/3ML
1 INJECTION, POWDER, FOR SOLUTION INTRAMUSCULAR; INTRAVENOUS EVERY 8 HOURS
Status: COMPLETED | OUTPATIENT
Start: 2024-09-10 | End: 2024-09-11

## 2024-09-10 RX ORDER — ISOSORBIDE MONONITRATE 30 MG/1
30 TABLET, EXTENDED RELEASE ORAL DAILY
Status: DISCONTINUED | OUTPATIENT
Start: 2024-09-11 | End: 2024-09-11 | Stop reason: HOSPADM

## 2024-09-10 RX ORDER — AMOXICILLIN 250 MG
1-2 CAPSULE ORAL 2 TIMES DAILY PRN
Qty: 30 TABLET | Refills: 0 | Status: SHIPPED | OUTPATIENT
Start: 2024-09-10

## 2024-09-10 RX ORDER — LIDOCAINE 40 MG/G
CREAM TOPICAL
Status: DISCONTINUED | OUTPATIENT
Start: 2024-09-10 | End: 2024-09-11 | Stop reason: HOSPADM

## 2024-09-10 RX ORDER — HYDROMORPHONE HYDROCHLORIDE 1 MG/ML
0.4 INJECTION, SOLUTION INTRAMUSCULAR; INTRAVENOUS; SUBCUTANEOUS EVERY 5 MIN PRN
Status: DISCONTINUED | OUTPATIENT
Start: 2024-09-10 | End: 2024-09-11 | Stop reason: HOSPADM

## 2024-09-10 RX ORDER — HYDROCHLOROTHIAZIDE 25 MG/1
25 TABLET ORAL DAILY
Status: DISCONTINUED | OUTPATIENT
Start: 2024-09-11 | End: 2024-09-11 | Stop reason: HOSPADM

## 2024-09-10 RX ORDER — PROCHLORPERAZINE MALEATE 5 MG
10 TABLET ORAL EVERY 6 HOURS PRN
Status: DISCONTINUED | OUTPATIENT
Start: 2024-09-10 | End: 2024-09-11 | Stop reason: HOSPADM

## 2024-09-10 RX ORDER — ONDANSETRON 4 MG/1
4 TABLET, ORALLY DISINTEGRATING ORAL EVERY 6 HOURS PRN
Status: DISCONTINUED | OUTPATIENT
Start: 2024-09-10 | End: 2024-09-11 | Stop reason: HOSPADM

## 2024-09-10 RX ORDER — AMOXICILLIN 250 MG
1 CAPSULE ORAL 2 TIMES DAILY
Status: DISCONTINUED | OUTPATIENT
Start: 2024-09-10 | End: 2024-09-11 | Stop reason: HOSPADM

## 2024-09-10 RX ORDER — BISACODYL 10 MG
10 SUPPOSITORY, RECTAL RECTAL DAILY PRN
Status: DISCONTINUED | OUTPATIENT
Start: 2024-09-13 | End: 2024-09-11 | Stop reason: HOSPADM

## 2024-09-10 RX ADMIN — LIDOCAINE HYDROCHLORIDE 50 MG: 20 INJECTION, SOLUTION INFILTRATION; PERINEURAL at 10:43

## 2024-09-10 RX ADMIN — Medication 100 MCG: at 10:47

## 2024-09-10 RX ADMIN — TRANEXAMIC ACID 1950 MG: 650 TABLET ORAL at 08:45

## 2024-09-10 RX ADMIN — PROPOFOL 100 MCG/KG/MIN: 10 INJECTION, EMULSION INTRAVENOUS at 10:43

## 2024-09-10 RX ADMIN — OXYCODONE HYDROCHLORIDE 5 MG: 5 TABLET ORAL at 19:35

## 2024-09-10 RX ADMIN — SODIUM CHLORIDE: 9 INJECTION, SOLUTION INTRAVENOUS at 15:43

## 2024-09-10 RX ADMIN — FENTANYL CITRATE 50 MCG: 50 INJECTION INTRAMUSCULAR; INTRAVENOUS at 10:26

## 2024-09-10 RX ADMIN — ONDANSETRON 4 MG: 2 INJECTION INTRAMUSCULAR; INTRAVENOUS at 10:29

## 2024-09-10 RX ADMIN — Medication 40 MCG/MIN: at 10:43

## 2024-09-10 RX ADMIN — MIDAZOLAM 1 MG: 1 INJECTION INTRAMUSCULAR; INTRAVENOUS at 10:26

## 2024-09-10 RX ADMIN — ALBUTEROL SULFATE 1 PUFF: 90 AEROSOL, METERED RESPIRATORY (INHALATION) at 16:00

## 2024-09-10 RX ADMIN — Medication 2 G: at 10:25

## 2024-09-10 RX ADMIN — SODIUM CHLORIDE, POTASSIUM CHLORIDE, SODIUM LACTATE AND CALCIUM CHLORIDE: 600; 310; 30; 20 INJECTION, SOLUTION INTRAVENOUS at 09:09

## 2024-09-10 RX ADMIN — CEFAZOLIN 1 G: 1 INJECTION, POWDER, FOR SOLUTION INTRAMUSCULAR; INTRAVENOUS at 18:45

## 2024-09-10 RX ADMIN — BUPIVACAINE HYDROCHLORIDE IN DEXTROSE 1.6 ML: 7.5 INJECTION, SOLUTION SUBARACHNOID at 10:40

## 2024-09-10 RX ADMIN — BUPIVACAINE HYDROCHLORIDE AND EPINEPHRINE BITARTRATE 20 ML: 5; .005 INJECTION, SOLUTION PERINEURAL at 12:59

## 2024-09-10 RX ADMIN — ALBUTEROL SULFATE 2 PUFF: 90 AEROSOL, METERED RESPIRATORY (INHALATION) at 19:29

## 2024-09-10 RX ADMIN — HYDROMORPHONE HYDROCHLORIDE 0.2 MG: 0.2 INJECTION, SOLUTION INTRAMUSCULAR; INTRAVENOUS; SUBCUTANEOUS at 21:41

## 2024-09-10 RX ADMIN — FAMOTIDINE 20 MG: 20 TABLET, FILM COATED ORAL at 21:10

## 2024-09-10 RX ADMIN — SENNOSIDES AND DOCUSATE SODIUM 1 TABLET: 50; 8.6 TABLET ORAL at 21:10

## 2024-09-10 RX ADMIN — GABAPENTIN 100 MG: 100 CAPSULE ORAL at 19:30

## 2024-09-10 RX ADMIN — DEXAMETHASONE SODIUM PHOSPHATE 4 MG: 4 INJECTION, SOLUTION INTRA-ARTICULAR; INTRALESIONAL; INTRAMUSCULAR; INTRAVENOUS; SOFT TISSUE at 12:59

## 2024-09-10 RX ADMIN — OXYCODONE HYDROCHLORIDE 5 MG: 5 TABLET ORAL at 15:39

## 2024-09-10 RX ADMIN — GABAPENTIN 100 MG: 100 CAPSULE ORAL at 15:39

## 2024-09-10 RX ADMIN — PROPOFOL 30 MG: 10 INJECTION, EMULSION INTRAVENOUS at 10:44

## 2024-09-10 ASSESSMENT — ACTIVITIES OF DAILY LIVING (ADL)
ADLS_ACUITY_SCORE: 29
ADLS_ACUITY_SCORE: 25
DIFFICULTY_EATING/SWALLOWING: NO
TOILETING_ISSUES: NO
CHANGE_IN_FUNCTIONAL_STATUS_SINCE_ONSET_OF_CURRENT_ILLNESS/INJURY: YES
ADLS_ACUITY_SCORE: 22
HEARING_DIFFICULTY_OR_DEAF: NO
WALKING_OR_CLIMBING_STAIRS_DIFFICULTY: YES
ADLS_ACUITY_SCORE: 25
VISION_MANAGEMENT: GLASSES
WEAR_GLASSES_OR_BLIND: YES
ADLS_ACUITY_SCORE: 31
ADLS_ACUITY_SCORE: 31
FALL_HISTORY_WITHIN_LAST_SIX_MONTHS: NO
ADLS_ACUITY_SCORE: 25
ADLS_ACUITY_SCORE: 31
ADLS_ACUITY_SCORE: 31
DIFFICULTY_COMMUNICATING: NO
CONCENTRATING,_REMEMBERING_OR_MAKING_DECISIONS_DIFFICULTY: NO
ADLS_ACUITY_SCORE: 22
ADLS_ACUITY_SCORE: 31
ADLS_ACUITY_SCORE: 22
ADLS_ACUITY_SCORE: 25
ADLS_ACUITY_SCORE: 31
WALKING_OR_CLIMBING_STAIRS: STAIR CLIMBING DIFFICULTY, REQUIRES EQUIPMENT
ADLS_ACUITY_SCORE: 22
DOING_ERRANDS_INDEPENDENTLY_DIFFICULTY: NO
ADLS_ACUITY_SCORE: 25
DRESSING/BATHING_DIFFICULTY: NO

## 2024-09-10 NOTE — PROGRESS NOTES
Spoke with patient regarding her plaquenil use.  She reports that she has stopped it before without issue and has been told by her rheumatologist she can stop it if needed.  Discussed with her, and will plan for her to hold the plaquenil for now, likely for the first 2 weeks to ensure good wound healing. If she starts to have scleroderma type issues, she can restart it.    Also discussed pain medications with the patient.  She has been recommended against tylenol due to liver issues.  Will not use tylenol post-op.  She also has had issues with GI and nsaids so will avoid nsaids post-op. With her liver function muscle relaxers would be contraindicated. Will avoid these.  She reports medications like atarax cause very heavy sedation so will avoid these.     She does tolerate gabapentin and only takes this 100mg at night.  Will increase this to 100mg TID and plan for oxycodone as well for post-op pain control.      EDUARD Lopez, CNP  Orthopedic Surgery

## 2024-09-10 NOTE — ANESTHESIA PROCEDURE NOTES
"Intrathecal Procedure Note    Pre-Procedure   Staff -        CRNA: Juwan Breaux APRN CRNA       Other Anesthesia Staff: Samuel Almeida       Performed By: CRNA, KYLEIGH and with CRNAs       Procedure performed by resident/fellow/CRNA in presence of a teaching physician.         Location: OR       Pre-Anesthestic Checklist: patient identified, IV checked, risks and benefits discussed, informed consent, monitors and equipment checked and pre-op evaluation  Timeout:       Correct Patient: Yes        Correct Procedure: Yes        Correct Site: Yes        Correct Position: Yes   Procedure Documentation  Procedure: intrathecal       Patient Position: sitting       Patient Prep/Sterile Barriers: sterile gloves, mask, patient draped       Skin prep: Betadine       Insertion Site: L3-4. (midline approach).       Needle Gauge: 25.        Needle Length (Inches): 3.5        Spinal Needle Type: Pencan       Introducer used       Introducer: 20 G       # of attempts: 1 and  # of redirects:  2    Assessment/Narrative         Paresthesias: No.       CSF fluid: clear.       Opening pressure was cmH2O while  Sitting.      Medication(s) Administered   0.75% Hyperbaric Bupivacaine (Intrathecal) - Intrathecal   1.6 mL - 9/10/2024 10:40:00 AM    FOR [facility] ([facility]) ONLY:   Pain Team Contact information: please page the Pain Team Via [employer]. Search \"Pain\". During daytime hours, please page the attending first. At night please page the resident first.      "

## 2024-09-10 NOTE — ANESTHESIA PROCEDURE NOTES
Adductor canal Procedure Note    Pre-Procedure   Staff -        CRNA: Juwan Breaux APRN CRNA       Other Anesthesia Staff: Samuel Almeida       Performed By: CRNA and KYLEIGH       Location: post-op       Pre-Anesthestic Checklist: patient identified, IV checked, site marked, risks and benefits discussed, informed consent, monitors and equipment checked, pre-op evaluation, at physician/surgeon's request and post-op pain management  Timeout:       Correct Patient: Yes        Correct Procedure: Yes        Correct Site: Yes        Correct Position: Yes        Correct Laterality: Yes        Site Marked: Yes  Procedure Documentation  Procedure: Adductor canal       Patient Position: supine       Patient Prep/Sterile Barriers: sterile gloves, mask       Skin prep: Chloraprep       Local skin infiltrated with 3 mL of 2% lidocaine.        Needle Type: insulated       Needle Gauge: 22.        Needle Length (millimeters): 100        Ultrasound guided       1. Ultrasound was used to identify targeted nerve, plexus, vascular marker, or fascial plane and place a needle adjacent to it in real-time.       2. Ultrasound was used to visualize the spread of anesthetic in close proximity to the above referenced structure.       3. A permanent image is entered into the patient's record.       4. The visualized anatomic structures appeared normal.       5. There were no apparent abnormal pathologic findings.    Assessment/Narrative         The placement was negative for: blood aspirated, painful injection and site bleeding       Paresthesias: No.       Test dose of mL at.         Test dose negative, 3 minutes after injection, for signs of intravascular, subdural, or intrathecal injection.       Bolus given via needle..        Secured via.        Insertion/Infusion Method: Single Shot       Complications: none       Injection made incrementally with aspirations every 5 mL.     Comments:  Placed with the SRNA.  Good visualization  "of the femoral artery and the local spread lateral to the artery.  No HR increase with injections and no other complications noted.  I will follow up with the pt if needed.      FOR [facility] ([facility]) ONLY:   Pain Team Contact information: please page the Pain Team Via [employer]. Search \"Pain\". During daytime hours, please page the attending first. At night please page the resident first.      "

## 2024-09-10 NOTE — ANESTHESIA PREPROCEDURE EVALUATION
Anesthesia Pre-Procedure Evaluation    Patient: Marcelina Estevez   MRN: [id number] : 1960        Procedure : Procedure(s):  left knee ramila assisted total arthroplasty          Past Medical History:   Diagnosis Date    CREST (calcinosis, Raynaud's phenomenon, esophageal dysfunction, sclerodactyly, telangiectasia) (H)     Hyperlipidemia     Hypertension     Hypertriglyceridemia 2011    Controlled with simvastatin.       Limited systemic sclerosis (H)     Menopausal syndrome (hot flashes) 2013    Positive MINDY (antinuclear antibody)     Raynaud disease       Past Surgical History:   Procedure Laterality Date    APPENDECTOMY      BIOPSY      cervical node    COLONOSCOPY N/A 11/10/2021    Procedure: COLONOSCOPY, FLEXIBLE, WITH LESION REMOVAL USING SNARE;  Surgeon: Domingo Wall MD;  Location: [facility] OR    COLONOSCOPY N/A 11/10/2021    Procedure: COLONOSCOPY, WITH POLYPECTOMY AND BIOPSY;  Surgeon: Domingo Wall MD;  Location: [facility] OR    COLONOSCOPY N/A 2021    Procedure: Colonoscopy, With Polypectomy And Biopsy;  Surgeon: Domingo Wall MD;  Location: [facility] OR    COLONOSCOPY N/A 2021    Procedure: Colonoscopy, Flexible, With Lesion Removal Using Snare;  Surgeon: Domingo Wall MD;  Location: [facility] OR    COLONOSCOPY N/A 2022    Procedure: COLONOSCOPY, FLEXIBLE, WITH LESION REMOVAL USING SNARE;  Surgeon: Domingo Wall MD;  Location: [facility]    COLONOSCOPY N/A 2022    Procedure: COLONOSCOPY, WITH POLYPECTOMY AND BIOPSY;  Surgeon: Domingo Wall MD;  Location: [facility]    COLONOSCOPY N/A 2023    Procedure: COLONOSCOPY, FLEXIBLE, WITH LESION REMOVAL USING SNARE;  Surgeon: Domingo Wall MD;  Location: [facility]    COLONOSCOPY WITH CO2 INSUFFLATION N/A 10/26/2020    Procedure: COLONOSCOPY, WITH CO2 INSUFFLATION;  Surgeon: Phyllis Chaudhari MD;  Location: [facility]    COLONOSCOPY WITH CO2 INSUFFLATION N/A 11/10/2021     Procedure: COLONOSCOPY, WITH CO2 INSUFFLATION;  Surgeon: Domingo Wall MD;  Location: [facility]    COLONOSCOPY WITH CO2 INSUFFLATION N/A 9/29/2021    Procedure: COLONOSCOPY, WITH CO2 INSUFFLATION;  Surgeon: Domingo Wall MD;  Location: [facility]    COLONOSCOPY WITH CO2 INSUFFLATION N/A 6/29/2022    Procedure: COLONOSCOPY, WITH CO2 INSUFFLATION;  Surgeon: Domingo Wall MD;  Location: [facility]    COLONOSCOPY WITH CO2 INSUFFLATION N/A 11/9/2022    Procedure: COLONOSCOPY, WITH CO2 INSUFFLATION;  Surgeon: Domingo Wall MD;  Location: [facility]    COLONOSCOPY WITH CO2 INSUFFLATION N/A 6/12/2023    Procedure: Colonoscopy with CO2 insufflation;  Surgeon: Domingo Wall MD;  Location: [facility]    COLONOSCOPY WITH CO2 INSUFFLATION N/A 7/3/2024    Procedure: Colonoscopy with CO2 insufflation;  Surgeon: Domingo Wall MD;  Location: [facility]    COMBINED ESOPHAGOSCOPY, GASTROSCOPY, DUODENOSCOPY (EGD) WITH CO2 INSUFFLATION N/A 6/29/2022    Procedure: ESOPHAGOGASTRODUODENOSCOPY, WITH CO2 INSUFFLATION;  Surgeon: Domingo Wall MD;  Location: [facility] OR    DILATION AND CURETTAGE, HYSTEROSCOPY DIAGNOSTIC, COMBINED  7/1/2014    Procedure: COMBINED DILATION AND CURETTAGE, HYSTEROSCOPY DIAGNOSTIC;  Surgeon: Mana Cabrera DO;  Location: [facility] OR    ENT SURGERY      tonsillectomy and adnoid removal    ESOPHAGOSCOPY, GASTROSCOPY, DUODENOSCOPY (EGD), COMBINED N/A 6/29/2022    Procedure: ESOPHAGOGASTRODUODENOSCOPY, WITH BIOPSY;  Surgeon: Domingo Wall MD;  Location: [facility]    HYSTERECTOMY TOTAL ABDOMINAL      ORTHOPEDIC SURGERY      left knee tear meniscus    RELEASE CARPAL TUNNEL BILATERAL  2009      Allergies   Allergen Reactions    Hydroxyzine      Made her very tired    Other Environmental Allergy Other (See Comments)    Seasonal Allergies     Sulfa Antibiotics      Vomiting      Vicodin [Hydrocodone-Acetaminophen] Nausea      Social History      Tobacco Use    Smoking status: Former     Current packs/day: 0.00     Types: Cigarettes     Quit date: 2001     Years since quittin.7    Smokeless tobacco: Never   Substance Use Topics    Alcohol use: Yes     Comment: 5-8 drinks/week      Wt Readings from Last 1 Encounters:   24 66.2 kg (146 lb)        Anesthesia Evaluation   Pt has had prior anesthetic. Type: MAC and General.    No history of anesthetic complications       ROS/MED HX  ENT/Pulmonary:  - neg pulmonary ROS     Neurologic:  - neg neurologic ROS     Cardiovascular: Comment: - Raynaud disease     (+) Dyslipidemia hypertension- -   -  - -                                 Previous cardiac testing   Echo: Date: Results:    Stress Test:  Date: Results:    ECG Reviewed:  Date: 24 Results:  - NSR   Cath:  Date: Results:      METS/Exercise Tolerance:     Hematologic:       Musculoskeletal:       GI/Hepatic:       Renal/Genitourinary:     (+) renal disease, type: CRI,            Endo:  - neg endo ROS     Psychiatric/Substance Use:     (+)    H/O chronic opiod use . Recreational drug usage: Cannabis.    Infectious Disease:  - neg infectious disease ROS     Malignancy:       Other:  - neg other ROS          Physical Exam    Airway        Mallampati: II   TM distance: > 3 FB   Neck ROM: full   Mouth opening: > 3 cm    Respiratory Devices and Support         Dental           Cardiovascular             Pulmonary                   OUTSIDE LABS:  CBC:   Lab Results   Component Value Date    WBC 7.1 2024    WBC 6.8 2023    HGB 9.7 (L) 2024    HGB 11.2 (L) 2024    HCT 28.5 (L) 2024    HCT 31.1 (L) 2023     2024     2023     BMP:   Lab Results   Component Value Date     (L) 2024     2024    POTASSIUM 4.0 2024    POTASSIUM 4.7 2024    CHLORIDE 95 (L) 2024    CHLORIDE 99 2024    CO2 22 2024    CO2 23 2024    BUN 30.6 (H)  "08/26/2024    BUN 25.1 (H) 04/16/2024    CR 1.23 (H) 08/26/2024    CR 1.11 (H) 04/16/2024    GLC 90 08/26/2024    GLC 85 04/16/2024     COAGS: No results found for: \"PTT\", \"INR\", \"FIBR\"  POC:   Lab Results   Component Value Date    HCG neg 07/01/2014     HEPATIC:   Lab Results   Component Value Date    ALBUMIN 4.5 08/26/2024    PROTTOTAL 7.3 07/16/2024     (H) 07/16/2024     (H) 07/16/2024    ALKPHOS 919 (H) 07/16/2024    BILITOTAL 0.7 07/16/2024     OTHER:   Lab Results   Component Value Date    SERGIO 9.4 08/26/2024    PHOS 3.2 08/26/2024    MAG 1.9 05/22/2018    TSH 2.01 06/16/2022    CRP <2.9 06/12/2018    SED 18 05/22/2018       Anesthesia Plan    ASA Status:  2    NPO Status:  NPO Appropriate    Anesthesia Type: Spinal.   Induction: Intravenous.   Maintenance: TIVA.        Consents    Anesthesia Plan(s) and associated risks, benefits, and realistic alternatives discussed. Questions answered and patient/representative(s) expressed understanding.     - Discussed: Risks, Benefits and Alternatives for BOTH SEDATION and the PROCEDURE were discussed     - Discussed with:  Patient      - Extended Intubation/Ventilatory Support Discussed: No.      - Patient is DNR/DNI Status: No     Use of blood products discussed: Yes.     - Discussed with: Patient.     Postoperative Care    Pain management: IV analgesics, Peripheral nerve block (Single Shot).   PONV prophylaxis: Ondansetron (or other 5HT-3), Background Propofol Infusion     Comments:    Other Comments: The risks and benefits of anesthesia, and the alternatives where applicable, have been discussed with the patient, and they wish to proceed.     Left adductor canal nerve block            EDUARD Benson CRNA    I have reviewed the pertinent notes and labs in the chart from the past 30 days and (re)examined the patient.  Any updates or changes from those notes are reflected in this note.     # Hyponatremia: Lowest Na = 133 mmol/L in last 30 days, " "will monitor as appropriate          # Overweight: Estimated body mass index is 28.99 kg/m  as calculated from the following:    Height as of 9/4/24: 1.511 m (4' 11.5\").    Weight as of 9/4/24: 66.2 kg (146 lb).      "

## 2024-09-10 NOTE — INTERVAL H&P NOTE
This H&P has been reviewed and there are no clinically significant changes in the patient s condition.  The patient was evaluated by myself as well as Dr. Harjinder Calvin prior to surgery. The Patient is approved for the surgery and the stated surgical procedure is still clinically indicated.

## 2024-09-10 NOTE — OR NURSING
Transfer from  PACU to Room 273  Transferred to bed via glyder mat    S: 65 y/o female  S/P Left total Knee       Anesthesia Type:  spinal       Surgeon:  Dr. Spring       Allergies:  See Medication Reconciliation Record       DNR: No    B:  Pertinent Medical History: ludy Richards             Surgical History:  hysterectomy, appy, see EHR    A:  EBL: 10        IVF:  800        UOP:  none, scanned for 406 at 1310        NPO:  ___Yes _x__No         Vomiting:  ___Yes _x__No         Drainage: none        Skin Integrity: incision CDI        RFO: ___Yes__x_No         SSI Patient?  ___Yes___No (if yes, see checklist for actions)        Brace/sling/equipment:  __x_Yes___No immobilizer         See PACU record for ongoing assessment, vital signs and pain assessment.    R: Post-Op vitals and assessments as ordered/indicated per patient's condition.       Follow Post-Op orders and notify Physician prn.       Continue to involve patient/family in plan of care and discharge planning.       Reinforce Pre-Operative education.       Implement skin safety interventions as appropriate.    Report given to EMA Emerson/S RN to assume care of pt

## 2024-09-10 NOTE — PROCEDURES
[facility] Operative Report    Pre-operative diagnosis: left knee primary osteoarthritis   Post-operative diagnosis: same  Procedure: Procedure(s): left total knee arthroplasty-robotic assisted (patella not resurfaced)  Surgeon: Rehan Spring DO  Anesthesia: spinal  Assistant(s): EDUARD Daley, CNP (A advanced practice provider was necessary for his expertise, exposure and surgical assistance throughout the case.)  Estimated blood loss: Less than 10ml  Urine output: na  Fluids: (See anesthesia record)  300ml ml crystalloids   Specimens: none  Complications: None  Counts: correct     Findings: Bone-on-bone arthritis medial compartment.  Full-thickness cartilage loss to the lateral femoral condyle.  Patellofemoral showed some partial thickness along the apex but no full-thickness wear    Implants:    Jamil size 3 triathlon cruciate retaining femoral component, 9 mm thickness X3 tibial bearing insert, size 3 tibial baseplate triathlon      Marcelina Estevez is a pleasant 64 year old-year-old patient with a complaint of knee pain.  The pain is been progressive and longstanding.  Despite conservative care the pain is impacting their quality of life.  The radiographs are clinically correlated.  Given their continued pain refractory to conservative care we discussed proceeding with Kofi assisted total knee arthroplasty.  The risk, benefits, complications, alternatives was reviewed in the office.  The risk including bleeding, infection, neurovascular injury, fracture, blood clot, stiffness, implant issues was discussed.  We also reviewed length of recovery.  Once this was thoroughly reviewed with an understanding, the patient opted to proceed surgically.    They were seen preoperatively and again informed consent was obtained.  The extremity was marked and the consent was signed.  They were wheeled back to an operative suite.  Safely transferred to an operative table.  When deemed appropriate by anesthesia the  extremity was sterilely prepped and draped in normal manner.  Prior to beginning, a timeout was performed.  The appropriate patient, laterality, surgery was identified.  Antibiotics were confirmed to have been administered.  Prior to scrubbing into the surgery, the preoperative plan was evaluated and the sizes were selected with placement of the prosthesis on the Kofi system.    An Esmarch was utilized to exsanguinate the extremity and tourniquet was inflated on the upper thigh.  A midline incision was followed with a medial parapatellar arthrotomy.  The anterior fat pad was excised.  No releases were performed.  The ACL excised.  Rimming osteophytes removed.  The checkpoints were placed in the femur as well as the proximal tibia.  This was followed with placement of the arrays including 2 pins intra-articular approximately 1.5 cm proximal to the condyle.  This was followed with 2 pins in the arrays placed approximately 5 cm distal to the knee along the medial tibial crest.  With that completed the knee was registered with the Kofi robot.  This included obtaining checkpoints along the medial and lateral malleoli as well as obtaining the hip center of rotation.  Once this was completed the knee was evaluated and extension as well as 90 degrees of flexion to determine the extension and flexion gap.  With this completed the gaps were analyzed with the Kofi robot.  The gaps were balanced with the robot.  Once this was acceptable the knee was positioned.    The patella was not resurfaced.  Rimming osteophytes were removed.    Using the Kofi robot with care to protect the collateral ligaments and patellar tendon the cuts on the femur were performed with no issues.  Attention was turned to the tibia, the cut on the proximal tibia was performed with no issues.    Trial components were placed.  The knee was taken through full range of motion showing the knee to be well balanced throughout the arc of motion with no evidence  of instability or patellar maltracking.    The tibia and femur were then prepared.  The final components as listed above were cemented into position and held in approximately 20 degrees of flexion until the cement cured.  The knee was carefully inspected and excess bone and cement fragments were removed.  During this process a 3-minute dilute Betadine lavage was performed.  This was followed pulse lavage.       The arthrotomy was then closed with 0 Vicryl in interrupted manner.  The knee was taken through motion showing a tight closure.  This was followed with 2-0 Vicryl subcutaneous followed a running Monocryl subcuticular stitch and skin glue.  A sterile bandage was applied and they were transferred to the PACU in stable condition.  They will be brought in the hospital for continued orthopedic care, DVT prophylaxis, pain management, physical therapy.    Vince Spring D.O.

## 2024-09-10 NOTE — MEDICATION SCRIBE - ADMISSION MEDICATION HISTORY
Medication Scribe Admission Medication History    Admission medication history is complete. The information provided in this note is only as accurate as the sources available at the time of the update.    Information Source(s): Patient via phone    Pertinent Information: patient reports that she was informed to put on hold plaquenil for the upcoming two weeks.     Changes made to PTA medication list:  Added: None  Deleted: Diprolene -AF ointment - no longer using   Changed: Losartan 100 mg changed to 50 mg tablet; taking 1 tablet (50 mg) BID as per patient report     Allergies reviewed with patient and updates made in EHR: yes    Medication History Completed By: DIANN PRIETO 9/10/2024 2:53 PM    PTA Med List   Medication Sig Note Last Dose    albuterol (PROAIR HFA) 108 (90 Base) MCG/ACT inhaler INHALE 2 PUFS BY MOUTH EVERY 6 HOURS AS NEEDED FOR SHORTNESS OF BREATH / DYSPNEA (Patient taking differently: Inhale 2 puffs into the lungs every 6 hours as needed for shortness of breath or wheezing. INHALE 2 PUFS BY MOUTH EVERY 6 HOURS AS NEEDED FOR SHORTNESS OF BREATH / DYSPNEA)  9/10/2024 at 0600    ALLEGRA ALLERGY 180 MG tablet Take 1 tablet (180 mg) by mouth daily  9/9/2024 at 0800    apixaban ANTICOAGULANT (ELIQUIS) 2.5 MG tablet Take 1 tablet (2.5 mg) by mouth 2 times daily for 14 days.      buPROPion (WELLBUTRIN XL) 150 MG 24 hr tablet Take 1 Tablet by mouth in the morning  9/9/2024 at 0800    diclofenac (VOLTAREN) 1 % topical gel Apply 2 g topically 4 times daily as needed for moderate pain 8/22/2024: Hold 3 days prior to DOS Past Week at PRN    DULoxetine (CYMBALTA) 60 MG capsule Take 2 capsules (120 mg) by mouth daily for 360 days  9/9/2024 at 0800    fluticasone (FLONASE) 50 MCG/ACT nasal spray Spray 1 spray into both nostrils daily  Past Month at PRN    gabapentin (NEURONTIN) 100 MG capsule Take 1 capsule (100 mg) by mouth at bedtime for 90 days  9/9/2024 at 2000    hydrochlorothiazide (HYDRODIURIL) 25 MG tablet  Take 25 mg by mouth daily  9/9/2024 at 0800    hydroxychloroquine (PLAQUENIL) 200 MG tablet Take 300 mg by mouth daily Take 1 200 mg tablet and 1/2 of 200mg  9/9/2024 at 0800    isosorbide mononitrate (IMDUR) 30 MG 24 hr tablet Take 1 Tablet by mouth once daily  9/9/2024 at 0800    LORazepam (ATIVAN) 0.5 MG tablet Take 1 tablet (0.5 mg) by mouth daily as needed (panic attack)  Past Week at PRN    losartan (COZAAR) 50 MG tablet Take 50 mg by mouth 2 times daily. 1 tablet (50 mg) two times a day  9/10/2024 at 0600    NIFEdipine ER (ADALAT CC) 30 MG 24 hr tablet Take 1 Tablet by mouth once daily  9/9/2024 at 0800    oxyCODONE (ROXICODONE) 5 MG tablet Take 1-2 tablets (5-10 mg) by mouth every 4 hours as needed for moderate to severe pain.      pantoprazole (PROTONIX) 40 MG EC tablet Take 1 tablet (40 mg) by mouth daily  9/9/2024 at 0800    senna-docusate (SENOKOT-S/PERICOLACE) 8.6-50 MG tablet Take 1-2 tablets by mouth 2 times daily as needed for constipation. Take while on oral narcotics to prevent or treat constipation.      VITAMIN D, CHOLECALCIFEROL, PO Take 2,000 Units by mouth daily   9/9/2024 at 0800

## 2024-09-11 ENCOUNTER — NURSE TRIAGE (OUTPATIENT)
Dept: ORTHOPEDICS | Facility: OTHER | Age: 64
End: 2024-09-11
Payer: COMMERCIAL

## 2024-09-11 ENCOUNTER — APPOINTMENT (OUTPATIENT)
Dept: PHYSICAL THERAPY | Facility: CLINIC | Age: 64
End: 2024-09-11
Attending: ORTHOPAEDIC SURGERY
Payer: COMMERCIAL

## 2024-09-11 VITALS
RESPIRATION RATE: 18 BRPM | SYSTOLIC BLOOD PRESSURE: 155 MMHG | DIASTOLIC BLOOD PRESSURE: 74 MMHG | HEART RATE: 70 BPM | OXYGEN SATURATION: 100 % | TEMPERATURE: 98.6 F

## 2024-09-11 LAB
HGB BLD-MCNC: 9.1 G/DL (ref 11.7–15.7)
HOLD SPECIMEN: NORMAL

## 2024-09-11 PROCEDURE — 36415 COLL VENOUS BLD VENIPUNCTURE: CPT | Performed by: NURSE PRACTITIONER

## 2024-09-11 PROCEDURE — 250N000011 HC RX IP 250 OP 636: Performed by: NURSE PRACTITIONER

## 2024-09-11 PROCEDURE — 85018 HEMOGLOBIN: CPT | Performed by: NURSE PRACTITIONER

## 2024-09-11 PROCEDURE — 250N000013 HC RX MED GY IP 250 OP 250 PS 637: Performed by: NURSE PRACTITIONER

## 2024-09-11 PROCEDURE — 97110 THERAPEUTIC EXERCISES: CPT | Mod: GP | Performed by: PHYSICAL THERAPIST

## 2024-09-11 PROCEDURE — 97530 THERAPEUTIC ACTIVITIES: CPT | Mod: GP | Performed by: PHYSICAL THERAPIST

## 2024-09-11 PROCEDURE — 97116 GAIT TRAINING THERAPY: CPT | Mod: GP | Performed by: PHYSICAL THERAPIST

## 2024-09-11 PROCEDURE — 258N000003 HC RX IP 258 OP 636: Performed by: NURSE PRACTITIONER

## 2024-09-11 RX ORDER — ALBUTEROL SULFATE 90 UG/1
1-2 AEROSOL, METERED RESPIRATORY (INHALATION) EVERY 6 HOURS PRN
Status: DISCONTINUED | OUTPATIENT
Start: 2024-09-11 | End: 2024-09-11 | Stop reason: HOSPADM

## 2024-09-11 RX ORDER — GABAPENTIN 100 MG/1
100 CAPSULE ORAL 3 TIMES DAILY PRN
Qty: 90 CAPSULE | Refills: 0 | Status: SHIPPED | OUTPATIENT
Start: 2024-09-11

## 2024-09-11 RX ADMIN — OXYCODONE HYDROCHLORIDE 5 MG: 5 TABLET ORAL at 08:31

## 2024-09-11 RX ADMIN — OXYCODONE HYDROCHLORIDE 5 MG: 5 TABLET ORAL at 08:42

## 2024-09-11 RX ADMIN — CEFAZOLIN 1 G: 1 INJECTION, POWDER, FOR SOLUTION INTRAMUSCULAR; INTRAVENOUS at 02:22

## 2024-09-11 RX ADMIN — POLYETHYLENE GLYCOL 3350 17 G: 17 POWDER, FOR SOLUTION ORAL at 08:39

## 2024-09-11 RX ADMIN — DULOXETINE HYDROCHLORIDE 120 MG: 30 CAPSULE, DELAYED RELEASE PELLETS ORAL at 08:34

## 2024-09-11 RX ADMIN — ISOSORBIDE MONONITRATE 30 MG: 30 TABLET, EXTENDED RELEASE ORAL at 08:33

## 2024-09-11 RX ADMIN — SENNOSIDES AND DOCUSATE SODIUM 1 TABLET: 50; 8.6 TABLET ORAL at 08:32

## 2024-09-11 RX ADMIN — FAMOTIDINE 20 MG: 20 TABLET, FILM COATED ORAL at 08:33

## 2024-09-11 RX ADMIN — APIXABAN 2.5 MG: 2.5 TABLET, FILM COATED ORAL at 08:33

## 2024-09-11 RX ADMIN — OXYCODONE HYDROCHLORIDE 10 MG: 5 TABLET ORAL at 12:42

## 2024-09-11 RX ADMIN — HYDROCHLOROTHIAZIDE 25 MG: 25 TABLET ORAL at 08:33

## 2024-09-11 RX ADMIN — NIFEDIPINE 30 MG: 30 TABLET, EXTENDED RELEASE ORAL at 08:32

## 2024-09-11 RX ADMIN — BUPROPION HYDROCHLORIDE 150 MG: 150 TABLET, EXTENDED RELEASE ORAL at 08:32

## 2024-09-11 RX ADMIN — OXYCODONE HYDROCHLORIDE 5 MG: 5 TABLET ORAL at 02:21

## 2024-09-11 RX ADMIN — SODIUM CHLORIDE: 9 INJECTION, SOLUTION INTRAVENOUS at 02:22

## 2024-09-11 ASSESSMENT — ACTIVITIES OF DAILY LIVING (ADL)
ADLS_ACUITY_SCORE: 27
ADLS_ACUITY_SCORE: 26
ADLS_ACUITY_SCORE: 27
ADLS_ACUITY_SCORE: 25
ADLS_ACUITY_SCORE: 27
ADLS_ACUITY_SCORE: 27
ADLS_ACUITY_SCORE: 26
ADLS_ACUITY_SCORE: 26
ADLS_ACUITY_SCORE: 27
ADLS_ACUITY_SCORE: 27
ADLS_ACUITY_SCORE: 26
ADLS_ACUITY_SCORE: 25

## 2024-09-11 NOTE — PROGRESS NOTES
Patient vital signs are at baseline: Yes  Patient able to ambulate as they were prior to admission or with assist devices provided by therapies during their stay:  Yes  Patient MUST void prior to discharge:  Yes  Patient able to tolerate oral intake:  Yes  Pain has adequate pain control using Oral analgesics:  Yes  Does patient have an identified :  Yes  Has goal D/C date and time been discussed with patient:  No,  Reason:  Tomorrow am.     4 hour shift note:    Po oxycodone and IV dilaudid given for 5/10 pain.  Pt walking SBA with walker and immobilizer.  Ice pack to surgical knee.    BP (!) 140/81 (BP Location: Left arm)   Pulse 75   Temp 98.4  F (36.9  C) (Oral)   Resp 18   LMP 06/12/2014   SpO2 96%

## 2024-09-11 NOTE — PROGRESS NOTES
09/10/24 1500   Appointment Info   Signing Clinician's Name / Credentials (PT) Mina Youssef, PT, DPT   Rehab Comments (PT) Patient amenable to skilled physical therapy services this afternoon.       Present no   Living Environment   People in Home spouse   Current Living Arrangements house   Home Accessibility stairs to enter home   Number of Stairs, Main Entrance 1   Stair Railings, Main Entrance none  (wall on both sides)   Number of Stairs, Within Home, Primary one   Stair Railings, Within Home, Primary railings safe and in good condition   Transportation Anticipated family or friend will provide   Self-Care   Usual Activity Tolerance good   Current Activity Tolerance moderate   Regular Exercise No   Equipment Currently Used at Home cane, straight;walker, rolling;raised toilet seat;shower chair;grab bar, tub/shower   Fall history within last six months no   General Information   Onset of Illness/Injury or Date of Surgery 09/10/24   Referring Physician Domingo Delaney APRN CNP   Patient/Family Therapy Goals Statement (PT) Return home with improved ability to walk with less pain.   Pertinent History of Current Problem (include personal factors and/or comorbidities that impact the POC) Patient is a 64 year old female POD 0 from left total knee arthroplasty. Patient has significant medical history including Proteinuria, seasonal allergies, serrated polyposis syndrome, panic attack, atrophic vaginitis, GERD, coronary vasospasm, episodic marijuana use, CREST, limited systemic sclerosis, anxiety, tension headache, Raynaud's phenomenon, anemia, status post total hysterectomy, allergic rhinitis due to pollen, hyperlipidemia, stage 3a chronic kidney disease, depression, hypertension, bilateral keratosis sicca, chronic night sweats, atopic rhinitis, and hypertriglyceridemia.  Patient is from home with  around the [facility], with her  being primary support upon  return home. Patient is ambulatory without assistive device at baseline, including performance with ADL's, housework, and dressing.   Existing Precautions/Restrictions weight bearing   Weight-Bearing Status - LUE full weight-bearing   Weight-Bearing Status - RUE full weight-bearing   Weight-Bearing Status - LLE weight-bearing as tolerated   Weight-Bearing Status - RLE full weight-bearing   Heart Disease Risk Factors High blood pressure;Dislipidemia;Overweight;Stress;Medical history   General Observations Patient not in acute distress, resting comfortably supine in hospital bed upon arrival today.   Cognition   Affect/Mental Status (Cognition) WNL   Orientation Status (Cognition) oriented x 4   Follows Commands (Cognition) WNL   Pain Assessment   Patient Currently in Pain Yes, see Vital Sign flowsheet  (4-5/10 on left knee with oxycodone pain killer administered before session.)   Integumentary/Edema   Integumentary/Edema no deficits were identifed   Integumentary/Edema Comments Normal s/p left TKA knee joint swelling with bandaging donned during today's session.   Posture    Posture Forward head position;Protracted shoulders  (Mild in nature.)   Range of Motion (ROM)   Range of Motion ROM is WFL   ROM Comment LLE Knee Extension PROM: Back of knee resting on bed in supine position; Left Knee Flexion: 58 degrees AROM with heel slide.   Strength (Manual Muscle Testing)   Strength (Manual Muscle Testing) strength is WFL   Strength Comments 4-/5 left quad contraction; Otherwise both lower extremities are grossly WFL's.   Bed Mobility   Bed Mobility no deficits identified;scooting/bridging;rolling right;supine-sit-supine   Rolling Right [place] (Bed Mobility) independent   Scooting/Bridging [place] (Bed Mobility) independent   Supine-Sit-Supine [place] (Bed Mobility) independent;verbal cues  (cueing needed for lower extremity sequencing to sit at edge of bed.)   Bed Mobility Limitations decreased ability to  use legs for bridging/pushing   Impairments Contributing to Impaired Bed Mobility decreased flexibility;pain;decreased ROM;decreased sensation;decreased strength   Assistive Device (Bed Mobility) other (see comments)  (None.)   Transfers   Transfers bed-chair transfer;sit-stand transfer;toilet transfer   Maintains Weight-bearing Status (Transfers) able to maintain   Transfer Safety Concerns Noted decreased step length;decreased weight-shifting ability   Impairments Contributing to Impaired Transfers pain;decreased ROM;decreased sensation;decreased strength   Bed-Chair Transfer   Assistive Device (Bed-Chair Transfers) walker, front-wheeled   Bed-Chair [place] (Transfers) modified independence;verbal cues  (Cueing needed for upper extremity sequencing to support safe ascent and descent with this transfer today.)   Sit-Stand Transfer   Sit-Stand [place] (Transfers) modified independence;verbal cues  (Cueing needed for upper extremity sequencing to support safe ascent and descent with this transfer today.)   Assistive Device (Sit-Stand Transfers) walker, front-wheeled   Toilet Transfer   [place] Level (Toilet Transfer) modified independence   Assistive Device (Toilet Transfer) walker, front-wheeled   Type (Toilet Transfer) sit-stand;stand-sit   Gait/Stairs (Locomotion)   [place] Level (Gait) modified independence;supervision   Assistive Device (Gait) walker, front-wheeled   Distance in Feet (Gait) 10   Pattern (Gait) step-through   Deviations/Abnormal Patterns (Gait) bekah decreased;gait speed decreased;stride length decreased;weight shifting decreased  (Mild decreased weightshifting onto left leg.)   Maintains Weight-bearing Status (Gait) able to maintain   Comment, (Gait/Stairs) Stairs not assessed today.   Balance   Balance other (describe)   Sitting Balance: Static good balance   Sitting Balance: Dynamic good balance   Sit-to-Stand Balance fair balance   Standing Balance: Static good balance    Standing Balance: Dynamic fair balance   Balance Comments Normal s/p left TKA weakness and pain related balance deficits, no other balance deficits observed during today's session.   Balance Quick Add Sitting balance: Static;Sitting balance: dynamic;Sit to stand balance;Standing balance: static;Standing balance: dynamic   Sensory Examination   Sensory Perception WFL   Sensory Perception Comments Only left TKA incisional numbness, otherwise WFL's for bilateral lower extremities.   Coordination   Coordination no deficits were identified   Muscle Tone   Muscle Tone no deficits were identified   Clinical Impression   Criteria for Skilled Therapeutic Intervention Yes, treatment indicated   PT Diagnosis (PT) Left knee weakness, instability, and ROM deficits; Gait and balance deficits.   Influenced by the following impairments Left total knee arthroplasty POD 0.   Functional limitations due to impairments Patient unable to ambulate and perform required functional transfers at her baseline independent mobility level without the use of a front wheeled walker due to her current aforementioned impairments.   Clinical Presentation (PT Evaluation Complexity) evolving   Clinical Presentation Rationale Based on observation, history, evaluation and clinical judgment.   Clinical Decision Making (Complexity) moderate complexity   Planned Therapy Interventions (PT) balance training;bed mobility training;cryotherapy;gait training;home exercise program;motor coordination training;neuromuscular re-education;patient/family education;postural re-education;ROM (range of motion);stair training;strengthening;stretching;transfer training;progressive activity/exercise;risk factor education;home program guidelines   Risk & Benefits of therapy have been explained evaluation/treatment results reviewed;care plan/treatment goals reviewed;risks/benefits reviewed;current/potential barriers reviewed;participants voiced agreement with care  plan;participants included;patient   Clinical Impression Comments Patient is a 64 year old female present POD 0 from a left knee TKA on 09/10/2024. Patient is baseline independent with upright ambulation, functional transfers, and ADL performance without use of an assistive device. She is from home with  in their one level house with one step to enter without railing. Patient currently below baseline mobility independence, requiring a front wheeled walker to safely perform each of her required mobility demands at this time. Recommend discharge to home with  assist and outpatient physical therapy services for continued progression of left knee ROM and strengthening progression for improved safety with required mobility demands.   PT Total Evaluation Time   PT Eval, Moderate Complexity Minutes (26704) 10   Physical Therapy Goals   PT Frequency Daily   PT Predicted Duration/Target Date for Goal Attainment 09/11/24   PT Goals Bed Mobility;Transfers;Gait;Stairs   PT: Bed Mobility Independent;Supine to/from sit;Rolling;Bridging;Within precautions   PT: Transfers Modified independent;Sit to/from stand;Bed to/from chair;Assistive device;Within precautions;Goal Met   PT: Gait Modified independent;Assistive device;Rolling walker;Within precautions;100 feet   PT: Stairs Modified independent;Assistive device;Within precautions;1 stair  (No railing.)   Interventions   Interventions Quick Adds Gait Training;Therapeutic Procedure   Therapeutic Procedure/Exercise   Ther. Procedure: strength, endurance, ROM, flexibillity Minutes (53600) 20   Symptoms Noted During/After Treatment increased pain  (With end ROM left knee flexion with heel slide exercise performed in supine today.)   Treatment Detail/Skilled Intervention Patient resting comfortably in supine position in bed at beginning of today's session. Patient performed following exercises x10 reps each with left lower extremity: Quad sets with 5 second holds,  "hamstring sets with 5 second holds, bilateral glute sets with 5 second holds, heel slides with 5 second holds, ankle pumps bilaterally, short arc quads, and hip abduction and adduction. Education to perform her initial HEP 3 times per day until starting outpatient physical therapy services within the next week to to ensure improve left knee strength and ROM improvements until this date. Education to rest, ice, elevate with pillows above hip height, and compress with compression stockings her left knee to reduce left knee joint post-operative pain and swelling. Patient resting comfortably in bedside chair with chair alarm on, cell and hospital phone near, call light near, tray table near, HEP exercise printout left in room, left knee brace immobilizer donned, ice pack placed on left anterior TKA knee incisional scar, all needs met, all IV and medication stands plugged in, and all questions answered at conclusion of today's session.   Gait Training   Gait Training Minutes (19341) 10   Symptoms Noted During/After Treatment (Gait Training) increased pain  (In left knee from weightbearing.)   Treatment Detail/Skilled Intervention Patient ambulated x80 feet with front wheeled walker within hallway with SBA x1 PT for support and IV stand manipulation. Patient education at bottom of steps for going \"up with good (non-surgical), and down with bad (surgical)\" lower extremity to perform when ascending and descending steps with inpatient physical therapist in the morning. Education to ambulate x80 feet for 2 more repetitions with nursing staff before going to bed tonight to continue introducing tolerable left body weight onto her left knee, to ambulate 1 time per every waking hour upon discharge while at home for the same purpose. Education also for donning left knee immobilizer brace with all upright mobility until able to safely lift her left leg into and out of bed without knee extensor lag x1 rep each direction over the " next week.   PT Discharge Planning   PT Plan Daily: Stairs, HEP exercises, ambulation with front wheeled walker, transfers.   PT Discharge Recommendation (DC Rec) home with outpatient physical therapy   PT Rationale for DC Rec Patient is a 64 year old female present POD 0 from a left knee TKA on 09/10/2024. Patient is baseline independent with upright ambulation, functional transfers, and ADL performance without use of an assistive device. She is from home with  in their one level house with one step to enter without railing. Patient currently below baseline mobility independence, requiring a front wheeled walker to safely perform each of her required mobility demands at this time. Recommend discharge to home with  assist and outpatient physical therapy services for continued progression of left knee ROM and strengthening progression for improved safety with required mobility demands.   PT Brief overview of current status IND with cueing for hand positioning with all bed mobility; MOD IND with all transfers using front wheeled walker; SBA x1 for gait with front wheeled walker x90 feet.   Total Session Time   Timed Code Treatment Minutes 30   Total Session Time (sum of timed and untimed services) 40     Thank you for your referral,  Mina Youssef, PT, DPT    [facility]  O: [phone]  E: [email or web address]

## 2024-09-11 NOTE — PLAN OF CARE
Goal Outcome Evaluation:      Plan of Care Reviewed With: patient, spouse    Overall Patient Progress: improvingOverall Patient Progress: improving    Outcome Evaluation: Moving well. Three spots of drainage on dressing are less than dime-sized. Oxycodone is controlling pain well. Good CMS. Immobilizer in place. Tolerating regular diet. Voiding. Anxious to get home. Will follow up with Dr. Spring 9-25-24.  S-(situation): Patient discharged to home via wheelchair to door with .     B-(background): Observation goals met     A-(assessment): as above    R-(recommendations): Discharge instructions reviewed with patient and . Listed belongings gathered and returned to patient.  Patient Education resolved: Yes  New medications-Pt. Has been educated about reason of use and side effects Yes  Home medications returned to patient NA  Medication Bin checked and emptied on discharge Yes

## 2024-09-11 NOTE — PLAN OF CARE
Physical Therapy Discharge Summary    Reason for therapy discharge:    Discharged to home with outpatient therapy.    Progress towards therapy goal(s). See goals on Care Plan in [facility] electronic health record for goal details.  Goals met    Therapy recommendation(s):    Continued therapy is recommended.  Rationale/Recommendations:   .Patient would benefit from continued skilled therapeutic intervention in order to progress them towards a higher level of function in accordance with their surgeon's protocol.        Thank you for your referral.  Judy Waggoner, PT, DPT, ATC, [facility]  O: [phone]  E: [email or web address]

## 2024-09-11 NOTE — TELEPHONE ENCOUNTER
Patient reports a scant amount of bloody drainage today after discharging from the hospital and when she got home 1hr later the dressing was 75% covered. Patient denies any dizziness/lightheadedness at this time. Patient attempted to send picture over [employer] and was unable.     Return call patient she stated no further change in discharge. Plan for patient to monitor over night and follow up with clinic in the morning to update of any changes.     Patient advised to be evaluated in the ER if drainage increases and soaks through the dressing, she become lightheaded or dizzy.     Patient verbalized understanding.     Patty Hill RN   [facility]

## 2024-09-11 NOTE — ANESTHESIA CARE TRANSFER NOTE
Patient: Marcelina Estevez    Procedure: Procedure(s):  left knee ramila assisted total arthroplasty       Diagnosis: Primary osteoarthritis of left knee [M17.12]  Diagnosis Additional Information: No value filed.    Anesthesia Type:   Spinal     Note:    Oropharynx: oropharynx clear of all foreign objects and spontaneously breathing  Level of Consciousness: drowsy  Oxygen Supplementation: face mask  Level of Supplemental Oxygen (L/min / FiO2): 6  Independent Airway: airway patency satisfactory and stable  Dentition: dentition unchanged  Vital Signs Stable: post-procedure vital signs reviewed and stable  Report to RN Given: handoff report given  Patient transferred to: PACU    Handoff Report: Identifed the Patient, Identified the Reponsible Provider, Reviewed the pertinent medical history, Discussed the surgical course, Reviewed Intra-OP anesthesia mangement and issues during anesthesia, Set expectations for post-procedure period and Allowed opportunity for questions and acknowledgement of understanding    Vitals:  Vitals Value Taken Time   /71 09/10/24 1335   Temp 76.82  F (24.9  C) 09/10/24 1325   Pulse 83 09/10/24 1339   Resp 13 09/10/24 1339   SpO2 95 % 09/10/24 1339   Vitals shown include unfiled device data.    Electronically Signed By: EDUARD Benson CRNA  September 11, 2024  3:28 PM

## 2024-09-11 NOTE — TELEPHONE ENCOUNTER
Patient Returning Call    Reason for call:  patient is having bleeding post one day surgery and does not know what to do. Requesting callback asap    Information relayed to patient:  te sent to clinic     Patient has additional questions:  No      [facility] or would you prefer to receive a phone call?:   Patient would prefer a phone call   Okay to leave a detailed message?: Yes at Cell number on file:    Telephone Information:   Mobile [phone]

## 2024-09-11 NOTE — DISCHARGE SUMMARY
[facility] Discharge Summary     Marcelina Estevez MRN# [id number]   YOB: 1960 Age: 64 year old     Date of Admission:  9/10/2024  Date of Discharge:  9/11/2024  Admitting Physician:  Rehan Spring DO  Discharge Physician:  EDUARD Klein CNP  Discharging Service:  Orthopedics     Primary Provider: Zoey Louis          Admission Diagnoses:   Primary osteoarthritis of left knee [M17.12]          Discharge Diagnosis:     Active Problems:    S/P total knee arthroplasty               Discharge Disposition:     Discharged to home           Condition on Discharge:     Discharge condition: Stable   Discharge vitals: Blood pressure (!) 155/74, pulse 70, temperature 98.6  F (37  C), temperature source Oral, resp. rate 18, last menstrual period 06/12/2014, SpO2 100%, not currently breastfeeding.   Code status on discharge: Full Code           Procedures / Labs / Imaging:   No other procedures performed during this admission          Medications Prior to Admission:     Facility-Administered Medications Prior to Admission   Medication Dose Route Frequency Provider Last Rate Last Admin    [DISCONTINUED] 2.0 mL bupivacaine (MARCAINE) 0.5% injection (50 mL vial)  2 mL   Ancelmo Da Silva, DO   2 mL at 10/05/23 1241    [DISCONTINUED] 2.0 mL bupivacaine (MARCAINE) 0.5% injection (50 mL vial)  2 mL   Ancelmo Da Silva, DO   2 mL at 10/05/23 1241    [DISCONTINUED] hylan (SYNVISC ONE) injection 48 mg  48 mg      48 mg at 05/30/24 0938    [DISCONTINUED] hylan (SYNVISC ONE) injection 48 mg  48 mg      48 mg at 05/30/24 0938    [DISCONTINUED] lidocaine 1 % injection 2 mL  2 mL   Ancelmo Da Silva, DO   2 mL at 10/05/23 1241    [DISCONTINUED] lidocaine 1 % injection 2 mL  2 mL   Ancelmo Da Silva, DO   2 mL at 10/05/23 1241    [DISCONTINUED] triamcinolone (KENALOG-40) injection 40 mg  40 mg   Ancelmo Da Silva, DO   40 mg at 10/05/23 1241    [DISCONTINUED] triamcinolone (KENALOG-40) injection 40 mg  40 mg    Ancelmo Da Silva,    40 mg at 10/05/23 1241     Medications Prior to Admission   Medication Sig Dispense Refill Last Dose    albuterol (PROAIR HFA) 108 (90 Base) MCG/ACT inhaler INHALE 2 PUFS BY MOUTH EVERY 6 HOURS AS NEEDED FOR SHORTNESS OF BREATH / DYSPNEA (Patient taking differently: Inhale 2 puffs into the lungs every 6 hours as needed for shortness of breath or wheezing. INHALE 2 PUFS BY MOUTH EVERY 6 HOURS AS NEEDED FOR SHORTNESS OF BREATH / DYSPNEA) 18 g 3 9/10/2024 at 0600    ALLEGRA ALLERGY 180 MG tablet Take 1 tablet (180 mg) by mouth daily 90 tablet 3 9/9/2024 at 0800    buPROPion (WELLBUTRIN XL) 150 MG 24 hr tablet Take 1 Tablet by mouth in the morning 90 tablet 0 9/9/2024 at 0800    DULoxetine (CYMBALTA) 60 MG capsule Take 2 capsules (120 mg) by mouth daily for 360 days 180 capsule 3 9/9/2024 at 0800    fluticasone (FLONASE) 50 MCG/ACT nasal spray Spray 1 spray into both nostrils daily 16 mL 5 Past Month at PRN    hydrochlorothiazide (HYDRODIURIL) 25 MG tablet Take 25 mg by mouth daily   9/9/2024 at 0800    hydroxychloroquine (PLAQUENIL) 200 MG tablet Take 300 mg by mouth daily Take 1 200 mg tablet and 1/2 of 200mg 120 tablet 1 9/9/2024 at 0800    isosorbide mononitrate (IMDUR) 30 MG 24 hr tablet Take 1 Tablet by mouth once daily 90 tablet 1 9/9/2024 at 0800    LORazepam (ATIVAN) 0.5 MG tablet Take 1 tablet (0.5 mg) by mouth daily as needed (panic attack) 15 tablet 0 Past Week at PRN    losartan (COZAAR) 50 MG tablet Take 50 mg by mouth 2 times daily. 1 tablet (50 mg) two times a day   9/10/2024 at 0600    NIFEdipine ER (ADALAT CC) 30 MG 24 hr tablet Take 1 Tablet by mouth once daily 90 tablet 1 9/9/2024 at 0800    pantoprazole (PROTONIX) 40 MG EC tablet Take 1 tablet (40 mg) by mouth daily 90 tablet 1 9/9/2024 at 0800    VITAMIN D, CHOLECALCIFEROL, PO Take 2,000 Units by mouth daily    9/9/2024 at 0800    order for DME Equipment being ordered: light lamp for seasonal affective disorder (Patient not  taking: Reported on 9/4/2024) 1 Device 0     [DISCONTINUED] budesonide-formoterol (SYMBICORT) 160-4.5 MCG/ACT Inhaler Inhale 2 puffs into the lungs. (Patient not taking: Reported on 9/10/2024)   never started    [DISCONTINUED] diclofenac (VOLTAREN) 1 % topical gel Apply 2 g topically 4 times daily as needed for moderate pain 150 g 2 Past Week at PRN    [DISCONTINUED] gabapentin (NEURONTIN) 100 MG capsule Take 1 capsule (100 mg) by mouth at bedtime for 90 days 90 capsule 0 9/9/2024 at 2000             Discharge Medications:     Current Discharge Medication List        START taking these medications    Details   apixaban ANTICOAGULANT (ELIQUIS) 2.5 MG tablet Take 1 tablet (2.5 mg) by mouth 2 times daily for 14 days.  Qty: 28 tablet, Refills: 0    Comments: For DVT Prevention  Associated Diagnoses: S/P total knee replacement using cement, left      oxyCODONE (ROXICODONE) 5 MG tablet Take 1-2 tablets (5-10 mg) by mouth every 4 hours as needed for moderate to severe pain.  Qty: 30 tablet, Refills: 0    Associated Diagnoses: S/P total knee replacement using cement, left      senna-docusate (SENOKOT-S/PERICOLACE) 8.6-50 MG tablet Take 1-2 tablets by mouth 2 times daily as needed for constipation. Take while on oral narcotics to prevent or treat constipation.  Qty: 30 tablet, Refills: 0    Comments: While taking narcotics  Associated Diagnoses: S/P total knee replacement using cement, left           CONTINUE these medications which have CHANGED    Details   gabapentin (NEURONTIN) 100 MG capsule Take 1 capsule (100 mg) by mouth 3 times daily as needed for neuropathic pain or other (pain).  Qty: 90 capsule, Refills: 0    Associated Diagnoses: Chronic pain of left knee; Chronic pain of right knee           CONTINUE these medications which have NOT CHANGED    Details   albuterol (PROAIR HFA) 108 (90 Base) MCG/ACT inhaler INHALE 2 PUFS BY MOUTH EVERY 6 HOURS AS NEEDED FOR SHORTNESS OF BREATH / DYSPNEA  Qty: 18 g, Refills: 3     Comments: Pharmacy may dispense brand covered by insurance (Proair, or proventil or ventolin or generic albuterol inhaler)  Associated Diagnoses: Seasonal allergic rhinitis due to pollen      ALLEGRA ALLERGY 180 MG tablet Take 1 tablet (180 mg) by mouth daily  Qty: 90 tablet, Refills: 3    Associated Diagnoses: Atopic rhinitis      buPROPion (WELLBUTRIN XL) 150 MG 24 hr tablet Take 1 Tablet by mouth in the morning  Qty: 90 tablet, Refills: 0    Associated Diagnoses: Hypertension goal BP (blood pressure) < 140/90      DULoxetine (CYMBALTA) 60 MG capsule Take 2 capsules (120 mg) by mouth daily for 360 days  Qty: 180 capsule, Refills: 3    Associated Diagnoses: Chronic pain of right knee; Moderate episode of recurrent major depressive disorder (H)      fluticasone (FLONASE) 50 MCG/ACT nasal spray Spray 1 spray into both nostrils daily  Qty: 16 mL, Refills: 5    Associated Diagnoses: Seasonal allergic rhinitis due to pollen      hydrochlorothiazide (HYDRODIURIL) 25 MG tablet Take 25 mg by mouth daily      hydroxychloroquine (PLAQUENIL) 200 MG tablet Take 300 mg by mouth daily Take 1 200 mg tablet and 1/2 of 200mg  Qty: 120 tablet, Refills: 1    Associated Diagnoses: Raynaud's disease without gangrene      isosorbide mononitrate (IMDUR) 30 MG 24 hr tablet Take 1 Tablet by mouth once daily  Qty: 90 tablet, Refills: 1    Associated Diagnoses: Hypertension goal BP (blood pressure) < 140/90      LORazepam (ATIVAN) 0.5 MG tablet Take 1 tablet (0.5 mg) by mouth daily as needed (panic attack)  Qty: 15 tablet, Refills: 0    Associated Diagnoses: MARCELINA (generalized anxiety disorder)      losartan (COZAAR) 50 MG tablet Take 50 mg by mouth 2 times daily. 1 tablet (50 mg) two times a day      NIFEdipine ER (ADALAT CC) 30 MG 24 hr tablet Take 1 Tablet by mouth once daily  Qty: 90 tablet, Refills: 1    Associated Diagnoses: Hypertension goal BP (blood pressure) < 140/90      pantoprazole (PROTONIX) 40 MG EC tablet Take 1 tablet (40 mg)  by mouth daily  Qty: 90 tablet, Refills: 1    Associated Diagnoses: Ischemic colitis (H24)      VITAMIN D, CHOLECALCIFEROL, PO Take 2,000 Units by mouth daily       order for DME Equipment being ordered: light lamp for seasonal affective disorder  Qty: 1 Device, Refills: 0    Associated Diagnoses: Severe seasonal affective disorder (H)           STOP taking these medications       budesonide-formoterol (SYMBICORT) 160-4.5 MCG/ACT Inhaler Comments:   Reason for Stopping:         diclofenac (VOLTAREN) 1 % topical gel Comments:   Reason for Stopping:                     Consultations:     No consultations were requested during this admission             Brief History of Illness:   See operative report          Hospital Course:     Patient admitted after routine elective surgery.  Patient discharge POD1 without incident.  By day of discharge discharge: Did well. Per patient felt good.    Continued to improve.  Pain was well-controlled with oral medications.  No fevers.   denied N/v. tolerated oral intake.   Braxton had been discontinued in recovery room and had voiding adequate.  Per patient PT went well. Patient expressed desire to discharge this day.  Has family at home to help.  No concerns, questions, or new issues.  Therapy oked for discharge home with family support.    Direct to outpatient physical therapy with [facility]. First session scheduled for next week  Eliquis x 2 weeks for dvt prevention  Gabapentin and oxycodone for pain control.   Due to liver issues not recommended for muscle relaxers or tylenol. atarax                           Significant Results:     None             Pending Results:     None           Discharge Instructions and Follow-Up:     Discharge diet: Regular   Discharge activity: Activity as tolerated   Discharge follow-up: 14 days with Dr. Spring/ Rex Delaney   Effected Extremity Left leg       Weight Bearing status Weight bearing as tolerated       Lines and drains: None    Wound care:  Keep incision covered with hospital dressing (Aquacel) for one week. It is okay to shower with this dressing on. However, do not submerge your dressing and incision in water for roughly 2 weeks. After one week remove this dressing and then keep it clean, dry, and covered. If this dressing become looses or falls off it is ok to cover with gauze and tape.  Wash the incision gently with warm soapy water after removing your hospital dressing and lightly pat dry. Do not use ointments, lotions, or creams on the incisions           EDUARD Lopez, CNP  Orthopedic Surgery

## 2024-09-11 NOTE — PROGRESS NOTES
Patient vital signs are at baseline: Yes  Patient able to ambulate as they were prior to admission or with assist devices provided by therapies during their stay:  Yes  Patient MUST void prior to discharge:  Yes  Patient able to tolerate oral intake:  Yes  Pain has adequate pain control using Oral analgesics:  Yes  Does patient have an identified :  Yes  Has goal D/C date and time been discussed with patient:  No    Alert and oriented x 4. Vitals stable on RA. Denies nausea and vomiting. No shortness of breath. Up with SBA in the room to the bathroom. PIV LR running at 100 mL/hr. PO oxy given x 1 after walking to the bathroom. Aquacell in place    BP (!) 142/78 (BP Location: Left arm)   Pulse 73   Temp 97.6  F (36.4  C) (Oral)   Resp 18   LMP 06/12/2014   SpO2 99%

## 2024-09-11 NOTE — PROGRESS NOTES
S-(situation): Patient arrives to room 273 via cart from PACU.    B-(background): Left total knee replacement.    A-(assessment): Pt. A&Ox 4, VSS on RA. Pain minimal. Dressing CDI, CMS intact.     R-(recommendations): Orders reviewed with patient and spouse. Will monitor patient per MD orders.     Inpatient nursing criteria listed below were met:    Health care directives status obtained and documented: Yes  VTE ordered/documented: Yes  Skin issues/needs documented:Yes  Isolation addressed and Signage used: NA  Fall Prevention: Care plan updated Yes Education given and documented Yes  Care Plan initiated and Co-Morbidities added: Yes  Education Assessment documented:Yes  Admission Education Documented: Yes  If present CAUTI/CLABI Education done: NA  New medication patient education completed and documented (Possible Side Effects of Common Medications handout): Yes  Allergies Reviewed: Yes  Admission Medication Reconciliation completed: Yes  Home medications if not able to send immediately home with family stored here: Yes  Reminder note placed in discharge instructions regarding home meds: NA  Individualized care needs/preferences addressed and charted: Yes  Provider Notified that patient has arrived to the unit: Yes

## 2024-09-11 NOTE — ANESTHESIA POSTPROCEDURE EVALUATION
Patient: Marcelina Estevez    Procedure: Procedure(s):  left knee ramila assisted total arthroplasty       Anesthesia Type:  Spinal    Note:  Disposition: Outpatient   Postop Pain Control: Uneventful            Sign Out: Well controlled pain   PONV: No   Neuro/Psych: Uneventful            Sign Out: Acceptable/Baseline neuro status   Airway/Respiratory: Uneventful            Sign Out: Acceptable/Baseline resp. status   CV/Hemodynamics: Uneventful            Sign Out: Acceptable CV status   Other NRE: NONE   DID A NON-ROUTINE EVENT OCCUR? No    Event details/Postop Comments:  Pt was happy with anesthesia care.  No complications.  I will follow up with the pt if needed.           Last vitals:  Vitals Value Taken Time   /71 09/10/24 1335   Temp 76.82  F (24.9  C) 09/10/24 1325   Pulse 83 09/10/24 1339   Resp 13 09/10/24 1339   SpO2 95 % 09/10/24 1339   Vitals shown include unfiled device data.    Electronically Signed By: EDUARD Benson CRNA  September 11, 2024  3:41 PM

## 2024-09-11 NOTE — TELEPHONE ENCOUNTER
Had called earlier today, was discharged at 12N today due to small amount of fresh bleeding after doing PT exercises. (Had compression sock on) Bleeding has now stopped. Patient has been waiting for surgical nurse to call her back as she was supposed to send a picture but was unable to do so. As this writer was on phone, she received a call so said she was going to take the call. This writer informed her that if it was not the surgical nurse to call back and ask to speak to on call provider.   Saritha CARTER RN  [facility]/Red Flag Triage & Med Refill Team  Reason for Disposition   Health information question, no triage required and triager able to answer question    Additional Information   Negative: New-onset or worsening symptoms, see that protocol (e.g., diarrhea, runny nose, sore throat)   Negative: Medicine question not related to refill or renewal   Negative: Requesting a renewal or refill of a medicine patient is currently taking   Negative: Questions or concerns about high blood pressure   Negative: Nursing judgment   Negative: Nursing judgment   Negative: Nursing judgment   Negative: Requesting lab results and adult stable (no new symptoms, not worsening)   Negative: Requesting referral to a specialist   Negative: Questions about durable medical equipment ordered and triager unable to answer   Negative: Requesting regular office appointment and adult stable (no new symptoms, not worsening)    Protocols used: Information Only Call - No Triage-A-[place]

## 2024-09-11 NOTE — PROGRESS NOTES
4 hour shift note:    Po oxycodone and IV dilaudid given for 5/10 pain.  Pt walking SBA with walker and immobilizer.  Ice pack to surgical knee.    BP (!) 140/81 (BP Location: Left arm)   Pulse 75   Temp 98.4  F (36.9  C) (Oral)   Resp 18   LMP 06/12/2014   SpO2 96%

## 2024-09-11 NOTE — PROGRESS NOTES
[facility]  Orthopedics Progress Note           Assessment and Plan:    Assessment:   Post-operative day #1 Procedure(s):  left knee ramila assisted total arthroplasty   Anemia of CD prior to surgery. Hgb 10.0 prior to surgery 9.1 today. Asymptomatic        Plan:   Encourage IS  Start or continue physical therapy  Activity as tolerated  Weight-bear as tolerated.  Discharge from hospital today.  Pain control measures  Advance diet as tolerated  Routine wound care  DVT Prophylaxis: SCD's, Compression Hose, Eliquis  No acute medical issues.            Interval History:   Doing well.  Continues to improve.  Pain is controlled.  No fevers.  Utilizing gabapentin and oxycodone for pain control. Going well. Saw therapy. Ambulating. Voiding. Tolerating oral intake. Has help at home.  Did reiterate with patient for the plan for plaquenil. Will hold it for now, until follow-up but if starting to have raynauld's or scleroderma issues that she can resume the medication.   Anticipate discharge today.           Review of Systems:    The patient denies any chest pain, shortness of breath, excessive pain, fever, chills, purulent drainage from the wound, nausea or vomiting.               Physical Exam:   General: awake, alert, appropriate and in no acute distress  Blood pressure (!) 155/74, pulse 70, temperature 97.6  F (36.4  C), temperature source Oral, resp. rate 18, last menstrual period 06/12/2014, SpO2 100%, not currently breastfeeding.  Left knee:  2 x very small spots of shadowing on the aquacell. Otherwise aquacell Dressing clean, dry and intact. Surrounding skin intact, no breakdown. Calf is soft, nontender with no significant swelling. Distal neurovascular grossly intact.  Compartments soft and non-tender.  2+ distal pulse, sensation intact to foot, able to df/pf against resistance. Brisk cap refill.            Data:     Results for orders placed or performed during the hospital encounter of 09/10/24 (from  the past 24 hour(s))   Hemoglobin   Result Value Ref Range    Hemoglobin 10.0 (L) 11.7 - 15.7 g/dL   XR Knee Port Left 1/2 Views    Narrative    LEFT KNEE PORTABLE ONE TO TWO VIEWS  9/10/2024 1:11 PM     HISTORY: Postop total knee.    COMPARISON: CT 8/26/2024      Impression    IMPRESSION:  There is a new two-component left knee arthroplasty in  expected position. No periprosthetic fracture. There is soft tissue  and intra-articular gas. Left knee joint effusion. Quadriceps  enthesophyte.    MELISSA ANDERSON MD         SYSTEM ID:  [id number]   Hemoglobin   Result Value Ref Range    Hemoglobin 9.1 (L) 11.7 - 15.7 g/dL   Extra Tube    Narrative    The following orders were created for panel order Extra Tube.  Procedure                               Abnormality         Status                     ---------                               -----------         ------                     Extra Green Top (Lithium...[[id number]]                      Final result                 Please view results for these tests on the individual orders.   Extra Green Top (Lithium Heparin) Tube   Result Value Ref Range    Hold Specimen JIC      Discussed with dr. Spring.    Domingo Delaney, APRN, CNP  Orthopedic Surgery

## 2024-09-12 ENCOUNTER — OFFICE VISIT (OUTPATIENT)
Dept: ORTHOPEDICS | Facility: CLINIC | Age: 64
End: 2024-09-12
Payer: COMMERCIAL

## 2024-09-12 VITALS — TEMPERATURE: 99.6 F

## 2024-09-12 DIAGNOSIS — Z96.652 S/P TOTAL KNEE REPLACEMENT USING CEMENT, LEFT: Primary | ICD-10-CM

## 2024-09-12 PROCEDURE — 99024 POSTOP FOLLOW-UP VISIT: CPT | Performed by: ORTHOPAEDIC SURGERY

## 2024-09-12 NOTE — LETTER
9/12/2024      Marcelina Estevez  [address] [place] [address]      Dear Colleague,    Thank you for referring your patient, Marcelina Estevez, to the [facility]. Please see a copy of my visit note below.    Orthopedic Clinic Post-Operative Note    CHIEF COMPLAINT:   Chief Complaint   Patient presents with     Left Knee - Surgical Followup     Left total knee arthroplasty, DOS: 9/12 (2d)       HISTORY OF PRESENT ILLNESS  Today's visit:  Total knee replacement on 9/11/24.  Discharged 9/12/24 without incident. She reports had a pin sized shirin of blood on the aquacell when she went home on the 12th. She started to do frequent exercises including multiple rounds of knee flexion. Started to notice more drainage. Called the clinic. Was recommended to continue to monitor. This AM had further drainage and started to soak into the steven stockings came back in. Pain controlled. No new injuries.  Otherwise doing as expected. Taking eliquis for DVT prevention.       September 10, 2024 status post left total knee arthroplasty    Patient's past medical, surgical, social and family histories reviewed.     Past Medical History:   Diagnosis Date     CREST (calcinosis, Raynaud's phenomenon, esophageal dysfunction, sclerodactyly, telangiectasia) (H)      Hyperlipidemia      Hypertension      Hypertriglyceridemia 01/18/2011    Controlled with simvastatin.        Limited systemic sclerosis (H)      Menopausal syndrome (hot flashes) 12/19/2013     Positive MINDY (antinuclear antibody)      Raynaud disease        Past Surgical History:   Procedure Laterality Date     APPENDECTOMY       ARTHROPLASTY, KNEE, ROBOT ASSISTED, USING RAMILA Left 9/10/2024    Procedure: left knee ramila assisted total arthroplasty;  Surgeon: Rehan Spring DO;  [facility]     BIOPSY      cervical node     COLONOSCOPY N/A 11/10/2021    Procedure: COLONOSCOPY, FLEXIBLE, WITH LESION REMOVAL USING SNARE;  Surgeon: Duke  Domingo Lambert MD;  [facility]     COLONOSCOPY N/A 11/10/2021    Procedure: COLONOSCOPY, WITH POLYPECTOMY AND BIOPSY;  Surgeon: Domingo Wall MD;  [facility]     COLONOSCOPY N/A 9/29/2021    Procedure: Colonoscopy, With Polypectomy And Biopsy;  Surgeon: Domingo Wall MD;  [facility]     COLONOSCOPY N/A 9/29/2021    Procedure: Colonoscopy, Flexible, With Lesion Removal Using Snare;  Surgeon: Domingo Wall MD;  [facility]     COLONOSCOPY N/A 6/29/2022    Procedure: COLONOSCOPY, FLEXIBLE, WITH LESION REMOVAL USING SNARE;  Surgeon: Domingo Wall MD;  [facility]     COLONOSCOPY N/A 11/9/2022    Procedure: COLONOSCOPY, WITH POLYPECTOMY AND BIOPSY;  Surgeon: Domingo Wall MD;  [facility]     COLONOSCOPY N/A 6/12/2023    Procedure: COLONOSCOPY, FLEXIBLE, WITH LESION REMOVAL USING SNARE;  Surgeon: Domingo Wall MD;  [facility]     COLONOSCOPY WITH CO2 INSUFFLATION N/A 10/26/2020    Procedure: COLONOSCOPY, WITH CO2 INSUFFLATION;  Surgeon: Phyllis Chaudhari MD;  [facility]     COLONOSCOPY WITH CO2 INSUFFLATION N/A 11/10/2021    Procedure: COLONOSCOPY, WITH CO2 INSUFFLATION;  Surgeon: Domingo Wall MD;  [facility]     COLONOSCOPY WITH CO2 INSUFFLATION N/A 9/29/2021    Procedure: COLONOSCOPY, WITH CO2 INSUFFLATION;  Surgeon: Domingo Wall MD;  [facility]     COLONOSCOPY WITH CO2 INSUFFLATION N/A 6/29/2022    Procedure: COLONOSCOPY, WITH CO2 INSUFFLATION;  Surgeon: Domingo Wall MD;  [facility]     COLONOSCOPY WITH CO2 INSUFFLATION N/A 11/9/2022    Procedure: COLONOSCOPY, WITH CO2 INSUFFLATION;  Surgeon: Domingo Wall MD;  [facility]     COLONOSCOPY WITH CO2 INSUFFLATION N/A 6/12/2023    Procedure: Colonoscopy with CO2 insufflation;  Surgeon: Domingo Wall MD;  [facility]     COLONOSCOPY WITH CO2 INSUFFLATION  N/A 7/3/2024    Procedure: Colonoscopy with CO2 insufflation;  Surgeon: Domingo Wall MD;  [facility]     COMBINED ESOPHAGOSCOPY, GASTROSCOPY, DUODENOSCOPY (EGD) WITH CO2 INSUFFLATION N/A 6/29/2022    Procedure: ESOPHAGOGASTRODUODENOSCOPY, WITH CO2 INSUFFLATION;  Surgeon: Domingo Wall MD;  [facility]     DILATION AND CURETTAGE, HYSTEROSCOPY DIAGNOSTIC, COMBINED  7/1/2014    Procedure: COMBINED DILATION AND CURETTAGE, HYSTEROSCOPY DIAGNOSTIC;  Surgeon: Mana Cabrera DO;  [facility]     ENT SURGERY      tonsillectomy and adnoid removal     ESOPHAGOSCOPY, GASTROSCOPY, DUODENOSCOPY (EGD), COMBINED N/A 6/29/2022    Procedure: ESOPHAGOGASTRODUODENOSCOPY, WITH BIOPSY;  Surgeon: Domingo Wall MD;  [facility]     HYSTERECTOMY TOTAL ABDOMINAL       ORTHOPEDIC SURGERY      left knee tear meniscus     RELEASE CARPAL TUNNEL BILATERAL  2009       Medications:  Current Outpatient Medications   Medication Sig Dispense Refill     albuterol (PROAIR HFA) 108 (90 Base) MCG/ACT inhaler INHALE 2 PUFS BY MOUTH EVERY 6 HOURS AS NEEDED FOR SHORTNESS OF BREATH / DYSPNEA (Patient taking differently: Inhale 2 puffs into the lungs every 6 hours as needed for shortness of breath or wheezing. INHALE 2 PUFS BY MOUTH EVERY 6 HOURS AS NEEDED FOR SHORTNESS OF BREATH / DYSPNEA) 18 g 3     ALLEGRA ALLERGY 180 MG tablet Take 1 tablet (180 mg) by mouth daily 90 tablet 3     apixaban ANTICOAGULANT (ELIQUIS) 2.5 MG tablet Take 1 tablet (2.5 mg) by mouth 2 times daily for 14 days. 28 tablet 0     buPROPion (WELLBUTRIN XL) 150 MG 24 hr tablet Take 1 Tablet by mouth in the morning 90 tablet 0     DULoxetine (CYMBALTA) 60 MG capsule Take 2 capsules (120 mg) by mouth daily for 360 days 180 capsule 3     fluticasone (FLONASE) 50 MCG/ACT nasal spray Spray 1 spray into both nostrils daily 16 mL 5     gabapentin (NEURONTIN) 100 MG capsule Take 1 capsule (100 mg) by mouth 3 times daily as needed  for neuropathic pain or other (pain). 90 capsule 0     hydrochlorothiazide (HYDRODIURIL) 25 MG tablet Take 25 mg by mouth daily       hydroxychloroquine (PLAQUENIL) 200 MG tablet Take 300 mg by mouth daily Take 1 200 mg tablet and 1/2 of 200mg 120 tablet 1     isosorbide mononitrate (IMDUR) 30 MG 24 hr tablet Take 1 Tablet by mouth once daily 90 tablet 1     LORazepam (ATIVAN) 0.5 MG tablet Take 1 tablet (0.5 mg) by mouth daily as needed (panic attack) 15 tablet 0     losartan (COZAAR) 50 MG tablet Take 50 mg by mouth 2 times daily. 1 tablet (50 mg) two times a day       NIFEdipine ER (ADALAT CC) 30 MG 24 hr tablet Take 1 Tablet by mouth once daily 90 tablet 1     order for DME Equipment being ordered: light lamp for seasonal affective disorder (Patient not taking: Reported on 9/4/2024) 1 Device 0     oxyCODONE (ROXICODONE) 5 MG tablet Take 1-2 tablets (5-10 mg) by mouth every 4 hours as needed for moderate to severe pain. 30 tablet 0     pantoprazole (PROTONIX) 40 MG EC tablet Take 1 tablet (40 mg) by mouth daily 90 tablet 1     senna-docusate (SENOKOT-S/PERICOLACE) 8.6-50 MG tablet Take 1-2 tablets by mouth 2 times daily as needed for constipation. Take while on oral narcotics to prevent or treat constipation. 30 tablet 0     VITAMIN D, CHOLECALCIFEROL, PO Take 2,000 Units by mouth daily        No current facility-administered medications for this visit.     Facility-Administered Medications Ordered in Other Visits   Medication Dose Route Frequency Provider Last Rate Last Admin     sodium chloride (PF) 0.9% PF flush 10 mL  10 mL Intravenous Once Hilda Lopez MD           Allergies   Allergen Reactions     Hydroxyzine      Made her very tired     Other Environmental Allergy Other (See Comments)     Seasonal Allergies      Sulfa Antibiotics      Vomiting       Vicodin [Hydrocodone-Acetaminophen] Nausea       Social History     Occupational History     Not on file   Tobacco Use     Smoking status: Former      Current packs/day: 0.00     Types: Cigarettes     Quit date: 2001     Years since quittin.7     Smokeless tobacco: Never   Vaping Use     Vaping status: Never Used   Substance and Sexual Activity     Alcohol use: Yes     Comment: 5-8 drinks/week     Drug use: No     Sexual activity: Yes     Partners: Male     Birth control/protection: Surgical     Comment: vasectomy       Family History   Problem Relation Age of Onset     Osteoporosis Mother      Eye Disorder Mother         cataract, mac degen     Macular Degeneration Mother      Dementia Mother      Hypertension Maternal Grandmother      Diabetes Maternal Grandfather      Eye Disorder Paternal Grandmother         glaucoma     Unknown/Adopted Paternal Grandfather      Hypertension Daughter      Graves' disease Daughter      Diabetes Other      Glaucoma No family hx of        REVIEW OF SYSTEMS  General: negative for, night sweats, dizziness, fatigue  Resp: No shortness of breath and no cough  CV: negative for chest pain, syncope or near-syncope  GI: negative for nausea, vomiting and diarrhea  : negative for dysuria and hematuria  Musculoskeletal: as above  Neurologic: negative for syncope   Hematologic: negative for bleeding disorder    Physical Exam:  Vitals: Temp 99.6  F (37.6  C)   LMP 2014   BMI= There is no height or weight on file to calculate BMI.  Constitutional: healthy, alert and no acute distress   Psychiatric: mentation appears normal and affect normal/bright  NEURO: no focal deficits  SKIN: Aquacell saturated. Removed. Incision approximate by monocryl and glue. There is one area near the apex of the proximal pole with slow sanginous drainage. No other active drainage from the incision. No dehiscence.  Overall  well approximated skin edges, without signs of infection including no erythema, incision breakdown or purlent drainage  JOINT/EXTREMITIES: left knee: expected post-op swelling. Minimum swelling distal of the knee. Able to straight  leg raise.  Leg resting in near full extension. Flexion not tested. No instability. Calf soft non-tender. Distal neurovascular grossly intact.  GAIT: non-antalgic    Diagnostic Modalities:  None today.  Independent visualization of the images was performed.      Impression:   Chief Complaint   Patient presents with     Left Knee - Surgical Followup     Left total knee arthroplasty, DOS: 9/12 (2d)     September 10, 2024 status post left total knee arthroplasty    Plan:   Replaced the aquacell with a bulky dressing and ace wrap.  Will have patient maintain this until we see her back on Wednesday. Will see her back in 6 days. Replace dressing with aquacell. Hold off on physical therapy until after seen on Wednesday.  Ok to work on straight leg raises and ankle pump. Avoid knee flexion until follow-up Ok to bear weight. Continue the immobilizer and walker.   Return to clinic 6 days (Wednesday the 18), or sooner as needed for changes.    Re-x-ray on return: No  Dr. Spring was present in the office.  He personally discussed the care plan and reviewed all appropriate imaging.    EDUARD Lopez, CNP  Orthopedic Surgery      Again, thank you for allowing me to participate in the care of your patient.        Sincerely,        Rehan Spring, DO

## 2024-09-12 NOTE — TELEPHONE ENCOUNTER
Patient notified of providers advice.    She is wondering since she can not use ibuprofen what she can use for pain?    Summer Julian RN on 6/20/2024 at 4:26 PM     [FreeTextEntry1] : cough and pharyngitis rapid strep negative culture sent likely due to viral URI. Recommend supportive care including antipyretics, fluids, and nasal saline followed by nasal suction. Return if symptoms worsen or persist.

## 2024-09-12 NOTE — PROGRESS NOTES
Orthopedic Clinic Post-Operative Note    CHIEF COMPLAINT:   Chief Complaint   Patient presents with    Left Knee - Surgical Followup     Left total knee arthroplasty, DOS: 9/12 (2d)       HISTORY OF PRESENT ILLNESS  Today's visit:  Total knee replacement on 9/11/24.  Discharged 9/12/24 without incident. She reports had a pin sized shirin of blood on the aquacell when she went home on the 12th. She started to do frequent exercises including multiple rounds of knee flexion. Started to notice more drainage. Called the clinic. Was recommended to continue to monitor. This AM had further drainage and started to soak into the steven stockings came back in. Pain controlled. No new injuries.  Otherwise doing as expected. Taking eliquis for DVT prevention.       September 10, 2024 status post left total knee arthroplasty    Patient's past medical, surgical, social and family histories reviewed.     Past Medical History:   Diagnosis Date    CREST (calcinosis, Raynaud's phenomenon, esophageal dysfunction, sclerodactyly, telangiectasia) (H)     Hyperlipidemia     Hypertension     Hypertriglyceridemia 01/18/2011    Controlled with simvastatin.       Limited systemic sclerosis (H)     Menopausal syndrome (hot flashes) 12/19/2013    Positive MINDY (antinuclear antibody)     Raynaud disease        Past Surgical History:   Procedure Laterality Date    APPENDECTOMY      ARTHROPLASTY, KNEE, ROBOT ASSISTED, USING RAMILA Left 9/10/2024    Procedure: left knee ramila assisted total arthroplasty;  Surgeon: Rehan Spring DO;  [facility]    BIOPSY      cervical node    COLONOSCOPY N/A 11/10/2021    Procedure: COLONOSCOPY, FLEXIBLE, WITH LESION REMOVAL USING SNARE;  Surgeon: Domingo Wall MD;  [facility]    COLONOSCOPY N/A 11/10/2021    Procedure: COLONOSCOPY, WITH POLYPECTOMY AND BIOPSY;  Surgeon: Domingo Wall MD;  [facility]    COLONOSCOPY N/A 9/29/2021    Procedure: Colonoscopy, With Polypectomy  And Biopsy;  Surgeon: Domingo Wall MD;  [facility]    COLONOSCOPY N/A 9/29/2021    Procedure: Colonoscopy, Flexible, With Lesion Removal Using Snare;  Surgeon: Domingo Wall MD;  [facility]    COLONOSCOPY N/A 6/29/2022    Procedure: COLONOSCOPY, FLEXIBLE, WITH LESION REMOVAL USING SNARE;  Surgeon: Domingo Wall MD;  [facility]    COLONOSCOPY N/A 11/9/2022    Procedure: COLONOSCOPY, WITH POLYPECTOMY AND BIOPSY;  Surgeon: Domingo Wall MD;  [facility]    COLONOSCOPY N/A 6/12/2023    Procedure: COLONOSCOPY, FLEXIBLE, WITH LESION REMOVAL USING SNARE;  Surgeon: Domingo Wall MD;  [facility]    COLONOSCOPY WITH CO2 INSUFFLATION N/A 10/26/2020    Procedure: COLONOSCOPY, WITH CO2 INSUFFLATION;  Surgeon: Phyllis Chaudhari MD;  [facility]    COLONOSCOPY WITH CO2 INSUFFLATION N/A 11/10/2021    Procedure: COLONOSCOPY, WITH CO2 INSUFFLATION;  Surgeon: Domingo Wall MD;  [facility]    COLONOSCOPY WITH CO2 INSUFFLATION N/A 9/29/2021    Procedure: COLONOSCOPY, WITH CO2 INSUFFLATION;  Surgeon: Domingo Wall MD;  [facility]    COLONOSCOPY WITH CO2 INSUFFLATION N/A 6/29/2022    Procedure: COLONOSCOPY, WITH CO2 INSUFFLATION;  Surgeon: Domingo Wall MD;  [facility]    COLONOSCOPY WITH CO2 INSUFFLATION N/A 11/9/2022    Procedure: COLONOSCOPY, WITH CO2 INSUFFLATION;  Surgeon: Domingo Wall MD;  [facility]    COLONOSCOPY WITH CO2 INSUFFLATION N/A 6/12/2023    Procedure: Colonoscopy with CO2 insufflation;  Surgeon: Domingo Wall MD;  [facility]    COLONOSCOPY WITH CO2 INSUFFLATION N/A 7/3/2024    Procedure: Colonoscopy with CO2 insufflation;  Surgeon: Domingo Wall MD;  [facility]    COMBINED ESOPHAGOSCOPY, GASTROSCOPY, DUODENOSCOPY (EGD) WITH CO2 INSUFFLATION N/A 6/29/2022    Procedure: ESOPHAGOGASTRODUODENOSCOPY, WITH CO2  INSUFFLATION;  Surgeon: Domingo Wall MD;  [facility]    DILATION AND CURETTAGE, HYSTEROSCOPY DIAGNOSTIC, COMBINED  7/1/2014    Procedure: COMBINED DILATION AND CURETTAGE, HYSTEROSCOPY DIAGNOSTIC;  Surgeon: Mana Cabrera DO;  [facility]    ENT SURGERY      tonsillectomy and adnoid removal    ESOPHAGOSCOPY, GASTROSCOPY, DUODENOSCOPY (EGD), COMBINED N/A 6/29/2022    Procedure: ESOPHAGOGASTRODUODENOSCOPY, WITH BIOPSY;  Surgeon: Domingo Wall MD;  [facility]    HYSTERECTOMY TOTAL ABDOMINAL      ORTHOPEDIC SURGERY      left knee tear meniscus    RELEASE CARPAL TUNNEL BILATERAL  2009       Medications:  Current Outpatient Medications   Medication Sig Dispense Refill    albuterol (PROAIR HFA) 108 (90 Base) MCG/ACT inhaler INHALE 2 PUFS BY MOUTH EVERY 6 HOURS AS NEEDED FOR SHORTNESS OF BREATH / DYSPNEA (Patient taking differently: Inhale 2 puffs into the lungs every 6 hours as needed for shortness of breath or wheezing. INHALE 2 PUFS BY MOUTH EVERY 6 HOURS AS NEEDED FOR SHORTNESS OF BREATH / DYSPNEA) 18 g 3    ALLEGRA ALLERGY 180 MG tablet Take 1 tablet (180 mg) by mouth daily 90 tablet 3    apixaban ANTICOAGULANT (ELIQUIS) 2.5 MG tablet Take 1 tablet (2.5 mg) by mouth 2 times daily for 14 days. 28 tablet 0    buPROPion (WELLBUTRIN XL) 150 MG 24 hr tablet Take 1 Tablet by mouth in the morning 90 tablet 0    DULoxetine (CYMBALTA) 60 MG capsule Take 2 capsules (120 mg) by mouth daily for 360 days 180 capsule 3    fluticasone (FLONASE) 50 MCG/ACT nasal spray Spray 1 spray into both nostrils daily 16 mL 5    gabapentin (NEURONTIN) 100 MG capsule Take 1 capsule (100 mg) by mouth 3 times daily as needed for neuropathic pain or other (pain). 90 capsule 0    hydrochlorothiazide (HYDRODIURIL) 25 MG tablet Take 25 mg by mouth daily      hydroxychloroquine (PLAQUENIL) 200 MG tablet Take 300 mg by mouth daily Take 1 200 mg tablet and 1/2 of 200mg 120 tablet 1    isosorbide mononitrate  (IMDUR) 30 MG 24 hr tablet Take 1 Tablet by mouth once daily 90 tablet 1    LORazepam (ATIVAN) 0.5 MG tablet Take 1 tablet (0.5 mg) by mouth daily as needed (panic attack) 15 tablet 0    losartan (COZAAR) 50 MG tablet Take 50 mg by mouth 2 times daily. 1 tablet (50 mg) two times a day      NIFEdipine ER (ADALAT CC) 30 MG 24 hr tablet Take 1 Tablet by mouth once daily 90 tablet 1    order for DME Equipment being ordered: light lamp for seasonal affective disorder (Patient not taking: Reported on 2024) 1 Device 0    oxyCODONE (ROXICODONE) 5 MG tablet Take 1-2 tablets (5-10 mg) by mouth every 4 hours as needed for moderate to severe pain. 30 tablet 0    pantoprazole (PROTONIX) 40 MG EC tablet Take 1 tablet (40 mg) by mouth daily 90 tablet 1    senna-docusate (SENOKOT-S/PERICOLACE) 8.6-50 MG tablet Take 1-2 tablets by mouth 2 times daily as needed for constipation. Take while on oral narcotics to prevent or treat constipation. 30 tablet 0    VITAMIN D, CHOLECALCIFEROL, PO Take 2,000 Units by mouth daily        No current facility-administered medications for this visit.     Facility-Administered Medications Ordered in Other Visits   Medication Dose Route Frequency Provider Last Rate Last Admin    sodium chloride (PF) 0.9% PF flush 10 mL  10 mL Intravenous Once Hilda Lopez MD           Allergies   Allergen Reactions    Hydroxyzine      Made her very tired    Other Environmental Allergy Other (See Comments)    Seasonal Allergies     Sulfa Antibiotics      Vomiting      Vicodin [Hydrocodone-Acetaminophen] Nausea       Social History     Occupational History    Not on file   Tobacco Use    Smoking status: Former     Current packs/day: 0.00     Types: Cigarettes     Quit date: 2001     Years since quittin.7    Smokeless tobacco: Never   Vaping Use    Vaping status: Never Used   Substance and Sexual Activity    Alcohol use: Yes     Comment: 5-8 drinks/week    Drug use: No    Sexual activity: Yes      Partners: Male     Birth control/protection: Surgical     Comment: vasectomy       Family History   Problem Relation Age of Onset    Osteoporosis Mother     Eye Disorder Mother         cataract, mac degen    Macular Degeneration Mother     Dementia Mother     Hypertension Maternal Grandmother     Diabetes Maternal Grandfather     Eye Disorder Paternal Grandmother         glaucoma    Unknown/Adopted Paternal Grandfather     Hypertension Daughter     Graves' disease Daughter     Diabetes Other     Glaucoma No family hx of        REVIEW OF SYSTEMS  General: negative for, night sweats, dizziness, fatigue  Resp: No shortness of breath and no cough  CV: negative for chest pain, syncope or near-syncope  GI: negative for nausea, vomiting and diarrhea  : negative for dysuria and hematuria  Musculoskeletal: as above  Neurologic: negative for syncope   Hematologic: negative for bleeding disorder    Physical Exam:  Vitals: Temp 99.6  F (37.6  C)   LMP 06/12/2014   BMI= There is no height or weight on file to calculate BMI.  Constitutional: healthy, alert and no acute distress   Psychiatric: mentation appears normal and affect normal/bright  NEURO: no focal deficits  SKIN: Aquacell saturated. Removed. Incision approximate by monocryl and glue. There is one area near the apex of the proximal pole with slow sanginous drainage. No other active drainage from the incision. No dehiscence.  Overall  well approximated skin edges, without signs of infection including no erythema, incision breakdown or purlent drainage  JOINT/EXTREMITIES: left knee: expected post-op swelling. Minimum swelling distal of the knee. Able to straight leg raise.  Leg resting in near full extension. Flexion not tested. No instability. Calf soft non-tender. Distal neurovascular grossly intact.  GAIT: non-antalgic    Diagnostic Modalities:  None today.  Independent visualization of the images was performed.      Impression:   Chief Complaint   Patient presents  with    Left Knee - Surgical Followup     Left total knee arthroplasty, DOS: 9/12 (2d)     September 10, 2024 status post left total knee arthroplasty    Plan:   Replaced the aquacell with a bulky dressing and ace wrap.  Will have patient maintain this until we see her back on Wednesday. Will see her back in 6 days. Replace dressing with aquacell. Hold off on physical therapy until after seen on Wednesday.  Ok to work on straight leg raises and ankle pump. Avoid knee flexion until follow-up Ok to bear weight. Continue the immobilizer and walker.   Return to clinic 6 days (Wednesday the 18), or sooner as needed for changes.    Re-x-ray on return: No  Dr. Spring was present in the office.  He personally discussed the care plan and reviewed all appropriate imaging.    EDUARD Lopez, CNP  Orthopedic Surgery

## 2024-09-16 DIAGNOSIS — Z96.652 S/P TOTAL KNEE REPLACEMENT USING CEMENT, LEFT: ICD-10-CM

## 2024-09-16 NOTE — TELEPHONE ENCOUNTER
Patient calling today requesting a refill of pain medication.      Patient underwent left total knee arthroplasty on 9/10/24 with Dr. Spring.     Current status: Bleeding has not continued, is holding off on physical therapy, but has been getting up to walk frequently.      Patient denies any recent injury or signs and symptoms related to infection.     Patient reports pain is currently 8/10.     Patient currently takin tab every 4-5 hours    Patient utilizing the following at home interventions:   Has been icing, resting, and elevating. Cannot take Tylenol due to her liver function.     History of narcotic fills for this issue:   oxyCODONE (ROXICODONE) 5 MG tablet Take 1-2 tablets (5-10 mg) by mouth every 4 hours as needed for moderate to severe pain. 30 tablet 0 ordered 09/10/2024 -- -- -- --          Summary: Take 1-2 tablets (5-10 mg) by mouth every 4 hours as needed for moderate to severe pain., Disp-30 tablet, R-0, E-Prescribe        In additional to all other unrelated medications reflected on their medication list.     Patient has 3 tabs remaining.     Patient uses [facility].     Next follow up appointment: 24    Brenda Goff RN   [facility]

## 2024-09-16 NOTE — TELEPHONE ENCOUNTER
[facility] Call Center    Phone Message    May a detailed message be left on voicemail: yes     Reason for Call: Medication Refill Request    Has the patient contacted the pharmacy for the refill? Yes   Name of medication being requested: Oxycodone  Provider who prescribed the medication: Rehan Spring  Pharmacy: [facility] [phone]  Date medication is needed: 09/17   Patient has 3 left.    Action Taken: Message routed to:  Other: Orthopedics    Travel Screening: Not Applicable     Date of Service:

## 2024-09-17 RX ORDER — OXYCODONE HYDROCHLORIDE 5 MG/1
5-10 TABLET ORAL EVERY 6 HOURS PRN
Qty: 20 TABLET | Refills: 0 | Status: SHIPPED | OUTPATIENT
Start: 2024-09-17 | End: 2024-09-27

## 2024-09-17 NOTE — TELEPHONE ENCOUNTER
Attempted to call patient, no answer. Left voicemail notifying patient that refill has been approved. Requested patient call back at [phone].       Brenda Goff RN   [facility]

## 2024-09-17 NOTE — PROGRESS NOTES
Orthopedic Clinic Post-Operative Note    CHIEF COMPLAINT: No chief complaint on file.      HISTORY OF PRESENT ILLNESS  Today's visit:  Pain is controlled.  She has been utilizing the oxycodone.  She decreased her exercises due to her bleeding.  No further bleeding issues      September 12 2024 visit:  Total knee replacement on 9/11/24.  Discharged 9/12/24 without incident. She reports had a pin sized shirin of blood on the aquacell when she went home on the 12th. She started to do frequent exercises including multiple rounds of knee flexion. Started to notice more drainage. Called the clinic. Was recommended to continue to monitor. This AM had further drainage and started to soak into the steven stockings came back in. Pain controlled. No new injuries.  Otherwise doing as expected. Taking eliquis for DVT prevention.         September 10, 2024 status post left total knee arthroplasty      Patient's past medical, surgical, social and family histories reviewed.     Past Medical History:   Diagnosis Date    CREST (calcinosis, Raynaud's phenomenon, esophageal dysfunction, sclerodactyly, telangiectasia) (H)     Hyperlipidemia     Hypertension     Hypertriglyceridemia 01/18/2011    Controlled with simvastatin.       Limited systemic sclerosis (H)     Menopausal syndrome (hot flashes) 12/19/2013    Positive MINDY (antinuclear antibody)     Raynaud disease        Past Surgical History:   Procedure Laterality Date    APPENDECTOMY      ARTHROPLASTY, KNEE, ROBOT ASSISTED, USING RAMILA Left 9/10/2024    Procedure: left knee ramila assisted total arthroplasty;  Surgeon: Rehan Spring DO;  Location: [facility]    BIOPSY      cervical node    COLONOSCOPY N/A 11/10/2021    Procedure: COLONOSCOPY, FLEXIBLE, WITH LESION REMOVAL USING SNARE;  Surgeon: Domingo Wall MD;  [facility]    COLONOSCOPY N/A 11/10/2021    Procedure: COLONOSCOPY, WITH POLYPECTOMY AND BIOPSY;  Surgeon: Domingo Wall MD;  [facility]    COLONOSCOPY N/A 9/29/2021    Procedure: Colonoscopy, With Polypectomy And Biopsy;  Surgeon: Domingo Wall MD;  [facility]    COLONOSCOPY N/A 9/29/2021    Procedure: Colonoscopy, Flexible, With Lesion Removal Using Snare;  Surgeon: Domingo Wall MD;  [facility]    COLONOSCOPY N/A 6/29/2022    Procedure: COLONOSCOPY, FLEXIBLE, WITH LESION REMOVAL USING SNARE;  Surgeon: Domingo Wall MD;  [facility]    COLONOSCOPY N/A 11/9/2022    Procedure: COLONOSCOPY, WITH POLYPECTOMY AND BIOPSY;  Surgeon: Domingo Wall MD;  [facility]    COLONOSCOPY N/A 6/12/2023    Procedure: COLONOSCOPY, FLEXIBLE, WITH LESION REMOVAL USING SNARE;  Surgeon: Domingo Wall MD;  [facility]    COLONOSCOPY WITH CO2 INSUFFLATION N/A 10/26/2020    Procedure: COLONOSCOPY, WITH CO2 INSUFFLATION;  Surgeon: Phyllis Chaudhari MD;  [facility]    COLONOSCOPY WITH CO2 INSUFFLATION N/A 11/10/2021    Procedure: COLONOSCOPY, WITH CO2 INSUFFLATION;  Surgeon: Domingo Wall MD;  [facility]    COLONOSCOPY WITH CO2 INSUFFLATION N/A 9/29/2021    Procedure: COLONOSCOPY, WITH CO2 INSUFFLATION;  Surgeon: Domingo Wall MD;  [facility]    COLONOSCOPY WITH CO2 INSUFFLATION N/A 6/29/2022    Procedure: COLONOSCOPY, WITH CO2 INSUFFLATION;  Surgeon: Domingo Wall MD;  [facility]    COLONOSCOPY WITH CO2 INSUFFLATION N/A 11/9/2022    Procedure: COLONOSCOPY, WITH CO2 INSUFFLATION;  Surgeon: Domingo Wall MD;  [facility]    COLONOSCOPY WITH CO2 INSUFFLATION N/A 6/12/2023    Procedure: Colonoscopy with CO2 insufflation;  Surgeon: Domingo Wall MD;  [facility]    COLONOSCOPY WITH CO2 INSUFFLATION N/A 7/3/2024    Procedure: Colonoscopy with CO2 insufflation;  Surgeon: Domingo Wall MD;  [facility]    COMBINED ESOPHAGOSCOPY, GASTROSCOPY, DUODENOSCOPY (EGD) WITH CO2  INSUFFLATION N/A 6/29/2022    Procedure: ESOPHAGOGASTRODUODENOSCOPY, WITH CO2 INSUFFLATION;  Surgeon: Domingo Wall MD;  [facility]    DILATION AND CURETTAGE, HYSTEROSCOPY DIAGNOSTIC, COMBINED  7/1/2014    Procedure: COMBINED DILATION AND CURETTAGE, HYSTEROSCOPY DIAGNOSTIC;  Surgeon: Mana Cabrera DO;  [facility]    ENT SURGERY      tonsillectomy and adnoid removal    ESOPHAGOSCOPY, GASTROSCOPY, DUODENOSCOPY (EGD), COMBINED N/A 6/29/2022    Procedure: ESOPHAGOGASTRODUODENOSCOPY, WITH BIOPSY;  Surgeon: Domingo Wall MD;  [facility]    HYSTERECTOMY TOTAL ABDOMINAL      ORTHOPEDIC SURGERY      left knee tear meniscus    RELEASE CARPAL TUNNEL BILATERAL  2009       Medications:  Current Outpatient Medications   Medication Sig Dispense Refill    albuterol (PROAIR HFA) 108 (90 Base) MCG/ACT inhaler INHALE 2 PUFS BY MOUTH EVERY 6 HOURS AS NEEDED FOR SHORTNESS OF BREATH / DYSPNEA (Patient taking differently: Inhale 2 puffs into the lungs every 6 hours as needed for shortness of breath or wheezing. INHALE 2 PUFS BY MOUTH EVERY 6 HOURS AS NEEDED FOR SHORTNESS OF BREATH / DYSPNEA) 18 g 3    ALLEGRA ALLERGY 180 MG tablet Take 1 tablet (180 mg) by mouth daily 90 tablet 3    apixaban ANTICOAGULANT (ELIQUIS) 2.5 MG tablet Take 1 tablet (2.5 mg) by mouth 2 times daily for 14 days. 28 tablet 0    buPROPion (WELLBUTRIN XL) 150 MG 24 hr tablet Take 1 Tablet by mouth in the morning 90 tablet 0    DULoxetine (CYMBALTA) 60 MG capsule Take 2 capsules (120 mg) by mouth daily for 360 days 180 capsule 3    fluticasone (FLONASE) 50 MCG/ACT nasal spray Spray 1 spray into both nostrils daily 16 mL 5    gabapentin (NEURONTIN) 100 MG capsule Take 1 capsule (100 mg) by mouth 3 times daily as needed for neuropathic pain or other (pain). 90 capsule 0    hydrochlorothiazide (HYDRODIURIL) 25 MG tablet Take 25 mg by mouth daily      hydroxychloroquine (PLAQUENIL) 200 MG tablet Take 300 mg by mouth  daily Take 1 200 mg tablet and 1/2 of 200mg 120 tablet 1    isosorbide mononitrate (IMDUR) 30 MG 24 hr tablet Take 1 Tablet by mouth once daily 90 tablet 1    LORazepam (ATIVAN) 0.5 MG tablet Take 1 tablet (0.5 mg) by mouth daily as needed (panic attack) 15 tablet 0    losartan (COZAAR) 50 MG tablet Take 50 mg by mouth 2 times daily. 1 tablet (50 mg) two times a day      NIFEdipine ER (ADALAT CC) 30 MG 24 hr tablet Take 1 Tablet by mouth once daily 90 tablet 1    order for DME Equipment being ordered: light lamp for seasonal affective disorder 1 Device 0    oxyCODONE (ROXICODONE) 5 MG tablet Take 1-2 tablets (5-10 mg) by mouth every 6 hours as needed for moderate to severe pain. 20 tablet 0    pantoprazole (PROTONIX) 40 MG EC tablet Take 1 tablet (40 mg) by mouth daily 90 tablet 1    senna-docusate (SENOKOT-S/PERICOLACE) 8.6-50 MG tablet Take 1-2 tablets by mouth 2 times daily as needed for constipation. Take while on oral narcotics to prevent or treat constipation. 30 tablet 0    VITAMIN D, CHOLECALCIFEROL, PO Take 2,000 Units by mouth daily        No current facility-administered medications for this visit.     Facility-Administered Medications Ordered in Other Visits   Medication Dose Route Frequency Provider Last Rate Last Admin    sodium chloride (PF) 0.9% PF flush 10 mL  10 mL Intravenous Once Hilda Lopez MD           Allergies   Allergen Reactions    Hydroxyzine      Made her very tired    Other Environmental Allergy Other (See Comments)    Seasonal Allergies     Sulfa Antibiotics      Vomiting      Vicodin [Hydrocodone-Acetaminophen] Nausea       Social History     Occupational History    Not on file   Tobacco Use    Smoking status: Former     Current packs/day: 0.00     Types: Cigarettes     Quit date: 2001     Years since quittin.7    Smokeless tobacco: Never   Vaping Use    Vaping status: Never Used   Substance and Sexual Activity    Alcohol use: Yes     Comment: 5-8 drinks/week    Drug  use: No    Sexual activity: Yes     Partners: Male     Birth control/protection: Surgical     Comment: vasectomy       Family History   Problem Relation Age of Onset    Osteoporosis Mother     Eye Disorder Mother         cataract, mac degen    Macular Degeneration Mother     Dementia Mother     Hypertension Maternal Grandmother     Diabetes Maternal Grandfather     Eye Disorder Paternal Grandmother         glaucoma    Unknown/Adopted Paternal Grandfather     Hypertension Daughter     Graves' disease Daughter     Diabetes Other     Glaucoma No family hx of        REVIEW OF SYSTEMS  General: negative for, night sweats, dizziness, fatigue  Resp: No shortness of breath and no cough  CV: negative for chest pain, syncope or near-syncope  GI: negative for nausea, vomiting and diarrhea  : negative for dysuria and hematuria  Musculoskeletal: as above  Neurologic: negative for syncope   Hematologic: negative for bleeding disorder    Physical Exam:  Vitals: /78 (BP Location: Left arm, Patient Position: Sitting, Cuff Size: Adult Regular)   Temp 96.9  F (36.1  C) (Temporal)   Wt 66.2 kg (146 lb)   LMP 06/12/2014   BMI 28.99 kg/m    BMI= Body mass index is 28.99 kg/m .  Constitutional: healthy, alert and no acute distress   Psychiatric: mentation appears normal and affect normal/bright  NEURO: no focal deficits  SKIN: .healing well, well approximated skin edges, without signs of infection including no erythema, incision breakdown or purlent drainage  JOINT/EXTREMITIES: Actively tested to 10/90.  Distal neurovascular intact.  No significant swelling  GAIT: not tested     Diagnostic Modalities:  left knee X-ray: The prosthesis has acceptable alignment. No fractures or dislocations. Prosthesis is well seated with no evidence of loosening  Independent visualization of the images was performed.      Impression: No chief complaint on file.     September 10, 2024 status post left total knee arthroplasty    Plan:   Doing as  expected.  No further bleeding.  Recommend getting back to physical therapy.  Her MIKAEL hose are acting like a tourniquet a few spots.  Recommend discontinuing that.  Work on elevation.  Return to clinic 4, week(s), or sooner as needed for changes.    Re-x-ray on return: No    Vince Spring D.O.

## 2024-09-18 ENCOUNTER — OFFICE VISIT (OUTPATIENT)
Dept: ORTHOPEDICS | Facility: CLINIC | Age: 64
End: 2024-09-18
Payer: COMMERCIAL

## 2024-09-18 ENCOUNTER — ANCILLARY PROCEDURE (OUTPATIENT)
Dept: GENERAL RADIOLOGY | Facility: CLINIC | Age: 64
End: 2024-09-18
Attending: ORTHOPAEDIC SURGERY
Payer: COMMERCIAL

## 2024-09-18 VITALS
TEMPERATURE: 96.9 F | SYSTOLIC BLOOD PRESSURE: 125 MMHG | WEIGHT: 146 LBS | BODY MASS INDEX: 28.99 KG/M2 | DIASTOLIC BLOOD PRESSURE: 78 MMHG

## 2024-09-18 DIAGNOSIS — Z96.652 S/P TOTAL KNEE REPLACEMENT USING CEMENT, LEFT: Primary | ICD-10-CM

## 2024-09-18 DIAGNOSIS — Z96.652 S/P TOTAL KNEE REPLACEMENT USING CEMENT, LEFT: ICD-10-CM

## 2024-09-18 PROCEDURE — 73562 X-RAY EXAM OF KNEE 3: CPT | Mod: TC | Performed by: RADIOLOGY

## 2024-09-18 PROCEDURE — 99024 POSTOP FOLLOW-UP VISIT: CPT | Performed by: ORTHOPAEDIC SURGERY

## 2024-09-18 ASSESSMENT — PAIN SCALES - GENERAL: PAINLEVEL: MILD PAIN (3)

## 2024-09-18 NOTE — LETTER
9/18/2024      Marcelina Estevez  [address] [place] [address]      Dear Colleague,    Thank you for referring your patient, Marcelian Estevez, to the [facility]. Please see a copy of my visit note below.    Orthopedic Clinic Post-Operative Note    CHIEF COMPLAINT: No chief complaint on file.      HISTORY OF PRESENT ILLNESS  Today's visit:  Pain is controlled.  She has been utilizing the oxycodone.  She decreased her exercises due to her bleeding.  No further bleeding issues      September 12 2024 visit:  Total knee replacement on 9/11/24.  Discharged 9/12/24 without incident. She reports had a pin sized shirin of blood on the aquacell when she went home on the 12th. She started to do frequent exercises including multiple rounds of knee flexion. Started to notice more drainage. Called the clinic. Was recommended to continue to monitor. This AM had further drainage and started to soak into the steven stockings came back in. Pain controlled. No new injuries.  Otherwise doing as expected. Taking eliquis for DVT prevention.         September 10, 2024 status post left total knee arthroplasty      Patient's past medical, surgical, social and family histories reviewed.     Past Medical History:   Diagnosis Date     CREST (calcinosis, Raynaud's phenomenon, esophageal dysfunction, sclerodactyly, telangiectasia) (H)      Hyperlipidemia      Hypertension      Hypertriglyceridemia 01/18/2011    Controlled with simvastatin.        Limited systemic sclerosis (H)      Menopausal syndrome (hot flashes) 12/19/2013     Positive MINDY (antinuclear antibody)      Raynaud disease        Past Surgical History:   Procedure Laterality Date     APPENDECTOMY       ARTHROPLASTY, KNEE, ROBOT ASSISTED, USING RAMILA Left 9/10/2024    Procedure: left knee ramila assisted total arthroplasty;  Surgeon: Rehan Spring DO;  [facility]     BIOPSY      cervical node     COLONOSCOPY N/A 11/10/2021    Procedure:  COLONOSCOPY, FLEXIBLE, WITH LESION REMOVAL USING SNARE;  Surgeon: Domingo Wall MD;  [facility]     COLONOSCOPY N/A 11/10/2021    Procedure: COLONOSCOPY, WITH POLYPECTOMY AND BIOPSY;  Surgeon: Domingo Wall MD;  [facility]     COLONOSCOPY N/A 9/29/2021    Procedure: Colonoscopy, With Polypectomy And Biopsy;  Surgeon: Domingo Wall MD;  [facility]     COLONOSCOPY N/A 9/29/2021    Procedure: Colonoscopy, Flexible, With Lesion Removal Using Snare;  Surgeon: Domingo Wall MD;  [facility]     COLONOSCOPY N/A 6/29/2022    Procedure: COLONOSCOPY, FLEXIBLE, WITH LESION REMOVAL USING SNARE;  Surgeon: Domingo Wall MD;  [facility]     COLONOSCOPY N/A 11/9/2022    Procedure: COLONOSCOPY, WITH POLYPECTOMY AND BIOPSY;  Surgeon: Domingo Wall MD;  [facility]     COLONOSCOPY N/A 6/12/2023    Procedure: COLONOSCOPY, FLEXIBLE, WITH LESION REMOVAL USING SNARE;  Surgeon: Domingo Wall MD;  [facility]     COLONOSCOPY WITH CO2 INSUFFLATION N/A 10/26/2020    Procedure: COLONOSCOPY, WITH CO2 INSUFFLATION;  Surgeon: Phyllis Chaudhari MD;  [facility]     COLONOSCOPY WITH CO2 INSUFFLATION N/A 11/10/2021    Procedure: COLONOSCOPY, WITH CO2 INSUFFLATION;  Surgeon: Domingo Wall MD;  [facility]     COLONOSCOPY WITH CO2 INSUFFLATION N/A 9/29/2021    Procedure: COLONOSCOPY, WITH CO2 INSUFFLATION;  Surgeon: Domingo Wall MD;  [facility]     COLONOSCOPY WITH CO2 INSUFFLATION N/A 6/29/2022    Procedure: COLONOSCOPY, WITH CO2 INSUFFLATION;  Surgeon: Domingo Wall MD;  [facility]     COLONOSCOPY WITH CO2 INSUFFLATION N/A 11/9/2022    Procedure: COLONOSCOPY, WITH CO2 INSUFFLATION;  Surgeon: Domingo Wall MD;  [facility]     COLONOSCOPY WITH CO2 INSUFFLATION N/A 6/12/2023    Procedure: Colonoscopy with CO2 insufflation;  Surgeon: [facility]  Domingo Lambert MD;  [facility]     COLONOSCOPY WITH CO2 INSUFFLATION N/A 7/3/2024    Procedure: Colonoscopy with CO2 insufflation;  Surgeon: Domingo Wall MD;  [facility]     COMBINED ESOPHAGOSCOPY, GASTROSCOPY, DUODENOSCOPY (EGD) WITH CO2 INSUFFLATION N/A 6/29/2022    Procedure: ESOPHAGOGASTRODUODENOSCOPY, WITH CO2 INSUFFLATION;  Surgeon: Domingo Wall MD;  [facility]     DILATION AND CURETTAGE, HYSTEROSCOPY DIAGNOSTIC, COMBINED  7/1/2014    Procedure: COMBINED DILATION AND CURETTAGE, HYSTEROSCOPY DIAGNOSTIC;  Surgeon: Mana Cabrera DO;  [facility]     ENT SURGERY      tonsillectomy and adnoid removal     ESOPHAGOSCOPY, GASTROSCOPY, DUODENOSCOPY (EGD), COMBINED N/A 6/29/2022    Procedure: ESOPHAGOGASTRODUODENOSCOPY, WITH BIOPSY;  Surgeon: Domingo Wall MD;  [facility]     HYSTERECTOMY TOTAL ABDOMINAL       ORTHOPEDIC SURGERY      left knee tear meniscus     RELEASE CARPAL TUNNEL BILATERAL  2009       Medications:  Current Outpatient Medications   Medication Sig Dispense Refill     albuterol (PROAIR HFA) 108 (90 Base) MCG/ACT inhaler INHALE 2 PUFS BY MOUTH EVERY 6 HOURS AS NEEDED FOR SHORTNESS OF BREATH / DYSPNEA (Patient taking differently: Inhale 2 puffs into the lungs every 6 hours as needed for shortness of breath or wheezing. INHALE 2 PUFS BY MOUTH EVERY 6 HOURS AS NEEDED FOR SHORTNESS OF BREATH / DYSPNEA) 18 g 3     ALLEGRA ALLERGY 180 MG tablet Take 1 tablet (180 mg) by mouth daily 90 tablet 3     apixaban ANTICOAGULANT (ELIQUIS) 2.5 MG tablet Take 1 tablet (2.5 mg) by mouth 2 times daily for 14 days. 28 tablet 0     buPROPion (WELLBUTRIN XL) 150 MG 24 hr tablet Take 1 Tablet by mouth in the morning 90 tablet 0     DULoxetine (CYMBALTA) 60 MG capsule Take 2 capsules (120 mg) by mouth daily for 360 days 180 capsule 3     fluticasone (FLONASE) 50 MCG/ACT nasal spray Spray 1 spray into both nostrils daily 16 mL 5     gabapentin  (NEURONTIN) 100 MG capsule Take 1 capsule (100 mg) by mouth 3 times daily as needed for neuropathic pain or other (pain). 90 capsule 0     hydrochlorothiazide (HYDRODIURIL) 25 MG tablet Take 25 mg by mouth daily       hydroxychloroquine (PLAQUENIL) 200 MG tablet Take 300 mg by mouth daily Take 1 200 mg tablet and 1/2 of 200mg 120 tablet 1     isosorbide mononitrate (IMDUR) 30 MG 24 hr tablet Take 1 Tablet by mouth once daily 90 tablet 1     LORazepam (ATIVAN) 0.5 MG tablet Take 1 tablet (0.5 mg) by mouth daily as needed (panic attack) 15 tablet 0     losartan (COZAAR) 50 MG tablet Take 50 mg by mouth 2 times daily. 1 tablet (50 mg) two times a day       NIFEdipine ER (ADALAT CC) 30 MG 24 hr tablet Take 1 Tablet by mouth once daily 90 tablet 1     order for DME Equipment being ordered: light lamp for seasonal affective disorder 1 Device 0     oxyCODONE (ROXICODONE) 5 MG tablet Take 1-2 tablets (5-10 mg) by mouth every 6 hours as needed for moderate to severe pain. 20 tablet 0     pantoprazole (PROTONIX) 40 MG EC tablet Take 1 tablet (40 mg) by mouth daily 90 tablet 1     senna-docusate (SENOKOT-S/PERICOLACE) 8.6-50 MG tablet Take 1-2 tablets by mouth 2 times daily as needed for constipation. Take while on oral narcotics to prevent or treat constipation. 30 tablet 0     VITAMIN D, CHOLECALCIFEROL, PO Take 2,000 Units by mouth daily        No current facility-administered medications for this visit.     Facility-Administered Medications Ordered in Other Visits   Medication Dose Route Frequency Provider Last Rate Last Admin     sodium chloride (PF) 0.9% PF flush 10 mL  10 mL Intravenous Once Hilda Lopez MD           Allergies   Allergen Reactions     Hydroxyzine      Made her very tired     Other Environmental Allergy Other (See Comments)     Seasonal Allergies      Sulfa Antibiotics      Vomiting       Vicodin [Hydrocodone-Acetaminophen] Nausea       Social History     Occupational History     Not on file    Tobacco Use     Smoking status: Former     Current packs/day: 0.00     Types: Cigarettes     Quit date: 2001     Years since quittin.7     Smokeless tobacco: Never   Vaping Use     Vaping status: Never Used   Substance and Sexual Activity     Alcohol use: Yes     Comment: 5-8 drinks/week     Drug use: No     Sexual activity: Yes     Partners: Male     Birth control/protection: Surgical     Comment: vasectomy       Family History   Problem Relation Age of Onset     Osteoporosis Mother      Eye Disorder Mother         cataract, mac degen     Macular Degeneration Mother      Dementia Mother      Hypertension Maternal Grandmother      Diabetes Maternal Grandfather      Eye Disorder Paternal Grandmother         glaucoma     Unknown/Adopted Paternal Grandfather      Hypertension Daughter      Graves' disease Daughter      Diabetes Other      Glaucoma No family hx of        REVIEW OF SYSTEMS  General: negative for, night sweats, dizziness, fatigue  Resp: No shortness of breath and no cough  CV: negative for chest pain, syncope or near-syncope  GI: negative for nausea, vomiting and diarrhea  : negative for dysuria and hematuria  Musculoskeletal: as above  Neurologic: negative for syncope   Hematologic: negative for bleeding disorder    Physical Exam:  Vitals: /78 (BP Location: Left arm, Patient Position: Sitting, Cuff Size: Adult Regular)   Temp 96.9  F (36.1  C) (Temporal)   Wt 66.2 kg (146 lb)   LMP 2014   BMI 28.99 kg/m    BMI= Body mass index is 28.99 kg/m .  Constitutional: healthy, alert and no acute distress   Psychiatric: mentation appears normal and affect normal/bright  NEURO: no focal deficits  SKIN: .healing well, well approximated skin edges, without signs of infection including no erythema, incision breakdown or purlent drainage  JOINT/EXTREMITIES: Actively tested to 10/90.  Distal neurovascular intact.  No significant swelling  GAIT: not tested     Diagnostic Modalities:  left  knee X-ray: The prosthesis has acceptable alignment. No fractures or dislocations. Prosthesis is well seated with no evidence of loosening  Independent visualization of the images was performed.      Impression: No chief complaint on file.     September 10, 2024 status post left total knee arthroplasty    Plan:   Doing as expected.  No further bleeding.  Recommend getting back to physical therapy.  Her MIKAEL hose are acting like a tourniquet a few spots.  Recommend discontinuing that.  Work on elevation.  Return to clinic 4, week(s), or sooner as needed for changes.    Re-x-ray on return: No    Vince Spring D.O.      Again, thank you for allowing me to participate in the care of your patient.        Sincerely,        Rehan Spring, DO

## 2024-09-20 ENCOUNTER — TRANSFERRED RECORDS (OUTPATIENT)
Dept: HEALTH INFORMATION MANAGEMENT | Facility: CLINIC | Age: 64
End: 2024-09-20
Payer: COMMERCIAL

## 2024-09-22 ENCOUNTER — MYC REFILL (OUTPATIENT)
Dept: FAMILY MEDICINE | Facility: CLINIC | Age: 64
End: 2024-09-22
Payer: COMMERCIAL

## 2024-09-22 DIAGNOSIS — I73.00 RAYNAUD'S DISEASE WITHOUT GANGRENE: ICD-10-CM

## 2024-09-22 DIAGNOSIS — J30.1 SEASONAL ALLERGIC RHINITIS DUE TO POLLEN: ICD-10-CM

## 2024-09-23 RX ORDER — ALBUTEROL SULFATE 90 UG/1
AEROSOL, METERED RESPIRATORY (INHALATION)
Qty: 18 G | Refills: 0 | Status: SHIPPED | OUTPATIENT
Start: 2024-09-23

## 2024-09-23 NOTE — TELEPHONE ENCOUNTER
The instruction for the Plaquenil is quite confusing.  Please check with patient - how she takes it daily? thnx

## 2024-09-23 NOTE — TELEPHONE ENCOUNTER
Left message for patient to return call.     Screening Questions    BlueKIND OF PREP RedLOCATION [review exclusion criteria] GreenSEDATION TYPE      1. Are you active on mychart? N - [employer]     2. What insurance is in the chart? [facility]      3.   Ordering/Referring Provider:  [facility]   4. BMI   (If greater than 40 review exclusion criteria [PAC APPT IF [MAC] @ [facility])  24.6  [If yes, BMI OVER 40-EXTENDED PREP]      **(Sedation review/consideration needed)**  Do you have a legal guardian or Medical Power of    and/or are you able to give consent for your medical care?     SELF     5. Have you had a positive covid test in the last 90 days?   N    6.  Are you currently on dialysis?   N [ If yes, G-PREP & HOSPITAL setting ONLY]     7.  Do you have chronic kidney disease?  Y [ If yes, G-PREP ]    8.   Do you have a diagnosis of diabetes?   N   [ If yes, G-PREP ]    9.  On a regular basis do you go 3-5 days between bowel movements?   N   [ If yes, EXTENDED PREP]    10.  Are you taking any prescription pain medications on a routine schedule?    N  [ If yes, EXTENDED PREP] [If yes, MAC]      11.   Do you have any chemical dependencies such as alcohol, street drugs, or methadone?    N [If yes, MAC]    12.   Do you have any history of post-traumatic stress syndrome, severe anxiety or history of psychosis?    Y - ANXIETY   [If yes, MAC]    13.  [FEMALES] Are you currently pregnant?     If yes, how many weeks?       Respiratory/Heart Screening:  [If yes to any of the following HOSPITAL setting only]     14. Do you have Pulmonary Hypertension [Lungs]?   N       15. Do you have UNCONTROLLED asthma?   N     16.  Do you use daily home oxygen?  N      17. Do you have mod to severe Obstructive Sleep Apnea?         (OKAY @ [facility]  [facility]  [facility]  [facility]  [facility]  [facility] - if pt is not on OXYGEN)  N      18.   Have you had a heart or lung transplant?   N      19.   Have you had a stroke or Transient ischemic attack (TIA - aka  mini stroke ) within 6 months?  (If yes, please review exclusion  criteria)  N     20.   In the past 6 months, have you had any heart related issues including cardiomyopathy or heart attack?   N           If yes, did it require cardiac stenting or other implantable device?   N      21.   Do you have any implantable devices in your body (pacemaker, defib, LVAD)? (If yes, please review exclusion criteria)  N     22. Do you take nitroglycerin?   N           If yes, how often? N  (if yes, HOSPITAL setting ONLY)    23.  Are you currently taking any blood thinners?    [If yes, INFORM patient to follw up w/ ORDERING PROVIDER FOR BRIDGING INSTRUCTIONS]     N    24.   Do you transfer independently?                (If NO, please HOSPITAL setting ONLY)  Y    25.   Preferred LOCAL Pharmacy for Pre Prescription:        [facility], [place] - [address] [address] [place], [place] - [address]    Scheduling Details  (Please ask for phone number if not scheduled by patient)      Caller : Marcelina Estevez   Additional comments: PT NEEDS:    **SCHEDULE COLONOSCOPY W/ STEVEENS @ [facility] w/(MAC)   **PT IS LEAVING FOR THE WINTER ON NOV 26    (1ST AVAIL WITH MAC IN NOV IS NOV 30)       MSG TO DR MASON FOR REVIEW.      [facility]

## 2024-09-27 ENCOUNTER — MYC REFILL (OUTPATIENT)
Dept: ORTHOPEDICS | Facility: CLINIC | Age: 64
End: 2024-09-27
Payer: COMMERCIAL

## 2024-09-27 DIAGNOSIS — Z96.652 S/P TOTAL KNEE REPLACEMENT USING CEMENT, LEFT: ICD-10-CM

## 2024-09-27 RX ORDER — OXYCODONE HYDROCHLORIDE 5 MG/1
5-10 TABLET ORAL EVERY 6 HOURS PRN
Qty: 20 TABLET | Refills: 0 | Status: SHIPPED | OUTPATIENT
Start: 2024-09-27

## 2024-09-27 NOTE — TELEPHONE ENCOUNTER
Patient is s/p left ramila assisted total arthroplasty on 9/10/24. Most recent refill was 9/17/24 for 20 tabs.    Next apt: 10/16/224 with [facility]    Forwarding to provider for review.    Tatyana Bell RN on 9/27/2024 at 2:25 PM

## 2024-09-27 NOTE — TELEPHONE ENCOUNTER
Appropriate. Refilled.  She is limited in options for medications due to liver and kidney hx.      EDUARD Lopez, CNP  Orthopedic Surgery

## 2024-09-30 ENCOUNTER — OFFICE VISIT (OUTPATIENT)
Dept: GASTROENTEROLOGY | Facility: CLINIC | Age: 64
End: 2024-09-30
Payer: COMMERCIAL

## 2024-09-30 VITALS
WEIGHT: 140 LBS | TEMPERATURE: 96.7 F | DIASTOLIC BLOOD PRESSURE: 68 MMHG | SYSTOLIC BLOOD PRESSURE: 120 MMHG | BODY MASS INDEX: 27.8 KG/M2

## 2024-09-30 DIAGNOSIS — R79.89 ELEVATED LFTS: ICD-10-CM

## 2024-09-30 LAB
ALBUMIN SERPL BCG-MCNC: 4.6 G/DL (ref 3.5–5.2)
ALP SERPL-CCNC: 180 U/L (ref 40–150)
ALT SERPL W P-5'-P-CCNC: 16 U/L (ref 0–50)
AST SERPL W P-5'-P-CCNC: 20 U/L (ref 0–45)
BILIRUB DIRECT SERPL-MCNC: <0.2 MG/DL (ref 0–0.3)
BILIRUB SERPL-MCNC: 0.4 MG/DL
CK SERPL-CCNC: 47 U/L (ref 26–192)
FERRITIN SERPL-MCNC: 1928 NG/ML (ref 11–328)
INR PPP: 1.07 (ref 0.85–1.15)
PROT SERPL-MCNC: 8.1 G/DL (ref 6.4–8.3)
TOTAL PROTEIN SERUM FOR ELP: 7.7 G/DL (ref 6.4–8.3)
TRANSFERRIN SERPL-MCNC: 217 MG/DL (ref 200–360)
VIT D+METAB SERPL-MCNC: 108 NG/ML (ref 20–50)

## 2024-09-30 PROCEDURE — 99213 OFFICE O/P EST LOW 20 MIN: CPT | Performed by: INTERNAL MEDICINE

## 2024-09-30 PROCEDURE — 83516 IMMUNOASSAY NONANTIBODY: CPT | Mod: 90 | Performed by: INTERNAL MEDICINE

## 2024-09-30 PROCEDURE — 82085 ASSAY OF ALDOLASE: CPT | Mod: 90 | Performed by: INTERNAL MEDICINE

## 2024-09-30 PROCEDURE — 82103 ALPHA-1-ANTITRYPSIN TOTAL: CPT | Mod: 90 | Performed by: INTERNAL MEDICINE

## 2024-09-30 PROCEDURE — 86038 ANTINUCLEAR ANTIBODIES: CPT | Performed by: INTERNAL MEDICINE

## 2024-09-30 PROCEDURE — 82550 ASSAY OF CK (CPK): CPT | Performed by: INTERNAL MEDICINE

## 2024-09-30 PROCEDURE — 85610 PROTHROMBIN TIME: CPT | Performed by: INTERNAL MEDICINE

## 2024-09-30 PROCEDURE — 84165 PROTEIN E-PHORESIS SERUM: CPT | Performed by: PATHOLOGY

## 2024-09-30 PROCEDURE — 86039 ANTINUCLEAR ANTIBODIES (ANA): CPT | Performed by: INTERNAL MEDICINE

## 2024-09-30 PROCEDURE — 82306 VITAMIN D 25 HYDROXY: CPT | Performed by: INTERNAL MEDICINE

## 2024-09-30 PROCEDURE — 86364 TISS TRNSGLTMNASE EA IG CLAS: CPT | Performed by: INTERNAL MEDICINE

## 2024-09-30 PROCEDURE — 80076 HEPATIC FUNCTION PANEL: CPT | Performed by: INTERNAL MEDICINE

## 2024-09-30 PROCEDURE — 90673 RIV3 VACCINE NO PRESERV IM: CPT | Performed by: INTERNAL MEDICINE

## 2024-09-30 PROCEDURE — 99207 INFLUENZA VACCINE TRIVALENT(FLUBLOK): CPT | Performed by: INTERNAL MEDICINE

## 2024-09-30 PROCEDURE — 99000 SPECIMEN HANDLING OFFICE-LAB: CPT | Performed by: INTERNAL MEDICINE

## 2024-09-30 PROCEDURE — 82728 ASSAY OF FERRITIN: CPT | Performed by: INTERNAL MEDICINE

## 2024-09-30 PROCEDURE — 36415 COLL VENOUS BLD VENIPUNCTURE: CPT | Performed by: INTERNAL MEDICINE

## 2024-09-30 PROCEDURE — 82104 ALPHA-1-ANTITRYPSIN PHENO: CPT | Mod: 90 | Performed by: INTERNAL MEDICINE

## 2024-09-30 PROCEDURE — 84466 ASSAY OF TRANSFERRIN: CPT | Performed by: INTERNAL MEDICINE

## 2024-09-30 ASSESSMENT — PAIN SCALES - GENERAL: PAINLEVEL: NO PAIN (0)

## 2024-09-30 NOTE — LETTER
October 7, 2024      Marcelina Estevez  [address] [place] [address]        Dear ,    We are writing to inform you of your test results.    Hold the vitamin D. The level is too high.  This is a fat soluble vitamin and needs to drift down for several months.    The labs are within normal limits. The AST and ALT are normal. The alkphos will is expected to normalize in the future.    A ferritin is an acute phase reactant.  The MINDY elevation, like before, does not make a diagnosis.    All in all, your PCP can monitor the liver tests 2-3 times a year.    No liver biopsy is needed.    A TwinRx vaccine series may be a good idea.      No liver conditions identified.  Good report.    Resulted Orders   INR   Result Value Ref Range    INR 1.07 0.85 - 1.15   Anti Nuclear Arturo IgG by IFA with Reflex   Result Value Ref Range    MINDY interpretation Positive (A) Negative      Comment:        Negative:              <1:40  Borderline Positive:   1:40 - 1:80  Positive:              >1:80    MINDY pattern 1 Centromere     MINDY titer 1 1:640    Ferritin   Result Value Ref Range    Ferritin 1,928 (H) 11 - 328 ng/mL   Transferrin   Result Value Ref Range    Transferrin 217.0 200.0 - 360.0 mg/dL   Tissue transglutaminase arturo IgA and IgG   Result Value Ref Range    Tissue Transglutaminase Antibody IgA <0.2 <7.0 U/mL      Comment:      Negative- The tTG-IgA assay has limited utility for patients with decreased levels of IgA. Screening for celiac disease should include IgA testing to rule out selective IgA deficiency and to guide selection and interpretation of serological testing. tTG-IgG testing may be positive in celiac disease patients with IgA deficiency.    Tissue Transglutaminase Antibody IgG <0.6 <7.0 U/mL      Comment:      Negative   Alpha 1 Antitrypsin   Result Value Ref Range    A1A Phenotype MM       Comment:       The patient appears to have a normal phenotype. All M   alleles (including subtypes M1, M2, and M3) produce  normal   serum concentrations of alpha-1-protease inhibitor and are   not associated with clinical disease. Caution in   interpretation is advised if the patient has been   transfused within the previous 21 days.  Performed By: [facility]  [address] [place] [id number]  : Ramses David MD, PhD  CLIA Number: [id number]    Alpha-1-Antitrypsin 236 (H) 90 - 200 mg/dL      Comment:      To convert to umol/L, multiply mg/dL by 0.185   CK total   Result Value Ref Range    CK 47 26 - 192 U/L   Aldolase   Result Value Ref Range    Aldolase 2.6 1.2 - 7.6 U/L      Comment:      REFERENCE INTERVAL: Aldolase    Access complete set of age- and/or gender-specific   reference intervals for this test in the [facility]   Test Directory ([email or web address]).  Performed By: [facility]  [address] [place] [address]  : Ramses David MD, PhD  CLIA Number: [id number]   F Actin EIA with reflex   Result Value Ref Range    F-Actin (Smooth Muscle) Ab, IgG by LALITO 14 0 - 19 Units      Comment:         If F-Actin (Smooth Muscle) Antibody, IgG is negative, the   Smooth Muscle Antibody titer by IFA is not performed.  REFERENCE INTERVAL: F-Actin (Smooth Muscle) Antibody, IgG   by                       LALITO      19 Units or less ....... Negative    20 - 30 Units .......... Weak Positive-Suggest repeat                             testing in two to three weeks                             with fresh specimen.    31 Units or greater..... Positive-Suggestive of                             autoimmune hepatitis type 1                             or chronic active hepatitis.    F-actin IgG antibodies have been shown to have increased   sensitivity for autoimmune hepatitis (AIH) but lower   specificity than smooth muscle antibodies (SMA). F-actin   IgG antibodies can also be seen in SMA-negative disease   controls (non-AIH), especially in patients with primary   biliary  cirrhosis and chronic hepatitis C infections. Some   patients with AIH may be SMA-positive but negative for   F-actin IgG.  Consider testing for SMA by IFA if suspicion   for AIH is strong.  Performed By: [facility]  [address] [place] [id number]  : Ramses David MD, PhD  CLIA Number: [id number]   Hepatic panel (Albumin, ALT, AST, Bili, Alk Phos, TP)   Result Value Ref Range    Protein Total 8.1 6.4 - 8.3 g/dL    Albumin 4.6 3.5 - 5.2 g/dL    Bilirubin Total 0.4 <=1.2 mg/dL    Alkaline Phosphatase 180 (H) 40 - 150 U/L    AST 20 0 - 45 U/L    ALT 16 0 - 50 U/L    Bilirubin Direct <0.20 0.00 - 0.30 mg/dL   Vitamin D Deficiency   Result Value Ref Range    Vitamin D, Total (25-Hydroxy) 108 (H) 20 - 50 ng/mL      Comment:      toxicity possible    Narrative    Season, race, dietary intake, and treatment affect the concentration of 25-hydroxy-Vitamin D. Values may decrease during winter months and increase during summer months.    Vitamin D determination is routinely performed by an immunoassay specific for 25 hydroxyvitamin D3.  If an individual is on vitamin D2(ergocalciferol) supplementation, please specify 25 OH vitamin D2 and D3 level determination by LCMSMS test VITD23.     Total Protein, Serum for ELP   Result Value Ref Range    Total Protein Serum for ELP 7.7 6.4 - 8.3 g/dL   Protein Electrophoresis, Serum   Result Value Ref Range    Albumin 4.5 3.7 - 5.1 g/dL    Alpha 1 0.6 (H) 0.2 - 0.4 g/dL    Alpha 2 1.1 (H) 0.5 - 0.9 g/dL    Beta Globulin 0.8 0.6 - 1.0 g/dL    Gamma Globulin 0.8 0.7 - 1.6 g/dL    Monoclonal Peak 0.0 <=0.0 g/dL    ELP Interpretation       Increased alpha 1 and alpha 2 fractions somewhat suggestive of an early acute phase reaction. No obvious monoclonal protein seen. Pathologic significance requires clinical correlation. SELINA Gómez M.D., Ph.D., Pathologist ().    Signout Location if [facility]        If you have any questions or  concerns, please call the clinic at the number listed above.       Sincerely,      Rigoberto Bess MD

## 2024-09-30 NOTE — LETTER
9/30/2024      Marcelina Estevez  [address] [place] [address]      Dear Colleague,    Thank you for referring your patient, Marcelina Estevez, to the [facility]. Please see a copy of my visit note below.    Gastroenterology    64-year-old with abnormal LFTs.  In 2023 and April 2024 liver function tests were normal.  Recently these been elevated without clear etiology.  Patient notes history of crest syndrome.  Patient was advised to avoid alcohol and discontinue Tylenol when the liver function tests were abnormal.  Alcohol use is occasional wine.  Patient avoids nonsteroidals after her ischemic colitis diagnosis in [place].    Heartburn previously requiring twice daily dosing of her proton pump inhibitor but currently once daily.    EGD with Mcgee's mucosa C1-M1 and 3 cm hiatal hernia.  No source of anemia identified 6/29/2022.    June , AST 2 nine 8 repeat July  .    No herbals or food supplements.  History of anemia with previous evaluation EGD in 2022 frequent colonoscopies with serrated polyposis syndrome.  Most recent exam was July 2024.  No clear new medications although HCTZ Plaquenil, Cozaar and nifedipine are listed as well as others.  Patient notes that hepatitis B series did not produce immunity when given in 1992.  No evidence of hepatitis A.      Right upper quadrant ultrasound 6/25/2024 normal no biliary dilatation common bile duct 2 mm.  Normal liver      Past medical history reviewed on epic    Medications reviewed on epic    Allergies reviewed on epic    Upon exam, blood pressure 120 x 68 pulse 70 afebrile BMI 27.8, head and neck unremarkable, cardiac S1-S2 without murmur lungs clear throughout abdomen soft nontender no organomegaly no lower extremity edema skin without rash.    Alkaline phosphatase isoenzymes, liver fraction.  Negative bone.  Negative other.  Negative HIV.  Negative BNP.  Normal ceruloplasmin, normal LK M, normal AMA.    Impression:  Abnormal LFTs these are somewhat variable.  DDx includes AIH, PBC, med effect, other.  Normocytic indices.  Preserved platelet count.  Other labs nondiagnostic.    Drug review: losartan less 2%; nifedipine rare, self limited;plaquenil rare; hydrochlorothiazide reported risk;wellbutrin less 1% risk; cymbalata, selflimited; gabapentin, unproven.    Plan: 1.  Round out additional labs, 2.  Protonix Premeal for greater effectiveness 30 to 60 minutes, 3.  Brief discussion of liver biopsy if needed although at this point this is just discussion, 4.  Future Twinrix at elective time for protection against hepatitis A and B, 5.  Regular flu shot today patient request.  Check with pharmacy for COVID-vaccine which is not available to me in clinic    Follow-up to be determined.            Again, thank you for allowing me to participate in the care of your patient.        Sincerely,        Rigoberto Bess MD

## 2024-09-30 NOTE — PROGRESS NOTES
Gastroenterology    64-year-old with abnormal LFTs.  In 2023 and April 2024 liver function tests were normal.  Recently these been elevated without clear etiology.  Patient notes history of crest syndrome.  Patient was advised to avoid alcohol and discontinue Tylenol when the liver function tests were abnormal.  Alcohol use is occasional wine.  Patient avoids nonsteroidals after her ischemic colitis diagnosis in [place].    Heartburn previously requiring twice daily dosing of her proton pump inhibitor but currently once daily.    EGD with Mcgee's mucosa C1-M1 and 3 cm hiatal hernia.  No source of anemia identified 6/29/2022.    June , AST 2 nine 8 repeat July  .    No herbals or food supplements.  History of anemia with previous evaluation EGD in 2022 frequent colonoscopies with serrated polyposis syndrome.  Most recent exam was July 2024.  No clear new medications although HCTZ Plaquenil, Cozaar and nifedipine are listed as well as others.  Patient notes that hepatitis B series did not produce immunity when given in 1992.  No evidence of hepatitis A.      Right upper quadrant ultrasound 6/25/2024 normal no biliary dilatation common bile duct 2 mm.  Normal liver      Past medical history reviewed on epic    Medications reviewed on epic    Allergies reviewed on epic    Upon exam, blood pressure 120 x 68 pulse 70 afebrile BMI 27.8, head and neck unremarkable, cardiac S1-S2 without murmur lungs clear throughout abdomen soft nontender no organomegaly no lower extremity edema skin without rash.    Alkaline phosphatase isoenzymes, liver fraction.  Negative bone.  Negative other.  Negative HIV.  Negative BNP.  Normal ceruloplasmin, normal LK M, normal AMA.    Impression: Abnormal LFTs these are somewhat variable.  DDx includes AIH, PBC, med effect, other.  Normocytic indices.  Preserved platelet count.  Other labs nondiagnostic.    Drug review: losartan less 2%; nifedipine rare, self limited;plaquenil  rare; hydrochlorothiazide reported risk;wellbutrin less 1% risk; cymbalata, selflimited; gabapentin, unproven.    Plan: 1.  Round out additional labs, 2.  Protonix Premeal for greater effectiveness 30 to 60 minutes, 3.  Brief discussion of liver biopsy if needed although at this point this is just discussion, 4.  Future Twinrix at elective time for protection against hepatitis A and B, 5.  Regular flu shot today patient request.  Check with pharmacy for COVID-vaccine which is not available to me in clinic    Follow-up to be determined.

## 2024-09-30 NOTE — PATIENT INSTRUCTIONS
As discussed    1.  Will check additional labs for evaluation of abnormal liver function tests    2.  Protonix will work better 30 to 60 minutes preevening meal    3.  Flu shot today, regular    4.  Consider future Twinrix vaccination series for hepatitis A and B protection.    Follow-up to be determined.

## 2024-10-01 ENCOUNTER — MYC REFILL (OUTPATIENT)
Dept: FAMILY MEDICINE | Facility: CLINIC | Age: 64
End: 2024-10-01
Payer: COMMERCIAL

## 2024-10-01 DIAGNOSIS — K55.9 ISCHEMIC COLITIS (H): ICD-10-CM

## 2024-10-01 LAB
ALBUMIN SERPL ELPH-MCNC: 4.5 G/DL (ref 3.7–5.1)
ALPHA1 GLOB SERPL ELPH-MCNC: 0.6 G/DL (ref 0.2–0.4)
ALPHA2 GLOB SERPL ELPH-MCNC: 1.1 G/DL (ref 0.5–0.9)
B-GLOBULIN SERPL ELPH-MCNC: 0.8 G/DL (ref 0.6–1)
GAMMA GLOB SERPL ELPH-MCNC: 0.8 G/DL (ref 0.7–1.6)
LOCATION OF TASK: ABNORMAL
M PROTEIN SERPL ELPH-MCNC: 0 G/DL
PROT PATTERN SERPL ELPH-IMP: ABNORMAL
TTG IGA SER-ACNC: <0.2 U/ML
TTG IGG SER-ACNC: <0.6 U/ML

## 2024-10-01 RX ORDER — PANTOPRAZOLE SODIUM 40 MG/1
40 TABLET, DELAYED RELEASE ORAL DAILY
Qty: 90 TABLET | Refills: 1 | OUTPATIENT
Start: 2024-10-01

## 2024-10-01 RX ORDER — HYDROXYCHLOROQUINE SULFATE 200 MG/1
TABLET, FILM COATED ORAL
Qty: 45 TABLET | Refills: 1 | Status: SHIPPED | OUTPATIENT
Start: 2024-10-01

## 2024-10-02 LAB
ALDOLASE SERPL-CCNC: 2.6 U/L
SMA IGG SER-ACNC: 14 UNITS

## 2024-10-03 LAB
A1AT PHENOTYP SERPL-IMP: ABNORMAL
A1AT SERPL-MCNC: 236 MG/DL

## 2024-10-04 LAB
ANA PAT SER IF-IMP: ABNORMAL
ANA SER QL IF: POSITIVE
ANA TITR SER IF: ABNORMAL {TITER}

## 2024-10-15 NOTE — PROGRESS NOTES
Orthopedic Clinic Post-Operative Note    CHIEF COMPLAINT:   Chief Complaint   Patient presents with    Left Knee - Surgical Followup     S/p total knee replacement, left on 9/10/24.       HISTORY OF PRESENT ILLNESS  Today's visit:  Returns. Reports overall doing very well. Attending therapy with [facility]. States feeling good. Improving her activity. Walking without any aid. Happy with progress. Feels much better than prior to surgery.  She does note recently had 2 small spots towards the top of her incision that bled a slight amount. No current drainage. Scabbed over and now healing.  Was using some lotion to the area.  Denies any redness, any fevers, feeling sick, increasing pain, etc.       September 18, 2024 visit:  Pain is controlled.  She has been utilizing the oxycodone.  She decreased her exercises due to her bleeding.  No further bleeding issues        September 12 2024 visit:  Total knee replacement on 9/11/24.  Discharged 9/12/24 without incident. She reports had a pin sized shirin of blood on the aquacell when she went home on the 12th. She started to do frequent exercises including multiple rounds of knee flexion. Started to notice more drainage. Called the clinic. Was recommended to continue to monitor. This AM had further drainage and started to soak into the steven stockings came back in. Pain controlled. No new injuries.  Otherwise doing as expected. Taking eliquis for DVT prevention.         September 10, 2024 status post left total knee arthroplasty      Patient's past medical, surgical, social and family histories reviewed.     Past Medical History:   Diagnosis Date    CREST (calcinosis, Raynaud's phenomenon, esophageal dysfunction, sclerodactyly, telangiectasia) (H)     Hyperlipidemia     Hypertension     Hypertriglyceridemia 01/18/2011    Controlled with simvastatin.       Limited systemic sclerosis (H)     Menopausal syndrome (hot flashes) 12/19/2013    Positive MINDY (antinuclear antibody)     Raynaud  disease        Past Surgical History:   Procedure Laterality Date    APPENDECTOMY      ARTHROPLASTY, KNEE, ROBOT ASSISTED, USING RAMILA Left 9/10/2024    Procedure: left knee ramila assisted total arthroplasty;  Surgeon: Rehan Spring DO;  Location: [facility]    BIOPSY      cervical node    COLONOSCOPY N/A 11/10/2021    Procedure: COLONOSCOPY, FLEXIBLE, WITH LESION REMOVAL USING SNARE;  Surgeon: Domingo Wall MD;  Location: [facility] OR    COLONOSCOPY N/A 11/10/2021    Procedure: COLONOSCOPY, WITH POLYPECTOMY AND BIOPSY;  Surgeon: Domingo Wall MD;  Location: [facility]    COLONOSCOPY N/A 9/29/2021    Procedure: Colonoscopy, With Polypectomy And Biopsy;  Surgeon: Domingo Wall MD;  Location: [facility]    COLONOSCOPY N/A 9/29/2021    Procedure: Colonoscopy, Flexible, With Lesion Removal Using Snare;  Surgeon: Domingo Wall MD;  Location: [facility]    COLONOSCOPY N/A 6/29/2022    Procedure: COLONOSCOPY, FLEXIBLE, WITH LESION REMOVAL USING SNARE;  Surgeon: Domingo Wall MD;  Location: [facility]    COLONOSCOPY N/A 11/9/2022    Procedure: COLONOSCOPY, WITH POLYPECTOMY AND BIOPSY;  Surgeon: Domingo Wall MD;  Location[facility]    COLONOSCOPY N/A 6/12/2023    Procedure: COLONOSCOPY, FLEXIBLE, WITH LESION REMOVAL USING SNARE;  Surgeon: Domingo Wall MD;  Location: [facility]    COLONOSCOPY WITH CO2 INSUFFLATION N/A 10/26/2020    Procedure: COLONOSCOPY, WITH CO2 INSUFFLATION;  Surgeon: Phyllis Chaudhari MD;  Location: [facility]    COLONOSCOPY WITH CO2 INSUFFLATION N/A 11/10/2021    Procedure: COLONOSCOPY, WITH CO2 INSUFFLATION;  Surgeon: Domingo Wall MD;  [facility]    COLONOSCOPY WITH CO2 INSUFFLATION N/A 9/29/2021    Procedure: COLONOSCOPY, WITH CO2 INSUFFLATION;  Surgeon: Domingo Wall MD;  [facility]    COLONOSCOPY WITH CO2 INSUFFLATION N/A 6/29/2022    Procedure: COLONOSCOPY, WITH CO2 INSUFFLATION;  Surgeon:  Domingo Wall MD;  [facility]    COLONOSCOPY WITH CO2 INSUFFLATION N/A 11/9/2022    Procedure: COLONOSCOPY, WITH CO2 INSUFFLATION;  Surgeon: Domingo Wall MD;  [facility]    COLONOSCOPY WITH CO2 INSUFFLATION N/A 6/12/2023    Procedure: Colonoscopy with CO2 insufflation;  Surgeon: Domingo Wall MD;  [facility]    COLONOSCOPY WITH CO2 INSUFFLATION N/A 7/3/2024    Procedure: Colonoscopy with CO2 insufflation;  Surgeon: Domingo Wall MD;  [facility]    COMBINED ESOPHAGOSCOPY, GASTROSCOPY, DUODENOSCOPY (EGD) WITH CO2 INSUFFLATION N/A 6/29/2022    Procedure: ESOPHAGOGASTRODUODENOSCOPY, WITH CO2 INSUFFLATION;  Surgeon: Domingo Wall MD;  Location[facility]    DILATION AND CURETTAGE, HYSTEROSCOPY DIAGNOSTIC, COMBINED  7/1/2014    Procedure: COMBINED DILATION AND CURETTAGE, HYSTEROSCOPY DIAGNOSTIC;  Surgeon: Mana Cabrera DO;  Location: [facility]    ENT SURGERY      tonsillectomy and adnoid removal    ESOPHAGOSCOPY, GASTROSCOPY, DUODENOSCOPY (EGD), COMBINED N/A 6/29/2022    Procedure: ESOPHAGOGASTRODUODENOSCOPY, WITH BIOPSY;  Surgeon: Domingo Wall MD;  Location: [facility]    HYSTERECTOMY TOTAL ABDOMINAL      ORTHOPEDIC SURGERY      left knee tear meniscus    RELEASE CARPAL TUNNEL BILATERAL  2009       Medications:  Current Outpatient Medications   Medication Sig Dispense Refill    albuterol (PROAIR HFA) 108 (90 Base) MCG/ACT inhaler INHALE 2 PUFS BY MOUTH EVERY 6 HOURS AS NEEDED FOR SHORTNESS OF BREATH / DYSPNEA 18 g 0    ALLEGRA ALLERGY 180 MG tablet Take 1 tablet (180 mg) by mouth daily 90 tablet 3    buPROPion (WELLBUTRIN XL) 150 MG 24 hr tablet Take 1 Tablet by mouth in the morning 90 tablet 0    DULoxetine (CYMBALTA) 60 MG capsule Take 2 capsules (120 mg) by mouth daily for 360 days 180 capsule 3    fluticasone (FLONASE) 50 MCG/ACT nasal spray Spray 1 spray into both nostrils daily 16 mL 5    gabapentin (NEURONTIN) 100  MG capsule Take 1 capsule (100 mg) by mouth 3 times daily as needed for neuropathic pain or other (pain). 90 capsule 0    hydrochlorothiazide (HYDRODIURIL) 25 MG tablet Take 25 mg by mouth daily      hydroxychloroquine (PLAQUENIL) 200 MG tablet Take 1.5 tablet daily 45 tablet 1    isosorbide mononitrate (IMDUR) 30 MG 24 hr tablet Take 1 Tablet by mouth once daily 90 tablet 1    LORazepam (ATIVAN) 0.5 MG tablet Take 1 tablet (0.5 mg) by mouth daily as needed (panic attack). Please do not take it with the pain med 15 tablet 0    losartan (COZAAR) 50 MG tablet Take 50 mg by mouth 2 times daily. 1 tablet (50 mg) two times a day      NIFEdipine ER (ADALAT CC) 30 MG 24 hr tablet Take 1 Tablet by mouth once daily 90 tablet 1    order for DME Equipment being ordered: light lamp for seasonal affective disorder 1 Device 0    pantoprazole (PROTONIX) 40 MG EC tablet Take 1 tablet (40 mg) by mouth daily 90 tablet 1    senna-docusate (SENOKOT-S/PERICOLACE) 8.6-50 MG tablet Take 1-2 tablets by mouth 2 times daily as needed for constipation. Take while on oral narcotics to prevent or treat constipation. 30 tablet 0    VITAMIN D, CHOLECALCIFEROL, PO Take 2,000 Units by mouth daily        No current facility-administered medications for this visit.     Facility-Administered Medications Ordered in Other Visits   Medication Dose Route Frequency Provider Last Rate Last Admin    sodium chloride (PF) 0.9% PF flush 10 mL  10 mL Intravenous Once Hilda Lopez MD           Allergies   Allergen Reactions    Hydroxyzine      Made her very tired    Other Environmental Allergy Other (See Comments)    Seasonal Allergies     Sulfa Antibiotics      Vomiting      Vicodin [Hydrocodone-Acetaminophen] Nausea       Social History     Occupational History    Not on file   Tobacco Use    Smoking status: Former     Current packs/day: 0.00     Types: Cigarettes     Quit date: 2001     Years since quittin.8    Smokeless tobacco: Never   Vaping  Use    Vaping status: Never Used   Substance and Sexual Activity    Alcohol use: Yes     Comment: 5-8 drinks/week    Drug use: No    Sexual activity: Yes     Partners: Male     Birth control/protection: Surgical     Comment: vasectomy       Family History   Problem Relation Age of Onset    Osteoporosis Mother     Eye Disorder Mother         cataract, mac degen    Macular Degeneration Mother     Dementia Mother     Hypertension Maternal Grandmother     Diabetes Maternal Grandfather     Eye Disorder Paternal Grandmother         glaucoma    Unknown/Adopted Paternal Grandfather     Hypertension Daughter     Graves' disease Daughter     Diabetes Other     Glaucoma No family hx of        REVIEW OF SYSTEMS  General: negative for, night sweats, dizziness, fatigue  Resp: No shortness of breath and no cough  CV: negative for chest pain, syncope or near-syncope  GI: negative for nausea, vomiting and diarrhea  : negative for dysuria and hematuria  Musculoskeletal: as above  Neurologic: negative for syncope   Hematologic: negative for bleeding disorder    Physical Exam:  Vitals: /74 (BP Location: Right arm, Patient Position: Sitting, Cuff Size: Adult Regular)   Temp 97.7  F (36.5  C) (Temporal)   Wt 66.2 kg (146 lb)   LMP 06/12/2014   BMI 28.99 kg/m    BMI= Body mass index is 28.99 kg/m .  Constitutional: healthy, alert and no acute distress   Psychiatric: mentation appears normal and affect normal/bright  NEURO: no focal deficits  SKIN: overall well healed, no erythema, no incision breakdown and no drainage. 2 small spots toward proximal end of incision. Pinhead sized. Consistent with suture abscess. No current drainage. Superficial. No erythema. Unable to express any fluid. Non-tender. No evidence of infection.   JOINT/EXTREMITIES: left knee: no swelling. AROM 0-118. PROM 0-120. Extensor intact. No pain with motion testing. Patella tracks midline. No focal or bony tenderness. No swelling distal of the knee.  Neurovascular grossly intact.   GAIT: not tested     Diagnostic Modalities:  None today.  Independent visualization of the images was performed.      Impression:   Chief Complaint   Patient presents with    Left Knee - Surgical Followup     S/p total knee replacement, left on 9/10/24.      September 10, 2024 status post left total knee arthroplasty    Plan:   Has 2 small spots consisted with healing suture abscess areas. No current evidence of infection. Recommended to continue to monitor the area, if redness, drainage, worsening, pain, etc get seen. Ok to leave open for shower. Can place a small bandaid over when outside or in dirty or wet conditions otherwise ok to leave open.  Do not soak. No lotions.     Otherwise continue with therapy, continue to increase activity. Discussed antibiotics prior to dental work until 1 year post-op.    Return to clinic 6, week(s), or sooner as needed for changes.    Re-x-ray on return: No    Dr. Spring was present in the office.  He personally discussed the care plan and reviewed all appropriate imaging.    EDUARD Loepz, CNP  Orthopedic Surgery

## 2024-10-16 ENCOUNTER — OFFICE VISIT (OUTPATIENT)
Dept: ORTHOPEDICS | Facility: CLINIC | Age: 64
End: 2024-10-16
Payer: COMMERCIAL

## 2024-10-16 VITALS
DIASTOLIC BLOOD PRESSURE: 74 MMHG | BODY MASS INDEX: 28.99 KG/M2 | SYSTOLIC BLOOD PRESSURE: 108 MMHG | TEMPERATURE: 97.7 F | WEIGHT: 146 LBS

## 2024-10-16 DIAGNOSIS — Z96.652 S/P TOTAL KNEE REPLACEMENT USING CEMENT, LEFT: Primary | ICD-10-CM

## 2024-10-16 PROCEDURE — 99024 POSTOP FOLLOW-UP VISIT: CPT | Performed by: ORTHOPAEDIC SURGERY

## 2024-10-16 ASSESSMENT — PAIN SCALES - GENERAL: PAINLEVEL: NO PAIN (0)

## 2024-10-16 NOTE — LETTER
10/16/2024      Marcelina Estevez  [address] [place] [address]      Dear Colleague,    Thank you for referring your patient, Marcelina Estevez, to the [facility]. Please see a copy of my visit note below.    Orthopedic Clinic Post-Operative Note    CHIEF COMPLAINT:   Chief Complaint   Patient presents with     Left Knee - Surgical Followup     S/p total knee replacement, left on 9/10/24.       HISTORY OF PRESENT ILLNESS  Today's visit:  Returns. Reports overall doing very well. Attending therapy with [facility]. States feeling good. Improving her activity. Walking without any aid. Happy with progress. Feels much better than prior to surgery.  She does note recently had 2 small spots towards the top of her incision that bled a slight amount. No current drainage. Scabbed over and now healing.  Was using some lotion to the area.  Denies any redness, any fevers, feeling sick, increasing pain, etc.       September 18, 2024 visit:  Pain is controlled.  She has been utilizing the oxycodone.  She decreased her exercises due to her bleeding.  No further bleeding issues        September 12 2024 visit:  Total knee replacement on 9/11/24.  Discharged 9/12/24 without incident. She reports had a pin sized shirin of blood on the aquacell when she went home on the 12th. She started to do frequent exercises including multiple rounds of knee flexion. Started to notice more drainage. Called the clinic. Was recommended to continue to monitor. This AM had further drainage and started to soak into the steven stockings came back in. Pain controlled. No new injuries.  Otherwise doing as expected. Taking eliquis for DVT prevention.         September 10, 2024 status post left total knee arthroplasty      Patient's past medical, surgical, social and family histories reviewed.     Past Medical History:   Diagnosis Date     CREST (calcinosis, Raynaud's phenomenon, esophageal dysfunction, sclerodactyly, telangiectasia) (H)       Hyperlipidemia      Hypertension      Hypertriglyceridemia 01/18/2011    Controlled with simvastatin.        Limited systemic sclerosis (H)      Menopausal syndrome (hot flashes) 12/19/2013     Positive MINDY (antinuclear antibody)      Raynaud disease        Past Surgical History:   Procedure Laterality Date     APPENDECTOMY       ARTHROPLASTY, KNEE, ROBOT ASSISTED, USING RAMILA Left 9/10/2024    Procedure: left knee ramila assisted total arthroplasty;  Surgeon: Rehan Spring DO;  Location: [facility]     BIOPSY      cervical node     COLONOSCOPY N/A 11/10/2021    Procedure: COLONOSCOPY, FLEXIBLE, WITH LESION REMOVAL USING SNARE;  Surgeon: Domingo Wall MD;  Location: [facility]     COLONOSCOPY N/A 11/10/2021    Procedure: COLONOSCOPY, WITH POLYPECTOMY AND BIOPSY;  Surgeon: Domingo Wall MD;  Location: [facility]     COLONOSCOPY N/A 9/29/2021    Procedure: Colonoscopy, With Polypectomy And Biopsy;  Surgeon: Domingo Wall MD;  Location: [facility]     COLONOSCOPY N/A 9/29/2021    Procedure: Colonoscopy, Flexible, With Lesion Removal Using Snare;  Surgeon: Domingo Wall MD;  Location: [facility]     COLONOSCOPY N/A 6/29/2022    Procedure: COLONOSCOPY, FLEXIBLE, WITH LESION REMOVAL USING SNARE;  Surgeon: Domingo Wall MD;  [facility]     COLONOSCOPY N/A 11/9/2022    Procedure: COLONOSCOPY, WITH POLYPECTOMY AND BIOPSY;  Surgeon: Domingo Wall MD;  [facility]     COLONOSCOPY N/A 6/12/2023    Procedure: COLONOSCOPY, FLEXIBLE, WITH LESION REMOVAL USING SNARE;  Surgeon: Domingo Wall MD;  [facility]     COLONOSCOPY WITH CO2 INSUFFLATION N/A 10/26/2020    Procedure: COLONOSCOPY, WITH CO2 INSUFFLATION;  Surgeon: Phyllis Chaudhari MD;  Location: [facility]     COLONOSCOPY WITH CO2 INSUFFLATION N/A 11/10/2021    Procedure: COLONOSCOPY, WITH CO2 INSUFFLATION;  Surgeon: Domingo Wall MD;  [facility]     COLONOSCOPY WITH  CO2 INSUFFLATION N/A 9/29/2021    Procedure: COLONOSCOPY, WITH CO2 INSUFFLATION;  Surgeon: Domingo Wall MD;  [facility]     COLONOSCOPY WITH CO2 INSUFFLATION N/A 6/29/2022    Procedure: COLONOSCOPY, WITH CO2 INSUFFLATION;  Surgeon: Domingo Wall MD;  [facility]     COLONOSCOPY WITH CO2 INSUFFLATION N/A 11/9/2022    Procedure: COLONOSCOPY, WITH CO2 INSUFFLATION;  Surgeon: Domingo Wall MD;  [facility]     COLONOSCOPY WITH CO2 INSUFFLATION N/A 6/12/2023    Procedure: Colonoscopy with CO2 insufflation;  Surgeon: Domingo Wall MD;  [facility]     COLONOSCOPY WITH CO2 INSUFFLATION N/A 7/3/2024    Procedure: Colonoscopy with CO2 insufflation;  Surgeon: Domingo Wall MD;  [facility]     COMBINED ESOPHAGOSCOPY, GASTROSCOPY, DUODENOSCOPY (EGD) WITH CO2 INSUFFLATION N/A 6/29/2022    Procedure: ESOPHAGOGASTRODUODENOSCOPY, WITH CO2 INSUFFLATION;  Surgeon: Domingo Wall MD;  [facility]     DILATION AND CURETTAGE, HYSTEROSCOPY DIAGNOSTIC, COMBINED  7/1/2014    Procedure: COMBINED DILATION AND CURETTAGE, HYSTEROSCOPY DIAGNOSTIC;  Surgeon: Mana Cabrera DO;  Location: [facility]     ENT SURGERY      tonsillectomy and adnoid removal     ESOPHAGOSCOPY, GASTROSCOPY, DUODENOSCOPY (EGD), COMBINED N/A 6/29/2022    Procedure: ESOPHAGOGASTRODUODENOSCOPY, WITH BIOPSY;  Surgeon: Domingo Wall MD;  Location: [facility]     HYSTERECTOMY TOTAL ABDOMINAL       ORTHOPEDIC SURGERY      left knee tear meniscus     RELEASE CARPAL TUNNEL BILATERAL  2009       Medications:  Current Outpatient Medications   Medication Sig Dispense Refill     albuterol (PROAIR HFA) 108 (90 Base) MCG/ACT inhaler INHALE 2 PUFS BY MOUTH EVERY 6 HOURS AS NEEDED FOR SHORTNESS OF BREATH / DYSPNEA 18 g 0     ALLEGRA ALLERGY 180 MG tablet Take 1 tablet (180 mg) by mouth daily 90 tablet 3     buPROPion (WELLBUTRIN XL) 150 MG 24 hr tablet Take 1 Tablet  by mouth in the morning 90 tablet 0     DULoxetine (CYMBALTA) 60 MG capsule Take 2 capsules (120 mg) by mouth daily for 360 days 180 capsule 3     fluticasone (FLONASE) 50 MCG/ACT nasal spray Spray 1 spray into both nostrils daily 16 mL 5     gabapentin (NEURONTIN) 100 MG capsule Take 1 capsule (100 mg) by mouth 3 times daily as needed for neuropathic pain or other (pain). 90 capsule 0     hydrochlorothiazide (HYDRODIURIL) 25 MG tablet Take 25 mg by mouth daily       hydroxychloroquine (PLAQUENIL) 200 MG tablet Take 1.5 tablet daily 45 tablet 1     isosorbide mononitrate (IMDUR) 30 MG 24 hr tablet Take 1 Tablet by mouth once daily 90 tablet 1     LORazepam (ATIVAN) 0.5 MG tablet Take 1 tablet (0.5 mg) by mouth daily as needed (panic attack). Please do not take it with the pain med 15 tablet 0     losartan (COZAAR) 50 MG tablet Take 50 mg by mouth 2 times daily. 1 tablet (50 mg) two times a day       NIFEdipine ER (ADALAT CC) 30 MG 24 hr tablet Take 1 Tablet by mouth once daily 90 tablet 1     order for DME Equipment being ordered: light lamp for seasonal affective disorder 1 Device 0     pantoprazole (PROTONIX) 40 MG EC tablet Take 1 tablet (40 mg) by mouth daily 90 tablet 1     senna-docusate (SENOKOT-S/PERICOLACE) 8.6-50 MG tablet Take 1-2 tablets by mouth 2 times daily as needed for constipation. Take while on oral narcotics to prevent or treat constipation. 30 tablet 0     VITAMIN D, CHOLECALCIFEROL, PO Take 2,000 Units by mouth daily        No current facility-administered medications for this visit.     Facility-Administered Medications Ordered in Other Visits   Medication Dose Route Frequency Provider Last Rate Last Admin     sodium chloride (PF) 0.9% PF flush 10 mL  10 mL Intravenous Once Hilda Lopez MD           Allergies   Allergen Reactions     Hydroxyzine      Made her very tired     Other Environmental Allergy Other (See Comments)     Seasonal Allergies      Sulfa Antibiotics      Vomiting        Vicodin [Hydrocodone-Acetaminophen] Nausea       Social History     Occupational History     Not on file   Tobacco Use     Smoking status: Former     Current packs/day: 0.00     Types: Cigarettes     Quit date: 2001     Years since quittin.8     Smokeless tobacco: Never   Vaping Use     Vaping status: Never Used   Substance and Sexual Activity     Alcohol use: Yes     Comment: 5-8 drinks/week     Drug use: No     Sexual activity: Yes     Partners: Male     Birth control/protection: Surgical     Comment: vasectomy       Family History   Problem Relation Age of Onset     Osteoporosis Mother      Eye Disorder Mother         cataract, mac degen     Macular Degeneration Mother      Dementia Mother      Hypertension Maternal Grandmother      Diabetes Maternal Grandfather      Eye Disorder Paternal Grandmother         glaucoma     Unknown/Adopted Paternal Grandfather      Hypertension Daughter      Graves' disease Daughter      Diabetes Other      Glaucoma No family hx of        REVIEW OF SYSTEMS  General: negative for, night sweats, dizziness, fatigue  Resp: No shortness of breath and no cough  CV: negative for chest pain, syncope or near-syncope  GI: negative for nausea, vomiting and diarrhea  : negative for dysuria and hematuria  Musculoskeletal: as above  Neurologic: negative for syncope   Hematologic: negative for bleeding disorder    Physical Exam:  Vitals: /74 (BP Location: Right arm, Patient Position: Sitting, Cuff Size: Adult Regular)   Temp 97.7  F (36.5  C) (Temporal)   Wt 66.2 kg (146 lb)   LMP 2014   BMI 28.99 kg/m    BMI= Body mass index is 28.99 kg/m .  Constitutional: healthy, alert and no acute distress   Psychiatric: mentation appears normal and affect normal/bright  NEURO: no focal deficits  SKIN: overall well healed, no erythema, no incision breakdown and no drainage. 2 small spots toward proximal end of incision. Pinhead sized. Consistent with suture abscess. No current  drainage. Superficial. No erythema. Unable to express any fluid. Non-tender. No evidence of infection.   JOINT/EXTREMITIES: left knee: no swelling. AROM 0-118. PROM 0-120. Extensor intact. No pain with motion testing. Patella tracks midline. No focal or bony tenderness. No swelling distal of the knee. Neurovascular grossly intact.   GAIT: not tested     Diagnostic Modalities:  None today.  Independent visualization of the images was performed.      Impression:   Chief Complaint   Patient presents with     Left Knee - Surgical Followup     S/p total knee replacement, left on 9/10/24.      September 10, 2024 status post left total knee arthroplasty    Plan:   Has 2 small spots consisted with healing suture abscess areas. No current evidence of infection. Recommended to continue to monitor the area, if redness, drainage, worsening, pain, etc get seen. Ok to leave open for shower. Can place a small bandaid over when outside or in dirty or wet conditions otherwise ok to leave open.  Do not soak. No lotions.     Otherwise continue with therapy, continue to increase activity. Discussed antibiotics prior to dental work until 1 year post-op.    Return to clinic 6, week(s), or sooner as needed for changes.    Re-x-ray on return: No    Dr. Spring was present in the office.  He personally discussed the care plan and reviewed all appropriate imaging.    EDUARD Lopez, CNP  Orthopedic Surgery      Again, thank you for allowing me to participate in the care of your patient.        Sincerely,        Rehan Spring, DO

## 2024-10-24 ENCOUNTER — TELEPHONE (OUTPATIENT)
Dept: FAMILY MEDICINE | Facility: CLINIC | Age: 64
End: 2024-10-24

## 2024-10-24 NOTE — TELEPHONE ENCOUNTER
Reason for Call:  Appointment Request    Patient requesting this type of appt:  Hospital/ED Follow-Up     Requested provider: Zoey Louis    Reason patient unable to be scheduled: Not with their preferred provider    When does patient want to be seen/preferred time: 1-2 weeks    Comments: patient would like to Dr. Louis. She had a procedure with Pulmonology on 10/24/24 needs to haves a hospital follow with in 2 weeks    Could we send this information to you in [employer] or would you prefer to receive a phone call?:   Patient would prefer a phone call   Okay to leave a detailed message?: Yes at Cell number on file:    Telephone Information:   Mobile [phone]       Call taken on 10/24/2024 at 3:58 PM by Norbert Higgins

## 2024-10-25 NOTE — TELEPHONE ENCOUNTER
Called and spoke with pt, relayed message below per VM.  Appointment was made 10/30/24 10:20AM.  Appointment scheduled.    Ansley Davila MA

## 2024-10-29 ENCOUNTER — PATIENT OUTREACH (OUTPATIENT)
Dept: CARE COORDINATION | Facility: CLINIC | Age: 64
End: 2024-10-29

## 2024-10-30 ENCOUNTER — OFFICE VISIT (OUTPATIENT)
Dept: FAMILY MEDICINE | Facility: CLINIC | Age: 64
End: 2024-10-30
Payer: COMMERCIAL

## 2024-10-30 VITALS
OXYGEN SATURATION: 96 % | HEIGHT: 60 IN | WEIGHT: 136.4 LBS | TEMPERATURE: 98.7 F | RESPIRATION RATE: 14 BRPM | HEART RATE: 81 BPM | DIASTOLIC BLOOD PRESSURE: 58 MMHG | BODY MASS INDEX: 26.78 KG/M2 | SYSTOLIC BLOOD PRESSURE: 98 MMHG

## 2024-10-30 DIAGNOSIS — Z12.31 VISIT FOR SCREENING MAMMOGRAM: ICD-10-CM

## 2024-10-30 DIAGNOSIS — I10 HYPERTENSION GOAL BP (BLOOD PRESSURE) < 140/90: Primary | ICD-10-CM

## 2024-10-30 DIAGNOSIS — E87.1 HYPONATREMIA: ICD-10-CM

## 2024-10-30 DIAGNOSIS — N18.31 STAGE 3A CHRONIC KIDNEY DISEASE (H): ICD-10-CM

## 2024-10-30 DIAGNOSIS — D63.8 ANEMIA IN OTHER CHRONIC DISEASES CLASSIFIED ELSEWHERE: ICD-10-CM

## 2024-10-30 DIAGNOSIS — R79.89 ELEVATED LFTS: ICD-10-CM

## 2024-10-30 DIAGNOSIS — F33.0 MILD EPISODE OF RECURRENT MAJOR DEPRESSIVE DISORDER (H): ICD-10-CM

## 2024-10-30 DIAGNOSIS — E83.42 HYPOMAGNESEMIA: ICD-10-CM

## 2024-10-30 LAB
ALBUMIN SERPL BCG-MCNC: 4.2 G/DL (ref 3.5–5.2)
ALP SERPL-CCNC: 812 U/L (ref 40–150)
ALT SERPL W P-5'-P-CCNC: 186 U/L (ref 0–50)
ANION GAP SERPL CALCULATED.3IONS-SCNC: 15 MMOL/L (ref 7–15)
AST SERPL W P-5'-P-CCNC: 103 U/L (ref 0–45)
BASOPHILS # BLD AUTO: 0.1 10E3/UL (ref 0–0.2)
BASOPHILS NFR BLD AUTO: 1 %
BILIRUB SERPL-MCNC: 0.4 MG/DL
BUN SERPL-MCNC: 40.1 MG/DL (ref 8–23)
CALCIUM SERPL-MCNC: 9.8 MG/DL (ref 8.8–10.4)
CHLORIDE SERPL-SCNC: 91 MMOL/L (ref 98–107)
CREAT SERPL-MCNC: 1.72 MG/DL (ref 0.51–0.95)
EGFRCR SERPLBLD CKD-EPI 2021: 33 ML/MIN/1.73M2
EOSINOPHIL # BLD AUTO: 0 10E3/UL (ref 0–0.7)
EOSINOPHIL NFR BLD AUTO: 0 %
ERYTHROCYTE [DISTWIDTH] IN BLOOD BY AUTOMATED COUNT: 12.8 % (ref 10–15)
GLUCOSE SERPL-MCNC: 98 MG/DL (ref 70–99)
HCO3 SERPL-SCNC: 22 MMOL/L (ref 22–29)
HCT VFR BLD AUTO: 29.9 % (ref 35–47)
HGB BLD-MCNC: 10.2 G/DL (ref 11.7–15.7)
IMM GRANULOCYTES # BLD: 0.1 10E3/UL
IMM GRANULOCYTES NFR BLD: 1 %
IRON BINDING CAPACITY (ROCHE): 232 UG/DL (ref 240–430)
IRON SATN MFR SERPL: 34 % (ref 15–46)
IRON SERPL-MCNC: 79 UG/DL (ref 37–145)
LYMPHOCYTES # BLD AUTO: 0.9 10E3/UL (ref 0.8–5.3)
LYMPHOCYTES NFR BLD AUTO: 12 %
MAGNESIUM SERPL-MCNC: 1.2 MG/DL (ref 1.7–2.3)
MCH RBC QN AUTO: 32.2 PG (ref 26.5–33)
MCHC RBC AUTO-ENTMCNC: 34.1 G/DL (ref 31.5–36.5)
MCV RBC AUTO: 94 FL (ref 78–100)
MONOCYTES # BLD AUTO: 0.9 10E3/UL (ref 0–1.3)
MONOCYTES NFR BLD AUTO: 12 %
NEUTROPHILS # BLD AUTO: 5.5 10E3/UL (ref 1.6–8.3)
NEUTROPHILS NFR BLD AUTO: 74 %
NRBC # BLD AUTO: 0 10E3/UL
NRBC BLD AUTO-RTO: 0 /100
PLATELET # BLD AUTO: 267 10E3/UL (ref 150–450)
POTASSIUM SERPL-SCNC: 4.2 MMOL/L (ref 3.4–5.3)
PROT SERPL-MCNC: 7.2 G/DL (ref 6.4–8.3)
RBC # BLD AUTO: 3.17 10E6/UL (ref 3.8–5.2)
SODIUM SERPL-SCNC: 128 MMOL/L (ref 135–145)
TSH SERPL DL<=0.005 MIU/L-ACNC: 2.41 UIU/ML (ref 0.3–4.2)
WBC # BLD AUTO: 7.4 10E3/UL (ref 4–11)

## 2024-10-30 PROCEDURE — 80053 COMPREHEN METABOLIC PANEL: CPT | Performed by: FAMILY MEDICINE

## 2024-10-30 PROCEDURE — 83735 ASSAY OF MAGNESIUM: CPT | Performed by: FAMILY MEDICINE

## 2024-10-30 PROCEDURE — 85025 COMPLETE CBC W/AUTO DIFF WBC: CPT | Performed by: FAMILY MEDICINE

## 2024-10-30 PROCEDURE — 96127 BRIEF EMOTIONAL/BEHAV ASSMT: CPT | Performed by: FAMILY MEDICINE

## 2024-10-30 PROCEDURE — 36415 COLL VENOUS BLD VENIPUNCTURE: CPT | Performed by: FAMILY MEDICINE

## 2024-10-30 PROCEDURE — 99215 OFFICE O/P EST HI 40 MIN: CPT | Performed by: FAMILY MEDICINE

## 2024-10-30 PROCEDURE — G2211 COMPLEX E/M VISIT ADD ON: HCPCS | Performed by: FAMILY MEDICINE

## 2024-10-30 PROCEDURE — 83540 ASSAY OF IRON: CPT | Performed by: FAMILY MEDICINE

## 2024-10-30 PROCEDURE — 84443 ASSAY THYROID STIM HORMONE: CPT | Performed by: FAMILY MEDICINE

## 2024-10-30 PROCEDURE — 83550 IRON BINDING TEST: CPT | Performed by: FAMILY MEDICINE

## 2024-10-30 RX ORDER — LOSARTAN POTASSIUM 50 MG/1
50 TABLET ORAL DAILY
Qty: 90 TABLET | Refills: 0 | Status: SHIPPED | OUTPATIENT
Start: 2024-10-30 | End: 2024-10-30

## 2024-10-30 RX ORDER — DULOXETIN HYDROCHLORIDE 30 MG/1
30 CAPSULE, DELAYED RELEASE ORAL EVERY EVENING
Qty: 30 CAPSULE | Refills: 1 | Status: SHIPPED | OUTPATIENT
Start: 2024-10-30 | End: 2024-10-31

## 2024-10-30 RX ORDER — LOSARTAN POTASSIUM 50 MG/1
50 TABLET ORAL DAILY
Qty: 90 TABLET | Refills: 0 | Status: SHIPPED | OUTPATIENT
Start: 2024-10-30

## 2024-10-30 RX ORDER — MAGNESIUM OXIDE 400 MG/1
400 TABLET ORAL
COMMUNITY
Start: 2024-10-24 | End: 2024-11-06

## 2024-10-30 ASSESSMENT — PATIENT HEALTH QUESTIONNAIRE - PHQ9
10. IF YOU CHECKED OFF ANY PROBLEMS, HOW DIFFICULT HAVE THESE PROBLEMS MADE IT FOR YOU TO DO YOUR WORK, TAKE CARE OF THINGS AT HOME, OR GET ALONG WITH OTHER PEOPLE: SOMEWHAT DIFFICULT
SUM OF ALL RESPONSES TO PHQ QUESTIONS 1-9: 9
SUM OF ALL RESPONSES TO PHQ QUESTIONS 1-9: 9

## 2024-10-30 NOTE — PROGRESS NOTES
Assessment & Plan     (N18.31) Stage 3a chronic kidney disease (H)  (primary encounter diagnosis)  Comment: She is known to have chronic kidney disease stage IIIa for years, has been seeing nephrologist through the [facility] regularly.  Last time she saw [facility] [place] nephrologist, Dr. Brooke was over a year ago.  I am not sure when her last visit with her Nephrologist through [facility] was - no documentations available to review.  Saw her pulmonologist (Kitty) last week, BMP showed her creatinine of 1.47 with a GFR 40% which is significantly worse as compared to her baseline of around 1.10 and 55% respectively.  She takes losartan 50 mg, hydrochlorothiazide 25 mg and Procardia 30 mg for high blood pressure.  She is also taking Imdur.  Her blood pressure has been on the lower side in the last couple visit and it is 98/58 today.    Repeat CMP today which showed her kidney function is worsen as compared to a week ago with creatinine 1.72 and GFR of 33%.  She does not have any problem urination.  She also has pulmonary hypertension, DARRIN syndrome, limited systemic sclerosis and chronic anemia.  No history of diabetes, CVA or CAD.  No history of gout.    Lab today also showed anemia with hyponatremia and hypomagnesemia.  Her other electrolytes were normal.    Stated that she does not have a follow-up appointment with her nephrologist scheduled yet.  I am concerned her acute exacerbation of her chronic kidney disease can be due to autoimune dz as well as perhaps hypoperfusion from low blood pressure.  She is also on hydrochlorothiazide.  Her losartan dose is on the lower side, doubtful it is the cause of her acute kidney failure.  I strongly encouraged her to follow-up with her nephrologist and rheumatologist as soon as possible.  In the meantime, will stop the hydrochlorothiazide and continue with losartan and Procardia.  Monitor her blood pressure at home and let me know if it persistently below  110/70 or above 140/90.  Recheck her BP and BMP in a week.    I also encouraged her to drink a lot of water.    Plan: Comprehensive metabolic panel (BMP + Alb, Alk         Phos, ALT, AST, Total. Bili, TP)            (I10) Hypertension goal BP (blood pressure) < 140/90  Comment: Also has chronic kidney disease as mentioned above.  The goal for blood pressure is to be around 130s/80s.  Her blood pressure is has been on the lower side and is quite low today.  She is not symptomatic however.  Her kidney function is worsening, concerns of hypoperfusion.  Sodium level was also low, concerned about the side effect from the hydrochlorothiazide.  Will stop hydrochlorothiazide, continue with the losartan at 50 mg and the Procardia.  She is also on Imdur.  Again, she was encouraged to monitor blood pressure closely and let me know if it persistently below 110/70 or above 140/90.  Recheck the blood pressure in a week with nursing staff as well.      (D63.8) Anemia in other chronic diseases classified elsewhere  Comment: Chronic condition in the last 3-4 years and overall her hemoglobin has been overall stable at around 9.  She is not symptomatic.  She also has chronic kidney disease and history of ischemic colitis.  She has a colonoscopy in July 2024 which 3 months ago and it was normal; she was recommended to repeat in 2 years.  Saw the GI Dr. Bess, GI specialist recently for elevated liver enzyme - lab noted increase of ferritin level otherwise the transferin and other iron studies were normal.  No melena or hematochezia no coffee-ground emesis or hematemesis.  Not on NSAID products and denies of excessive alcohol intake.    Will continue to avoid NSAID products, especially with her chronic kidney disease.  She is not on low-dose aspirin and there is no indication for her to be on it.  Her iron study today was again normal. I think her anemia likely is due to chronic disease.  She is understandably concerned about, will refer  her to hematologist for further evaluation as well.  Will check the peripheral blood smear.    Plan: CBC with platelets and differential, Iron and         iron binding capacity            (R79.89) Elevated LFTs  Comment: Saw GI specialist Dr. Bess for this couple weeks ago.  At that time, her liver enzyme was normal.  Her liver enzymes were elevated about 4 months ago.  Last week, her labs showed elevated AST/ALT again.  Recheck the CMP today and they remain to be elevated, about the same it was a week ago.  She denies of excessive alcohol intake.  No longer taking the statin.  No history of liver disease.  Worked up for hepatitis B, hepatitis A and hepatitis C about 3 months ago were negative/normal.  Limited abdominal ultrasound on May 28, 2024 so hepatic steatosis but a follow-up ultrasound on June 23, 2024, it was read to be normal.  She is not morbidly obese.  Her cholesterol levels 6 months ago was normal except the total cholesterol slightly high at 240.    I again encouraged her to avoid all alcohol products.  She does not drink heavily but does drink wine occasionally.  She also should avoid Tylenol products especially when her liver enzyme is elevated.  I reviewed the medical record from Dr. Bess, GI specialist.  He did quite extensive workup and all the labs were normal except positive for MINDY which expected due to her crest and lupus.  He did mention briefly about liver biopsy, since now her liver enzymes is getting worse again, I recommend her to follow-up with Dr. Bess for further evaluation.  I will also send a note for him and to ask for his guidance.    Her exam today was completely normal.  Her kidney function today is low, lower than its baseline.  I am certainly concern of hepatorenal syndrome although I do not see any obvious cause for it.  No history of CHF and she has no symptoms of it.  Will keep an eye on this closely.  Will try to adjust her medication for her kidney as above.  Recheck  lab  in a week - will recheck the CMP, CBC and add the INR, TSH and sed rate.  She had 2 liver ultrasound done in the last 4 months, I do not think that she is needing one at this point.  Further evaluation per\ Dr. Bess.  She is strongly encouraged to follow-up with her rheumatologist and nephrologist as well.      (E83.42) Hypomagnesemia  Comment: Her magnesium level was critical low last week when she saw her pulmonologist.  She has an IV infusion.  Been taking the magnesium oxide 400 mg daily as well.  Recheck the mag level today and is remained below.  No side effect from the Maxide.  Will increase the mag oxide to 400 mg twice daily.  Potential side effect discussed specially with diarrhea.  Recheck the mag level in a month a week as well.    Plan: Magnesium            (E.87.42):  Hyponatremia  Sodium level today was 128, dropped down from 129 about a week ago.  Review of medical record, her sodium level had been low, been around 130-132 in the last 6 months.  She is not symptomatic, likely it is a chronic condition.  Likely is due to that hydrochlorothiazide.  Will stop the hydrochlorothiazide and recheck the level in a week.    (F33.0) Mild episode of recurrent major depressive disorder (H)  Comment: Been taking the Cymbalta 120 mg daily for years and was recently started on the Wellbutrin  mg daily.  She did not think the Wellbutrin add any benefit.  Been having more stress with her acute medical conditions.  No suicidal or homicidal ideation.  No hallucination.  Recommend to split the Cymbalta to twice a day and stop the Wellbutrin today.  Will consider adding a different adjunctive medication if not improve with Cymbalta adjustment.  Symptoms the need to be seen to call and discussed.  Will refer her to counseling as well.  Her TSH level was normal today.    Plan: TSH with free T4 reflex    (Z12.31) Visit for screening mammogram    Plan: MA Screening Bilateral w/ Khoa            The longitudinal plan of  care for the diagnosis(es)/condition(s) as documented were addressed during this visit. Due to the added complexity in care, I will continue to support Marcelina in the subsequent management and with ongoing continuity of care.        MED REC REQUIRED  Post Medication Reconciliation Status: discharge medications reconciled and changed, per note/orders  BMI  Estimated body mass index is 26.64 kg/m  as calculated from the following:    Height as of this encounter: 1.524 m (5').    Weight as of this encounter: 61.9 kg (136 lb 6.4 oz).   Weight management plan: Did not address today      Subjective   Marcelina is a 64 year old, presenting for the following health issues:  ER F/U (Follow up from angio of right heart. DOS was 10/24)      10/30/2024    10:27 AM   Additional Questions   Roomed by Ayo BARRERA     Marcelina is here today for general follow-up.  She has been seeing the nephrologist for her chronic kidney disease and rheumatologist for her CREST and lupus.  She also saw Dr. Bess, the GI specialist recently for elevated liver enzymes.  She also has been seeing cardiology and pulmonology.  States that she been taking the medications as prescribed.  She takes hydrochlorothiazide, losartan and Adalat for high blood pressure.   She is also take Imdur for coronary vasospasm.  Been on this medication for years.  Stated that she is no longer seeing the nephrologist at the [facility], but has been seeing neurologist [facility].  Stated she saw her kidney doctor recently, no medical record available for me to review review.  No change in medication.  She saw Dr. Bess about a month ago for elevated liver enzymes.  At that time, her liver enzyme was normal.  Dr. Bess did quite a bit of the lab work and they were all negative/normal except for positive MINDY.  She was reassured.  She had 2 liver ultrasounds about 4 months ago for elevated liver enzymes and they were both normal.  Her workup for hepatitis B, C and A  were also negative about 4 months ago.  Denied of excessive alcohol or Tylenol intake.  No jaundice.  No abdominal pain.  She had a procedure done with her Pulmonologist through [facility] last week and was noted her liver enzyme was again elevated.  She was diagnosed with pulmonary hypertension.  She is very concerned about the liver enzymes.  Last week's labs also showed a severe magnesium hypomagnesemia; had magnesium infusion and currently taking magnesium oxide 400 mg daily.  Labs also showed a low in sodium with worsening kidney function.  Her chronic anemia was overall stable - stable hgb.  She had a history of ischemic colitis.  Colonoscopy couple months ago was normal.  No melena or hematochezia.  No coffee-ground emesis.  No abdominal pain.  She also has depression and anxiety which have been getting worse due to her acute medical conditions.  Been taking the Cymbalta 120 mg daily for years.  She was started on the Wellbutrin about 2-3 months ago and has been taking it as prescribed with no side effect.  She does not feel any benefit from the Wellbutrin.  No headache or dizziness.  No chest pain or shortness of breath.  No nausea, vomiting, diarrhea or constipation.  No problem urination.  No leg swelling, orthopnea or dyspnea.  No fever or chills.  No orthopnea or dyspnea. No other concern.        Review of Systems  Constitutional, neuro, ENT, endocrine, pulmonary, cardiac, gastrointestinal, genitourinary, musculoskeletal, integument and psychiatric systems are negative, except as otherwise noted.      Objective    BP 98/58   Pulse 81   Temp 98.7  F (37.1  C) (Temporal)   Resp 14   Ht 1.524 m (5')   Wt 61.9 kg (136 lb 6.4 oz)   LMP 06/12/2014   SpO2 96%   BMI 26.64 kg/m    Body mass index is 26.64 kg/m .  Physical Exam   GENERAL: alert and no distress, speaking in full sentences.  Answered questions appropriately.  EYES: Eyes grossly normal to inspection, PERRL and conjunctivae and sclerae normal.   Nonicteric  HENT: ear canals and TM's normal, nose and mouth without ulcers or lesions.  Nares are non-congested. Oropharynx is pink and moist. No tender with palpation to the sinuses.   NECK: Supple, no lymphadenopathy or thyromegaly.  No JV distention.  RESP: lungs clear to auscultation - no rales, rhonchi or wheezes  CV: regular rate and rhythm, no murmur or peripheral edema  ABDOMEN: soft, nontender, nondistended, no palpable masses or organomegaly with normal bowel sound.  No CVA tenderness.  MS: no gross musculoskeletal defects noted, no edema.  No focal weakness.  SKIN: no suspicious lesions or rashes.  No jaundice.  NEURO: Normal strength and tone, mentation intact and speech normal.  Cranial nerves II to XII intact.  DTR +2 throughout.  PSYCH: mentation appears normal, affect normal/bright    Results for orders placed or performed in visit on 10/30/24   TSH with free T4 reflex     Status: Normal   Result Value Ref Range    TSH 2.41 0.30 - 4.20 uIU/mL   Iron and iron binding capacity     Status: Abnormal   Result Value Ref Range    Iron 79 37 - 145 ug/dL    Iron Binding Capacity 232 (L) 240 - 430 ug/dL    Iron Sat Index 34 15 - 46 %   Magnesium     Status: Abnormal   Result Value Ref Range    Magnesium 1.2 (L) 1.7 - 2.3 mg/dL   Comprehensive metabolic panel (BMP + Alb, Alk Phos, ALT, AST, Total. Bili, TP)     Status: Abnormal   Result Value Ref Range    Sodium 128 (L) 135 - 145 mmol/L    Potassium 4.2 3.4 - 5.3 mmol/L    Carbon Dioxide (CO2) 22 22 - 29 mmol/L    Anion Gap 15 7 - 15 mmol/L    Urea Nitrogen 40.1 (H) 8.0 - 23.0 mg/dL    Creatinine 1.72 (H) 0.51 - 0.95 mg/dL    GFR Estimate 33 (L) >60 mL/min/1.73m2    Calcium 9.8 8.8 - 10.4 mg/dL    Chloride 91 (L) 98 - 107 mmol/L    Glucose 98 70 - 99 mg/dL    Alkaline Phosphatase 812 (H) 40 - 150 U/L     (H) 0 - 45 U/L     (H) 0 - 50 U/L    Protein Total 7.2 6.4 - 8.3 g/dL    Albumin 4.2 3.5 - 5.2 g/dL    Bilirubin Total 0.4 <=1.2 mg/dL   CBC  with platelets and differential     Status: Abnormal   Result Value Ref Range    WBC Count 7.4 4.0 - 11.0 10e3/uL    RBC Count 3.17 (L) 3.80 - 5.20 10e6/uL    Hemoglobin 10.2 (L) 11.7 - 15.7 g/dL    Hematocrit 29.9 (L) 35.0 - 47.0 %    MCV 94 78 - 100 fL    MCH 32.2 26.5 - 33.0 pg    MCHC 34.1 31.5 - 36.5 g/dL    RDW 12.8 10.0 - 15.0 %    Platelet Count 267 150 - 450 10e3/uL    % Neutrophils 74 %    % Lymphocytes 12 %    % Monocytes 12 %    % Eosinophils 0 %    % Basophils 1 %    % Immature Granulocytes 1 %    NRBCs per 100 WBC 0 <1 /100    Absolute Neutrophils 5.5 1.6 - 8.3 10e3/uL    Absolute Lymphocytes 0.9 0.8 - 5.3 10e3/uL    Absolute Monocytes 0.9 0.0 - 1.3 10e3/uL    Absolute Eosinophils 0.0 0.0 - 0.7 10e3/uL    Absolute Basophils 0.1 0.0 - 0.2 10e3/uL    Absolute Immature Granulocytes 0.1 <=0.4 10e3/uL    Absolute NRBCs 0.0 10e3/uL   CBC with platelets and differential     Status: Abnormal    Narrative    The following orders were created for panel order CBC with platelets and differential.  Procedure                               Abnormality         Status                     ---------                               -----------         ------                     CBC with platelets and d...[[id number]]  Abnormal            Final result                 Please view results for these tests on the individual orders.           Signed Electronically by: Zoey Clements Mai, MD      Answers submitted by the patient for this visit:  Patient Health Questionnaire (Submitted on 10/30/2024)  If you checked off any problems, how difficult have these problems made it for you to do your work, take care of things at home, or get along with other people?: Somewhat difficult  PHQ9 TOTAL SCORE: 9

## 2024-10-30 NOTE — PATIENT INSTRUCTIONS
Hold the HTCZ, continue with the Losartan  Check your BP; the goal for your BP is 120s/80s; let me know if it is persistently outside of its range  Stop the Welbutrin  Will change you Cymbalta to 60 mg in am and 30 mg in pm  Labs today

## 2024-10-31 ENCOUNTER — PATIENT OUTREACH (OUTPATIENT)
Dept: ONCOLOGY | Facility: CLINIC | Age: 64
End: 2024-10-31
Payer: COMMERCIAL

## 2024-10-31 ENCOUNTER — PATIENT OUTREACH (OUTPATIENT)
Dept: CARE COORDINATION | Facility: CLINIC | Age: 64
End: 2024-10-31
Payer: COMMERCIAL

## 2024-10-31 ENCOUNTER — MYC MEDICAL ADVICE (OUTPATIENT)
Dept: FAMILY MEDICINE | Facility: CLINIC | Age: 64
End: 2024-10-31
Payer: COMMERCIAL

## 2024-10-31 DIAGNOSIS — Z96.652 S/P TOTAL KNEE REPLACEMENT USING CEMENT, LEFT: ICD-10-CM

## 2024-10-31 PROBLEM — I27.20 PULMONARY HYPERTENSION (H): Status: ACTIVE | Noted: 2024-10-24

## 2024-10-31 NOTE — TELEPHONE ENCOUNTER
Dr. Louis signature for 30 mg capsule incorrect. Patient is taking 120 mg ( 2 tablets, 60 mg each) in am. Requesting correction.     Requesting oxycodone refill.     Evans Chandler RN on 10/31/2024 at 12:29 PM

## 2024-10-31 NOTE — PROGRESS NOTES
New Patient Oncology Nurse Navigator Note     Referral Received: 10/31/24      Referring provider: Zoey Louis MD     Referring Clinic/Organization: [facility]     Referred to: Benign Hematology    Requested provider (if applicable): First available - did not specify      Evaluation for :   Diagnosis   D63.8 (ICD-10-CM) - Anemia in other chronic diseases classified elsewhere      Clinical History (per Nurse review of records provided):      10/30 OV Missy:   (D63.8) Anemia in other chronic diseases classified elsewhere  Comment: Chronic condition in the last 3-4 years and overall her hemoglobin has been overall stable at around 9.  She is not symptomatic.  She also has chronic kidney disease and history of ischemic colitis.  She has a colonoscopy in July 2024 which 3 months ago and it was normal; she was recommended to repeat in 2 years.  Saw the GI Dr. Bess, GI specialist recently for elevated liver enzyme - lab noted increase of ferritin level otherwise the transferin and other iron studies were normal.  No melena or hematochezia no coffee-ground emesis or hematemesis.  Not on NSAID products and denies of excessive alcohol intake.     Will continue to avoid NSAID products, especially with her chronic kidney disease.  She is not on low-dose aspirin and there is no indication for her to be on it.  Her iron study today was again normal. I think her anemia likely is due to chronic disease.  She is understandably concerned about, will refer her to hematologist for further evaluation as well.  Will check the peripheral blood smear.     Plan: CBC with platelets and differential, Iron and         iron binding capacity   Latest Reference Range & Units 09/11/24 05:22 09/30/24 11:57 10/30/24 11:17   Ferritin 11 - 328 ng/mL  1,928 (H)    Glucose 70 - 99 mg/dL   98   Iron 37 - 145 ug/dL   79   Iron Binding Capacity 240 - 430 ug/dL   232 (L)   Iron Sat Index 15 - 46 %   34   Tissue Transglutaminase Antibody IgA <7.0 U/mL   <0.2    Tissue Transglutaminase Farida IgG <7.0 U/mL  <0.6    Transferrin 200.0 - 360.0 mg/dL  217.0    TSH 0.30 - 4.20 uIU/mL   2.41   Vitamin D, Total (25-Hydroxy) 20 - 50 ng/mL  108 (H)    WBC 4.0 - 11.0 10e3/uL   7.4   Hemoglobin 11.7 - 15.7 g/dL 9.1 (L)  10.2 (L)   Hematocrit 35.0 - 47.0 %   29.9 (L)   Platelet Count 150 - 450 10e3/uL   267   RBC Count 3.80 - 5.20 10e6/uL   3.17 (L)   MCV 78 - 100 fL   94   MCH 26.5 - 33.0 pg   32.2   MCHC 31.5 - 36.5 g/dL   34.1   RDW 10.0 - 15.0 %   12.8   % Neutrophils %   74   % Lymphocytes %   12   % Monocytes %   12   % Eosinophils %   0   % Basophils %   1   Absolute Basophils 0.0 - 0.2 10e3/uL   0.1   Absolute Eosinophils 0.0 - 0.7 10e3/uL   0.0   Absolute Immature Granulocytes <=0.4 10e3/uL   0.1   Absolute Lymphocytes 0.8 - 5.3 10e3/uL   0.9   Absolute Monocytes 0.0 - 1.3 10e3/uL   0.9   % Immature Granulocytes %   1   Absolute Neutrophils 1.6 - 8.3 10e3/uL   5.5   Absolute NRBCs 10e3/uL   0.0   NRBCs per 100 WBC <1 /100   0   (H): Data is abnormally high  (L): Data is abnormally low  Records Location: [facility]     Additional testing needed prior to consult:     ?    Referral updates and Plan:     10/31/2024 3:44 PM -  Referral received and reviewed. Chronic anemia. Sent to NPS to schedule first available.     Kath Mahan, ERINN, RN  Hematology/Oncology Nurse Navigator  [facility]  [phone] / [phone]

## 2024-11-01 NOTE — TELEPHONE ENCOUNTER
Talked to patient over the phone.  She is scheduled to be seen next week.  She is overall doing well   And has no concerns today

## 2024-11-06 ENCOUNTER — OFFICE VISIT (OUTPATIENT)
Dept: FAMILY MEDICINE | Facility: CLINIC | Age: 64
End: 2024-11-06
Payer: COMMERCIAL

## 2024-11-06 VITALS
SYSTOLIC BLOOD PRESSURE: 100 MMHG | RESPIRATION RATE: 14 BRPM | DIASTOLIC BLOOD PRESSURE: 65 MMHG | TEMPERATURE: 97.1 F | OXYGEN SATURATION: 95 % | HEIGHT: 61 IN | BODY MASS INDEX: 26.51 KG/M2 | WEIGHT: 140.4 LBS | HEART RATE: 83 BPM

## 2024-11-06 DIAGNOSIS — F41.1 GAD (GENERALIZED ANXIETY DISORDER): ICD-10-CM

## 2024-11-06 DIAGNOSIS — R79.89 ELEVATED LFTS: ICD-10-CM

## 2024-11-06 DIAGNOSIS — K21.9 GASTROESOPHAGEAL REFLUX DISEASE WITHOUT ESOPHAGITIS: ICD-10-CM

## 2024-11-06 DIAGNOSIS — N18.31 STAGE 3A CHRONIC KIDNEY DISEASE (H): Primary | ICD-10-CM

## 2024-11-06 DIAGNOSIS — D63.8 ANEMIA IN OTHER CHRONIC DISEASES CLASSIFIED ELSEWHERE: ICD-10-CM

## 2024-11-06 DIAGNOSIS — I10 HYPERTENSION GOAL BP (BLOOD PRESSURE) < 140/90: ICD-10-CM

## 2024-11-06 DIAGNOSIS — N18.31 STAGE 3A CHRONIC KIDNEY DISEASE (H): ICD-10-CM

## 2024-11-06 DIAGNOSIS — K55.9 ISCHEMIC COLITIS (H): ICD-10-CM

## 2024-11-06 DIAGNOSIS — E83.42 HYPOMAGNESEMIA: ICD-10-CM

## 2024-11-06 LAB
ALBUMIN SERPL BCG-MCNC: 4.3 G/DL (ref 3.5–5.2)
ALP SERPL-CCNC: 588 U/L (ref 40–150)
ALT SERPL W P-5'-P-CCNC: 148 U/L (ref 0–50)
ANION GAP SERPL CALCULATED.3IONS-SCNC: 16 MMOL/L (ref 7–15)
AST SERPL W P-5'-P-CCNC: 105 U/L (ref 0–45)
BASOPHILS # BLD AUTO: 0 10E3/UL (ref 0–0.2)
BASOPHILS NFR BLD AUTO: 1 %
BILIRUB SERPL-MCNC: 0.4 MG/DL
BUN SERPL-MCNC: 38 MG/DL (ref 8–23)
CALCIUM SERPL-MCNC: 9.7 MG/DL (ref 8.8–10.4)
CHLORIDE SERPL-SCNC: 95 MMOL/L (ref 98–107)
CREAT SERPL-MCNC: 1.15 MG/DL (ref 0.51–0.95)
EGFRCR SERPLBLD CKD-EPI 2021: 53 ML/MIN/1.73M2
EOSINOPHIL # BLD AUTO: 0 10E3/UL (ref 0–0.7)
EOSINOPHIL NFR BLD AUTO: 0 %
ERYTHROCYTE [DISTWIDTH] IN BLOOD BY AUTOMATED COUNT: 13.1 % (ref 10–15)
ERYTHROCYTE [SEDIMENTATION RATE] IN BLOOD BY WESTERGREN METHOD: 37 MM/HR (ref 0–30)
GLUCOSE SERPL-MCNC: 105 MG/DL (ref 70–99)
HCO3 SERPL-SCNC: 21 MMOL/L (ref 22–29)
HCT VFR BLD AUTO: 30 % (ref 35–47)
HGB BLD-MCNC: 9.9 G/DL (ref 11.7–15.7)
IMM GRANULOCYTES # BLD: 0.1 10E3/UL
IMM GRANULOCYTES NFR BLD: 1 %
INR PPP: 1.01 (ref 0.85–1.15)
LYMPHOCYTES # BLD AUTO: 1.3 10E3/UL (ref 0.8–5.3)
LYMPHOCYTES NFR BLD AUTO: 17 %
MAGNESIUM SERPL-MCNC: 1.2 MG/DL (ref 1.7–2.3)
MCH RBC QN AUTO: 31.4 PG (ref 26.5–33)
MCHC RBC AUTO-ENTMCNC: 33 G/DL (ref 31.5–36.5)
MCV RBC AUTO: 95 FL (ref 78–100)
MONOCYTES # BLD AUTO: 0.8 10E3/UL (ref 0–1.3)
MONOCYTES NFR BLD AUTO: 10 %
NEUTROPHILS # BLD AUTO: 5.5 10E3/UL (ref 1.6–8.3)
NEUTROPHILS NFR BLD AUTO: 72 %
NRBC # BLD AUTO: 0 10E3/UL
NRBC BLD AUTO-RTO: 0 /100
PLATELET # BLD AUTO: 268 10E3/UL (ref 150–450)
POTASSIUM SERPL-SCNC: 3.9 MMOL/L (ref 3.4–5.3)
PROT SERPL-MCNC: 6.8 G/DL (ref 6.4–8.3)
RBC # BLD AUTO: 3.15 10E6/UL (ref 3.8–5.2)
RETICS # AUTO: 0.05 10E6/UL (ref 0.03–0.1)
RETICS/RBC NFR AUTO: 1.5 % (ref 0.5–2)
SODIUM SERPL-SCNC: 132 MMOL/L (ref 135–145)
WBC # BLD AUTO: 7.7 10E3/UL (ref 4–11)

## 2024-11-06 PROCEDURE — 99207 BLOOD MORPHOLOGY PATHOLOGIST REVIEW: CPT | Performed by: PATHOLOGY

## 2024-11-06 PROCEDURE — 80053 COMPREHEN METABOLIC PANEL: CPT | Performed by: FAMILY MEDICINE

## 2024-11-06 PROCEDURE — 85652 RBC SED RATE AUTOMATED: CPT | Performed by: FAMILY MEDICINE

## 2024-11-06 PROCEDURE — 82306 VITAMIN D 25 HYDROXY: CPT | Performed by: FAMILY MEDICINE

## 2024-11-06 PROCEDURE — G2211 COMPLEX E/M VISIT ADD ON: HCPCS | Performed by: FAMILY MEDICINE

## 2024-11-06 PROCEDURE — 83735 ASSAY OF MAGNESIUM: CPT | Performed by: FAMILY MEDICINE

## 2024-11-06 PROCEDURE — 85610 PROTHROMBIN TIME: CPT | Performed by: FAMILY MEDICINE

## 2024-11-06 PROCEDURE — 85045 AUTOMATED RETICULOCYTE COUNT: CPT | Performed by: FAMILY MEDICINE

## 2024-11-06 PROCEDURE — 99214 OFFICE O/P EST MOD 30 MIN: CPT | Performed by: FAMILY MEDICINE

## 2024-11-06 PROCEDURE — 36415 COLL VENOUS BLD VENIPUNCTURE: CPT | Performed by: FAMILY MEDICINE

## 2024-11-06 PROCEDURE — 85025 COMPLETE CBC W/AUTO DIFF WBC: CPT | Performed by: FAMILY MEDICINE

## 2024-11-06 RX ORDER — MAGNESIUM OXIDE 400 MG/1
400 TABLET ORAL 2 TIMES DAILY
Qty: 60 TABLET | Refills: 1 | Status: SHIPPED | OUTPATIENT
Start: 2024-11-06 | End: 2024-11-12

## 2024-11-06 RX ORDER — LORAZEPAM 0.5 MG/1
0.5 TABLET ORAL 2 TIMES DAILY PRN
Qty: 20 TABLET | Refills: 0 | Status: SHIPPED | OUTPATIENT
Start: 2024-11-06

## 2024-11-06 RX ORDER — BUSPIRONE HYDROCHLORIDE 5 MG/1
5 TABLET ORAL 2 TIMES DAILY
Qty: 60 TABLET | Refills: 1 | Status: SHIPPED | OUTPATIENT
Start: 2024-11-06 | End: 2024-11-25

## 2024-11-06 RX ORDER — PANTOPRAZOLE SODIUM 40 MG/1
40 TABLET, DELAYED RELEASE ORAL DAILY
Qty: 90 TABLET | Refills: 3 | Status: SHIPPED | OUTPATIENT
Start: 2024-11-06 | End: 2024-11-25

## 2024-11-06 ASSESSMENT — PAIN SCALES - GENERAL: PAINLEVEL_OUTOF10: NO PAIN (0)

## 2024-11-06 NOTE — TELEPHONE ENCOUNTER
RECORDS STATUS - ALL OTHER DIAGNOSIS      RECORDS RECEIVED FROM: [facility] - Internal records   DATE RECEIVED: 11/6

## 2024-11-06 NOTE — PROGRESS NOTES
"  {PROVIDER CHARTING PREFERENCE:697491}    Subjective   Marcelina is a 64 year old, presenting for the following health issues:  Follow Up        11/6/2024     2:23 PM   Additional Questions   Roomed by Taylor     HPI     {MA/LPN/RN Pre-Provider Visit Orders- hCG/UA/Strep (Optional):266886}  {SUPERLIST (Optional):987286}  {additonal problems for provider to add (Optional):597869}    {ROS Picklists (Optional):379121}      Objective    /65 (BP Location: Right arm, Patient Position: Sitting, Cuff Size: Adult Regular)   Pulse 83   Temp 97.1  F (36.2  C) (Temporal)   Resp 14   Ht 1.55 m (5' 1.02\")   Wt 63.7 kg (140 lb 6.4 oz)   LMP 06/12/2014   SpO2 95%   BMI 26.51 kg/m    Body mass index is 26.51 kg/m .  Physical Exam   {Exam List (Optional):442707}    {Diagnostic Test Results (Optional):745883}        Signed Electronically by: Zoey Clements Mai, MD  {Email feedback regarding this note to [email or web address]   :[id number]}  " also on [employer].  I    (R79.89) Elevated LFTs  Comment: Saw GI specialist Dr. Bess for this about a month ago.  At that time, her liver enzyme was normal.  Her liver enzymes first noted to be elevated about 4 months ago.  Last week, her labs showed elevated AST/ALT again.  Recheck the CMP today and they remain to be elevated, about the same it was a week ago.  No excessive alcohol intake and no longer taking the statin.  No history of liver disease.  Worked up for hepatitis B, hepatitis A and hepatitis C about 3 months ago were negative/normal.  Limited abdominal ultrasound on May 28, 2024 so hepatic steatosis but a follow-up ultrasound on June 23, 2024, it was read to be normal.  She is not morbidly obese.  Her cholesterol levels 6 months ago was normal except the total cholesterol slightly high at 240.     I reviewed the medical record from Dr. Bess, GI specialist.  He did quite extensive workup and all the labs were normal except positive for MINDY which expected due to her crest and lupus.      Her exam today was again completely normal.  Kidney function improved but liver enzymes remain the same.  Recent INR, TSH and sed rate were normal.  She had 2 liver ultrasound done in the last 4 months, I do not think that she is needing one at this point.  She was encouraged to follow-up with Dr. Bess for further evaluation.  She is strongly encouraged to follow-up with her rheumatologist and nephrologist as well.  Encouraged to avoid excessive Tylenol intake.  Per Dr. Bess's recommendation, she was recommended to hold off on the vitamin D supplement.     Plan: Comprehensive metabolic panel (BMP + Alb, Alk         Phos, ALT, AST, Total. Bili, TP)            (E83.42) Hypomagnesemia  Comment: Will continue to replace with magnesium gluconate 500 mg twice a day.  Potential side effect discussed including diarrhea.    Plan: magnesium gluconate (MAGONATE) 500 (27 Mg) MG         tablet, DISCONTINUED: magnesium oxide (MAG-OX)          400 MG tablet            (K21.9) Gastroesophageal reflux disease without esophagitis  Comment: Chronic and controlled with the Protonix.  Encouraged to avoid any known triggers, especially with greasy, spicy food, or late meals.   She d enied of excessive alcohol, caffeine or NSAID intake.  No history of GI bleeding.  Will continue with Protonix.  And encouraged to taper it off as tolerated when she is ready.  Symptoms the need to be seen or call in discussed.     Plan: pantoprazole (PROTONIX) 40 MG EC tablet        .    (F41.1) MARCELINA (generalized anxiety disorder)  Comment: Been having more stress and her anxiety has gotten worse.  No depression.  No suicidal or homicidal ideation.  Will continue with the Cymbalta at 60 mg twice a day.  Did not respond to the Wellbutrin.  Adding the BuSpar twice daily.  Continue with lorazepam as needed for anxiety attack.    Plan: busPIRone (BUSPAR) 5 MG tablet, LORazepam         (ATIVAN) 0.5 MG tablet            The longitudinal plan of care for the diagnosis(es)/condition(s) as documented were addressed during this visit. Due to the added complexity in care, I will continue to support Marcelina in the subsequent management and with ongoing continuity of care.       MED REC REQUIRED  Post Medication Reconciliation Status: discharge medications reconciled and changed, per note/orders      Subjective   Marcelina is a 64 year old, presenting for the following health issues:  Follow Up        11/6/2024     2:23 PM   Additional Questions   Roomed by Taylor BARRERA     Marcelina is seen today for follow-up on her kidney function, elevated liver enzyme and high blood pressure.  She was seen about a week ago, please see my last dictation for further details.  Overall feels about the same since the last visit.  Stop the hydrochlorothiazide as recommended.  Not checking her blood pressure at home.  No chest pain or shortness of breath.  No leg swelling, orthopnea or dyspnea.  No problem with  "urination.  No diarrhea or constipation.  She is scheduled to see a nephrologist in a couple days.  She also scheduled to see hematology for chronic anemia in a week.  No nausea or vomiting.  No fever or chills.  No significant joint pain.  She sees rheumatologist regularly for her limited systemic sclerosis; her next appointment is in a week as well.  No other concern.  Her anxiety has gotten worse due to her recent medical conditions.  She did not respond to the Wellbutrin.  Cymbalta twice a day has helped.  She has Ativan to be taken as needed for anxiety attack which she takes rarely.  No problem with sleeping.  No depression.  No suicidal homicidal ideation.  No drugs or alcohol.  No abdominal pain.  No UTI symptoms.        Review of Systems  Constitutional, HEENT, cardiovascular, pulmonary, gi and gu systems are negative, except as otherwise noted.      Objective    /65 (BP Location: Right arm, Patient Position: Sitting, Cuff Size: Adult Regular)   Pulse 83   Temp 97.1  F (36.2  C) (Temporal)   Resp 14   Ht 1.55 m (5' 1.02\")   Wt 63.7 kg (140 lb 6.4 oz)   LMP 06/12/2014   SpO2 95%   BMI 26.51 kg/m    Body mass index is 26.51 kg/m .  Physical Exam   GENERAL: alert and no distress.  Speaking in full sentences.  Answered the questions appropriately.  EYES: Eyes grossly normal to inspection, PERRL and conjunctivae and sclerae normal.  Nonicteric or conjunctival injection  HENT: ear canals and TM's normal, nose and mouth without ulcers or lesions.  Nares are non-congested. Oropharynx is pink and moist. No tender with palpation to the sinuses.   NECK: Supple, no lymphadenopathy or thyromegaly  RESP: lungs clear to auscultation - no rales, rhonchi or wheezes  CV: regular rate and rhythm, no murmur, no peripheral edema  ABDOMEN: soft, nondistended, nontender, no palpable masses or organomegaly with normal bowel sound  MS: no gross musculoskeletal defects noted, no edema.  No focal weakness.  NEURO: " Normal strength and tone, mentation intact and speech normal.  Cranial nerves II to XII intact.  DTR +2 throughout.  PSYCH: mentation appears normal, affect normal/bright.  Thoughts are intact.    Results for orders placed or performed in visit on 11/06/24   INR     Status: Normal   Result Value Ref Range    INR 1.01 0.85 - 1.15   ESR: Erythrocyte sedimentation rate     Status: Abnormal   Result Value Ref Range    Erythrocyte Sedimentation Rate 37 (H) 0 - 30 mm/hr   Magnesium     Status: Abnormal   Result Value Ref Range    Magnesium 1.2 (L) 1.7 - 2.3 mg/dL   CBC with platelets and differential     Status: Abnormal   Result Value Ref Range    WBC Count 7.7 4.0 - 11.0 10e3/uL    RBC Count 3.15 (L) 3.80 - 5.20 10e6/uL    Hemoglobin 9.9 (L) 11.7 - 15.7 g/dL    Hematocrit 30.0 (L) 35.0 - 47.0 %    MCV 95 78 - 100 fL    MCH 31.4 26.5 - 33.0 pg    MCHC 33.0 31.5 - 36.5 g/dL    RDW 13.1 10.0 - 15.0 %    Platelet Count 268 150 - 450 10e3/uL    % Neutrophils 72 %    % Lymphocytes 17 %    % Monocytes 10 %    % Eosinophils 0 %    % Basophils 1 %    % Immature Granulocytes 1 %    NRBCs per 100 WBC 0 <1 /100    Absolute Neutrophils 5.5 1.6 - 8.3 10e3/uL    Absolute Lymphocytes 1.3 0.8 - 5.3 10e3/uL    Absolute Monocytes 0.8 0.0 - 1.3 10e3/uL    Absolute Eosinophils 0.0 0.0 - 0.7 10e3/uL    Absolute Basophils 0.0 0.0 - 0.2 10e3/uL    Absolute Immature Granulocytes 0.1 <=0.4 10e3/uL    Absolute NRBCs 0.0 10e3/uL   Reticulocyte count     Status: Normal   Result Value Ref Range    % Reticulocyte 1.5 0.5 - 2.0 %    Absolute Reticulocyte 0.048 0.025 - 0.095 10e6/uL   Comprehensive metabolic panel (BMP + Alb, Alk Phos, ALT, AST, Total. Bili, TP)     Status: Abnormal   Result Value Ref Range    Sodium 132 (L) 135 - 145 mmol/L    Potassium 3.9 3.4 - 5.3 mmol/L    Carbon Dioxide (CO2) 21 (L) 22 - 29 mmol/L    Anion Gap 16 (H) 7 - 15 mmol/L    Urea Nitrogen 38.0 (H) 8.0 - 23.0 mg/dL    Creatinine 1.15 (H) 0.51 - 0.95 mg/dL    GFR Estimate  53 (L) >60 mL/min/1.73m2    Calcium 9.7 8.8 - 10.4 mg/dL    Chloride 95 (L) 98 - 107 mmol/L    Glucose 105 (H) 70 - 99 mg/dL    Alkaline Phosphatase 588 (H) 40 - 150 U/L     (H) 0 - 45 U/L     (H) 0 - 50 U/L    Protein Total 6.8 6.4 - 8.3 g/dL    Albumin 4.3 3.5 - 5.2 g/dL    Bilirubin Total 0.4 <=1.2 mg/dL   Vitamin D Deficiency     Status: Abnormal   Result Value Ref Range    Vitamin D, Total (25-Hydroxy) 84 (H) 20 - 50 ng/mL    Narrative    Season, race, dietary intake, and treatment affect the concentration of 25-hydroxy-Vitamin D. Values may decrease during winter months and increase during summer months.    Vitamin D determination is routinely performed by an immunoassay specific for 25 hydroxyvitamin D3.  If an individual is on vitamin D2(ergocalciferol) supplementation, please specify 25 OH vitamin D2 and D3 level determination by LCMSMS test [id number].     Bld morphology pathology review     Status: None   Result Value Ref Range    Final Diagnosis       Peripheral blood, morphology:  - Mild anemia, normocytic and normochromic.  - WBC subsets quantitatively within normal limits and without diagnostic morphologic abnormality.  - Platelets quantitatively within normal limits and without diagnostic morphologic abnormality.  - Negative for schistocytes and definitive morphologic features of hemolysis.   - Negative for overt features of myelodysplasia and circulating blasts.    See comment.    COMMENT:  Normocytic anemia is nonspecific and in general may be attributable to multiple overlapping etiologies, including chronic disease, renal and/or endocrine disease, blood loss, iron deficiency (in some patients), and/or bone marrow suppression (by underlying infection, various nutritional deficiencies, toxicities, alcohol use, or medications).  Clinical correlation is recommended.       Peripheral Smear       A microscopic examination is performed.       Peripheral Hematologic Data        Latest Reference  Range & Units 11/06/24 13:58   WBC 4.0 - 11.0 10e3/uL 7.7   Hemoglobin 11.7 - 15.7 g/dL 9.9 (L)   Hematocrit 35.0 - 47.0 % 30.0 (L)   Platelet Count 150 - 450 10e3/uL 268   RBC Count 3.80 - 5.20 10e6/uL 3.15 (L)   MCV 78 - 100 fL 95   MCH 26.5 - 33.0 pg 31.4   MCHC 31.5 - 36.5 g/dL 33.0   RDW 10.0 - 15.0 % 13.1   % Neutrophils % 72   % Lymphocytes % 17   % Monocytes % 10   % Eosinophils % 0   % Basophils % 1   Absolute Basophils 0.0 - 0.2 10e3/uL 0.0   Absolute Eosinophils 0.0 - 0.7 10e3/uL 0.0   Absolute Immature Granulocytes <=0.4 10e3/uL 0.1   Absolute Lymphocytes 0.8 - 5.3 10e3/uL 1.3   Absolute Monocytes 0.0 - 1.3 10e3/uL 0.8   % Immature Granulocytes % 1   Absolute Neutrophils 1.6 - 8.3 10e3/uL 5.5   Absolute NRBCs 10e3/uL 0.0   NRBCs per 100 WBC <1 /100 0   % Retic 0.5 - 2.0 % 1.5   Absolute Retic 0.025 - 0.095 10e6/uL 0.048   (L): Data is abnormally low    For patients over 18 years old, anemia and quantitative abnormalities of WBC and platelets (if present) may be further stratified as follows:    WBC (10^9/L):    Greater than 50.0: Marked leukocytosis    18.0 - 50.0: Moderate leukocytosis    11.1 - 17.9: Mild leukocytosis    3.0 - 3.9: Mild leukopenia    2.0 - 2.9: Moderate leukopenia    Less than 2.0: Marked leukopenia    Hemoglobin (g/dL) in females:    9.7 - 11.6: Mild anemia    7.7 - 9.6: Moderate anemia    Less than 7.7: Marked anemia    Platelets (10^9/L):    Greater than 900: Marked thrombocytosis    601 - 900: Moderate thrombocytosis    451 - 600: Mild thrombocytosis    100 - 149: Mild thrombocytopenia    50 - 99: Moderate thrombocytopenia    Less than 50: Marked thrombocytopenia       Performing Labs       The technical component of this testing was completed at [facility], [facility] and [facility]     Lab Blood Morphology Pathologist Review     Status: Abnormal    Narrative    The following orders were  created for panel order Lab Blood Morphology Pathologist Review.  Procedure                               Abnormality         Status                     ---------                               -----------         ------                     Bld morphology pathology...[[id number]]                      Final result               CBC with platelets and d...[[id number]]  Abnormal            Final result               Reticulocyte count[[id number]]           Normal              Final result               Morphology Tracking[[id number]]                              Final result                 Please view results for these tests on the individual orders.           Signed Electronically by: Zoey Clements Mai, MD

## 2024-11-07 LAB
PATH REPORT.COMMENTS IMP SPEC: NORMAL
PATH REPORT.FINAL DX SPEC: NORMAL
PATH REPORT.MICROSCOPIC SPEC OTHER STN: NORMAL
PATH REPORT.MICROSCOPIC SPEC OTHER STN: NORMAL
VIT D+METAB SERPL-MCNC: 84 NG/ML (ref 20–50)
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
## 2024-11-12 ENCOUNTER — ONCOLOGY VISIT (OUTPATIENT)
Dept: ONCOLOGY | Facility: CLINIC | Age: 64
End: 2024-11-12
Attending: FAMILY MEDICINE
Payer: COMMERCIAL

## 2024-11-12 ENCOUNTER — PRE VISIT (OUTPATIENT)
Dept: ONCOLOGY | Facility: CLINIC | Age: 64
End: 2024-11-12
Payer: COMMERCIAL

## 2024-11-12 VITALS
BODY MASS INDEX: 26.06 KG/M2 | WEIGHT: 138 LBS | HEART RATE: 85 BPM | HEIGHT: 61 IN | OXYGEN SATURATION: 97 % | SYSTOLIC BLOOD PRESSURE: 106 MMHG | RESPIRATION RATE: 16 BRPM | DIASTOLIC BLOOD PRESSURE: 68 MMHG

## 2024-11-12 DIAGNOSIS — K76.0 HEPATIC STEATOSIS: Primary | ICD-10-CM

## 2024-11-12 DIAGNOSIS — D63.8 ANEMIA IN OTHER CHRONIC DISEASES CLASSIFIED ELSEWHERE: ICD-10-CM

## 2024-11-12 LAB
CRP SERPL-MCNC: 12.1 MG/L
HOLD SPECIMEN: NORMAL

## 2024-11-12 PROCEDURE — 86140 C-REACTIVE PROTEIN: CPT | Performed by: INTERNAL MEDICINE

## 2024-11-12 PROCEDURE — 36415 COLL VENOUS BLD VENIPUNCTURE: CPT | Performed by: INTERNAL MEDICINE

## 2024-11-12 PROCEDURE — 82668 ASSAY OF ERYTHROPOIETIN: CPT | Performed by: INTERNAL MEDICINE

## 2024-11-12 PROCEDURE — 99213 OFFICE O/P EST LOW 20 MIN: CPT | Performed by: INTERNAL MEDICINE

## 2024-11-12 ASSESSMENT — PAIN SCALES - GENERAL: PAINLEVEL_OUTOF10: SEVERE PAIN (7)

## 2024-11-12 NOTE — LETTER
11/12/2024      Marcelina Estevez  [address]  [facility] [address]      Dear Colleague,    Thank you for referring your patient, Marcelina Estevez, to the [facility]. Please see a copy of my visit note below.    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    Impression:  Probable anemia of chronic disease due to known history of CREST syndrome, limited systemic sclerosis, pulmonary hypertension, and stage IIIa CKD  Hyperferritinemia  Hepatic steatosis with elevated liver function tests;     Recommendation, plan, instructions:  1. Blood tests today (EPO level, CRP) including 10/31/2024 labs ordered by Dr. Louis(CBC, CMP,smear)  2. CT scan abdomen and pelvis without IV contrast (?in [place]) I am concerned that she may have nonalcoholic fatty liver disease that explains her elevated liver function studies and this could be a contributing factor to her anemia of chronic disease.  If the CT findings are compelling enough, I will also order a FibroScan to rule out josy cirrhosis.  3. I will call with results and what to do next.  I spoke with the patient regarding the general management and prognosis of anemia of chronic disease which is really dependent upon the underlying medical causes.  It is not a primary hematologic disorder.  I am not concerned about a myelodysplastic syndrome or need to do a bone marrow biopsy at this time.    Time with patient including review, documentation, history, examination, coordination of care and counseling was 50 minutes.    History of present illness:  Review of this patient's medical record demonstrates normal hemoglobins until August, 2014.  Since then her hemoglobins have  ranged between 10.2 to 11.4 g/dL with normocytic indices, platelet counts mid range around 250,000 with normal white counts and differentials.    She has known history of CREST syndrome with systemic sclerosis and pulmonary hypertension and history of stage III CKD on hydroxychloroquine, isosorbide, lisinopril and nifedipine.  She has used alcohol on a regular basis in the past but stopped completely 1 month ago because of her liver blood test results.    Recent workup has revealed 2-4x elevated liver function studies, serum creatinines between 1.15-1.72, unremarkable peripheral smear, low magnesium, ESR=37, serum iron 79, TIBC 232, saturation 34, ferritin 1928, TSH 2.71, ELP without monoclonal protein although elevated alpha-1 and alpha-2 fractions, and positive MINDY titers.  May, 2024 [facility] abdominal ultrasound revealed hepatic steatosis although no mention was made of the spleen size.  The marked elevated ferritin was obtained 3 weeks after she received parenteral iron in [place].    She and her  are planning to spend the winter in [place], [place] and leave around 12/2/2024 for 4 months.    Past medical history:  Past Medical History:   Diagnosis Date     CREST (calcinosis, Raynaud's phenomenon, esophageal dysfunction, sclerodactyly, telangiectasia) (H)      Hyperlipidemia      Hypertension      Hypertriglyceridemia 01/18/2011    Controlled with simvastatin.        Limited systemic sclerosis (H)      Menopausal syndrome (hot flashes) 12/19/2013     Positive MINDY (antinuclear antibody)      Raynaud disease          Family history:  I have reviewed this patient's family history and updated it with pertinent information if needed.  Family History   Problem Relation Age of Onset     Osteoporosis Mother      Eye Disorder Mother         cataract, mac degen     Macular Degeneration Mother      Dementia Mother      Hypertension Maternal Grandmother      Diabetes Maternal Grandfather      Eye Disorder Paternal  Grandmother         glaucoma     Unknown/Adopted Paternal Grandfather      Hypertension Daughter      Graves' disease Daughter      Diabetes Other      Glaucoma No family hx of          Medications:  Current Outpatient Medications   Medication Sig Dispense Refill     albuterol (PROAIR HFA) 108 (90 Base) MCG/ACT inhaler INHALE 2 PUFS BY MOUTH EVERY 6 HOURS AS NEEDED FOR SHORTNESS OF BREATH / DYSPNEA 18 g 0     ALLEGRA ALLERGY 180 MG tablet Take 1 tablet (180 mg) by mouth daily 90 tablet 3     busPIRone (BUSPAR) 5 MG tablet Take 1 tablet (5 mg) by mouth 2 times daily. For anxiety; continue with the Duloxetine 60 tablet 1     DULoxetine (CYMBALTA) 60 MG capsule TAKE TWO CAPSULES BY MOUTH ONCE DAILY 180 capsule 0     fluticasone (FLONASE) 50 MCG/ACT nasal spray Spray 1 spray into both nostrils daily 16 mL 5     hydroxychloroquine (PLAQUENIL) 200 MG tablet Take 1.5 tablet daily 45 tablet 1     isosorbide mononitrate (IMDUR) 30 MG 24 hr tablet Take 1 Tablet by mouth once daily 90 tablet 1     LORazepam (ATIVAN) 0.5 MG tablet Take 1 tablet (0.5 mg) by mouth 2 times daily as needed for anxiety. 20 tablet 0     losartan (COZAAR) 50 MG tablet Take 1 tablet (50 mg) by mouth daily. 90 tablet 0     magnesium gluconate (MAGONATE) 500 (27 Mg) MG tablet Take 1 tablet (500 mg) by mouth 2 times daily. 100 tablet 1     NIFEdipine ER (ADALAT CC) 30 MG 24 hr tablet Take 1 Tablet by mouth once daily 90 tablet 1     order for DME Equipment being ordered: light lamp for seasonal affective disorder 1 Device 0     pantoprazole (PROTONIX) 40 MG EC tablet Take 1 tablet (40 mg) by mouth daily. 90 tablet 3     No current facility-administered medications for this visit.     Facility-Administered Medications Ordered in Other Visits   Medication Dose Route Frequency Provider Last Rate Last Admin     sodium chloride (PF) 0.9% PF flush 10 mL  10 mL Intravenous Once Hilda Lopez MD           Allergies:  Allergies   Allergen Reactions      "Statins Other (See Comments)     Elevated liver enzyme       Hydroxyzine      Made her very tired     Other Environmental Allergy Other (See Comments)     Seasonal Allergies      Sulfa Antibiotics      Vomiting         Review of systems:  Except as note above, the patient denies:  Headache, double vision, change in vision, hearing loss, tinnitus;  Dyspnea, chest pain, palpitations, pleurisy or hemoptysis;  Anorexia, nausea, vomiting, diarrhea, constipation, rectal bleeding bleeding, change in bowel habits;  Urinary frequency, burning or hematuria;  Fever, chills, sweats, change in appetite;  Bone or back pain; skin rash, joint swelling, new lumps;  Unexplained, bleeding or bruising, tingling numbness, change in gait;    Physical examination:  /68   Pulse 85   Resp 16   Ht 1.55 m (5' 1.02\")   Wt 62.6 kg (138 lb)   LMP 06/12/2014   SpO2 97%   BMI 26.05 kg/m      The patient is alert and oriented.   Throat and oral mucosa are normal-appearing.   Adenopathy is absent in the neck, axilla, groin and supraclavicular fossa;   Lungs are clear to auscultation and percussion without rales, wheezes or rubs; Heart rhythm is regular, heart sounds are without murmurs or gallops  Abdomen is soft and flat without hepatosplenomegaly, masses, ascites or tenderness;  Extremities and skin reveal no unusual skin lesions, joint swelling or edema;        Isak Luu MD              Again, thank you for allowing me to participate in the care of your patient.        Sincerely,        Isak Luu MD  "

## 2024-11-12 NOTE — PATIENT INSTRUCTIONS
"1. Blood tests today including 10/31/2024 labs ordered by Dr. Louis(CBC, CMP)  2. CT scan abdomen and pelvis without IV contrast (?in [place])  3. Dr. Luu will call with results and what to do next; Dr. Luu thinks you probably have \"anemia of chronic disease.\"  "

## 2024-11-12 NOTE — NURSING NOTE
"Oncology Rooming Note    November 12, 2024 3:21 PM   Marcelina Estevez is a 64 year old female who presents for:    Chief Complaint   Patient presents with    Oncology Clinic Visit     New patient- anemia     Initial Vitals: /68   Pulse 85   Resp 16   Ht 1.55 m (5' 1.02\")   Wt 62.6 kg (138 lb)   LMP 06/12/2014   SpO2 97%   BMI 26.05 kg/m   Estimated body mass index is 26.05 kg/m  as calculated from the following:    Height as of this encounter: 1.55 m (5' 1.02\").    Weight as of this encounter: 62.6 kg (138 lb). Body surface area is 1.64 meters squared.  Severe Pain (7) Comment: Data Unavailable   Patient's last menstrual period was 06/12/2014.  Allergies reviewed: Yes  Medications reviewed: Yes    Medications: Medication refills not needed today.  Pharmacy name entered into [facility]:    [facility] [place], [place] - [address]  [facility] - [place], [place] - [address] [facility] [address] [place] [address]    Frailty Screening:   Is the patient here for a new oncology consult visit in cancer care? 2. No      Clinical concerns: anemia       Fawn Soto LPN              "

## 2024-11-13 ENCOUNTER — HOSPITAL ENCOUNTER (OUTPATIENT)
Dept: CT IMAGING | Facility: CLINIC | Age: 64
Discharge: HOME | End: 2024-11-13
Attending: INTERNAL MEDICINE | Admitting: INTERNAL MEDICINE
Payer: COMMERCIAL

## 2024-11-13 DIAGNOSIS — Z96.652 S/P TOTAL KNEE REPLACEMENT USING CEMENT, LEFT: Primary | ICD-10-CM

## 2024-11-13 DIAGNOSIS — K76.0 HEPATIC STEATOSIS: ICD-10-CM

## 2024-11-13 PROCEDURE — 250N000009 HC RX 250: Performed by: INTERNAL MEDICINE

## 2024-11-13 PROCEDURE — 74177 CT ABD & PELVIS W/CONTRAST: CPT

## 2024-11-13 PROCEDURE — 250N000011 HC RX IP 250 OP 636: Performed by: INTERNAL MEDICINE

## 2024-11-13 RX ORDER — IOPAMIDOL 755 MG/ML
500 INJECTION, SOLUTION INTRAVASCULAR ONCE
Status: COMPLETED | OUTPATIENT
Start: 2024-11-13 | End: 2024-11-13

## 2024-11-13 RX ADMIN — IOPAMIDOL 68 ML: 755 INJECTION, SOLUTION INTRAVENOUS at 14:43

## 2024-11-13 RX ADMIN — SODIUM CHLORIDE 60 ML: 9 INJECTION, SOLUTION INTRAVENOUS at 14:44

## 2024-11-14 LAB — EPO SERPL-ACNC: 9 MU/ML

## 2024-11-15 ENCOUNTER — OFFICE VISIT (OUTPATIENT)
Dept: OPHTHALMOLOGY | Facility: CLINIC | Age: 64
End: 2024-11-15
Payer: COMMERCIAL

## 2024-11-15 DIAGNOSIS — H52.4 PRESBYOPIA: ICD-10-CM

## 2024-11-15 DIAGNOSIS — H25.13 SENILE NUCLEAR SCLEROSIS, BILATERAL: ICD-10-CM

## 2024-11-15 DIAGNOSIS — Z79.899 LONG-TERM USE OF PLAQUENIL: Primary | ICD-10-CM

## 2024-11-15 ASSESSMENT — VISUAL ACUITY
METHOD: SNELLEN - LINEAR
OS_CC: 20/70
CORRECTION_TYPE: GLASSES
OS_PH_CC: 20/30
OD_CC+: -1
OD_CC: 20/25
OD_CC: J1+
OS_CC: J2
OS_PH_CC+: +2

## 2024-11-15 ASSESSMENT — REFRACTION_MANIFEST
OS_CYLINDER: +2.25
OD_SPHERE: -5.25
OD_AXIS: 002
OS_ADD: +2.50
OS_SPHERE: -5.50
OD_CYLINDER: +3.25
OD_ADD: +2.50
OS_AXIS: 012

## 2024-11-15 ASSESSMENT — REFRACTION_WEARINGRX
OS_SPHERE: -4.00
OD_ADD: +2.50
OD_SPHERE: -5.25
OS_AXIS: 012
SPECS_TYPE: PAL
OS_CYLINDER: +1.50
OS_ADD: +2.50
OD_AXIS: 003
OD_CYLINDER: +3.50

## 2024-11-15 ASSESSMENT — TONOMETRY
IOP_METHOD: ICARE
OD_IOP_MMHG: 19
OS_IOP_MMHG: 17

## 2024-11-15 ASSESSMENT — EXTERNAL EXAM - LEFT EYE: OS_EXAM: NORMAL

## 2024-11-15 ASSESSMENT — SLIT LAMP EXAM - LIDS
COMMENTS: NORMAL
COMMENTS: NORMAL

## 2024-11-15 ASSESSMENT — CONF VISUAL FIELD: COMMENTS: 10-2 VISUAL FIELD WAS PERFORMED TODAY

## 2024-11-15 ASSESSMENT — CUP TO DISC RATIO
OD_RATIO: 0.1
OS_RATIO: 0.1

## 2024-11-15 ASSESSMENT — EXTERNAL EXAM - RIGHT EYE: OD_EXAM: NORMAL

## 2024-11-15 NOTE — NURSING NOTE
Chief Complaints and History of Present Illnesses   Patient presents with    Monitor Current High Risk Meds     Chief Complaint(s) and History of Present Illness(es)       Monitor Current High Risk Meds              Laterality: both eyes              Comments    Pt returns for annual exam and to monitor ocular health for high risk medication Plaquenil- 300 mg every day. Pt wonders if the bifocal in her glasses is correct. She notices when she tips her head down to read, everything is blurry. She often has to remove her glasses to read. She denies significant discomfort each eye.

## 2024-11-15 NOTE — PROGRESS NOTES
Assessment/Plan  (Z79.899) Long-term use of Plaquenil  (primary encounter diagnosis)  Comment: No evidence of Plaquenil toxicity. Patient started Plaquenil 300mg daily in early 2018.   Plan: Discussed findings with patient as well as potential long term risks of continued medication use. Plan on monitoring annually with dilated exam for changes. Return to clinic sooner if vision changes are noted.       (H25.13) Senile nuclear sclerosis, bilateral  Comment: Not visually significant  Plan: Monitor.     (H52.4) Presbyopia  Plan: Discussed findings with patient. New spectacle prescription dispensed to patient. Patient is welcome to return to clinic with prolonged adaptation difficulties.       Complete documentation of historical and exam elements from today's encounter can  be found in the full encounter summary report (not reduplicated in this progress  note). I personally obtained the chief complaint(s) and history of present illness. I  confirmed and edited as necessary the review of systems, past medical/surgical  history, family history, social history, and examination findings as documented by  others; and I examined the patient myself. I personally reviewed the relevant tests,  images, and reports as documented above. I formulated and edited as necessary the  assessment and plan and discussed the findings and management plan with the  patient and family.    Leonardo Melgar OD

## 2024-11-21 PROBLEM — G44.209 TENSION HEADACHE: Status: RESOLVED | Noted: 2017-09-28 | Resolved: 2024-11-21

## 2024-11-22 ENCOUNTER — ANCILLARY PROCEDURE (OUTPATIENT)
Dept: GENERAL RADIOLOGY | Facility: CLINIC | Age: 64
End: 2024-11-22
Attending: NURSE PRACTITIONER
Payer: COMMERCIAL

## 2024-11-22 DIAGNOSIS — Z96.652 S/P TOTAL KNEE REPLACEMENT USING CEMENT, LEFT: ICD-10-CM

## 2024-11-22 DIAGNOSIS — M25.561 RIGHT KNEE PAIN: ICD-10-CM

## 2024-11-22 PROCEDURE — 73562 X-RAY EXAM OF KNEE 3: CPT | Mod: TC | Performed by: INTERNAL MEDICINE

## 2024-11-24 RX ORDER — ALLOPURINOL 100 MG/1
100 TABLET ORAL DAILY
COMMUNITY
Start: 2023-08-31

## 2024-11-24 RX ORDER — PANTOPRAZOLE SODIUM 40 MG/1
40 TABLET, DELAYED RELEASE ORAL DAILY
COMMUNITY

## 2024-11-25 ENCOUNTER — OFFICE VISIT (OUTPATIENT)
Dept: FAMILY MEDICINE | Facility: CLINIC | Age: 64
End: 2024-11-25
Payer: COMMERCIAL

## 2024-11-25 ENCOUNTER — HOSPITAL ENCOUNTER (OUTPATIENT)
Dept: MAMMOGRAPHY | Facility: CLINIC | Age: 64
Discharge: HOME | End: 2024-11-25
Attending: FAMILY MEDICINE | Admitting: FAMILY MEDICINE
Payer: COMMERCIAL

## 2024-11-25 VITALS
RESPIRATION RATE: 16 BRPM | BODY MASS INDEX: 26.39 KG/M2 | DIASTOLIC BLOOD PRESSURE: 77 MMHG | HEIGHT: 61 IN | SYSTOLIC BLOOD PRESSURE: 114 MMHG | OXYGEN SATURATION: 98 % | HEART RATE: 86 BPM | TEMPERATURE: 98 F | WEIGHT: 139.8 LBS

## 2024-11-25 DIAGNOSIS — G89.29 CHRONIC PAIN OF RIGHT KNEE: ICD-10-CM

## 2024-11-25 DIAGNOSIS — E83.42 HYPOMAGNESEMIA: ICD-10-CM

## 2024-11-25 DIAGNOSIS — F33.1 MODERATE EPISODE OF RECURRENT MAJOR DEPRESSIVE DISORDER (H): ICD-10-CM

## 2024-11-25 DIAGNOSIS — J30.1 SEASONAL ALLERGIC RHINITIS DUE TO POLLEN: ICD-10-CM

## 2024-11-25 DIAGNOSIS — K21.9 GASTROESOPHAGEAL REFLUX DISEASE WITHOUT ESOPHAGITIS: ICD-10-CM

## 2024-11-25 DIAGNOSIS — M25.561 CHRONIC PAIN OF RIGHT KNEE: ICD-10-CM

## 2024-11-25 DIAGNOSIS — Z12.31 VISIT FOR SCREENING MAMMOGRAM: ICD-10-CM

## 2024-11-25 DIAGNOSIS — F41.1 GAD (GENERALIZED ANXIETY DISORDER): ICD-10-CM

## 2024-11-25 DIAGNOSIS — I10 HYPERTENSION GOAL BP (BLOOD PRESSURE) < 140/90: ICD-10-CM

## 2024-11-25 DIAGNOSIS — I73.00 RAYNAUD'S DISEASE WITHOUT GANGRENE: ICD-10-CM

## 2024-11-25 PROCEDURE — 20610 DRAIN/INJ JOINT/BURSA W/O US: CPT | Mod: RT | Performed by: FAMILY MEDICINE

## 2024-11-25 PROCEDURE — 77063 BREAST TOMOSYNTHESIS BI: CPT

## 2024-11-25 PROCEDURE — 99214 OFFICE O/P EST MOD 30 MIN: CPT | Mod: 25 | Performed by: FAMILY MEDICINE

## 2024-11-25 PROCEDURE — 77067 SCR MAMMO BI INCL CAD: CPT

## 2024-11-25 RX ORDER — LOSARTAN POTASSIUM 50 MG/1
50 TABLET ORAL DAILY
Qty: 90 TABLET | Refills: 1 | Status: SHIPPED | OUTPATIENT
Start: 2024-11-25

## 2024-11-25 RX ORDER — ALBUTEROL SULFATE 90 UG/1
INHALANT RESPIRATORY (INHALATION)
Qty: 18 G | Refills: 5 | Status: SHIPPED | OUTPATIENT
Start: 2024-11-25

## 2024-11-25 RX ORDER — MAGNESIUM OXIDE 400 MG/1
400 TABLET ORAL 2 TIMES DAILY
Qty: 180 TABLET | Refills: 1 | Status: SHIPPED | OUTPATIENT
Start: 2024-11-25

## 2024-11-25 RX ORDER — DULOXETIN HYDROCHLORIDE 60 MG/1
60 CAPSULE, DELAYED RELEASE ORAL 2 TIMES DAILY
Qty: 180 CAPSULE | Refills: 1 | Status: SHIPPED | OUTPATIENT
Start: 2024-11-25

## 2024-11-25 RX ORDER — PANTOPRAZOLE SODIUM 40 MG/1
40 TABLET, DELAYED RELEASE ORAL DAILY
Qty: 90 TABLET | Refills: 3 | Status: SHIPPED | OUTPATIENT
Start: 2024-11-25

## 2024-11-25 RX ORDER — LIDOCAINE HYDROCHLORIDE 10 MG/ML
5 INJECTION, SOLUTION EPIDURAL; INFILTRATION; INTRACAUDAL; PERINEURAL ONCE
Status: COMPLETED | OUTPATIENT
Start: 2024-11-25 | End: 2024-11-25

## 2024-11-25 RX ORDER — ISOSORBIDE MONONITRATE 30 MG/1
30 TABLET, EXTENDED RELEASE ORAL DAILY
Qty: 90 TABLET | Refills: 1 | Status: SHIPPED | OUTPATIENT
Start: 2024-11-25

## 2024-11-25 RX ORDER — ASPIRIN 81 MG/1
81 TABLET ORAL DAILY
COMMUNITY
Start: 2024-11-25

## 2024-11-25 RX ORDER — FLUTICASONE PROPIONATE 50 MCG
1 SPRAY, SUSPENSION (ML) NASAL DAILY
Qty: 16 ML | Refills: 5 | Status: CANCELLED | OUTPATIENT
Start: 2024-11-25

## 2024-11-25 RX ORDER — BUSPIRONE HYDROCHLORIDE 5 MG/1
5 TABLET ORAL 2 TIMES DAILY
Qty: 180 TABLET | Refills: 1 | Status: SHIPPED | OUTPATIENT
Start: 2024-11-25

## 2024-11-25 RX ORDER — NIFEDIPINE 30 MG
30 TABLET, EXTENDED RELEASE ORAL DAILY
Qty: 90 TABLET | Refills: 1 | Status: SHIPPED | OUTPATIENT
Start: 2024-11-25

## 2024-11-25 RX ORDER — TRIAMCINOLONE ACETONIDE 40 MG/ML
40 INJECTION, SUSPENSION INTRA-ARTICULAR; INTRAMUSCULAR ONCE
Status: COMPLETED | OUTPATIENT
Start: 2024-11-25 | End: 2024-11-25

## 2024-11-25 RX ORDER — LORAZEPAM 0.5 MG/1
0.5 TABLET ORAL 2 TIMES DAILY PRN
Qty: 20 TABLET | Refills: 0 | Status: CANCELLED | OUTPATIENT
Start: 2024-11-25

## 2024-11-25 RX ORDER — HYDROXYCHLOROQUINE SULFATE 200 MG/1
TABLET, FILM COATED ORAL
Qty: 45 TABLET | Refills: 1 | Status: CANCELLED | OUTPATIENT
Start: 2024-11-25

## 2024-11-25 RX ADMIN — LIDOCAINE HYDROCHLORIDE 5 ML: 10 INJECTION, SOLUTION EPIDURAL; INFILTRATION; INTRACAUDAL; PERINEURAL at 13:53

## 2024-11-25 RX ADMIN — TRIAMCINOLONE ACETONIDE 40 MG: 40 INJECTION, SUSPENSION INTRA-ARTICULAR; INTRAMUSCULAR at 13:53

## 2024-11-25 SDOH — HEALTH STABILITY: PHYSICAL HEALTH: ON AVERAGE, HOW MANY MINUTES DO YOU ENGAGE IN EXERCISE AT THIS LEVEL?: 20 MIN

## 2024-11-25 SDOH — HEALTH STABILITY: PHYSICAL HEALTH: ON AVERAGE, HOW MANY DAYS PER WEEK DO YOU ENGAGE IN MODERATE TO STRENUOUS EXERCISE (LIKE A BRISK WALK)?: 4 DAYS

## 2024-11-25 ASSESSMENT — PAIN SCALES - GENERAL: PAINLEVEL_OUTOF10: NO PAIN (0)

## 2024-11-25 ASSESSMENT — SOCIAL DETERMINANTS OF HEALTH (SDOH): HOW OFTEN DO YOU GET TOGETHER WITH FRIENDS OR RELATIVES?: ONCE A WEEK

## 2024-11-25 NOTE — PROGRESS NOTES
Assessment & Plan     (M25.561,  G89.29) Chronic pain of right knee  Comment: Likely due to arthritis.  Had left knee arthroplasty recently and is doing well.  Interested to try the steroid injection.  Please see the procedure note for further details.  Leaving for [place] in a week and will be down there for 3 to 4 months.  Continue with normal activities as tolerated.  Tylenol or Motrin as needed for pain.  Follow-up as needed.    Plan: Large Joint/Bursa injection and/or drainage         (Shoulder, Knee), triamcinolone (KENALOG-40)         injection 40 mg, lidocaine (PF) (XYLOCAINE) 1 %        injection 5 mL            (I10) Hypertension goal BP (blood pressure) < 140/90  Comment: Also has Raynaud's  syndrome; responded well to the Nifedipine.  Blood pressure has been stable with the nifedipine, losartan and isosorbide.  Has chronic kidney disease which is being managed by the nephrologist.  No history of DM, CVA or CAD.  The goal for blood pressure to be less than 130s/80s.  Overall, her blood pressure is stable and controlled.  No longer taking hydrochlorothiazide.  No change in medication.  She was encouraged to work closely with her nephrologist for her kidney condition.    Plan: losartan (COZAAR) 50 MG tablet, NIFEdipine ER         (ADALAT CC) 30 MG 24 hr tablet, aspirin 81 MG         EC tablet, Comprehensive metabolic panel (BMP +        Alb, Alk Phos, ALT, AST, Total. Bili, TP)            (J30.1) Seasonal allergic rhinitis due to pollen  Comment: Stable and doing well.  Continue with Flonase, Zyrtec, and Albuterol inhaler as needed.    Plan: albuterol (PROAIR HFA) 108 (90 Base) MCG/ACT         inhaler            (K21.9) Gastroesophageal reflux disease without esophagitis  Comment: Chronic and controlled with the Protonix.  Encouraged to avoid any known triggers, especially with greasy, spicy food or late meals.  She does no alcohol or taking NSAID excessively.  No history of GI bleeding.  Will continue with  Protonix and encouraged to taper it off as tolerated when she is ready.  Symptoms the need to be seen or call in discussed.    Plan: pantoprazole (PROTONIX) 40 MG EC tablet            (F41.1) MARCELINA (generalized anxiety disorder)  (F33.1) Moderate episode of recurrent major depressive disorder (H)  Comment: Feeling better.  Tolerated medication well.  No suicidal homicidal ideation.  No incision.  No anxiety attack and did not appear depressed.  Sleeping okay.  Will continue with Cymbalta 60 mg twice daily and BuSpar 5 mg twice daily.  Symptoms that need to be seen or call in discussed.    Plan: busPIRone (BUSPAR) 5 MG tablet, DULoxetine         (CYMBALTA) 60 MG capsule            (I73.00) Raynaud's disease without gangrene  Comment: Stable and is controlled with the nifedipine.  Encouraged her to avoid any known trigger.      (E83.42) Hypomagnesemia  Comment: Did not tolerate the magnesium gluconate 500 mg twice a day.  Will drop it back to magnesium oxide 400 mg twice a day.  Recommend to recheck the mag level in about a month or 2.    Plan: magnesium oxide (MAG-OX) 400 MG tablet,         Magnesium            Patient has been advised of split billing requirements and indicates understanding: Yes        Counseling  Appropriate preventive services were addressed with this patient via screening, questionnaire, or discussion as appropriate for fall prevention, nutrition, physical activity, Tobacco-use cessation, social engagement, weight loss and cognition.  Checklist reviewing preventive services available has been given to the patient.      Regular exercise    Subjective   Marcelina is a 64 year old, presenting for the following health issues:  Recheck Medication and Imm/Inj        11/25/2024     1:24 PM   Additional Questions   Roomed by Sendy     Imm/Inj    History of Present Illness       Hypertension: She presents for follow up of hypertension.  She does check blood pressure  regularly outside of the clinic.  Outpatient blood pressures have not been over 140/90. She does not follow a low salt diet. She exercises with enough effort to increase her heart rate 20 to 29 minutes per day.  She exercises with enough effort to increase her heart rate 4 days per week.   She is taking medications regularly.    Marcelina today for right knee.  Has had for several months and is getting worse.  She thinks it is due to arthritis.  The pain is similar to her previous left arthritis knee pain.  Dull and achy pain which is worsened with weight-bear activities or going up and down the stairs.  No redness or swelling.  No unusual activities.  Had the left knee replacement surgery done several months ago, going well.  Responded to steroid injection to the left knee in the past and would like to try the steroid injection on the right knee today.  Leaving for [place] next week and will be down there for 3-4 months.    Also needs medication refills before leaving for [place].  She takes Nifedipine , losartan and isosorbide for high blood pressure.  Nifedipine also has been working well for her Raynaud's syndrome.  Not checking her blood pressure at home.  She has chronic kidney disease which is being managed by the nephrologist.  She saw the nephrologist last week who was okay with the medication.  No chest pain or shortness of breath.  No leg swelling, orthopnea or dyspnea.  Denied of excessive salt intake.    Acid reflux is controlled with the Protonix; no concern about.  Depression and anxiety are getting better with Cymbalta and BuSpar.  Did not respond to the Wellbutrin.  No suicidal or homicidal ideation.  No hallucination.  She takes a lot of rarely.    Did not tolerate magnesium gluconate 500 mg twice a day, significant diarrhea from it.  Been doing okay with the magnesium oxide 400 mg twice daily.  Her magnesium level has been low.      Review of Systems  Constitutional, HEENT, cardiovascular, pulmonary, gi and gu systems are negative,  "except as otherwise noted.      Objective    /77   Pulse 86   Temp 98  F (36.7  C) (Temporal)   Resp 16   Ht 1.555 m (5' 1.22\")   Wt 63.4 kg (139 lb 12.8 oz)   LMP 06/12/2014   SpO2 98%   BMI 26.23 kg/m    Body mass index is 26.23 kg/m .  Physical Exam   GENERAL: alert and no distress  RESP: lungs clear to auscultation - no rales, rhonchi or wheezes  CV: regular rate and rhythm, no murmur, no peripheral edema  MS: Walk without limping.  Both legs are equally in strength.  Right knee with no effusion or redness.  Tender to palpate diffusely.  Negative anterior/posterior drawer test.  Negative valgus/valgus tress test.  PSYCH: mentation appears normal, affect normal/bright.  Thoughts are intact.  No hallucination.    Results for orders placed or performed during the hospital encounter of 11/25/24   MA Screening Bilateral w/ Khoa     Status: Normal    Narrative    BILATERAL FULL FIELD DIGITAL SCREENING MAMMOGRAM WITH TOMOSYNTHESIS    Performed on: 11/25/24    No comparisons were made when reading this study.    Technique:  This study was evaluated with the assistance of Computer-Aided   Detection.  Breast Tomosynthesis was used in interpretation.    Findings: There are scattered areas of fibroglandular density.  There is   no radiographic evidence of malignancy.     Impression    IMPRESSION: ACR BI-RADS Category 1: Negative    BREAST CANCER SCREENING RECOMMENDATION: Routine yearly mammography   beginning at age 40 or as discussed with your provider.    The results and recommendations of this examination will be communicated   to the patient.        Ramses Hernandez MD             Signed Electronically by: Zoey Clements Mai, MD    "

## 2024-12-08 NOTE — PROCEDURES
Procedure: Right knee Kenolog injection  Indication:  Knee pain with arthritis.    The whole precedure discussed with the patient.  Risks associated with the procedure was also discussed, which include but not limited pain, bleeding and infection.  Alternatives were also discussed.  Patient requested to have the injection on her right knee today.  Consent was obtained.    Time out performed - he was identified times three.       The whole procedure was done in an usual sterile maner.  The inserted site was cleaned with alcohol swab.  A mixture of 4 cc of 1% Lidocain and 1 cc of Kenolog (40 mg) inject directly into the right knee, entering at the upper outer quadrant.  Succeeded with first attempt.  Patient tolerates well and has some relief from the injection.  Post procedure care discussed and educate about symptoms that need to be seen or call in.     Zoey Louis MD.

## 2025-01-04 ENCOUNTER — HEALTH MAINTENANCE LETTER (OUTPATIENT)
Age: 65
End: 2025-01-04

## 2025-01-07 DIAGNOSIS — I10 HYPERTENSION GOAL BP (BLOOD PRESSURE) < 140/90: ICD-10-CM

## 2025-01-07 DIAGNOSIS — I10 ESSENTIAL (PRIMARY) HYPERTENSION: ICD-10-CM

## 2025-01-07 RX ORDER — ISOSORBIDE MONONITRATE 30 MG/1
30 TABLET, EXTENDED RELEASE ORAL DAILY
Qty: 90 TABLET | Refills: 2 | Status: SHIPPED | OUTPATIENT
Start: 2025-01-07

## 2025-01-07 RX ORDER — NIFEDIPINE 30 MG
30 TABLET, EXTENDED RELEASE ORAL DAILY
Qty: 90 TABLET | Refills: 0 | Status: SHIPPED | OUTPATIENT
Start: 2025-01-07

## 2025-02-04 DIAGNOSIS — I10 HYPERTENSION GOAL BP (BLOOD PRESSURE) < 140/90: ICD-10-CM

## 2025-02-04 RX ORDER — NIFEDIPINE 30 MG
30 TABLET, EXTENDED RELEASE ORAL DAILY
Qty: 90 TABLET | Refills: 0 | OUTPATIENT
Start: 2025-02-04

## 2025-02-04 NOTE — TELEPHONE ENCOUNTER
Clinic RN: Please investigate patient's chart or contact patient if the information cannot be found because the medication is listed as historical or discontinued. Confirm patient is taking this medication. Document findings and route refill encounter to provider for approval or denial.    Gem Casas RN

## 2025-02-05 RX ORDER — BUPROPION HYDROCHLORIDE 150 MG/1
150 TABLET ORAL EVERY MORNING
Qty: 90 TABLET | Refills: 2 | OUTPATIENT
Start: 2025-02-05

## 2025-03-14 ENCOUNTER — TELEPHONE (OUTPATIENT)
Dept: ORTHOPEDICS | Facility: CLINIC | Age: 65
End: 2025-03-14
Payer: COMMERCIAL

## 2025-03-14 NOTE — TELEPHONE ENCOUNTER
Patient is in [place] until 5/1/25 and has appointment with Dr Spring on 5/5/25. Patient is calling to request MRI orders to be placed so she can get MRI ASAP after she comes back on 5/1/25 and before she sees Dr. Spring on 5/5/25. Would like orders added to her chart so she can schedule the MRI well in advance to keep 5/5/25 appointment for knee replacement consult

## 2025-03-17 NOTE — TELEPHONE ENCOUNTER
Called to clarify with patient about MRI.  Left voicemail to call back.      EDUARD Lopez, CNP  Orthopedic Surgery

## 2025-03-17 NOTE — TELEPHONE ENCOUNTER
Spoke with patient. She is coming on May to discuss right knee replacement and was wondering if she needed MRI on the right knee. There is no trauma, she has a slow progressive pain, and has moderate to severe joint space loss medially.  Will evaluate her at the appointment. Have not seen her previously for the right knee. No MRI prior to visit.       EDUARD Lopez, CNP  Orthopedic Surgery

## 2025-03-19 DIAGNOSIS — I10 HYPERTENSION GOAL BP (BLOOD PRESSURE) < 140/90: ICD-10-CM

## 2025-03-20 ENCOUNTER — MYC MEDICAL ADVICE (OUTPATIENT)
Dept: FAMILY MEDICINE | Facility: CLINIC | Age: 65
End: 2025-03-20
Payer: COMMERCIAL

## 2025-03-20 NOTE — LETTER
[address] [address]  Phone: [phone]  Fax: [phone]        March 26, 2025      Marcelina Estevez                                                                                                                                [address] [facility] [address]            Dear Ms. Herb,    We are concerned about your health care.  We recently provided you with a medication refill.  Many medications require routine follow-up with your Doctor.      At this time we ask that: You schedule an appointment for your annual physical.    Your prescription: No further refills will be given until your follow up care is completed.      Thank you,  [place] [facility]  [phone]

## 2025-03-20 NOTE — TELEPHONE ENCOUNTER
Looks like this medication was stopped, not sure if this is a refill request erro.  Please check with the patient if she has been taking it.  If so, does she still want to continue with this.  Thank you

## 2025-03-25 RX ORDER — BUPROPION HYDROCHLORIDE 150 MG/1
150 TABLET ORAL EVERY MORNING
Qty: 90 TABLET | Refills: 2 | OUTPATIENT
Start: 2025-03-25

## 2025-03-25 NOTE — TELEPHONE ENCOUNTER
Patient returning call. She confirmed she is no longer taking medication. She thinks it is on an automatic refill.     Evans Chandler RN on 3/25/2025 at 1:44 PM

## 2025-05-01 NOTE — PROGRESS NOTES
Office Visit-Follow up    Chief Complaint: Marcelina Estevez is a 65 year old female who is being seen for   Chief Complaint   Patient presents with    Right Knee - Pain       History of Present Illness:   Today's visit:  Right knee continues to worsen.  Now more painful than the left knee prior to replacement. Reports can only walk a short distance due to the pain and has had to resort to wheelchair for longer distances and when recently in the airport.  The pain is waking her at night.  Left knee doing excellent. Would like the right knee replaced.   Used eliquis after last surgery due to previous ischemic bowel issues and inability to take NSAIDs, did therapy at [place] in [facility].       November 22, 2024 office visit:  Patient has been doing excellent with no complaints.  She passed and graduated formal physical therapy already with flexion of 135 degrees.  Denies any numbness or tingling or any problems with the incision.  Had some soreness on the right knee but mostly because she is compensating.  States eventually she would want the right knee replaced if it came to that.     October 16, 2024 visit:  Returns. Reports overall doing very well. Attending therapy with [facility]. States feeling good. Improving her activity. Walking without any aid. Happy with progress. Feels much better than prior to surgery.  She does note recently had 2 small spots towards the top of her incision that bled a slight amount. No current drainage. Scabbed over and now healing.  Was using some lotion to the area.  Denies any redness, any fevers, feeling sick, increasing pain, etc.         September 18, 2024 visit:  Pain is controlled.  She has been utilizing the oxycodone.  She decreased her exercises due to her bleeding.  No further bleeding issues        September 12 2024 visit:  Total knee replacement on 9/11/24.  Discharged 9/12/24 without incident. She reports had a pin sized shirin of blood on the aquacell when she went home on the 12th. She  started to do frequent exercises including multiple rounds of knee flexion. Started to notice more drainage. Called the clinic. Was recommended to continue to monitor. This AM had further drainage and started to soak into the steven stockings came back in. Pain controlled. No new injuries.  Otherwise doing as expected. Taking eliquis for DVT prevention.         September 10, 2024 status post left total knee arthroplasty     2024 office visit:  Previously seen by Dr. Abrams for left knee pain.  His visit on July 10, 2024 was reviewed.  Concerns for inflammatory arthritis.  Recent pulmonology visit on 2020 for review as well.   She reports 20+ years of progressive left knee pain. Now reports the right knee and hip are starting to hurt due to increasing pain and limping with the left knee. No specific injuries recently. Reports just getting progressively worse.   Treatments tried: previous knee arthroscopy with meniscus debridement, multiple cortisone injections, gel injections, physical therapy in the past, rest, activity modification, OTC medications including nsaids and tylenol.  Despite this progressive constant pain limiting her activity, she reports able to walk less than 1 block without stopping due to pain, limited other rec. activities as well as limiting ability to play with her grandkids, clean, and kneel,. These symptoms have been ongoing for long time but worse the last year.       Reports seen by pulmonologist recently due to long term systemic sclerosis.  Was told pulmonary function and chest imaging was largely unchanged but was recommended for further cardiac workup and testing. Denies any home 02 use. States lungs and breathing has felt at baseline and not limiting.     Social History     Occupational History    Not on file   Tobacco Use    Smoking status: Former     Current packs/day: 0.00     Types: Cigarettes     Quit date: 2001     Years since quittin.3    Smokeless  "tobacco: Never   Vaping Use    Vaping status: Never Used   Substance and Sexual Activity    Alcohol use: Yes     Comment: 5-8 drinks/week    Drug use: No    Sexual activity: Yes     Partners: Male     Birth control/protection: Surgical     Comment: vasectomy       REVIEW OF SYSTEMS  General: negative for, night sweats, dizziness, fatigue  Resp: No shortness of breath and no cough  CV: negative for chest pain, syncope or near-syncope  GI: negative for nausea, vomiting and diarrhea  : negative for dysuria and hematuria  Musculoskeletal: as above  Neurologic: negative for syncope   Hematologic: negative for bleeding disorder    Physical Exam:  Vitals: Ht 1.55 m (5' 1.02\")   Wt 61.5 kg (135 lb 8 oz)   LMP 06/12/2014   BMI 25.58 kg/m    BMI= Body mass index is 25.58 kg/m .  Constitutional: healthy, alert and no acute distress   Psychiatric: mentation appears normal and affect normal/bright  NEURO: no focal deficits  RESP: Normal with easy respirations and no use of accessory muscles to breathe, no audible wheezing or retractions  CV: No peripheral edema         Regular rate and rhythm by palpation  SKIN: No erythema, rashes, excoriation, or breakdown. No evidence of infection.   JOINT/EXTREMITIES: right knee: small effusion.  AROM 0-115. Some pain at maximum flexion.  No gross instability. Medial joint line tenderness. No other focal or bony tenderness.  Extensor intact. Calf soft non-tender.  GAIT: not tested             Diagnostic Modalities:  None today.  Previous imaging reviewed from November 22nd, 2024: severe to near bone on bone medial joint space loss, lateral space is maintained, patellofemoral has mild joint space loss.   Independent visualization of the images was performed.      Impression: right knee primary osteoarthritis    September 10, 2024 status post left total knee arthroplasty  Plan:  All of the above pertinent physical exam and imaging modalities findings was reviewed with Marcelina.    The " patient has attempted conservative treatments that include multiple cortisone injections, gel injections, physical therapy in the past, rest, activity modification, OTC medications including  tylenol.  Despite this progressive constant pain limiting her activity, she reports able to walk only short distance without stopping due to pain, and resulting in occasional use of wheelchair as well limited other activities as well as limiting ability to play with her grandkids, clean, and kneel.  Pain is impacting her sleep, and the knee now hurts more than the left did prior to replacement. Given the level of severity and no reported benefits from physical therapy in the past along do not feel she would benefit from current physical therapy and would be contraindicated as more likely to exacerbate her current symptoms leading to further decrease in quality of life. Secondary to these reasons I have offered surgery in the form of left ramila assisted total knee replacement.     Risks, benefits, complications, alternatives, limitations, and post operative course were discussed. Risks including infection (requiring long-term antibiotics and repeat surgeries), implant loosening (requiring revision surgery), heart attacks, strokes, bleeding (possibly requiring blood transfusion), scars, instability, numbness around the knee, stiffness (requiring manipulations or repeat surgeries), instability and dislocations, fractures, blood clots the legs and lungs, nerve injury (possibly leading to foot drop).  Postoperative course was discussed as far as limitations and expected recovery times. It was also discussed that after surgery there is a need for blood thinners to prevent blood clots, but even when being treated it is possible to develop a blood clot.  Anticipate POD1 discharge.  We discussed physical therapy being an important component of postop success.  Past medical and surgical history was reviewed. . Ms. Estevez will need a  pre-operative medical evaluation and clearance by a primary care provider. We will obtain a CBC and the appropriate radiographs prior to surgery. I will leave it to the discretion of the primary care provider for additional pre-operative testing.   She did mention an upcoming routine teeth cleaning, given that she would like surgery soon, will have her postpone that.     Plan for Eliquis for DVT prevention  Physical therapy to [facility]  Understands need for CT prior.     She did have issue with some bleeding from her surgical site after last surgery, she reports was pushing her motion too hard initially, and now knows how to better balance motion, exercises, and healing.  The left is doing excellent and she relief of symptoms from left knee replacement.       Return to clinic 1 week prior, 14 days postop, or sooner as needed for changes.  Re-x-ray on return: No    Vince Spring D.O.

## 2025-05-05 ENCOUNTER — TELEPHONE (OUTPATIENT)
Dept: ORTHOPEDICS | Facility: CLINIC | Age: 65
End: 2025-05-05

## 2025-05-05 ENCOUNTER — OFFICE VISIT (OUTPATIENT)
Dept: ORTHOPEDICS | Facility: CLINIC | Age: 65
End: 2025-05-05
Payer: MEDICARE

## 2025-05-05 VITALS — HEIGHT: 61 IN | WEIGHT: 135.5 LBS | BODY MASS INDEX: 25.58 KG/M2

## 2025-05-05 DIAGNOSIS — M17.11 PRIMARY OSTEOARTHRITIS OF RIGHT KNEE: Primary | ICD-10-CM

## 2025-05-05 PROCEDURE — 99214 OFFICE O/P EST MOD 30 MIN: CPT | Performed by: ORTHOPAEDIC SURGERY

## 2025-05-05 NOTE — TELEPHONE ENCOUNTER
Type of surgery: right ramila assisted total knee replacement   Location of surgery: [facility]  Date and time of surgery: 6/10  Surgeon: Saw  Pre-Op Appt Date: 5/30  Post-Op Appt Date: 6/23   Packet sent out: Yes mailed total packet, pt aware   Pre-cert/Authorization completed:  Not Applicable  Date: na

## 2025-05-05 NOTE — LETTER
5/5/2025      Marcelina Estevez  [address] [place] [address]      Dear Colleague,    Thank you for referring your patient, Marcelina Estevez, to the [facility]. Please see a copy of my visit note below.    Office Visit-Follow up    Chief Complaint: Marcelina Estevez is a 65 year old female who is being seen for   Chief Complaint   Patient presents with     Right Knee - Pain       History of Present Illness:   Today's visit:  Right knee continues to worsen.  Now more painful than the left knee prior to replacement. Reports can only walk a short distance due to the pain and has had to resort to wheelchair for longer distances and when recently in the airport.  The pain is waking her at night.  Left knee doing excellent. Would like the right knee replaced.   Used eliquis after last surgery due to previous ischemic bowel issues and inability to take NSAIDs, did therapy at [place] in [facility].       November 22, 2024 office visit:  Patient has been doing excellent with no complaints.  She passed and graduated formal physical therapy already with flexion of 135 degrees.  Denies any numbness or tingling or any problems with the incision.  Had some soreness on the right knee but mostly because she is compensating.  States eventually she would want the right knee replaced if it came to that.     October 16, 2024 visit:  Returns. Reports overall doing very well. Attending therapy with [facility]. States feeling good. Improving her activity. Walking without any aid. Happy with progress. Feels much better than prior to surgery.  She does note recently had 2 small spots towards the top of her incision that bled a slight amount. No current drainage. Scabbed over and now healing.  Was using some lotion to the area.  Denies any redness, any fevers, feeling sick, increasing pain, etc.         September 18, 2024 visit:  Pain is controlled.  She has been utilizing the oxycodone.  She decreased her exercises due to her  bleeding.  No further bleeding issues        September 12 2024 visit:  Total knee replacement on 9/11/24.  Discharged 9/12/24 without incident. She reports had a pin sized shirin of blood on the aquacell when she went home on the 12th. She started to do frequent exercises including multiple rounds of knee flexion. Started to notice more drainage. Called the clinic. Was recommended to continue to monitor. This AM had further drainage and started to soak into the steven stockings came back in. Pain controlled. No new injuries.  Otherwise doing as expected. Taking eliquis for DVT prevention.         September 10, 2024 status post left total knee arthroplasty     August 14, 2024 office visit:  Previously seen by Dr. Abrams for left knee pain.  His visit on July 10, 2024 was reviewed.  Concerns for inflammatory arthritis.  Recent pulmonology visit on July 31, 2020 for review as well.   She reports 20+ years of progressive left knee pain. Now reports the right knee and hip are starting to hurt due to increasing pain and limping with the left knee. No specific injuries recently. Reports just getting progressively worse.   Treatments tried: previous knee arthroscopy with meniscus debridement, multiple cortisone injections, gel injections, physical therapy in the past, rest, activity modification, OTC medications including nsaids and tylenol.  Despite this progressive constant pain limiting her activity, she reports able to walk less than 1 block without stopping due to pain, limited other rec. activities as well as limiting ability to play with her grandkids, clean, and kneel,. These symptoms have been ongoing for long time but worse the last year.       Reports seen by pulmonologist recently due to long term systemic sclerosis.  Was told pulmonary function and chest imaging was largely unchanged but was recommended for further cardiac workup and testing. Denies any home 02 use. States lungs and breathing has felt at baseline  "and not limiting.     Social History     Occupational History     Not on file   Tobacco Use     Smoking status: Former     Current packs/day: 0.00     Types: Cigarettes     Quit date: 2001     Years since quittin.3     Smokeless tobacco: Never   Vaping Use     Vaping status: Never Used   Substance and Sexual Activity     Alcohol use: Yes     Comment: 5-8 drinks/week     Drug use: No     Sexual activity: Yes     Partners: Male     Birth control/protection: Surgical     Comment: vasectomy       REVIEW OF SYSTEMS  General: negative for, night sweats, dizziness, fatigue  Resp: No shortness of breath and no cough  CV: negative for chest pain, syncope or near-syncope  GI: negative for nausea, vomiting and diarrhea  : negative for dysuria and hematuria  Musculoskeletal: as above  Neurologic: negative for syncope   Hematologic: negative for bleeding disorder    Physical Exam:  Vitals: Ht 1.55 m (5' 1.02\")   Wt 61.5 kg (135 lb 8 oz)   LMP 2014   BMI 25.58 kg/m    BMI= Body mass index is 25.58 kg/m .  Constitutional: healthy, alert and no acute distress   Psychiatric: mentation appears normal and affect normal/bright  NEURO: no focal deficits  RESP: Normal with easy respirations and no use of accessory muscles to breathe, no audible wheezing or retractions  CV: No peripheral edema         Regular rate and rhythm by palpation  SKIN: No erythema, rashes, excoriation, or breakdown. No evidence of infection.   JOINT/EXTREMITIES: right knee: small effusion.  AROM 0-115. Some pain at maximum flexion.  No gross instability. Medial joint line tenderness. No other focal or bony tenderness.  Extensor intact. Calf soft non-tender.  GAIT: not tested             Diagnostic Modalities:  None today.  Previous imaging reviewed from 2024: severe to near bone on bone medial joint space loss, lateral space is maintained, patellofemoral has mild joint space loss.   Independent visualization of the images was " performed.      Impression: right knee primary osteoarthritis    September 10, 2024 status post left total knee arthroplasty  Plan:  All of the above pertinent physical exam and imaging modalities findings was reviewed with Marcelina.    The patient has attempted conservative treatments that include multiple cortisone injections, gel injections, physical therapy in the past, rest, activity modification, OTC medications including  tylenol.  Despite this progressive constant pain limiting her activity, she reports able to walk only short distance without stopping due to pain, and resulting in occasional use of wheelchair as well limited other activities as well as limiting ability to play with her grandkids, clean, and kneel.  Pain is impacting her sleep, and the knee now hurts more than the left did prior to replacement. Given the level of severity and no reported benefits from physical therapy in the past along do not feel she would benefit from current physical therapy and would be contraindicated as more likely to exacerbate her current symptoms leading to further decrease in quality of life. Secondary to these reasons I have offered surgery in the form of left ramila assisted total knee replacement.     Risks, benefits, complications, alternatives, limitations, and post operative course were discussed. Risks including infection (requiring long-term antibiotics and repeat surgeries), implant loosening (requiring revision surgery), heart attacks, strokes, bleeding (possibly requiring blood transfusion), scars, instability, numbness around the knee, stiffness (requiring manipulations or repeat surgeries), instability and dislocations, fractures, blood clots the legs and lungs, nerve injury (possibly leading to foot drop).  Postoperative course was discussed as far as limitations and expected recovery times. It was also discussed that after surgery there is a need for blood thinners to prevent blood clots, but even when  being treated it is possible to develop a blood clot.  Anticipate POD1 discharge.  We discussed physical therapy being an important component of postop success.  Past medical and surgical history was reviewed. . Ms. Estevez will need a pre-operative medical evaluation and clearance by a primary care provider. We will obtain a CBC and the appropriate radiographs prior to surgery. I will leave it to the discretion of the primary care provider for additional pre-operative testing.   She did mention an upcoming routine teeth cleaning, given that she would like surgery soon, will have her postpone that.     Plan for Eliquis for DVT prevention  Physical therapy to [facility]  Understands need for CT prior.     She did have issue with some bleeding from her surgical site after last surgery, she reports was pushing her motion too hard initially, and now knows how to better balance motion, exercises, and healing.  The left is doing excellent and she relief of symptoms from left knee replacement.       Return to clinic 1 week prior, 14 days postop, or sooner as needed for changes.  Re-x-ray on return: No    Vince Spring D.O.          Again, thank you for allowing me to participate in the care of your patient.        Sincerely,        Rehan Spring, DO    Electronically signed

## 2025-05-12 ENCOUNTER — HOSPITAL ENCOUNTER (OUTPATIENT)
Dept: CT IMAGING | Facility: CLINIC | Age: 65
Discharge: HOME | End: 2025-05-12
Attending: NURSE PRACTITIONER | Admitting: NURSE PRACTITIONER
Payer: MEDICARE

## 2025-05-12 DIAGNOSIS — M17.11 PRIMARY OSTEOARTHRITIS OF RIGHT KNEE: ICD-10-CM

## 2025-05-12 PROCEDURE — 73700 CT LOWER EXTREMITY W/O DYE: CPT | Mod: RT

## 2025-05-30 ENCOUNTER — OFFICE VISIT (OUTPATIENT)
Dept: FAMILY MEDICINE | Facility: CLINIC | Age: 65
End: 2025-05-30
Payer: MEDICARE

## 2025-05-30 VITALS
HEIGHT: 61 IN | WEIGHT: 132.8 LBS | RESPIRATION RATE: 12 BRPM | SYSTOLIC BLOOD PRESSURE: 110 MMHG | BODY MASS INDEX: 25.07 KG/M2 | TEMPERATURE: 98.4 F | OXYGEN SATURATION: 100 % | HEART RATE: 78 BPM | DIASTOLIC BLOOD PRESSURE: 70 MMHG

## 2025-05-30 DIAGNOSIS — M34.1 CREST (CALCINOSIS, RAYNAUD'S PHENOMENON, ESOPHAGEAL DYSFUNCTION, SCLERODACTYLY, TELANGIECTASIA) (H): ICD-10-CM

## 2025-05-30 DIAGNOSIS — M17.11 PRIMARY OSTEOARTHRITIS OF RIGHT KNEE: ICD-10-CM

## 2025-05-30 DIAGNOSIS — N18.31 STAGE 3A CHRONIC KIDNEY DISEASE (H): ICD-10-CM

## 2025-05-30 DIAGNOSIS — F33.1 MODERATE EPISODE OF RECURRENT MAJOR DEPRESSIVE DISORDER (H): ICD-10-CM

## 2025-05-30 DIAGNOSIS — I20.1 CORONARY VASOSPASM: ICD-10-CM

## 2025-05-30 DIAGNOSIS — Z01.818 PRE-OP EXAM: Primary | ICD-10-CM

## 2025-05-30 DIAGNOSIS — E83.42 HYPOMAGNESEMIA: ICD-10-CM

## 2025-05-30 DIAGNOSIS — I10 HYPERTENSION GOAL BP (BLOOD PRESSURE) < 140/90: ICD-10-CM

## 2025-05-30 DIAGNOSIS — F41.1 GAD (GENERALIZED ANXIETY DISORDER): ICD-10-CM

## 2025-05-30 DIAGNOSIS — E78.5 HYPERLIPIDEMIA LDL GOAL <160: ICD-10-CM

## 2025-05-30 PROBLEM — I73.00 RAYNAUD'S DISEASE WITHOUT GANGRENE: Status: RESOLVED | Noted: 2018-01-29 | Resolved: 2025-05-30

## 2025-05-30 LAB
ALBUMIN SERPL BCG-MCNC: 4.6 G/DL (ref 3.5–5.2)
ALP SERPL-CCNC: 336 U/L (ref 40–150)
ALT SERPL W P-5'-P-CCNC: 75 U/L (ref 0–50)
ANION GAP SERPL CALCULATED.3IONS-SCNC: 12 MMOL/L (ref 7–15)
AST SERPL W P-5'-P-CCNC: 73 U/L (ref 0–45)
BILIRUB SERPL-MCNC: 0.5 MG/DL
BUN SERPL-MCNC: 26.8 MG/DL (ref 8–23)
CALCIUM SERPL-MCNC: 10.2 MG/DL (ref 8.8–10.4)
CHLORIDE SERPL-SCNC: 96 MMOL/L (ref 98–107)
CHOLEST SERPL-MCNC: 196 MG/DL
CREAT SERPL-MCNC: 1.13 MG/DL (ref 0.51–0.95)
EGFRCR SERPLBLD CKD-EPI 2021: 54 ML/MIN/1.73M2
FASTING STATUS PATIENT QL REPORTED: NO
FASTING STATUS PATIENT QL REPORTED: NO
GLUCOSE SERPL-MCNC: 114 MG/DL (ref 70–99)
HCO3 SERPL-SCNC: 25 MMOL/L (ref 22–29)
HDLC SERPL-MCNC: 123 MG/DL
LDLC SERPL CALC-MCNC: 62 MG/DL
MAGNESIUM SERPL-MCNC: 1.6 MG/DL (ref 1.7–2.3)
NONHDLC SERPL-MCNC: 73 MG/DL
POTASSIUM SERPL-SCNC: 3.9 MMOL/L (ref 3.4–5.3)
PROT SERPL-MCNC: 7.3 G/DL (ref 6.4–8.3)
SODIUM SERPL-SCNC: 133 MMOL/L (ref 135–145)
TRIGL SERPL-MCNC: 53 MG/DL
URATE SERPL-MCNC: 3.1 MG/DL (ref 2.4–5.7)

## 2025-05-30 PROCEDURE — 93000 ELECTROCARDIOGRAM COMPLETE: CPT | Performed by: FAMILY MEDICINE

## 2025-05-30 PROCEDURE — 1125F AMNT PAIN NOTED PAIN PRSNT: CPT | Performed by: FAMILY MEDICINE

## 2025-05-30 PROCEDURE — 96127 BRIEF EMOTIONAL/BEHAV ASSMT: CPT | Performed by: FAMILY MEDICINE

## 2025-05-30 PROCEDURE — 3074F SYST BP LT 130 MM HG: CPT | Performed by: FAMILY MEDICINE

## 2025-05-30 PROCEDURE — 83735 ASSAY OF MAGNESIUM: CPT | Performed by: FAMILY MEDICINE

## 2025-05-30 PROCEDURE — 3048F LDL-C <100 MG/DL: CPT | Performed by: FAMILY MEDICINE

## 2025-05-30 PROCEDURE — 36415 COLL VENOUS BLD VENIPUNCTURE: CPT | Performed by: FAMILY MEDICINE

## 2025-05-30 PROCEDURE — 80061 LIPID PANEL: CPT | Performed by: FAMILY MEDICINE

## 2025-05-30 PROCEDURE — 80053 COMPREHEN METABOLIC PANEL: CPT | Performed by: FAMILY MEDICINE

## 2025-05-30 PROCEDURE — 99214 OFFICE O/P EST MOD 30 MIN: CPT | Performed by: FAMILY MEDICINE

## 2025-05-30 PROCEDURE — 84550 ASSAY OF BLOOD/URIC ACID: CPT | Performed by: FAMILY MEDICINE

## 2025-05-30 PROCEDURE — 3078F DIAST BP <80 MM HG: CPT | Performed by: FAMILY MEDICINE

## 2025-05-30 RX ORDER — LOSARTAN POTASSIUM 25 MG/1
25 TABLET ORAL DAILY
Qty: 90 TABLET | Refills: 1 | Status: SHIPPED | OUTPATIENT
Start: 2025-05-30

## 2025-05-30 RX ORDER — CHOLECALCIFEROL (VITAMIN D3) 50 MCG
1 TABLET ORAL DAILY
Qty: 100 TABLET | Refills: 1 | Status: SHIPPED | OUTPATIENT
Start: 2025-05-30

## 2025-05-30 RX ORDER — LORAZEPAM 0.5 MG/1
0.5 TABLET ORAL 2 TIMES DAILY PRN
Qty: 20 TABLET | Refills: 0 | Status: SHIPPED | OUTPATIENT
Start: 2025-05-30 | End: 2025-06-04

## 2025-05-30 ASSESSMENT — PAIN SCALES - GENERAL: PAINLEVEL_OUTOF10: SEVERE PAIN (7)

## 2025-05-30 NOTE — PROGRESS NOTES
Preoperative Evaluation  [address] [address]  Phone: [phone]  Fax: [phone]  Primary Provider: Zoey Clements Mai, MD  Pre-op Performing Provider: Zoey Clements Mai, MD  May 30, 2025             5/30/2025   Surgical Information   What procedure is being done? right knee replacement   Facility or Hospital where procedure/surgery will be performed: [facility]   Who is doing the procedure / surgery? Dr pSring   Date of surgery / procedure: Mavis 10   Time of surgery / procedure: dont know yet   Where do you plan to recover after surgery? at home with family     Fax number for surgical facility: Note does not need to be faxed, will be available electronically in Epic.    Assessment & Plan     The proposed surgical procedure is considered INTERMEDIATE risk.      ICD-10-CM    1. Pre-op exam  Z01.818 EKG 12-lead complete w/read - Clinics      2. Primary osteoarthritis of right knee  M17.11       3. Hypertension goal BP (blood pressure) < 140/90  I10 losartan (COZAAR) 25 MG tablet      4. Coronary vasospasm  I20.1 Lipid panel reflex to direct LDL Non-fasting     Lipid panel reflex to direct LDL Non-fasting      5. Hyperlipidemia LDL goal <160  E78.5       6. Stage 3a chronic kidney disease (H)  N18.31 Uric acid     vitamin D3 (CHOLECALCIFEROL) 50 mcg (2000 units) tablet     Comprehensive metabolic panel (BMP + Alb, Alk Phos, ALT, AST, Total. Bili, TP)     Uric acid     Comprehensive metabolic panel (BMP + Alb, Alk Phos, ALT, AST, Total. Bili, TP)      7. MARCELINA (generalized anxiety disorder)  F41.1 LORazepam (ATIVAN) 0.5 MG tablet      8. Moderate episode of recurrent major depressive disorder (H)  F33.1       9. CREST (calcinosis, Raynaud's phenomenon, esophageal dysfunction, sclerodactyly, telangiectasia) (H)  M34.1       10. Hypomagnesemia  E83.42 Magnesium     Magnesium         Blood pressure has been stable and normal with losartan.  The losartan dose was  recently decreased from 50 mg to 25 mg by her nephrologist.  She has chronic kidney disease which is stable and being managed by the nephrologist.  She been seeing her nephrologist closely.  She is also take Lipitor for high cholesterol - doing well, no side effect..  She takes Adalat and Imdur for coronary vasospasm - been working well.  No history of heart disease.  Overall, her chronic medical conditions are stable and tolerable, tolerating the medications well.      Risks and Recommendations  The patient has the following additional risks and recommendations for perioperative complications:      Cardiovascular:  No history of CAD, CVA, or DM.  HTN has been normal and stable.  She takes Imdur and Adalat for coronary spasm - no history of angina or CAD.  Today's EKG showed normal sinus rhythm.  No no further cardiovascular work-up is needed for this procedure.    Diabetes:  She does not have diabetes.    Nephrology:  CKD stage 3 -stable   - Today's CMP: Creatinine 1.3 with GFR of 54% -stable/baseline   - Electrolytes were normal except sodium slightly low but also has been stable  No DM  HTN has been controlled.  No further workup is needed for this procedure.    Pulmonary:   No pulmonary risk identified.  She quit smoking in 2001.  No history of asthma or COPD.   - Incentive spirometry post-op if prolonged general anesthesia is required.   - Oxygen as needed to maintain O2 sat above 94% during and after procedure.    Obstructive Sleep Apnea:   No history of sleep apnea.  No risk for sleep apnea identified.    Anemia/Bleeding/Clotting:   No history of bleeding disorder.  No history of PE/DVT.  PE/DVT prophylaxis per surgeon's recommendation/desire.    - SCD during and after the procedure until she is fully ambulatory.  No history of anemia or blood transfusion   - Okay to be transfused if necessary.     Social and Substance:   No no mental health or safety concern.  Her depression and anxiety are overall  stable/controlled with Cymbalta and BuSpar.  Not on chronic pain meds.  No drugs or alcohol.  No longer smoking.  She lives with her .    Infection:   No history of MRSA  Prophylactic antibiotics per surgeon's recommendation/desire.  Shower with provided antimicrobial shampoo the day before and a.m. the procedure        Antiplatelet or Anticoagulation Medication Instructions  Please take your prescribed medications as prescribed except.  No Aspirin or NSAID (Ibuprofen, Aleve, Motrin, Naproxen, ect.) for 7 days before the procedure  Ok to take Tylenol up to 1000 mg every 6 hrs as needed for pain  Hold all herbs and vitamin except vitamin d for 2 weeks before the procedure  Hold the Losartan on am of the procedure; resume it after the procedure   Take shower with provided shampoo the day before and am of procedure  No alcohol or smoking for 24 before the procedure  Nothing to eat for 8 hrs before the procedure.      Recommendation  Approval given to proceed with proposed procedure, without further diagnostic evaluation.    Follow-up       Subjective   Marcelina is a 65 year old, presenting for the following:  Pre-Op Exam          5/30/2025    10:11 AM   Additional Questions   Roomed by Ari Padilla     HPI:          5/30/2025   Pre-Op Questionnaire   Have you ever had a heart attack or stroke? No   Have you ever had surgery on your heart or blood vessels, such as a stent placement, a coronary artery bypass, or surgery on an artery in your head, neck, heart, or legs? No   Do you have chest pain with activity? No   Do you have a history of heart failure? No   Do you currently have a cold, bronchitis or symptoms of other infection? No   Do you have a cough, shortness of breath, or wheezing? No   Do you or anyone in your family have previous history of blood clots? No   Do you or does anyone in your family have a serious bleeding problem such as prolonged bleeding following surgeries or cuts? No   Have you ever had  problems with anemia or been told to take iron pills? (!) YES - resolved with iron supplement   Have you had any abnormal blood loss such as black, tarry or bloody stools, or abnormal vaginal bleeding? No   Have you ever had a blood transfusion? No   Are you willing to have a blood transfusion if it is medically needed before, during, or after your surgery? Yes   Have you or any of your relatives ever had problems with anesthesia? No   Do you have sleep apnea, excessive snoring or daytime drowsiness? No   Do you have any artifical heart valves or other implanted medical devices like a pacemaker, defibrillator, or continuous glucose monitor? No   Do you have artificial joints? (!) YES   Are you allergic to latex? No     Advance Care Planning    Discussed advance care planning with patient; informed AVS has link to [facility] [employer].    Preoperative Review of    reviewed - controlled substances reflected in medication list.      Marcelina is here today for pre-op physical. She is scheduled for total right knee arthroplasty on 6/10/25 with Dr. Spring at [facility] due to arthritis.  Failed conservative management. Had left knee plasty over a year ago with no complication; tolerated procedure well and had uneventful recovery.  It is planned for inpatient recovery for 1-2 days.  Also is planning for general anesthesia. No personal or family history of anesthesia complication or pre-matured CAD or MI.  Has not been on steroid orally in the last 6 months.  Not taking Aspirin or other form of blood thinner.  Last dose of NSAID was weeks ago.       Her chronic medical conditions are stable.  She has been taking the medications as prescribed.  She takes Lipitor for high cholesterol, isosorbide and Adalat for coronary vasospasm, Protonix for acid reflux, and losartan for high blood pressure.  She has chronic kidney disease which is being managed by the nephrologist.  Her kidney function has been stable.  She also  takes Plaquenil for CREST for which she sees rheumatologist regularly.  Her depression and anxiety are overall stable/tolerable with Cymbalta and BuSpar.  She takes Xanax as needed for anxiety attacks rarely.  No history of CAD, DM or CVA.  No asthma or COPD.  Quit smoking in 2001.    She generally is doing well and has no concern today. No headache or dizziness. No runny nose, nasal congestion, ST, coughing, fever or chill.  No chest pain or SOB.  No N/V/D/C or problem with urination.         Patient Active Problem List    Diagnosis Date Noted    Hypomagnesemia 11/25/2024     Priority: Medium    Pulmonary hypertension (H) 10/24/2024     Priority: Medium    S/P total knee replacement using cement, left 09/12/2024     Priority: Medium    S/P total knee arthroplasty 09/10/2024     Priority: Medium    Primary osteoarthritis of left knee 09/04/2024     Priority: Medium    Proteinuria, unspecified type 08/12/2023     Priority: Medium    Seasonal allergies 08/08/2023     Priority: Medium    Serrated polyposis syndrome 05/05/2023     Priority: Medium    Panic attack 04/20/2021     Priority: Medium    Atrophic vaginitis 04/20/2021     Priority: Medium    Gastroesophageal reflux disease 05/23/2019     Priority: Medium    Coronary vasospasm 12/07/2018     Priority: Medium    Marijuana use, episodic 09/10/2018     Priority: Medium    CREST (calcinosis, Raynaud's phenomenon, esophageal dysfunction, sclerodactyly, telangiectasia) (H)      Priority: Medium    Limited systemic sclerosis (H)      Priority: Medium    Raynaud's disease without gangrene 01/29/2018     Priority: Medium    MARCELINA (generalized anxiety disorder) 01/08/2018     Priority: Medium    Raynaud's phenomenon without gangrene 03/01/2017     Priority: Medium    ACP (advance care planning) 08/19/2015     Priority: Medium     Advance Care Planning 8/19/2015: ACP Review and Resources Provided:  Reviewed chart for advance care plan.  Marcelina Estevez has no plan or code  status on file however states presence of ACP document. Copy requested.  Confirmed/documented legally designated decision maker(s). Added by Mary Padilla RN Advance Care Planning Liaison with [facility] [employer]          Anemia in other chronic diseases classified elsewhere 01/09/2015     Priority: Medium     B12, TSH, Iron study normal in 2018      Status post total hysterectomy 11/18/2014     Priority: Medium    Allergic rhinitis due to pollen 08/02/2014     Priority: Medium    Hyperlipidemia LDL goal <160 06/17/2013     Priority: Medium    Stage 3a chronic kidney disease (H) 06/17/2013     Priority: Medium    Moderate episode of recurrent major depressive disorder (H) 09/10/2012     Priority: Medium     Celexa not helping, fluoxetine caused rash.       Hypertension goal BP (blood pressure) < 140/90 01/18/2011     Priority: Medium    Keratitis sicca, bilateral 06/10/2013     Priority: Low    Atopic rhinitis 01/18/2011     Priority: Low     (Problem list name updated by automated process. Provider to review and confirm.)        Past Medical History:   Diagnosis Date    CREST (calcinosis, Raynaud's phenomenon, esophageal dysfunction, sclerodactyly, telangiectasia) (H)     Hyperlipidemia     Hypertension     Hypertriglyceridemia 01/18/2011    Controlled with simvastatin.       Limited systemic sclerosis (H)     Menopausal syndrome (hot flashes) 12/19/2013    Positive MINDY (antinuclear antibody)     Raynaud disease      Past Surgical History:   Procedure Laterality Date    APPENDECTOMY      ARTHROPLASTY, KNEE, ROBOT ASSISTED, USING RAMILA Left 9/10/2024    Procedure: left knee ramila assisted total arthroplasty;  Surgeon: Rehan Spring DO;  Location: [facility] OR    BIOPSY      cervical node    COLONOSCOPY N/A 11/10/2021    Procedure: COLONOSCOPY, FLEXIBLE, WITH LESION REMOVAL USING SNARE;  Surgeon: Domingo Wall MD;  Location: [facility] OR    COLONOSCOPY N/A 11/10/2021    Procedure: COLONOSCOPY, WITH  POLYPECTOMY AND BIOPSY;  Surgeon: Domingo Wall MD;  Location: [facility]    COLONOSCOPY N/A 9/29/2021    Procedure: Colonoscopy, With Polypectomy And Biopsy;  Surgeon: Domingo Wall MD;  Location: [facility]    COLONOSCOPY N/A 9/29/2021    Procedure: Colonoscopy, Flexible, With Lesion Removal Using Snare;  Surgeon: Domingo Wall MD;  Location: [facility]    COLONOSCOPY N/A 6/29/2022    Procedure: COLONOSCOPY, FLEXIBLE, WITH LESION REMOVAL USING SNARE;  Surgeon: Domingo Wall MD;  Location: [facility]    COLONOSCOPY N/A 11/9/2022    Procedure: COLONOSCOPY, WITH POLYPECTOMY AND BIOPSY;  Surgeon: Domingo Wall MD;  Location: [facility]    COLONOSCOPY N/A 6/12/2023    Procedure: COLONOSCOPY, FLEXIBLE, WITH LESION REMOVAL USING SNARE;  Surgeon: Domingo Wall MD;  Location: [facility]    COLONOSCOPY WITH CO2 INSUFFLATION N/A 10/26/2020    Procedure: COLONOSCOPY, WITH CO2 INSUFFLATION;  Surgeon: Phyllis Chaudhari MD;  Location: [facility]    COLONOSCOPY WITH CO2 INSUFFLATION N/A 11/10/2021    Procedure: COLONOSCOPY, WITH CO2 INSUFFLATION;  Surgeon: Domingo Wall MD;  Location: [facility]    COLONOSCOPY WITH CO2 INSUFFLATION N/A 9/29/2021    Procedure: COLONOSCOPY, WITH CO2 INSUFFLATION;  Surgeon: Domingo Wall MD;  [facility]    COLONOSCOPY WITH CO2 INSUFFLATION N/A 6/29/2022    Procedure: COLONOSCOPY, WITH CO2 INSUFFLATION;  Surgeon: Domingo Wall MD;  [facility]    COLONOSCOPY WITH CO2 INSUFFLATION N/A 11/9/2022    Procedure: COLONOSCOPY, WITH CO2 INSUFFLATION;  Surgeon: Domingo Wall MD;  [facility]    COLONOSCOPY WITH CO2 INSUFFLATION N/A 6/12/2023    Procedure: Colonoscopy with CO2 insufflation;  Surgeon: Domingo Wall MD;  Location[facility]    COLONOSCOPY WITH CO2 INSUFFLATION N/A 7/3/2024    Procedure: Colonoscopy with CO2 insufflation;  Surgeon: Domingo Wall MD;  Location:  [facility]    COMBINED ESOPHAGOSCOPY, GASTROSCOPY, DUODENOSCOPY (EGD) WITH CO2 INSUFFLATION N/A 6/29/2022    Procedure: ESOPHAGOGASTRODUODENOSCOPY, WITH CO2 INSUFFLATION;  Surgeon: Domingo Wall MD;  Location: [facility]    DILATION AND CURETTAGE, HYSTEROSCOPY DIAGNOSTIC, COMBINED  7/1/2014    Procedure: COMBINED DILATION AND CURETTAGE, HYSTEROSCOPY DIAGNOSTIC;  Surgeon: Mana Cabrera DO;  Location: [facility]    ENT SURGERY      tonsillectomy and adnoid removal    ESOPHAGOSCOPY, GASTROSCOPY, DUODENOSCOPY (EGD), COMBINED N/A 6/29/2022    Procedure: ESOPHAGOGASTRODUODENOSCOPY, WITH BIOPSY;  Surgeon: Domingo Wall MD;  Location: [facility]    HYSTERECTOMY TOTAL ABDOMINAL      ORTHOPEDIC SURGERY      left knee tear meniscus    RELEASE CARPAL TUNNEL BILATERAL  2009     Current Outpatient Medications   Medication Sig Dispense Refill    albuterol (PROAIR HFA) 108 (90 Base) MCG/ACT inhaler INHALE 2 PUFS BY MOUTH EVERY 6 HOURS AS NEEDED FOR SHORTNESS OF BREATH / DYSPNEA 18 g 5    ALLEGRA ALLERGY 180 MG tablet Take 1 tablet (180 mg) by mouth daily 90 tablet 3    allopurinol (ZYLOPRIM) 100 MG tablet Take 100 mg by mouth daily. (Patient taking differently: Take 100 mg by mouth daily.)      atorvastatin (LIPITOR) 40 MG tablet Take 1 tablet (40 mg) by mouth daily. 90 tablet 0    busPIRone (BUSPAR) 5 MG tablet Take 1 tablet (5 mg) by mouth 2 times daily. 180 tablet 0    DULoxetine (CYMBALTA) 60 MG capsule Take 1 capsule (60 mg) by mouth 2 times daily. 180 capsule 0    fluticasone (FLONASE) 50 MCG/ACT nasal spray Spray 1 spray into both nostrils daily 16 mL 5    hydroxychloroquine (PLAQUENIL) 200 MG tablet Take 1.5 tablet daily 45 tablet 1    isosorbide mononitrate (IMDUR) 30 MG 24 hr tablet Take 1 tablet (30 mg) by mouth daily. 90 tablet 0    LORazepam (ATIVAN) 0.5 MG tablet Take 1 tablet (0.5 mg) by mouth 2 times daily as needed for anxiety. 20 tablet 0    losartan (COZAAR) 25 MG tablet Take 2 tablets (50 mg) by  mouth daily. 90 tablet 0    magnesium oxide (MAG-OX) 400 MG tablet Take 1 tablet (400 mg) by mouth 2 times daily. 180 tablet 1    NIFEdipine ER (ADALAT CC) 30 MG 24 hr tablet Take 1 tablet (30 mg) by mouth daily. 90 tablet 0    pantoprazole (PROTONIX) 40 MG EC tablet Take 1 tablet (40 mg) by mouth daily. 90 tablet 3    aspirin 81 MG EC tablet Take 1 tablet (81 mg) by mouth daily. (Patient not taking: Reported on 2025)      febuxostat (ULORIC) 40 MG TABS tablet Take 20 mg by mouth daily. (Patient not taking: Reported on 2025)      losartan (COZAAR) 50 MG tablet Take 1 tablet (50 mg) by mouth daily. (Patient not taking: Reported on 2025) 90 tablet 1    order for DME Equipment being ordered: light lamp for seasonal affective disorder (Patient not taking: Reported on 2025) 1 Device 0    pantoprazole (PROTONIX) 40 MG EC tablet Take 40 mg by mouth daily.         Allergies   Allergen Reactions    Statins Other (See Comments)     Elevated liver enzyme      Hydroxyzine      Made her very tired    Other Environmental Allergy Other (See Comments)    Seasonal Allergies     Sulfa Antibiotics      Vomiting          Social History     Tobacco Use    Smoking status: Former     Current packs/day: 0.00     Types: Cigarettes     Quit date: 2001     Years since quittin.4    Smokeless tobacco: Never   Substance Use Topics    Alcohol use: Yes     Comment: 5-8 drinks/week     Family History   Problem Relation Age of Onset    Osteoporosis Mother     Eye Disorder Mother         cataract, mac degen    Macular Degeneration Mother     Dementia Mother     Hypertension Maternal Grandmother     Diabetes Maternal Grandfather     Eye Disorder Paternal Grandmother         glaucoma    Unknown/Adopted Paternal Grandfather     Hypertension Daughter     Graves' disease Daughter     Diabetes Other     Glaucoma No family hx of      History   Drug Use No             Review of Systems  Constitutional, HEENT, cardiovascular,  "pulmonary, GI, , musculoskeletal, neuro, skin, endocrine and psych systems are negative, except as otherwise noted.    Objective    /70   Pulse 78   Temp 98.4  F (36.9  C) (Temporal)   Resp 12   Ht 1.559 m (5' 1.38\")   Wt 60.2 kg (132 lb 12.8 oz)   LMP 06/12/2014   SpO2 100%   BMI 24.78 kg/m     Estimated body mass index is 24.78 kg/m  as calculated from the following:    Height as of this encounter: 1.559 m (5' 1.38\").    Weight as of this encounter: 60.2 kg (132 lb 12.8 oz).  Physical Exam  GENERAL: healthy, alert and no distress.  Speaking in full sentences.  EYES: Eyes grossly normal to inspection, PERRL and conjunctivae and sclerae normal.  No nystagmus.   HENT: ear canals and TM's normal.  Nares are non-congested. Oropharynx is pink and moist. No tender with palpation to the sinuses.  No tonsillar hypertrophy.  NECK: Supple, no lymphadenopathy or thyromegaly.  No tender with palpation to the cervical spine or its paraspinous muscle.  RESP: lungs clear to auscultation - no rales, rhonchi or wheezes.  Good respiratory effort throughout.  CV: regular rate and rhythm, no murmur  ABDOMEN: soft, nontender, nondistended, no palpable masses organomegaly with normal culture.  No CVA tenderness.  MS: no gross musculoskeletal defects noted, no edema.  No focal weakness.  SKIN: No suspicious lesions or rashes.  No petechiae or ecchymosis.  NEURO: Normal strength and tone, mentation intact and speech normal.  Cranial nerve II through XII intact.  DTRs +2 throughout.  No focal neurological deficit.  PSYCH: mentation appears normal, affect normal/bright.  Thoughts intact or hallucination.       Recent Labs   Lab Test 11/06/24  1358 10/30/24  1117 09/30/24  1157   HGB 9.9* 10.2*  --     267  --    INR 1.01  --  1.07   * 128*  --    POTASSIUM 3.9 4.2  --    CR 1.15* 1.72*  --         Diagnostics  Recent Results (from the past 48 hours)   Lipid panel reflex to direct LDL Non-fasting    Collection " Time: 05/30/25 11:35 AM   Result Value Ref Range    Cholesterol 196 <200 mg/dL    Triglycerides 53 <150 mg/dL    Direct Measure  >=50 mg/dL    LDL Cholesterol Calculated 62 <100 mg/dL    Non HDL Cholesterol 73 <130 mg/dL    Patient Fasting > 8hrs? No    Uric acid    Collection Time: 05/30/25 11:35 AM   Result Value Ref Range    Uric Acid 3.1 2.4 - 5.7 mg/dL   Magnesium    Collection Time: 05/30/25 11:35 AM   Result Value Ref Range    Magnesium 1.6 (L) 1.7 - 2.3 mg/dL   Comprehensive metabolic panel (BMP + Alb, Alk Phos, ALT, AST, Total. Bili, TP)    Collection Time: 05/30/25 11:35 AM   Result Value Ref Range    Sodium 133 (L) 135 - 145 mmol/L    Potassium 3.9 3.4 - 5.3 mmol/L    Carbon Dioxide (CO2) 25 22 - 29 mmol/L    Anion Gap 12 7 - 15 mmol/L    Urea Nitrogen 26.8 (H) 8.0 - 23.0 mg/dL    Creatinine 1.13 (H) 0.51 - 0.95 mg/dL    GFR Estimate 54 (L) >60 mL/min/1.73m2    Calcium 10.2 8.8 - 10.4 mg/dL    Chloride 96 (L) 98 - 107 mmol/L    Glucose 114 (H) 70 - 99 mg/dL    Alkaline Phosphatase 336 (H) 40 - 150 U/L    AST 73 (H) 0 - 45 U/L    ALT 75 (H) 0 - 50 U/L    Protein Total 7.3 6.4 - 8.3 g/dL    Albumin 4.6 3.5 - 5.2 g/dL    Bilirubin Total 0.5 <=1.2 mg/dL    Patient Fasting > 8hrs? No       EKG: Normal Sinus Rhythm, normal axis, normal intervals, no acute ST/T changes c/w ischemia, no LVH by voltage criteria, unchanged from previous tracings    Revised Cardiac Risk Index (RCRI)  The patient has the following serious cardiovascular risks for perioperative complications:   - No serious cardiac risks = 0 points     RCRI Interpretation: 0 points: Class I (very low risk - 0.4% complication rate)         Signed Electronically by: Zoey Clements Mai, MD  A copy of this evaluation report is provided to the requesting physician.       Answers submitted by the patient for this visit:  Patient Health Questionnaire (Submitted on 5/30/2025)  If you checked off any problems, how difficult have these problems made it for you  to do your work, take care of things at home, or get along with other people?: Somewhat difficult  PHQ9 TOTAL SCORE: 7

## 2025-05-30 NOTE — H&P (VIEW-ONLY)
Preoperative Evaluation  [address] [address]  Phone: [phone]  Fax: [phone]  Primary Provider: Zoey Clements Mai, MD  Pre-op Performing Provider: Zoey Clements Mai, MD  May 30, 2025             5/30/2025   Surgical Information   What procedure is being done? right knee replacement   Facility or Hospital where procedure/surgery will be performed: [facility]   Who is doing the procedure / surgery? Dr Spring   Date of surgery / procedure: Mavis 10   Time of surgery / procedure: dont know yet   Where do you plan to recover after surgery? at home with family     Fax number for surgical facility: Note does not need to be faxed, will be available electronically in Epic.    Assessment & Plan     The proposed surgical procedure is considered INTERMEDIATE risk.      ICD-10-CM    1. Pre-op exam  Z01.818 EKG 12-lead complete w/read - Clinics      2. Primary osteoarthritis of right knee  M17.11       3. Hypertension goal BP (blood pressure) < 140/90  I10 losartan (COZAAR) 25 MG tablet      4. Coronary vasospasm  I20.1 Lipid panel reflex to direct LDL Non-fasting     Lipid panel reflex to direct LDL Non-fasting      5. Hyperlipidemia LDL goal <160  E78.5       6. Stage 3a chronic kidney disease (H)  N18.31 Uric acid     vitamin D3 (CHOLECALCIFEROL) 50 mcg (2000 units) tablet     Comprehensive metabolic panel (BMP + Alb, Alk Phos, ALT, AST, Total. Bili, TP)     Uric acid     Comprehensive metabolic panel (BMP + Alb, Alk Phos, ALT, AST, Total. Bili, TP)      7. MARCELINA (generalized anxiety disorder)  F41.1 LORazepam (ATIVAN) 0.5 MG tablet      8. Moderate episode of recurrent major depressive disorder (H)  F33.1       9. CREST (calcinosis, Raynaud's phenomenon, esophageal dysfunction, sclerodactyly, telangiectasia) (H)  M34.1       10. Hypomagnesemia  E83.42 Magnesium     Magnesium         Blood pressure has been stable and normal with losartan.  The losartan dose was  recently decreased from 50 mg to 25 mg by her nephrologist.  She has chronic kidney disease which is stable and being managed by the nephrologist.  She been seeing her nephrologist closely.  She is also take Lipitor for high cholesterol - doing well, no side effect..  She takes Adalat and Imdur for coronary vasospasm - been working well.  No history of heart disease.  Overall, her chronic medical conditions are stable and tolerable, tolerating the medications well.      Risks and Recommendations  The patient has the following additional risks and recommendations for perioperative complications:      Cardiovascular:  No history of CAD, CVA, or DM.  HTN has been normal and stable.  She takes Imdur and Adalat for coronary spasm - no history of angina or CAD.  Today's EKG showed normal sinus rhythm.  No no further cardiovascular work-up is needed for this procedure.    Diabetes:  She does not have diabetes.    Nephrology:  CKD stage 3 -stable   - Today's CMP: Creatinine 1.3 with GFR of 54% -stable/baseline   - Electrolytes were normal except sodium slightly low but also has been stable  No DM  HTN has been controlled.  No further workup is needed for this procedure.    Pulmonary:   No pulmonary risk identified.  She quit smoking in 2001.  No history of asthma or COPD.   - Incentive spirometry post-op if prolonged general anesthesia is required.   - Oxygen as needed to maintain O2 sat above 94% during and after procedure.    Obstructive Sleep Apnea:   No history of sleep apnea.  No risk for sleep apnea identified.    Anemia/Bleeding/Clotting:   No history of bleeding disorder.  No history of PE/DVT.  PE/DVT prophylaxis per surgeon's recommendation/desire.    - SCD during and after the procedure until she is fully ambulatory.  No history of anemia or blood transfusion   - Okay to be transfused if necessary.     Social and Substance:   No no mental health or safety concern.  Her depression and anxiety are overall  stable/controlled with Cymbalta and BuSpar.  Not on chronic pain meds.  No drugs or alcohol.  No longer smoking.  She lives with her .    Infection:   No history of MRSA  Prophylactic antibiotics per surgeon's recommendation/desire.  Shower with provided antimicrobial shampoo the day before and a.m. the procedure        Antiplatelet or Anticoagulation Medication Instructions  Please take your prescribed medications as prescribed except.  No Aspirin or NSAID (Ibuprofen, Aleve, Motrin, Naproxen, ect.) for 7 days before the procedure  Ok to take Tylenol up to 1000 mg every 6 hrs as needed for pain  Hold all herbs and vitamin except vitamin d for 2 weeks before the procedure  Hold the Losartan on am of the procedure; resume it after the procedure   Take shower with provided shampoo the day before and am of procedure  No alcohol or smoking for 24 before the procedure  Nothing to eat for 8 hrs before the procedure.      Recommendation  Approval given to proceed with proposed procedure, without further diagnostic evaluation.    Follow-up       Subjective   Marcelina is a 65 year old, presenting for the following:  Pre-Op Exam          5/30/2025    10:11 AM   Additional Questions   Roomed by Ari Padilla     HPI:          5/30/2025   Pre-Op Questionnaire   Have you ever had a heart attack or stroke? No   Have you ever had surgery on your heart or blood vessels, such as a stent placement, a coronary artery bypass, or surgery on an artery in your head, neck, heart, or legs? No   Do you have chest pain with activity? No   Do you have a history of heart failure? No   Do you currently have a cold, bronchitis or symptoms of other infection? No   Do you have a cough, shortness of breath, or wheezing? No   Do you or anyone in your family have previous history of blood clots? No   Do you or does anyone in your family have a serious bleeding problem such as prolonged bleeding following surgeries or cuts? No   Have you ever had  problems with anemia or been told to take iron pills? (!) YES - resolved with iron supplement   Have you had any abnormal blood loss such as black, tarry or bloody stools, or abnormal vaginal bleeding? No   Have you ever had a blood transfusion? No   Are you willing to have a blood transfusion if it is medically needed before, during, or after your surgery? Yes   Have you or any of your relatives ever had problems with anesthesia? No   Do you have sleep apnea, excessive snoring or daytime drowsiness? No   Do you have any artifical heart valves or other implanted medical devices like a pacemaker, defibrillator, or continuous glucose monitor? No   Do you have artificial joints? (!) YES   Are you allergic to latex? No     Advance Care Planning    Discussed advance care planning with patient; informed AVS has link to [employer].    Preoperative Review of    reviewed - controlled substances reflected in medication list.      Marcelina is here today for pre-op physical. She is scheduled for total right knee arthroplasty on 6/10/25 with Dr. Spring at [facility] due to arthritis.  Failed conservative management. Had left knee plasty over a year ago with no complication; tolerated procedure well and had uneventful recovery.  It is planned for inpatient recovery for 1-2 days.  Also is planning for general anesthesia. No personal or family history of anesthesia complication or pre-matured CAD or MI.  Has not been on steroid orally in the last 6 months.  Not taking Aspirin or other form of blood thinner.  Last dose of NSAID was weeks ago.       Her chronic medical conditions are stable.  She has been taking the medications as prescribed.  She takes Lipitor for high cholesterol, isosorbide and Adalat for coronary vasospasm, Protonix for acid reflux, and losartan for high blood pressure.  She has chronic kidney disease which is being managed by the nephrologist.  Her kidney function has been stable.  She also  takes Plaquenil for CREST for which she sees rheumatologist regularly.  Her depression and anxiety are overall stable/tolerable with Cymbalta and BuSpar.  She takes Xanax as needed for anxiety attacks rarely.  No history of CAD, DM or CVA.  No asthma or COPD.  Quit smoking in 2001.    She generally is doing well and has no concern today. No headache or dizziness. No runny nose, nasal congestion, ST, coughing, fever or chill.  No chest pain or SOB.  No N/V/D/C or problem with urination.         Patient Active Problem List    Diagnosis Date Noted    Hypomagnesemia 11/25/2024     Priority: Medium    Pulmonary hypertension (H) 10/24/2024     Priority: Medium    S/P total knee replacement using cement, left 09/12/2024     Priority: Medium    S/P total knee arthroplasty 09/10/2024     Priority: Medium    Primary osteoarthritis of left knee 09/04/2024     Priority: Medium    Proteinuria, unspecified type 08/12/2023     Priority: Medium    Seasonal allergies 08/08/2023     Priority: Medium    Serrated polyposis syndrome 05/05/2023     Priority: Medium    Panic attack 04/20/2021     Priority: Medium    Atrophic vaginitis 04/20/2021     Priority: Medium    Gastroesophageal reflux disease 05/23/2019     Priority: Medium    Coronary vasospasm 12/07/2018     Priority: Medium    Marijuana use, episodic 09/10/2018     Priority: Medium    CREST (calcinosis, Raynaud's phenomenon, esophageal dysfunction, sclerodactyly, telangiectasia) (H)      Priority: Medium    Limited systemic sclerosis (H)      Priority: Medium    Raynaud's disease without gangrene 01/29/2018     Priority: Medium    MARCELINA (generalized anxiety disorder) 01/08/2018     Priority: Medium    Raynaud's phenomenon without gangrene 03/01/2017     Priority: Medium    ACP (advance care planning) 08/19/2015     Priority: Medium     Advance Care Planning 8/19/2015: ACP Review and Resources Provided:  Reviewed chart for advance care plan.  Marcelina Estevez has no plan or code  status on file however states presence of ACP document. Copy requested.  Confirmed/documented legally designated decision maker(s). Added by Mary Padilla RN Advance Care Planning Liaison with [employer] [employer]          Anemia in other chronic diseases classified elsewhere 01/09/2015     Priority: Medium     B12, TSH, Iron study normal in 2018      Status post total hysterectomy 11/18/2014     Priority: Medium    Allergic rhinitis due to pollen 08/02/2014     Priority: Medium    Hyperlipidemia LDL goal <160 06/17/2013     Priority: Medium    Stage 3a chronic kidney disease (H) 06/17/2013     Priority: Medium    Moderate episode of recurrent major depressive disorder (H) 09/10/2012     Priority: Medium     Celexa not helping, fluoxetine caused rash.       Hypertension goal BP (blood pressure) < 140/90 01/18/2011     Priority: Medium    Keratitis sicca, bilateral 06/10/2013     Priority: Low    Atopic rhinitis 01/18/2011     Priority: Low     (Problem list name updated by automated process. Provider to review and confirm.)        Past Medical History:   Diagnosis Date    CREST (calcinosis, Raynaud's phenomenon, esophageal dysfunction, sclerodactyly, telangiectasia) (H)     Hyperlipidemia     Hypertension     Hypertriglyceridemia 01/18/2011    Controlled with simvastatin.       Limited systemic sclerosis (H)     Menopausal syndrome (hot flashes) 12/19/2013    Positive MINDY (antinuclear antibody)     Raynaud disease      Past Surgical History:   Procedure Laterality Date    APPENDECTOMY      ARTHROPLASTY, KNEE, ROBOT ASSISTED, USING RAMILA Left 9/10/2024    Procedure: left knee ramila assisted total arthroplasty;  Surgeon: Rehan Spring DO;  Location: [facility] OR    BIOPSY      cervical node    COLONOSCOPY N/A 11/10/2021    Procedure: COLONOSCOPY, FLEXIBLE, WITH LESION REMOVAL USING SNARE;  Surgeon: Domingo Wall MD;  Location: [facility] OR    COLONOSCOPY N/A 11/10/2021    Procedure: COLONOSCOPY, WITH  POLYPECTOMY AND BIOPSY;  Surgeon: Domingo Wall MD;  Location: [facility]    COLONOSCOPY N/A 9/29/2021    Procedure: Colonoscopy, With Polypectomy And Biopsy;  Surgeon: Domingo Wall MD;  Location: [facility]    COLONOSCOPY N/A 9/29/2021    Procedure: Colonoscopy, Flexible, With Lesion Removal Using Snare;  Surgeon: Domingo Wall MD;  Location: [facility]    COLONOSCOPY N/A 6/29/2022    Procedure: COLONOSCOPY, FLEXIBLE, WITH LESION REMOVAL USING SNARE;  Surgeon: Domingo Wall MD;  Location[facility]    COLONOSCOPY N/A 11/9/2022    Procedure: COLONOSCOPY, WITH POLYPECTOMY AND BIOPSY;  Surgeon: Domingo Wall MD;  Location: [facility]    COLONOSCOPY N/A 6/12/2023    Procedure: COLONOSCOPY, FLEXIBLE, WITH LESION REMOVAL USING SNARE;  Surgeon: Domingo Wall MD;  Location: [facility]    COLONOSCOPY WITH CO2 INSUFFLATION N/A 10/26/2020    Procedure: COLONOSCOPY, WITH CO2 INSUFFLATION;  Surgeon: Phyllis Chaudhari MD;  Location: [facility]    COLONOSCOPY WITH CO2 INSUFFLATION N/A 11/10/2021    Procedure: COLONOSCOPY, WITH CO2 INSUFFLATION;  Surgeon: Domingo Wall MD;  [facility]    COLONOSCOPY WITH CO2 INSUFFLATION N/A 9/29/2021    Procedure: COLONOSCOPY, WITH CO2 INSUFFLATION;  Surgeon: Domingo Wall MD;  [facility]    COLONOSCOPY WITH CO2 INSUFFLATION N/A 6/29/2022    Procedure: COLONOSCOPY, WITH CO2 INSUFFLATION;  Surgeon: Domingo Wall MD;  [facility]    COLONOSCOPY WITH CO2 INSUFFLATION N/A 11/9/2022    Procedure: COLONOSCOPY, WITH CO2 INSUFFLATION;  Surgeon: Domingo Wall MD;  [facility]    COLONOSCOPY WITH CO2 INSUFFLATION N/A 6/12/2023    Procedure: Colonoscopy with CO2 insufflation;  Surgeon: Domingo Wall MD;  [facility]    COLONOSCOPY WITH CO2 INSUFFLATION N/A 7/3/2024    Procedure: Colonoscopy with CO2 insufflation;  Surgeon: Domingo Wall MD;  Location:  [facility]    COMBINED ESOPHAGOSCOPY, GASTROSCOPY, DUODENOSCOPY (EGD) WITH CO2 INSUFFLATION N/A 6/29/2022    Procedure: ESOPHAGOGASTRODUODENOSCOPY, WITH CO2 INSUFFLATION;  Surgeon: Domingo Wall MD;  Location: [facility]    DILATION AND CURETTAGE, HYSTEROSCOPY DIAGNOSTIC, COMBINED  7/1/2014    Procedure: COMBINED DILATION AND CURETTAGE, HYSTEROSCOPY DIAGNOSTIC;  Surgeon: Mana Cabrera DO;  Location: [facility]    ENT SURGERY      tonsillectomy and adnoid removal    ESOPHAGOSCOPY, GASTROSCOPY, DUODENOSCOPY (EGD), COMBINED N/A 6/29/2022    Procedure: ESOPHAGOGASTRODUODENOSCOPY, WITH BIOPSY;  Surgeon: Domingo Wall MD;  Location: [facility]    HYSTERECTOMY TOTAL ABDOMINAL      ORTHOPEDIC SURGERY      left knee tear meniscus    RELEASE CARPAL TUNNEL BILATERAL  2009     Current Outpatient Medications   Medication Sig Dispense Refill    albuterol (PROAIR HFA) 108 (90 Base) MCG/ACT inhaler INHALE 2 PUFS BY MOUTH EVERY 6 HOURS AS NEEDED FOR SHORTNESS OF BREATH / DYSPNEA 18 g 5    ALLEGRA ALLERGY 180 MG tablet Take 1 tablet (180 mg) by mouth daily 90 tablet 3    allopurinol (ZYLOPRIM) 100 MG tablet Take 100 mg by mouth daily. (Patient taking differently: Take 100 mg by mouth daily.)      atorvastatin (LIPITOR) 40 MG tablet Take 1 tablet (40 mg) by mouth daily. 90 tablet 0    busPIRone (BUSPAR) 5 MG tablet Take 1 tablet (5 mg) by mouth 2 times daily. 180 tablet 0    DULoxetine (CYMBALTA) 60 MG capsule Take 1 capsule (60 mg) by mouth 2 times daily. 180 capsule 0    fluticasone (FLONASE) 50 MCG/ACT nasal spray Spray 1 spray into both nostrils daily 16 mL 5    hydroxychloroquine (PLAQUENIL) 200 MG tablet Take 1.5 tablet daily 45 tablet 1    isosorbide mononitrate (IMDUR) 30 MG 24 hr tablet Take 1 tablet (30 mg) by mouth daily. 90 tablet 0    LORazepam (ATIVAN) 0.5 MG tablet Take 1 tablet (0.5 mg) by mouth 2 times daily as needed for anxiety. 20 tablet 0    losartan (COZAAR) 25 MG tablet Take 2 tablets (50 mg) by  mouth daily. 90 tablet 0    magnesium oxide (MAG-OX) 400 MG tablet Take 1 tablet (400 mg) by mouth 2 times daily. 180 tablet 1    NIFEdipine ER (ADALAT CC) 30 MG 24 hr tablet Take 1 tablet (30 mg) by mouth daily. 90 tablet 0    pantoprazole (PROTONIX) 40 MG EC tablet Take 1 tablet (40 mg) by mouth daily. 90 tablet 3    aspirin 81 MG EC tablet Take 1 tablet (81 mg) by mouth daily. (Patient not taking: Reported on 2025)      febuxostat (ULORIC) 40 MG TABS tablet Take 20 mg by mouth daily. (Patient not taking: Reported on 2025)      losartan (COZAAR) 50 MG tablet Take 1 tablet (50 mg) by mouth daily. (Patient not taking: Reported on 2025) 90 tablet 1    order for DME Equipment being ordered: light lamp for seasonal affective disorder (Patient not taking: Reported on 2025) 1 Device 0    pantoprazole (PROTONIX) 40 MG EC tablet Take 40 mg by mouth daily.         Allergies   Allergen Reactions    Statins Other (See Comments)     Elevated liver enzyme      Hydroxyzine      Made her very tired    Other Environmental Allergy Other (See Comments)    Seasonal Allergies     Sulfa Antibiotics      Vomiting          Social History     Tobacco Use    Smoking status: Former     Current packs/day: 0.00     Types: Cigarettes     Quit date: 2001     Years since quittin.4    Smokeless tobacco: Never   Substance Use Topics    Alcohol use: Yes     Comment: 5-8 drinks/week     Family History   Problem Relation Age of Onset    Osteoporosis Mother     Eye Disorder Mother         cataract, mac degen    Macular Degeneration Mother     Dementia Mother     Hypertension Maternal Grandmother     Diabetes Maternal Grandfather     Eye Disorder Paternal Grandmother         glaucoma    Unknown/Adopted Paternal Grandfather     Hypertension Daughter     Graves' disease Daughter     Diabetes Other     Glaucoma No family hx of      History   Drug Use No             Review of Systems  Constitutional, HEENT, cardiovascular,  "pulmonary, GI, , musculoskeletal, neuro, skin, endocrine and psych systems are negative, except as otherwise noted.    Objective    /70   Pulse 78   Temp 98.4  F (36.9  C) (Temporal)   Resp 12   Ht 1.559 m (5' 1.38\")   Wt 60.2 kg (132 lb 12.8 oz)   LMP 06/12/2014   SpO2 100%   BMI 24.78 kg/m     Estimated body mass index is 24.78 kg/m  as calculated from the following:    Height as of this encounter: 1.559 m (5' 1.38\").    Weight as of this encounter: 60.2 kg (132 lb 12.8 oz).  Physical Exam  GENERAL: healthy, alert and no distress.  Speaking in full sentences.  EYES: Eyes grossly normal to inspection, PERRL and conjunctivae and sclerae normal.  No nystagmus.   HENT: ear canals and TM's normal.  Nares are non-congested. Oropharynx is pink and moist. No tender with palpation to the sinuses.  No tonsillar hypertrophy.  NECK: Supple, no lymphadenopathy or thyromegaly.  No tender with palpation to the cervical spine or its paraspinous muscle.  RESP: lungs clear to auscultation - no rales, rhonchi or wheezes.  Good respiratory effort throughout.  CV: regular rate and rhythm, no murmur  ABDOMEN: soft, nontender, nondistended, no palpable masses organomegaly with normal culture.  No CVA tenderness.  MS: no gross musculoskeletal defects noted, no edema.  No focal weakness.  SKIN: No suspicious lesions or rashes.  No petechiae or ecchymosis.  NEURO: Normal strength and tone, mentation intact and speech normal.  Cranial nerve II through XII intact.  DTRs +2 throughout.  No focal neurological deficit.  PSYCH: mentation appears normal, affect normal/bright.  Thoughts intact or hallucination.       Recent Labs   Lab Test 11/06/24  1358 10/30/24  1117 09/30/24  1157   HGB 9.9* 10.2*  --     267  --    INR 1.01  --  1.07   * 128*  --    POTASSIUM 3.9 4.2  --    CR 1.15* 1.72*  --         Diagnostics  Recent Results (from the past 48 hours)   Lipid panel reflex to direct LDL Non-fasting    Collection " Time: 05/30/25 11:35 AM   Result Value Ref Range    Cholesterol 196 <200 mg/dL    Triglycerides 53 <150 mg/dL    Direct Measure  >=50 mg/dL    LDL Cholesterol Calculated 62 <100 mg/dL    Non HDL Cholesterol 73 <130 mg/dL    Patient Fasting > 8hrs? No    Uric acid    Collection Time: 05/30/25 11:35 AM   Result Value Ref Range    Uric Acid 3.1 2.4 - 5.7 mg/dL   Magnesium    Collection Time: 05/30/25 11:35 AM   Result Value Ref Range    Magnesium 1.6 (L) 1.7 - 2.3 mg/dL   Comprehensive metabolic panel (BMP + Alb, Alk Phos, ALT, AST, Total. Bili, TP)    Collection Time: 05/30/25 11:35 AM   Result Value Ref Range    Sodium 133 (L) 135 - 145 mmol/L    Potassium 3.9 3.4 - 5.3 mmol/L    Carbon Dioxide (CO2) 25 22 - 29 mmol/L    Anion Gap 12 7 - 15 mmol/L    Urea Nitrogen 26.8 (H) 8.0 - 23.0 mg/dL    Creatinine 1.13 (H) 0.51 - 0.95 mg/dL    GFR Estimate 54 (L) >60 mL/min/1.73m2    Calcium 10.2 8.8 - 10.4 mg/dL    Chloride 96 (L) 98 - 107 mmol/L    Glucose 114 (H) 70 - 99 mg/dL    Alkaline Phosphatase 336 (H) 40 - 150 U/L    AST 73 (H) 0 - 45 U/L    ALT 75 (H) 0 - 50 U/L    Protein Total 7.3 6.4 - 8.3 g/dL    Albumin 4.6 3.5 - 5.2 g/dL    Bilirubin Total 0.5 <=1.2 mg/dL    Patient Fasting > 8hrs? No       EKG: Normal Sinus Rhythm, normal axis, normal intervals, no acute ST/T changes c/w ischemia, no LVH by voltage criteria, unchanged from previous tracings    Revised Cardiac Risk Index (RCRI)  The patient has the following serious cardiovascular risks for perioperative complications:   - No serious cardiac risks = 0 points     RCRI Interpretation: 0 points: Class I (very low risk - 0.4% complication rate)         Signed Electronically by: Zoey Clements Mai, MD  A copy of this evaluation report is provided to the requesting physician.       Answers submitted by the patient for this visit:  Patient Health Questionnaire (Submitted on 5/30/2025)  If you checked off any problems, how difficult have these problems made it for you  to do your work, take care of things at home, or get along with other people?: Somewhat difficult  PHQ9 TOTAL SCORE: 7

## 2025-05-30 NOTE — PATIENT INSTRUCTIONS
Please take your prescribed medications as prescribed except.  No Aspirin or NSAID (Ibuprofen, Aleve, Motrin, Naproxen, ect.) for 7 days before the procedure  Ok to take Tylenol up to 1000 mg every 6 hrs as needed for pain  Hold all herbs and vitamin except vitamin d for 2 weeks before the procedure  Hold the Losartan on am of the procedure; resume it after the procedure   Take shower with provided shampoo the day before and am of procedure  No alcohol or smoking for 24 before the procedure  Nothing to eat for 8 hrs before the procedure.

## 2025-05-30 NOTE — PROGRESS NOTES
1 week prior to right total knee replacement    Well versed in recovery. Underwent previous total knee replacement in 2024.  She did have small amount of postop bleeding after surgery. Did need an aquacell change on POD2 to a bulky and this resolved shortly after. She was also very active and pushing the home exercises. We discussed this.       H&P and labs reviewed: no red flags. Hx of CKD stage 3. Creatinine stable over the last year.  1.13 with GFR 54 at pre-op. Planning for eliquis for post op DVT prevention.     CT completed on 5/12/25    Plan for Physical therapy:  Physical therapy to [facility]. Is scheduled.     Plan for discharge: POD1. Has help at home.     Medications/Allergies: Atarax. Will avoid this post-op.     Medication for DVT prophylaxis: Eliqius. Hx of ischemic bowel and unable to take NSAIDs. Did well with this after last surgery.     Domingo Delaney APRN, CNP  Orthopedic Surgery

## 2025-05-31 ENCOUNTER — RESULTS FOLLOW-UP (OUTPATIENT)
Dept: FAMILY MEDICINE | Facility: CLINIC | Age: 65
End: 2025-05-31

## 2025-05-31 PROBLEM — I27.20 PULMONARY HYPERTENSION (H): Status: RESOLVED | Noted: 2024-10-24 | Resolved: 2025-05-31

## 2025-06-03 NOTE — PROGRESS NOTES
Ortho Navigator Note      Pre-op Date 5/30/25     Medical Clearance  CLEARED     Labs Sodium slightly low at 133    Hgb 11.1     COVID Test Date No longer indicated      Skin  Intact      Activity: Ambulates independently      Equipment Need Patient will NOT likely need a walker for discharge. Defer to PT/OT for recs.       Meds to Hold Held all supplements 14 days prior to surgery  Please take your prescribed medications as prescribed except.  No Aspirin or NSAID (Ibuprofen, Aleve, Motrin, Naproxen, ect.) for 7 days before the procedure  Ok to take Tylenol up to 1000 mg every 6 hrs as needed for pain  Hold all herbs and vitamin except vitamin d for 2 weeks before the procedure  Hold the Losartan on am of the procedure; resume it after the procedure     * Medication recommendations are not intended to be exhaustive; they are limited to common medications that are potentially dangerous if incorrectly managed   NPO Instructions  Instructed to stop solids 8 hr prior to arrival and clears 2 hr prior to arrival.       Pre-op Joint Education Complete? Refused zoom class at this time   Discharge Plan Patient has plan to discharge home on morning of POD 1.   Spouse Ramakrishna will arrive at hospital at 0800 to participate in therapy and discharge education. They will then transport patient home    /Transportation Spouse Ramakrishna will be  and transportation.  is physically able to care for patient.      Barriers to Discharge No barriers to discharge.      Additional Info/   Special Needs : -Hx of LEFT TKA September 2024        Post Op PT 6/13/25 at [facility]               06/03/25 0371   Discharge Planning   Patient/Family Anticipates Transition to home   Concerns to be Addressed denies needs/concerns at this time   Living Arrangements   People in Home spouse   Type of Residence Private Residence   Is your private residence a single family home or apartment? Single family home   Number of Stairs, Within Home,  Primary one   Stair Railings, Within Home, Primary railings safe and in good condition   Once home, are you able to live on one level? Yes   Which level? Main Level   Bathroom Shower/Tub Walk-in shower   Equipment Currently Used at Home shower chair;tub bench   Support System   Support Systems Spouse/Significant Other   Do you have someone available to stay with you one or two nights once you are home? Yes   Medical Clearance   Date of Physical 05/30/25   Clinic Name [facility]   It is recommended that you call and check with any specialty providers before surgery to see if you need surgical clearance.  Do you see any specialty providers outside of your primary care provider? No   Blood   Known Bleeding Disorder or Coagulopathy No   Does the patient have any Hindu/cultural preferences related to blood products? No   Education   Has the patient scheduled or completed pre-op total joint education, either in class or online, in the last 12 months? No   Patient attended total joint pre-op class/received pre-op teaching  email/phone call   Falls Risk   Has the patient been identified as a high falls risk before surgery? (fallen in the last 6 months, history of falls, unsteady gait, or balance issues) No   Did an order get placed to attend outpatient pre-surgery balance evaluation? No   Relationship/Environment   Name(s) of People in Home Spouse Ramakrishna

## 2025-06-04 ENCOUNTER — VIRTUAL VISIT (OUTPATIENT)
Dept: ORTHOPEDICS | Facility: CLINIC | Age: 65
End: 2025-06-04
Payer: MEDICARE

## 2025-06-04 VITALS — BODY MASS INDEX: 24.19 KG/M2 | WEIGHT: 129.6 LBS

## 2025-06-04 DIAGNOSIS — M17.11 PRIMARY OSTEOARTHRITIS OF RIGHT KNEE: Primary | ICD-10-CM

## 2025-06-04 PROCEDURE — 99207 PR NO CHARGE LOS: CPT | Mod: 93 | Performed by: ORTHOPAEDIC SURGERY

## 2025-06-04 NOTE — LETTER
6/4/2025      Marcelina Estevez  [address] [place] [address]      Dear Colleague,    Thank you for referring your patient, Marcelina Estevez, to the [facility]. Please see a copy of my visit note below.    1 week prior to right total knee replacement    Well versed in recovery. Underwent previous total knee replacement in 2024.  She did have small amount of postop bleeding after surgery. Did need an aquacell change on POD2 to a bulky and this resolved shortly after. She was also very active and pushing the home exercises. We discussed this.       H&P and labs reviewed: no red flags. Hx of CKD stage 3. Creatinine stable over the last year.  1.13 with GFR 54 at pre-op. Planning for eliquis for post op DVT prevention.     CT completed on 5/12/25    Plan for Physical therapy:  Physical therapy to [facility]. Is scheduled.     Plan for discharge: POD1. Has help at home.     Medications/Allergies: Atarax. Will avoid this post-op.     Medication for DVT prophylaxis: Eliqius. Hx of ischemic bowel and unable to take NSAIDs. Did well with this after last surgery.     EDUARD Lopez, CNP  Orthopedic Surgery      Again, thank you for allowing me to participate in the care of your patient.        Sincerely,        Rehan Spring, DO    Electronically signed

## 2025-06-10 ENCOUNTER — HOSPITAL ENCOUNTER (OUTPATIENT)
Facility: CLINIC | Age: 65
Discharge: HOME | End: 2025-06-11
Attending: ORTHOPAEDIC SURGERY | Admitting: ORTHOPAEDIC SURGERY
Payer: MEDICARE

## 2025-06-10 ENCOUNTER — ANESTHESIA EVENT (OUTPATIENT)
Dept: SURGERY | Facility: CLINIC | Age: 65
End: 2025-06-10
Payer: MEDICARE

## 2025-06-10 ENCOUNTER — APPOINTMENT (OUTPATIENT)
Dept: GENERAL RADIOLOGY | Facility: CLINIC | Age: 65
End: 2025-06-10
Attending: NURSE PRACTITIONER
Payer: MEDICARE

## 2025-06-10 ENCOUNTER — ANESTHESIA (OUTPATIENT)
Dept: SURGERY | Facility: CLINIC | Age: 65
End: 2025-06-10
Payer: MEDICARE

## 2025-06-10 ENCOUNTER — TELEPHONE (OUTPATIENT)
Dept: FAMILY MEDICINE | Facility: CLINIC | Age: 65
End: 2025-06-10

## 2025-06-10 DIAGNOSIS — Z96.651 S/P TOTAL KNEE ARTHROPLASTY, RIGHT: ICD-10-CM

## 2025-06-10 DIAGNOSIS — Z96.651 S/P TOTAL KNEE REPLACEMENT USING CEMENT, RIGHT: Primary | ICD-10-CM

## 2025-06-10 DIAGNOSIS — I10 HYPERTENSION GOAL BP (BLOOD PRESSURE) < 140/90: ICD-10-CM

## 2025-06-10 LAB — GLUCOSE BLDC GLUCOMTR-MCNC: 87 MG/DL (ref 70–99)

## 2025-06-10 PROCEDURE — 250N000011 HC RX IP 250 OP 636: Mod: JZ | Performed by: NURSE ANESTHETIST, CERTIFIED REGISTERED

## 2025-06-10 PROCEDURE — 710N000010 HC RECOVERY PHASE 1, LEVEL 2, PER MIN: Performed by: ORTHOPAEDIC SURGERY

## 2025-06-10 PROCEDURE — 999N000063 XR KNEE PORT RIGHT 1/2 VIEWS: Mod: RT

## 2025-06-10 PROCEDURE — 258N000003 HC RX IP 258 OP 636: Performed by: NURSE ANESTHETIST, CERTIFIED REGISTERED

## 2025-06-10 PROCEDURE — 370N000017 HC ANESTHESIA TECHNICAL FEE, PER MIN: Performed by: ORTHOPAEDIC SURGERY

## 2025-06-10 PROCEDURE — 82962 GLUCOSE BLOOD TEST: CPT

## 2025-06-10 PROCEDURE — 250N000009 HC RX 250: Performed by: NURSE ANESTHETIST, CERTIFIED REGISTERED

## 2025-06-10 PROCEDURE — 271N000001 HC OR GENERAL SUPPLY NON-STERILE: Performed by: ORTHOPAEDIC SURGERY

## 2025-06-10 PROCEDURE — C1713 ANCHOR/SCREW BN/BN,TIS/BN: HCPCS | Performed by: ORTHOPAEDIC SURGERY

## 2025-06-10 PROCEDURE — C1776 JOINT DEVICE (IMPLANTABLE): HCPCS | Performed by: ORTHOPAEDIC SURGERY

## 2025-06-10 PROCEDURE — 258N000003 HC RX IP 258 OP 636: Performed by: NURSE PRACTITIONER

## 2025-06-10 PROCEDURE — 250N000009 HC RX 250: Mod: JW | Performed by: NURSE ANESTHETIST, CERTIFIED REGISTERED

## 2025-06-10 PROCEDURE — 64447 NJX AA&/STRD FEMORAL NRV IMG: CPT | Mod: XU,RT

## 2025-06-10 PROCEDURE — 250N000009 HC RX 250: Performed by: ORTHOPAEDIC SURGERY

## 2025-06-10 PROCEDURE — 250N000011 HC RX IP 250 OP 636: Mod: JZ | Performed by: NURSE PRACTITIONER

## 2025-06-10 PROCEDURE — 360N000080 HC SURGERY LEVEL 7, PER MIN: Performed by: ORTHOPAEDIC SURGERY

## 2025-06-10 PROCEDURE — 250N000013 HC RX MED GY IP 250 OP 250 PS 637: Performed by: NURSE PRACTITIONER

## 2025-06-10 PROCEDURE — 97161 PT EVAL LOW COMPLEX 20 MIN: CPT | Mod: GP

## 2025-06-10 PROCEDURE — 250N000011 HC RX IP 250 OP 636: Performed by: NURSE PRACTITIONER

## 2025-06-10 PROCEDURE — 999N000141 HC STATISTIC PRE-PROCEDURE NURSING ASSESSMENT: Performed by: ORTHOPAEDIC SURGERY

## 2025-06-10 PROCEDURE — 97110 THERAPEUTIC EXERCISES: CPT | Mod: GP

## 2025-06-10 PROCEDURE — 272N000001 HC OR GENERAL SUPPLY STERILE: Performed by: ORTHOPAEDIC SURGERY

## 2025-06-10 DEVICE — IMP BASEPLATE TIBIAL HOWM TRI 2 5520-B-200: Type: IMPLANTABLE DEVICE | Site: KNEE | Status: FUNCTIONAL

## 2025-06-10 DEVICE — IMPLANTABLE DEVICE: Type: IMPLANTABLE DEVICE | Site: KNEE | Status: FUNCTIONAL

## 2025-06-10 DEVICE — IMP BONE CEMENT STRK SIMPLEX GENTAMICIN 40GM 6195-1-001: Type: IMPLANTABLE DEVICE | Site: KNEE | Status: FUNCTIONAL

## 2025-06-10 DEVICE — IMP COMP FEM STRK TRIATHLN CR RT 3 5510-F-302: Type: IMPLANTABLE DEVICE | Site: KNEE | Status: FUNCTIONAL

## 2025-06-10 DEVICE — PIN FIXATION L110 MM OD4 MM BONE STERILE 144110: Type: IMPLANTABLE DEVICE | Site: KNEE | Status: FUNCTIONAL

## 2025-06-10 DEVICE — PIN FIXATION STRAIGHT L140 MM OD4 MM KNEE STERILE 144140: Type: IMPLANTABLE DEVICE | Site: KNEE | Status: FUNCTIONAL

## 2025-06-10 RX ORDER — ALLOPURINOL 100 MG/1
100 TABLET ORAL 2 TIMES DAILY
Status: DISCONTINUED | OUTPATIENT
Start: 2025-06-10 | End: 2025-06-11 | Stop reason: HOSPADM

## 2025-06-10 RX ORDER — ONDANSETRON 2 MG/ML
INJECTION INTRAMUSCULAR; INTRAVENOUS PRN
Status: DISCONTINUED | OUTPATIENT
Start: 2025-06-10 | End: 2025-06-10

## 2025-06-10 RX ORDER — FENTANYL CITRATE 50 UG/ML
25 INJECTION, SOLUTION INTRAMUSCULAR; INTRAVENOUS EVERY 5 MIN PRN
Status: DISCONTINUED | OUTPATIENT
Start: 2025-06-10 | End: 2025-06-10

## 2025-06-10 RX ORDER — BUPIVACAINE HCL/EPINEPHRINE 0.5-1:200K
VIAL (ML) INJECTION PRN
Status: DISCONTINUED | OUTPATIENT
Start: 2025-06-10 | End: 2025-06-10

## 2025-06-10 RX ORDER — PROPOFOL 10 MG/ML
INJECTION, EMULSION INTRAVENOUS CONTINUOUS PRN
Status: DISCONTINUED | OUTPATIENT
Start: 2025-06-10 | End: 2025-06-10

## 2025-06-10 RX ORDER — NALOXONE HYDROCHLORIDE 0.4 MG/ML
0.1 INJECTION, SOLUTION INTRAMUSCULAR; INTRAVENOUS; SUBCUTANEOUS
Status: DISCONTINUED | OUTPATIENT
Start: 2025-06-10 | End: 2025-06-10

## 2025-06-10 RX ORDER — HYDROMORPHONE HCL IN WATER/PF 6 MG/30 ML
0.1 PATIENT CONTROLLED ANALGESIA SYRINGE INTRAVENOUS
Status: DISCONTINUED | OUTPATIENT
Start: 2025-06-10 | End: 2025-06-11 | Stop reason: HOSPADM

## 2025-06-10 RX ORDER — ONDANSETRON 4 MG/1
4 TABLET, ORALLY DISINTEGRATING ORAL EVERY 30 MIN PRN
Status: DISCONTINUED | OUTPATIENT
Start: 2025-06-10 | End: 2025-06-10

## 2025-06-10 RX ORDER — SODIUM CHLORIDE, SODIUM LACTATE, POTASSIUM CHLORIDE, CALCIUM CHLORIDE 600; 310; 30; 20 MG/100ML; MG/100ML; MG/100ML; MG/100ML
INJECTION, SOLUTION INTRAVENOUS CONTINUOUS
Status: DISCONTINUED | OUTPATIENT
Start: 2025-06-10 | End: 2025-06-11 | Stop reason: HOSPADM

## 2025-06-10 RX ORDER — ALBUTEROL SULFATE 90 UG/1
1-2 INHALANT RESPIRATORY (INHALATION) EVERY 4 HOURS PRN
Status: DISCONTINUED | OUTPATIENT
Start: 2025-06-10 | End: 2025-06-10

## 2025-06-10 RX ORDER — POLYETHYLENE GLYCOL 3350 17 G/17G
17 POWDER, FOR SOLUTION ORAL DAILY
Status: DISCONTINUED | OUTPATIENT
Start: 2025-06-11 | End: 2025-06-11 | Stop reason: HOSPADM

## 2025-06-10 RX ORDER — FAMOTIDINE 20 MG/1
20 TABLET, FILM COATED ORAL 2 TIMES DAILY
Status: DISCONTINUED | OUTPATIENT
Start: 2025-06-10 | End: 2025-06-11 | Stop reason: HOSPADM

## 2025-06-10 RX ORDER — ONDANSETRON 2 MG/ML
4 INJECTION INTRAMUSCULAR; INTRAVENOUS EVERY 30 MIN PRN
Status: DISCONTINUED | OUTPATIENT
Start: 2025-06-10 | End: 2025-06-10

## 2025-06-10 RX ORDER — FENTANYL CITRATE 50 UG/ML
50 INJECTION, SOLUTION INTRAMUSCULAR; INTRAVENOUS EVERY 5 MIN PRN
Status: DISCONTINUED | OUTPATIENT
Start: 2025-06-10 | End: 2025-06-10

## 2025-06-10 RX ORDER — HYDROMORPHONE HCL IN WATER/PF 6 MG/30 ML
0.2 PATIENT CONTROLLED ANALGESIA SYRINGE INTRAVENOUS
Status: DISCONTINUED | OUTPATIENT
Start: 2025-06-10 | End: 2025-06-11 | Stop reason: HOSPADM

## 2025-06-10 RX ORDER — OXYCODONE HYDROCHLORIDE 5 MG/1
5 TABLET ORAL EVERY 4 HOURS PRN
Status: DISCONTINUED | OUTPATIENT
Start: 2025-06-10 | End: 2025-06-11 | Stop reason: HOSPADM

## 2025-06-10 RX ORDER — LORAZEPAM 0.5 MG/1
0.5 TABLET ORAL 2 TIMES DAILY PRN
Status: DISCONTINUED | OUTPATIENT
Start: 2025-06-10 | End: 2025-06-11 | Stop reason: HOSPADM

## 2025-06-10 RX ORDER — BUSPIRONE HYDROCHLORIDE 5 MG/1
5 TABLET ORAL 2 TIMES DAILY
Status: DISCONTINUED | OUTPATIENT
Start: 2025-06-10 | End: 2025-06-11 | Stop reason: HOSPADM

## 2025-06-10 RX ORDER — LIDOCAINE 40 MG/G
CREAM TOPICAL
Status: DISCONTINUED | OUTPATIENT
Start: 2025-06-10 | End: 2025-06-10 | Stop reason: HOSPADM

## 2025-06-10 RX ORDER — PANTOPRAZOLE SODIUM 40 MG/1
40 TABLET, DELAYED RELEASE ORAL DAILY
Status: DISCONTINUED | OUTPATIENT
Start: 2025-06-10 | End: 2025-06-11 | Stop reason: HOSPADM

## 2025-06-10 RX ORDER — BUPIVACAINE HYDROCHLORIDE 7.5 MG/ML
INJECTION, SOLUTION INTRASPINAL PRN
Status: DISCONTINUED | OUTPATIENT
Start: 2025-06-10 | End: 2025-06-10

## 2025-06-10 RX ORDER — PROPOFOL 10 MG/ML
INJECTION, EMULSION INTRAVENOUS PRN
Status: DISCONTINUED | OUTPATIENT
Start: 2025-06-10 | End: 2025-06-10

## 2025-06-10 RX ORDER — MEPERIDINE HYDROCHLORIDE 25 MG/ML
12.5 INJECTION INTRAMUSCULAR; INTRAVENOUS; SUBCUTANEOUS EVERY 5 MIN PRN
Status: DISCONTINUED | OUTPATIENT
Start: 2025-06-10 | End: 2025-06-10

## 2025-06-10 RX ORDER — CEFAZOLIN SODIUM/WATER 2 G/20 ML
2 SYRINGE (ML) INTRAVENOUS SEE ADMIN INSTRUCTIONS
Status: DISCONTINUED | OUTPATIENT
Start: 2025-06-10 | End: 2025-06-10 | Stop reason: HOSPADM

## 2025-06-10 RX ORDER — ALBUTEROL SULFATE 0.83 MG/ML
2.5 SOLUTION RESPIRATORY (INHALATION) EVERY 4 HOURS PRN
Status: DISCONTINUED | OUTPATIENT
Start: 2025-06-10 | End: 2025-06-10

## 2025-06-10 RX ORDER — DULOXETIN HYDROCHLORIDE 30 MG/1
60 CAPSULE, DELAYED RELEASE ORAL 2 TIMES DAILY
Status: DISCONTINUED | OUTPATIENT
Start: 2025-06-10 | End: 2025-06-11 | Stop reason: HOSPADM

## 2025-06-10 RX ORDER — PROCHLORPERAZINE MALEATE 5 MG/1
5 TABLET ORAL EVERY 6 HOURS PRN
Status: DISCONTINUED | OUTPATIENT
Start: 2025-06-10 | End: 2025-06-11 | Stop reason: HOSPADM

## 2025-06-10 RX ORDER — ISOSORBIDE MONONITRATE 30 MG/1
30 TABLET, EXTENDED RELEASE ORAL DAILY
Status: DISCONTINUED | OUTPATIENT
Start: 2025-06-11 | End: 2025-06-11 | Stop reason: HOSPADM

## 2025-06-10 RX ORDER — AMOXICILLIN 250 MG
1-2 CAPSULE ORAL 2 TIMES DAILY PRN
Qty: 30 TABLET | Refills: 0 | Status: SHIPPED | OUTPATIENT
Start: 2025-06-10

## 2025-06-10 RX ORDER — BISACODYL 10 MG
10 SUPPOSITORY, RECTAL RECTAL DAILY PRN
Status: DISCONTINUED | OUTPATIENT
Start: 2025-06-13 | End: 2025-06-11 | Stop reason: HOSPADM

## 2025-06-10 RX ORDER — CEFAZOLIN SODIUM 1 G/3ML
1 INJECTION, POWDER, FOR SOLUTION INTRAMUSCULAR; INTRAVENOUS EVERY 8 HOURS
Status: COMPLETED | OUTPATIENT
Start: 2025-06-10 | End: 2025-06-11

## 2025-06-10 RX ORDER — NIFEDIPINE 30 MG/1
30 TABLET, EXTENDED RELEASE ORAL DAILY
Status: DISCONTINUED | OUTPATIENT
Start: 2025-06-10 | End: 2025-06-11 | Stop reason: HOSPADM

## 2025-06-10 RX ORDER — FENTANYL CITRATE-0.9 % NACL/PF 10 MCG/ML
PLASTIC BAG, INJECTION (ML) INTRAVENOUS CONTINUOUS PRN
Status: DISCONTINUED | OUTPATIENT
Start: 2025-06-10 | End: 2025-06-10

## 2025-06-10 RX ORDER — FENTANYL CITRATE 50 UG/ML
INJECTION, SOLUTION INTRAMUSCULAR; INTRAVENOUS PRN
Status: DISCONTINUED | OUTPATIENT
Start: 2025-06-10 | End: 2025-06-10

## 2025-06-10 RX ORDER — HYDROMORPHONE HYDROCHLORIDE 1 MG/ML
0.2 INJECTION, SOLUTION INTRAMUSCULAR; INTRAVENOUS; SUBCUTANEOUS EVERY 5 MIN PRN
Status: DISCONTINUED | OUTPATIENT
Start: 2025-06-10 | End: 2025-06-10

## 2025-06-10 RX ORDER — DEXAMETHASONE SODIUM PHOSPHATE 10 MG/ML
4 INJECTION, SOLUTION INTRAMUSCULAR; INTRAVENOUS
Status: DISCONTINUED | OUTPATIENT
Start: 2025-06-10 | End: 2025-06-10

## 2025-06-10 RX ORDER — ONDANSETRON 4 MG/1
4 TABLET, ORALLY DISINTEGRATING ORAL EVERY 6 HOURS PRN
Status: DISCONTINUED | OUTPATIENT
Start: 2025-06-10 | End: 2025-06-11 | Stop reason: HOSPADM

## 2025-06-10 RX ORDER — OXYCODONE HYDROCHLORIDE 5 MG/1
5-10 TABLET ORAL EVERY 4 HOURS PRN
Qty: 30 TABLET | Refills: 0 | Status: SHIPPED | OUTPATIENT
Start: 2025-06-10

## 2025-06-10 RX ORDER — TRANEXAMIC ACID 650 MG/1
1950 TABLET ORAL ONCE
Status: COMPLETED | OUTPATIENT
Start: 2025-06-10 | End: 2025-06-10

## 2025-06-10 RX ORDER — METHOCARBAMOL 500 MG/1
500 TABLET, FILM COATED ORAL EVERY 6 HOURS PRN
Status: DISCONTINUED | OUTPATIENT
Start: 2025-06-10 | End: 2025-06-11 | Stop reason: HOSPADM

## 2025-06-10 RX ORDER — AMOXICILLIN 250 MG
1 CAPSULE ORAL 2 TIMES DAILY
Status: DISCONTINUED | OUTPATIENT
Start: 2025-06-10 | End: 2025-06-11 | Stop reason: HOSPADM

## 2025-06-10 RX ORDER — OXYCODONE HYDROCHLORIDE 5 MG/1
10 TABLET ORAL EVERY 4 HOURS PRN
Status: DISCONTINUED | OUTPATIENT
Start: 2025-06-10 | End: 2025-06-11 | Stop reason: HOSPADM

## 2025-06-10 RX ORDER — LIDOCAINE 40 MG/G
CREAM TOPICAL
Status: DISCONTINUED | OUTPATIENT
Start: 2025-06-10 | End: 2025-06-11 | Stop reason: HOSPADM

## 2025-06-10 RX ORDER — LABETALOL HYDROCHLORIDE 5 MG/ML
10 INJECTION, SOLUTION INTRAVENOUS
Status: DISCONTINUED | OUTPATIENT
Start: 2025-06-10 | End: 2025-06-10

## 2025-06-10 RX ORDER — ONDANSETRON 2 MG/ML
4 INJECTION INTRAMUSCULAR; INTRAVENOUS EVERY 6 HOURS PRN
Status: DISCONTINUED | OUTPATIENT
Start: 2025-06-10 | End: 2025-06-11 | Stop reason: HOSPADM

## 2025-06-10 RX ORDER — CEFAZOLIN SODIUM/WATER 2 G/20 ML
2 SYRINGE (ML) INTRAVENOUS
Status: COMPLETED | OUTPATIENT
Start: 2025-06-10 | End: 2025-06-10

## 2025-06-10 RX ORDER — HYDROMORPHONE HYDROCHLORIDE 1 MG/ML
0.5 INJECTION, SOLUTION INTRAMUSCULAR; INTRAVENOUS; SUBCUTANEOUS EVERY 5 MIN PRN
Status: DISCONTINUED | OUTPATIENT
Start: 2025-06-10 | End: 2025-06-10

## 2025-06-10 RX ORDER — ACETAMINOPHEN 500 MG
1000 TABLET ORAL EVERY 8 HOURS PRN
Qty: 60 TABLET | Refills: 1 | Status: SHIPPED | OUTPATIENT
Start: 2025-06-10

## 2025-06-10 RX ORDER — ACETAMINOPHEN 325 MG/1
975 TABLET ORAL EVERY 8 HOURS
Status: DISCONTINUED | OUTPATIENT
Start: 2025-06-10 | End: 2025-06-11 | Stop reason: HOSPADM

## 2025-06-10 RX ORDER — SODIUM CHLORIDE, SODIUM LACTATE, POTASSIUM CHLORIDE, CALCIUM CHLORIDE 600; 310; 30; 20 MG/100ML; MG/100ML; MG/100ML; MG/100ML
INJECTION, SOLUTION INTRAVENOUS CONTINUOUS
Status: DISCONTINUED | OUTPATIENT
Start: 2025-06-10 | End: 2025-06-10 | Stop reason: HOSPADM

## 2025-06-10 RX ADMIN — ALLOPURINOL 100 MG: 100 TABLET ORAL at 20:11

## 2025-06-10 RX ADMIN — ONDANSETRON 4 MG: 2 INJECTION INTRAMUSCULAR; INTRAVENOUS at 10:32

## 2025-06-10 RX ADMIN — FAMOTIDINE 20 MG: 20 TABLET, FILM COATED ORAL at 12:59

## 2025-06-10 RX ADMIN — PROPOFOL 50 MG: 10 INJECTION, EMULSION INTRAVENOUS at 09:28

## 2025-06-10 RX ADMIN — SENNOSIDES AND DOCUSATE SODIUM 1 TABLET: 50; 8.6 TABLET ORAL at 12:59

## 2025-06-10 RX ADMIN — Medication 2 G: at 09:17

## 2025-06-10 RX ADMIN — MIDAZOLAM 2 MG: 1 INJECTION INTRAMUSCULAR; INTRAVENOUS at 09:17

## 2025-06-10 RX ADMIN — FENTANYL CITRATE 25 MCG: 50 INJECTION INTRAMUSCULAR; INTRAVENOUS at 09:49

## 2025-06-10 RX ADMIN — DULOXETINE HYDROCHLORIDE 60 MG: 30 CAPSULE, DELAYED RELEASE ORAL at 12:59

## 2025-06-10 RX ADMIN — SENNOSIDES AND DOCUSATE SODIUM 1 TABLET: 50; 8.6 TABLET ORAL at 20:10

## 2025-06-10 RX ADMIN — FENTANYL CITRATE 25 MCG: 50 INJECTION INTRAMUSCULAR; INTRAVENOUS at 09:20

## 2025-06-10 RX ADMIN — DULOXETINE HYDROCHLORIDE 60 MG: 30 CAPSULE, DELAYED RELEASE ORAL at 20:12

## 2025-06-10 RX ADMIN — OXYCODONE 10 MG: 5 TABLET ORAL at 20:19

## 2025-06-10 RX ADMIN — PROPOFOL 100 MCG/KG/MIN: 10 INJECTION, EMULSION INTRAVENOUS at 09:28

## 2025-06-10 RX ADMIN — LIDOCAINE HYDROCHLORIDE 0.1 ML: 10 INJECTION, SOLUTION EPIDURAL; INFILTRATION; INTRACAUDAL; PERINEURAL at 08:21

## 2025-06-10 RX ADMIN — BUPIVACAINE HYDROCHLORIDE AND EPINEPHRINE BITARTRATE 20 ML: 5; .005 INJECTION, SOLUTION PERINEURAL at 11:45

## 2025-06-10 RX ADMIN — ACETAMINOPHEN 975 MG: 325 TABLET ORAL at 12:58

## 2025-06-10 RX ADMIN — Medication 20 MCG/MIN: at 09:28

## 2025-06-10 RX ADMIN — CEFAZOLIN 1 G: 1 INJECTION, POWDER, FOR SOLUTION INTRAMUSCULAR; INTRAVENOUS at 16:45

## 2025-06-10 RX ADMIN — SODIUM CHLORIDE, SODIUM LACTATE, POTASSIUM CHLORIDE, AND CALCIUM CHLORIDE: .6; .31; .03; .02 INJECTION, SOLUTION INTRAVENOUS at 20:32

## 2025-06-10 RX ADMIN — SODIUM CHLORIDE, SODIUM LACTATE, POTASSIUM CHLORIDE, AND CALCIUM CHLORIDE: .6; .31; .03; .02 INJECTION, SOLUTION INTRAVENOUS at 08:21

## 2025-06-10 RX ADMIN — PANTOPRAZOLE SODIUM 40 MG: 40 TABLET, DELAYED RELEASE ORAL at 12:59

## 2025-06-10 RX ADMIN — FENTANYL CITRATE 25 MCG: 50 INJECTION INTRAMUSCULAR; INTRAVENOUS at 09:24

## 2025-06-10 RX ADMIN — FAMOTIDINE 20 MG: 20 TABLET, FILM COATED ORAL at 20:11

## 2025-06-10 RX ADMIN — BUSPIRONE HYDROCHLORIDE 5 MG: 5 TABLET ORAL at 20:11

## 2025-06-10 RX ADMIN — ACETAMINOPHEN 975 MG: 325 TABLET ORAL at 20:10

## 2025-06-10 RX ADMIN — FENTANYL CITRATE 25 MCG: 50 INJECTION INTRAMUSCULAR; INTRAVENOUS at 09:33

## 2025-06-10 RX ADMIN — TRANEXAMIC ACID 1950 MG: 650 TABLET ORAL at 07:57

## 2025-06-10 RX ADMIN — BUPIVACAINE HYDROCHLORIDE IN DEXTROSE 1.6 ML: 7.5 INJECTION, SOLUTION SUBARACHNOID at 09:27

## 2025-06-10 RX ADMIN — NIFEDIPINE 30 MG: 30 TABLET, FILM COATED, EXTENDED RELEASE ORAL at 12:58

## 2025-06-10 RX ADMIN — OXYCODONE 5 MG: 5 TABLET ORAL at 15:56

## 2025-06-10 RX ADMIN — SODIUM CHLORIDE, SODIUM LACTATE, POTASSIUM CHLORIDE, AND CALCIUM CHLORIDE: .6; .31; .03; .02 INJECTION, SOLUTION INTRAVENOUS at 09:56

## 2025-06-10 RX ADMIN — SODIUM CHLORIDE, SODIUM LACTATE, POTASSIUM CHLORIDE, AND CALCIUM CHLORIDE: .6; .31; .03; .02 INJECTION, SOLUTION INTRAVENOUS at 12:38

## 2025-06-10 ASSESSMENT — ACTIVITIES OF DAILY LIVING (ADL)
ADLS_ACUITY_SCORE: 31
ADLS_ACUITY_SCORE: 30
ADLS_ACUITY_SCORE: 31
ADLS_ACUITY_SCORE: 30
ADLS_ACUITY_SCORE: 31
ADLS_ACUITY_SCORE: 30
ADLS_ACUITY_SCORE: 31
ADLS_ACUITY_SCORE: 31
ADLS_ACUITY_SCORE: 30
ADLS_ACUITY_SCORE: 30

## 2025-06-10 ASSESSMENT — LIFESTYLE VARIABLES: TOBACCO_USE: 1

## 2025-06-10 NOTE — ANESTHESIA PREPROCEDURE EVALUATION
Anesthesia Pre-Procedure Evaluation    Patient: Marcelina Estevez   MRN: [id number] : 1960          Procedure : Procedure(s):  right ramila assisted total knee replacement         Past Medical History:   Diagnosis Date    CREST (calcinosis, Raynaud's phenomenon, esophageal dysfunction, sclerodactyly, telangiectasia) (H)     Hyperlipidemia     Hypertension     Hypertriglyceridemia 2011    Controlled with simvastatin.       Limited systemic sclerosis (H)     Menopausal syndrome (hot flashes) 2013    Positive MINDY (antinuclear antibody)     Raynaud disease       Past Surgical History:   Procedure Laterality Date    APPENDECTOMY      ARTHROPLASTY, KNEE, ROBOT ASSISTED, USING RAMILA Left 9/10/2024    Procedure: left knee ramila assisted total arthroplasty;  Surgeon: Rehan Spring DO;  [facility]: [facility]    BIOPSY      cervical node    COLONOSCOPY N/A 11/10/2021    Procedure: COLONOSCOPY, FLEXIBLE, WITH LESION REMOVAL USING SNARE;  Surgeon: Domingo Wall MD;  [facility]    COLONOSCOPY N/A 11/10/2021    Procedure: COLONOSCOPY, WITH POLYPECTOMY AND BIOPSY;  Surgeon: Domingo Wall MD;  [facility]    COLONOSCOPY N/A 2021    Procedure: Colonoscopy, With Polypectomy And Biopsy;  Surgeon: Domingo Wall MD;  [facility]    COLONOSCOPY N/A 2021    Procedure: Colonoscopy, Flexible, With Lesion Removal Using Snare;  Surgeon: Domingo Wall MD;  [facility]    COLONOSCOPY N/A 2022    Procedure: COLONOSCOPY, FLEXIBLE, WITH LESION REMOVAL USING SNARE;  Surgeon: Domingo Wall MD;  [facility]    COLONOSCOPY N/A 2022    Procedure: COLONOSCOPY, WITH POLYPECTOMY AND BIOPSY;  Surgeon: Domingo Wall MD;  [facility]    COLONOSCOPY N/A 2023    Procedure: COLONOSCOPY, FLEXIBLE, WITH LESION REMOVAL USING SNARE;  Surgeon: Domingo Wall MD;  [facility]    COLONOSCOPY WITH CO2  INSUFFLATION N/A 10/26/2020    Procedure: COLONOSCOPY, WITH CO2 INSUFFLATION;  Surgeon: Phyllis Chaudhari MD;  [facility]    COLONOSCOPY WITH CO2 INSUFFLATION N/A 11/10/2021    Procedure: COLONOSCOPY, WITH CO2 INSUFFLATION;  Surgeon: Domingo Wall MD;  [facility]    COLONOSCOPY WITH CO2 INSUFFLATION N/A 9/29/2021    Procedure: COLONOSCOPY, WITH CO2 INSUFFLATION;  Surgeon: Domingo Wall MD;  [facility]    COLONOSCOPY WITH CO2 INSUFFLATION N/A 6/29/2022    Procedure: COLONOSCOPY, WITH CO2 INSUFFLATION;  Surgeon: Domingo Wall MD;  [facility]    COLONOSCOPY WITH CO2 INSUFFLATION N/A 11/9/2022    Procedure: COLONOSCOPY, WITH CO2 INSUFFLATION;  Surgeon: Domingo Wall MD;  [facility]    COLONOSCOPY WITH CO2 INSUFFLATION N/A 6/12/2023    Procedure: Colonoscopy with CO2 insufflation;  Surgeon: Domingo Wall MD;  [facility]    COLONOSCOPY WITH CO2 INSUFFLATION N/A 7/3/2024    Procedure: Colonoscopy with CO2 insufflation;  Surgeon: Domingo Wall MD;  [facility]    COMBINED ESOPHAGOSCOPY, GASTROSCOPY, DUODENOSCOPY (EGD) WITH CO2 INSUFFLATION N/A 6/29/2022    Procedure: ESOPHAGOGASTRODUODENOSCOPY, WITH CO2 INSUFFLATION;  Surgeon: Domingo Wall MD;  [facility]    DILATION AND CURETTAGE, HYSTEROSCOPY DIAGNOSTIC, COMBINED  7/1/2014    Procedure: COMBINED DILATION AND CURETTAGE, HYSTEROSCOPY DIAGNOSTIC;  Surgeon: Mana Cabrera DO;  Location: [facility]    ENT SURGERY      tonsillectomy and adnoid removal    ESOPHAGOSCOPY, GASTROSCOPY, DUODENOSCOPY (EGD), COMBINED N/A 6/29/2022    Procedure: ESOPHAGOGASTRODUODENOSCOPY, WITH BIOPSY;  Surgeon: Domingo Wall MD;  [facility]    HYSTERECTOMY TOTAL ABDOMINAL      ORTHOPEDIC SURGERY      left knee tear meniscus    RELEASE CARPAL TUNNEL BILATERAL  2009      Allergies   Allergen Reactions    Hydroxyzine      Made her very tired     Other Environmental Allergy Other (See Comments)    Seasonal Allergies     Sulfa Antibiotics      Vomiting        Social History     Tobacco Use    Smoking status: Former     Current packs/day: 0.00     Types: Cigarettes     Quit date: 2001     Years since quittin.4    Smokeless tobacco: Never   Substance Use Topics    Alcohol use: Yes     Comment: 5-8 drinks/week      Wt Readings from Last 1 Encounters:   25 58.8 kg (129 lb 9.6 oz)        Anesthesia Evaluation            ROS/MED HX  ENT/Pulmonary:     (+)                tobacco use, Past use,    Intermittent, asthma  Treatment: Inhaler prn,                 Neurologic:  - neg neurologic ROS     Cardiovascular:     (+) Dyslipidemia hypertension- -   -  - -                                 Previous cardiac testing   Echo: Date: Results:    Stress Test:  Date: Results:    ECG Reviewed:  Date: 25 Results:  SR with BBB  Cath:  Date: Results:      METS/Exercise Tolerance:     Hematologic:     (+)      anemia,          Musculoskeletal:   (+)  arthritis,             GI/Hepatic:     (+) GERD, Asymptomatic on medication,       appendicitis,           Renal/Genitourinary:     (+) renal disease, type: CRI,            Endo:  - neg endo ROS     Psychiatric/Substance Use:     (+) psychiatric history anxiety and depression   Recreational drug usage: Cannabis (smokes a couple times per wek).    Infectious Disease:       Malignancy:       Other:              Physical Exam  Airway  Mallampati: II  TM distance: >3 FB  Neck ROM: full  Upper bite lip test: II  Mouth opening: >= 4 cm    Cardiovascular - normal exam  Rhythm: regular  Rate: normal rate     Dental   (+) Multiple crowns, permanent bridges      Pulmonary - normal examBreath sounds clear to auscultation        Neurological - normal exam  She appears awake, alert and oriented x3.    Other Findings       OUTSIDE LABS:  CBC:   Lab Results   Component Value Date    WBC 7.7 2024    WBC 7.4 10/30/2024    HGB  9.9 (L) 11/06/2024    HGB 10.2 (L) 10/30/2024    HCT 30.0 (L) 11/06/2024    HCT 29.9 (L) 10/30/2024     11/06/2024     10/30/2024     BMP:   Lab Results   Component Value Date     (L) 05/30/2025     (L) 11/06/2024    POTASSIUM 3.9 05/30/2025    POTASSIUM 3.9 11/06/2024    CHLORIDE 96 (L) 05/30/2025    CHLORIDE 95 (L) 11/06/2024    CO2 25 05/30/2025    CO2 21 (L) 11/06/2024    BUN 26.8 (H) 05/30/2025    BUN 38.0 (H) 11/06/2024    CR 1.13 (H) 05/30/2025    CR 1.15 (H) 11/06/2024     (H) 05/30/2025     (H) 11/06/2024     COAGS:   Lab Results   Component Value Date    INR 1.01 11/06/2024     POC:   Lab Results   Component Value Date    HCG neg 07/01/2014     HEPATIC:   Lab Results   Component Value Date    ALBUMIN 4.6 05/30/2025    PROTTOTAL 7.3 05/30/2025    ALT 75 (H) 05/30/2025    AST 73 (H) 05/30/2025    ALKPHOS 336 (H) 05/30/2025    BILITOTAL 0.5 05/30/2025     OTHER:   Lab Results   Component Value Date    SERGIO 10.2 05/30/2025    PHOS 3.2 08/26/2024    MAG 1.6 (L) 05/30/2025    TSH 2.41 10/30/2024    CRP <2.9 06/12/2018    SED 37 (H) 11/06/2024       Anesthesia Plan    ASA Status:  3      NPO Status: NPO Appropriate   Anesthesia Type: Spinal.  Airway: natural airway.  Induction: intravenous.  Maintenance: Balanced.   Techniques and Equipment:       - Monitoring Plan: standard ASA monitoring     Consents    Anesthesia Plan(s) and associated risks, benefits, and realistic alternatives discussed. Questions answered and patient/representative(s) expressed understanding.     - Discussed:     - Discussed with:  Patient        - Pt is DNR/DNI Status: no DNR          Postoperative Care    Pain management: non-narcotic analgesics, plan for postoperative opioid use, peripheral nerve block.     Comments:    Other Comments: The risks and benefits of anesthesia, and the alternatives where applicable, have been discussed with the patient, and they wish to proceed.                  Fred  EDUARD Castanon CRNA    I have reviewed the pertinent notes and labs in the chart from the past 30 days and (re)examined the patient.  Any updates or changes from those notes are reflected in this note.    Clinically Significant Risk Factors Present on Admission                 # Drug Induced Platelet Defect: home medication list includes an antiplatelet medication   # Hypertension: Noted on problem list

## 2025-06-10 NOTE — OR NURSING
Transfer from  PACU to Room 253  Transferred to bed via Glyder Mat    S: 64 y/o female  S/P right ramila assisted total knee replacement       Anesthesia Type:  spinal with post op adductor canal block       Surgeon:  Dr. Spring       Allergies:  See Medication Reconciliation Record       DNR: no    B:  Pertinent Medical History:   Past Medical History:   Diagnosis Date    CREST (calcinosis, Raynaud's phenomenon, esophageal dysfunction, sclerodactyly, telangiectasia) (H)     Hyperlipidemia     Hypertension     Hypertriglyceridemia 01/18/2011    Controlled with simvastatin.       Limited systemic sclerosis (H)     Menopausal syndrome (hot flashes) 12/19/2013    Positive MINDY (antinuclear antibody)     Raynaud disease              Surgical History:    Past Surgical History:   Procedure Laterality Date    APPENDECTOMY      ARTHROPLASTY, KNEE, ROBOT ASSISTED, USING RAMILA Left 9/10/2024    Procedure: left knee ramila assisted total arthroplasty;  Surgeon: Rehan pSring DO;  Location: [facility]    BIOPSY      cervical node    COLONOSCOPY N/A 11/10/2021    Procedure: COLONOSCOPY, FLEXIBLE, WITH LESION REMOVAL USING SNARE;  Surgeon: Domingo Wall MD;  [facility]    COLONOSCOPY N/A 11/10/2021    Procedure: COLONOSCOPY, WITH POLYPECTOMY AND BIOPSY;  Surgeon: Domingo Wall MD;  [facility]    COLONOSCOPY N/A 9/29/2021    Procedure: Colonoscopy, With Polypectomy And Biopsy;  Surgeon: Domingo Wall MD;  [facility]    COLONOSCOPY N/A 9/29/2021    Procedure: Colonoscopy, Flexible, With Lesion Removal Using Snare;  Surgeon: Domingo Wall MD;  [facility]    COLONOSCOPY N/A 6/29/2022    Procedure: COLONOSCOPY, FLEXIBLE, WITH LESION REMOVAL USING SNARE;  Surgeon: Domingo Wall MD;  [facility]    COLONOSCOPY N/A 11/9/2022    Procedure: COLONOSCOPY, WITH POLYPECTOMY AND BIOPSY;  Surgeon: Domingo Wall MD;  [facility]     COLONOSCOPY N/A 6/12/2023    Procedure: COLONOSCOPY, FLEXIBLE, WITH LESION REMOVAL USING SNARE;  Surgeon: Domingo Wall MD;  [facility]    COLONOSCOPY WITH CO2 INSUFFLATION N/A 10/26/2020    Procedure: COLONOSCOPY, WITH CO2 INSUFFLATION;  Surgeon: Phyllis Chaudhari MD;  [facility]    COLONOSCOPY WITH CO2 INSUFFLATION N/A 11/10/2021    Procedure: COLONOSCOPY, WITH CO2 INSUFFLATION;  Surgeon: Domingo Wall MD;  [facility]    COLONOSCOPY WITH CO2 INSUFFLATION N/A 9/29/2021    Procedure: COLONOSCOPY, WITH CO2 INSUFFLATION;  Surgeon: Domingo Wall MD;  [facility]    COLONOSCOPY WITH CO2 INSUFFLATION N/A 6/29/2022    Procedure: COLONOSCOPY, WITH CO2 INSUFFLATION;  Surgeon: Domingo Wall MD;  [facility]    COLONOSCOPY WITH CO2 INSUFFLATION N/A 11/9/2022    Procedure: COLONOSCOPY, WITH CO2 INSUFFLATION;  Surgeon: Domingo Wall MD;  [facility]    COLONOSCOPY WITH CO2 INSUFFLATION N/A 6/12/2023    Procedure: Colonoscopy with CO2 insufflation;  Surgeon: Domingo Wall MD;  [facility]    COLONOSCOPY WITH CO2 INSUFFLATION N/A 7/3/2024    Procedure: Colonoscopy with CO2 insufflation;  Surgeon: Domingo Wall MD;  [facility]    COMBINED ESOPHAGOSCOPY, GASTROSCOPY, DUODENOSCOPY (EGD) WITH CO2 INSUFFLATION N/A 6/29/2022    Procedure: ESOPHAGOGASTRODUODENOSCOPY, WITH CO2 INSUFFLATION;  Surgeon: Domingo Wall MD;  [facility]    DILATION AND CURETTAGE, HYSTEROSCOPY DIAGNOSTIC, COMBINED  7/1/2014    Procedure: COMBINED DILATION AND CURETTAGE, HYSTEROSCOPY DIAGNOSTIC;  Surgeon: Mana Cabrera DO;  [facility]: [facility]    ENT SURGERY      tonsillectomy and adnoid removal    ESOPHAGOSCOPY, GASTROSCOPY, DUODENOSCOPY (EGD), COMBINED N/A 6/29/2022    Procedure: ESOPHAGOGASTRODUODENOSCOPY, WITH BIOPSY;  Surgeon: Domingo Wall MD;  [facility]    HYSTERECTOMY TOTAL  ABDOMINAL      ORTHOPEDIC SURGERY      left knee tear meniscus    RELEASE CARPAL TUNNEL BILATERAL  2009       A:  EBL: 10 ml        IVF:  1300 ml        UOP:  no urge        NPO:  ___Yes _x__No         Vomiting:  ___Yes __x_No         Drainage: none noted        Skin Integrity: see flowsheets        RFO: ___Yes_x__No         SSI Patient?  ___Yes__x_No         Brace/sling/equipment:  _x__Yes, immobilizer         See PACU record for ongoing assessment, vital signs and pain assessment.    R: Post-Op vitals and assessments as ordered/indicated per patient's condition.       Follow Post-Op orders and notify Physician prn.       Continue to involve patient/family in plan of care and discharge planning.       Reinforce Pre-Operative education.       Implement skin safety interventions as appropriate.    Telephone report given to Lauren SARABIA RN [facility]

## 2025-06-10 NOTE — ANESTHESIA PROCEDURE NOTES
Adductor canal Procedure Note    Pre-Procedure   Staff -        Performed By: CRNA       Location: post-op       Pre-Anesthestic Checklist: patient identified, IV checked, site marked, risks and benefits discussed, informed consent, monitors and equipment checked, pre-op evaluation, at physician/surgeon's request and post-op pain management  Timeout:       Correct Patient: Yes        Correct Procedure: Yes        Correct Site: Yes        Correct Position: Yes        Correct Laterality: Yes        Site Marked: Yes  Procedure Documentation  Procedure: Adductor canal         Laterality: right       Patient Position: supine       Patient Prep/Sterile Barriers: sterile gloves, mask       Skin prep: Chloraprep       Local skin infiltrated with 3 mL of 2% lidocaine.        Needle Type: insulated       Needle Gauge: 22.        Needle Length (millimeters): 100        Ultrasound guided       1. Ultrasound was used to identify targeted nerve, plexus, vascular marker, or fascial plane and place a needle adjacent to it in real-time.       2. Ultrasound was used to visualize the spread of anesthetic in close proximity to the above referenced structure.       3. A permanent image is entered into the patient's record.    Assessment/Narrative         The placement was negative for: blood aspirated, painful injection and site bleeding       Paresthesias: No.       Test dose of mL at.         Test dose negative, 3 minutes after injection, for signs of intravascular, subdural, or intrathecal injection.       Bolus given via needle..        Secured via.        Insertion/Infusion Method: Single Shot       Complications: none       Injection made incrementally with aspirations every 5 mL.     Comments:  Placed with the assistance of an RN.  Good visualization of the femoral artery and the local spread lateral to the artery.  No HR increase with injections and no other complications noted.  I will follow up with the pt if needed.      FOR  "[facility] ONLY:   Pain Team Contact information: please page the Pain Team Via uchoose. Search \"Pain\". During daytime hours, please page the attending first. At night please page the resident first.      "

## 2025-06-10 NOTE — PLAN OF CARE
Goal Outcome Evaluation:      Plan of Care Reviewed With: patient, spouse    Overall Patient Progress: improvingOverall Patient Progress: improving     Patient is A&Ox4. Right knee CDI with knee immobilizer in place. Oxycodone 5mg given x1 this afternoon. Walked halls x2 with A1, walker and gait belt. LR running at 100ml/hr with IV Ancef given. Up in chair for dinner with ice to knee. Saw physical therapy this evening. Lungs are clear. Vitally stable on room air. Plan to discharge home in the morning.

## 2025-06-10 NOTE — PROCEDURES
[facility] Operative Report    Pre-operative diagnosis: right knee primary osteoarthritis   Post-operative diagnosis: same  Procedure: Procedure(s): right total knee arthroplasty-robotic assisted (patella not resurfaced)  Surgeon: Rehan Spring DO  Anesthesia: spinal  Assistant(s): EDUARD Daley, CNP (A advanced practice provider was necessary for his expertise, exposure and surgical assistance throughout the case.)  Estimated blood loss: Less than 10ml  Urine output: na  Fluids: 1400 ml crystalloids   Specimens: none  Complications: None  Counts: correct     Findings: Bone-on-bone medial compartment arthritis.  There is also full-thickness cartilage loss through the weightbearing aspect lateral femoral condyle.  Native measurements showed 7.5 degrees varus with 2 degrees hyperextension, final component placement showed 4.5 degrees of varus with 0 degree flexion contracture    Implants:    Jamil size 3 triathlon cruciate retaining femoral component, 9 mm thickness X3 tibial bearing insert, size 2 tibial baseplate triathlon      Marcelina Estevez is a pleasant 65 year old-year-old patient with a complaint of knee pain.  The pain is been progressive and longstanding.  Despite conservative care the pain is impacting their quality of life.  The radiographs are clinically correlated.  Given their continued pain refractory to conservative care we discussed proceeding with Kofi assisted total knee arthroplasty.  The risk, benefits, complications, alternatives was reviewed in the office.  The risk including bleeding, infection, neurovascular injury, fracture, blood clot, stiffness, implant issues was discussed.  We also reviewed length of recovery.  Once this was thoroughly reviewed with an understanding, the patient opted to proceed surgically.    They were seen preoperatively and again informed consent was obtained.  The extremity was marked and the consent was signed.  They were wheeled back to an operative  suite.  Safely transferred to an operative table.  When deemed appropriate by anesthesia the extremity was sterilely prepped and draped in normal manner.  Prior to beginning, a timeout was performed.  The appropriate patient, laterality, surgery was identified.  Antibiotics were confirmed to have been administered.  Prior to scrubbing into the surgery, the preoperative plan was evaluated and the sizes were selected with placement of the prosthesis on the Kofi system.    An Esmarch was utilized to exsanguinate the extremity and tourniquet was inflated on the upper thigh.  A midline incision was followed with a medial parapatellar arthrotomy.  The anterior fat pad was excised.  No releases were performed.  The ACL excised.  Rimming osteophytes removed.  The checkpoints were placed in the femur as well as the proximal tibia.  This was followed with placement of the arrays including 2 pins intra-articular approximately 1.5 cm proximal to the condyle.  This was followed with 2 pins in the arrays placed approximately 5 cm distal to the knee along the medial tibial crest.  With that completed the knee was registered with the Kofi robot.  This included obtaining checkpoints along the medial and lateral malleoli as well as obtaining the hip center of rotation.  Once this was completed the knee was evaluated and extension as well as 90 degrees of flexion to determine the extension and flexion gap.  With this completed the gaps were analyzed with the Kofi robot.  The gaps were balanced with the robot.  The balancing took into consideration native joint alignment including femoral and tibia native alignment, femoral rotation including careful evaluation of the trochlea with patellar tracking.  Once this was acceptable the knee was positioned.    The patella was not resurfaced.  Rimming osteophytes were removed.    Using the Kofi robot with care to protect the collateral ligaments and patellar tendon the cuts on the femur were  performed with no issues.  Attention was turned to the tibia, the cut on the proximal tibia was performed with no issues.    Trial components were placed.  The knee was taken through full range of motion showing the knee to be well balanced throughout the arc of motion with no evidence of instability or patellar maltracking.    The tibia and femur were then prepared.  The final components as listed above were cemented into position and held in approximately 20 degrees of flexion until the cement cured.  The knee was carefully inspected and excess bone and cement fragments were removed.  During this process a 3-minute dilute Betadine lavage was performed.  This was followed pulse lavage.       The arthrotomy was then closed with 0 Vicryl in interrupted manner.  The knee was taken through motion showing a tight closure.  This was followed with 2-0 Vicryl subcutaneous followed a running Monocryl subcuticular stitch and skin glue.  A sterile bandage was applied and they were transferred to the PACU in stable condition.  They will be brought in the hospital for continued orthopedic care, DVT prophylaxis, pain management, physical therapy.    Vince Spring D.O.

## 2025-06-10 NOTE — PROGRESS NOTES
"S-(situation): Patient registered to Observation. Patient arrived to room 253 via cart  from PACU.    B-(background): Right TKA    A-(assessment): /74 (BP Location: Left arm)   Pulse 83   Temp 97.5  F (36.4  C) (Oral)   Resp 14   Ht 1.549 m (5' 1\")   Wt 60.9 kg (134 lb 4.2 oz)   LMP 06/12/2014   SpO2 97%   BMI 25.37 kg/m    Patient is A&Ox4. No pain to right knee, ice and knee immobilizer in place. Lungs are clear. Dressing to Right knee is CDI. Patient is due to void and ambulate.     R-(recommendations): Orders and observation goals reviewed with patient.    Nursing Observation criteria listed below was met:    Skin issues/needs documented:Yes  Isolation needs addressed and Signage up: NA  Fall Prevention: Education given and documented: Yes  Education Assessment documented:Yes  Admission Education Documented: Yes  New medication patient education completed and documented (Possible Side Effects of Common Medications handout): Yes  OBS video/handout Reviewed & Documented: Yes  Allergies Reviewed: Yes  Medication Reconciliation Complete: Yes  Home medications if not able to send immediately home with family stored here: NA  Reminder note placed in discharge instructions of home meds: NA  Patient has discharge needs (If yes, please explain): No  Patient discharge preferences addressed and charted on white board:  No  Provider notified that patient has arrived to the unit: Yes         "

## 2025-06-10 NOTE — ANESTHESIA CARE TRANSFER NOTE
Patient: Marcelina Estevez    Procedure: Procedure(s):  right ramila assisted total knee replacement       Diagnosis: Primary osteoarthritis of right knee [M17.11]  Diagnosis Additional Information: No value filed.    Anesthesia Type:   Spinal     Note:    Oropharynx: oropharynx clear of all foreign objects and spontaneously breathing  Level of Consciousness: awake  Oxygen Supplementation: nasal cannula    Independent Airway: airway patency satisfactory and stable  Dentition: dentition unchanged  Vital Signs Stable: post-procedure vital signs reviewed and stable  Report to RN Given: handoff report given  Patient transferred to: PACU    Handoff Report: Identifed the Patient, Identified the Reponsible Provider, Reviewed the pertinent medical history, Discussed the surgical course, Reviewed Intra-OP anesthesia mangement and issues during anesthesia, Set expectations for post-procedure period and Allowed opportunity for questions and acknowledgement of understanding      Vitals:  Vitals Value Taken Time   /65 06/10/25 11:45   Temp 98.06  F (36.7  C) 06/10/25 11:48   Pulse 82 06/10/25 11:48   Resp 12 06/10/25 11:48   SpO2 99 % 06/10/25 11:48   Vitals shown include unfiled device data.    Electronically Signed By: EDUARD Lopez CRNA  Mavis 10, 2025  11:49 AM

## 2025-06-10 NOTE — INTERVAL H&P NOTE
This H&P has been reviewed and there are no clinically significant changes in the patient s condition.  The patient was evaluated by myself as well as Dr. Zoey Louis prior to surgery. The Patient is approved for the surgery and the stated surgical procedure is still clinically indicated.

## 2025-06-10 NOTE — ANESTHESIA PROCEDURE NOTES
"Intrathecal Procedure Note    Pre-Procedure   Staff -        Performed By: CRNA       Location: OR       Pre-Anesthestic Checklist: patient identified, IV checked, risks and benefits discussed, informed consent, monitors and equipment checked and pre-op evaluation  Timeout:       Correct Patient: Yes        Correct Procedure: Yes        Correct Site: Yes        Correct Position: Yes   Procedure Documentation  Procedure: intrathecal         Patient Position: sitting       Patient Prep/Sterile Barriers: sterile gloves, mask, patient draped       Skin prep: Betadine       Insertion Site: L3-4. (midline approach).       Needle Gauge: 25.        Needle Length (Inches): 3.5        Spinal Needle Type: Pencan       Introducer used       Introducer: 20 G       # of attempts: 1 and  # of redirects:  0    Assessment/Narrative         Paresthesias: No.       CSF fluid: clear.       Opening pressure was cmH2O while  Sitting.        FOR [facility] ([facility]) ONLY:   Pain Team Contact information: please page the Pain Team Via Bill[employer]Forward. Search \"Pain\". During daytime hours, please page the attending first. At night please page the resident first.      "

## 2025-06-10 NOTE — TELEPHONE ENCOUNTER
Patient Quality Outreach    Patient is due for the following:   Physical Annual Wellness Visit    Action(s) Taken:   Patient has upcoming appointment, these items will be addressed at that time.    Type of outreach:    Chart review performed, no outreach needed.    Questions for provider review:    None         Ayo Pérez MA

## 2025-06-10 NOTE — PROGRESS NOTES
06/10/25 1600   Appointment Info   Signing Clinician's Name / Credentials (PT) Mina Youssef, PT, DPT   Rehab Comments (PT) Patient amenable to skilled inpatient PT services this afternoon.       Present no   Living Environment   People in Home significant other   Current Living Arrangements house   Home Accessibility stairs to enter home   Number of Stairs, Main Entrance 1   Stair Railings, Main Entrance none  (wall on both sides)   Number of Stairs, Within Home, Primary one   Stair Railings, Within Home, Primary railings safe and in good condition   Transportation Anticipated family or friend will provide   Self-Care   Usual Activity Tolerance good   Current Activity Tolerance moderate   Regular Exercise Yes   Activity/Exercise Type walking   Exercise Amount/Frequency other (see comments)  (Unspecified.)   Equipment Currently Used at Home shower chair;tub bench;walker, standard;cane, quad;raised toilet seat   Fall history within last six months yes  (Bumps and bruises without injury.)   Number of times patient has fallen within last six months 2   General Information   Onset of Illness/Injury or Date of Surgery 06/10/25   Referring Physician Domingo Delanye APRN CNP   Patient/Family Therapy Goals Statement (PT) Return home safely after right knee TKA surgery today on 06/10/2025.   Pertinent History of Current Problem (include personal factors and/or comorbidities that impact the POC) Patient is a 65 year old female admitted to the hospital for a right knee TKA procedure and is currently POD 0 status. Patient is from home with  for social support at home for which she has two small steps to enter. Patient normally ambulates and performs functional transfers with MOD IND unsteadily while using a single end cane or furniture surfing prior to surgery today, thus she is currently below this prior level of function due to needing use of a front wheeled walker for MOD IND  with performance of all required functional transfer and ambulation demands throughout today's session due to her current right knee pain, ROM, strength, and stability deficits at today's session. Patient's other past medical history includes proteinuria, seasonal allergies, serrated polyposis syndrome, panic attack, atrophic vaginitis, GERD, coronary vasospasm, episodic marijuana use, CREST, limited systemic sclerosis, anxiety, tension headache, Raynaud's phenomenon, anemia, status post total hysterectomy, allergic rhinitis due to pollen, hyperlipidemia, stage 3a chronic kidney disease, depression, hypertension, bilateral keratosis sicca, chronic night sweats, atopic rhinitis, and hypertriglyceridemia. Recommendation for disposition due to current sufficient social support at home and proficiency with required household functional mobility demands is home with assist and initiation of outpatient PT services for progression of her right knee stability, ROM, and strengh for safer performance of her required upright functional transfer and ambulation demands.   Existing Precautions/Restrictions brace worn when out of bed;weight bearing   Weight-Bearing Status - LUE full weight-bearing   Weight-Bearing Status - RUE full weight-bearing   Weight-Bearing Status - LLE weight-bearing as tolerated   Weight-Bearing Status - RLE full weight-bearing   Heart Disease Risk Factors High blood pressure;Dislipidemia;Medical history   General Observations Patient grossly NAD, well groomed, and pleasant throughout today's session.   Cognition   Affect/Mental Status (Cognition) WNL   Orientation Status (Cognition) oriented x 4   Follows Commands (Cognition) WNL   Pain Assessment   Patient Currently in Pain Yes, see Vital Sign flowsheet  (8/10 RLE knee pain at rest.)   Integumentary/Edema   Integumentary/Edema other (describe)   Posture    Posture Forward head position;Protracted shoulders  (Mild in nature.)   Range of Motion (ROM)    Range of Motion ROM is [facility]   ROM Comment For bilateral upper and lower extremities; Right knee flexion at 40 degrees for active ROM in supine longsitting with right knee extension lacking 5 degrees from neutral in same position.   Strength (Manual Muscle Testing)   Strength (Manual Muscle Testing) strength is [facility]   Strength Comments For bilateral upper and lower extremities; Supine right quad isometric strength at 3+/5 and hamstring isometric strength at 4+/5.   Bed Mobility   Bed Mobility no deficits identified   Bed Mobility Limitations decreased ability to use legs for bridging/pushing   Impairments Contributing to Impaired Bed Mobility decreased flexibility;abnormal muscle tone;pain;decreased ROM;decreased sensation;decreased strength   Assistive Device (Bed Mobility) other (see comments)  (None.)   Comment, (Bed Mobility) IND with supine to sit and sit to supine transfers today without assistive equipment for performance.   Transfers   Transfers sit-stand transfer;bed-chair transfer   Maintains Weight-bearing Status (Transfers) able to maintain   Transfer Safety Concerns Noted decreased step length   Impairments Contributing to Impaired Transfers impaired balance;decreased flexibility;abnormal muscle tone;pain;decreased ROM;decreased sensation;decreased strength   Comment, (Transfers) Cueing needed for proper upper and lower extremity placement and pushing to safely raise and lower herself to and from a seated position.   Bed-Chair Transfer   Bed-Chair [place] (Transfers) modified independence   Assistive Device (Bed-Chair Transfers) walker, front-wheeled   Comment, (Bed-Chair Transfer) Cueing needed for proper upper and lower extremity placement and pushing to safely raise and lower herself to and from a seated position.   Sit-Stand Transfer   Sit-Stand [place] (Transfers) modified independence   Assistive Device (Sit-Stand Transfers) walker, front-wheeled   Comment, (Sit-Stand Transfer) Cueing  needed for proper upper and lower extremity placement and pushing to safely raise and lower herself to and from a seated position.   Gait/Stairs (Locomotion)   [place] (Gait) modified independence   Assistive Device (Gait) walker, front-wheeled   Distance in Feet (Gait) 100   Pattern (Gait) step-through   Deviations/Abnormal Patterns (Gait) antalgic;bekah decreased;gait speed decreased;stride length decreased  (All right sided deviations.)   Maintains Weight-bearing Status (Gait) able to maintain   Comment, (Gait/Stairs) Stairs not assessed today.   Balance   Balance other (describe)   Sitting Balance: Static good balance   Sitting Balance: Dynamic fair balance   Sit-to-Stand Balance good balance   Standing Balance: Static fair balance   Standing Balance: Dynamic fair balance   Balance Quick Add Sitting balance: Static;Sitting balance: dynamic;Sit to stand balance;Standing balance: static;Standing balance: dynamic   Sensory Examination   Sensory Perception WNL   Sensory Perception Comments For bilateral upper and lower extremities except right knee TKA incisional and anterior lower leg numbness along post-surgical bandaging region.   Coordination   Coordination no deficits were identified   Muscle Tone   Muscle Tone no deficits were identified   Clinical Impression   Criteria for Skilled Therapeutic Intervention Yes, treatment indicated  (Evaluation and POD 0 & 1 treatment only for bed mobility, HEP, transfers, stairs, and ambulation using front wheeled walker.)   PT Diagnosis (PT) Right knee pain, ROM, instability, and weakness deficits; Gait and balance deficits.   Influenced by the following impairments Right knee TKA POD 0 status on 06/10/2025.   Functional limitations due to impairments Patient normally ambulates and performs functional transfers with MOD IND unsteadily while using a single end cane or furniture surfing prior to surgery today, thus she is currently below this prior level of  function due to needing use of a front wheeled walker for MOD IND with performance of all required functional transfer and ambulation demands throughout today's session due to her current right knee pain, ROM, strength, and stability deficits at today's session.   Clinical Presentation (PT Evaluation Complexity) stable   Clinical Presentation Rationale Based on observation, history, evaluation and clinical judgment.   Clinical Decision Making (Complexity) low complexity   Planned Therapy Interventions (PT) balance training;bed mobility training;cryotherapy;gait training;home exercise program;motor coordination training;neuromuscular re-education;patient/family education;ROM (range of motion);stair training;strengthening;stretching;transfer training;progressive activity/exercise;risk factor education;home program guidelines   Risk & Benefits of therapy have been explained evaluation/treatment results reviewed;care plan/treatment goals reviewed;risks/benefits reviewed;current/potential barriers reviewed;participants voiced agreement with care plan;participants included;patient;spouse/significant other   Clinical Impression Comments Patient is a 65 year old female admitted to the hospital for a right knee TKA procedure and is currently POD 0 status. Patient is from home with  for social support at home for which she has two small steps to enter. Patient normally ambulates and performs functional transfers with MOD IND unsteadily while using a single end cane or furniture surfing prior to surgery today, thus she is currently below this prior level of function due to needing use of a front wheeled walker for MOD IND with performance of all required functional transfer and ambulation demands throughout today's session due to her current right knee pain, ROM, strength, and stability deficits at today's session. Patient's other past medical history includes proteinuria, seasonal allergies, serrated polyposis  syndrome, panic attack, atrophic vaginitis, GERD, coronary vasospasm, episodic marijuana use, CREST, limited systemic sclerosis, anxiety, tension headache, Raynaud's phenomenon, anemia, status post total hysterectomy, allergic rhinitis due to pollen, hyperlipidemia, stage 3a chronic kidney disease, depression, hypertension, bilateral keratosis sicca, chronic night sweats, atopic rhinitis, and hypertriglyceridemia. Recommendation for disposition due to current sufficient social support at home and proficiency with required household functional mobility demands is home with assist and initiation of outpatient PT services for progression of her right knee stability, ROM, and strengh for safer performance of her required upright functional transfer and ambulation demands.   PT Total Evaluation Time   PT Eval, Low Complexity Minutes (25894) 30   Physical Therapy Goals   PT Frequency Other (see comments)  (Evaluation and POD 0 & 1 treatment only for bed mobility, HEP, transfers, stairs, and ambulation using front wheeled walker.)   PT Predicted Duration/Target Date for Goal Attainment 06/11/25   PT Goals Bed Mobility;Transfers;Gait;Stairs   PT: Bed Mobility Independent;Supine to/from sit;Within precautions;Goal Met   PT: Transfers Modified independent;Sit to/from stand;Bed to/from chair;Assistive device;Within precautions;Goal Met   PT: Gait Modified independent;Assistive device;Rolling walker;Within precautions;100 feet;Goal Met   PT: Stairs Modified independent;Assistive device;Within precautions;2 stairs;Rail on both sides   Interventions   Interventions Quick Adds Therapeutic Procedure   Therapeutic Procedure/Exercise   Ther. Procedure: strength, endurance, ROM, flexibillity Minutes (43299) 25   Symptoms Noted During/After Treatment increased pain  (Mild increase in right knee pain.)   Treatment Detail/Skilled Intervention Patient resting comfortably in bedside recliner chair upon arrival at today's session. Patient  able to perform each of the following exercises x10 reps with their right lower extremity in reclined long sitting position: Bilateral glute sets with 5 second holds, quad sets with 5 second holds, hamstring sets with 5 second holds, bilateral ankle pumps, heel slides with 5 second holds, hip abduction and adduction active ROM s, and short arc quads. Patient education for performing incentive spirometer 10 times per waking hour, perform HEP exercises detailed above for 3x per day until start of formal outpatient PT services, to don right knee immobilizer brace from distal to proximal straps with all upright mobility demands until her first formal outpatient PT appointment, elevate her operative right leg with one longitudinal pillow and one folded in half longitudinal pillow with heel resting off edge of top pillow, ice her operative right knee for 20 minutes off and on after each hourly and progressively tolerable distance ambulation trials using her front wheeled walker and round of HEP performance along her anterior right TKA incision to assist with joint pain and swelling management with these activities, to keep right knee immobilizer donned but loosened while sleeping in case she needs to get up in the middle of the night to use the restroom, and to ambulate with front wheeled walker with right leg WBAT.     Patient resting comfortably sitting in reclined bedside chair with chair alarm on, hospital phone and call light near, HEP packet left in the room, tray table near, all questions answered, all needs met, right leg elevated with pillows and with ice pack on anterior right TKA incision, RN in the room, and RN notified of patient's physical status at the conclusion of today's session.   PT Discharge Planning   PT Plan Evaluation and POD 0 & 1 treatment only for bed mobility, HEP, transfers, stairs, and ambulation using front wheeled walker.   PT Discharge Recommendation (DC Rec) home with assist;home with  outpatient physical therapy   PT Rationale for DC Rec Patient is a 65 year old female admitted to the hospital for a right knee TKA procedure and is currently POD 0 status. Patient is from home with  for social support at home for which she has two small steps to enter. Patient normally ambulates and performs functional transfers with MOD IND unsteadily while using a single end cane or furniture surfing prior to surgery today, thus she is currently below this prior level of function due to needing use of a front wheeled walker for MOD IND with performance of all required functional transfer and ambulation demands throughout today's session due to her current right knee pain, ROM, strength, and stability deficits at today's session. Patient's other past medical history includes proteinuria, seasonal allergies, serrated polyposis syndrome, panic attack, atrophic vaginitis, GERD, coronary vasospasm, episodic marijuana use, CREST, limited systemic sclerosis, anxiety, tension headache, Raynaud's phenomenon, anemia, status post total hysterectomy, allergic rhinitis due to pollen, hyperlipidemia, stage 3a chronic kidney disease, depression, hypertension, bilateral keratosis sicca, chronic night sweats, atopic rhinitis, and hypertriglyceridemia. Recommendation for disposition due to current sufficient social support at home and proficiency with required household functional mobility demands is home with assist and initiation of outpatient PT services for progression of her right knee stability, ROM, and strengh for safer performance of her required upright functional transfer and ambulation demands.   PT Brief overview of current status IND with all bed mobility; MOD IND with ambulation x100 feet, sit to stand transfer, stand to sit transfer, and bed to chair transfer with use of front wheeled walker.   PT Total Distance Amb During Session (feet) 100   Physical Therapy Time and Intention   Timed Code Treatment  Minutes 25   Total Session Time (sum of timed and untimed services) 55     [facility]                                                                                   OUTPATIENT PHYSICAL THERAPY    PLAN OF TREATMENT FOR OUTPATIENT REHABILITATION   Patient's Last Name, First Name, Marcelina Morejon YOB: 1960   Provider's Name   [facility]   Medical Record No.  [id number]     Onset Date: 06/10/25 Start of Care Date:  06/10/2025     Medical Diagnosis: S/P total knee arthroplasty, right                  PT Diagnosis:  Right knee pain, ROM, instability, and weakness deficits; Gait and balance deficits. Certification Dates:  From:  06/10/2025  To:  06/11/2025       See note for plan of treatment, functional goals, and certification details.    I CERTIFY THE NEED FOR THESE SERVICES FURNISHED UNDER        THIS PLAN OF TREATMENT AND WHILE UNDER MY CARE (Physician co-signature of this document indicates review and certification of the therapy plan).               Thank you for your referral,  Mina Youssef PT, DPT    [facility]  O: [phone]  E: [email or web address]

## 2025-06-11 VITALS
BODY MASS INDEX: 25.35 KG/M2 | OXYGEN SATURATION: 93 % | SYSTOLIC BLOOD PRESSURE: 130 MMHG | HEIGHT: 61 IN | WEIGHT: 134.26 LBS | TEMPERATURE: 98 F | DIASTOLIC BLOOD PRESSURE: 70 MMHG | RESPIRATION RATE: 18 BRPM | HEART RATE: 77 BPM

## 2025-06-11 LAB
FASTING STATUS PATIENT QL REPORTED: YES
GLUCOSE SERPL-MCNC: 110 MG/DL (ref 70–99)
HGB BLD-MCNC: 8.7 G/DL (ref 11.7–15.7)
MCV RBC AUTO: 97 FL (ref 78–100)

## 2025-06-11 PROCEDURE — 36415 COLL VENOUS BLD VENIPUNCTURE: CPT | Performed by: ORTHOPAEDIC SURGERY

## 2025-06-11 PROCEDURE — 97116 GAIT TRAINING THERAPY: CPT | Mod: GP | Performed by: PHYSICAL THERAPIST

## 2025-06-11 PROCEDURE — 97530 THERAPEUTIC ACTIVITIES: CPT | Mod: GP | Performed by: PHYSICAL THERAPIST

## 2025-06-11 PROCEDURE — 250N000013 HC RX MED GY IP 250 OP 250 PS 637: Performed by: NURSE PRACTITIONER

## 2025-06-11 PROCEDURE — 258N000003 HC RX IP 258 OP 636: Performed by: NURSE PRACTITIONER

## 2025-06-11 PROCEDURE — 97110 THERAPEUTIC EXERCISES: CPT | Mod: GP | Performed by: PHYSICAL THERAPIST

## 2025-06-11 PROCEDURE — 85018 HEMOGLOBIN: CPT | Performed by: NURSE PRACTITIONER

## 2025-06-11 PROCEDURE — 250N000011 HC RX IP 250 OP 636: Performed by: NURSE PRACTITIONER

## 2025-06-11 PROCEDURE — 82947 ASSAY GLUCOSE BLOOD QUANT: CPT | Performed by: ORTHOPAEDIC SURGERY

## 2025-06-11 RX ORDER — ALLOPURINOL 100 MG/1
100 TABLET ORAL 2 TIMES DAILY
COMMUNITY
Start: 2025-06-11

## 2025-06-11 RX ORDER — METHOCARBAMOL 500 MG/1
500 TABLET, FILM COATED ORAL 3 TIMES DAILY PRN
Qty: 45 TABLET | Refills: 1 | Status: SHIPPED | OUTPATIENT
Start: 2025-06-11

## 2025-06-11 RX ORDER — PANTOPRAZOLE SODIUM 40 MG/1
40 TABLET, DELAYED RELEASE ORAL 2 TIMES DAILY
COMMUNITY

## 2025-06-11 RX ORDER — ASPIRIN 81 MG/1
81 TABLET ORAL DAILY
COMMUNITY
Start: 2025-06-25

## 2025-06-11 RX ADMIN — OXYCODONE 5 MG: 5 TABLET ORAL at 08:54

## 2025-06-11 RX ADMIN — ISOSORBIDE MONONITRATE 30 MG: 30 TABLET, EXTENDED RELEASE ORAL at 09:25

## 2025-06-11 RX ADMIN — ACETAMINOPHEN 975 MG: 325 TABLET ORAL at 04:23

## 2025-06-11 RX ADMIN — ALLOPURINOL 100 MG: 100 TABLET ORAL at 09:26

## 2025-06-11 RX ADMIN — OXYCODONE 10 MG: 5 TABLET ORAL at 04:23

## 2025-06-11 RX ADMIN — NIFEDIPINE 30 MG: 30 TABLET, FILM COATED, EXTENDED RELEASE ORAL at 09:29

## 2025-06-11 RX ADMIN — PANTOPRAZOLE SODIUM 40 MG: 40 TABLET, DELAYED RELEASE ORAL at 09:25

## 2025-06-11 RX ADMIN — SENNOSIDES AND DOCUSATE SODIUM 1 TABLET: 50; 8.6 TABLET ORAL at 09:26

## 2025-06-11 RX ADMIN — OXYCODONE 5 MG: 5 TABLET ORAL at 09:25

## 2025-06-11 RX ADMIN — BUSPIRONE HYDROCHLORIDE 5 MG: 5 TABLET ORAL at 09:26

## 2025-06-11 RX ADMIN — FAMOTIDINE 20 MG: 20 TABLET, FILM COATED ORAL at 09:26

## 2025-06-11 RX ADMIN — CEFAZOLIN 1 G: 1 INJECTION, POWDER, FOR SOLUTION INTRAMUSCULAR; INTRAVENOUS at 00:33

## 2025-06-11 RX ADMIN — METHOCARBAMOL 500 MG: 500 TABLET, FILM COATED ORAL at 09:26

## 2025-06-11 RX ADMIN — APIXABAN 2.5 MG: 2.5 TABLET, FILM COATED ORAL at 09:25

## 2025-06-11 RX ADMIN — OXYCODONE 10 MG: 5 TABLET ORAL at 00:31

## 2025-06-11 RX ADMIN — POLYETHYLENE GLYCOL 3350 17 G: 17 POWDER, FOR SOLUTION ORAL at 09:27

## 2025-06-11 RX ADMIN — DULOXETINE HYDROCHLORIDE 60 MG: 30 CAPSULE, DELAYED RELEASE ORAL at 09:26

## 2025-06-11 ASSESSMENT — ACTIVITIES OF DAILY LIVING (ADL)
ADLS_ACUITY_SCORE: 31
ADLS_ACUITY_SCORE: 35
ADLS_ACUITY_SCORE: 31
ADLS_ACUITY_SCORE: 35

## 2025-06-11 NOTE — PROGRESS NOTES
Patient vital signs are at baseline: Yes  Patient able to ambulate as they were prior to admission or with assist devices provided by therapies during their stay:  Yes  Patient MUST void prior to discharge:  Yes. Voiding WNL  Patient able to tolerate oral intake:  Yes  Pain has adequate pain control using Oral analgesics:  Yes  Does patient have an identified :  Yes  Has goal D/C date and time been discussed with patient:  Yes

## 2025-06-11 NOTE — PLAN OF CARE
Physical Therapy Discharge Summary    Reason for therapy discharge:    Discharged to home with outpatient therapy.    Progress towards therapy goal(s). See goals on Care Plan in [facility] electronic health record for goal details.  Goals met    Therapy recommendation(s):    Continued therapy is recommended.  Rationale/Recommendations:   .Patient would benefit from continued skilled therapeutic intervention in order to progress them towards a higher level of function in accordance with their surgeon's protocol.        Thank you for your referral.  Judy Waggoner, PT, DPT, ATC, [facility]  O: [phone]  E: [email or web address]

## 2025-06-11 NOTE — DISCHARGE INSTRUCTIONS
Total Knee Replacement Discharge Instructions                                     [phone] Bone and Joint Service Line for issues or concerns      General Care:  After surgery you may feel tired/sleepy. This is normal. If you have any question along the way please contact the office. If you feel it is an issue cannot wait for normal office hours, contact the 24 hour bone and joint line at [phone]. You should not drive or operate machines/equipment until released by your physician to do so.     Wound Care:  Keep incision covered with hospital dressing (Aquacel) for one week. It is okay to shower with this dressing on. However, do not submerge your dressing and incision in water for roughly 2 weeks. After one week remove this dressing and then keep it clean, dry, and covered. If this dressing become looses or falls off it is ok to cover with gauze and tape.  Wash the incision gently with warm soapy water after removing your hospital dressing and lightly pat dry.       Diet:  Start with non-alcoholic liquids at first, particularly water or sports drinks after surgery. Progress to bland foods such as crackers and bread and finally to your normal diet if you have no problems. Avoid alcohol when taking narcotic pain medications.      Pain control:  Take your pain medications as prescribed. These medications may make you sleepy. Do not drive, operate equipment, or drink alcohol when taking these.  You may take Tylenol (Generic name is acetaminophen) as directed on the bottle for additional relief or in place of the prescribed pain medications as your pain gets better. Do not take any other NSAIDs (Motrin, Ibuprofen, Aleve, Naproxen) while taking the blood thinner. If the medications cause a reaction such as nausea or skin rash, stop taking them and contact your doctor. Please plan accordingly, pain medications will not be re-filled on the weekends or at night. Call the office during the day if you need more  medications.    Blood thinner:  It is very important to take it as prescribed. It is a medication to help prevent blood clots in your legs or lungs. No medication is perfect, so if you notice a sudden onset of pain/swelling in your calf area call your doctor. If you notice a sudden onset of troubles breathing and/or chest pain call 911.     Swelling (edema) control:  Preventing swelling (edema) in your legs after surgery is very important. It is helpful for achieving optimal range of motion as well as preventing blood clots.  It starts with simple things such as elevation of your legs and icing. Elevate your legs above your heart.    Do this for 20 minutes every couple hours the first few days after surgery. We also recommend MIKAEL hose (compression hose) to be worn. Wear on both legs during the day. You may remove at night. Wear these until directed to stop.      Icing:  It is common for some swelling, aching and stiffness to occur for up to 6-9 months after a knee replacement. If you knee swells, get off your feet, elevate your leg and apply some ice packs. Apply for 20 minutes at a time. For the first 1-2 weeks apply ice 2-3 x day or more after therapy.    Walker/crutches:  Use a walker/crutches when you go home. You will transition to the use of a cane and finally to no additional support.     Braces:  You will go home with a knee immobilizer. You can take it off when you are lying or sitting down. Wear it when you are walking. This immobilizer can come off after you are doing a straight leg raise. Your physician and or physical therapist will help to determine when to stop wearing it.     Physical Therapy:  The success of your knee replacement is based on doing physical therapy. You will have some pain and discomfort along the way. If you feel your pain is limiting your progress make sure to take some pain medication prior to your therapy session. If you pain medications are not working talk to your surgeon.    The goal is to work on range of motion. While it is important to working on bending your knee, it is equally important to make sure you knee comes out all the way straight. To assist in this do not place any bumps, pillows and or blankets under your knee when you are lying down. You should have an outpatient physical therapy appointment scheduled for about 1 week after surgery.     Activity:  Unless otherwise instructed, you can weight bear as tolerated. While laying or sitting down you should straighten your knee all the way out and then gently work on bending the knee back. Do not worry at first if your knee feels stiff and will not bend like normal, this will get better. Never put a pillow or bump under you knee, instead put a pillow under your ankle so your knee will straighten out all the way.     Normal findings after surgery:  Numbness and tenderness around the incisions and to the outside of the incision is normal.  You may have bruising around the incisions and down the lower leg.   Your knee will be swollen for months after surgery. It will feel  tight  to move.   Low grade fevers less than 100.5 degrees Fahrenheit are normal.   You may have some minor swelling in the leg/calf area.  You will have some increased pain after your therapy sessions.     When to call the Office:  Temperature greater than 101.5 degrees Fahreheit.  Fever, chills, and increasing pain in the knee.  Excessive drainage from the incisions that include bright red blood.  Drainage from the incisions sites that appear yellow, pus-like, or foul smelling.  Increasing pain the knee not relieved by the prescribed pain medications or ice.  Persistent nausea or vomiting not helped by the Zofran.  Increased pain or swelling in your calf area (in back above your ankle) that wasn t there when in the hospital.  Any other effects you feel are significant.  Call 911 if you experience any chest pain and/or shortness or breath.

## 2025-06-11 NOTE — PROGRESS NOTES
"Patient vital signs are at baseline: Yes  Patient able to ambulate as they were prior to admission or with assist devices provided by therapies during their stay:  Yes  Patient MUST void prior to discharge:  Yes  Patient able to tolerate oral intake:  Yes  Pain has adequate pain control using Oral analgesics:  Yes  Does patient have an identified :  Yes  Has goal D/C date and time been discussed with patient:  Yes      A/Ox4, pleasant and cooperative. VSS. Per report, ate 100% of supper. Denies N/V or stomach discomfort. C/O pain in R hip 7-8/10. Had PRN 5mg Oxycodone around 4pm which she reports was \"somewhat effective\" for pain relief but had increased aching pain after ambulating. Wearing brace with ice pack on intermittently. Dressing C/D/I. Tolerating PO fluids without issue. Plan to ambulate before bedtime tonight. Safety precautions in place.   "

## 2025-06-11 NOTE — PROGRESS NOTES
"[facility]  Orthopedics Progress Note           Assessment and Plan:    Assessment:   Post-operative day #1 Procedure(s):  right ramila assisted total knee replacement   Acute post-operative blood loss anemia-asymptomatic         Plan:   Encourage IS  Start or continue physical therapy  Activity as tolerated  Weight-bear as tolerated.  Discharge from hospital today.  Pain control measures  Advance diet as tolerated  Routine wound care  DVT Prophylaxis: SCD's, Compression Hose, Eliquis  No acute medical issues.            Interval History:   Doing well.  Continues to improve.  Pain increased last night but with staying current on oxycodone the pain is now better. overall well-controlled on oral medications.  No fevers.  Voiding. Ambulating with therapy. Tolerating oral intake.  Anticipate discharge this AM.      Hgb 8.7. she reports does have hx of anemia. Reports being worked up by PCP/hemo-onc for anemia for quite some time and no cause was identified.  She is asymptomatic. Blood pressures >120s/60s. No dizziness or lightheadedness. Suspect dilutional component to the decreased hemoglobin along with surgery this AM. She reports has an appointment in 2 weeks for recheck with her PCP.             Review of Systems:    The patient denies any chest pain, shortness of breath, excessive pain, fever, chills, purulent drainage from the wound, nausea or vomiting.               Physical Exam:   General: awake, alert, appropriate and in no acute distress  Blood pressure 114/67, pulse 71, temperature 98.1  F (36.7  C), temperature source Oral, resp. rate 12, height 1.549 m (5' 1\"), weight 60.9 kg (134 lb 4.2 oz), last menstrual period 06/12/2014, SpO2 95%, not currently breastfeeding.  Right knee:  Dressing clean, dry and intact. Surrounding skin intact, no breakdown. Compartments soft and non-tender. Calf is soft, nontender with no significant swelling. Distal neurovascular grossly intact including 2+ distal " pulse, sensation intact to foot, able to df/pf against resistance. Brisk cap refill.              Data:     Results for orders placed or performed during the hospital encounter of 06/10/25 (from the past 24 hours)   Glucose by meter   Result Value Ref Range    GLUCOSE BY METER POCT 87 70 - 99 mg/dL   XR Knee Port Right 1/2 Views    Narrative    EXAM: XR KNEE PORT RIGHT 1/2 VIEWS  LOCATION: [facility]  DATE: 6/10/2025    INDICATION: Post Op Total Knee  COMPARISON: 05/12/2025      Impression    IMPRESSION: Interval right knee arthroplasty with normal alignment and no evidence of acute hardware complication. Expected surrounding postoperative edema and gas.   Hemoglobin   Result Value Ref Range    Hemoglobin 8.7 (L) 11.7 - 15.7 g/dL    MCV 97 78 - 100 fL   Glucose   Result Value Ref Range    Glucose 110 (H) 70 - 99 mg/dL    Patient Fasting > 8hrs? Yes      Discussed with Dr. Spring.     Domingo Delaney APRN, CNP  Orthopedic Surgery

## 2025-06-11 NOTE — PROGRESS NOTES
S-(situation): Patient discharged to home via wheelchair with     B-(background): Observation goals met     A-(assessment): Alert and oriented x 4. Vitals stable on RA. Denies nausea and vomiting. No shortness of breath. Reports pain in right knee, PRN oxy given x 1. Aquacell in place, CDI. Immobilizer in place with activity.     R-(recommendations): Discharge instructions reviewed with patient and . Listed belongings gathered and returned to patient.  Patient Education resolved: Yes  New medications-Pt. Has been educated about reason of use and side effects Yes  Home medications returned to patient Yes  Medication Bin checked and emptied on discharge Yes

## 2025-06-13 ENCOUNTER — TELEPHONE (OUTPATIENT)
Dept: FAMILY MEDICINE | Facility: CLINIC | Age: 65
End: 2025-06-13
Payer: MEDICARE

## 2025-06-13 NOTE — TELEPHONE ENCOUNTER
"[facility] sends in fax regarding Atorvastatin for patient stating,     \"Our records indicate the PATIENT IS ALLERGIC TO Statins-HMMG-CoA Reductase Inhibito. Please review the patient's chart for type and severity of reaction.   - Your patient has reported allergy to Statins-HMMG-CoA Reductase Inhibito. Please review and evaluate if ATORVASTATIN TABS would be appropriate to dispense.\"    "

## 2025-06-13 NOTE — PROGRESS NOTES
Orthopedic Clinic Post-Operative Note    CHIEF COMPLAINT: No chief complaint on file.      HISTORY OF PRESENT ILLNESS  Today's visit:  Overall doing well.  Attending therapy.  Doing well.  Pain is controlled.  Using oxycodone.  Taking Eliquis.      Mavis 10, 2025 surgery: Right total knee replacement        May 5, 2025 office visit:  Right knee continues to worsen.  Now more painful than the left knee prior to replacement. Reports can only walk a short distance due to the pain and has had to resort to wheelchair for longer distances and when [place] in the airport.  The pain is waking her at night.  Left knee doing excellent. Would like the right knee replaced.   Used eliquis after last surgery due to previous ischemic bowel issues and inability to take NSAIDs, did therapy at [place] in [facility].         November 22, 2024 office visit:  Patient has been doing excellent with no complaints.  She passed and graduated formal physical therapy already with flexion of 135 degrees.  Denies any numbness or tingling or any problems with the incision.  Had some soreness on the right knee but mostly because she is compensating.  States eventually she would want the right knee replaced if it came to that.     October 16, 2024 visit:  Returns. Reports overall doing very well. Attending therapy with [facility]. States feeling good. Improving her activity. Walking without any aid. Happy with progress. Feels much better than prior to surgery.  She does note recently had 2 small spots towards the top of her incision that bled a slight amount. No current drainage. Scabbed over and now healing.  Was using some lotion to the area.  Denies any redness, any fevers, feeling sick, increasing pain, etc.         September 18, 2024 visit:  Pain is controlled.  She has been utilizing the oxycodone.  She decreased her exercises due to her bleeding.  No further bleeding issues        September 12 2024 visit:  Total knee replacement on 9/11/24.  Discharged  9/12/24 without incident. She reports had a pin sized shirin of blood on the aquacell when she went home on the 12th. She started to do frequent exercises including multiple rounds of knee flexion. Started to notice more drainage. Called the clinic. Was recommended to continue to monitor. This AM had further drainage and started to soak into the steven stockings came back in. Pain controlled. No new injuries.  Otherwise doing as expected. Taking eliquis for DVT prevention.         September 10, 2024 status post left total knee arthroplasty     August 14, 2024 office visit:  Previously seen by Dr. Abrams for left knee pain.  His visit on July 10, 2024 was reviewed.  Concerns for inflammatory arthritis.  Recent pulmonology visit on July 31, 2020 for review as well.   She reports 20+ years of progressive left knee pain. Now reports the right knee and hip are starting to hurt due to increasing pain and limping with the left knee. No specific injuries recently. Reports just getting progressively worse.   Treatments tried: previous knee arthroscopy with meniscus debridement, multiple cortisone injections, gel injections, physical therapy in the past, rest, activity modification, OTC medications including nsaids and tylenol.  Despite this progressive constant pain limiting her activity, she reports able to walk less than 1 block without stopping due to pain, limited other rec. activities as well as limiting ability to play with her grandkids, clean, and kneel,. These symptoms have been ongoing for long time but worse the last year.       Reports seen by pulmonologist recently due to long term systemic sclerosis.  Was told pulmonary function and chest imaging was largely unchanged but was recommended for further cardiac workup and testing. Denies any home 02 use. States lungs and breathing has felt at baseline and not limiting.   Patient's past medical, surgical, social and family histories reviewed.     Past Medical History:    Diagnosis Date    CREST (calcinosis, Raynaud's phenomenon, esophageal dysfunction, sclerodactyly, telangiectasia) (H)     Hyperlipidemia     Hypertension     Hypertriglyceridemia 01/18/2011    Controlled with simvastatin.       Limited systemic sclerosis (H)     Menopausal syndrome (hot flashes) 12/19/2013    Positive MINDY (antinuclear antibody)     Raynaud disease        Past Surgical History:   Procedure Laterality Date    APPENDECTOMY      ARTHROPLASTY, KNEE, ROBOT ASSISTED, USING RAMILA Left 9/10/2024    Procedure: left knee ramila assisted total arthroplasty;  Surgeon: Rehan Spring DO;  Location: [facility] OR    ARTHROPLASTY, KNEE, ROBOT ASSISTED, USING RAMILA Right 6/10/2025    Procedure: right ramila assisted total knee replacement;  Surgeon: Rehan Spring DO;  Location: [facility] OR    BIOPSY      cervical node    COLONOSCOPY N/A 11/10/2021    Procedure: COLONOSCOPY, FLEXIBLE, WITH LESION REMOVAL USING SNARE;  Surgeon: Domingo Wall MD;  Location: [facility]    COLONOSCOPY N/A 11/10/2021    Procedure: COLONOSCOPY, WITH POLYPECTOMY AND BIOPSY;  Surgeon: Domingo Wall MD;  Location: [facility]    COLONOSCOPY N/A 9/29/2021    Procedure: Colonoscopy, With Polypectomy And Biopsy;  Surgeon: Domingo Wall MD;  Location: [facility]    COLONOSCOPY N/A 9/29/2021    Procedure: Colonoscopy, Flexible, With Lesion Removal Using Snare;  Surgeon: Domingo Wall MD;  Location: [facility]    COLONOSCOPY N/A 6/29/2022    Procedure: COLONOSCOPY, FLEXIBLE, WITH LESION REMOVAL USING SNARE;  Surgeon: Domingo Wall MD;  Location: [facility]    COLONOSCOPY N/A 11/9/2022    Procedure: COLONOSCOPY, WITH POLYPECTOMY AND BIOPSY;  Surgeon: Domingo Wall MD;  Location: [facility]    COLONOSCOPY N/A 6/12/2023    Procedure: COLONOSCOPY, FLEXIBLE, WITH LESION REMOVAL USING SNARE;  Surgeon: Domingo Wall MD;  Location: [facility]    COLONOSCOPY WITH CO2 INSUFFLATION N/A  10/26/2020    Procedure: COLONOSCOPY, WITH CO2 INSUFFLATION;  Surgeon: Phyllis Chaudhari MD;  Location: [facility]    COLONOSCOPY WITH CO2 INSUFFLATION N/A 11/10/2021    Procedure: COLONOSCOPY, WITH CO2 INSUFFLATION;  Surgeon: Domingo Wall MD;  [facility]    COLONOSCOPY WITH CO2 INSUFFLATION N/A 9/29/2021    Procedure: COLONOSCOPY, WITH CO2 INSUFFLATION;  Surgeon: Domingo Wall MD;  [facility]    COLONOSCOPY WITH CO2 INSUFFLATION N/A 6/29/2022    Procedure: COLONOSCOPY, WITH CO2 INSUFFLATION;  Surgeon: Domingo Wall MD;  [facility]    COLONOSCOPY WITH CO2 INSUFFLATION N/A 11/9/2022    Procedure: COLONOSCOPY, WITH CO2 INSUFFLATION;  Surgeon: Domingo Wall MD;  [facility]    COLONOSCOPY WITH CO2 INSUFFLATION N/A 6/12/2023    Procedure: Colonoscopy with CO2 insufflation;  Surgeon: Domingo Wall MD;  [facility]    COLONOSCOPY WITH CO2 INSUFFLATION N/A 7/3/2024    Procedure: Colonoscopy with CO2 insufflation;  Surgeon: Domingo Wall MD;  [facility]    COMBINED ESOPHAGOSCOPY, GASTROSCOPY, DUODENOSCOPY (EGD) WITH CO2 INSUFFLATION N/A 6/29/2022    Procedure: ESOPHAGOGASTRODUODENOSCOPY, WITH CO2 INSUFFLATION;  Surgeon: Dmoingo Wall MD;  Location: [facility]    DILATION AND CURETTAGE, HYSTEROSCOPY DIAGNOSTIC, COMBINED  7/1/2014    Procedure: COMBINED DILATION AND CURETTAGE, HYSTEROSCOPY DIAGNOSTIC;  Surgeon: Mana Cabrera DO;  Location: [facility]    ENT SURGERY      tonsillectomy and adnoid removal    ESOPHAGOSCOPY, GASTROSCOPY, DUODENOSCOPY (EGD), COMBINED N/A 6/29/2022    Procedure: ESOPHAGOGASTRODUODENOSCOPY, WITH BIOPSY;  Surgeon: Domingo Wall MD;  Location: [facility]    HYSTERECTOMY TOTAL ABDOMINAL      ORTHOPEDIC SURGERY      left knee tear meniscus    RELEASE CARPAL TUNNEL BILATERAL  2009       Medications:  Current Outpatient Medications   Medication Sig Dispense Refill     acetaminophen (TYLENOL) 500 MG tablet Take 2 tablets (1,000 mg) by mouth every 8 hours as needed for other (mild pain). 60 tablet 1    apixaban ANTICOAGULANT (ELIQUIS) 2.5 MG tablet Take 1 tablet (2.5 mg) by mouth 2 times daily for 14 days. 28 tablet 0    methocarbamol (ROBAXIN) 500 MG tablet Take 1 tablet (500 mg) by mouth 3 times daily as needed for muscle spasms. 45 tablet 1    oxyCODONE (ROXICODONE) 5 MG tablet Take 1-2 tablets (5-10 mg) by mouth every 4 hours as needed for moderate to severe pain. 30 tablet 0    albuterol (PROAIR HFA) 108 (90 Base) MCG/ACT inhaler INHALE 2 PUFS BY MOUTH EVERY 6 HOURS AS NEEDED FOR SHORTNESS OF BREATH / DYSPNEA 18 g 5    ALLEGRA ALLERGY 180 MG tablet Take 1 tablet (180 mg) by mouth daily 90 tablet 3    allopurinol (ZYLOPRIM) 100 MG tablet Take 1 tablet (100 mg) by mouth 2 times daily.      [START ON 6/25/2025] aspirin 81 MG EC tablet Take 1 tablet (81 mg) by mouth daily.      atorvastatin (LIPITOR) 40 MG tablet Take 1 tablet (40 mg) by mouth daily. 90 tablet 0    busPIRone (BUSPAR) 5 MG tablet Take 1 tablet (5 mg) by mouth 2 times daily. 180 tablet 0    DULoxetine (CYMBALTA) 60 MG capsule Take 1 capsule (60 mg) by mouth 2 times daily. 180 capsule 0    fluticasone (FLONASE) 50 MCG/ACT nasal spray Spray 1 spray into both nostrils daily 16 mL 5    [Paused] hydroxychloroquine (PLAQUENIL) 200 MG tablet Take 1.5 tablet daily 45 tablet 1    isosorbide mononitrate (IMDUR) 30 MG 24 hr tablet Take 1 tablet (30 mg) by mouth daily. 90 tablet 0    LORazepam (ATIVAN) 0.5 MG tablet Take 1 tablet (0.5 mg) by mouth 2 times daily as needed for anxiety. 30 tablet 0    losartan (COZAAR) 25 MG tablet Take 1 tablet (25 mg) by mouth daily. 90 tablet 1    magnesium oxide (MAG-OX) 400 MG tablet Take 1 tablet (400 mg) by mouth 2 times daily. 180 tablet 1    NIFEdipine ER (ADALAT CC) 30 MG 24 hr tablet Take 1 tablet (30 mg) by mouth daily. 90 tablet 0    order for DME Equipment being ordered: light lamp  for seasonal affective disorder (Patient not taking: Reported on 2025) 1 Device 0    pantoprazole (PROTONIX) 40 MG EC tablet Take 40 mg by mouth 2 times daily.      senna-docusate (SENOKOT-S/PERICOLACE) 8.6-50 MG tablet Take 1-2 tablets by mouth 2 times daily as needed for constipation. Take while on oral narcotics to prevent or treat constipation. 30 tablet 0    vitamin D3 (CHOLECALCIFEROL) 50 mcg (2000 units) tablet Take 1 tablet (50 mcg) by mouth daily. 100 tablet 1     No current facility-administered medications for this visit.     Facility-Administered Medications Ordered in Other Visits   Medication Dose Route Frequency Provider Last Rate Last Admin    sodium chloride (PF) 0.9% PF flush 10 mL  10 mL Intravenous Once Hilda Lopez MD           Allergies   Allergen Reactions    Hydroxyzine      Made her very tired    Other Environmental Allergy Other (See Comments)    Seasonal Allergies     Sulfa Antibiotics      Vomiting         Social History     Occupational History    Not on file   Tobacco Use    Smoking status: Former     Current packs/day: 0.00     Types: Cigarettes     Quit date: 2001     Years since quittin.4    Smokeless tobacco: Never   Vaping Use    Vaping status: Never Used   Substance and Sexual Activity    Alcohol use: Yes     Comment: 5-8 drinks/week    Drug use: No    Sexual activity: Yes     Partners: Male     Birth control/protection: Surgical     Comment: vasectomy       Family History   Problem Relation Age of Onset    Osteoporosis Mother     Eye Disorder Mother         cataract, mac degen    Macular Degeneration Mother     Dementia Mother     Hypertension Maternal Grandmother     Diabetes Maternal Grandfather     Eye Disorder Paternal Grandmother         glaucoma    Unknown/Adopted Paternal Grandfather     Hypertension Daughter     Graves' disease Daughter     Diabetes Other     Glaucoma No family hx of        REVIEW OF SYSTEMS  General: negative for, night sweats,  dizziness, fatigue  Resp: No shortness of breath and no cough  CV: negative for chest pain, syncope or near-syncope  GI: negative for nausea, vomiting and diarrhea  : negative for dysuria and hematuria  Musculoskeletal: as above  Neurologic: negative for syncope   Hematologic: negative for bleeding disorder    Physical Exam:  Vitals: LMP 06/12/2014   BMI= There is no height or weight on file to calculate BMI.  Constitutional: healthy, alert and no acute distress   Psychiatric: mentation appears normal and affect normal/bright  NEURO: no focal deficits  SKIN: .healing well, well approximated skin edges, without signs of infection including no erythema, incision breakdown or purlent drainage  JOINT/EXTREMITIES: 10-90 degrees active motion.  No instability.  No peripheral edema.  Distal neurovasc intact.  GAIT: antalgic    Diagnostic Modalities:  right knee X-ray: The prosthesis has acceptable alignment. No fractures or dislocations. Prosthesis is well seated with no evidence of loosening, cemented Jamil knee.  Patella not resurfaced  Independent visualization of the images was performed.      Impression: No chief complaint on file.  Doing as expected.  Right total knee replacement-Mavis 10, 2025 (13 days)  Plan:   Continue therapy.  Wound care discussed.  Refill of oxycodone provided.  Return to clinic 4, week(s), or sooner as needed for changes.    Re-x-ray on return: No    Vince Spring D.O.

## 2025-06-13 NOTE — TELEPHONE ENCOUNTER
Atorvastatin (Lipitor) on patiens active med list since 02/26/2024    Routed message to provider to continue with conversation.     Marizol Mcnulty RN on 6/13/2025 at 11:28 AM

## 2025-06-16 ENCOUNTER — TRANSFERRED RECORDS (OUTPATIENT)
Dept: HEALTH INFORMATION MANAGEMENT | Facility: CLINIC | Age: 65
End: 2025-06-16
Payer: MEDICARE

## 2025-06-16 DIAGNOSIS — Z96.651 S/P TOTAL KNEE REPLACEMENT USING CEMENT, RIGHT: ICD-10-CM

## 2025-06-16 DIAGNOSIS — Z96.651 S/P TOTAL KNEE REPLACEMENT USING CEMENT, RIGHT: Primary | ICD-10-CM

## 2025-06-16 RX ORDER — OXYCODONE HYDROCHLORIDE 5 MG/1
5-10 TABLET ORAL EVERY 4 HOURS PRN
Qty: 30 TABLET | Refills: 0 | Status: SHIPPED | OUTPATIENT
Start: 2025-06-16

## 2025-06-16 NOTE — TELEPHONE ENCOUNTER
Other: Rx Refill:    oxyCODONE (ROXICODONE) 5 MG tablet   Pharmacy: [facility] - [address]   Could we send this information to you in [facility] or would you prefer to receive a phone call?:   Patient would prefer a phone call   Okay to leave a detailed message?: Yes at Cell number on file:    Telephone Information:   Mobile [phone]

## 2025-06-16 NOTE — TELEPHONE ENCOUNTER
Patient notified of provider's message as written. Patient verbalized understanding and has no further questions at this time.     Brenda Goff RN   [facility]

## 2025-06-16 NOTE — TELEPHONE ENCOUNTER
Attempted to call patient, no answer. Left voicemail and requested patient call back at [phone].       Brenda Goff RN   [facility]

## 2025-06-16 NOTE — TELEPHONE ENCOUNTER
Patient calling today requesting a refill of pain medication.      Patient underwent a right total knee arthroplasty on 6/10/25 with Dr. Spring.      Current status: patient stated that her pain has worsened since starting PHYSICAL THERAPY last Friday. She states that she ran out of her oxycodone (ROXICODONE) 5 mg tablets this past Saturday and states that the Tylenol is only giving her mild relief.     Patient denies any recent injury or signs and symptoms related to infection.     Patient reports pain is currently 7/10.     Patient currently takin tablets of acetaminophen (TYLENOL) 500 mg tablets every 8 hours. Patient reports that she is also taking methocarbamol  (ROBAXIN) 500 mg in the evening as she reports that it makes her tired.     Patient utilizing the following at home interventions:   Icing for about 15-20 minutes 3-4 times daily. Wears her compression stocking at night.     History of narcotic fills for this issue:   oxyCODONE (ROXICODONE) 5 MG tablet Take 1-2 tablets (5-10 mg) by mouth every 4 hours as needed for moderate to severe pain. 30 tablet 0 ordered 06/10/2025 -- -- -- --           Summary: Take 1-2 tablets (5-10 mg) by mouth every 4 hours as needed for moderate to severe pain., Disp-30 tablet, R-0, E-Prescribe        In additional to all other unrelated medications reflected on their medication list.     Patient has 0 tabs remaining.     Patient uses the [facility].     Next follow up appointment: 25     Brenda Goff RN   [facility]

## 2025-06-23 ENCOUNTER — OFFICE VISIT (OUTPATIENT)
Dept: ORTHOPEDICS | Facility: CLINIC | Age: 65
End: 2025-06-23
Payer: MEDICARE

## 2025-06-23 ENCOUNTER — ANCILLARY PROCEDURE (OUTPATIENT)
Dept: GENERAL RADIOLOGY | Facility: CLINIC | Age: 65
End: 2025-06-23
Attending: NURSE PRACTITIONER
Payer: MEDICARE

## 2025-06-23 DIAGNOSIS — Z96.651 S/P TOTAL KNEE REPLACEMENT USING CEMENT, RIGHT: Primary | ICD-10-CM

## 2025-06-23 DIAGNOSIS — Z96.651 S/P TOTAL KNEE REPLACEMENT USING CEMENT, RIGHT: ICD-10-CM

## 2025-06-23 PROCEDURE — 99024 POSTOP FOLLOW-UP VISIT: CPT | Performed by: ORTHOPAEDIC SURGERY

## 2025-06-23 PROCEDURE — 73562 X-RAY EXAM OF KNEE 3: CPT | Mod: TC | Performed by: RADIOLOGY

## 2025-06-23 RX ORDER — OXYCODONE HYDROCHLORIDE 5 MG/1
5-10 TABLET ORAL EVERY 6 HOURS PRN
Qty: 20 TABLET | Refills: 0 | Status: SHIPPED | OUTPATIENT
Start: 2025-06-23

## 2025-06-23 NOTE — LETTER
6/23/2025      Marcelina Estevez  [address] [place] [address]      Dear Colleague,    Thank you for referring your patient, Marcelina Estevez, to the [facility]. Please see a copy of my visit note below.    Orthopedic Clinic Post-Operative Note    CHIEF COMPLAINT: No chief complaint on file.      HISTORY OF PRESENT ILLNESS  Today's visit:  Overall doing well.  Attending therapy.  Doing well.  Pain is controlled.  Using oxycodone.  Taking Eliquis.      Mavis 10, 2025 surgery: Right total knee replacement        May 5, 2025 office visit:  Right knee continues to worsen.  Now more painful than the left knee prior to replacement. Reports can only walk a short distance due to the pain and has had to resort to wheelchair for longer distances and when recently in the airport.  The pain is waking her at night.  Left knee doing excellent. Would like the right knee replaced.   Used eliquis after last surgery due to previous ischemic bowel issues and inability to take NSAIDs, did therapy at [facility] in [facility].         November 22, 2024 office visit:  Patient has been doing excellent with no complaints.  She passed and graduated formal physical therapy already with flexion of 135 degrees.  Denies any numbness or tingling or any problems with the incision.  Had some soreness on the right knee but mostly because she is compensating.  States eventually she would want the right knee replaced if it came to that.     October 16, 2024 visit:  Returns. Reports overall doing very well. Attending therapy with [facility]. States feeling good. Improving her activity. Walking without any aid. Happy with progress. Feels much better than prior to surgery.  She does note recently had 2 small spots towards the top of her incision that bled a slight amount. No current drainage. Scabbed over and now healing.  Was using some lotion to the area.  Denies any redness, any fevers, feeling sick, increasing pain, etc.         September  18, 2024 visit:  Pain is controlled.  She has been utilizing the oxycodone.  She decreased her exercises due to her bleeding.  No further bleeding issues        September 12 2024 visit:  Total knee replacement on 9/11/24.  Discharged 9/12/24 without incident. She reports had a pin sized shirin of blood on the aquacell when she went home on the 12th. She started to do frequent exercises including multiple rounds of knee flexion. Started to notice more drainage. Called the clinic. Was recommended to continue to monitor. This AM had further drainage and started to soak into the steven stockings came back in. Pain controlled. No new injuries.  Otherwise doing as expected. Taking eliquis for DVT prevention.         September 10, 2024 status post left total knee arthroplasty     August 14, 2024 office visit:  Previously seen by Dr. Abrams for left knee pain.  His visit on July 10, 2024 was reviewed.  Concerns for inflammatory arthritis.  Recent pulmonology visit on July 31, 2020 for review as well.   She reports 20+ years of progressive left knee pain. Now reports the right knee and hip are starting to hurt due to increasing pain and limping with the left knee. No specific injuries recently. Reports just getting progressively worse.   Treatments tried: previous knee arthroscopy with meniscus debridement, multiple cortisone injections, gel injections, physical therapy in the past, rest, activity modification, OTC medications including nsaids and tylenol.  Despite this progressive constant pain limiting her activity, she reports able to walk less than 1 block without stopping due to pain, limited other rec. activities as well as limiting ability to play with her grandkids, clean, and kneel,. These symptoms have been ongoing for long time but worse the last year.       Reports seen by pulmonologist recently due to long term systemic sclerosis.  Was told pulmonary function and chest imaging was largely unchanged but was  recommended for further cardiac workup and testing. Denies any home 02 use. States lungs and breathing has felt at baseline and not limiting.   Patient's past medical, surgical, social and family histories reviewed.     Past Medical History:   Diagnosis Date     CREST (calcinosis, Raynaud's phenomenon, esophageal dysfunction, sclerodactyly, telangiectasia) (H)      Hyperlipidemia      Hypertension      Hypertriglyceridemia 01/18/2011    Controlled with simvastatin.        Limited systemic sclerosis (H)      Menopausal syndrome (hot flashes) 12/19/2013     Positive MINDY (antinuclear antibody)      Raynaud disease        Past Surgical History:   Procedure Laterality Date     APPENDECTOMY       ARTHROPLASTY, KNEE, ROBOT ASSISTED, USING RAMILA Left 9/10/2024    Procedure: left knee ramila assisted total arthroplasty;  Surgeon: Rehan Spring DO;  Location: [facility]     ARTHROPLASTY, KNEE, ROBOT ASSISTED, USING RAMILA Right 6/10/2025    Procedure: right ramila assisted total knee replacement;  Surgeon: Rehan Spring DO;  Location: [facility]     BIOPSY      cervical node     COLONOSCOPY N/A 11/10/2021    Procedure: COLONOSCOPY, FLEXIBLE, WITH LESION REMOVAL USING SNARE;  Surgeon: Domingo Wall MD;  Location: [facility]     COLONOSCOPY N/A 11/10/2021    Procedure: COLONOSCOPY, WITH POLYPECTOMY AND BIOPSY;  Surgeon: Domingo Wall MD;  Location: [facility]     COLONOSCOPY N/A 9/29/2021    Procedure: Colonoscopy, With Polypectomy And Biopsy;  Surgeon: Domingo Wall MD;  Location: [facility]     COLONOSCOPY N/A 9/29/2021    Procedure: Colonoscopy, Flexible, With Lesion Removal Using Snare;  Surgeon: Domingo Wall MD;  Location: [facility]     COLONOSCOPY N/A 6/29/2022    Procedure: COLONOSCOPY, FLEXIBLE, WITH LESION REMOVAL USING SNARE;  Surgeon: Domingo Wall MD;  Location: [facility]     COLONOSCOPY N/A 11/9/2022    Procedure: COLONOSCOPY, WITH POLYPECTOMY AND BIOPSY;   Surgeon: Domingo Wall MD;  Location: [facility]     COLONOSCOPY N/A 6/12/2023    Procedure: COLONOSCOPY, FLEXIBLE, WITH LESION REMOVAL USING SNARE;  Surgeon: Domingo Wall MD;  [facility]: [facility]     COLONOSCOPY WITH CO2 INSUFFLATION N/A 10/26/2020    Procedure: COLONOSCOPY, WITH CO2 INSUFFLATION;  Surgeon: Phyllis Chaudhari MD;  Location: [facility]     COLONOSCOPY WITH CO2 INSUFFLATION N/A 11/10/2021    Procedure: COLONOSCOPY, WITH CO2 INSUFFLATION;  Surgeon: Domingo Wall MD;  [facility]     COLONOSCOPY WITH CO2 INSUFFLATION N/A 9/29/2021    Procedure: COLONOSCOPY, WITH CO2 INSUFFLATION;  Surgeon: Domingo Wall MD;  [facility]     COLONOSCOPY WITH CO2 INSUFFLATION N/A 6/29/2022    Procedure: COLONOSCOPY, WITH CO2 INSUFFLATION;  Surgeon: Domingo Wall MD;  [facility]     COLONOSCOPY WITH CO2 INSUFFLATION N/A 11/9/2022    Procedure: COLONOSCOPY, WITH CO2 INSUFFLATION;  Surgeon: Domingo Wall MD;  [facility]     COLONOSCOPY WITH CO2 INSUFFLATION N/A 6/12/2023    Procedure: Colonoscopy with CO2 insufflation;  Surgeon: Domingo Wall MD;  [facility]     COLONOSCOPY WITH CO2 INSUFFLATION N/A 7/3/2024    Procedure: Colonoscopy with CO2 insufflation;  Surgeon: Domingo Wall MD;  [facility]: [facility]     COMBINED ESOPHAGOSCOPY, GASTROSCOPY, DUODENOSCOPY (EGD) WITH CO2 INSUFFLATION N/A 6/29/2022    Procedure: ESOPHAGOGASTRODUODENOSCOPY, WITH CO2 INSUFFLATION;  Surgeon: Domingo Wall MD;  Location: [facility]     DILATION AND CURETTAGE, HYSTEROSCOPY DIAGNOSTIC, COMBINED  7/1/2014    Procedure: COMBINED DILATION AND CURETTAGE, HYSTEROSCOPY DIAGNOSTIC;  Surgeon: Mana Cabrera DO;  Location: [facility]     ENT SURGERY      tonsillectomy and adnoid removal     ESOPHAGOSCOPY, GASTROSCOPY, DUODENOSCOPY (EGD), COMBINED N/A 6/29/2022    Procedure: ESOPHAGOGASTRODUODENOSCOPY, WITH BIOPSY;  Surgeon:  Domingo Wall MD;  Location: [facility]     HYSTERECTOMY TOTAL ABDOMINAL       ORTHOPEDIC SURGERY      left knee tear meniscus     RELEASE CARPAL TUNNEL BILATERAL  2009       Medications:  Current Outpatient Medications   Medication Sig Dispense Refill     acetaminophen (TYLENOL) 500 MG tablet Take 2 tablets (1,000 mg) by mouth every 8 hours as needed for other (mild pain). 60 tablet 1     apixaban ANTICOAGULANT (ELIQUIS) 2.5 MG tablet Take 1 tablet (2.5 mg) by mouth 2 times daily for 14 days. 28 tablet 0     methocarbamol (ROBAXIN) 500 MG tablet Take 1 tablet (500 mg) by mouth 3 times daily as needed for muscle spasms. 45 tablet 1     oxyCODONE (ROXICODONE) 5 MG tablet Take 1-2 tablets (5-10 mg) by mouth every 4 hours as needed for moderate to severe pain. 30 tablet 0     albuterol (PROAIR HFA) 108 (90 Base) MCG/ACT inhaler INHALE 2 PUFS BY MOUTH EVERY 6 HOURS AS NEEDED FOR SHORTNESS OF BREATH / DYSPNEA 18 g 5     ALLEGRA ALLERGY 180 MG tablet Take 1 tablet (180 mg) by mouth daily 90 tablet 3     allopurinol (ZYLOPRIM) 100 MG tablet Take 1 tablet (100 mg) by mouth 2 times daily.       [START ON 6/25/2025] aspirin 81 MG EC tablet Take 1 tablet (81 mg) by mouth daily.       atorvastatin (LIPITOR) 40 MG tablet Take 1 tablet (40 mg) by mouth daily. 90 tablet 0     busPIRone (BUSPAR) 5 MG tablet Take 1 tablet (5 mg) by mouth 2 times daily. 180 tablet 0     DULoxetine (CYMBALTA) 60 MG capsule Take 1 capsule (60 mg) by mouth 2 times daily. 180 capsule 0     fluticasone (FLONASE) 50 MCG/ACT nasal spray Spray 1 spray into both nostrils daily 16 mL 5     [Paused] hydroxychloroquine (PLAQUENIL) 200 MG tablet Take 1.5 tablet daily 45 tablet 1     isosorbide mononitrate (IMDUR) 30 MG 24 hr tablet Take 1 tablet (30 mg) by mouth daily. 90 tablet 0     LORazepam (ATIVAN) 0.5 MG tablet Take 1 tablet (0.5 mg) by mouth 2 times daily as needed for anxiety. 30 tablet 0     losartan (COZAAR) 25 MG tablet Take 1 tablet (25 mg)  by mouth daily. 90 tablet 1     magnesium oxide (MAG-OX) 400 MG tablet Take 1 tablet (400 mg) by mouth 2 times daily. 180 tablet 1     NIFEdipine ER (ADALAT CC) 30 MG 24 hr tablet Take 1 tablet (30 mg) by mouth daily. 90 tablet 0     order for DME Equipment being ordered: light lamp for seasonal affective disorder (Patient not taking: Reported on 2025) 1 Device 0     pantoprazole (PROTONIX) 40 MG EC tablet Take 40 mg by mouth 2 times daily.       senna-docusate (SENOKOT-S/PERICOLACE) 8.6-50 MG tablet Take 1-2 tablets by mouth 2 times daily as needed for constipation. Take while on oral narcotics to prevent or treat constipation. 30 tablet 0     vitamin D3 (CHOLECALCIFEROL) 50 mcg (2000 units) tablet Take 1 tablet (50 mcg) by mouth daily. 100 tablet 1     No current facility-administered medications for this visit.     Facility-Administered Medications Ordered in Other Visits   Medication Dose Route Frequency Provider Last Rate Last Admin     sodium chloride (PF) 0.9% PF flush 10 mL  10 mL Intravenous Once Hilda Lopez MD           Allergies   Allergen Reactions     Hydroxyzine      Made her very tired     Other Environmental Allergy Other (See Comments)     Seasonal Allergies      Sulfa Antibiotics      Vomiting         Social History     Occupational History     Not on file   Tobacco Use     Smoking status: Former     Current packs/day: 0.00     Types: Cigarettes     Quit date: 2001     Years since quittin.4     Smokeless tobacco: Never   Vaping Use     Vaping status: Never Used   Substance and Sexual Activity     Alcohol use: Yes     Comment: 5-8 drinks/week     Drug use: No     Sexual activity: Yes     Partners: Male     Birth control/protection: Surgical     Comment: vasectomy       Family History   Problem Relation Age of Onset     Osteoporosis Mother      Eye Disorder Mother         cataract, mac degen     Macular Degeneration Mother      Dementia Mother      Hypertension Maternal  Grandmother      Diabetes Maternal Grandfather      Eye Disorder Paternal Grandmother         glaucoma     Unknown/Adopted Paternal Grandfather      Hypertension Daughter      Graves' disease Daughter      Diabetes Other      Glaucoma No family hx of        REVIEW OF SYSTEMS  General: negative for, night sweats, dizziness, fatigue  Resp: No shortness of breath and no cough  CV: negative for chest pain, syncope or near-syncope  GI: negative for nausea, vomiting and diarrhea  : negative for dysuria and hematuria  Musculoskeletal: as above  Neurologic: negative for syncope   Hematologic: negative for bleeding disorder    Physical Exam:  Vitals: LMP 06/12/2014   BMI= There is no height or weight on file to calculate BMI.  Constitutional: healthy, alert and no acute distress   Psychiatric: mentation appears normal and affect normal/bright  NEURO: no focal deficits  SKIN: .healing well, well approximated skin edges, without signs of infection including no erythema, incision breakdown or purlent drainage  JOINT/EXTREMITIES: 10-90 degrees active motion.  No instability.  No peripheral edema.  Distal neurovasc intact.  GAIT: antalgic    Diagnostic Modalities:  right knee X-ray: The prosthesis has acceptable alignment. No fractures or dislocations. Prosthesis is well seated with no evidence of loosening, cemented Powell knee.  Patella not resurfaced  Independent visualization of the images was performed.      Impression: No chief complaint on file.  Doing as expected.  Right total knee replacement-Mavis 10, 2025 (13 days)  Plan:   Continue therapy.  Wound care discussed.  Refill of oxycodone provided.  Return to clinic 4, week(s), or sooner as needed for changes.    Re-x-ray on return: No    Vince Spring D.O.    Again, thank you for allowing me to participate in the care of your patient.        Sincerely,        Rehan Spring, DO    Electronically signed

## 2025-07-15 ENCOUNTER — OFFICE VISIT (OUTPATIENT)
Dept: FAMILY MEDICINE | Facility: CLINIC | Age: 65
End: 2025-07-15
Payer: MEDICARE

## 2025-07-15 VITALS
SYSTOLIC BLOOD PRESSURE: 104 MMHG | OXYGEN SATURATION: 99 % | HEIGHT: 61 IN | BODY MASS INDEX: 23.64 KG/M2 | WEIGHT: 125.2 LBS | DIASTOLIC BLOOD PRESSURE: 68 MMHG | HEART RATE: 77 BPM | TEMPERATURE: 97.3 F

## 2025-07-15 DIAGNOSIS — F33.1 MODERATE EPISODE OF RECURRENT MAJOR DEPRESSIVE DISORDER (H): ICD-10-CM

## 2025-07-15 DIAGNOSIS — I20.1 CORONARY VASOSPASM: ICD-10-CM

## 2025-07-15 DIAGNOSIS — Z00.00 ENCOUNTER FOR MEDICARE ANNUAL WELLNESS EXAM: Primary | ICD-10-CM

## 2025-07-15 DIAGNOSIS — F41.0 PANIC ATTACK: ICD-10-CM

## 2025-07-15 DIAGNOSIS — M34.1 CREST (CALCINOSIS, RAYNAUD'S PHENOMENON, ESOPHAGEAL DYSFUNCTION, SCLERODACTYLY, TELANGIECTASIA) (H): ICD-10-CM

## 2025-07-15 DIAGNOSIS — K22.70 BARRETT'S ESOPHAGUS WITHOUT DYSPLASIA: ICD-10-CM

## 2025-07-15 DIAGNOSIS — E78.5 HYPERLIPIDEMIA LDL GOAL <160: ICD-10-CM

## 2025-07-15 DIAGNOSIS — E83.42 HYPOMAGNESEMIA: ICD-10-CM

## 2025-07-15 DIAGNOSIS — R79.89 ELEVATED LFTS: ICD-10-CM

## 2025-07-15 DIAGNOSIS — I10 HYPERTENSION GOAL BP (BLOOD PRESSURE) < 140/90: ICD-10-CM

## 2025-07-15 DIAGNOSIS — Z78.0 ASYMPTOMATIC POSTMENOPAUSAL STATUS: ICD-10-CM

## 2025-07-15 DIAGNOSIS — N18.31 STAGE 3A CHRONIC KIDNEY DISEASE (H): ICD-10-CM

## 2025-07-15 DIAGNOSIS — F41.1 GAD (GENERALIZED ANXIETY DISORDER): ICD-10-CM

## 2025-07-15 DIAGNOSIS — D63.8 ANEMIA IN OTHER CHRONIC DISEASES CLASSIFIED ELSEWHERE: ICD-10-CM

## 2025-07-15 PROBLEM — R80.9 PROTEINURIA, UNSPECIFIED TYPE: Status: RESOLVED | Noted: 2023-08-12 | Resolved: 2025-07-15

## 2025-07-15 LAB
ALBUMIN MFR UR ELPH: 23.6 MG/DL
ALBUMIN SERPL BCG-MCNC: 4.6 G/DL (ref 3.5–5.2)
ALP SERPL-CCNC: 457 U/L (ref 40–150)
ALT SERPL W P-5'-P-CCNC: 97 U/L (ref 0–50)
ANION GAP SERPL CALCULATED.3IONS-SCNC: 12 MMOL/L (ref 7–15)
AST SERPL W P-5'-P-CCNC: 71 U/L (ref 0–45)
BASOPHILS # BLD AUTO: 0.1 10E3/UL (ref 0–0.2)
BASOPHILS NFR BLD AUTO: 1 %
BILIRUB SERPL-MCNC: 0.5 MG/DL
BUN SERPL-MCNC: 32.2 MG/DL (ref 8–23)
CALCIUM SERPL-MCNC: 10.4 MG/DL (ref 8.8–10.4)
CHLORIDE SERPL-SCNC: 102 MMOL/L (ref 98–107)
CREAT SERPL-MCNC: 1.08 MG/DL (ref 0.51–0.95)
CREAT UR-MCNC: 74.1 MG/DL
EGFRCR SERPLBLD CKD-EPI 2021: 57 ML/MIN/1.73M2
EOSINOPHIL # BLD AUTO: 0.4 10E3/UL (ref 0–0.7)
EOSINOPHIL NFR BLD AUTO: 4 %
ERYTHROCYTE [DISTWIDTH] IN BLOOD BY AUTOMATED COUNT: 14.5 % (ref 10–15)
FERRITIN SERPL-MCNC: 863 NG/ML (ref 11–328)
GLUCOSE SERPL-MCNC: 108 MG/DL (ref 70–99)
HCO3 SERPL-SCNC: 22 MMOL/L (ref 22–29)
HCT VFR BLD AUTO: 29.1 % (ref 35–47)
HGB BLD-MCNC: 9.8 G/DL (ref 11.7–15.7)
IMM GRANULOCYTES # BLD: 0.1 10E3/UL
IMM GRANULOCYTES NFR BLD: 1 %
IRON BINDING CAPACITY (ROCHE): 268 UG/DL (ref 240–430)
IRON SATN MFR SERPL: 27 % (ref 15–46)
IRON SERPL-MCNC: 72 UG/DL (ref 37–145)
LYMPHOCYTES # BLD AUTO: 1.1 10E3/UL (ref 0.8–5.3)
LYMPHOCYTES NFR BLD AUTO: 13 %
MAGNESIUM SERPL-MCNC: 1.2 MG/DL (ref 1.7–2.3)
MCH RBC QN AUTO: 32.6 PG (ref 26.5–33)
MCHC RBC AUTO-ENTMCNC: 33.7 G/DL (ref 31.5–36.5)
MCV RBC AUTO: 97 FL (ref 78–100)
MONOCYTES # BLD AUTO: 0.6 10E3/UL (ref 0–1.3)
MONOCYTES NFR BLD AUTO: 6 %
NEUTROPHILS # BLD AUTO: 6.6 10E3/UL (ref 1.6–8.3)
NEUTROPHILS NFR BLD AUTO: 75 %
NRBC # BLD AUTO: 0 10E3/UL
NRBC BLD AUTO-RTO: 0 /100
PLATELET # BLD AUTO: 240 10E3/UL (ref 150–450)
POTASSIUM SERPL-SCNC: 4.1 MMOL/L (ref 3.4–5.3)
PROT SERPL-MCNC: 7.6 G/DL (ref 6.4–8.3)
PROT/CREAT 24H UR: 0.32 MG/MG CR (ref 0–0.2)
RBC # BLD AUTO: 3.01 10E6/UL (ref 3.8–5.2)
SODIUM SERPL-SCNC: 136 MMOL/L (ref 135–145)
WBC # BLD AUTO: 8.9 10E3/UL (ref 4–11)

## 2025-07-15 PROCEDURE — 1125F AMNT PAIN NOTED PAIN PRSNT: CPT | Performed by: FAMILY MEDICINE

## 2025-07-15 PROCEDURE — G0402 INITIAL PREVENTIVE EXAM: HCPCS | Performed by: FAMILY MEDICINE

## 2025-07-15 PROCEDURE — 36415 COLL VENOUS BLD VENIPUNCTURE: CPT | Performed by: FAMILY MEDICINE

## 2025-07-15 PROCEDURE — 84156 ASSAY OF PROTEIN URINE: CPT | Performed by: FAMILY MEDICINE

## 2025-07-15 PROCEDURE — 80053 COMPREHEN METABOLIC PANEL: CPT | Performed by: FAMILY MEDICINE

## 2025-07-15 PROCEDURE — 85025 COMPLETE CBC W/AUTO DIFF WBC: CPT | Performed by: FAMILY MEDICINE

## 2025-07-15 PROCEDURE — 3074F SYST BP LT 130 MM HG: CPT | Performed by: FAMILY MEDICINE

## 2025-07-15 PROCEDURE — 82728 ASSAY OF FERRITIN: CPT | Performed by: FAMILY MEDICINE

## 2025-07-15 PROCEDURE — 3078F DIAST BP <80 MM HG: CPT | Performed by: FAMILY MEDICINE

## 2025-07-15 PROCEDURE — 99214 OFFICE O/P EST MOD 30 MIN: CPT | Mod: 25 | Performed by: FAMILY MEDICINE

## 2025-07-15 PROCEDURE — 83550 IRON BINDING TEST: CPT | Performed by: FAMILY MEDICINE

## 2025-07-15 PROCEDURE — 83735 ASSAY OF MAGNESIUM: CPT | Performed by: FAMILY MEDICINE

## 2025-07-15 PROCEDURE — G2211 COMPLEX E/M VISIT ADD ON: HCPCS | Performed by: FAMILY MEDICINE

## 2025-07-15 PROCEDURE — 83540 ASSAY OF IRON: CPT | Performed by: FAMILY MEDICINE

## 2025-07-15 RX ORDER — PANTOPRAZOLE SODIUM 40 MG/1
40 TABLET, DELAYED RELEASE ORAL DAILY
Qty: 90 TABLET | Refills: 1 | Status: SHIPPED | OUTPATIENT
Start: 2025-07-15

## 2025-07-15 RX ORDER — ARIPIPRAZOLE 2 MG/1
2 TABLET ORAL DAILY
Qty: 30 TABLET | Refills: 1 | Status: SHIPPED | OUTPATIENT
Start: 2025-07-15

## 2025-07-15 SDOH — HEALTH STABILITY: PHYSICAL HEALTH: ON AVERAGE, HOW MANY DAYS PER WEEK DO YOU ENGAGE IN MODERATE TO STRENUOUS EXERCISE (LIKE A BRISK WALK)?: 2 DAYS

## 2025-07-15 SDOH — HEALTH STABILITY: PHYSICAL HEALTH: ON AVERAGE, HOW MANY MINUTES DO YOU ENGAGE IN EXERCISE AT THIS LEVEL?: 30 MIN

## 2025-07-15 ASSESSMENT — PAIN SCALES - GENERAL: PAINLEVEL_OUTOF10: MODERATE PAIN (4)

## 2025-07-15 ASSESSMENT — SOCIAL DETERMINANTS OF HEALTH (SDOH): HOW OFTEN DO YOU GET TOGETHER WITH FRIENDS OR RELATIVES?: TWICE A WEEK

## 2025-07-15 NOTE — Clinical Note
Looks like she did not read the result note, please be sure she is aware of the message.  Also  let her know that I am sending her for an ultrasound of the liver due to elevated liver enzymes.  I encouraged her to avoid all alcohol products.  Also have her to hold off on the Lipitor.  Recheck the CMP and mag level in 3 months.  Will also have her to restart the magnesium oxide and low-dose aspirin

## 2025-07-15 NOTE — PATIENT INSTRUCTIONS
Patient Education   Preventive Care Advice   This is general advice given by our system to help you stay healthy. However, your care team may have specific advice just for you. Please talk to your care team about your preventive care needs.  Nutrition  Eat 5 or more servings of fruits and vegetables each day.  Try wheat bread, brown rice and whole grain pasta (instead of white bread, rice, and pasta).  Get enough calcium and vitamin D. Check the label on foods and aim for 100% of the RDA (recommended daily allowance).  Lifestyle  Exercise at least 150 minutes each week  (30 minutes a day, 5 days a week).  Do muscle strengthening activities 2 days a week. These help control your weight and prevent disease.  No smoking.  Wear sunscreen to prevent skin cancer.  Have a dental exam and cleaning every 6 months.  Yearly exams  See your health care team every year to talk about:  Any changes in your health.  Any medicines your care team has prescribed.  Preventive care, family planning, and ways to prevent chronic diseases.  Shots (vaccines)   HPV shots (up to age 26), if you've never had them before.  Hepatitis B shots (up to age 59), if you've never had them before.  COVID-19 shot: Get this shot when it's due.  Flu shot: Get a flu shot every year.  Tetanus shot: Get a tetanus shot every 10 years.  Pneumococcal, hepatitis A, and RSV shots: Ask your care team if you need these based on your risk.  Shingles shot (for age 50 and up)  General health tests  Diabetes screening:  Starting at age 35, Get screened for diabetes at least every 3 years.  If you are younger than age 35, ask your care team if you should be screened for diabetes.  Cholesterol test: At age 39, start having a cholesterol test every 5 years, or more often if advised.  Bone density scan (DEXA): At age 50, ask your care team if you should have this scan for osteoporosis (brittle bones).  Hepatitis C: Get tested at least once in your life.  STIs (sexually  transmitted infections)  Before age 24: Ask your care team if you should be screened for STIs.  After age 24: Get screened for STIs if you're at risk. You are at risk for STIs (including HIV) if:  You are sexually active with more than one person.  You don't use condoms every time.  You or a partner was diagnosed with a sexually transmitted infection.  If you are at risk for HIV, ask about PrEP medicine to prevent HIV.  Get tested for HIV at least once in your life, whether you are at risk for HIV or not.  Cancer screening tests  Cervical cancer screening: If you have a cervix, begin getting regular cervical cancer screening tests starting at age 21.  Breast cancer scan (mammogram): If you've ever had breasts, begin having regular mammograms starting at age 40. This is a scan to check for breast cancer.  Colon cancer screening: It is important to start screening for colon cancer at age 45.  Have a colonoscopy test every 10 years (or more often if you're at risk) Or, ask your provider about stool tests like a FIT test every year or Cologuard test every 3 years.  To learn more about your testing options, visit:   .  For help making a decision, visit:   https://bit.ly/hk19002.  Prostate cancer screening test: If you have a prostate, ask your care team if a prostate cancer screening test (PSA) at age 55 is right for you.  Lung cancer screening: If you are a current or former smoker ages 50 to 80, ask your care team if ongoing lung cancer screenings are right for you.  For informational purposes only. Not to replace the advice of your health care provider. Copyright   2023 [facility]. All rights reserved. Clinically reviewed by the [facility]itions Program. [employer] 308656 - REV 01/24.  Preventing Falls: Care Instructions  Injuries and health problems such as trouble walking or poor eyesight can increase your risk of falling. So can some medicines. But there are things you can do to help  "prevent falls. You can exercise to get stronger. You can also arrange your home to make it safer.    Talk to your doctor about the medicines you take. Ask if any of them increase the risk of falls and whether they can be changed or stopped.   Try to exercise regularly. It can help improve your strength and balance. This can help lower your risk of falling.         Practice fall safety and prevention.   Wear low-heeled shoes that fit well and give your feet good support. Talk to your doctor if you have foot problems that make this hard.  Carry a cellphone or wear a medical alert device that you can use to call for help.  Use stepladders instead of chairs to reach high objects. Don't climb if you're at risk for falls. Ask for help, if needed.  Wear the correct eyeglasses, if you need them.        Make your home safer.   Remove rugs, cords, clutter, and furniture from walkways.  Keep your house well lit. Use night-lights in hallways and bathrooms.  Install and use sturdy handrails on stairways.  Wear nonskid footwear, even inside. Don't walk barefoot or in socks without shoes.        Be safe outside.   Use handrails, curb cuts, and ramps whenever possible.  Keep your hands free by using a shoulder bag or backpack.  Try to walk in well-lit areas. Watch out for uneven ground, changes in pavement, and debris.  Be careful in the winter. Walk on the grass or gravel when sidewalks are slippery. Use de-icer on steps and walkways. Add non-slip devices to shoes.    Put grab bars and nonskid mats in your shower or tub and near the toilet. Try to use a shower chair or bath bench when bathing.   Get into a tub or shower by putting in your weaker leg first. Get out with your strong side first. Have a phone or medical alert device in the bathroom with you.   Where can you learn more?  Go to https://www.Punchhwise.net/patiented  Enter G117 in the search box to learn more about \"Preventing Falls: Care Instructions.\"  Current as of: " July 31, 2024  Content Version: 14.5    5436-7207 [employer].   Care instructions adapted under license by your healthcare professional. If you have questions about a medical condition or this instruction, always ask your healthcare professional. [employer] disclaims any warranty or liability for your use of this information.    Learning About Stress  What is stress?     Stress is your body's response to a hard situation. Your body can have a physical, emotional, or mental response. Stress is a fact of life for most people, and it affects everyone differently. What causes stress for you may not be stressful for someone else.  A lot of things can cause stress. You may feel stress when you go on a job interview, take a test, or run a race. This kind of short-term stress is normal and even useful. It can help you if you need to work hard or react quickly. For example, stress can help you finish an important job on time.  Long-term stress is caused by ongoing stressful situations or events. Examples of long-term stress include long-term health problems, ongoing problems at work, or conflicts in your family. Long-term stress can harm your health.  How does stress affect your health?  When you are stressed, your body responds as though you are in danger. It makes hormones that speed up your heart, make you breathe faster, and give you a burst of energy. This is called the fight-or-flight stress response. If the stress is over quickly, your body goes back to normal and no harm is done.  But if stress happens too often or lasts too long, it can have bad effects. Long-term stress can make you more likely to get sick, and it can make symptoms of some diseases worse. If you tense up when you are stressed, you may develop neck, shoulder, or low back pain. Stress is linked to high blood pressure and heart disease.  Stress also harms your emotional health. It can make you quiñones, tense, or depressed. Your  relationships may suffer, and you may not do well at work or school.  What can you do to manage stress?  You can try these things to help manage stress:   Do something active. Exercise or activity can help reduce stress. Walking is a great way to get started. Even everyday activities such as housecleaning or yard work can help.  Try yoga or michael chi. These techniques combine exercise and meditation. You may need some training at first to learn them.  Do something you enjoy. For example, listen to music or go to a movie. Practice your hobby or do volunteer work.  Meditate. This can help you relax, because you are not worrying about what happened before or what may happen in the future.  Do guided imagery. Imagine yourself in any setting that helps you feel calm. You can use online videos, books, or a teacher to guide you.  Do breathing exercises. For example:  From a standing position, bend forward from the waist with your knees slightly bent. Let your arms dangle close to the floor.  Breathe in slowly and deeply as you return to a standing position. Roll up slowly and lift your head last.  Hold your breath for just a few seconds in the standing position.  Breathe out slowly and bend forward from the waist.  Let your feelings out. Talk, laugh, cry, and express anger when you need to. Talking with supportive friends or family, a counselor, or a lavinia leader about your feelings is a healthy way to relieve stress. Avoid discussing your feelings with people who make you feel worse.  Write. It may help to write about things that are bothering you. This helps you find out how much stress you feel and what is causing it. When you know this, you can find better ways to cope.  What can you do to prevent stress?  You might try some of these things to help prevent stress:  Manage your time. This helps you find time to do the things you want and need to do.  Get enough sleep. Your body recovers from the stresses of the day while  "you are sleeping.  Get support. Your family, friends, and community can make a difference in how you experience stress.  Limit your news feed. Avoid or limit time on social media or news that may make you feel stressed.  Do something active. Exercise or activity can help reduce stress. Walking is a great way to get started.  Where can you learn more?  Go to https://www.[employer].net/patiented  Enter N032 in the search box to learn more about \"Learning About Stress.\"  Current as of: October 24, 2024  Content Version: 14.5    3849-4288 [employer].   Care instructions adapted under license by your healthcare professional. If you have questions about a medical condition or this instruction, always ask your healthcare professional. [employer] disclaims any warranty or liability for your use of this information.    Learning About Sleeping Well  What does sleeping well mean?     Sleeping well means getting enough sleep to feel good and stay healthy. How much sleep is enough varies among people.  The number of hours you sleep and how you feel when you wake up are both important. If you do not feel refreshed, you probably need more sleep. Another sign of not getting enough sleep is feeling tired during the day.  Experts recommend that adults get at least 7 or more hours of sleep per day. Children and older adults need more sleep.  Why is getting enough sleep important?  Getting enough quality sleep is a basic part of good health. When your sleep suffers, your physical health, mood, and your thoughts can suffer too. You may find yourself feeling more grumpy or stressed. Not getting enough sleep also can lead to serious problems, including injury, accidents, anxiety, and depression.  What might cause poor sleeping?  Many things can cause sleep problems, including:  Changes to your sleep schedule.  Stress. Stress can be caused by fear about a single event, such as giving a speech. Or you may have " "ongoing stress, such as worry about work or school.  Depression, anxiety, and other mental or emotional conditions.  Changes in your sleep habits or surroundings. This includes changes that happen where you sleep, such as noise, light, or sleeping in a different bed. It also includes changes in your sleep pattern, such as having jet lag or working a late shift.  Health problems, such as pain, breathing problems, and restless legs syndrome.  Lack of regular exercise.  Using alcohol, nicotine, or caffeine before bed.  How can you help yourself?  Here are some tips that may help you sleep more soundly and wake up feeling more refreshed.  Your sleeping area   Use your bedroom only for sleeping and sex. A bit of light reading may help you fall asleep. But if it doesn't, do your reading elsewhere in the house. Try not to use your TV, computer, smartphone, or tablet while you are in bed.  Be sure your bed is big enough to stretch out comfortably, especially if you have a sleep partner.  Keep your bedroom quiet, dark, and cool. Use curtains, blinds, or a sleep mask to block out light. To block out noise, use earplugs, soothing music, or a \"white noise\" machine.  Your evening and bedtime routine   Create a relaxing bedtime routine. You might want to take a warm shower or bath, or listen to soothing music.  Go to bed at the same time every night. And get up at the same time every morning, even if you feel tired.  What to avoid   Limit caffeine (coffee, tea, caffeinated sodas) during the day, and don't have any for at least 6 hours before bedtime.  Avoid drinking alcohol before bedtime. Alcohol can cause you to wake up more often during the night.  Try not to smoke or use tobacco, especially in the evening. Nicotine can keep you awake.  Limit naps during the day, especially close to bedtime.  Avoid lying in bed awake for too long. If you can't fall asleep or if you wake up in the middle of the night and can't get back to sleep " "within about 20 minutes, get out of bed and go to another room until you feel sleepy.  Avoid taking medicine right before bed that may keep you awake or make you feel hyper or energized. Your doctor can tell you if your medicine may do this and if you can take it earlier in the day.  If you can't sleep   Imagine yourself in a peaceful, pleasant scene. Focus on the details and feelings of being in a place that is relaxing.  Get up and do a quiet or boring activity until you feel sleepy.  Avoid drinking any liquids before going to bed to help prevent waking up often to use the bathroom.  Where can you learn more?  Go to https://www.Lab42.net/patiented  Enter J942 in the search box to learn more about \"Learning About Sleeping Well.\"  Current as of: July 31, 2024  Content Version: 14.5    2137-7792 [employer].   Care instructions adapted under license by your healthcare professional. If you have questions about a medical condition or this instruction, always ask your healthcare professional. [employer] disclaims any warranty or liability for your use of this information.    Recovering From Depression: Care Instructions  Overview    Sticking to your treatment plan is important as you recover from depression. It may take time for your symptoms to get better after you start treatment. Try not to give up if you don't feel better right away. Make sure you keep going to counseling and taking any prescribed medicine if they are part of your treatment plan.  Focus on things that can help you feel better, such as being with friends and family. Try to eat healthy foods, be active, and get enough sleep. Take things slowly as you begin to recover.  Follow-up care is a key part of your treatment and safety. Be sure to make and go to all appointments, and call your doctor if you are having problems. It's also a good idea to know your test results and keep a list of the medicines you take.  How can you " care for yourself at home?  Be realistic  If you have a large task to do, break it up into smaller steps you can handle, and just do what you can.  You may want to put off important decisions until your depression has lifted. If you have plans that will have a major impact on your life, such as marriage, divorce, or a job change, try to wait a bit. Talk it over with friends and loved ones who can help you look at the overall picture first.  Reaching out to people for help is important. Do not isolate yourself. Let your family and friends help you. Find someone you can trust and confide in, and talk to that person.  Be patient, and be kind to yourself. Remember that depression is not your fault and is not something you can overcome with willpower alone. Treatment is important for depression, just like for any other illness. Feeling better takes time, and your mood will improve little by little.  Stay active  Stay busy and get outside. Take a walk, or try some other light exercise.  Talk with your doctor about an exercise program. Exercise can help with mild depression.  Go to a movie or concert. Take part in a Sikhism activity or other social gathering. Go to a ball game.  Ask a friend to have dinner with you.  Take care of yourself  Eat healthy foods such as fresh fruits and vegetables, whole grains, and lean protein. If you have lost your appetite, eat small snacks rather than large meals.  Avoid using marijuana and other drugs and drinking alcohol. Do not take medicines that have not been prescribed for you. They may interfere with medicines you may be taking for depression, or they may make your depression worse.  Take your medicines exactly as they are prescribed. You may start to feel better within 1 to 3 weeks of taking antidepressant medicine. But it can take as many as 6 to 8 weeks to see more improvement. If you have questions or concerns about your medicines, or if you do not notice any improvement by 3  weeks, talk to your doctor.  Continue to take your medicine after your symptoms improve. Taking your medicine for at least 6 months after you feel better can help keep you from getting depressed again. If this isn't the first time you have been depressed, your doctor may recommend you to take medicine even longer.  If you have any side effects from your medicine, tell your doctor. Many side effects are mild and will go away on their own after you have been taking the medicine for a few weeks. Some may last longer. Talk to your doctor if side effects are bothering you too much. You might be able to try a different medicine.  Continue counseling. It may help prevent depression from returning, especially if you've had multiple episodes of depression. Talk with your counselor if you are having a hard time attending your sessions or you think the sessions aren't working. Don't just stop going.  Get enough sleep. Talk to your doctor if you are having problems sleeping.  Avoid sleeping pills unless they are prescribed by the doctor treating your depression. Sleeping pills may make you groggy during the day, and they may interact with other medicine you are taking.  If you have any other illnesses, such as diabetes, heart disease, or high blood pressure, make sure to continue with your treatment. Tell your doctor about all of the medicines you take, including those with or without a prescription.  Where to get help 24 hours a day, 7 days a week  If you or someone you know talks about suicide, self-harm, a mental health crisis, a substance use crisis, or any other kind of emotional distress, get help right away. You can:  Call the Suicide and Crisis Lifeline at 711.  Text HOME to 378829 to access the Crisis Text Line.  Consider saving these numbers in your phone.  Go to [employer].org for more information or to chat online.  Call 208 anytime you think you may need emergency care. For example, call if:  You feel like hurting  "yourself or someone else.  Someone you know has depression and is about to attempt or is attempting suicide.  Where to get help 24 hours a day, 7 days a week  If you or someone you know talks about suicide, self-harm, a mental health crisis, a substance use crisis, or any other kind of emotional distress, get help right away. You can:  Call the Suicide and Crisis Lifeline at 988.  Text HOME to 114075 to access the Crisis Text Line.  Consider saving these numbers in your phone.  Go to [employer] for more information or to chat online.  Call your doctor now or seek immediate medical care if:  You hear voices.  Someone you know has depression and:  Starts to give away possessions.  Uses illegal drugs or drinks alcohol heavily.  Talks or writes about death, including writing suicide notes or talking about guns, knives, or pills.  Starts to spend a lot of time alone.  Acts very aggressively or suddenly appears calm.  Watch closely for changes in your health, and be sure to contact your doctor if:  You do not get better as expected.  Where can you learn more?  Go to https://www.[employer].net/patiented  Enter N529 in the search box to learn more about \"Recovering From Depression: Care Instructions.\"  Current as of: July 31, 2024  Content Version: 14.5 2024-2025 [employer].   Care instructions adapted under license by your healthcare professional. If you have questions about a medical condition or this instruction, always ask your healthcare professional. [employer] disclaims any warranty or liability for your use of this information.       "

## 2025-07-15 NOTE — PROGRESS NOTES
Preventive Care Visit  Formerly [facility]  Zoey Clements Mai, MD, Family Medicine  Jul 15, 2025  {Provider  Link to SmartSet :513509}    {PROVIDER CHARTING PREFERENCE:931024}    Elyssa Hewitt is a 65 year old, presenting for the following:  Medicare Visit        7/15/2025     8:50 AM   Additional Questions   Roomed by Kassidy BARRERA  ***     {SUPERLIST (Optional):245595}  {additonal problems for provider to add (Optional):291832}  Advance Care Planning  {The storyboard will display whether the patient has ACP docs on file. Hover over the Code section in the storyboard to access the ACP documents. :170245}  Document on file is a Health Care Directive or POLST.        7/15/2025   General Health   How would you rate your overall physical health? Good   Feel stress (tense, anxious, or unable to sleep) To some extent         7/15/2025   Nutrition   Diet: Regular (no restrictions)         7/15/2025   Exercise   Days per week of moderate/strenous exercise 2 days   Average minutes spent exercising at this level 30 min         7/15/2025   Social Factors   Frequency of gathering with friends or relatives Twice a week          No data to display                  7/15/2025   Dental   Dentist two times every year? Yes          No data to display                     No data to display                Today's PHQ-9 Score:       7/15/2025     8:47 AM   PHQ-9 SCORE   PHQ-9 Total Score MyChart 7 (Mild depression)   PHQ-9 Total Score 7        Patient-reported         7/15/2025   Substance Use   Alcohol more than 3/day or more than 7/wk No   Do you have a current opioid prescription? No   How severe/bad is pain from 1 to 10? 4/10   Do you use any other substances recreationally? No     Social History     Tobacco Use    Smoking status: Former     Current packs/day: 0.00     Types: Cigarettes     Quit date: 2001     Years since quittin.5    Smokeless tobacco: Never   Vaping Use    Vaping status:  Never Used   Substance Use Topics    Alcohol use: Yes     Comment: 5-8 drinks/week    Drug use: No     {Provider  If there are gaps in the social history shown above, please follow the link to update and then refresh the note Link to Social and Substance History :416381}      11/25/2024   LAST FHS-7 RESULTS   1st degree relative breast or ovarian cancer No   Any relative bilateral breast cancer No   Any male have breast cancer No   Any ONE woman have BOTH breast AND ovarian cancer No   Any woman with breast cancer before 50yrs No   2 or more relatives with breast AND/OR ovarian cancer No   2 or more relatives with breast AND/OR bowel cancer No     {If any of the questions to the FHS7 are answered yes, consider referral for genetic counseling.    Additional indications for genetic referral include personal history of breast or ovarian cancer, genetic mutation in 1st degree relative which increases risk of breast cancer including BRCA1, BRCA2, MICHAELA, PALB 2, TP53, CHEK2, PTEN, CDH1, STK11 (per ACS) and/or 1st degree relative with history of pancreatic or high-risk prostate cancer (per NCCN):438112}   {Mammogram Decision Support (Optional):202186}      History of abnormal Pap smear: { :990932}        6/25/2020    12:00 AM 5/2/2014    12:00 AM 5/23/2011     3:03 PM   PAP / HPV   PAP (Historical)  OTHER-NIL, See Result  NIL    PAP-ABSTRACT See Scanned Document        ASCVD Risk   The ASCVD Risk score (Catarina SHIPLEY, et al., 2019) failed to calculate for the following reasons:    Risk score cannot be calculated because patient has a medical history suggesting prior/existing ASCVD    {Link to Fracture Risk Assessment Tool (Optional):117395}    {Provider  REQUIRED FOR AWV Use the storyboard to review patient history, after sections have been marked as reviewed, refresh note to capture documentation:578629}  {Provider   REQUIRED AWV use this link to review and update sexual activity history  after section has been  marked as reviewed, refresh note to capture documentation:428725}  Reviewed and updated as needed this visit by Provider                    {HISTORY OPTIONS (Optional):766559}  Current providers sharing in care for this patient include:  Patient Care Team:  Zoey Louis MD as PCP - General (Family Medicine)  Alka Sanon MD as MD (Otolaryngology)  Leonardo Melgar OD as Assigned Surgical Provider  Johanna Jaramillo DO as MD (Gastroenterology)  Deann Meyer PharmD as Pharmacist (Pharmacist)  Laurent Guerrero PA-C as Physician Assistant (Gastroenterology)  Zoey Louis MD as Assigned PCP  Laurent Guerrero PA-C as Assigned Gastroenterology Provider  Rigoberto Bess MD as MD (Gastroenterology)  Aydee Rosario PA-C as Physician Assistant (Family Medicine)  Rehan Spring DO as Assigned Musculoskeletal Provider  Isak Luu MD as Physician (Hematology & Oncology)  Zoey Louis MD as Assigned Pain Medication Provider    The following health maintenance items are reviewed in Epic and correct as of today:  Health Maintenance   Topic Date Due    DEXA  Never done    RSV VACCINE (1 - Risk 60-74 years 1-dose series) Never done    COVID-19 VACCINE (5 - 2024-25 season) 09/01/2024    MEDICARE ANNUAL WELLNESS VISIT  03/21/2025    MICROALBUMIN  08/26/2025    INFLUENZA VACCINE (1) 09/01/2025    EYE EXAM  11/15/2025    MAMMO SCREENING  11/25/2025    PHQ-9  01/15/2026    ALT  05/30/2026    BMP  05/30/2026    LIPID  05/30/2026    ANNUAL REVIEW OF HM ORDERS  05/30/2026    URIC ACID  05/30/2026    HEMOGLOBIN  06/11/2026    COLORECTAL CANCER SCREENING  07/03/2026    FALL RISK ASSESSMENT  07/15/2026    DIABETES SCREENING  06/11/2028    DTAP/TDAP/TD VACCINE (5 - Td or Tdap) 04/10/2029    ADVANCE CARE PLANNING  05/31/2030    HEPATITIS C SCREENING  Completed    HIV SCREENING  Completed    DEPRESSION ACTION PLAN  Completed    PNEUMOCOCCAL VACCINE 50+ YEARS  Completed    URINALYSIS  Completed    ZOSTER VACCINE   "Completed    HPV VACCINE  Aged Out    MENINGITIS VACCINE  Aged Out    PAP  Discontinued       {ROS Picklists (Optional):731973}     Objective    Exam  /68   Pulse 77   Temp 97.3  F (36.3  C) (Temporal)   Ht 1.549 m (5' 1\")   Wt 56.8 kg (125 lb 3.2 oz)   LMP 06/12/2014   SpO2 99%   BMI 23.66 kg/m     Estimated body mass index is 23.66 kg/m  as calculated from the following:    Height as of this encounter: 1.549 m (5' 1\").    Weight as of this encounter: 56.8 kg (125 lb 3.2 oz).    Physical Exam  {Exam Choices (Optional):832344}         7/15/2025   Mini Cog   Clock Draw Score 2 Normal   3 Item Recall 3 objects recalled   Mini Cog Total Score 5     {A Mini-Cog total score of 0-2 suggests the possibility of dementia, score of 3-5 suggests no dementia:460788}    Vision Screen  Patient wears corrective lenses (select all that apply): Worn during vision screen  Vision Screen Results: (!) REFER  {Provider  Link to Vision and Hearing Results :775759}    Signed Electronically by: Zoey Clements Mai, MD  {Email feedback regarding this note to [email or web address]   :464974}  Answers submitted by the patient for this visit:  Patient Health Questionnaire (Submitted on 7/15/2025)  If you checked off any problems, how difficult have these problems made it for you to do your work, take care of things at home, or get along with other people?: Somewhat difficult  PHQ9 TOTAL SCORE: 7    " elevated today.  No risk for hepatitis.  No excessive alcohol intake - typically she drinks about 5-8 drinks a week.  Abdominal exam was normal.  Will send for liver ultrasound again, likely normal as her two previous US last year did not show of an obvious cause.  Cholesterol is not elevated with LDL of 62.  Will have hold off on the Lipitor for now.  She was strongly encouraged to abstain from all alcohol beverages.  Recheck the liver enzymes in 2 months.  I also recommended her to work closely with her rheumatologist and the GI specialist.    Saw GI specialist Dr. Bess for this about a year ago.  When she saw him, her liver enzymes were normal.  Her liver enzymes first has been elevated in the last 8 months.  Rechecked the CMP today and they remain to be elevated.  No excessive alcohol intake but is taking Lipitor 40 mg.  Worked up for hepatitis B, hepatitis A and hepatitis C about about a year ago were negative/normal.  Limited abdominal ultrasound on May 28, 2024 so hepatic steatosis but a follow-up ultrasound on June 23, 2024 and it was read to be normal.  She is not morbidly obese.  Her cholesterol levels 6 months ago was normal except the total cholesterol slightly high at 240.     I reviewed the medical record from Dr. Bess, GI specialist.  He did quite extensive workup and all the labs were normal except positive for MINDY which expected due to her crest and lupus.      Her exam today was again completely normal.  Kidney function remained to be slightly low but overall stable.  Further evaluation could be considered based on the ultrasound result today.    Plan: Comprehensive metabolic panel (BMP + Alb, Alk         Phos, ALT, AST, Total. Bili, TP)            (M34.1) CREST (calcinosis, Raynaud's phenomenon, esophageal dysfunction, sclerodactyly, telangiectasia) (H)  Comment: Stable and is doing well.  Managed by rheumatologist.  Tolerating the Plaquenil well.  She was encouraged to work with the  rheumatologist closely.  Also recommended eye exam at least annually due to the Plaquenil.       The longitudinal plan of care for the diagnosis(es)/condition(s) as documented were addressed during this visit. Due to the added complexity in care, I will continue to support Marcelina in the subsequent management and with ongoing continuity of care.       Patient has been advised of split billing requirements and indicates understanding: Yes    Counseling  Appropriate preventive services were addressed with this patient via screening, questionnaire, or discussion as appropriate for fall prevention, nutrition, physical activity, Tobacco-use cessation, social engagement, weight loss and cognition.  Checklist reviewing preventive services available has been given to the patient.  Reviewed patient's diet, addressing concerns and/or questions.   She is at risk for lack of exercise and has been provided with information to increase physical activity for the benefit of her well-being.   She is at risk for psychosocial distress and has been provided with information to reduce risk.   Discussed possible causes of fatigue. Reviewed preventive health counseling, as reflected in patient instructions       Regular exercise       Healthy diet/nutrition       Vision screening       Hearing screening       Immunizations  Vaccines up to date.  Patient declined some/all vaccines today.  COVID and RSV           Alcohol use       Osteoporosis prevention/bone health       Colorectal Cancer Screening       Advance Care Planning    Follow-up    Follow-up Visit   Expected date:  Jul 22, 2026 (Approximate)      Follow Up Appointment Details:     Follow-up with whom?: PCP    Follow-Up for what?: Medicare Wellness    Welcome or Annual?: Annual Wellness    How?: In Person                 Subjective   Marcelina is a 65 year old, presenting for the following:  Medicare Visit        7/15/2025     8:50 AM   Additional Questions   Roomed by Kassidy           BRUCE Hewitt is here today for physical and general follow-up.    She takes losartan for high blood pressure and Procardia/Imdur for coronary vasospasm with no side effect.  Not checking her blood pressure at home but her blood pressure has been soft on the lower range.  No chest pain or angina.  She also sees the nephrologist for her chronic kidney disease.  She is not on ACE inhibitor or ARB.  No problem with urination.  Her kidney function has been stable and at baseline.  Her liver enzymes have been elevated in the last year.  Workup last year with normal hepatitis panel and 2 liver ultrasounds.  She drinks typically 5-8 drinks a week.  No binge drinking.  No history of liver disease.  Saw GI specialist last year and was reassured.  No jaundice.  She also has chronic anemia and her hemoglobin typically runs in the range 9-10.  Not symptomatic.  Last the colonoscopy a year ago were normal - repeat in 2 years due to history of polyps.  Last upper endoscopy 3 years ago which showed esophagitis.  No upper GI bleeding symptoms. She sees rheumatologist for CREST and limited systemic sclerosis - overall they are stable with Plaquenil.    She also has depression anxiety - been taking Cymbalta, BuSpar and as needed Xanax.  Feeling better with the medication but is still having a lot of anxiety - more bad days than good day.  Tolerating the Cymbalta and Buspar well.  Not sure if the Buspar is helping.  Xanax helps with anxiety attacks which he takes typically once a day.  No depression.  No suicidal homicidal ideation.  No hallucination.  No drugs or excessive alcohol intake.  Sleeping okay.     Otherwise, she is doing well. Her last physical exam was years ago.  No headache, dizziness or acute visual change. No running nose, nasal congestion, coughing, fever or chill. No CP/SOB. No N/V/D/C or problem with urination.  No abdominal pain.  She is post-menopausal - no history of abnormal pap or STD.  Last pap was over  5 years ago and it was normal.  Denied of STD risks.  No leg swelling, orthopnea or dyspnea.  No joint or muscle pain. Not exercising.  Drinks about 5-8 beers a week, no binge drinking.  No alcohol or tobacco smoking.  No problem with sleeping.  No safety concern, lives with her .  No weight change. No other concern today       Advance Care Planning    Document on file is a Health Care Directive or POLST.        7/15/2025   General Health   How would you rate your overall physical health? Good   Feel stress (tense, anxious, or unable to sleep) To some extent         7/15/2025   Nutrition   Diet: Regular (no restrictions)         7/15/2025   Exercise   Days per week of moderate/strenous exercise 2 days   Average minutes spent exercising at this level 30 min         7/15/2025   Social Factors   Frequency of gathering with friends or relatives Twice a week          No data to display                  7/15/2025   Dental   Dentist two times every year? Yes          No data to display                     No data to display                Today's PHQ-9 Score:       7/15/2025     8:47 AM   PHQ-9 SCORE   PHQ-9 Total Score MyChart 7 (Mild depression)   PHQ-9 Total Score 7        Patient-reported         7/15/2025   Substance Use   Alcohol more than 3/day or more than 7/wk No   Do you have a current opioid prescription? No   How severe/bad is pain from 1 to 10? 4/10   Do you use any other substances recreationally? No     Social History     Tobacco Use    Smoking status: Former     Current packs/day: 0.00     Average packs/day: 0.3 packs/day for 21.0 years (5.3 ttl pk-yrs)     Types: Cigarettes     Start date:      Quit date: 2001     Years since quittin.5    Smokeless tobacco: Never   Vaping Use    Vaping status: Never Used   Substance Use Topics    Alcohol use: Yes     Comment: 5-8 drinks/week    Drug use: No           2024   LAST FHS-7 RESULTS   1st degree relative breast or ovarian cancer No   Any  relative bilateral breast cancer No   Any male have breast cancer No   Any ONE woman have BOTH breast AND ovarian cancer No   Any woman with breast cancer before 50yrs No   2 or more relatives with breast AND/OR ovarian cancer No   2 or more relatives with breast AND/OR bowel cancer No        Mammogram Screening - Mammogram every 1-2 years updated in Health Maintenance based on mutual decision making      History of abnormal Pap smear: Status post hysterectomy with removal of cervix and no history of CIN2 or greater or cervical cancer. Health Maintenance and Surgical History updated.        2020    12:00 AM 2014    12:00 AM 2011     3:03 PM   PAP / HPV   PAP (Historical)  OTHER-NIL, See Result  NIL    PAP-ABSTRACT See Scanned Document        ASCVD Risk   The ASCVD Risk score (Catarina SHIPLEY, et al., 2019) failed to calculate for the following reasons:    Risk score cannot be calculated because patient has a medical history suggesting prior/existing ASCVD    Fracture Risk Assessment Tool  Link to Frax Calculator  Use the information below to complete the Frax calculator  : 1960  Sex: female  Weight (kg): 56.8 kg (actual weight)  Height (cm): 154.9 cm  Previous Fragility Fracture:  No  History of parent with fractured hip:  No  Current Smoking:  No  Patient has been on glucocorticoids for more than 3 months (5mg/day or more): No  Rheumatoid Arthritis on Problem List:  No  Secondary Osteoporosis on Problem List:  No  Consumes 3 or more units of alcohol per day: No  Femoral Neck BMD (g/cm2)            Reviewed and updated as needed this visit by Provider   Tobacco  Allergies  Meds  Problems  Med Hx  Surg Hx  Fam Hx  Soc   Hx Sexual Activity          Past Medical History:   Diagnosis Date    CREST (calcinosis, Raynaud's phenomenon, esophageal dysfunction, sclerodactyly, telangiectasia) (H)     Hyperlipidemia     Hypertension     Hypertriglyceridemia 2011    Controlled with  simvastatin.       Limited systemic sclerosis (H)     Menopausal syndrome (hot flashes) 12/19/2013    Positive MINDY (antinuclear antibody)     Raynaud disease      Past Surgical History:   Procedure Laterality Date    APPENDECTOMY      ARTHROPLASTY, KNEE, ROBOT ASSISTED, USING RAMILA Left 9/10/2024    Procedure: left knee ramila assisted total arthroplasty;  Surgeon: Rehan Spring DO;  [facility]    ARTHROPLASTY, KNEE, ROBOT ASSISTED, USING RAMILA Right 6/10/2025    Procedure: right ramila assisted total knee replacement;  Surgeon: Rehan Spring DO;  Location: [facility]    BIOPSY      cervical node    COLONOSCOPY N/A 11/10/2021    Procedure: COLONOSCOPY, FLEXIBLE, WITH LESION REMOVAL USING SNARE;  Surgeon: Domingo Wall MD;  [facility]    COLONOSCOPY N/A 11/10/2021    Procedure: COLONOSCOPY, WITH POLYPECTOMY AND BIOPSY;  Surgeon: Domingo Wall MD;  [facility]    COLONOSCOPY N/A 9/29/2021    Procedure: Colonoscopy, With Polypectomy And Biopsy;  Surgeon: Domingo Wall MD;  [facility]    COLONOSCOPY N/A 9/29/2021    Procedure: Colonoscopy, Flexible, With Lesion Removal Using Snare;  Surgeon: Domingo Wall MD;  [facility]    COLONOSCOPY N/A 6/29/2022    Procedure: COLONOSCOPY, FLEXIBLE, WITH LESION REMOVAL USING SNARE;  Surgeon: Domingo Wall MD;  [facility]    COLONOSCOPY N/A 11/9/2022    Procedure: COLONOSCOPY, WITH POLYPECTOMY AND BIOPSY;  Surgeon: Domingo Wall MD;  [facility]    COLONOSCOPY N/A 6/12/2023    Procedure: COLONOSCOPY, FLEXIBLE, WITH LESION REMOVAL USING SNARE;  Surgeon: Domingo Wall MD;  [facility]    COLONOSCOPY WITH CO2 INSUFFLATION N/A 10/26/2020    Procedure: COLONOSCOPY, WITH CO2 INSUFFLATION;  Surgeon: Phyllis Chaudhari MD;  [facility]    COLONOSCOPY WITH CO2 INSUFFLATION N/A 11/10/2021    Procedure: COLONOSCOPY, WITH CO2 INSUFFLATION;   Surgeon: Domingo Wall MD;  [facility]    COLONOSCOPY WITH CO2 INSUFFLATION N/A 9/29/2021    Procedure: COLONOSCOPY, WITH CO2 INSUFFLATION;  Surgeon: Domingo Wall MD;  [facility]    COLONOSCOPY WITH CO2 INSUFFLATION N/A 6/29/2022    Procedure: COLONOSCOPY, WITH CO2 INSUFFLATION;  Surgeon: Domingo Wall MD;  [facility]    COLONOSCOPY WITH CO2 INSUFFLATION N/A 11/9/2022    Procedure: COLONOSCOPY, WITH CO2 INSUFFLATION;  Surgeon: Domingo Wall MD;  [facility]    COLONOSCOPY WITH CO2 INSUFFLATION N/A 6/12/2023    Procedure: Colonoscopy with CO2 insufflation;  Surgeon: Domingo Wall MD;  [facility]    COLONOSCOPY WITH CO2 INSUFFLATION N/A 7/3/2024    Procedure: Colonoscopy with CO2 insufflation;  Surgeon: Domingo Wall MD;  [facility]    COMBINED ESOPHAGOSCOPY, GASTROSCOPY, DUODENOSCOPY (EGD) WITH CO2 INSUFFLATION N/A 6/29/2022    Procedure: ESOPHAGOGASTRODUODENOSCOPY, WITH CO2 INSUFFLATION;  Surgeon: Domingo Wall MD;  [facility]    DILATION AND CURETTAGE, HYSTEROSCOPY DIAGNOSTIC, COMBINED  7/1/2014    Procedure: COMBINED DILATION AND CURETTAGE, HYSTEROSCOPY DIAGNOSTIC;  Surgeon: Mana Cabrera DO;  [facility]    ENT SURGERY      tonsillectomy and adnoid removal    ESOPHAGOSCOPY, GASTROSCOPY, DUODENOSCOPY (EGD), COMBINED N/A 6/29/2022    Procedure: ESOPHAGOGASTRODUODENOSCOPY, WITH BIOPSY;  Surgeon: Domingo Wall MD;  [facility]    HYSTERECTOMY TOTAL ABDOMINAL      ORTHOPEDIC SURGERY      left knee tear meniscus    RELEASE CARPAL TUNNEL BILATERAL  2009     BP Readings from Last 3 Encounters:   07/15/25 104/68   06/11/25 130/70   05/30/25 110/70    Wt Readings from Last 3 Encounters:   07/15/25 56.8 kg (125 lb 3.2 oz)   06/10/25 60.9 kg (134 lb 4.2 oz)   06/04/25 58.8 kg (129 lb 9.6 oz)                  Patient Active Problem List   Diagnosis    Hypertension  goal BP (blood pressure) < 140/90    Atopic rhinitis    Moderate episode of recurrent major depressive disorder (H)    Keratitis sicca, bilateral    Hyperlipidemia LDL goal <160    Stage 3a chronic kidney disease (H)    Allergic rhinitis due to pollen    Status post total hysterectomy    Anemia in other chronic diseases classified elsewhere    ACP (advance care planning)    MARCELINA (generalized anxiety disorder)    CREST (calcinosis, Raynaud's phenomenon, esophageal dysfunction, sclerodactyly, telangiectasia) (H)    Limited systemic sclerosis (H)    Marijuana use, episodic    Panic attack    Atrophic vaginitis    Coronary vasospasm    Serrated polyposis syndrome    Mcege's esophagus without dysplasia    Raynaud's phenomenon without gangrene    Seasonal allergies    Primary osteoarthritis of right knee    S/P total knee arthroplasty    S/P total knee replacement using cement, left    Hypomagnesemia    S/P total knee arthroplasty, right     Past Surgical History:   Procedure Laterality Date    APPENDECTOMY      ARTHROPLASTY, KNEE, ROBOT ASSISTED, USING RAMILA Left 9/10/2024    Procedure: left knee ramila assisted total arthroplasty;  Surgeon: Rehan Spring DO;  [facility]    ARTHROPLASTY, KNEE, ROBOT ASSISTED, USING RAMILA Right 6/10/2025    Procedure: right ramila assisted total knee replacement;  Surgeon: Rehan Spring DO;  [facility]    BIOPSY      cervical node    COLONOSCOPY N/A 11/10/2021    Procedure: COLONOSCOPY, FLEXIBLE, WITH LESION REMOVAL USING SNARE;  Surgeon: Domingo Wall MD;  [facility]    COLONOSCOPY N/A 11/10/2021    Procedure: COLONOSCOPY, WITH POLYPECTOMY AND BIOPSY;  Surgeon: Domingo Wall MD;  [facility]    COLONOSCOPY N/A 9/29/2021    Procedure: Colonoscopy, With Polypectomy And Biopsy;  Surgeon: Domingo Wall MD;  [facility]    COLONOSCOPY N/A 9/29/2021    Procedure: Colonoscopy, Flexible, With Lesion Removal Using  Snare;  Surgeon: Domingo Wall MD;  [facility]    COLONOSCOPY N/A 6/29/2022    Procedure: COLONOSCOPY, FLEXIBLE, WITH LESION REMOVAL USING SNARE;  Surgeon: Domingo Wall MD;  [facility]    COLONOSCOPY N/A 11/9/2022    Procedure: COLONOSCOPY, WITH POLYPECTOMY AND BIOPSY;  Surgeon: Domingo Wall MD;  [facility]    COLONOSCOPY N/A 6/12/2023    Procedure: COLONOSCOPY, FLEXIBLE, WITH LESION REMOVAL USING SNARE;  Surgeon: Domingo Wall MD;  [facility]    COLONOSCOPY WITH CO2 INSUFFLATION N/A 10/26/2020    Procedure: COLONOSCOPY, WITH CO2 INSUFFLATION;  Surgeon: Phyllis Chaudhari MD;  [facility]    COLONOSCOPY WITH CO2 INSUFFLATION N/A 11/10/2021    Procedure: COLONOSCOPY, WITH CO2 INSUFFLATION;  Surgeon: Domingo Wall MD;  [facility]    COLONOSCOPY WITH CO2 INSUFFLATION N/A 9/29/2021    Procedure: COLONOSCOPY, WITH CO2 INSUFFLATION;  Surgeon: Domingo Wall MD;  [facility]    COLONOSCOPY WITH CO2 INSUFFLATION N/A 6/29/2022    Procedure: COLONOSCOPY, WITH CO2 INSUFFLATION;  Surgeon: Domingo Wall MD;  [facility]    COLONOSCOPY WITH CO2 INSUFFLATION N/A 11/9/2022    Procedure: COLONOSCOPY, WITH CO2 INSUFFLATION;  Surgeon: Domingo Wall MD;  [facility]    COLONOSCOPY WITH CO2 INSUFFLATION N/A 6/12/2023    Procedure: Colonoscopy with CO2 insufflation;  Surgeon: Domingo Wall MD;  [facility]    COLONOSCOPY WITH CO2 INSUFFLATION N/A 7/3/2024    Procedure: Colonoscopy with CO2 insufflation;  Surgeon: Domingo Wall MD;  [facility]    COMBINED ESOPHAGOSCOPY, GASTROSCOPY, DUODENOSCOPY (EGD) WITH CO2 INSUFFLATION N/A 6/29/2022    Procedure: ESOPHAGOGASTRODUODENOSCOPY, WITH CO2 INSUFFLATION;  Surgeon: Domingo Wall MD;  Location: [facility]    DILATION AND CURETTAGE, HYSTEROSCOPY DIAGNOSTIC, COMBINED  7/1/2014    Procedure: COMBINED  DILATION AND CURETTAGE, HYSTEROSCOPY DIAGNOSTIC;  Surgeon: Mana Cabrera DO;  Location: [facility]    ENT SURGERY      tonsillectomy and adnoid removal    ESOPHAGOSCOPY, GASTROSCOPY, DUODENOSCOPY (EGD), COMBINED N/A 2022    Procedure: ESOPHAGOGASTRODUODENOSCOPY, WITH BIOPSY;  Surgeon: Domingo Wall MD;  Location: [facility]    HYSTERECTOMY TOTAL ABDOMINAL      ORTHOPEDIC SURGERY      left knee tear meniscus    RELEASE CARPAL TUNNEL BILATERAL         Social History     Tobacco Use    Smoking status: Former     Current packs/day: 0.00     Average packs/day: 0.3 packs/day for 21.0 years (5.3 ttl pk-yrs)     Types: Cigarettes     Start date:      Quit date: 2001     Years since quittin.5    Smokeless tobacco: Never   Substance Use Topics    Alcohol use: Yes     Comment: 5-8 drinks/week     Family History   Problem Relation Age of Onset    Osteoporosis Mother     Eye Disorder Mother         cataract, mac degen    Macular Degeneration Mother     Dementia Mother     Unknown/Adopted Father     No Known Problems Maternal Half-Sister     Hypertension Maternal Grandmother     Diabetes Maternal Grandfather     Eye Disorder Paternal Grandmother         glaucoma    Unknown/Adopted Paternal Grandfather     Hypertension Daughter     Graves' disease Daughter     No Known Problems Son     Diabetes Other     Glaucoma No family hx of          Current Outpatient Medications   Medication Sig Dispense Refill    acetaminophen (TYLENOL) 500 MG tablet Take 2 tablets (1,000 mg) by mouth every 8 hours as needed for other (mild pain). 60 tablet 1    albuterol (PROAIR HFA) 108 (90 Base) MCG/ACT inhaler INHALE 2 PUFS BY MOUTH EVERY 6 HOURS AS NEEDED FOR SHORTNESS OF BREATH / DYSPNEA 18 g 5    ALLEGRA ALLERGY 180 MG tablet Take 1 tablet (180 mg) by mouth daily 90 tablet 3    allopurinol (ZYLOPRIM) 100 MG tablet Take 1 tablet (100 mg) by mouth 2 times daily.      ARIPiprazole (ABILIFY) 2 MG tablet Take 1 tablet (2  mg) by mouth daily. 30 tablet 1    aspirin 81 MG EC tablet Take 1 tablet (81 mg) by mouth daily.      busPIRone (BUSPAR) 5 MG tablet Take 1 tablet (5 mg) by mouth 2 times daily. 180 tablet 0    DULoxetine (CYMBALTA) 60 MG capsule Take 1 capsule (60 mg) by mouth 2 times daily. 180 capsule 0    hydroxychloroquine (PLAQUENIL) 200 MG tablet Take 1.5 tablet daily 45 tablet 1    isosorbide mononitrate (IMDUR) 30 MG 24 hr tablet Take 1 tablet (30 mg) by mouth daily. 90 tablet 0    LORazepam (ATIVAN) 0.5 MG tablet Take 1 tablet (0.5 mg) by mouth 2 times daily as needed for anxiety. 30 tablet 0    losartan (COZAAR) 25 MG tablet Take 1 tablet (25 mg) by mouth daily. 90 tablet 1    NIFEdipine ER (ADALAT CC) 30 MG 24 hr tablet Take 1 tablet (30 mg) by mouth daily. 90 tablet 0    pantoprazole (PROTONIX) 40 MG EC tablet Take 1 tablet (40 mg) by mouth daily. 90 tablet 1    vitamin D3 (CHOLECALCIFEROL) 50 mcg (2000 units) tablet Take 1 tablet (50 mcg) by mouth daily. 100 tablet 1    fluticasone (FLONASE) 50 MCG/ACT nasal spray Spray 1 spray into both nostrils daily (Patient not taking: Reported on 7/15/2025) 16 mL 5    order for DME Equipment being ordered: light lamp for seasonal affective disorder (Patient not taking: Reported on 5/30/2025) 1 Device 0     Allergies   Allergen Reactions    Hydroxyzine      Made her very tired    Other Environmental Allergy Other (See Comments)    Seasonal Allergies     Sulfa Antibiotics      Vomiting       Recent Labs   Lab Test 07/15/25  1012 05/30/25  1135 11/06/24  1358 10/30/24  1117 06/24/24  1044 04/16/24  1217 08/26/22  1201 06/16/22  1341 08/11/21  1040 04/19/21  1015 10/02/20  1630   LDL  --  62  --   --   --  74  --  66  --  69  --    HDL  --  123  --   --   --  156  --  127  --  131  --    TRIG  --  53  --   --   --  52  --  60  --  85  --    ALT 97* 75* 148* 186*   < > 29  --  29   < > 50  --    CR 1.08* 1.13* 1.15* 1.72*   < > 1.11*   < > 1.09*   < > 0.94 1.08*   GFRESTIMATED 57* 54*  53* 33*   < > 55*   < > 57*   < > 66 56*   GFRESTBLACK  --   --   --   --   --   --   --   --   --  76 64   POTASSIUM 4.1 3.9 3.9 4.2   < > 4.7   < > 3.2*   < > 4.0 3.9   TSH  --   --   --  2.41  --   --   --  2.01  --   --   --     < > = values in this interval not displayed.      Current providers sharing in care for this patient include:  Patient Care Team:  Zoey Louis MD as PCP - General (Family Medicine)  Alka Sanon MD as MD (Otolaryngology)  Leonardo Melgar OD as Assigned Surgical Provider  Johanna Jaramillo DO as MD (Gastroenterology)  Deann Meyer, PharmD as Pharmacist (Pharmacist)  Laurent Guerrero PA-C as Physician Assistant (Gastroenterology)  Zoey Louis MD as Assigned PCP  Laurent Guerrero PA-C as Assigned Gastroenterology Provider  Rigoberto Bess MD as MD (Gastroenterology)  Aydee Rosario PA-C as Physician Assistant (Family Medicine)  Rehan Spring DO as Assigned Musculoskeletal Provider  Isak Luu MD as Physician (Hematology & Oncology)  Zoey Louis MD as Assigned Pain Medication Provider    The following health maintenance items are reviewed in Epic and correct as of today:  Health Maintenance   Topic Date Due    DEXA  Never done    RSV VACCINE (1 - Risk 60-74 years 1-dose series) Never done    COVID-19 VACCINE (5 - 2024-25 season) 09/01/2024    MICROALBUMIN  08/26/2025    INFLUENZA VACCINE (1) 09/01/2025    EYE EXAM  11/15/2025    MAMMO SCREENING  11/25/2025    PHQ-9  01/15/2026    LIPID  05/30/2026    ANNUAL REVIEW OF HM ORDERS  05/30/2026    URIC ACID  05/30/2026    COLORECTAL CANCER SCREENING  07/03/2026    MEDICARE ANNUAL WELLNESS VISIT  07/15/2026    ALT  07/15/2026    BMP  07/15/2026    FALL RISK ASSESSMENT  07/15/2026    HEMOGLOBIN  07/15/2026    DIABETES SCREENING  07/15/2028    DTAP/TDAP/TD VACCINE (5 - Td or Tdap) 04/10/2029    ADVANCE CARE PLANNING  07/18/2030    HEPATITIS C SCREENING  Completed    HIV SCREENING  Completed    DEPRESSION ACTION PLAN  " Completed    PNEUMOCOCCAL VACCINE 50+ YEARS  Completed    URINALYSIS  Completed    ZOSTER VACCINE  Completed    HPV VACCINE  Aged Out    MENINGITIS VACCINE  Aged Out    PAP  Discontinued         Review of Systems  Constitutional, HEENT, cardiovascular, pulmonary, GI, , musculoskeletal, neuro, skin, endocrine and psych systems are negative, except as otherwise noted.     Objective    Exam  /68   Pulse 77   Temp 97.3  F (36.3  C) (Temporal)   Ht 1.549 m (5' 1\")   Wt 56.8 kg (125 lb 3.2 oz)   LMP 06/12/2014   SpO2 99%   BMI 23.66 kg/m     Estimated body mass index is 23.66 kg/m  as calculated from the following:    Height as of this encounter: 1.549 m (5' 1\").    Weight as of this encounter: 56.8 kg (125 lb 3.2 oz).    Physical Exam  GENERAL: healthy, alert and no distress.  Speaking in full sentences.  EYES: Eyes grossly normal to inspection, PERRL and conjunctivae and sclerae normal.  No nystagmus.  All 4 visual fields intact.  HENT: ear canals and TM's normal.  Nares are non-congested. Oropharynx is pink and moist. No tender with palpation to the sinuses.   NECK: no adenopathy, supple, no lymphadenopathy or thyromegaly.  No tender with palpation to the cervical spine or its paraspinous muscle.  No JV distention or carotid bruits  RESP: lungs clear to auscultation - no rales, rhonchi or wheezes.  Good respiratory effort throughout.  BREAST: Upper and recommended but she declined.  She has no concerned about it  CV: regular rate and rhythm, no murmur  ABDOMEN: soft, nontender, nondistended, no palpable masses organomegaly with normal culture.  No CVA tenderness.   (female): Offered but declined.  MS: No gross musculoskeletal defects noted, no edema. All joints are in the normal range of motion and 4 extremities were equally in strength. Hips, knees, ankles, shoulders, elbows, wrists exams were normal. Fine motor skills of the fingers are intact. Back is straight, no tender with palpation to the " spine.  SKIN: Above the skin exam was normal, no suspicious lesions or rashes.  Offered and recommended below the waist skin exam but she declined  NEURO: Normal strength and tone, mentation intact and speech normal.  Cranial nerve II through XII intact.  DTRs +2 throughout.  No focal neurological deficit.  PSYCH: mentation appears normal, affect normal/bright.  Thoughts intact, suicidal/homicidal ideation.  No hallucination.     Results for orders placed or performed in visit on 07/15/25   Comprehensive metabolic panel (BMP + Alb, Alk Phos, ALT, AST, Total. Bili, TP)     Status: Abnormal   Result Value Ref Range    Sodium 136 135 - 145 mmol/L    Potassium 4.1 3.4 - 5.3 mmol/L    Carbon Dioxide (CO2) 22 22 - 29 mmol/L    Anion Gap 12 7 - 15 mmol/L    Urea Nitrogen 32.2 (H) 8.0 - 23.0 mg/dL    Creatinine 1.08 (H) 0.51 - 0.95 mg/dL    GFR Estimate 57 (L) >60 mL/min/1.73m2    Calcium 10.4 8.8 - 10.4 mg/dL    Chloride 102 98 - 107 mmol/L    Glucose 108 (H) 70 - 99 mg/dL    Alkaline Phosphatase 457 (H) 40 - 150 U/L    AST 71 (H) 0 - 45 U/L    ALT 97 (H) 0 - 50 U/L    Protein Total 7.6 6.4 - 8.3 g/dL    Albumin 4.6 3.5 - 5.2 g/dL    Bilirubin Total 0.5 <=1.2 mg/dL   Magnesium     Status: Abnormal   Result Value Ref Range    Magnesium 1.2 (L) 1.7 - 2.3 mg/dL   Ferritin     Status: Abnormal   Result Value Ref Range    Ferritin 863 (H) 11 - 328 ng/mL   Iron and iron binding capacity     Status: Normal   Result Value Ref Range    Iron 72 37 - 145 ug/dL    Iron Binding Capacity 268 240 - 430 ug/dL    Iron Sat Index 27 15 - 46 %   Protein  random urine     Status: Abnormal   Result Value Ref Range    Total Protein Urine mg/dL 23.6   mg/dL    Total Protein Urine mg/mg Creat 0.32 (H) 0.00 - 0.20 mg/mg Cr    Creatinine Urine mg/dL 74.1 mg/dL   CBC with platelets and differential     Status: Abnormal   Result Value Ref Range    WBC Count 8.9 4.0 - 11.0 10e3/uL    RBC Count 3.01 (L) 3.80 - 5.20 10e6/uL    Hemoglobin 9.8 (L) 11.7 -  15.7 g/dL    Hematocrit 29.1 (L) 35.0 - 47.0 %    MCV 97 78 - 100 fL    MCH 32.6 26.5 - 33.0 pg    MCHC 33.7 31.5 - 36.5 g/dL    RDW 14.5 10.0 - 15.0 %    Platelet Count 240 150 - 450 10e3/uL    % Neutrophils 75 %    % Lymphocytes 13 %    % Monocytes 6 %    % Eosinophils 4 %    % Basophils 1 %    % Immature Granulocytes 1 %    NRBCs per 100 WBC 0 <1 /100    Absolute Neutrophils 6.6 1.6 - 8.3 10e3/uL    Absolute Lymphocytes 1.1 0.8 - 5.3 10e3/uL    Absolute Monocytes 0.6 0.0 - 1.3 10e3/uL    Absolute Eosinophils 0.4 0.0 - 0.7 10e3/uL    Absolute Basophils 0.1 0.0 - 0.2 10e3/uL    Absolute Immature Granulocytes 0.1 <=0.4 10e3/uL    Absolute NRBCs 0.0 10e3/uL   CBC with platelets and differential     Status: Abnormal    Narrative    The following orders were created for panel order CBC with platelets and differential.  Procedure                               Abnormality         Status                     ---------                               -----------         ------                     CBC with platelets and ...[[id number]]  Abnormal            Final result                 Please view results for these tests on the individual orders.             7/15/2025   Mini Cog   Clock Draw Score 2 Normal   3 Item Recall 3 objects recalled   Mini Cog Total Score 5         Vision Screen  Patient wears corrective lenses (select all that apply): Worn during vision screen  Vision Screen Results: (!) REFER      Signed Electronically by: Zoey Clements Mai, MD    Answers submitted by the patient for this visit:  Patient Health Questionnaire (Submitted on 7/15/2025)  If you checked off any problems, how difficult have these problems made it for you to do your work, take care of things at home, or get along with other people?: Somewhat difficult  PHQ9 TOTAL SCORE: 7

## 2025-07-16 NOTE — PROGRESS NOTES
Orthopedic Clinic Post-Operative Note    CHIEF COMPLAINT:   Chief Complaint   Patient presents with    Right Knee - Total Joint Post-op       HISTORY OF PRESENT ILLNESS  Today's visit:  Doing great.  Very happy.  No pain.    June 23, 2025 office visit:  Overall doing well.  Attending therapy.  Doing well.  Pain is controlled.  Using oxycodone.  Taking Eliquis.        Mavis 10, 2025 surgery: Right total knee replacement           May 5, 2025 office visit:  Right knee continues to worsen.  Now more painful than the left knee prior to replacement. Reports can only walk a short distance due to the pain and has had to resort to wheelchair for longer distances and when recently in the airport.  The pain is waking her at night.  Left knee doing excellent. Would like the right knee replaced.   Used eliquis after last surgery due to previous ischemic bowel issues and inability to take NSAIDs, did therapy at [facility].         November 22, 2024 office visit:  Patient has been doing excellent with no complaints.  She passed and graduated formal physical therapy already with flexion of 135 degrees.  Denies any numbness or tingling or any problems with the incision.  Had some soreness on the right knee but mostly because she is compensating.  States eventually she would want the right knee replaced if it came to that.     October 16, 2024 visit:  Returns. Reports overall doing very well. Attending therapy with [facility]. States feeling good. Improving her activity. Walking without any aid. Happy with progress. Feels much better than prior to surgery.  She does note recently had 2 small spots towards the top of her incision that bled a slight amount. No current drainage. Scabbed over and now healing.  Was using some lotion to the area.  Denies any redness, any fevers, feeling sick, increasing pain, etc.         September 18, 2024 visit:  Pain is controlled.  She has been utilizing the oxycodone.  She decreased her exercises due to  her bleeding.  No further bleeding issues        September 12 2024 visit:  Total knee replacement on 9/11/24.  Discharged 9/12/24 without incident. She reports had a pin sized shirin of blood on the aquacell when she went home on the 12th. She started to do frequent exercises including multiple rounds of knee flexion. Started to notice more drainage. Called the clinic. Was recommended to continue to monitor. This AM had further drainage and started to soak into the steven stockings came back in. Pain controlled. No new injuries.  Otherwise doing as expected. Taking eliquis for DVT prevention.         September 10, 2024 status post left total knee arthroplasty     August 14, 2024 office visit:  Previously seen by Dr. Abrams for left knee pain.  His visit on July 10, 2024 was reviewed.  Concerns for inflammatory arthritis.  Recent pulmonology visit on July 31, 2020 for review as well.   She reports 20+ years of progressive left knee pain. Now reports the right knee and hip are starting to hurt due to increasing pain and limping with the left knee. No specific injuries recently. Reports just getting progressively worse.   Treatments tried: previous knee arthroscopy with meniscus debridement, multiple cortisone injections, gel injections, physical therapy in the past, rest, activity modification, OTC medications including nsaids and tylenol.  Despite this progressive constant pain limiting her activity, she reports able to walk less than 1 block without stopping due to pain, limited other rec. activities as well as limiting ability to play with her grandkids, clean, and kneel,. These symptoms have been ongoing for long time but worse the last year.       Reports seen by pulmonologist recently due to long term systemic sclerosis.  Was told pulmonary function and chest imaging was largely unchanged but was recommended for further cardiac workup and testing. Denies any home 02 use. States lungs and breathing has felt at  baseline and not limiting.           Patient's past medical, surgical, social and family histories reviewed.     Past Medical History:   Diagnosis Date    CREST (calcinosis, Raynaud's phenomenon, esophageal dysfunction, sclerodactyly, telangiectasia) (H)     Hyperlipidemia     Hypertension     Hypertriglyceridemia 01/18/2011    Controlled with simvastatin.       Limited systemic sclerosis (H)     Menopausal syndrome (hot flashes) 12/19/2013    Positive MINDY (antinuclear antibody)     Raynaud disease        Past Surgical History:   Procedure Laterality Date    APPENDECTOMY      ARTHROPLASTY, KNEE, ROBOT ASSISTED, USING RAMILA Left 9/10/2024    Procedure: left knee ramila assisted total arthroplasty;  Surgeon: Rehan Spring DO;  Location: [facility]    ARTHROPLASTY, KNEE, ROBOT ASSISTED, USING RAMILA Right 6/10/2025    Procedure: right ramila assisted total knee replacement;  Surgeon: Rehan Spring DO;  Location: [facility]    BIOPSY      cervical node    COLONOSCOPY N/A 11/10/2021    Procedure: COLONOSCOPY, FLEXIBLE, WITH LESION REMOVAL USING SNARE;  Surgeon: Domingo Wall MD;  Location: [facility]    COLONOSCOPY N/A 11/10/2021    Procedure: COLONOSCOPY, WITH POLYPECTOMY AND BIOPSY;  Surgeon: Domingo Wall MD;  [facility]    COLONOSCOPY N/A 9/29/2021    Procedure: Colonoscopy, With Polypectomy And Biopsy;  Surgeon: Domingo Wall MD;  [facility]    COLONOSCOPY N/A 9/29/2021    Procedure: Colonoscopy, Flexible, With Lesion Removal Using Snare;  Surgeon: Domingo Wall MD;  [facility]    COLONOSCOPY N/A 6/29/2022    Procedure: COLONOSCOPY, FLEXIBLE, WITH LESION REMOVAL USING SNARE;  Surgeon: Domingo Wall MD;  [facility]    COLONOSCOPY N/A 11/9/2022    Procedure: COLONOSCOPY, WITH POLYPECTOMY AND BIOPSY;  Surgeon: Domingo Wall MD;  [facility]    COLONOSCOPY N/A 6/12/2023    Procedure: COLONOSCOPY, FLEXIBLE, WITH  LESION REMOVAL USING SNARE;  Surgeon: Domingo Wall MD;  [facility]    COLONOSCOPY WITH CO2 INSUFFLATION N/A 10/26/2020    Procedure: COLONOSCOPY, WITH CO2 INSUFFLATION;  Surgeon: Phyllis Chaudhari MD;  [facility]    COLONOSCOPY WITH CO2 INSUFFLATION N/A 11/10/2021    Procedure: COLONOSCOPY, WITH CO2 INSUFFLATION;  Surgeon: Domingo Wall MD;  [facility]    COLONOSCOPY WITH CO2 INSUFFLATION N/A 9/29/2021    Procedure: COLONOSCOPY, WITH CO2 INSUFFLATION;  Surgeon: Domingo Wall MD;  [facility]    COLONOSCOPY WITH CO2 INSUFFLATION N/A 6/29/2022    Procedure: COLONOSCOPY, WITH CO2 INSUFFLATION;  Surgeon: Domingo Wall MD;  [facility]    COLONOSCOPY WITH CO2 INSUFFLATION N/A 11/9/2022    Procedure: COLONOSCOPY, WITH CO2 INSUFFLATION;  Surgeon: Domingo Wall MD;  [facility]    COLONOSCOPY WITH CO2 INSUFFLATION N/A 6/12/2023    Procedure: Colonoscopy with CO2 insufflation;  Surgeon: Domingo Wall MD;  [facility]    COLONOSCOPY WITH CO2 INSUFFLATION N/A 7/3/2024    Procedure: Colonoscopy with CO2 insufflation;  Surgeon: Domingo Wall MD;  [facility]    COMBINED ESOPHAGOSCOPY, GASTROSCOPY, DUODENOSCOPY (EGD) WITH CO2 INSUFFLATION N/A 6/29/2022    Procedure: ESOPHAGOGASTRODUODENOSCOPY, WITH CO2 INSUFFLATION;  Surgeon: Domingo Wall MD;  [facility]    DILATION AND CURETTAGE, HYSTEROSCOPY DIAGNOSTIC, COMBINED  7/1/2014    Procedure: COMBINED DILATION AND CURETTAGE, HYSTEROSCOPY DIAGNOSTIC;  Surgeon: Mana Cabrera DO;  [facility]    ENT SURGERY      tonsillectomy and adnoid removal    ESOPHAGOSCOPY, GASTROSCOPY, DUODENOSCOPY (EGD), COMBINED N/A 6/29/2022    Procedure: ESOPHAGOGASTRODUODENOSCOPY, WITH BIOPSY;  Surgeon: Domingo Wall MD;  [facility]    HYSTERECTOMY TOTAL ABDOMINAL      ORTHOPEDIC SURGERY      left knee tear meniscus     RELEASE CARPAL TUNNEL BILATERAL  2009       Medications:  Current Outpatient Medications   Medication Sig Dispense Refill    acetaminophen (TYLENOL) 500 MG tablet Take 2 tablets (1,000 mg) by mouth every 8 hours as needed for other (mild pain). 60 tablet 1    albuterol (PROAIR HFA) 108 (90 Base) MCG/ACT inhaler INHALE 2 PUFS BY MOUTH EVERY 6 HOURS AS NEEDED FOR SHORTNESS OF BREATH / DYSPNEA 18 g 5    ALLEGRA ALLERGY 180 MG tablet Take 1 tablet (180 mg) by mouth daily 90 tablet 3    allopurinol (ZYLOPRIM) 100 MG tablet Take 1 tablet (100 mg) by mouth 2 times daily.      ARIPiprazole (ABILIFY) 2 MG tablet Take 1 tablet (2 mg) by mouth daily. 30 tablet 1    aspirin 81 MG EC tablet Take 1 tablet (81 mg) by mouth daily. (Patient not taking: Reported on 7/23/2025)      busPIRone (BUSPAR) 5 MG tablet Take 1 tablet (5 mg) by mouth 2 times daily. 180 tablet 0    DULoxetine (CYMBALTA) 60 MG capsule Take 1 capsule (60 mg) by mouth 2 times daily. 180 capsule 0    fluticasone (FLONASE) 50 MCG/ACT nasal spray Spray 1 spray into both nostrils daily (Patient not taking: Reported on 7/15/2025) 16 mL 5    hydroxychloroquine (PLAQUENIL) 200 MG tablet Take 1.5 tablet daily 45 tablet 1    isosorbide mononitrate (IMDUR) 30 MG 24 hr tablet Take 1 tablet (30 mg) by mouth daily. 90 tablet 0    LORazepam (ATIVAN) 0.5 MG tablet Take 1 tablet (0.5 mg) by mouth 2 times daily as needed for anxiety. 30 tablet 0    losartan (COZAAR) 25 MG tablet Take 1 tablet (25 mg) by mouth daily. 90 tablet 1    NIFEdipine ER (ADALAT CC) 30 MG 24 hr tablet Take 1 tablet (30 mg) by mouth daily. 90 tablet 0    order for DME Equipment being ordered: light lamp for seasonal affective disorder (Patient not taking: Reported on 5/30/2025) 1 Device 0    pantoprazole (PROTONIX) 40 MG EC tablet Take 1 tablet (40 mg) by mouth daily. 90 tablet 1    vitamin D3 (CHOLECALCIFEROL) 50 mcg (2000 units) tablet Take 1 tablet (50 mcg) by mouth daily. 100 tablet 1     No current  facility-administered medications for this visit.     Facility-Administered Medications Ordered in Other Visits   Medication Dose Route Frequency Provider Last Rate Last Admin    sodium chloride (PF) 0.9% PF flush 10 mL  10 mL Intravenous Once Hilda Lopez MD           Allergies   Allergen Reactions    Hydroxyzine      Made her very tired    Other Environmental Allergy Other (See Comments)    Seasonal Allergies     Sulfa Antibiotics      Vomiting         Social History     Occupational History    Not on file   Tobacco Use    Smoking status: Former     Current packs/day: 0.00     Average packs/day: 0.3 packs/day for 21.0 years (5.3 ttl pk-yrs)     Types: Cigarettes     Start date:      Quit date: 2001     Years since quittin.5    Smokeless tobacco: Never   Vaping Use    Vaping status: Never Used   Substance and Sexual Activity    Alcohol use: Yes     Comment: 5-8 drinks/week    Drug use: No    Sexual activity: Yes     Partners: Male     Birth control/protection: Surgical     Comment: vasectomy       Family History   Problem Relation Age of Onset    Osteoporosis Mother     Eye Disorder Mother         cataract, mac degen    Macular Degeneration Mother     Dementia Mother     Unknown/Adopted Father     No Known Problems Maternal Half-Sister     Hypertension Maternal Grandmother     Diabetes Maternal Grandfather     Eye Disorder Paternal Grandmother         glaucoma    Unknown/Adopted Paternal Grandfather     Hypertension Daughter     Graves' disease Daughter     No Known Problems Son     Diabetes Other     Glaucoma No family hx of        REVIEW OF SYSTEMS  General: negative for, night sweats, dizziness, fatigue  Resp: No shortness of breath and no cough  CV: negative for chest pain, syncope or near-syncope  GI: negative for nausea, vomiting and diarrhea  : negative for dysuria and hematuria  Musculoskeletal: as above  Neurologic: negative for syncope   Hematologic: negative for bleeding  disorder    Physical Exam:  Vitals: [facility] 06/12/2014   BMI= There is no height or weight on file to calculate BMI.  Constitutional: healthy, alert and no acute distress   Psychiatric: mentation appears normal and affect normal/bright  NEURO: no focal deficits  SKIN: .well healed, no erythema, no incision breakdown and no drainage.  JOINT/EXTREMITIES: Active motion 5-125 degrees.  Passive full extension.  No instability.  No effusion.  No areas of tenderness  GAIT: non-antalgic    Diagnostic Modalities:  None today.  Independent visualization of the images was performed.      Impression:   Chief Complaint   Patient presents with    Right Knee - Total Joint Post-op   Right total knee replacement-Mavis 10, 2025 (6 weeks 1 day)    Plan:   Doing great.  She feels great.  She is very happy.  Continue working on exercises.  Follow-up in 6 weeks as needed  Return to clinic 6, week(s), PRN, or sooner as needed for changes.    Re-x-ray on return: Yes.    Vince Spring D.O.

## 2025-07-18 RX ORDER — ASPIRIN 81 MG/1
81 TABLET ORAL DAILY
COMMUNITY
Start: 2025-07-18

## 2025-07-21 ENCOUNTER — TELEPHONE (OUTPATIENT)
Dept: FAMILY MEDICINE | Facility: CLINIC | Age: 65
End: 2025-07-21
Payer: MEDICARE

## 2025-07-21 ENCOUNTER — TELEPHONE (OUTPATIENT)
Dept: GASTROENTEROLOGY | Facility: CLINIC | Age: 65
End: 2025-07-21
Payer: MEDICARE

## 2025-07-21 NOTE — TELEPHONE ENCOUNTER
Zoey Louis MD  [facility]  Looks like she did not read the result note, please be sure she is aware of the message.  Also  let her know that I am sending her for an ultrasound of the liver due to elevated liver enzymes.  I encouraged her to avoid all alcohol products.  Also have her to hold off on the Lipitor.  Recheck the CMP and mag level in 3 months.  Will also have her to restart the magnesium oxide and low-dose aspirin

## 2025-07-21 NOTE — TELEPHONE ENCOUNTER
"Endoscopy Scheduling Screen    Caller: patient    Have you had any respiratory illness or flu-like symptoms in the last 10 days?  No    Patient is ACTIVE on [employer].  Inform patient that all appointment instructions will be sent via [employer].    Review patient's insurance for any non participating payor.    Ordering/Referring Provider: GISSELLE VERGARA    (If ordering provider performs procedure, schedule with ordering provider unless otherwise instructed. )    BMI: Estimated body mass index is 23.66 kg/m  as calculated from the following:    Height as of 7/15/25: 1.549 m (5' 1\").    Weight as of 7/15/25: 56.8 kg (125 lb 3.2 oz).     Sedation Ordered  MAC/deep sedation.   BMI<= 45 45 < BMI <= 48 48 < BMI < = 50  BMI > 50   No Restrictions No [facility]  No [facility]  [facility] with exceptions Hospital Only OR Only       Do you have a history of malignant hyperthermia?  No    (Females) Are you currently pregnant?   No     Have you been diagnosed or told you have pulmonary hypertension?   No    Do you have an LVAD?  No    Have you been told you have moderate to severe sleep apnea?  No.    Have you been told you have COPD, asthma, or any other lung disease?  No    Has your doctor ordered any cardiac tests like echo, angiogram, stress test, ablation, or EKG, that you have not completed yet?  No    Do you  have a history of any heart conditions?  No     Have you ever had or are you waiting for an organ transplant?  No. Continue scheduling, no site restrictions.    Have you had a stroke or transient ischemic attack (TIA aka \"mini stroke\") in the last 2 years?   No.    Have you been diagnosed with or been told you have cirrhosis of the liver?   No.    Are you currently on dialysis?   No    Do you need assistance transferring?   No    BMI: Estimated body mass index is 23.66 kg/m  as calculated from the following:    Height as of 7/15/25: 1.549 m (5' 1\").    Weight as of 7/15/25: 56.8 kg (125 lb 3.2 oz).     Is patients BMI > 40 and " scheduling location [facility]?  No    Do you take an injectable or oral medication for weight loss or diabetes (excluding insulin)?  No    Do you take the medication Naltrexone?  No    Do you take blood thinners?  No       Prep   Are you currently on dialysis or do you have chronic kidney disease?  Yes ([facility] Prep)    Do you have a diagnosis of diabetes?  No    Do you have a diagnosis of cystic fibrosis (CF)?  No    On a regular basis do you go 3 -5 days between bowel movements?  No    BMI > 40?  No    Preferred Pharmacy:        [place], [place] - [address] [place] [address]  [address] [facility] [address]  Phone: [phone] Fax: [phone]          Final Scheduling Details     Procedure scheduled  Upper endoscopy (EGD)    Surgeon:  ANTONIO     Date of procedure:  9/2/25     Pre-OP / PAC:   No - Not required for this site.    Location  [facility] - Patient preference.    Sedation   MAC/Deep Sedation - Per order.      Patient Reminders:   You will receive a call from a Nurse to review instructions and health history.  This assessment must be completed prior to your procedure.  Failure to complete the Nurse assessment may result in the procedure being cancelled.      On the day of your procedure, please designate an adult(s) who can drive you home stay with you for the next 24 hours. The medicines used in the exam will make you sleepy. You will not be able to drive.      You cannot take public transportation, ride share services, or non-medical taxi service without a responsible caregiver.  Medical transport services are allowed with the requirement that a responsible caregiver will receive you at your destination.  We require that drivers and caregivers are confirmed prior to your procedure.

## 2025-07-23 ENCOUNTER — OFFICE VISIT (OUTPATIENT)
Dept: ORTHOPEDICS | Facility: CLINIC | Age: 65
End: 2025-07-23
Payer: MEDICARE

## 2025-07-23 ENCOUNTER — HOSPITAL ENCOUNTER (OUTPATIENT)
Dept: BONE DENSITY | Facility: CLINIC | Age: 65
Discharge: HOME | End: 2025-07-23
Attending: FAMILY MEDICINE
Payer: MEDICARE

## 2025-07-23 ENCOUNTER — HOSPITAL ENCOUNTER (OUTPATIENT)
Dept: ULTRASOUND IMAGING | Facility: CLINIC | Age: 65
Discharge: HOME | End: 2025-07-23
Attending: FAMILY MEDICINE
Payer: MEDICARE

## 2025-07-23 DIAGNOSIS — Z96.651 S/P TOTAL KNEE REPLACEMENT USING CEMENT, RIGHT: Primary | ICD-10-CM

## 2025-07-23 DIAGNOSIS — R79.89 ELEVATED LFTS: ICD-10-CM

## 2025-07-23 DIAGNOSIS — Z78.0 ASYMPTOMATIC POSTMENOPAUSAL STATUS: ICD-10-CM

## 2025-07-23 PROCEDURE — 76705 ECHO EXAM OF ABDOMEN: CPT

## 2025-07-23 PROCEDURE — 77080 DXA BONE DENSITY AXIAL: CPT

## 2025-07-23 NOTE — ANESTHESIA POSTPROCEDURE EVALUATION
Patient patient PCP office called today asking get the patient appointment sooner. PCP got a message from the  infection disease dr that patient need be see sooner, PCP sending over office notes today. Please called patient .      Thank you    Patient: Marcelina Estevez    Procedure: Procedure(s):  right ramila assisted total knee replacement       Anesthesia Type:  Spinal    Note:  Disposition: Outpatient   Postop Pain Control: Uneventful            Sign Out: Well controlled pain   PONV: No   Neuro/Psych: Uneventful            Sign Out: Acceptable/Baseline neuro status   Airway/Respiratory: Uneventful            Sign Out: Acceptable/Baseline resp. status   CV/Hemodynamics: Uneventful            Sign Out: Acceptable CV status   Other NRE: NONE   DID A NON-ROUTINE EVENT OCCUR? No    Event details/Postop Comments:  Pt was happy with anesthesia care.  No complications.  I will follow up with the pt if needed.           Last vitals:  Vitals Value Taken Time   /69 06/10/25 12:10   Temp 98.24  F (36.8  C) 06/10/25 12:15   Pulse 83 06/10/25 12:13   Resp 17 06/10/25 12:14   SpO2 98 % 06/10/25 12:14   Vitals shown include unfiled device data.    Electronically Signed By: EDUARD Lopez CRNA  Mavis 10, 2025  5:01 PM   Spoke with patient, she is aware of appointment time and date with Dr. Machado.    n/a

## 2025-07-23 NOTE — LETTER
7/23/2025      Marcelina Estevez  [address] [place] [address]      Dear Colleague,    Thank you for referring your patient, Marcelina Estevez, to the [facility]. Please see a copy of my visit note below.    Orthopedic Clinic Post-Operative Note    CHIEF COMPLAINT:   Chief Complaint   Patient presents with     Right Knee - Total Joint Post-op       HISTORY OF PRESENT ILLNESS  Today's visit:  Doing great.  Very happy.  No pain.    June 23, 2025 office visit:  Overall doing well.  Attending therapy.  Doing well.  Pain is controlled.  Using oxycodone.  Taking Eliquis.        Mavis 10, 2025 surgery: Right total knee replacement           May 5, 2025 office visit:  Right knee continues to worsen.  Now more painful than the left knee prior to replacement. Reports can only walk a short distance due to the pain and has had to resort to wheelchair for longer distances and when recently in the airport.  The pain is waking her at night.  Left knee doing excellent. Would like the right knee replaced.   Used eliquis after last surgery due to previous ischemic bowel issues and inability to take NSAIDs, did therapy at [place] in [facility].         November 22, 2024 office visit:  Patient has been doing excellent with no complaints.  She passed and graduated formal physical therapy already with flexion of 135 degrees.  Denies any numbness or tingling or any problems with the incision.  Had some soreness on the right knee but mostly because she is compensating.  States eventually she would want the right knee replaced if it came to that.     October 16, 2024 visit:  Returns. Reports overall doing very well. Attending therapy with [facility]. States feeling good. Improving her activity. Walking without any aid. Happy with progress. Feels much better than prior to surgery.  She does note recently had 2 small spots towards the top of her incision that bled a slight amount. No current drainage. Scabbed over and now healing.   Was using some lotion to the area.  Denies any redness, any fevers, feeling sick, increasing pain, etc.         September 18, 2024 visit:  Pain is controlled.  She has been utilizing the oxycodone.  She decreased her exercises due to her bleeding.  No further bleeding issues        September 12 2024 visit:  Total knee replacement on 9/11/24.  Discharged 9/12/24 without incident. She reports had a pin sized shirin of blood on the aquacell when she went home on the 12th. She started to do frequent exercises including multiple rounds of knee flexion. Started to notice more drainage. Called the clinic. Was recommended to continue to monitor. This AM had further drainage and started to soak into the steven stockings came back in. Pain controlled. No new injuries.  Otherwise doing as expected. Taking eliquis for DVT prevention.         September 10, 2024 status post left total knee arthroplasty     August 14, 2024 office visit:  Previously seen by Dr. Abrams for left knee pain.  His visit on July 10, 2024 was reviewed.  Concerns for inflammatory arthritis.  Recent pulmonology visit on July 31, 2020 for review as well.   She reports 20+ years of progressive left knee pain. Now reports the right knee and hip are starting to hurt due to increasing pain and limping with the left knee. No specific injuries recently. Reports just getting progressively worse.   Treatments tried: previous knee arthroscopy with meniscus debridement, multiple cortisone injections, gel injections, physical therapy in the past, rest, activity modification, OTC medications including nsaids and tylenol.  Despite this progressive constant pain limiting her activity, she reports able to walk less than 1 block without stopping due to pain, limited other rec. activities as well as limiting ability to play with her grandkids, clean, and kneel,. These symptoms have been ongoing for long time but worse the last year.       Reports seen by pulmonologist recently  due to long term systemic sclerosis.  Was told pulmonary function and chest imaging was largely unchanged but was recommended for further cardiac workup and testing. Denies any home 02 use. States lungs and breathing has felt at baseline and not limiting.           Patient's past medical, surgical, social and family histories reviewed.     Past Medical History:   Diagnosis Date     CREST (calcinosis, Raynaud's phenomenon, esophageal dysfunction, sclerodactyly, telangiectasia) (H)      Hyperlipidemia      Hypertension      Hypertriglyceridemia 01/18/2011    Controlled with simvastatin.        Limited systemic sclerosis (H)      Menopausal syndrome (hot flashes) 12/19/2013     Positive MINDY (antinuclear antibody)      Raynaud disease        Past Surgical History:   Procedure Laterality Date     APPENDECTOMY       ARTHROPLASTY, KNEE, ROBOT ASSISTED, USING RAMILA Left 9/10/2024    Procedure: left knee ramila assisted total arthroplasty;  Surgeon: Rehan Spring DO;  Location: [facility]     ARTHROPLASTY, KNEE, ROBOT ASSISTED, USING RAMILA Right 6/10/2025    Procedure: right ramila assisted total knee replacement;  Surgeon: Rehan Spring DO;  Location: [facility]     BIOPSY      cervical node     COLONOSCOPY N/A 11/10/2021    Procedure: COLONOSCOPY, FLEXIBLE, WITH LESION REMOVAL USING SNARE;  Surgeon: Domingo Wall MD;  Location: [facility]     COLONOSCOPY N/A 11/10/2021    Procedure: COLONOSCOPY, WITH POLYPECTOMY AND BIOPSY;  Surgeon: Domingo Wall MD;  Location: [facility]     COLONOSCOPY N/A 9/29/2021    Procedure: Colonoscopy, With Polypectomy And Biopsy;  Surgeon: Domingo Wall MD;  Location: [facility]     COLONOSCOPY N/A 9/29/2021    Procedure: Colonoscopy, Flexible, With Lesion Removal Using Snare;  Surgeon: Domingo Wall MD;  Location: [facility]     COLONOSCOPY N/A 6/29/2022    Procedure: COLONOSCOPY, FLEXIBLE, WITH LESION REMOVAL USING SNARE;  Surgeon: Duke  Domingo Lambert MD;  Location: [facility]     COLONOSCOPY N/A 11/9/2022    Procedure: COLONOSCOPY, WITH POLYPECTOMY AND BIOPSY;  Surgeon: Domingo Wall MD;  Location: [facility]     COLONOSCOPY N/A 6/12/2023    Procedure: COLONOSCOPY, FLEXIBLE, WITH LESION REMOVAL USING SNARE;  Surgeon: Domingo Wall MD;  Location: [facility]     COLONOSCOPY WITH CO2 INSUFFLATION N/A 10/26/2020    Procedure: COLONOSCOPY, WITH CO2 INSUFFLATION;  Surgeon: Phyllis Chaudhari MD;  Location: [facility]     COLONOSCOPY WITH CO2 INSUFFLATION N/A 11/10/2021    Procedure: COLONOSCOPY, WITH CO2 INSUFFLATION;  Surgeon: Domingo Wall MD;  [facility]     COLONOSCOPY WITH CO2 INSUFFLATION N/A 9/29/2021    Procedure: COLONOSCOPY, WITH CO2 INSUFFLATION;  Surgeon: Domingo Wall MD;  [facility]     COLONOSCOPY WITH CO2 INSUFFLATION N/A 6/29/2022    Procedure: COLONOSCOPY, WITH CO2 INSUFFLATION;  Surgeon: Domingo Wall MD;  [facility]     COLONOSCOPY WITH CO2 INSUFFLATION N/A 11/9/2022    Procedure: COLONOSCOPY, WITH CO2 INSUFFLATION;  Surgeon: Domingo Wall MD;  [facility]     COLONOSCOPY WITH CO2 INSUFFLATION N/A 6/12/2023    Procedure: Colonoscopy with CO2 insufflation;  Surgeon: Domingo Wall MD;  [facility]     COLONOSCOPY WITH CO2 INSUFFLATION N/A 7/3/2024    Procedure: Colonoscopy with CO2 insufflation;  Surgeon: Domingo Wall MD;  Location: [facility]     COMBINED ESOPHAGOSCOPY, GASTROSCOPY, DUODENOSCOPY (EGD) WITH CO2 INSUFFLATION N/A 6/29/2022    Procedure: ESOPHAGOGASTRODUODENOSCOPY, WITH CO2 INSUFFLATION;  Surgeon: Domingo Wall MD;  Location: [facility]     DILATION AND CURETTAGE, HYSTEROSCOPY DIAGNOSTIC, COMBINED  7/1/2014    Procedure: COMBINED DILATION AND CURETTAGE, HYSTEROSCOPY DIAGNOSTIC;  Surgeon: Mana Cabrera DO;  Location: [facility]     ENT SURGERY      tonsillectomy and adnoid removal      ESOPHAGOSCOPY, GASTROSCOPY, DUODENOSCOPY (EGD), COMBINED N/A 6/29/2022    Procedure: ESOPHAGOGASTRODUODENOSCOPY, WITH BIOPSY;  Surgeon: Domingo Wall MD;  Location: [facility]     HYSTERECTOMY TOTAL ABDOMINAL       ORTHOPEDIC SURGERY      left knee tear meniscus     RELEASE CARPAL TUNNEL BILATERAL  2009       Medications:  Current Outpatient Medications   Medication Sig Dispense Refill     acetaminophen (TYLENOL) 500 MG tablet Take 2 tablets (1,000 mg) by mouth every 8 hours as needed for other (mild pain). 60 tablet 1     albuterol (PROAIR HFA) 108 (90 Base) MCG/ACT inhaler INHALE 2 PUFS BY MOUTH EVERY 6 HOURS AS NEEDED FOR SHORTNESS OF BREATH / DYSPNEA 18 g 5     ALLEGRA ALLERGY 180 MG tablet Take 1 tablet (180 mg) by mouth daily 90 tablet 3     allopurinol (ZYLOPRIM) 100 MG tablet Take 1 tablet (100 mg) by mouth 2 times daily.       ARIPiprazole (ABILIFY) 2 MG tablet Take 1 tablet (2 mg) by mouth daily. 30 tablet 1     aspirin 81 MG EC tablet Take 1 tablet (81 mg) by mouth daily. (Patient not taking: Reported on 7/23/2025)       busPIRone (BUSPAR) 5 MG tablet Take 1 tablet (5 mg) by mouth 2 times daily. 180 tablet 0     DULoxetine (CYMBALTA) 60 MG capsule Take 1 capsule (60 mg) by mouth 2 times daily. 180 capsule 0     fluticasone (FLONASE) 50 MCG/ACT nasal spray Spray 1 spray into both nostrils daily (Patient not taking: Reported on 7/15/2025) 16 mL 5     hydroxychloroquine (PLAQUENIL) 200 MG tablet Take 1.5 tablet daily 45 tablet 1     isosorbide mononitrate (IMDUR) 30 MG 24 hr tablet Take 1 tablet (30 mg) by mouth daily. 90 tablet 0     LORazepam (ATIVAN) 0.5 MG tablet Take 1 tablet (0.5 mg) by mouth 2 times daily as needed for anxiety. 30 tablet 0     losartan (COZAAR) 25 MG tablet Take 1 tablet (25 mg) by mouth daily. 90 tablet 1     NIFEdipine ER (ADALAT CC) 30 MG 24 hr tablet Take 1 tablet (30 mg) by mouth daily. 90 tablet 0     order for DME Equipment being ordered: light lamp for seasonal  affective disorder (Patient not taking: Reported on 2025) 1 Device 0     pantoprazole (PROTONIX) 40 MG EC tablet Take 1 tablet (40 mg) by mouth daily. 90 tablet 1     vitamin D3 (CHOLECALCIFEROL) 50 mcg (2000 units) tablet Take 1 tablet (50 mcg) by mouth daily. 100 tablet 1     No current facility-administered medications for this visit.     Facility-Administered Medications Ordered in Other Visits   Medication Dose Route Frequency Provider Last Rate Last Admin     sodium chloride (PF) 0.9% PF flush 10 mL  10 mL Intravenous Once Hilda Lopez MD           Allergies   Allergen Reactions     Hydroxyzine      Made her very tired     Other Environmental Allergy Other (See Comments)     Seasonal Allergies      Sulfa Antibiotics      Vomiting         Social History     Occupational History     Not on file   Tobacco Use     Smoking status: Former     Current packs/day: 0.00     Average packs/day: 0.3 packs/day for 21.0 years (5.3 ttl pk-yrs)     Types: Cigarettes     Start date:      Quit date: 2001     Years since quittin.5     Smokeless tobacco: Never   Vaping Use     Vaping status: Never Used   Substance and Sexual Activity     Alcohol use: Yes     Comment: 5-8 drinks/week     Drug use: No     Sexual activity: Yes     Partners: Male     Birth control/protection: Surgical     Comment: vasectomy       Family History   Problem Relation Age of Onset     Osteoporosis Mother      Eye Disorder Mother         cataract, mac degen     Macular Degeneration Mother      Dementia Mother      Unknown/Adopted Father      No Known Problems Maternal Half-Sister      Hypertension Maternal Grandmother      Diabetes Maternal Grandfather      Eye Disorder Paternal Grandmother         glaucoma     Unknown/Adopted Paternal Grandfather      Hypertension Daughter      Graves' disease Daughter      No Known Problems Son      Diabetes Other      Glaucoma No family hx of        REVIEW OF SYSTEMS  General: negative for, night  sweats, dizziness, fatigue  Resp: No shortness of breath and no cough  CV: negative for chest pain, syncope or near-syncope  GI: negative for nausea, vomiting and diarrhea  : negative for dysuria and hematuria  Musculoskeletal: as above  Neurologic: negative for syncope   Hematologic: negative for bleeding disorder    Physical Exam:  Vitals: [facility] 06/12/2014   BMI= There is no height or weight on file to calculate BMI.  Constitutional: healthy, alert and no acute distress   Psychiatric: mentation appears normal and affect normal/bright  NEURO: no focal deficits  SKIN: .well healed, no erythema, no incision breakdown and no drainage.  JOINT/EXTREMITIES: Active motion 5-125 degrees.  Passive full extension.  No instability.  No effusion.  No areas of tenderness  GAIT: non-antalgic    Diagnostic Modalities:  None today.  Independent visualization of the images was performed.      Impression:   Chief Complaint   Patient presents with     Right Knee - Total Joint Post-op   Right total knee replacement-Mavis 10, 2025 (6 weeks 1 day)    Plan:   Doing great.  She feels great.  She is very happy.  Continue working on exercises.  Follow-up in 6 weeks as needed  Return to clinic 6, week(s), PRN, or sooner as needed for changes.    Re-x-ray on return: Yes.    Vince Spring D.O.    Again, thank you for allowing me to participate in the care of your patient.        Sincerely,        Rehan Spring, DO    Electronically signed

## 2025-08-12 ENCOUNTER — TELEPHONE (OUTPATIENT)
Dept: GASTROENTEROLOGY | Facility: CLINIC | Age: 65
End: 2025-08-12
Payer: MEDICARE

## 2025-08-13 ENCOUNTER — OFFICE VISIT (OUTPATIENT)
Dept: ORTHOPEDICS | Facility: CLINIC | Age: 65
End: 2025-08-13
Payer: MEDICARE

## 2025-08-13 ENCOUNTER — ANCILLARY PROCEDURE (OUTPATIENT)
Dept: GENERAL RADIOLOGY | Facility: CLINIC | Age: 65
End: 2025-08-13
Attending: STUDENT IN AN ORGANIZED HEALTH CARE EDUCATION/TRAINING PROGRAM
Payer: MEDICARE

## 2025-08-13 DIAGNOSIS — M25.532 LEFT WRIST PAIN: Primary | ICD-10-CM

## 2025-08-13 DIAGNOSIS — M25.532 LEFT WRIST PAIN: ICD-10-CM

## 2025-08-13 DIAGNOSIS — M18.0 PRIMARY OSTEOARTHRITIS OF BOTH FIRST CARPOMETACARPAL JOINTS: Primary | ICD-10-CM

## 2025-08-13 PROCEDURE — 99213 OFFICE O/P EST LOW 20 MIN: CPT | Mod: 24 | Performed by: STUDENT IN AN ORGANIZED HEALTH CARE EDUCATION/TRAINING PROGRAM

## 2025-08-13 PROCEDURE — 73110 X-RAY EXAM OF WRIST: CPT | Mod: TC | Performed by: RADIOLOGY

## 2025-08-13 PROCEDURE — 20604 DRAIN/INJ JOINT/BURSA W/US: CPT | Mod: 79 | Performed by: STUDENT IN AN ORGANIZED HEALTH CARE EDUCATION/TRAINING PROGRAM

## 2025-08-13 RX ORDER — LIDOCAINE HYDROCHLORIDE 10 MG/ML
0.5 INJECTION, SOLUTION INFILTRATION; PERINEURAL
Status: COMPLETED | OUTPATIENT
Start: 2025-08-13 | End: 2025-08-13

## 2025-08-13 RX ORDER — TRIAMCINOLONE ACETONIDE 40 MG/ML
20 INJECTION, SUSPENSION INTRA-ARTICULAR; INTRAMUSCULAR
Status: COMPLETED | OUTPATIENT
Start: 2025-08-13 | End: 2025-08-13

## 2025-08-13 RX ADMIN — TRIAMCINOLONE ACETONIDE 20 MG: 40 INJECTION, SUSPENSION INTRA-ARTICULAR; INTRAMUSCULAR at 13:47

## 2025-08-13 RX ADMIN — LIDOCAINE HYDROCHLORIDE 0.5 ML: 10 INJECTION, SOLUTION INFILTRATION; PERINEURAL at 13:47

## 2025-08-28 ENCOUNTER — TELEPHONE (OUTPATIENT)
Dept: ORTHOPEDICS | Facility: CLINIC | Age: 65
End: 2025-08-28
Payer: MEDICARE

## 2025-08-28 DIAGNOSIS — Z96.651 S/P TOTAL KNEE REPLACEMENT USING CEMENT, RIGHT: Primary | ICD-10-CM

## 2025-08-28 RX ORDER — AMOXICILLIN 500 MG/1
2000 CAPSULE ORAL ONCE
Qty: 4 CAPSULE | Refills: 0 | Status: SHIPPED | OUTPATIENT
Start: 2025-08-28 | End: 2025-08-28

## 2025-09-02 ENCOUNTER — ANESTHESIA EVENT (OUTPATIENT)
Dept: GASTROENTEROLOGY | Facility: CLINIC | Age: 65
End: 2025-09-02
Payer: MEDICARE

## 2025-09-02 ENCOUNTER — HOSPITAL ENCOUNTER (OUTPATIENT)
Facility: CLINIC | Age: 65
Discharge: HOME | End: 2025-09-02
Attending: INTERNAL MEDICINE | Admitting: INTERNAL MEDICINE
Payer: MEDICARE

## 2025-09-02 ENCOUNTER — ANESTHESIA (OUTPATIENT)
Dept: GASTROENTEROLOGY | Facility: CLINIC | Age: 65
End: 2025-09-02
Payer: MEDICARE

## 2025-09-02 VITALS — DIASTOLIC BLOOD PRESSURE: 87 MMHG | OXYGEN SATURATION: 97 % | HEART RATE: 66 BPM | SYSTOLIC BLOOD PRESSURE: 152 MMHG

## 2025-09-02 LAB — UPPER GI ENDOSCOPY: NORMAL

## 2025-09-02 PROCEDURE — 43245 EGD DILATE STRICTURE: CPT | Performed by: INTERNAL MEDICINE

## 2025-09-02 PROCEDURE — 43249 ESOPH EGD DILATION <30 MM: CPT | Performed by: INTERNAL MEDICINE

## 2025-09-02 PROCEDURE — 88305 TISSUE EXAM BY PATHOLOGIST: CPT | Mod: TC | Performed by: INTERNAL MEDICINE

## 2025-09-02 PROCEDURE — 250N000009 HC RX 250: Performed by: NURSE ANESTHETIST, CERTIFIED REGISTERED

## 2025-09-02 PROCEDURE — 250N000011 HC RX IP 250 OP 636: Performed by: NURSE ANESTHETIST, CERTIFIED REGISTERED

## 2025-09-02 PROCEDURE — 258N000003 HC RX IP 258 OP 636: Performed by: NURSE ANESTHETIST, CERTIFIED REGISTERED

## 2025-09-02 PROCEDURE — 370N000017 HC ANESTHESIA TECHNICAL FEE, PER MIN: Performed by: INTERNAL MEDICINE

## 2025-09-02 PROCEDURE — 43239 EGD BIOPSY SINGLE/MULTIPLE: CPT | Performed by: INTERNAL MEDICINE

## 2025-09-02 RX ORDER — ONDANSETRON 4 MG/1
4 TABLET, ORALLY DISINTEGRATING ORAL EVERY 30 MIN PRN
Status: DISCONTINUED | OUTPATIENT
Start: 2025-09-02 | End: 2025-09-02 | Stop reason: HOSPADM

## 2025-09-02 RX ORDER — DEXAMETHASONE SODIUM PHOSPHATE 10 MG/ML
4 INJECTION, SOLUTION INTRAMUSCULAR; INTRAVENOUS
Status: DISCONTINUED | OUTPATIENT
Start: 2025-09-02 | End: 2025-09-02 | Stop reason: HOSPADM

## 2025-09-02 RX ORDER — ONDANSETRON 2 MG/ML
4 INJECTION INTRAMUSCULAR; INTRAVENOUS EVERY 30 MIN PRN
Status: DISCONTINUED | OUTPATIENT
Start: 2025-09-02 | End: 2025-09-02 | Stop reason: HOSPADM

## 2025-09-02 RX ORDER — SODIUM CHLORIDE, SODIUM LACTATE, POTASSIUM CHLORIDE, CALCIUM CHLORIDE 600; 310; 30; 20 MG/100ML; MG/100ML; MG/100ML; MG/100ML
INJECTION, SOLUTION INTRAVENOUS CONTINUOUS
Status: DISCONTINUED | OUTPATIENT
Start: 2025-09-02 | End: 2025-09-02 | Stop reason: HOSPADM

## 2025-09-02 RX ORDER — NALOXONE HYDROCHLORIDE 0.4 MG/ML
0.1 INJECTION, SOLUTION INTRAMUSCULAR; INTRAVENOUS; SUBCUTANEOUS
Status: DISCONTINUED | OUTPATIENT
Start: 2025-09-02 | End: 2025-09-02 | Stop reason: HOSPADM

## 2025-09-02 RX ORDER — LIDOCAINE HYDROCHLORIDE 10 MG/ML
INJECTION, SOLUTION EPIDURAL; INFILTRATION; INTRACAUDAL; PERINEURAL PRN
Status: DISCONTINUED | OUTPATIENT
Start: 2025-09-02 | End: 2025-09-02

## 2025-09-02 RX ORDER — PROPOFOL 10 MG/ML
INJECTION, EMULSION INTRAVENOUS PRN
Status: DISCONTINUED | OUTPATIENT
Start: 2025-09-02 | End: 2025-09-02

## 2025-09-02 RX ADMIN — PROPOFOL 30 MG: 10 INJECTION, EMULSION INTRAVENOUS at 10:58

## 2025-09-02 RX ADMIN — PROPOFOL 30 MG: 10 INJECTION, EMULSION INTRAVENOUS at 10:56

## 2025-09-02 RX ADMIN — SODIUM CHLORIDE, SODIUM LACTATE, POTASSIUM CHLORIDE, AND CALCIUM CHLORIDE: .6; .31; .03; .02 INJECTION, SOLUTION INTRAVENOUS at 10:25

## 2025-09-02 RX ADMIN — LIDOCAINE HYDROCHLORIDE 50 MG: 10 INJECTION, SOLUTION EPIDURAL; INFILTRATION; INTRACAUDAL; PERINEURAL at 10:46

## 2025-09-02 RX ADMIN — PROPOFOL 30 MG: 10 INJECTION, EMULSION INTRAVENOUS at 10:51

## 2025-09-02 RX ADMIN — PROPOFOL 30 MG: 10 INJECTION, EMULSION INTRAVENOUS at 10:47

## 2025-09-02 RX ADMIN — PROPOFOL 30 MG: 10 INJECTION, EMULSION INTRAVENOUS at 10:49

## 2025-09-02 RX ADMIN — PROPOFOL 30 MG: 10 INJECTION, EMULSION INTRAVENOUS at 10:54

## 2025-09-02 RX ADMIN — PROPOFOL 30 MG: 10 INJECTION, EMULSION INTRAVENOUS at 10:53

## 2025-09-02 RX ADMIN — PROPOFOL 90 MG: 10 INJECTION, EMULSION INTRAVENOUS at 10:46

## 2025-09-02 ASSESSMENT — LIFESTYLE VARIABLES: TOBACCO_USE: 1

## 2025-09-02 ASSESSMENT — ACTIVITIES OF DAILY LIVING (ADL)
ADLS_ACUITY_SCORE: 55
ADLS_ACUITY_SCORE: 55

## 2025-09-04 PROCEDURE — 88305 TISSUE EXAM BY PATHOLOGIST: CPT | Mod: 26 | Performed by: PATHOLOGY

## (undated) DEVICE — SOL WATER IRRIG 1000ML BOTTLE 07139-09

## (undated) DEVICE — BONE CLEANING TIP INTERPULSE  0210-010-000

## (undated) DEVICE — BNDG ESMARK 6" STERILE

## (undated) DEVICE — PAD CHUX UNDERPAD 23X24" 7136

## (undated) DEVICE — CAST PADDING 6" WEBRIL STERILE

## (undated) DEVICE — BNDG COBAN 6"X5YDS STERILE

## (undated) DEVICE — SUCTION TIP YANKAUER ORTHO SUPER SUCKER EFS-111

## (undated) DEVICE — SU VICRYL 0 OS-6 18" UND J754T

## (undated) DEVICE — PREP CHLORAPREP 26ML TINTED ORANGE  260815

## (undated) DEVICE — SU DERMABOND ADVANCED .7ML DNX12

## (undated) DEVICE — BLADE SAW SAGITTAL MAKO STANDARD STERILE DISP 116170

## (undated) DEVICE — GLOVE BIOGEL INDICATOR 7.5 LF 41675

## (undated) DEVICE — BLADE SAW OSCILLATING  99MMX2MM THK MAKO NAR STRL 116171

## (undated) DEVICE — KIT DRAPE RIO 1 PIECE POCKET RIO 111320

## (undated) DEVICE — KIT ENDO FIRST STEP DISINFECTANT 200ML W/POUCH EP-4

## (undated) DEVICE — SYR INFLATOR AND GAUGE 60ML M00550601

## (undated) DEVICE — NDL COUNTER 40CT  31142311

## (undated) DEVICE — GLOVE BIOGEL PI INDICATOR 8.0 LF 41680

## (undated) DEVICE — DRAPE CONVERTORS U-DRAPE 60X72" 8476

## (undated) DEVICE — DRAPE POUCH INSTRUMENT 1018

## (undated) DEVICE — SUCTION IRR SYSTEM W/O TIP INTERPULSE HANDPIECE 0210-100-000

## (undated) DEVICE — ESU PENCIL SMOKE EVAC W/ROCKER SWITCH 0703-047-000

## (undated) DEVICE — TUBING SUCTION 12"X1/4" N612

## (undated) DEVICE — DRSG AQUACEL AG 3.5X14"  422607

## (undated) DEVICE — CORD RETRACTION SILICON 111619

## (undated) DEVICE — HOOD FLYTE 0408-800-000

## (undated) DEVICE — SOL NACL 0.9% IRRIG 3000ML BAG 07972-08

## (undated) DEVICE — LUBRICATING JELLY 4.25OZ

## (undated) DEVICE — GLOVE BIOGEL PI ULTRATOUCH G SZ 7.5 42175

## (undated) DEVICE — SU VICRYL+ 2-0 CP-2 18IN UND VCP762D

## (undated) DEVICE — SU VICRYL+ 0 OS-6 18IN UND VCP754T

## (undated) DEVICE — SU MONOCRYL 4-0 PS-2 18" UND Y496G

## (undated) DEVICE — DRAPE EXTREMITY W/ARMBOARD 29405

## (undated) DEVICE — SOL NACL 0.9% IRRIG 1000ML BOTTLE 07138-09

## (undated) DEVICE — CAST PADDING 6" UNSTERILE 9046

## (undated) DEVICE — PEN MARKING SKIN

## (undated) DEVICE — MARKER SURGICAL CHECKPOINT KIT STRL LF DISP 111645

## (undated) DEVICE — SYR EAR BULB 2OZ

## (undated) DEVICE — TOURNIQUET SGL  BLADDER 30"X4" BLUE 5921030135

## (undated) DEVICE — ENDO CATH ESOPH BALLOON 15-16.5-18MMX180CM CRE FIXED WIRE

## (undated) DEVICE — TOURNIQUET CUFF 34"

## (undated) DEVICE — BONE CEMENT MIXING BOWL W/SPATULA

## (undated) DEVICE — PACK TOTAL JOINT STD LATEX

## (undated) DEVICE — POSITIONER OR LEG KIT DISPOSABLE 111618

## (undated) DEVICE — IMM KNEE 20" 79-80020

## (undated) DEVICE — ENDO FORCEP ENDOJAW BIOPSY 2.8MMX230CM FB-220U

## (undated) DEVICE — SU VICRYL 2-0 CP-2 18" UND J762D

## (undated) DEVICE — MARKER SYS NAVIGATION VIZADISC KNEE TRACK KIT STRL LF 107120

## (undated) DEVICE — Device

## (undated) DEVICE — KIT ENDO TURNOVER/PROCEDURE CARRY-ON 101822

## (undated) RX ORDER — PROPOFOL 10 MG/ML
INJECTION, EMULSION INTRAVENOUS
Status: DISPENSED
Start: 2025-06-10

## (undated) RX ORDER — BUPIVACAINE HYDROCHLORIDE AND EPINEPHRINE 5; 5 MG/ML; UG/ML
INJECTION, SOLUTION EPIDURAL; INTRACAUDAL; PERINEURAL
Status: DISPENSED
Start: 2025-06-10

## (undated) RX ORDER — PROPOFOL 10 MG/ML
INJECTION, EMULSION INTRAVENOUS
Status: DISPENSED
Start: 2025-09-02

## (undated) RX ORDER — BUPIVACAINE HYDROCHLORIDE AND EPINEPHRINE 5; 5 MG/ML; UG/ML
INJECTION, SOLUTION EPIDURAL; INTRACAUDAL; PERINEURAL
Status: DISPENSED
Start: 2024-09-10

## (undated) RX ORDER — ONDANSETRON 2 MG/ML
INJECTION INTRAMUSCULAR; INTRAVENOUS
Status: DISPENSED
Start: 2024-09-10

## (undated) RX ORDER — FENTANYL CITRATE 50 UG/ML
INJECTION, SOLUTION INTRAMUSCULAR; INTRAVENOUS
Status: DISPENSED
Start: 2022-11-09

## (undated) RX ORDER — SODIUM CHLORIDE 9 MG/ML
INJECTION, SOLUTION INTRAVENOUS
Status: DISPENSED
Start: 2025-06-10

## (undated) RX ORDER — FENTANYL CITRATE 50 UG/ML
INJECTION, SOLUTION INTRAMUSCULAR; INTRAVENOUS
Status: DISPENSED
Start: 2020-10-26

## (undated) RX ORDER — DIPHENHYDRAMINE HYDROCHLORIDE 50 MG/ML
INJECTION INTRAMUSCULAR; INTRAVENOUS
Status: DISPENSED
Start: 2020-10-26

## (undated) RX ORDER — EPINEPHRINE 1 MG/ML
INJECTION, SOLUTION INTRAMUSCULAR; SUBCUTANEOUS
Status: DISPENSED
Start: 2021-11-10

## (undated) RX ORDER — FENTANYL CITRATE 50 UG/ML
INJECTION, SOLUTION INTRAMUSCULAR; INTRAVENOUS
Status: DISPENSED
Start: 2025-06-10

## (undated) RX ORDER — PHENYLEPHRINE HYDROCHLORIDE 10 MG/ML
INJECTION INTRAVENOUS
Status: DISPENSED
Start: 2025-06-10

## (undated) RX ORDER — LIDOCAINE HYDROCHLORIDE 20 MG/ML
SOLUTION OROPHARYNGEAL
Status: DISPENSED
Start: 2022-06-29

## (undated) RX ORDER — LIDOCAINE HYDROCHLORIDE 10 MG/ML
INJECTION, SOLUTION EPIDURAL; INFILTRATION; INTRACAUDAL; PERINEURAL
Status: DISPENSED
Start: 2025-09-02

## (undated) RX ORDER — FENTANYL CITRATE 50 UG/ML
INJECTION, SOLUTION INTRAMUSCULAR; INTRAVENOUS
Status: DISPENSED
Start: 2024-09-10

## (undated) RX ORDER — SIMETHICONE 40MG/0.6ML
SUSPENSION, DROPS(FINAL DOSAGE FORM)(ML) ORAL
Status: DISPENSED
Start: 2021-11-10